# Patient Record
Sex: FEMALE | Race: WHITE | NOT HISPANIC OR LATINO | URBAN - METROPOLITAN AREA
[De-identification: names, ages, dates, MRNs, and addresses within clinical notes are randomized per-mention and may not be internally consistent; named-entity substitution may affect disease eponyms.]

---

## 2022-09-01 ENCOUNTER — APPOINTMENT (EMERGENCY)
Dept: CT IMAGING | Facility: HOSPITAL | Age: 57
End: 2022-09-01
Payer: COMMERCIAL

## 2022-09-01 ENCOUNTER — APPOINTMENT (OUTPATIENT)
Dept: MRI IMAGING | Facility: HOSPITAL | Age: 57
End: 2022-09-01
Payer: COMMERCIAL

## 2022-09-01 ENCOUNTER — HOSPITAL ENCOUNTER (OUTPATIENT)
Facility: HOSPITAL | Age: 57
Setting detail: OBSERVATION
Discharge: HOME HEALTH | End: 2022-09-02
Attending: EMERGENCY MEDICINE | Admitting: INTERNAL MEDICINE
Payer: COMMERCIAL

## 2022-09-01 DIAGNOSIS — R29.90 STROKE-LIKE SYMPTOM: Primary | ICD-10-CM

## 2022-09-01 DIAGNOSIS — I63.9 ACUTE CVA (CEREBROVASCULAR ACCIDENT) (HCC): ICD-10-CM

## 2022-09-01 PROBLEM — I10 ESSENTIAL HYPERTENSION: Status: ACTIVE | Noted: 2022-09-01

## 2022-09-01 PROBLEM — Z72.0 TOBACCO ABUSE: Status: ACTIVE | Noted: 2022-09-01

## 2022-09-01 LAB
2HR DELTA HS TROPONIN: 0 NG/L
4HR DELTA HS TROPONIN: 1 NG/L
ANION GAP SERPL CALCULATED.3IONS-SCNC: 11 MMOL/L (ref 4–13)
APTT PPP: 33 SECONDS (ref 23–37)
ATRIAL RATE: 70 BPM
BUN SERPL-MCNC: 11 MG/DL (ref 5–25)
CALCIUM SERPL-MCNC: 8.9 MG/DL (ref 8.3–10.1)
CARDIAC TROPONIN I PNL SERPL HS: 4 NG/L
CARDIAC TROPONIN I PNL SERPL HS: 4 NG/L
CARDIAC TROPONIN I PNL SERPL HS: 5 NG/L
CHLORIDE SERPL-SCNC: 100 MMOL/L (ref 96–108)
CO2 SERPL-SCNC: 27 MMOL/L (ref 21–32)
CREAT SERPL-MCNC: 0.84 MG/DL (ref 0.6–1.3)
ERYTHROCYTE [DISTWIDTH] IN BLOOD BY AUTOMATED COUNT: 12.5 % (ref 11.6–15.1)
GFR SERPL CREATININE-BSD FRML MDRD: 77 ML/MIN/1.73SQ M
GLUCOSE SERPL-MCNC: 124 MG/DL (ref 65–140)
GLUCOSE SERPL-MCNC: 94 MG/DL (ref 65–140)
HCT VFR BLD AUTO: 42.8 % (ref 34.8–46.1)
HGB BLD-MCNC: 14.4 G/DL (ref 11.5–15.4)
INR PPP: 1.08 (ref 0.84–1.19)
MCH RBC QN AUTO: 32.9 PG (ref 26.8–34.3)
MCHC RBC AUTO-ENTMCNC: 33.6 G/DL (ref 31.4–37.4)
MCV RBC AUTO: 98 FL (ref 82–98)
P AXIS: 68 DEGREES
PLATELET # BLD AUTO: 218 THOUSANDS/UL (ref 149–390)
PMV BLD AUTO: 10.9 FL (ref 8.9–12.7)
POTASSIUM SERPL-SCNC: 4 MMOL/L (ref 3.5–5.3)
PR INTERVAL: 174 MS
PROTHROMBIN TIME: 14.8 SECONDS (ref 11.6–14.5)
QRS AXIS: 55 DEGREES
QRSD INTERVAL: 96 MS
QT INTERVAL: 416 MS
QTC INTERVAL: 449 MS
RBC # BLD AUTO: 4.38 MILLION/UL (ref 3.81–5.12)
SODIUM SERPL-SCNC: 138 MMOL/L (ref 135–147)
T WAVE AXIS: 33 DEGREES
VENTRICULAR RATE: 70 BPM
WBC # BLD AUTO: 6.22 THOUSAND/UL (ref 4.31–10.16)

## 2022-09-01 PROCEDURE — 84484 ASSAY OF TROPONIN QUANT: CPT | Performed by: PHYSICIAN ASSISTANT

## 2022-09-01 PROCEDURE — 82948 REAGENT STRIP/BLOOD GLUCOSE: CPT

## 2022-09-01 PROCEDURE — 85027 COMPLETE CBC AUTOMATED: CPT | Performed by: EMERGENCY MEDICINE

## 2022-09-01 PROCEDURE — G1004 CDSM NDSC: HCPCS

## 2022-09-01 PROCEDURE — 70498 CT ANGIOGRAPHY NECK: CPT

## 2022-09-01 PROCEDURE — 85730 THROMBOPLASTIN TIME PARTIAL: CPT | Performed by: EMERGENCY MEDICINE

## 2022-09-01 PROCEDURE — 93005 ELECTROCARDIOGRAM TRACING: CPT

## 2022-09-01 PROCEDURE — 93010 ELECTROCARDIOGRAM REPORT: CPT | Performed by: INTERNAL MEDICINE

## 2022-09-01 PROCEDURE — 99285 EMERGENCY DEPT VISIT HI MDM: CPT | Performed by: EMERGENCY MEDICINE

## 2022-09-01 PROCEDURE — 84484 ASSAY OF TROPONIN QUANT: CPT | Performed by: EMERGENCY MEDICINE

## 2022-09-01 PROCEDURE — 99291 CRITICAL CARE FIRST HOUR: CPT | Performed by: PHYSICIAN ASSISTANT

## 2022-09-01 PROCEDURE — 99220 PR INITIAL OBSERVATION CARE/DAY 70 MINUTES: CPT | Performed by: INTERNAL MEDICINE

## 2022-09-01 PROCEDURE — 70551 MRI BRAIN STEM W/O DYE: CPT

## 2022-09-01 PROCEDURE — 80048 BASIC METABOLIC PNL TOTAL CA: CPT | Performed by: EMERGENCY MEDICINE

## 2022-09-01 PROCEDURE — 85610 PROTHROMBIN TIME: CPT | Performed by: EMERGENCY MEDICINE

## 2022-09-01 PROCEDURE — 99285 EMERGENCY DEPT VISIT HI MDM: CPT

## 2022-09-01 PROCEDURE — 70496 CT ANGIOGRAPHY HEAD: CPT

## 2022-09-01 PROCEDURE — 36415 COLL VENOUS BLD VENIPUNCTURE: CPT | Performed by: EMERGENCY MEDICINE

## 2022-09-01 RX ORDER — ATORVASTATIN CALCIUM 40 MG/1
40 TABLET, FILM COATED ORAL DAILY
COMMUNITY

## 2022-09-01 RX ORDER — CLOPIDOGREL BISULFATE 75 MG/1
75 TABLET ORAL DAILY
Status: DISCONTINUED | OUTPATIENT
Start: 2022-09-02 | End: 2022-09-02 | Stop reason: HOSPADM

## 2022-09-01 RX ORDER — ATORVASTATIN CALCIUM 40 MG/1
40 TABLET, FILM COATED ORAL
Status: DISCONTINUED | OUTPATIENT
Start: 2022-09-01 | End: 2022-09-02 | Stop reason: HOSPADM

## 2022-09-01 RX ORDER — ASPIRIN 81 MG/1
81 TABLET ORAL DAILY
Status: DISCONTINUED | OUTPATIENT
Start: 2022-09-02 | End: 2022-09-02 | Stop reason: HOSPADM

## 2022-09-01 RX ORDER — ACETAMINOPHEN 325 MG/1
650 TABLET ORAL EVERY 4 HOURS PRN
Status: DISCONTINUED | OUTPATIENT
Start: 2022-09-01 | End: 2022-09-02 | Stop reason: HOSPADM

## 2022-09-01 RX ORDER — NICOTINE 21 MG/24HR
1 PATCH, TRANSDERMAL 24 HOURS TRANSDERMAL DAILY
Status: DISCONTINUED | OUTPATIENT
Start: 2022-09-01 | End: 2022-09-02 | Stop reason: HOSPADM

## 2022-09-01 RX ORDER — ENOXAPARIN SODIUM 100 MG/ML
40 INJECTION SUBCUTANEOUS
Status: DISCONTINUED | OUTPATIENT
Start: 2022-09-01 | End: 2022-09-02 | Stop reason: HOSPADM

## 2022-09-01 RX ORDER — LORAZEPAM 0.5 MG/1
0.5 TABLET ORAL EVERY 8 HOURS PRN
Status: DISCONTINUED | OUTPATIENT
Start: 2022-09-01 | End: 2022-09-02 | Stop reason: HOSPADM

## 2022-09-01 RX ORDER — NIFEDIPINE 30 MG/1
30 TABLET, FILM COATED, EXTENDED RELEASE ORAL DAILY
COMMUNITY
End: 2022-10-06 | Stop reason: SDUPTHER

## 2022-09-01 RX ORDER — TRAMADOL HYDROCHLORIDE 50 MG/1
50 TABLET ORAL EVERY 8 HOURS PRN
COMMUNITY
End: 2022-09-08 | Stop reason: ALTCHOICE

## 2022-09-01 RX ORDER — ASPIRIN 81 MG/1
81 TABLET ORAL DAILY
COMMUNITY
End: 2022-10-06 | Stop reason: SDUPTHER

## 2022-09-01 RX ORDER — CLOPIDOGREL BISULFATE 75 MG/1
75 TABLET ORAL DAILY
COMMUNITY
End: 2022-09-16 | Stop reason: SDUPTHER

## 2022-09-01 RX ORDER — ASPIRIN 325 MG
325 TABLET ORAL ONCE
Status: COMPLETED | OUTPATIENT
Start: 2022-09-01 | End: 2022-09-01

## 2022-09-01 RX ADMIN — ATORVASTATIN CALCIUM 40 MG: 40 TABLET, FILM COATED ORAL at 16:36

## 2022-09-01 RX ADMIN — IOHEXOL 65 ML: 350 INJECTION, SOLUTION INTRAVENOUS at 11:39

## 2022-09-01 RX ADMIN — LORAZEPAM 0.5 MG: 0.5 TABLET ORAL at 16:55

## 2022-09-01 RX ADMIN — ASPIRIN 325 MG ORAL TABLET 325 MG: 325 PILL ORAL at 14:18

## 2022-09-01 RX ADMIN — ENOXAPARIN SODIUM 40 MG: 100 INJECTION SUBCUTANEOUS at 14:18

## 2022-09-01 NOTE — CASE MANAGEMENT
Case Management Assessment & Discharge Planning Note    Patient name Josefina Andersen  Location [facility] POOL/[facility] MRN [id number]  : 1965 Date 2022       Current Admission Date: 2022  Current Admission Diagnosis:Stroke-like symptoms   Patient Active Problem List    Diagnosis Date Noted    Stroke-like symptoms 2022      LOS (days): 0  Geometric Mean LOS (GMLOS) (days):   Days to GMLOS:     OBJECTIVE:              Current admission status: Observation       Preferred Pharmacy: No Pharmacies Listed  Primary Care Provider: No primary care provider on file  Primary Insurance:   Secondary Insurance:     ASSESSMENT:  [address] [place] [address] Representative - Son   Primary Phone: [phone] (Mobile)               Advance Directives  Does patient have a [address]?: No  Was patient offered paperwork?: Yes (declined)  Does patient currently have a Health Care decision maker?: Yes, please see Health Care Proxy section  Does patient have Advance Directives?: No  Was patient offered paperwork?: Yes (declined)  Primary Contact: Leonides Alonzo         Readmission Root Cause  30 Day Readmission: No    Patient Information  Admitted from[de-identified] Home  Mental Status: Alert  During Assessment patient was accompanied by: Not accompanied during assessment  Assessment information provided by[de-identified] Patient  Primary Caregiver: Self  Support Systems: Self, Family members  [place] of Residence: [address] [place] [address] do you live in?: [address] [place] [address] entry access options   Select all that apply : Stairs  Number of steps to enter home : 3  Do the steps have railings?: Yes  Type of Current Residence: [facility]  In the last 12 months, was there a time when you were not able to pay the mortgage or rent on time?: No  In the last 12 months, how many places have you lived?: 1  In the last 12 months, was there a time when you did not have a steady place to sleep or slept in a shelter (including now)?: No  Homeless/housing insecurity resource given?: N/A  Living Arrangements: Lives w/ Son  Is patient a ?: No    Activities of Daily Living Prior to Admission  Functional Status: Independent  Completes ADLs independently?: Yes  Ambulates independently?: Yes  Does patient use assisted devices?: Yes  Assisted Devices (DME) used: [employer]  Does patient currently own DME?: Yes  What DME does the patient currently own?: [employer]  Does patient have a history of Outpatient Therapy (PT/OT)?: Yes  Does the patient have a history of Short-Term Rehab?: No  Does patient have a history of HHC?: No  Does patient currently have [address]?: No         Patient Information Continued  Income Source: Unknown  Does patient have prescription coverage?: Yes  Within the past 12 months, you worried that your food would run out before you got the money to buy more : Never true  Within the past 12 months, the food you bought just didn't last and you didn't have money to get more : Never true  Food insecurity resource given?: N/A  Does patient receive dialysis treatments?: No  Does patient have a history of substance abuse?: No  Does patient have a history of Mental Health Diagnosis?: No         Means of Transportation  Means of Transport to Appts[de-identified] Family transport  In the past 12 months, has lack of transportation kept you from medical appointments or from getting medications?: No  In the past 12 months, has lack of transportation kept you from meetings, work, or from getting things needed for daily living?: No  Was application for public transport provided?: N/A        DISCHARGE DETAILS:    Discharge planning discussed with[de-identified] Patient  Freedom of Choice: Yes  Comments - Freedom of Choice: discussed  CM contacted family/caregiver?: No- see comments (pt indpt in room)  Were Treatment Team discharge recommendations reviewed with patient/caregiver?: Yes  Did patient/caregiver verbalize understanding of patient care needs?: Yes Contacts  Reason/Outcome: Discharge [address]         Is the patient interested in [facility] at discharge?: No    DME Referral Provided  Referral made for DME?: No    Other Referral/Resources/Interventions Provided:  Interventions: None Indicated  Referral Comments: Pt needs TBD  At this time she states she is indpt  Pt is participating in Op PT/OT with [facility] from previous stroke 8/1  No other noted needs            Discharge Destination Plan[de-identified] Home  Transport at Discharge : Family                                      Additional Comments: Cm met with pt at bedside in the ED  Pt reports that she lives in a mobile home with 3 louisa  Pt states that her 2 sons reside with her as well  No hx of HHC/STR/MH/DA  Pt states that she had a stroke earlier this month and went to [facility]  She was sent home with a walker and OP PT/OT orders  Pt started OP PT/OT yesterday with [facility]  Pt states she has not used her rw yet  Pt also reports that her speech is "off" as of today due to ther current hospital problem resulting in stroke pathway orders  Pt states that she is no longer driving for about a year now do to losing her license  Pt did not expand on this further  Pt states her sons are her transportation when needed  Pt does nto have a PCP  INFO link explained to pt and added to her follow up  Pt does have insurance  She states her cards have not come yet so she was not able to provide to registration  Pt states she uses [employer] and that her insurance is on record with them  Cm called pts pharmacy and got her ID number for her [employer] plan ID number [id number]  This information was provided to Financial Counsel[employer] for ins to be added to pts chart  Neurology consulting   Cm following

## 2022-09-01 NOTE — H&P
[place]     H&P- Ghislaine Zenge 1965, 61 y o  female MRN: [id number]  Unit/Bed#: -[facility] Encounter: [id number]  Primary Care Provider: No primary care provider on file  Date and time admitted to hospital: 9/1/2022 11:11 AM    * Stroke-like symptoms  Assessment & Plan  · Patient presented to the ED on 09/01/2022 with stroke like symptoms of right sided UE and LE weakness, left sided facial droop, and dysphagia  · Patient was admitted to [facility] last week for acute CVA  · CT head and CTA of head and neck: Concern for right sided subacute to chronic infarct  Chronic left lacunar infarcts  Atherosclerotic changes on bilateral carotids   · Obtain MRI brain  · Will attempt to obtain records from recent [facility] admission - order echo if this was not recently completed previously  · Continue ASA/plavix, statin  · Attempting to obtain medical records  · Consult PT/OT and speech therapy  · Monitor on telemetry  · Follow up hemoglobin A1c, lipid panel  · Appreciate ongoing neurology recommendations    Essential hypertension  Assessment & Plan  · Blood pressure stable  · Home medication regimen includes nifedipine  · Hold antihypertensives for now to allow for permissive hypertension  · Monitor BP per unit protocol    Tobacco abuse  Assessment & Plan  · Smokes 4 cigarettes per day on average  · Nicotine patch ordered    VTE Pharmacologic Prophylaxis: VTE Score: 6 High Risk (Score >/= 5) - Pharmacological DVT Prophylaxis Ordered: enoxaparin (Lovenox)  Sequential Compression Devices Ordered  Code Status: Level 1 - Full Code   Discussion with family: Patient declined call to   Offered to call  Anticipated Length of Stay: Patient will be admitted on an observation basis with an anticipated length of stay of less than 2 midnights secondary to CVA work up      Total Time for Visit, including Counseling / Coordination of Care: 70 minutes Greater than 50% of this total time spent on direct patient counseling and coordination of care  Chief Complaint: Stroke like symptoms    History of Present Illness:  Josefina Andersen is a 61 y o  female with a PMH of HTN, tobacco abuse, and peripheral neuropathy of the lower extremities who presents with stroke-like symptoms of right upper and lower extremity weakness, left sided facial droop, and dysphagia  Patient has recently had a right hemisphere stroke and was admitted to [facility] last week  Patient stated that the new weakness began yesterday (08/31) afternoon but did not think much of it  Patient has noted sensation of dysphagia and had an episode while drinking coffee where she felt she could not completely swallow therefore her intake has been reduced due to fear of difficulty swallowing  Also notes that her speech was slightly "off" after her recent hospitalization but that seems to have worsened since yesterday when her new symptoms originally began  This was also associated with report of blurry vision  She states that she is a little SOB, which she feels is related to anxiety  Denies nausea, vomiting, and abdominal pain  Denies any chest pain  She believes she had been discharged on aspirin and plavix after leaving [facility] but is unable to recall what her other medications had been  She had been discharged home with VNA services and was to use a rolling walker with ambulation  Review of Systems:  Review of Systems   Constitutional: Negative for chills, fatigue, fever and unexpected weight change  HENT: Negative for congestion, sore throat and trouble swallowing  Eyes: Positive for visual disturbance  Negative for photophobia and pain  Respiratory: Negative for cough, shortness of breath and wheezing  Cardiovascular: Negative for chest pain, palpitations and leg swelling     Gastrointestinal: Negative for abdominal pain, constipation, diarrhea, nausea and vomiting  Endocrine: Negative for polyuria  Genitourinary: Negative for difficulty urinating, dysuria, flank pain, hematuria and urgency  Musculoskeletal: Negative for back pain, myalgias, neck pain and neck stiffness  Skin: Negative for pallor and rash  Neurological: Positive for facial asymmetry, speech difficulty, weakness and numbness  Negative for dizziness, tremors, syncope, light-headedness and headaches  Hematological: Does not bruise/bleed easily  Psychiatric/Behavioral: Negative for agitation and confusion  Past Medical and Surgical History:   Past Medical History:   Diagnosis Date    CVA (cerebral vascular accident) ([facility] [address] )     Diverticulitis        History reviewed  No pertinent surgical history  Meds/Allergies:  Prior to Admission medications    Not on File     I have reviewed home medications with a medical source (PCP, Pharmacy, other)  Allergies: No Known Allergies    Social History:  Marital Status: Unknown   Occupation:   Patient Pre-hospital Living Situation: Home  Patient Pre-hospital Level of Mobility: unable to be assessed at time of evaluation  Patient Pre-hospital Diet Restrictions:   Substance Use History:   Social History     Substance and Sexual Activity   Alcohol Use Yes     Social History     Tobacco Use   Smoking Status Current Every Day Smoker    Types: Cigarettes   Smokeless Tobacco Never Used   Tobacco Comment    smokes 4 cigarettes daily     Social History     Substance and Sexual Activity   Drug Use Yes    Types: Marijuana       Family History:  History reviewed  No pertinent family history      Physical Exam:     Vitals:   Blood Pressure: 170/92 (09/01/22 1439)  Pulse: 58 (09/01/22 1439)  Temperature: 97 5 °F (36 4 °C) (09/01/22 1439)  Temp Source: Axillary (09/01/22 1338)  Respirations: 17 (09/01/22 1338)  Height: 5' 6" (167 6 cm) (09/01/22 1344)  Weight - Scale: 61 3 kg (135 lb 2 3 oz) (09/01/22 1344)  SpO2: 96 % (09/01/22 1439)    Physical Exam  Vitals and nursing note reviewed  Constitutional:       Appearance: Normal appearance  Comments: No acute distress   HENT:      Head: Normocephalic  Eyes:      General: No scleral icterus  Extraocular Movements: Extraocular movements intact  Conjunctiva/sclera: Conjunctivae normal    Cardiovascular:      Rate and Rhythm: Normal rate and regular rhythm  Heart sounds: Normal heart sounds  Pulmonary:      Effort: Pulmonary effort is normal       Breath sounds: Normal breath sounds  No wheezing, rhonchi or rales  Abdominal:      General: Bowel sounds are normal       Palpations: Abdomen is soft  Tenderness: There is no abdominal tenderness  There is no guarding or rebound  Musculoskeletal:         General: No swelling, tenderness or deformity  Cervical back: Normal range of motion  Comments: Right upper and lower extremity strength 4/5  Left upper and lower extremity strength 5/5  No erythema and edema     Skin:     General: Skin is warm and dry  Neurological:      Mental Status: She is alert and oriented to person, place, and time  Cranial Nerves: Dysarthria and facial asymmetry present  Comments: - left sided facial droop   Psychiatric:         Attention and Perception: Attention and perception normal          Mood and Affect: Mood is anxious           Additional Data:     Lab Results:  Results from last 7 days   Lab Units 09/01/22  1126   WBC Thousand/uL 6 22   HEMOGLOBIN g/dL 14 4   HEMATOCRIT % 42 8   PLATELETS Thousands/uL 218     Results from last 7 days   Lab Units 09/01/22  1126   SODIUM mmol/L 138   POTASSIUM mmol/L 4 0   CHLORIDE mmol/L 100   CO2 mmol/L 27   BUN mg/dL 11   CREATININE mg/dL 0 84   ANION GAP mmol/L 11   CALCIUM mg/dL 8 9   GLUCOSE RANDOM mg/dL 124     Results from last 7 days   Lab Units 09/01/22  1149   INR  1 08                   Imaging: Reviewed radiology reports from this admission including: CT head  CTA stroke alert (head/neck) Final Result by Emma Lott MD (09/01 1210)      1  No intracranial large vessel occlusion or critical stenosis  No aneurysm  2   No hemodynamically significant stenosis of cervical carotid and vertebral arteries  Atherosclerotic change of bilateral carotid arteries  I personally discussed this study with Dr Laurence Quiroz on 9/1/2022 at 11:49 AM                 Workstation performed: [id number]         CT stroke alert brain   Final Result by Emma Lott MD (09/01 1212)      1  Hypodensities involving right frontal periventricular white matter/centrum semiovale and right thalamocapsular region representing ischemic change, most likely subacute to chronic in nature  2   Chronic lacunar infarcts involving left thalamus and basal ganglia  I personally discussed this study with Dr Laurence Quiroz on 9/1/2022 at 11:49 AM              Workstation performed: [id number]         MRI inpatient order    (Results Pending)       EKG and Other Studies Reviewed on Admission:   · EKG: NSR  HR 70     ** Please Note: This note has been constructed using a voice recognition system   **

## 2022-09-01 NOTE — ASSESSMENT & PLAN NOTE
· Patient presented to the ED on 09/01/2022 with stroke like symptoms of right sided UE and LE weakness, left sided facial droop, and dysphagia  · Patient was admitted to [facility] last week for acute CVA  · CT head and CTA of head and neck: Concern for right sided subacute to chronic infarct  Chronic left lacunar infarcts    Atherosclerotic changes on bilateral carotids   · Obtain MRI brain  · Will attempt to obtain records from recent [facility] admission - order echo if this was not recently completed previously  · Continue ASA/plavix, statin  · Attempting to obtain medical records  · Consult PT/OT and speech therapy  · Monitor on telemetry  · Follow up hemoglobin A1c, lipid panel  · Appreciate ongoing neurology recommendations

## 2022-09-01 NOTE — ASSESSMENT & PLAN NOTE
· Blood pressure stable  · Home medication regimen includes nifedipine  · Hold antihypertensives for now to allow for permissive hypertension  · Monitor BP per unit protocol

## 2022-09-01 NOTE — CONSULTS
Consultation - Stroke   Trinidad Atwood 61 y o  female MRN: [id number]  Unit/Bed#: ED 09 Encounter: [id number]      Assessment/Plan   76-year-old female with history of recent right hemisphere stroke at [facility] (L facial weakness and hemiparesis per patient), HTN, tobacco use, anxiety and LE neuropathy  Patient presents today (09/01) as stroke following new onset stroke-like symptoms beginning last night: dysarthria, blurry vision and R UE and LE heaviness and fine motor difficulty in the R UE  No acute findings on initial neuroimaging (recent subacute R hemisphere infarcts noted and chronic L subcortical infarcts seen)  Not a candidate for TNK/endovasucular intervention  Will rule out new/acute cerebrovascular event given her symptoms  Thrombolytic Decision: Patient not a candidate  Unclear time of onset outside appropriate time window  Plan:  -initiate stroke pathway:  -CT head during alert with R thalamic/corona radiata infarcts, but no new/acute intracranial pathology  -CTA head/neck negative for LVO, nor any significant flow restrictive disease/stenosis  -obtain MRI brain  -if able, obtain records from [address] [place] [address] admission last week; if 2D echo performed and results reviewed, no need to repeat from neurology standpoint  -per patient on plavix prior to admission; will load with aspirin 325 mg once and continue DAPT starting tomorrow  -initiate lipitor 40 mg daily  -check hemoglobin A1C and lipid panel  -ok with permissive HTN until MRI reviewed  -neuro checks  -telemetry monitoring  -provide stroke education  -therapy evaluations (noted with unsteady gait during initial eval in the ED this morning)    Discussed plan of care with attending neurologist during stroke alert  Recommendations for outpatient neurological follow up have yet to be determined      History of Present Illness     Reason for Consult / Principal Problem: Stroke alert, new onset dysarthria, blurry vision and R sided heaviness/fine motor difficulty  Hx and PE limited by: patient relatively poor historian  Patient last known well: last night (08/31) at 7 PM  Stroke alert called: 11:22 AM  Neurology time of arrival: Via phone immediately, in person 11:24 AM    HPI: Ji Ibarra is a 61 y o  female with history as mentioned above in assessment who neurology is asked to evaluate as stroke alert this morning for the above symptoms  Upon discussing with patient, she was just recently admitted to [facility] last week for several days after suffering right hemisphere stroke; symptoms that time included left facial droop, left-sided hemiparesis  She was discharged home on Plavix and had been functioning okay/ambulating without significant difficulty  Beginning last night at approximately 7:00 PM; patient felt new onset symptoms which included slurred speech, his sensation blurry vision as well as the sensation of bilateral hand cramping  Additionally though last night and as well as this morning, she experience new right-sided symptoms which she was concerned about; this included heaviness of the right upper lower extremity, including difficulty holding a coffee cup which resulted in her spelling  She noted her gait was more unsteady than previously  Given these new onset symptoms patient became concerned and a friend drove her to the ED this morning for evaluation  Stroke alert was activated upon arrival, initial /95, see NIHSS as mentioned below and neuro imaging results as mentioned above  Patient states she could do a better job with medication compliance at home, currently smoker, 4 cigarettes per day on average  No prior outside records for review at time of stroke alert and note documentation, will update if/as it becomes available  Consult to Neurology  Consult performed by:  Laura Maya PA-C  Consult ordered by: Shira Hill, DO          Review of Systems   Unable to perform ROS: Acuity of condition       Historical Information   Past Medical History:   Diagnosis Date    CVA [facility]cerebral vascular accident) ([facility] [address] )     Diverticulitis      History reviewed  No pertinent surgical history  Social History   Social History     Substance and Sexual Activity   Alcohol Use Yes     Social History     Substance and Sexual Activity   Drug Use Not on file     E-Cigarette/Vaping    E-Cigarette Use Never User      E-Cigarette/Vaping Substances     Social History     Tobacco Use   Smoking Status Current Every Day Smoker    Types: Cigarettes   Smokeless Tobacco Never Used     Family History: History reviewed  No pertinent family history  Review of previous medical records was completed  Meds/Allergies   current meds:   No current facility-administered medications for this encounter  and PTA meds:   None       No Known Allergies    Objective   Vitals:Blood pressure 163/92, pulse 66, resp  rate 16, height 5' 6" (1 676 m), weight 60 3 kg (133 lb), SpO2 99 %  ,Body mass index is 21 47 kg/m²  No intake or output data in the 24 hours ending 09/01/22 1208    Invasive Devices: Invasive Devices  Report    Peripheral Intravenous Line  Duration           Peripheral IV 09/01/22 Left Forearm <1 day                Physical Exam  Constitutional:       Comments: Overall frail/thin body frame   HENT:      Head: Normocephalic and atraumatic  Eyes:      Extraocular Movements: EOM normal       Pupils: Pupils are equal, round, and reactive to light  Cardiovascular:      Rate and Rhythm: Normal rate  Pulmonary:      Effort: Pulmonary effort is normal    Abdominal:      General: There is no distension  Musculoskeletal:      Cervical back: Normal range of motion and neck supple  Skin:     General: Skin is warm and dry  Neurological:      Mental Status: She is alert  Neurologic Exam     Mental Status     Awake, alert, oriented to place, date, names simple objects without aphasia    Speech is moderately dysarthric, for the most part intelligible  Following commands  Cranial Nerves     CN II   Visual fields full to confrontation  CN III, IV, VI   Pupils are equal, round, and reactive to light  Extraocular motions are normal    Nystagmus: none     CN V   Facial sensation intact  CN VII   Left facial weakness: central    CN VIII   CN VIII normal      CN IX, X   CN IX normal    CN X normal      CN XI   CN XI normal      CN XII   CN XII normal      EOMs appear full, no visual field deficit to confrontation, has resting and with smile a left facial weakness/droop  To pinprick, symmetric and sharp sensation throughout the face  Motor Exam No obvious drift/pronation in the upper extremities with testing, with maximal effort she has just trace weakness at most in the right upper extremity  Has proximal greater than distal mild weakness throughout the left upper extremity  Minimal with giveaway weakness with left hip flexor and knee flexion  Full strength with right hip flexion and knee range of motion  Symmetric and essentially full sensation in the distal lower extremities bilaterally  Sensory Exam     Diminished sensation to pin and temperature bilaterally lower extremities (from the knee distally; history of neuropathy per patient)    Fully intact sensation in upper extremities to temperature, vibration, light touch  No obvious kristy sensory loss nor neglect/extension  Gait, Coordination, and Reflexes   Minimal clumsiness bilaterally with heel-to-shin, but not significant  Has slightly clumsy year left finger-to-nose performance compared to the right, which appears smooth  3+ patellar DTRs bilaterally, 2+ throughout uppers, no ankle clonus  Gait deferred due to acuity of condition/safety  NIHSS:  1a Level of Consciousness: 0 = Alert   1b  LOC Questions: 0 = Answers both correctly   1c  LOC Commands: 0 = Obeys both correctly   2  Best Gaze: 0 = Normal   3   Visual: 0 = No visual field loss   4  Facial Palsy: 2=Partial paralysis (total or near total paralysis of the lower face)   5a  Motor Right Arm: 0=No drift, limb holds 90 (or 45) degrees for full 10 seconds   5b  Motor Left Arm: 0=No drift, limb holds 90 (or 45) degrees for full 10 seconds   6a  Motor Right Le=No drift, limb holds 90 (or 45) degrees for full 10 seconds   6b  Motor Left Le=No drift, limb holds 90 (or 45) degrees for full 10 seconds   7  Limb Ataxia:  0=Absent   8  Sensory: 0=Normal; no sensory loss   9  Best Language:  0=No aphasia, normal   10  Dysarthria: 1=Mild to moderate, patient slurs at least some words and at worst, can be understood with some difficulty   11  Extinction and Inattention (formerly Neglect): 0=No abnormality   Total Score: 3     Time NIHSS was completed: 11:30 AM    Modified Mansoor Score:  2 (Unable to carry out all previous activities, but able to look after own affairs without assistance)    Lab Results:   CBC:   Results from last 7 days   Lab Units 22  1126   WBC Thousand/uL 6 22   RBC Million/uL 4 38   HEMOGLOBIN g/dL 14 4   HEMATOCRIT % 42 8   MCV fL 98   PLATELETS Thousands/uL 218   , BMP/CMP:       Invalid input(s): ALBUMIN, Vitamin B12:   , HgBA1C:   , TSH:   , Coagulation:   , Lipid Profile:     Imaging Studies: I have personally reviewed pertinent films in PACS   CTA stroke alert (head/neck)   Final Result by Libertad Arriola MD ([id number])      1  No intracranial large vessel occlusion or critical stenosis  No aneurysm  2   No hemodynamically significant stenosis of cervical carotid and vertebral arteries  Atherosclerotic change of bilateral carotid arteries  I personally discussed this study with Dr Izabella Sandhu on 2022 at 11:49 AM                 Workstation performed: [id number]         CT stroke alert brain   Final Result by Libertad Arriola MD ([id number])      1    Hypodensities involving right frontal periventricular white matter/centrum semiovale and right thalamocapsular region representing ischemic change, most likely subacute to chronic in nature  2   Chronic lacunar infarcts involving left thalamus and basal ganglia  I personally discussed this study with Dr Patrick Alston on 9/1/2022 at 11:49 AM              Workstation performed: [id number]             EKG, Pathology, and Other Studies: I have personally reviewed pertinent reports  VTE Prophylaxis: None, in ED    Code Status: No Order    Total Critical Care time spent 45 minutes  Discussed plan of care with patient and ED team: initial neuroimaging without significant findings, admit to stroke pathway to exclude new infarct causing her acute symptoms, DAPT, neuro checks, therapies for gait assessment

## 2022-09-01 NOTE — PLAN OF CARE
Problem: MOBILITY - ADULT  Goal: Maintain or return to baseline ADL function  Description: INTERVENTIONS:  -  Assess patient's ability to carry out ADLs; assess patient's baseline for ADL function and identify physical deficits which impact ability to perform ADLs (bathing, care of mouth/teeth, toileting, grooming, dressing, etc )  - Assess/evaluate cause of self-care deficits   - Assess range of motion  - Assess patient's mobility; develop plan if impaired  - Assess patient's need for assistive devices and provide as appropriate  - Encourage maximum independence but intervene and supervise when necessary  - Involve family in performance of ADLs  - Assess for home care needs following discharge   - Consider OT consult to assist with ADL evaluation and planning for discharge  - Provide patient education as appropriate  Outcome: Progressing  Goal: Maintains/Returns to pre admission functional level  Description: INTERVENTIONS:  - Perform BMAT or MOVE assessment daily    - Set and communicate daily mobility goal to care team and patient/family/caregiver     - Collaborate with rehabilitation services on mobility goals if consulted  - Perform Range of Motion  - Reposition patient  - Dangle patient   - Stand patient  - Ambulate patient   - Out of bed to chair   - Out of bed for meals   Problem: NEUROSENSORY - ADULT  Goal: Achieves stable or improved neurological status  Description: INTERVENTIONS  - Monitor and report changes in neurological status  - Monitor vital signs such as temperature, blood pressure, glucose, and any other labs ordered   - Initiate measures to prevent increased intracranial pressure  - Monitor for seizure activity and implement precautions if appropriate      Outcome: Progressing  Goal: Achieves maximal functionality and self care  Description: INTERVENTIONS  - Monitor swallowing and airway patency with patient fatigue and changes in neurological status  - Encourage and assist patient to increase activity and self care     - Encourage visually impaired, hearing impaired and aphasic patients to use assistive/communication devices  Outcome: Progressing     - Out of bed for toileting  - Record patient progress and toleration of activity level   Outcome: Progressing

## 2022-09-01 NOTE — ED PROVIDER NOTES
History  Chief Complaint   Patient presents with    CVA/TIA-like Symptoms     HX CVA last week, slurred speech left facial droop, unsteady gait, vision changes  States last normal time was yesterday afternoon     48-year-old female notes dysarthria and heaviness of right upper extremity, left upper extremity and the right side her face  She says it feels somewhat like muscle spasm and somewhat like she is dehydrated  Started last evening  It has not improved  Patient recently was seen at [facility] and diagnosed with stroke  Plavix was begun  Patient denies recent fever, cough, vomiting, diarrhea, headache vision changes  Prior to Admission Medications   Prescriptions Last Dose Informant Patient Reported? Taking? NIFEdipine ER (ADALAT CC) 30 MG 24 hr tablet   Yes Yes   Sig: Take 30 mg by mouth daily   aspirin (ECOTRIN LOW STRENGTH) 81 mg EC tablet   Yes Yes   Sig: Take 81 mg by mouth daily   atorvastatin (LIPITOR) 40 mg tablet   Yes Yes   Sig: Take 40 mg by mouth daily   clopidogrel (PLAVIX) 75 mg tablet   Yes Yes   Sig: Take 75 mg by mouth daily   traMADol (ULTRAM) 50 mg tablet   Yes Yes   Sig: Take 50 mg by mouth every 8 (eight) hours as needed for moderate pain      Facility-Administered Medications: None       Past Medical History:   Diagnosis Date    CVA (cerebral vascular accident) ([facility] [address] )     Diverticulitis        History reviewed  No pertinent surgical history  History reviewed  No pertinent family history  I have reviewed and agree with the history as documented  E-Cigarette/Vaping    E-Cigarette Use Never User      E-Cigarette/Vaping Substances     Social History     Tobacco Use    Smoking status: Current Every Day Smoker     Types: Cigarettes    Smokeless tobacco: Never Used    Tobacco comment: smokes 4 cigarettes daily   Vaping Use    Vaping Use: Never used   Substance Use Topics    Alcohol use:  Yes    Drug use: Yes     Types: Marijuana       Review of Systems Constitutional: Negative for fever  Eyes: Positive for visual disturbance (transient blurred vision)  Respiratory: Negative for cough and shortness of breath  Cardiovascular: Negative for chest pain and palpitations  Gastrointestinal: Negative for abdominal pain and blood in stool  Genitourinary: Negative for dysuria and flank pain  Neurological: Positive for speech difficulty  Negative for dizziness, tremors, syncope and headaches  Psychiatric/Behavioral: Positive for confusion  All other systems reviewed and are negative  Physical Exam  Physical Exam  Vitals and nursing note reviewed  Constitutional:       General: She is not in acute distress  Appearance: She is well-developed and normal weight  She is not ill-appearing or diaphoretic  Comments: dysarthric   HENT:      Head: Normocephalic and atraumatic  Right Ear: External ear normal       Left Ear: External ear normal       Nose: Nose normal       Mouth/Throat:      Mouth: Mucous membranes are moist       Pharynx: Oropharynx is clear  Eyes:      General: No scleral icterus  Conjunctiva/sclera: Conjunctivae normal       Pupils: Pupils are equal, round, and reactive to light  Neck:      Vascular: No carotid bruit  Cardiovascular:      Rate and Rhythm: Normal rate and regular rhythm  Pulses: Normal pulses  Heart sounds: Normal heart sounds  No murmur heard  Pulmonary:      Effort: Pulmonary effort is normal       Breath sounds: Normal breath sounds  Abdominal:      General: Bowel sounds are normal       Palpations: Abdomen is soft  Tenderness: There is no abdominal tenderness  There is no guarding or rebound  Musculoskeletal:         General: No tenderness  Normal range of motion  Cervical back: Normal range of motion and neck supple  No tenderness  Right lower leg: No edema  Left lower leg: No edema  Skin:     General: Skin is warm and dry        Capillary Refill: Capillary refill takes less than 2 seconds  Findings: No rash  Neurological:      Mental Status: She is alert and oriented to person, place, and time  Cranial Nerves: No cranial nerve deficit  Sensory: No sensory deficit  Motor: Weakness present  Coordination: Coordination normal       Deep Tendon Reflexes: Reflexes are normal and symmetric  Reflexes normal       Comments: Dysarthria     Psychiatric:         Mood and Affect: Mood normal          Behavior: Behavior normal          Vital Signs  ED Triage Vitals   Temperature Pulse Respirations Blood Pressure SpO2   09/01/22 1145 09/01/22 1109 09/01/22 1109 09/01/22 1109 09/01/22 1109   98 3 °F (36 8 °C) 77 16 (!) 172/95 99 %      Temp Source Heart Rate Source Patient Position - Orthostatic VS BP Location FiO2 (%)   09/01/22 1145 09/01/22 1109 09/01/22 1109 09/01/22 1109 --   Oral Monitor Sitting Left arm       Pain Score       09/01/22 1109       No Pain           Vitals:    09/02/22 0756 09/02/22 1140 09/02/22 1523 09/02/22 1528   BP: 160/93 160/93 143/89 143/89   Pulse: (!) 50   63   Patient Position - Orthostatic VS:             Visual Acuity  Visual Acuity    Flowsheet Row Most Recent Value   L Pupil Size (mm) 3   R Pupil Size (mm) 3   L Pupil Shape Round   R Pupil Shape Round          ED Medications  Medications   iohexol (OMNIPAQUE) 350 MG/ML injection (MULTI-DOSE) 100 mL (65 mL Intravenous Given 9/1/22 1139)   aspirin tablet 325 mg (325 mg Oral Given 9/1/22 1418)   potassium chloride (K-DUR,KLOR-CON) CR tablet 40 mEq (40 mEq Oral Given 9/2/22 0957)   barium sulfate 60 % cream 2 5 g (2 5 g Oral Given by Other 9/2/22 1142)       Diagnostic Studies  Results Reviewed     Procedure Component Value Units Date/Time    Fingerstick Glucose (POCT) [[id number]]  (Normal) Collected: 09/01/22 1145    Lab Status: Final result Updated: 09/02/22 1432     POC Glucose 84 mg/dl     HS Troponin I 4hr [[id number]]  (Normal) Collected: 09/01/22 1836    Lab Status: Final result Specimen: Blood from Arm, Right Updated: 09/01/22 1915     hs TnI 4hr 5 ng/L      Delta 4hr hsTnI 1 ng/L     HS Troponin I 2hr [[id number]]  (Normal) Collected: 09/01/22 1416    Lab Status: Final result Specimen: Blood from Arm, Left Updated: 09/01/22 1458     hs TnI 2hr 4 ng/L      Delta 2hr hsTnI 0 ng/L     Protime-INR [[id number]]  (Abnormal) Collected: 09/01/22 1149    Lab Status: Final result Specimen: Blood from Arm, Left Updated: 09/01/22 1443     Protime 14 8 seconds      INR 1 08    APTT [[id number]]  (Normal) Collected: 09/01/22 1149    Lab Status: Final result Specimen: Blood from Arm, Left Updated: 09/01/22 1443     PTT 33 seconds     Basic metabolic panel [[id number]] Collected: 09/01/22 1126    Lab Status: Final result Specimen: Blood from Arm, Left Updated: 09/01/22 1242     Sodium 138 mmol/L      Potassium 4 0 mmol/L      Chloride 100 mmol/L      CO2 27 mmol/L      ANION GAP 11 mmol/L      BUN 11 mg/dL      Creatinine 0 84 mg/dL      Glucose 124 mg/dL      Calcium 8 9 mg/dL      eGFR 77 ml/min/1 73sq m     Narrative:      [place] guidelines for Chronic Kidney Disease (CKD):     Stage 1 with normal or high GFR (GFR > 90 mL/min/1 73 square meters)    Stage 2 Mild CKD (GFR = 60-89 mL/min/1 73 square meters)    Stage 3A Moderate CKD (GFR = 45-59 mL/min/1 73 square meters)    Stage 3B Moderate CKD (GFR = 30-44 mL/min/1 73 square meters)    Stage 4 Severe CKD (GFR = 15-29 mL/min/1 73 square meters)    Stage 5 End Stage CKD (GFR <15 mL/min/1 73 square meters)  Note: GFR calculation is accurate only with a steady state creatinine    HS Troponin 0hr (reflex protocol) [[id number]]  (Normal) Collected: 09/01/22 1126    Lab Status: Final result Specimen: Blood from Arm, Left Updated: 09/01/22 1223     hs TnI 0hr 4 ng/L     CBC and Platelet [[id number]]  (Normal) Collected: 09/01/22 1126    Lab Status: Final result Specimen: Blood from Arm, Left Updated: 09/01/22 1130 WBC 6 22 Thousand/uL      RBC 4 38 Million/uL      Hemoglobin 14 4 g/dL      Hematocrit 42 8 %      MCV 98 fL      MCH 32 9 pg      MCHC 33 6 g/dL      RDW 12 5 %      Platelets 025 Thousands/uL      MPV 10 9 fL                  FL barium swallow video w speech   Final Result by SYSTEMGENERATED, DOCUMENTATION (09/02 1110)      MRI brain wo contrast   Final Result by Rosaline Costa MD (09/01 1601)      Acute infarcts in right frontal centrum semiovale and right posterior putamen  Small subacute infarct in left paramedian andreea  Chronic lacunar infarcts in right corona radiata, bilateral basal ganglia, and bilateral thalami  Findings consistent with hypertensive arteriopathy  Mild chronic microangiopathy  The study was marked in [facility] for immediate notification  Workstation performed: [id number]         CTA stroke alert (head/neck)   Final Result by Rob Sanders MD (09/01 1210)      1  No intracranial large vessel occlusion or critical stenosis  No aneurysm  2   No hemodynamically significant stenosis of cervical carotid and vertebral arteries  Atherosclerotic change of bilateral carotid arteries  I personally discussed this study with Dr Tahmina Wang on 9/1/2022 at 11:49 AM                 Workstation performed: [id number]         CT stroke alert brain   Final Result by Rob Sanders MD (09/01 1212)      1  Hypodensities involving right frontal periventricular white matter/centrum semiovale and right thalamocapsular region representing ischemic change, most likely subacute to chronic in nature  2   Chronic lacunar infarcts involving left thalamus and basal ganglia                        I personally discussed this study with Dr Tahmina Wang on 9/1/2022 at 11:49 AM              Workstation performed: [id number]                    Procedures  ECG 12 Lead Documentation Only    Date/Time: 9/1/2022 11:51 AM  Performed by: Davis Kenyon   Authorized by: Miller Jacobo DO     ECG reviewed by me, the ED Provider: yes    Patient location:  ED  Previous ECG:     Previous ECG:  Unavailable    Comparison to cardiac monitor: Yes    Interpretation:     Interpretation: normal    Quality:     Tracing quality:  Limited by artifact  Other findings:     Other findings: LAE               ED Course  ED Course as of 09/02/22 2012   Thu Sep 01, 2022   1126 Discussed case with Dr Laurence Dowd Name 09/01/22 1155             NIH Stroke Scale    Interval Baseline      Level of Consciousness (1a ) 0      LOC Questions (1b ) 0      LOC Commands (1c ) 0      Best Gaze (2 ) 0      Visual (3 ) 0      Facial Palsy (4 ) 2      Motor Arm, Left (5a ) 0      Motor Arm, Right (5b ) 0      Motor Leg, Left (6a ) 0      Motor Leg, Right (6b ) 0      Limb Ataxia (7 ) 0      Sensory (8 ) 0      Best Language (9 ) 0      Dysarthria (10 ) 1      Extinction and Inattention (11 ) (Formerly Neglect) 0      Total 3              Flowsheet Row Most Recent Value   Thrombolytic Decision Options    Thrombolytic Decision Patient not a candidate  Patient is not a candidate options Unclear time of onset outside appropriate time window , Recent significant trauma/stroke  MDM  Number of Diagnoses or Management Options  Stroke-like symptom: new and requires workup  Diagnosis management comments: 60 yo female with history of acute ischemic stroke evaluated at [facility] within the past week was improved on Plavix  Since about 7 PM last night developed dysarthria and feeling of heaviness/weakness/muscle tightness of both upper extremities  Had mild residual LUE symptoms from prior event, but RUE symptoms are new according to patient  Will call Stroke Alert although outside window for thrombolytic  May be candidate for endovascular intervention      Neurology responded to stroke alert and has been with patient since before CT/CTA performed  Patient without hemorrhage or large vessel occlusion  [facility]  Amount and/or Complexity of Data Reviewed  Clinical lab tests: ordered and reviewed  Tests in the radiology section of CPT®: ordered and reviewed  Review and summarize past medical records: yes  Discuss the patient with other providers: yes  Independent visualization of images, tracings, or specimens: yes        Disposition  Final diagnoses:   Stroke-like symptom     Time reflects when diagnosis was documented in both MDM as applicable and the Disposition within this note     Time User Action Codes Description Comment    9/1/2022 11:20 AM Sonny WALTER Add [R29 90] Stroke-like symptom     9/2/2022  3:29 PM Ford Austin Add [I63 9] Acute CVA (cerebrovascular accident) [facility])       ED Disposition     ED Disposition   Admit    Condition   Stable    Date/Time   u Sep 1, 2022 12:19 PM    Comment   Case was discussed with Dr Loly Edwards and the patient's admission status was agreed to be Admission Status: observation status to the service of Dr Loly Edwards   Follow-up Information     Follow up With Specialties Details Why Contact Info Additional Information    [facility]  Follow up [address] PCP [address] [facility] [facility] Neurology Follow up Office will call patient to arrange follow-up  If you do not hear from office within 7-10 days following discharge, please call office at [phone] to inquire   [address] [facility] [address] [address] [facility] [address], [address], [address], [place], [place], [address] [facility], [address] [place] [address] Medicine, Nurse Practitioner Follow up Appt Time: 9/08/22 at 9:05 am  You are a new patient Elaine  [address]  [address] [address] [facility] [address] [address]  [facility]  Follow up Start of care 9/9 Anand Archer [address]  [phone]           Discharge Medication List as of 9/2/2022  4:44 PM      CONTINUE these medications which have NOT CHANGED    Details   aspirin (ECOTRIN LOW STRENGTH) 81 mg EC tablet Take 81 mg by mouth daily, Historical Med      atorvastatin (LIPITOR) 40 mg tablet Take 40 mg by mouth daily, Historical Med      clopidogrel (PLAVIX) 75 mg tablet Take 75 mg by mouth daily, Historical Med      NIFEdipine ER (ADALAT CC) 30 MG 24 hr tablet Take 30 mg by mouth daily, Historical Med      traMADol (ULTRAM) 50 mg tablet Take 50 mg by mouth every 8 (eight) hours as needed for moderate pain, Historical Med             Outpatient Discharge Orders   EXTERNAL: Ambulatory Referral to Home Health   Standing Status: Future Standing Exp  Date: 09/02/23      Ambulatory referral to Cardiology   Standing Status: Future Standing Exp  Date: 09/02/23      EXTERNAL: Ambulatory Referral to Home Health   Standing Status: Future Standing Exp   Date: 09/02/23      Discharge Diet       PDMP Review       Value Time User    PDMP Reviewed  Yes 9/1/2022  1:45 PM Glenn Carson PA-C          ED Provider  Electronically Signed by           Davis Kenyon DO  09/02/22 2012

## 2022-09-02 ENCOUNTER — APPOINTMENT (OUTPATIENT)
Dept: RADIOLOGY | Facility: HOSPITAL | Age: 57
End: 2022-09-02
Payer: COMMERCIAL

## 2022-09-02 ENCOUNTER — APPOINTMENT (OUTPATIENT)
Dept: NON INVASIVE DIAGNOSTICS | Facility: HOSPITAL | Age: 57
End: 2022-09-02
Payer: COMMERCIAL

## 2022-09-02 VITALS
BODY MASS INDEX: 21.69 KG/M2 | DIASTOLIC BLOOD PRESSURE: 89 MMHG | RESPIRATION RATE: 16 BRPM | OXYGEN SATURATION: 97 % | HEART RATE: 63 BPM | SYSTOLIC BLOOD PRESSURE: 143 MMHG | HEIGHT: 66 IN | TEMPERATURE: 98.1 F | WEIGHT: 135 LBS

## 2022-09-02 PROBLEM — I63.9 ACUTE CVA (CEREBROVASCULAR ACCIDENT) (HCC): Status: ACTIVE | Noted: 2022-09-01

## 2022-09-02 LAB
ANION GAP SERPL CALCULATED.3IONS-SCNC: 8 MMOL/L (ref 4–13)
AORTIC ROOT: 2.8 CM
APICAL FOUR CHAMBER EJECTION FRACTION: 65 %
BASOPHILS # BLD AUTO: 0.03 THOUSANDS/ΜL (ref 0–0.1)
BASOPHILS NFR BLD AUTO: 1 % (ref 0–1)
BUN SERPL-MCNC: 15 MG/DL (ref 5–25)
CALCIUM SERPL-MCNC: 8.9 MG/DL (ref 8.3–10.1)
CHLORIDE SERPL-SCNC: 103 MMOL/L (ref 96–108)
CHOLEST SERPL-MCNC: 114 MG/DL
CO2 SERPL-SCNC: 28 MMOL/L (ref 21–32)
CREAT SERPL-MCNC: 0.75 MG/DL (ref 0.6–1.3)
E WAVE DECELERATION TIME: 359 MS
EOSINOPHIL # BLD AUTO: 0.14 THOUSAND/ΜL (ref 0–0.61)
EOSINOPHIL NFR BLD AUTO: 2 % (ref 0–6)
ERYTHROCYTE [DISTWIDTH] IN BLOOD BY AUTOMATED COUNT: 12.5 % (ref 11.6–15.1)
EST. AVERAGE GLUCOSE BLD GHB EST-MCNC: 103 MG/DL
FRACTIONAL SHORTENING: 38 (ref 28–44)
GFR SERPL CREATININE-BSD FRML MDRD: 88 ML/MIN/1.73SQ M
GLUCOSE P FAST SERPL-MCNC: 113 MG/DL (ref 65–99)
GLUCOSE SERPL-MCNC: 113 MG/DL (ref 65–140)
GLUCOSE SERPL-MCNC: 84 MG/DL (ref 65–140)
HBA1C MFR BLD: 5.2 %
HCT VFR BLD AUTO: 40.2 % (ref 34.8–46.1)
HDLC SERPL-MCNC: 39 MG/DL
HGB BLD-MCNC: 13.4 G/DL (ref 11.5–15.4)
IMM GRANULOCYTES # BLD AUTO: 0 THOUSAND/UL (ref 0–0.2)
IMM GRANULOCYTES NFR BLD AUTO: 0 % (ref 0–2)
INTERVENTRICULAR SEPTUM IN DIASTOLE (PARASTERNAL SHORT AXIS VIEW): 1.2 CM
INTERVENTRICULAR SEPTUM: 1.2 CM (ref 0.6–1.1)
LAAS-AP2: 16.3 CM2
LAAS-AP4: 23.9 CM2
LDLC SERPL CALC-MCNC: 56 MG/DL (ref 0–100)
LEFT ATRIUM AREA SYSTOLE SINGLE PLANE A4C: 22.6 CM2
LEFT ATRIUM SIZE: 3.4 CM
LEFT INTERNAL DIMENSION IN SYSTOLE: 3.1 CM (ref 2.1–4)
LEFT VENTRICLE DIASTOLIC VOLUME (MOD BIPLANE): 165 ML
LEFT VENTRICLE SYSTOLIC VOLUME (MOD BIPLANE): 56 ML
LEFT VENTRICULAR INTERNAL DIMENSION IN DIASTOLE: 5 CM (ref 3.5–6)
LEFT VENTRICULAR POSTERIOR WALL IN END DIASTOLE: 1.3 CM
LEFT VENTRICULAR STROKE VOLUME: 79 ML
LV EF: 66 %
LVSV (TEICH): 79 ML
LYMPHOCYTES # BLD AUTO: 2.55 THOUSANDS/ΜL (ref 0.6–4.47)
LYMPHOCYTES NFR BLD AUTO: 43 % (ref 14–44)
MCH RBC QN AUTO: 32.7 PG (ref 26.8–34.3)
MCHC RBC AUTO-ENTMCNC: 33.3 G/DL (ref 31.4–37.4)
MCV RBC AUTO: 98 FL (ref 82–98)
MONOCYTES # BLD AUTO: 0.73 THOUSAND/ΜL (ref 0.17–1.22)
MONOCYTES NFR BLD AUTO: 12 % (ref 4–12)
MV E'TISSUE VEL-SEP: 9 CM/S
MV PEAK A VEL: 0.78 M/S
MV PEAK E VEL: 57 CM/S
MV STENOSIS PRESSURE HALF TIME: 104 MS
MV VALVE AREA P 1/2 METHOD: 2.12
NEUTROPHILS # BLD AUTO: 2.48 THOUSANDS/ΜL (ref 1.85–7.62)
NEUTS SEG NFR BLD AUTO: 42 % (ref 43–75)
NRBC BLD AUTO-RTO: 0 /100 WBCS
PLATELET # BLD AUTO: 190 THOUSANDS/UL (ref 149–390)
PMV BLD AUTO: 11.2 FL (ref 8.9–12.7)
POTASSIUM SERPL-SCNC: 3.4 MMOL/L (ref 3.5–5.3)
RBC # BLD AUTO: 4.1 MILLION/UL (ref 3.81–5.12)
RIGHT ATRIUM AREA SYSTOLE A4C: 14.6 CM2
RIGHT VENTRICLE ID DIMENSION: 2.8 CM
SL CV LEFT ATRIUM LENGTH A2C: 5.4 CM
SL CV LV EF: 55
SL CV PED ECHO LEFT VENTRICLE DIASTOLIC VOLUME (MOD BIPLANE) 2D: 116 ML
SL CV PED ECHO LEFT VENTRICLE SYSTOLIC VOLUME (MOD BIPLANE) 2D: 38 ML
SODIUM SERPL-SCNC: 139 MMOL/L (ref 135–147)
TRIGL SERPL-MCNC: 93 MG/DL
WBC # BLD AUTO: 5.93 THOUSAND/UL (ref 4.31–10.16)

## 2022-09-02 PROCEDURE — RECHECK: Performed by: PHYSICIAN ASSISTANT

## 2022-09-02 PROCEDURE — 97167 OT EVAL HIGH COMPLEX 60 MIN: CPT

## 2022-09-02 PROCEDURE — 83036 HEMOGLOBIN GLYCOSYLATED A1C: CPT | Performed by: INTERNAL MEDICINE

## 2022-09-02 PROCEDURE — 74230 X-RAY XM SWLNG FUNCJ C+: CPT

## 2022-09-02 PROCEDURE — 85025 COMPLETE CBC W/AUTO DIFF WBC: CPT | Performed by: INTERNAL MEDICINE

## 2022-09-02 PROCEDURE — 99214 OFFICE O/P EST MOD 30 MIN: CPT | Performed by: PSYCHIATRY & NEUROLOGY

## 2022-09-02 PROCEDURE — 92610 EVALUATE SWALLOWING FUNCTION: CPT

## 2022-09-02 PROCEDURE — 80048 BASIC METABOLIC PNL TOTAL CA: CPT | Performed by: INTERNAL MEDICINE

## 2022-09-02 PROCEDURE — 93306 TTE W/DOPPLER COMPLETE: CPT

## 2022-09-02 PROCEDURE — 80061 LIPID PANEL: CPT | Performed by: INTERNAL MEDICINE

## 2022-09-02 PROCEDURE — 93306 TTE W/DOPPLER COMPLETE: CPT | Performed by: INTERNAL MEDICINE

## 2022-09-02 PROCEDURE — 97163 PT EVAL HIGH COMPLEX 45 MIN: CPT

## 2022-09-02 PROCEDURE — 92526 ORAL FUNCTION THERAPY: CPT

## 2022-09-02 PROCEDURE — 92522 EVALUATE SPEECH PRODUCTION: CPT

## 2022-09-02 PROCEDURE — 99217 PR OBSERVATION CARE DISCHARGE MANAGEMENT: CPT | Performed by: PHYSICIAN ASSISTANT

## 2022-09-02 PROCEDURE — 92611 MOTION FLUOROSCOPY/SWALLOW: CPT

## 2022-09-02 RX ORDER — POTASSIUM CHLORIDE 20 MEQ/1
40 TABLET, EXTENDED RELEASE ORAL ONCE
Status: COMPLETED | OUTPATIENT
Start: 2022-09-02 | End: 2022-09-02

## 2022-09-02 RX ADMIN — POTASSIUM CHLORIDE 40 MEQ: 1500 TABLET, EXTENDED RELEASE ORAL at 09:57

## 2022-09-02 RX ADMIN — ENOXAPARIN SODIUM 40 MG: 100 INJECTION SUBCUTANEOUS at 09:43

## 2022-09-02 RX ADMIN — ASPIRIN 81 MG: 81 TABLET, COATED ORAL at 09:43

## 2022-09-02 RX ADMIN — LORAZEPAM 0.5 MG: 0.5 TABLET ORAL at 12:46

## 2022-09-02 RX ADMIN — CLOPIDOGREL BISULFATE 75 MG: 75 TABLET ORAL at 09:43

## 2022-09-02 RX ADMIN — LORAZEPAM 0.5 MG: 0.5 TABLET ORAL at 04:28

## 2022-09-02 NOTE — ASSESSMENT & PLAN NOTE
· Blood pressure stable  · Home medication regimen includes nifedipine  · Held antihypertensives to allow for permissive hypertension  · Can resume on discharge  · Monitor BP per unit protocol

## 2022-09-02 NOTE — CASE MANAGEMENT
Case Management Progress Note    Patient name Sarkis Chaudhry  Location [address] 325/-01 MRN [id number]  : 1965 Date 2022       LOS (days): 0  Geometric Mean LOS (GMLOS) (days):   Days to GMLOS:        OBJECTIVE:        Current admission status: Observation  Preferred Pharmacy:   [address] [place] [address] #[id number] [address]  Phone: [phone] Fax: [phone]    Primary Care Provider: No primary care provider on file  Primary Insurance:   Secondary Insurance:     PROGRESS NOTE: Pt in need of a PCP for [facility] to be established   Cm was able to get pt an appointment with Joe Nuñez with [facility] on 22 at 9:05am  [facility] now able to accept pt for PT/OT/ST

## 2022-09-02 NOTE — PROGRESS NOTES
Progress Note - Neurology   Roel Stokes 61 y o  female MRN: [id number]  Unit/Bed#: -01 Encounter: [id number]      Assessment/Plan   58-year-old female with history of recent right hemisphere stroke at [facility] (L facial weakness and hemiparesis per patient), HTN, tobacco use, anxiety and LE neuropathy       Patient presented on 09/01 as stroke alert following new onset stroke-like symptoms beginning the night prior: dysarthria, blurry vision and R UE and LE heaviness and fine motor difficulty in the R UE      Was not a candidate for TNK/endovasuclar intervention  Her MRI yesterday afternoon again demonstrated right hemisphere infarcts as well as subacute to chronic left pontine infarct (unclear if these were the same infarcts noted on her MRI during recent admission to [place]; those images/report are not available for review)  Additionally noted numerous subcortical and deep white matter hemosiderin deposits suggesting micro-hemorrhages (likely hypertensive)      Etiology of strokes is most likely small vessel in nature, however given bihemispheric strokes and lack of medical information regarding her [place] admission recently, need to rule out embolic origin such as cardio-embolic      Plan:  -stroke pathway initiated:  -CT head during alert with R thalamic/corona radiata infarcts, but no new/acute intracranial pathology  -CTA head/neck negative for LVO, nor any significant flow restrictive disease/stenosis  -MRI brain performed yesterday, results as mentioned above  -if able, obtain records from [facility] admission last week; if 2D echo performed and results reviewed, no need to repeat from neurology standpoint  -would recommend outpatient cardiology follow up for Alhaji Grover placement for arrhythmia monitoring to look for underlying a-fib (versus loop monitor placement if indicated)  -continue DAPT for 3 weeks, followed by monotherapy with Plavix  -lipitor 40 mg daily  -hemoglobin A1C pending, lipid panel with LDL of 56  -neuro checks  -telemetry monitoring  -provide stroke education  -therapy evaluations (noted with unsteady gait during initial eval in the ED)  -will arrange for stroke clinic/vascular neurology team follow up     Discussed plan of care with attending neurologist during stroke alert  Brunetta Burkitt will need follow up in in 6 weeks with neurovascular attending or advance practitioner  She will not require outpatient neurological testing  Subjective:   Patient resting in bed, noting she feels confused this morning  Vitals: Blood pressure 132/74, pulse (!) 53, temperature (!) 97 4 °F (36 3 °C), resp  rate 18, height 5' 6" (1 676 m), weight 61 3 kg (135 lb 2 3 oz), SpO2 94 %  ,Body mass index is 21 81 kg/m²  Physical Exam:   Physical Exam  Constitutional:       Appearance: Normal appearance  HENT:      Head: Normocephalic and atraumatic  Eyes:      Extraocular Movements: Extraocular movements intact  Conjunctiva/sclera: Conjunctivae normal       Pupils: Pupils are equal, round, and reactive to light  Cardiovascular:      Rate and Rhythm: Normal rate  Pulmonary:      Effort: Pulmonary effort is normal    Abdominal:      General: There is no distension  Musculoskeletal:      Cervical back: Normal range of motion and neck supple  Skin:     General: Skin is warm and dry  Neurological:      Mental Status: She is alert and oriented to person, place, and time  Coordination: Heel to Shin Test normal         Neurologic Exam     Mental Status   Oriented to person, place, and time  Attention: normal  Concentration: normal    Normal comprehension  Just mild dysarthria, speech intelligible, no aphasia, following commands  Cranial Nerves     CN III, IV, VI   Pupils are equal, round, and reactive to light  Motor Exam Largely full appearing strength throughout UE/LE with exception of just mild L deltoid giveaway weakness  Sensory Exam   Right leg vibration: decreased from ankle  Left leg vibration: decreased from ankle  Right leg pinprick: decreased from knee  Left leg pinprick: decreased from knee    Gait, Coordination, and Reflexes     Coordination   Heel to shin coordination: normal    Tremor   Resting tremor: absent  Intention tremor: absent    Reflexes   Right plantar reflex: mute  Left plantar: normal  Right ankle clonus: absent  Left ankle clonus: absent      Lab, Imaging and other studies:   MRI brain wo contrast   Final Result by Rao Lewis MD (09/01 1601)      Acute infarcts in right frontal centrum semiovale and right posterior putamen  Small subacute infarct in left paramedian andreea  Chronic lacunar infarcts in right corona radiata, bilateral basal ganglia, and bilateral thalami  Findings consistent with hypertensive arteriopathy  Mild chronic microangiopathy  The study was marked in [facility] for immediate notification  Workstation performed: [id number]         CTA stroke alert (head/neck)   Final Result by Luis Eduardo Arteaga MD (09/01 1210)      1  No intracranial large vessel occlusion or critical stenosis  No aneurysm  2   No hemodynamically significant stenosis of cervical carotid and vertebral arteries  Atherosclerotic change of bilateral carotid arteries  I personally discussed this study with Dr Isreal Sánchez on 9/1/2022 at 11:49 AM                 Workstation performed: [id number]         CT stroke alert brain   Final Result by Luis Eduardo Arteaga MD (09/01 1212)      1  Hypodensities involving right frontal periventricular white matter/centrum semiovale and right thalamocapsular region representing ischemic change, most likely subacute to chronic in nature  2   Chronic lacunar infarcts involving left thalamus and basal ganglia                        I personally discussed this study with Dr Isreal Sánchez on 9/1/2022 at 11:49 AM  Workstation performed: [id number]             CBC:   Results from last 7 days   Lab Units 09/02/22  0305 09/01/22  1126   WBC Thousand/uL 5 93 6 22   RBC Million/uL 4 10 4 38   HEMOGLOBIN g/dL 13 4 14 4   HEMATOCRIT % 40 2 42 8   MCV fL 98 98   PLATELETS Thousands/uL 190 218   , BMP/CMP:   Results from last 7 days   Lab Units 09/02/22  0305 09/01/22  1126   SODIUM mmol/L 139 138   POTASSIUM mmol/L 3 4* 4 0   CHLORIDE mmol/L 103 100   CO2 mmol/L 28 27   BUN mg/dL 15 11   CREATININE mg/dL 0 75 0 84   CALCIUM mg/dL 8 9 8 9   EGFR ml/min/1 73sq m 88 77   , Vitamin B12:   , HgBA1C:   , TSH:   , Coagulation:   Results from last 7 days   Lab Units 09/01/22  1149   INR  1 08   , Lipid Profile:   Results from last 7 days   Lab Units 09/02/22  0305   HDL mg/dL 39*   LDL CALC mg/dL 56   TRIGLYCERIDES mg/dL 93     VTE Prophylaxis: Sequential compression device (Venodyne)  and Enoxaparin (Lovenox)    Total time spent today 20 minutes

## 2022-09-02 NOTE — CASE MANAGEMENT
Case Management Progress Note    Patient name Yaakov Ledesma  Location [address] 325/-01 MRN [id number]  : 1965 Date 2022       LOS (days): 0  Geometric Mean LOS (GMLOS) (days):   Days to GMLOS:        OBJECTIVE:        Current admission status: Observation  Preferred Pharmacy:   [address] [place] [address] #[id number] [address]  Phone: [phone] Fax: [phone]    Primary Care Provider: No primary care provider on file  Primary Insurance:   Secondary Insurance:     PROGRESS NOTE: Cm discussed HHC need with pt  Pt in agreement for [facility] for PT/Ot  No agency preference  Referrals sent via aidin

## 2022-09-02 NOTE — NURSING NOTE
Pt's son will be picking pt up  Pt's IV was removed  Pt's discharge instructions were discussed and reviewed  All patient questions were answered  All patient belongings were taken with her at discharge

## 2022-09-02 NOTE — PROGRESS NOTES
[place]     Progress Note - Rosanna Mathur 1965, 61 y o  female MRN: [id number]  Unit/Bed#: -01 Encounter: [id number]  Primary Care Provider: No primary care provider on file  Date and time admitted to hospital: 9/1/2022 11:11 AM    * Acute CVA (cerebrovascular accident) [facility])  Assessment & Plan  · Patient presented to the ED on 09/01/2022 with stroke like symptoms of right sided UE and LE weakness, left sided facial droop, and dysphagia  · Patient was admitted to [facility] last week for acute CVA  · CT head and CTA of head and neck: Concern for right sided subacute to chronic infarct  Chronic left lacunar infarcts  Atherosclerotic changes on bilateral carotids   · MRI brain: Findings consistent with hypertensive arteriopathy  Mild chronic microangiopathy  Acute and chronic   infarcts demonstrated in the right hemisphere  Subacute infarcts demonstrated in the left hemisphere  · Will attempt to obtain records from recent [facility] admission  · Echo ordered  · Continue ASA/plavix, statin  · Attempting to obtain medical records  · Consult PT/OT and speech therapy - FL barium swallow study ordered  · Monitor on telemetry - no evidence of Afib  · Appreciate ongoing neurology recommendations    Essential hypertension  Assessment & Plan  · Blood pressure stable  · Home medication regimen includes nifedipine  · Hold antihypertensives for now to allow for permissive hypertension  · Monitor BP per unit protocol    Tobacco abuse  Assessment & Plan  · Smokes 4 cigarettes per day on average  · Nicotine patch ordered      VTE Pharmacologic Prophylaxis: VTE Score: 6 Moderate Risk (Score 3-4) - Pharmacological DVT Prophylaxis Ordered: enoxaparin (Lovenox)  Patient Centered Rounds: I performed bedside rounds with nursing staff today  Discussions with Specialists or Other Care Team Provider:  Appreciate initial neurology consultation    Discussed with case management, PT/OT    Education and Discussions with Family / Patient: Patient declined call to   Time Spent for Care: 30 minutes  More than 50% of total time spent on counseling and coordination of care as described above  Current Length of Stay: 0 day(s)  Current Patient Status: Observation   Certification Statement: The patient will continue to require additional inpatient hospital stay due to pending PT/OT, final Neurology recommendations  Discharge Plan: Anticipate discharge later today or tomorrow to home with home services  Code Status: Level 1 - Full Code    Subjective:   Patient was admitted to the hospital on  after presenting to the ED with stroke like symptoms of right upper and lower extremity weakness, dysphagia, and left sided facial droop that began  evening  She was admitted to [facility] last week for a right hemisphere stroke and was discharged on plavix and ASA  Denies nausea, vomiting, and abdominal pain  States she has peripheral neuropathy with diminished feeling in bilateral lower extremities  Denies any chest pain  She states that she is SOB, which she feels is due to anxiety  Patient is having trouble speaking and swallowing  Objective:     Vitals:   Temp (24hrs), Av 9 °F (36 6 °C), Min:97 1 °F (36 2 °C), Max:98 4 °F (36 9 °C)    Temp:  [97 1 °F (36 2 °C)-98 4 °F (36 9 °C)] 97 9 °F (36 6 °C)  HR:  [43-62] 50  Resp:  [16-19] 16  BP: (132-170)/(74-99) 160/93  SpO2:  [91 %-98 %] 95 %  Body mass index is 21 81 kg/m²  Input and Output Summary (last 24 hours): Intake/Output Summary (Last 24 hours) at 2022 1214  Last data filed at 2022 0936  Gross per 24 hour   Intake 240 ml   Output --   Net 240 ml       Physical Exam:   Physical Exam  Vitals and nursing note reviewed  Constitutional:       General: She is not in acute distress  Appearance: She is well-developed  HENT:      Head: Normocephalic and atraumatic     Eyes: Conjunctiva/sclera: Conjunctivae normal    Cardiovascular:      Rate and Rhythm: Normal rate and regular rhythm  Heart sounds: No murmur heard  Pulmonary:      Effort: Pulmonary effort is normal  No respiratory distress  Breath sounds: Normal breath sounds  Abdominal:      General: Bowel sounds are normal       Palpations: Abdomen is soft  Tenderness: There is no abdominal tenderness  Musculoskeletal:      Cervical back: Neck supple  Comments: R upper and lower extremity weakness: 4/5  L upper and lower extremity weakness: 5/5   Skin:     General: Skin is warm and dry  Neurological:      Mental Status: She is alert  Cranial Nerves: Dysarthria and facial asymmetry present  Motor: Weakness present  Psychiatric:         Mood and Affect: Mood is anxious           Additional Data:     Labs:  Results from last 7 days   Lab Units 09/02/22  0305   WBC Thousand/uL 5 93   HEMOGLOBIN g/dL 13 4   HEMATOCRIT % 40 2   PLATELETS Thousands/uL 190   NEUTROS PCT % 42*   LYMPHS PCT % 43   MONOS PCT % 12   EOS PCT % 2     Results from last 7 days   Lab Units 09/02/22  0305   SODIUM mmol/L 139   POTASSIUM mmol/L 3 4*   CHLORIDE mmol/L 103   CO2 mmol/L 28   BUN mg/dL 15   CREATININE mg/dL 0 75   ANION GAP mmol/L 8   CALCIUM mg/dL 8 9   GLUCOSE RANDOM mg/dL 113     Results from last 7 days   Lab Units 09/01/22  1149   INR  1 08     Results from last 7 days   Lab Units 09/01/22  1613   POC GLUCOSE mg/dl 94     Results from last 7 days   Lab Units 09/02/22  0305   HEMOGLOBIN A1C % 5 2           Lines/Drains:  Invasive Devices  Report    Peripheral Intravenous Line  Duration           Peripheral IV 09/01/22 Left Forearm 1 day                  Telemetry:  Telemetry Orders (From admission, onward)             48 Hour Telemetry Monitoring  Continuous x 48 hours        References:    Telemetry Guidelines   Question:  Reason for 48 Hour Telemetry  Answer:  Acute CVA (<24 hrs old, hemispheric strokes, selected brainstem strokes, cardiac arrhythmias)                 Telemetry Reviewed: Normal Sinus Rhythm and Sinus Bradycardia  Indication for Continued Telemetry Use: Acute CVA             Imaging: Reviewed radiology reports from this admission including: CT head and MRI brain    Recent Cultures (last 7 days):         Last 24 Hours Medication List:   Current Facility-Administered Medications   Medication Dose Route Frequency Provider Last Rate    acetaminophen  650 mg Oral Q4H PRN Randee Correia PA-C      aspirin  81 mg Oral Daily Raejina Lawrence, [place]      atorvastatin  40 mg Oral Daily With Dinner Randee Correia PA-C      barium sulfate  135 mL Oral Once in [facility], [place]      clopidogrel  75 mg Oral Daily Rae Lawrence PA-C      enoxaparin  40 mg Subcutaneous Q24H [address] Rae Lawrence PA-C      LORazepam  0 5 mg Oral Q8H PRN Randee Correia PA-C      nicotine  1 patch Transdermal Daily Randee Correia PA-C          Today, Patient Was Seen By: Dave Doe PA-C    **Please Note: This note may have been constructed using a voice recognition system  **

## 2022-09-02 NOTE — PLAN OF CARE
Problem: MOBILITY - ADULT  Goal: Maintain or return to baseline ADL function  Description: INTERVENTIONS:  -  Assess patient's ability to carry out ADLs; assess patient's baseline for ADL function and identify physical deficits which impact ability to perform ADLs (bathing, care of mouth/teeth, toileting, grooming, dressing, etc )  - Assess/evaluate cause of self-care deficits   - Assess range of motion  - Assess patient's mobility; develop plan if impaired  - Assess patient's need for assistive devices and provide as appropriate  - Encourage maximum independence but intervene and supervise when necessary  - Involve family in performance of ADLs  - Assess for home care needs following discharge   - Consider OT consult to assist with ADL evaluation and planning for discharge  - Provide patient education as appropriate  Outcome: Progressing  Goal: Maintains/Returns to pre admission functional level  Description: INTERVENTIONS:  - Perform BMAT or MOVE assessment daily    - Set and communicate daily mobility goal to care team and patient/family/caregiver  - Collaborate with rehabilitation services on mobility goals if consulted  - Perform Range of Motion 3 times a day  - Reposition patient every 2 hours    - Dangle patient 3 times a day  - Stand patient 3 times a day  - Ambulate patient 3 times a day  - Out of bed to chair 3 times a day   - Out of bed for meals 3 times a day  - Out of bed for toileting  - Record patient progress and toleration of activity level   Outcome: Progressing     Problem: Potential for Falls  Goal: Patient will remain free of falls  Description: INTERVENTIONS:  - Educate patient/family on patient safety including physical limitations  - Instruct patient to call for assistance with activity   - Consult OT/PT to assist with strengthening/mobility   - Keep Call bell within reach  - Keep bed low and locked with side rails adjusted as appropriate  - Keep care items and personal belongings within reach  - Initiate and maintain comfort rounds  - Make Fall Risk Sign visible to staff  - Offer Toileting every 2 Hours, in advance of need  - Initiate/Maintain bed alarm  - Obtain necessary fall risk management equipment:   - Apply yellow socks and bracelet for high fall risk patients  - Consider moving patient to room near nurses station  Outcome: Progressing     Problem: SAFETY ADULT  Goal: Maintain or return to baseline ADL function  Description: INTERVENTIONS:  -  Assess patient's ability to carry out ADLs; assess patient's baseline for ADL function and identify physical deficits which impact ability to perform ADLs (bathing, care of mouth/teeth, toileting, grooming, dressing, etc )  - Assess/evaluate cause of self-care deficits   - Assess range of motion  - Assess patient's mobility; develop plan if impaired  - Assess patient's need for assistive devices and provide as appropriate  - Encourage maximum independence but intervene and supervise when necessary  - Involve family in performance of ADLs  - Assess for home care needs following discharge   - Consider OT consult to assist with ADL evaluation and planning for discharge  - Provide patient education as appropriate  Outcome: Progressing  Goal: Patient will remain free of falls  Description: INTERVENTIONS:  - Educate patient/family on patient safety including physical limitations  - Instruct patient to call for assistance with activity   - Consult OT/PT to assist with strengthening/mobility   - Keep Call bell within reach  - Keep bed low and locked with side rails adjusted as appropriate  - Keep care items and personal belongings within reach  - Initiate and maintain comfort rounds  - Make Fall Risk Sign visible to staff  - Offer Toileting every 2 Hours, in advance of need  - Initiate/Maintain bed alarm  - Obtain necessary fall risk management equipment:   - Apply yellow socks and bracelet for high fall risk patients  - Consider moving patient to room near nurses station  Outcome: Progressing     Problem: Knowledge Deficit  Goal: Patient/family/caregiver demonstrates understanding of disease process, treatment plan, medications, and discharge instructions  Description: Complete learning assessment and assess knowledge base  Interventions:  - Provide teaching at level of understanding  - Provide teaching via preferred learning methods  Outcome: Progressing     Problem: NEUROSENSORY - ADULT  Goal: Achieves stable or improved neurological status  Description: INTERVENTIONS  - Monitor and report changes in neurological status  - Monitor vital signs such as temperature, blood pressure, glucose, and any other labs ordered   - Initiate measures to prevent increased intracranial pressure  - Monitor for seizure activity and implement precautions if appropriate      Outcome: Progressing  Goal: Achieves maximal functionality and self care  Description: INTERVENTIONS  - Monitor swallowing and airway patency with patient fatigue and changes in neurological status  - Encourage and assist patient to increase activity and self care     - Encourage visually impaired, hearing impaired and aphasic patients to use assistive/communication devices  Outcome: Progressing     Problem: Prexisting or High Potential for Compromised Skin Integrity  Goal: Skin integrity is maintained or improved  Description: INTERVENTIONS:  - Identify patients at risk for skin breakdown  - Assess and monitor skin integrity  - Assess and monitor nutrition and hydration status  - Monitor labs   - Assess for incontinence   - Turn and reposition patient  - Assist with mobility/ambulation  - Relieve pressure over bony prominences  - Avoid friction and shearing  - Provide appropriate hygiene as needed including keeping skin clean and dry  - Evaluate need for skin moisturizer/barrier cream  - Collaborate with interdisciplinary team   - Patient/family teaching  - Consider wound care consult   Outcome: Progressing Problem: Nutrition/Hydration-ADULT  Goal: Nutrient/Hydration intake appropriate for improving, restoring or maintaining nutritional needs  Description: Monitor and assess patient's nutrition/hydration status for malnutrition  Collaborate with interdisciplinary team and initiate plan and interventions as ordered  Monitor patient's weight and dietary intake as ordered or per policy  Utilize nutrition screening tool and intervene as necessary  Determine patient's food preferences and provide high-protein, high-caloric foods as appropriate       INTERVENTIONS:  - Monitor oral intake, urinary output, labs, and treatment plans  - Assess nutrition and hydration status and recommend course of action  - Evaluate amount of meals eaten  - Assist patient with eating if necessary   - Allow adequate time for meals  - Recommend/ encourage appropriate diets, oral nutritional supplements, and vitamin/mineral supplements  - Order, calculate, and assess calorie counts as needed  - Recommend, monitor, and adjust tube feedings and TPN/PPN based on assessed needs  - Assess need for intravenous fluids  - Provide specific nutrition/hydration education as appropriate  - Include patient/family/caregiver in decisions related to nutrition  Outcome: Progressing

## 2022-09-02 NOTE — SPEECH THERAPY NOTE
Speech Language/Pathology    Speech-Language Pathology Bedside Swallow Evaluation      Patient Name: Nallely Adames    Today's Date: 9/2/2022     Problem List  Principal Problem:    Stroke-like symptoms  Active Problems:    Tobacco abuse    Essential hypertension      Past Medical History  Past Medical History:   Diagnosis Date    CVA (cerebral vascular accident) ([address] )     Diverticulitis        Past Surgical History  History reviewed  No pertinent surgical history  Summary   Pt presented with s/s suggestive of minimal oral and suspected mild pharyngeal dysphagia  Mastication and oral manipulation min-mild prolonged 2/2 oral weakness  Pt ultimately able to break down and transfer all  Swallows suspected fairly prompt  Cough and wet vocal quality w/ all trials thin liquids today (via cup and straw)  No overt s/s aspiration w/ nectar thick, pt reports increased comfort w/ this consistency  S/w pt regarding VBS to further assess swallow function/safety and further guide tx plan, pt agreeable at this time  Risk/s for Aspiration: Mild      Recommended Diet: regular diet and nectar thick liquids   Recommended Form of Meds: whole with puree   Aspiration precautions and swallowing strategies: upright posture, only feed when fully alert, slow rate of feeding and small bites/sips  Other Recommendations: Continue frequent oral care, plan for VBS today as able         Current Medical Status  Pt is a 61 y o  female who presented to [address] [place] [address]  with a PMH of HTN, tobacco abuse, and peripheral neuropathy of the lower extremities who presents with stroke-like symptoms of right upper and lower extremity weakness, left sided facial droop, and dysphagia  Patient has recently had a right hemisphere stroke and was admitted to [address] [place] [address] last week  Patient stated that the new weakness began yesterday (08/31) afternoon but did not think much of it    Patient has noted sensation of dysphagia and had an episode while drinking coffee where she felt she could not completely swallow therefore her intake has been reduced due to fear of difficulty swallowing  Also notes that her speech was slightly "off" after her recent hospitalization but that seems to have worsened since yesterday when her new symptoms originally began  This was also associated with report of blurry vision  She states that she is a little SOB, which she feels is related to anxiety  Denies nausea, vomiting, and abdominal pain  Denies any chest pain  She believes she had been discharged on aspirin and plavix after leaving [facility] but is unable to recall what her other medications had been  She had been discharged home with VNA services and was to use a rolling walker with ambulation  Current Precautions: Allergies:  No known food allergies    Past medical history:  Please see H&P for details    Special Studies:  MRI brain 9/1: Acute infarcts in right frontal centrum semiovale and right posterior putamen      Small subacute infarct in left paramedian andreea      Chronic lacunar infarcts in right corona radiata, bilateral basal ganglia, and bilateral thalami      Findings consistent with hypertensive arteriopathy      Mild chronic microangiopathy  CTA stroke alert 9/1: 1  No intracranial large vessel occlusion or critical stenosis  No aneurysm        2  No hemodynamically significant stenosis of cervical carotid and vertebral arteries  Atherosclerotic change of bilateral carotid arteries      CT stroke alert brain 9/1: 1  Hypodensities involving right frontal periventricular white matter/centrum semiovale and right thalamocapsular region representing ischemic change, most likely subacute to chronic in nature      2    Chronic lacunar infarcts involving left thalamus and basal ganglia          Social/Education/Vocational Hx:  Pt lives with family    Swallow Information   Current Risks for Dysphagia & Aspiration: CVA  Current Symptoms/Concerns: coughing with po and choking incident  Current Diet: regular diet and thin liquids   Baseline Diet: regular diet and thin liquids      Baseline Assessment   Behavior/Cognition: alert  Speech/Language Status: able to participate in conversation, able to follow commands and speech is dysarthric and pt reports word-finding difficulty, further assessment indicated  Patient Positioning: upright in bed  Pain Status/Interventions/Response to Interventions:   No report of or nonverbal indications of pain  Swallow Mechanism Exam  Facial: left facial droop  Labial: decreased ROM left side  Lingual: WFL  Velum: symmetrical  Mandible: adequate ROM  Dentition: adequate  Vocal quality:clear/adequate   Volitional Cough: strong/productive   Respiratory Status: on RA      Consistencies Assessed and Performance   Materials administered included toasted english muffin, scrambled eggs, sausage taz, thin liquids, nectar thick liquids     Oral Stage: minimal  Mastication was adequate with the materials administered today, min prolonged 2/2 oral weakness  Bolus formation and transfer were functional with no significant oral residue noted  No overt s/s reduced oral control  Pharyngeal Stage: mild  Swallow Mechanics:  Swallowing initiation appeared prompt  Laryngeal rise was palpated and judged to be within functional limits  Coughing and wet vocal quality w/ thin liquids today  Esophageal Concerns: none reported    Strategies and Efficacy: cup vs straw without difference     Summary and Recommendations (see above)    Results Reviewed with: patient, RN and PA     Treatment Recommended: Yes     Frequency of treatment: As able and appropriate     Patient Stated Goal: "I just want to get better"     Dysphagia LTG  -Patient will demonstrate safe and effective oral intake (without overt s/s significant oral/pharyngeal dysphagia including s/s penetration or aspiration) for the highest appropriate diet level  Short Term Goals:  -Pt will tolerate regular textures and nectar thick liquid with no significant s/s oral or pharyngeal dysphagia across 1-3 diagnostic session/s    -Patient will tolerate trials of upgraded food and/or liquid texture with no significant s/s of oral or pharyngeal dysphagia including aspiration across 1-3 diagnostic sessions     -Patient will comply with a Video/Modified Barium Swallow study for more complete assessment of swallowing anatomy/physiology/aspiration risk and to assess efficacy of treatment techniques so as to best guide treatment plan    Speech Therapy Prognosis   Prognosis: good    Prognosis Considerations: age, medical status, prior medical history and cognitive status

## 2022-09-02 NOTE — DISCHARGE SUMMARY
Atif Zabalamaton  Discharge- Laurie Cano 1965, 61 y o  female MRN: [id number]  Unit/Bed#: -01 Encounter: [id number]  Primary Care Provider: No primary care provider on file  Date and time admitted to hospital: 9/1/2022 11:11 AM    * Acute CVA (cerebrovascular accident) [facility])  Assessment & Plan  · Patient presented to the ED on 09/01/2022 with stroke like symptoms of right sided UE and LE weakness, left sided facial droop, and dysphagia  · Patient was admitted to [facility] last week for acute CVA  · CT head and CTA of head and neck: Concern for right sided subacute to chronic infarct  Chronic left lacunar infarcts  Atherosclerotic changes on bilateral carotids   · MRI brain: Findings consistent with hypertensive arteriopathy  Mild chronic microangiopathy  Acute and chronic   infarcts demonstrated in the right hemisphere  Subacute infarcts demonstrated in the left hemisphere     · Will attempt to obtain records from recent [facility] admission  · Echo reveals mild left atrial dilation and mitral regurgitation  · Continue ASA/plavix, statin  · Attempting to obtain medical records  · Consult PT/OT and speech therapy - FL barium swallow study ordered  · Monitor on telemetry - no evidence of Afib  · Will need outpatient ZIO patch/loop recorder, to follow up outpatient with Cardiology referral  · Appreciate ongoing neurology recommendations    Essential hypertension  Assessment & Plan  · Blood pressure stable  · Home medication regimen includes nifedipine  · Held antihypertensives to allow for permissive hypertension  · Can resume on discharge  · Monitor BP per unit protocol    Tobacco abuse  Assessment & Plan  · Smokes 4 cigarettes per day on average  · Nicotine patch ordered    Medical Problems             Resolved Problems  Date Reviewed: 9/2/2022   None               Discharging Physician / Practitioner: Anselmo Eagle PA-C  PCP: No primary care provider on file  Admission Date:   Admission Orders (From admission, onward)     Ordered        09/01/22 1219  Place in Observation  Once                      Discharge Date: 09/02/22    Consultations During Hospital Stay:  · Neurology  · Speech, PT/OT    Procedures Performed:   · Echo 9/2:    Left Ventricle: Left ventricular cavity size is normal  Wall thickness is normal  The left ventricular ejection fraction is 55%  Systolic function is normal  Wall motion is normal  Diastolic function is mildly abnormal, consistent with grade I (abnormal) relaxation    Left Atrium: The atrium is mildly dilated    Mitral Valve: There is mild regurgitation  Significant Findings / Test Results:   · CT stroke alert 9/1:  · Hypodensities involving right frontal periventricular white matter/centrum semiovale and right thalamocapsular region representing ischemic change, most likely subacute to chronic in nature  · Chronic lacunar infarcts involving left thalamus and basal ganglia  · CTA stroke alert 9/1:  · No intracranial large vessel occlusion or critical stenosis  No aneurysm  · No hemodynamically significant stenosis of cervical carotid and vertebral arteries  Atherosclerotic change of bilateral carotid arteries  Incidental Findings:   · None    Test Results Pending at Discharge (will require follow up): · None     Outpatient Tests Requested:  · ZIO/Loop    Complications:  None    Reason for Admission: stroke-like symptoms    Hospital Course:   Tristen Turner is a 61 y o  female patient with past medical history of hypertension, tobacco abuse, peripheral neuropathy who originally presented to the hospital on 9/1/2022 due to left-sided facial droop with right-sided weakness, slurred speech and blurred vision  Neurology consulted  Patient admitted under stroke pathway, antihypertensive medications held on admission, can be resumed on discharge  Imaging does confirm stroke    Given possible multi embolic source, patient should have Cardiology evaluation and loop recorder or ZIO patch placed outpatient  She should continue on dap therapy for 3 weeks and continue on aspirin monotherapy  Outpatient follow-up in stroke clinic recommended  Hemodynamically stable time of discharge and appropriate for outpatient follow-up  PT/OT cleared patient for safe return home  Speech therapy recommending modified diet on discharge with nectar thick liquids  Please see above list of diagnoses and related plan for additional information  Condition at Discharge: stable    Discharge Day Visit / Exam:   * Please refer to separate progress note for these details *    Discussion with Family: Patient declined call to   Discharge instructions/Information to patient and family:   See after visit summary for information provided to patient and family  Provisions for Follow-Up Care:  See after visit summary for information related to follow-up care and any pertinent home health orders  Disposition:   Home with VNA Services (Reminder: Complete face to face encounter)    Planned Readmission: None     Discharge Statement:  I spent 65 minutes discharging the patient  This time was spent on the day of discharge  I had direct contact with the patient on the day of discharge  Greater than 50% of the total time was spent examining patient, answering all patient questions, arranging and discussing plan of care with patient as well as directly providing post-discharge instructions  Additional time then spent on discharge activities  Discharge Medications:  See after visit summary for reconciled discharge medications provided to patient and/or family        **Please Note: This note may have been constructed using a voice recognition system**

## 2022-09-02 NOTE — PROGRESS NOTES
Pastoral Care Progress Note    2022  Patient: Anisa Drummond : 1965  Admission Date & Time: 2022 1111  MRN: [id number] [facility]: [id number]       entered the room to respond to a call bell  Pt requested her gown be tied in the back, which  did   introduced herself also, providing chaplaincy education  Pt because teary; however, when asked if she would like to talk about it, she declined   told her that she could tell a nurse or Aid later if she was interested and they would reach out to me to come back       [id number]   Clinical Encounter Type   Visited With Patient   Routine Visit Introduction

## 2022-09-02 NOTE — SPEECH THERAPY NOTE
Speech Language/Pathology  Speech/Language Pathology  Assessment    Patient Name: Shahana Blair  Today's Date: 9/2/2022     Problem List  Principal Problem:    Acute CVA (cerebrovascular accident) [facility])  Active Problems:    Tobacco abuse    Essential hypertension    Past Medical History  Past Medical History:   Diagnosis Date    CVA (cerebral vascular accident) ([address] )     Diverticulitis      Past Surgical History  History reviewed  No pertinent surgical history  MOTOR SPEECH EVALUATION    Impressions:  Pt presents w/ mild dysarthria huber by decreased breath support for speech, slow rate, and imprecise articulation  Motor speech impairments w/ min impact on pt's ability to communicate regarding wants/needs/medical care though expresses frustration and anxiety regarding her impairments  See below for performance on specific tasks       Therapy Prognosis: Good   Prognosis considerations: Age, current medical, strong motivation for tx   Discharge Recommendations: ST to continue at next level of care     Goals:  LONG TERM GOALS:  -The patient will demonstrate intelligible speech in all activities of daily living including dynamic conversation  -The patient will independently utilize compensatory strategies to maximize communication in all ADLs      SHORT TERM GOALS:    Respiration and Phonation  -Patient will utilize the strategy of increased respiratory support (ie inhale more deeply)  before beginning an utterance at the word, phrase and sentence level tasks  for 90% of trials with minimal to no cues needed in both formal speech tasks and in informal ADL activities    Articulation    -Patient will use trained strategies (eg slowed rate, over articulation, increased oral opening, writing key word, increased loudness, phrasing)  to improve speech intelligibility in word,  phrases, sentences and  conversation with 80% accuracy      -Patient will discriminate between intelligible and unintelligible speech and use trained strategies to repair communication (eg slowed rate, exaggerated articulation, repetition, rephrasal)               Patient's goal: "to get better"         H&P/Admit info, pertinent provider notes:(pmh noted above)  61 y o  female presented to the ED with stroke-like symptoms of right upper and lower extremity weakness, left sided facial droop, and dysphagia   Patient has recently had a right hemisphere stroke and was admitted to [facility] last week  Patient stated that the new weakness began yesterday (08/31) afternoon but did not think much of it  Shahla Jett has noted sensation of dysphagia and had an episode while drinking coffee where she felt she could not completely swallow therefore her intake has been reduced due to fear of difficulty swallowing   Also notes that her speech was slightly "off" after her recent hospitalization but that seems to have worsened since yesterday when her new symptoms originally began   This was also associated with report of blurry vision  Peter iFore states that she is a little SOB, which she feels is related to anxiety  PMH: CVA, HTN, diverticulitis  PE: RU and RL extremity strength 4/5, TAMARA and LL extremity strength 5/5, L sided facial droop and dysarthria  Plan: Observation for stroke like symptoms: MRI brain, continue ASA, statin, Plavix, telemetry, PT/OT/speech, Neurology consult, hold antihypertensives       Neurology consult: stroke pathway, MRI brain,  mg load, initiate lipitor 40 mg daily, neuro checks, allow permissive HTN            Special Studies:  MRI brain 9/1: Acute infarcts in right frontal centrum semiovale and right posterior putamen      Small subacute infarct in left paramedian andreea      Chronic lacunar infarcts in right corona radiata, bilateral basal ganglia, and bilateral thalami      Findings consistent with hypertensive arteriopathy      Mild chronic microangiopathy      CTA stroke alert 9/1: 1   No intracranial large vessel occlusion or critical stenosis  No aneurysm        2   No hemodynamically significant stenosis of cervical carotid and vertebral arteries   Atherosclerotic change of bilateral carotid arteries      CT stroke alert brain 9/1: 1   Hypodensities involving right frontal periventricular white matter/centrum semiovale and right thalamocapsular region representing ischemic change, most likely subacute to chronic in nature      2   Chronic lacunar infarcts involving left thalamus and basal ganglia  Did the pt report pain? No   If yes, was nursing notified/was it addressed? N/a    Precautions: -     Evaluation(abnormal findings in red): Vowel prolongation: /a/ (Normal 15-20 seconds): 12 seconds     Diadochokinesis:    puh puh puh (normal 3-5 5 repetitions/second): 3 reps/sec    tuh tuh tuh (normal should exceed 25 reps in 10 seconds): 26 reps/10s    kuh kuh kuh (normal should exceed 25 reps in 10 seconds): 25 reps/10s    puh tuh kuh (or buttercup)- (normal should exceed 8 reps in 10 seconds): 12 reps     Articulation:  Single words: precise articulation, 100% intelligibility     Multisyllabic words: mild imprecise articulations (overall retains intelligibility)     Repetition of multisyllabic words: decreased precision by 4th repetition      Words of progressive length: precise articulation, 100% intelligibility     Count 1-20, then backwards from 20-1: precise articulation though decreased breath support     Oral Motor Skills:  Facial symmetry: L facial droop   Labial: L weakness   Lingual: symmetrical   Palatal function: symmetrical  Voice: wfl      Oral apraxia:  No s/s oral apraxia     Articulation in connected speech:  Mildly imprecise articulation, judged approx 90% intelligibility       Conversation:  Imprecise articulation  Decreased breath support for speech  Slow rate

## 2022-09-02 NOTE — PROCEDURES
Video Swallow Study      Patient Name: Brittany Billy  PKNRE'Y Date: 9/2/2022        Past Medical History  Past Medical History:   Diagnosis Date    CVA (cerebral vascular accident) ([address] )     Diverticulitis         Past Surgical History  History reviewed  No pertinent surgical history  Video Barium Swallow Study    Summary:  Images are on PACS for review  Pt presents w/ min-mild oral, mild pharyngeal dysphagia huber by reduced oral control w/ premature spill over base of tongue, delayed swallow initiation w/ delayed epiglottic inversion and delayed/incomplete laryngeal vestibule closure resulting in reduced airway protection during the swallow  All material spills to the at least the valleculae, thin liquids to the pyriforms  Kirt aspiration w/ thin liquids via tsp, and straw, trace/transient w/ cup sip thin  Cough response appreciated  No significant pharyngeal retention, no penetration or aspiration of other administered consistencies  Recommendations:  Diet: Regular textures   Liquids: Nectar thick (plan for f/u regarding benefits/risks of modifications and acceptance of risk)   Meds: Whole in puree  Strategies: Chin tuck or prep set w/ effortful swallow for thin liquids   Frequent oral care  Upright position  F/u ST tx: Yes  Therapy Prognosis: Fair-good   Prognosis considerations: Age, current medical, past medical   Aspiration Precautions  Reflux Precautions  Consider consult with: -   Results reviewed with: pt, nursing, PA   Aspiration precautions posted  Repeat VBS as necessary  If a dedicated assessment of the esophagus is desired, consider esophagram/barium swallow or EGD  Goals:  Pt will tolerate least restrictive diet w/out s/s aspiration or oral/pharyngeal difficulties  Patient's goal: "getting better"     Previous VBS:  No         Does the pt have pain? No   If yes, was nursing made aware/was it addressed?  N/A       Precautions:  -     Food Allergies:  None    Current Diet:  Regular w/ nectar     Premorbid diet:  Regular w/ thin     Dentition:  Natural/adequate    O2 requirement:  RA    Oral mech:  Strength and ROM: L facial droop and weakness    Vocal Quality/Speech:  Dysarthric    Cognitive status:  Awake, alert    Pt was viewed sitting upright in the lateral position  Trials administered were consistent with [facility] Protocol: 5-mL thin liquid x2, 20-mL cup sip thin, 40-mL sequential swallow thin, 5-mL nectar thick, 20-mL cup sip nectar thick, 40-mL sequential swallow nectar thick, 5-mL Honey thick, 5-mL pudding, ½ cookie coated with 3-mL pudding  Pt was also given thin liquids by straw, as well as a barium tablet with pudding  Oral stage:  Lip closure: wfl   Mastication: wfl   Bolus formation: wfl   Bolus control: reduced, premature spill over base of tongue   Transfer: adequate  Residue: no     Pharyngeal stage:  Swallow promptness: mild delay   Spill to valleculae: w/ all   Spill to pyriforms: w/ thin   Epiglottic inversion: delayed, sluggish   Laryngeal excursion: fair   Pharyngeal constriction: fair, reduced pharyngeal stripping wave   Laryngeal Vestibule Closure: delayed, incomplete   Vallecular retention: no   Pyriform retention: no  PPW coating: no   Osteophytes: no   CP prominence: no   Retropulsion from prominence: no   Transient penetration: no   Epiglottic undercoat: no   Penetration: no   Aspiration: trace w/ thin liquids via cup, katiana aspiration w/ thin liquid via tsp and straw  Strategies: chin tuck and oral prep both effective in eliminating aspiration   Response to aspiration: cough     Screening of Esophageal stage:   Tortuous, slow motility, retention

## 2022-09-02 NOTE — ASSESSMENT & PLAN NOTE
· Patient presented to the ED on 09/01/2022 with stroke like symptoms of right sided UE and LE weakness, left sided facial droop, and dysphagia  · Patient was admitted to [facility] last week for acute CVA  · CT head and CTA of head and neck: Concern for right sided subacute to chronic infarct  Chronic left lacunar infarcts  Atherosclerotic changes on bilateral carotids   · MRI brain: Findings consistent with hypertensive arteriopathy  Mild chronic microangiopathy  Acute and chronic   infarcts demonstrated in the right hemisphere  Subacute infarcts demonstrated in the left hemisphere     · Will attempt to obtain records from recent [facility] admission  · Echo ordered  · Continue ASA/plavix, statin  · Attempting to obtain medical records  · Consult PT/OT and speech therapy - FL barium swallow study ordered  · Monitor on telemetry - no evidence of Afib  · Appreciate ongoing neurology recommendations

## 2022-09-02 NOTE — UTILIZATION REVIEW
Initial Clinical Review    Admission: Date/Time/Statement:   Admission Orders (From admission, onward)     Ordered        09/01/22 1219  Place in Observation  Once                      Orders Placed This Encounter   Procedures    Place in Observation     Standing Status:   Standing     Number of Occurrences:   1     Order Specific Question:   Level of Care     Answer:   Med Surg [16]     ED Arrival Information     Expected   -    Arrival   9/1/2022 10:58    Acuity   Emergent            Means of arrival   Walk-In    Escorted by   [facility]    Admission type   Emergency            Arrival complaint   numbness on right side , slurring words           Chief Complaint   Patient presents with    CVA/TIA-like Symptoms     HX CVA last week, slurred speech left facial droop, unsteady gait, vision changes  States last normal time was yesterday afternoon       Initial Presentation: 61 y o  female presented to the ED with stroke-like symptoms of right upper and lower extremity weakness, left sided facial droop, and dysphagia  Patient has recently had a right hemisphere stroke and was admitted to [address] [place] [address] last week  Patient stated that the new weakness began yesterday (08/31) afternoon but did not think much of it  Patient has noted sensation of dysphagia and had an episode while drinking coffee where she felt she could not completely swallow therefore her intake has been reduced due to fear of difficulty swallowing  Also notes that her speech was slightly "off" after her recent hospitalization but that seems to have worsened since yesterday when her new symptoms originally began  This was also associated with report of blurry vision  She states that she is a little SOB, which she feels is related to anxiety  PMH: CVA, HTN, diverticulitis  PE: RU and RL extremity strength 4/5, TAMARA and LL extremity strength 5/5, L sided facial droop and dysarthria   Plan: Observation for stroke like symptoms: MRI brain, continue ASA, statin, Plavix, telemetry, PT/OT/speech, Neurology consult, hold antihypertensives  Neurology consult: stroke pathway, MRI brain,  mg load, initiate lipitor 40 mg daily, neuro checks, allow permissive HTN       ED Triage Vitals   Temperature Pulse Respirations Blood Pressure SpO2   09/01/22 1145 09/01/22 1109 09/01/22 1109 09/01/22 1109 09/01/22 1109   98 3 °F (36 8 °C) 77 16 (!) 172/95 99 %      Temp Source Heart Rate Source Patient Position - Orthostatic VS BP Location FiO2 (%)   09/01/22 1145 09/01/22 1109 09/01/22 1109 09/01/22 1109 --   Oral Monitor Sitting Left arm       Pain Score       09/01/22 1109       No Pain          Wt Readings from Last 1 Encounters:   09/01/22 61 3 kg (135 lb 2 3 oz)     Additional Vital Signs:     Date/Time Temp Pulse Resp BP MAP (mmHg) SpO2 O2 Device   09/02/22 07:56:37 97 9 °F (36 6 °C) 50 Abnormal  -- 160/93 115 95 % --   09/02/22 07:54:56 97 9 °F (36 6 °C) 46 Abnormal  16 162/79 107 98 % --   09/02/22 07:03:23 97 4 °F (36 3 °C) Abnormal  53 Abnormal  -- 132/74 93 94 % --   09/02/22 0430 98 °F (36 7 °C) 48 Abnormal  18 138/80 -- 95 % None (Room air)   09/02/22 02:29:22 97 7 °F (36 5 °C) 43 Abnormal  -- 145/83 104 95 % --   09/01/22 23:35:13 98 °F (36 7 °C) 44 Abnormal  18 141/85 104 94 % None (Room air)   09/01/22 23:34:49 98 °F (36 7 °C) -- -- 141/85 104 -- --   09/01/22 20:50:23 98 2 °F (36 8 °C) 52 Abnormal  -- 135/79 98 94 % --   09/01/22 20:49:19 98 2 °F (36 8 °C) 55 -- 135/79 98 94 % --   09/01/22 19:00:22 98 2 °F (36 8 °C) 53 Abnormal  18 138/79 99 94 % --   09/01/22 18:26:14 97 5 °F (36 4 °C) 59 -- 139/78 98 93 % --   09/01/22 17:15:19 98 4 °F (36 9 °C) 51 Abnormal  -- 165/94 118 97 % --   09/01/22 17:14:09 98 4 °F (36 9 °C) 57 -- 166/99 121 97 % --   09/01/22 16:35:56 98 1 °F (36 7 °C) 55 16 166/89 115 96 % --   09/01/22 16:19:23 97 8 °F (36 6 °C) 54 Abnormal  16 168/90 116 96 % --   09/01/22 14:39:09 97 5 °F (36 4 °C) 58 16 170/92 118 96 % --   09/01/22 14:03:49 97 6 °F (36 4 °C) 60 16 155/94 114 95 % --   09/01/22 1338 97 1 °F (36 2 °C) Abnormal  62 17 163/95 118 93 % None (Room air)   09/01/22 1250 -- 50 Abnormal  18 151/94 117 93 % --   09/01/22 1230 -- 51 Abnormal  19 153/92 117 91 % --   09/01/22 1220 -- 53 Abnormal  19 158/95 121 92 % --   09/01/22 1215 -- 55 18 158/95 -- 92 % --   09/01/22 1200 -- 59 18 170/99 129 94 % --   09/01/22 1145 98 3 °F (36 8 °C) 50 Abnormal  16 150/80 -- 99 % None (Room air)   09/01/22 11:37:30 -- -- -- -- -- -- None (Room air)   09/01/22 1130 -- 66 16 163/92 121 99 % None (Room air)   09/01/22 1125 -- 76 -- 163/92 121 99 % None (Room air)       Pertinent Labs/Diagnostic Test Results:   MRI brain wo contrast   Final Result by Dev Salazar MD (09/01 1601)      Acute infarcts in right frontal centrum semiovale and right posterior putamen  Small subacute infarct in left paramedian andreea  Chronic lacunar infarcts in right corona radiata, bilateral basal ganglia, and bilateral thalami  Findings consistent with hypertensive arteriopathy  Mild chronic microangiopathy  The study was marked in [facility] for immediate notification  Workstation performed: [id number]         CTA stroke alert (head/neck)   Final Result by Dinh Del Angel MD (09/01 1210)      1  No intracranial large vessel occlusion or critical stenosis  No aneurysm  2   No hemodynamically significant stenosis of cervical carotid and vertebral arteries  Atherosclerotic change of bilateral carotid arteries  I personally discussed this study with Dr Jeet Mendes on 9/1/2022 at 11:49 AM                 Workstation performed: [id number]         CT stroke alert brain   Final Result by Dinh Del Angel MD (09/01 1212)      1    Hypodensities involving right frontal periventricular white matter/centrum semiovale and right thalamocapsular region representing ischemic change, most likely subacute to chronic in nature  2   Chronic lacunar infarcts involving left thalamus and basal ganglia                        I personally discussed this study with Dr Alisia Mckinley on 9/1/2022 at 11:49 AM              Workstation performed: [id number]         FL barium swallow video w speech    (Results Pending)     9/1 EKG:  Normal sinus rhythm  Possible Left atrial enlargement  Nonspecific T wave abnormality  Abnormal ECG  No previous ECGs available      Results from last 7 days   Lab Units 09/02/22  0305 09/01/22  1126   WBC Thousand/uL 5 93 6 22   HEMOGLOBIN g/dL 13 4 14 4   HEMATOCRIT % 40 2 42 8   PLATELETS Thousands/uL 190 218   NEUTROS ABS Thousands/µL 2 48  --          Results from last 7 days   Lab Units 09/02/22  0305 09/01/22  1126   SODIUM mmol/L 139 138   POTASSIUM mmol/L 3 4* 4 0   CHLORIDE mmol/L 103 100   CO2 mmol/L 28 27   ANION GAP mmol/L 8 11   BUN mg/dL 15 11   CREATININE mg/dL 0 75 0 84   EGFR ml/min/1 73sq m 88 77   CALCIUM mg/dL 8 9 8 9         Results from last 7 days   Lab Units 09/01/22  1613   POC GLUCOSE mg/dl 94     Results from last 7 days   Lab Units 09/02/22  0305 09/01/22  1126   GLUCOSE RANDOM mg/dL 113 124         Results from last 7 days   Lab Units 09/01/22  1836 09/01/22  1416 09/01/22  1126   HS TNI 0HR ng/L  --   --  4   HS TNI 2HR ng/L  --  4  --    HSTNI D2 ng/L  --  0  --    HS TNI 4HR ng/L 5  --   --    HSTNI D4 ng/L 1  --   --          Results from last 7 days   Lab Units 09/01/22  1149   PROTIME seconds 14 8*   INR  1 08   PTT seconds 33       ED Treatment:   Medication Administration from 09/01/2022 1057 to 09/01/2022 1321       Date/Time Order Dose Route Action     09/01/2022 1139 iohexol (OMNIPAQUE) 350 MG/ML injection (MULTI-DOSE) 100 mL 65 mL Intravenous Given        Past Medical History:   Diagnosis Date    CVA [facility]cerebral vascular accident) ([facility] [address] )     Diverticulitis      Present on Admission:   Stroke-like symptoms   Essential hypertension      Admitting Diagnosis: Stroke-like symptom [R29 90]  Age/Sex: 61 y o  female  Admission Orders:  Scheduled Medications:  aspirin, 81 mg, Oral, Daily  atorvastatin, 40 mg, Oral, Daily With Dinner  clopidogrel, 75 mg, Oral, Daily  enoxaparin, 40 mg, Subcutaneous, Q24H NAOMI  nicotine, 1 patch, Transdermal, Daily      Continuous IV Infusions:     PRN Meds:  acetaminophen, 650 mg, Oral, Q4H PRN  LORazepam, 0 5 mg, Oral, Q8H PRN        IP CONSULT TO NEUROLOGY  IP CONSULT TO CASE MANAGEMENT  IP CONSULT TO NUTRITION SERVICES    Network Utilization Review Department  ATTENTION: Please call with any questions or concerns to [phone] and carefully listen to the prompts so that you are directed to the right person  All voicemails are confidential   Mitali Raya all requests for admission clinical reviews, approved or denied determinations and any other requests to dedicated fax number below belonging to the campus where the patient is receiving treatment   List of dedicated fax numbers for the Facilities:  [address] DENIALS (Administrative/Medical Necessity) [phone]   [address] (Maternity/NICU/Pediatrics) [phone]   [address]  [address] Medical [place] [address] [id number] [place] [address] [address] [facility] [place] [address] [phone]

## 2022-09-02 NOTE — PHYSICAL THERAPY NOTE
PHYSICAL THERAPY Evaluation    Performed at least 2 patient identifiers during session:  Patient Active Problem List   Diagnosis    Stroke-like symptoms    Tobacco abuse    Essential hypertension       Past Medical History:   Diagnosis Date    CVA (cerebral vascular accident) ([address] )     Diverticulitis        History reviewed  No pertinent surgical history  09/02/22 1040   PT Last Visit   PT Visit Date 09/02/22   Note Type   Note type Evaluation   Pain Assessment   Pain Assessment Tool 0-10   Pain Score No Pain   Home Living   Type of Home Mobile home   Home Layout One level  (3 [address])   Prior Function   Level of [place] Independent with ADLs and functional mobility   Lives With Son   Receives Help From Family   ADL Assistance Independent   IADLs Independent   General   Additional Pertinent History pt with previous CVA and L sided weakness as a result  Family/Caregiver Present No   Cognition   Overall Cognitive Status WFL   Arousal/Participation Alert   Orientation Level Oriented X4   Memory Within functional limits   Following Commands Follows all commands and directions without difficulty   Subjective   Subjective pt with expressive aphasia however able to communicate and get point across with increased time   RUE Assessment   RUE Assessment WFL   LUE Assessment   LUE Assessment   (4-/5 at triceps;  WFL at shoulder wrist/hand)   RLE Assessment   RLE Assessment WFL   LLE Assessment   LLE Assessment WFL   Vision-Basic Assessment   Current Vision Wears glasses only for reading   Patient Visual Report Blurring of print when reading;Blurring of vision when changing focal distance   Coordination   Movements are Fluid and Coordinated 0   Coordination and Movement Description impaired L UE coordination (+) ataxia with finger to target;  pt becomes tearful     Sensation   Critical access hospital however pt reports chronic neuropathy in BLE from knees to toes)   Finger to Nose & Finger to Finger  Impaired  (intact on R;  slowed movement on L;  L pronator drift)   Heel to Shin Intact   Rapid Alternating Movements Intact  (intact for BLE toe taps)   Bed Mobility   Supine to Sit 6  Modified independent   Additional Comments Pt remains seated in WC at end of session   Transfers   Sit to Stand 6  Modified independent   Stand to Sit 6  Modified independent   Ambulation/Elevation   Gait pattern Decreased foot clearance;Shuffling; Short stride   Gait Assistance 5  Supervision   Additional items Assist x 1   Assistive Device None   Distance 100ft without AD, short shuffling steps, no LOB or unsteadiness, slow jxason;  able to scan enviornment appropriately;  pt continues with word finding difficulty/expressive aphasia however able to hold conversation while ambulating   Balance   Static Sitting Good   Dynamic Sitting Good   Static Standing Fair +   Dynamic Standing Fair   Ambulatory Fair   Activity Tolerance   Activity Tolerance   (no adverse effects to PT noted)   Nurse Made Aware Claudia   Assessment   Prognosis Good   Problem List Decreased strength; Impaired balance;Decreased mobility; Decreased coordination  (impaired speech)   Assessment Pt is a 61 y o  female who presented to ED 9/1/22 with c/o dysarthria, blurry vision, RUE and LE heaviness  Dx: CVA; Acute infarcts in R Frontal centrum semiovale and R posterior putamen  Comorbidities affecting pt's physical performance at time of assessment include: CVA, non-compliance with meds, smoker  Personal factors affecting pt at time of IE include: expressive aphasia, coordination deficits in LUE  PLOF and home set up listed above  Upon evaluation: Pt mod I for bed mobility, mod I for sit to stand, and S for ambulation without AD  Full objective findings from PT assessment regarding body systems outlined above  Current limitations include impaired coordination, expressive aphasia   Pt's clinical presentation is currently unstable/unpredictable seen in pt's presentation of continuous monitoring in hospital, aphasia, coordination deficits  Pt would benefit from continued PT while in hospital and follow up with HHCPT at D/C to increase strength, balance, endurance, independence with funcitonal mobility to return to PLOF, maximize independence, decrease caregiver burden and improve quality of life  The patient's AM-PAC Basic Mobility Inpatient Short Form Raw Score is 22  A Raw score of greater than 17 suggests the patient may benefit from discharge to home  Please also refer to the recommendation of the Physical Therapist for safe discharge planning  Goals   Patient Goals to get better   STG Expiration Date 09/16/22   Short Term Goal #1 Pt will be able to demo:  mod I ambulation x 200ft without AD, mod I to ascend/descend 4 steps with handrail;  to return home at mod I level  Plan   Treatment/Interventions ADL retraining;Functional transfer training;LE strengthening/ROM; Elevations; Therapeutic exercise; Endurance training;Patient/family training;Equipment eval/education;Gait training;Bed mobility; Compensatory technique education;Continued evaluation;Spoke to nursing;OT   PT Frequency 2-3x/wk   Recommendation   PT Discharge Recommendation Home with home health rehabilitation   Additional Comments educated pt on use of LUE as much as possible throughout the day and during ADL activities     AM-PAC Basic Mobility Inpatient   Turning in Bed Without Bedrails 4   Lying on Back to Sitting on Edge of Flat Bed 4   Moving Bed to Chair 4   Standing Up From Chair 4   Walk in Room 3   Climb 3-5 Stairs 3   Basic Mobility Inpatient Raw Score 22   Basic Mobility Standardized Score 47 4   Highest Level Of Mobility   JH-HLM Goal 7: Walk 25 feet or more   JH-HLM Achieved 7: Walk 25 feet or more     Duran Huddleston PT    Patient Name: Brunetta Burkitt [id number] Date: 9/2/2022

## 2022-09-02 NOTE — PLAN OF CARE
Problem: PHYSICAL THERAPY ADULT  Goal: Performs mobility at highest level of function for planned discharge setting  See evaluation for individualized goals  Description: Treatment/Interventions: ADL retraining, Functional transfer training, LE strengthening/ROM, Elevations, Therapeutic exercise, Endurance training, Patient/family training, Equipment eval/education, Gait training, Bed mobility, Compensatory technique education, Continued evaluation, Spoke to nursing, OT          See flowsheet documentation for full assessment, interventions and recommendations  Note: Prognosis: Good  Problem List: Decreased strength, Impaired balance, Decreased mobility, Decreased coordination (impaired speech)  Assessment: Pt is a 61 y o  female who presented to ED 9/1/22 with c/o dysarthria, blurry vision, RUE and LE heaviness  Dx: CVA; Acute infarcts in R Frontal centrum semiovale and R posterior putamen  Comorbidities affecting pt's physical performance at time of assessment include: CVA, non-compliance with meds, smoker  Personal factors affecting pt at time of IE include: expressive aphasia, coordination deficits in LUE  PLOF and home set up listed above  Upon evaluation: Pt mod I for bed mobility, mod I for sit to stand, and S for ambulation without AD  Full objective findings from PT assessment regarding body systems outlined above  Current limitations include impaired coordination, expressive aphasia  Pt's clinical presentation is currently unstable/unpredictable seen in pt's presentation of continuous monitoring in hospital, aphasia, coordination deficits  Pt would benefit from continued PT while in hospital and follow up with HHCPT at D/C to increase strength, balance, endurance, independence with funcitonal mobility to return to PLOF, maximize independence, decrease caregiver burden and improve quality of life  The patient's AM-PAC Basic Mobility Inpatient Short Form Raw Score is 22   A Raw score of greater than 17 suggests the patient may benefit from discharge to home  Please also refer to the recommendation of the Physical Therapist for safe discharge planning  PT Discharge Recommendation: Home with home health rehabilitation    See flowsheet documentation for full assessment

## 2022-09-02 NOTE — PLAN OF CARE
Problem: OCCUPATIONAL THERAPY ADULT  Goal: Performs self-care activities at highest level of function for planned discharge setting  See evaluation for individualized goals  Description: Treatment Interventions: ADL retraining, Functional transfer training, Patient/family training, Equipment evaluation/education, Energy conservation, Compensatory technique education, Visual perceptual retraining          See flowsheet documentation for full assessment, interventions and recommendations  Note: Limitation: Decreased high-level ADLs, Decreased ADL status, Decreased self-care trans  Prognosis: Good  Assessment: Pt is a 61 y o  female seen for OT evaluation at [facility], admitted 9/1/2022 w/ Acute CVA (cerebrovascular accident) ([facility] [address] )  MRI noted demonstrated consistent right hemisphere infarcts as well as subacute to chronic left pontine infarct  OT completed extensive review of pt's medical and social history  Comorbidities affecting pt's functional performance at time of assessment include: history of recent right hemisphere stroke at [facility] (L facial weakness and hemiparesis per patient), tobacco use, HTN  Personal factors affecting pt at time of IE include: steps to enter environment, behavioral pattern, difficulty performing ADLS, difficulty performing IADLS , flat affect, decreased initiation and engagement  and environment  Prior to admission, pt was living in a [facility] with 3STE with her 2 sons  Pt was I w/  ADLS and w/ IADLS, (-) drove, & required use of no DME/AD PTA  Upon evaluation: Pt requires Mod I for bed mobility, S for functional mobility/transfers, S for UB ADLs and S for LB ADLS 2* the following deficits impacting occupational performance: decreased balance, impaired GMC, decreased safety awareness and decreased coping skills  Full objective findings from OT assessment regarding body systems outlined above   Pt to benefit from continued skilled OT tx while in the hospital to address deficits as defined above and maximize level of functional independence w/ ADL's and functional mobility  Occupational Performance areas to address include: bathing/shower, toilet hygiene, dressing, medication management, health maintenance, functional mobility, community mobility and clothing management  Based on findings, pt is of high complexity  The patient's raw score on the AM-PAC Daily Activity inpatient short form is 24, standardized score is 57 54, greater than 39 4  Patients at this level are likely to benefit from DC to home, which does coincide with current above OT recommendations  However, please refer to therapist recommendation for discharge planning given other factors that may influence destination  At this time, OT recommendations at time of discharge are home OT       OT Discharge Recommendation: Home with home health rehabilitation

## 2022-09-02 NOTE — SPEECH THERAPY NOTE
Speech Language/Pathology    Speech/Language Pathology Progress Note    Patient Name: Betty Danielson  Today's Date: 9/2/2022     Problem List  Principal Problem:    Acute CVA (cerebrovascular accident) [facility])  Active Problems:    Tobacco abuse    Essential hypertension       Past Medical History  Past Medical History:   Diagnosis Date    CVA (cerebral vascular accident) ([address] )     Diverticulitis         Past Surgical History  History reviewed  No pertinent surgical history  Subjective:  Pt eager to hear VBS findings  Objective:  Pt seen for dx dysphagia tx following VBS  VBS findings thoroughly reviewed w/ pt w/ use of images to aid in understanding  Education provided on risks of aspiration w/ each administered consistency and options for diet modifications (thickened liquids) to potentially reduce aspiration  Further education provided on risks vs benefits of liquid texture modifications (reduced aspiration risk and subsequent medical implications, increased retention, potential dehydration, etc )  Education provided on strategies to optimize swallow safety without dysphagia diet modifications, including the importance of oral care and s/s medical complications associated w/ aspiration to monitor for and notify medical team of should they arise  Pt verbalized understanding of options and at this time  Pt states she wishes to thicken liquids to reduce aspiration risk w/ ongoing ST f/u to work toward goal of thin liquids w/ strategy use  Pt states she does not feel she would be able to independently use strategies to tolerate thin liquids safely  Education provided on how to thicken liquids appropriately and hand-out provided on how to obtain resources and properly thicken  Pt seen w/ 4 oz cup sips nectar thick liquids, no overt s/s aspiration       Assessment:  Pt w/ good understanding of VBS findings and recommendations and at this time agreeable to modifications to nectar thick liquids given aspiration on VBS      Plan/Recommendations:  Regular w/ nectar thick  Frequent and thorough oral care  ST f/u as able and appropriate

## 2022-09-02 NOTE — OCCUPATIONAL THERAPY NOTE
Occupational Therapy Evaluation     Patient Name: Kassandra Reasons  Today's Date: 9/2/2022  Problem List  Principal Problem:    Acute CVA (cerebrovascular accident) [facility])  Active Problems:    Tobacco abuse    Essential hypertension    Past Medical History  Past Medical History:   Diagnosis Date    CVA (cerebral vascular accident) ([address] )     Diverticulitis      Past Surgical History  History reviewed  No pertinent surgical history  09/02/22 1037   OT Last Visit   OT Visit Date 09/02/22   Note Type   Note type Evaluation   Restrictions/Precautions   Weight Bearing Precautions Per Order No   Pain Assessment   Pain Assessment Tool 0-10   Pain Score No Pain   Home Living   Type of Home Mobile home   Home Layout One level  (3STE)   Bathroom Shower/Tub Tub/shower unit   Bathroom Equipment Shower chair  (Has not been using but reports she plans to use now)   [address] [facility]  (RW)   Additional Comments Pt issued RW at recent DC from [facility] s/p CVA  However, pt reports she has not been using and has been furniture walking   Prior Function   Level of [place] Independent with ADLs and functional mobility   Lives With Son  (2 sons)   Receives Help From Family   ADL Assistance Independent   IADLs Independent   Falls in the last 6 months 0   Comments (-) Drives - sons drive  Per documentation, pt has been nonompliant with her meds & smokes 4 cigarettes a day   Lifestyle   Autonomy Pt reports being independent in ADL/IADLs except for driving   Reciprocal Relationships Lives with 2 sons   Home alone 3 days of the week during the day   Psychosocial   Psychosocial (WDL) X   Patient Behaviors/Mood Tearful   Subjective   Subjective "It's so scary"   ADL   Eating Assistance 7  Independent   Grooming Assistance 7  [facility] 5  Supervision/Setup   LB Bathing Assistance 5  Supervision/Setup   UB Dressing Assistance 5  Supervision/Setup   LB Dressing Assistance 5  Supervision/Setup Toileting Assistance  5  Supervision/Setup   Bed Mobility   Supine to Sit 6  Modified independent   Additional Comments Pt remains seated in WC with call bell in reach in preparation for transport to S   Transfers   Sit to Stand 6  Modified independent   Stand to Sit 6  Modified independent   Functional Mobility   Functional Mobility 5  Supervision   Additional Comments No DME   Balance   Static Sitting Good   Dynamic Sitting Good   Static Standing Fair +   Dynamic Standing Fair   Ambulatory Fair   Activity Tolerance   Activity Tolerance Patient tolerated treatment well   Medical Staff Made Aware PT Elma   Nurse Made Aware LUIS FERNANDO GODOY Assessment   RUE Assessment WFL  (Grossly 5/5 MMT)   LUE Assessment   LUE Assessment WFL  ((4/5 MMT at triceps, shoulder 5/5,  4/5 MMT))   Hand Function   Gross Motor Coordination Impaired  (WFL to RUE  Mild past point reaching for LUE)   Fine Motor Coordination Functional  (Pt able to management small lids/caps & manipulate spoon in hand)   Sensation   Light Touch No apparent deficits   Vision-Basic Assessment   Current Vision Wears glasses only for reading   Patient Visual Report Blurring of vision when changing focal distance;Blurring of print when reading   Vision - Complex Assessment   Ocular Range of Motion WFL   Head Position WDL   Tracking Decreased smoothness of eye movement to L inferior field   Saccades Decreased speed of pursuit between targets  (Reports dizziness)   Acuity Able to read clock/calendar on wall without difficulty; Able to read employee name badge without difficulty  (Only able to read name badge with readers on   Read clock without readers)   Perception   Inattention/Neglect Appears intact   Cognition   Overall Cognitive Status WFL   Arousal/Participation Alert   Attention Within functional limits   Orientation Level Oriented X4   Memory Within functional limits   Following Commands Follows all commands and directions without difficulty   Assessment Limitation Decreased high-level ADLs; Decreased ADL status; Decreased self-care trans   Prognosis Good   Assessment Pt is a 61 y o  female seen for OT evaluation at [facility], admitted 9/1/2022 w/ Acute CVA (cerebrovascular accident) ([address] )  MRI noted demonstrated consistent right hemisphere infarcts as well as subacute to chronic left pontine infarct  OT completed extensive review of pt's medical and social history  Comorbidities affecting pt's functional performance at time of assessment include: history of recent right hemisphere stroke at [facility] (L facial weakness and hemiparesis per patient), tobacco use, HTN  Personal factors affecting pt at time of IE include: steps to enter environment, behavioral pattern, difficulty performing ADLS, difficulty performing IADLS , flat affect, decreased initiation and engagement  and environment  Prior to admission, pt was living in a [facility] with 3STE with her 2 sons  Pt was I w/  ADLS and w/ IADLS, (-) drove, & required use of no DME/AD PTA  Upon evaluation: Pt requires Mod I for bed mobility, S for functional mobility/transfers, S for UB ADLs and S for LB ADLS 2* the following deficits impacting occupational performance: decreased balance, impaired GMC, decreased safety awareness and decreased coping skills  Full objective findings from OT assessment regarding body systems outlined above  Pt to benefit from continued skilled OT tx while in the hospital to address deficits as defined above and maximize level of functional independence w/ ADL's and functional mobility  Occupational Performance areas to address include: bathing/shower, toilet hygiene, dressing, medication management, health maintenance, functional mobility, community mobility and clothing management  Based on findings, pt is of high complexity  The patient's raw score on the AM-PAC Daily Activity inpatient short form is 24, standardized score is 57 54, greater than 39 4   Patients at this level are likely to benefit from DC to home, which does coincide with current above OT recommendations  However, please refer to therapist recommendation for discharge planning given other factors that may influence destination  At this time, OT recommendations at time of discharge are home OT  Goals   Patient Goals Pt wants to get better   Plan   Treatment Interventions ADL retraining;Functional transfer training;Patient/family training;Equipment evaluation/education; Energy conservation; Compensatory technique education;Visual perceptual retraining   Goal Expiration Date 09/12/22   OT Treatment Day 0   OT Frequency 2-3x/wk   Recommendation   OT Discharge Recommendation Home with home health rehabilitation   AM-PAC Daily Activity Inpatient   Lower Body Dressing 4   Bathing 4   Toileting 4   Upper Body Dressing 4   Grooming 4   Eating 4   Daily Activity Raw Score 24   Daily Activity Standardized Score (Calc for Raw Score >=11) 57 54   AM-PAC Applied Cognition Inpatient   Following a Speech/Presentation 4   Understanding Ordinary Conversation 4   Taking Medications 4   Remembering Where Things Are Placed or Put Away 4   Remembering List of 4-5 Errands 4   Taking Care of Complicated Tasks 4   Applied Cognition Raw Score 24   Applied Cognition Standardized Score 62 21     Pt will achieve the following goals within 10 days  *Pt will complete UB bathing and dressing with Mod I     *Pt will complete LB bathing and dressing independently  *Pt will complete toileting w/ Mod I w/ G hygiene/thoroughness using DME PRN    *Pt will complete bed mobility independently, with bed flat and no side rail to prep for purposeful tasks    *Pt will perform functional transfers with on/off all surfaces independently w/ G balance/safety  *Pt will improve functional mobility during ADL/IADL/leisure tasks to Mod I using DME as needed w/ G balance/safety       *Pt will improve standing balance to G for 8-10 minutes during purposeful activity w/ S & G endurance       *Assess DME needs     Michaela Sport, OTR/L

## 2022-09-02 NOTE — ASSESSMENT & PLAN NOTE
· Patient presented to the ED on 09/01/2022 with stroke like symptoms of right sided UE and LE weakness, left sided facial droop, and dysphagia  · Patient was admitted to [facility] last week for acute CVA  · CT head and CTA of head and neck: Concern for right sided subacute to chronic infarct  Chronic left lacunar infarcts  Atherosclerotic changes on bilateral carotids   · MRI brain: Findings consistent with hypertensive arteriopathy  Mild chronic microangiopathy  Acute and chronic   infarcts demonstrated in the right hemisphere  Subacute infarcts demonstrated in the left hemisphere     · Will attempt to obtain records from recent [facility] admission  · Echo reveals mild left atrial dilation and mitral regurgitation  · Continue ASA/plavix, statin  · Attempting to obtain medical records  · Consult PT/OT and speech therapy - FL barium swallow study ordered  · Monitor on telemetry - no evidence of Afib  · Will need outpatient ZIO patch/loop recorder, to follow up outpatient with Cardiology referral  · Appreciate ongoing neurology recommendations

## 2022-09-02 NOTE — NURSING NOTE
Pt slept during most hrly rounds between assessments overnight; awoken for bloodwork, assisted to BR to void, and then req Ativan for anxiety  Sinus Darrick on the monitor overnight; neuro checks unchanged, VSS; pt reports feeling better than when first to ER

## 2022-09-08 ENCOUNTER — OFFICE VISIT (OUTPATIENT)
Dept: FAMILY MEDICINE CLINIC | Facility: HOSPITAL | Age: 57
End: 2022-09-08
Payer: COMMERCIAL

## 2022-09-08 ENCOUNTER — TELEPHONE (OUTPATIENT)
Dept: NEUROLOGY | Facility: CLINIC | Age: 57
End: 2022-09-08

## 2022-09-08 VITALS
OXYGEN SATURATION: 97 % | BODY MASS INDEX: 21.57 KG/M2 | HEIGHT: 66 IN | WEIGHT: 134.2 LBS | HEART RATE: 80 BPM | SYSTOLIC BLOOD PRESSURE: 152 MMHG | DIASTOLIC BLOOD PRESSURE: 90 MMHG | TEMPERATURE: 97.3 F

## 2022-09-08 DIAGNOSIS — I63.9 ACUTE CVA (CEREBROVASCULAR ACCIDENT) (HCC): Primary | ICD-10-CM

## 2022-09-08 DIAGNOSIS — I10 ESSENTIAL HYPERTENSION: ICD-10-CM

## 2022-09-08 DIAGNOSIS — Z12.11 SCREEN FOR COLON CANCER: ICD-10-CM

## 2022-09-08 DIAGNOSIS — Z09 HOSPITAL DISCHARGE FOLLOW-UP: ICD-10-CM

## 2022-09-08 DIAGNOSIS — Z72.0 TOBACCO ABUSE: ICD-10-CM

## 2022-09-08 DIAGNOSIS — Z12.31 ENCOUNTER FOR SCREENING MAMMOGRAM FOR BREAST CANCER: ICD-10-CM

## 2022-09-08 PROCEDURE — 99204 OFFICE O/P NEW MOD 45 MIN: CPT | Performed by: NURSE PRACTITIONER

## 2022-09-08 PROCEDURE — 3725F SCREEN DEPRESSION PERFORMED: CPT | Performed by: NURSE PRACTITIONER

## 2022-09-08 PROCEDURE — 1111F DSCHRG MED/CURRENT MED MERGE: CPT | Performed by: NURSE PRACTITIONER

## 2022-09-08 RX ORDER — LISINOPRIL 10 MG/1
10 TABLET ORAL DAILY
Qty: 30 TABLET | Refills: 1 | Status: SHIPPED | OUTPATIENT
Start: 2022-09-08

## 2022-09-08 NOTE — PROGRESS NOTES
Assessment/Plan:    Acute CVA (cerebrovascular accident) [facility])  She is doing relatively well  We were able to get her scheduled with neuro for November  PT/OT/speech with [facility]  BP uncontrolled  Add lisinopril  Continue on Plavix, statin and ASA  F/U in 4 weeks  Essential hypertension  BP is not controlled  Add lisinopril  F/U in 4 weeks  Tobacco abuse  Smoking 4 cigarettes/day and plans to continue to wean to full cessation  Declines any assistance  Diagnoses and all orders for this visit:    Acute CVA (cerebrovascular accident) [facility])  -     Ambulatory Referral to Cardiology; Future  -     Ambulatory Referral to Neurology; Future    Essential hypertension  -     lisinopril (ZESTRIL) 10 mg tablet; Take 1 tablet (10 mg total) by mouth daily    Hospital discharge follow-up    Screen for colon cancer  -     Ambulatory referral for colonoscopy; Future    Encounter for screening mammogram for breast cancer  -     Mammo screening bilateral w 3d & cad; Future    Tobacco abuse          Subjective:      Patient ID: Nupur Heaton is a 61 y o  female  New pt  Presented to ER on 9/1 with left sided facial droop, weakness, slurred speech and blurry vision  Imaging confirmed CVA  Right sided subacute or chronic infarct  Left lacunar infarct  Atherosclerotic changes B/L carotid arteries  She was previously in [address] [place] [address] the week prior with acute CVA  Smoker  H/o HTN  Neuro consulted  Possible embolic source  Echo showed mild atrial dilation and mitral regurg  Will need cardiology eval and Zio patch  Dap therapy for 3 weeks then ASA monotherapy  Evaluated by speech and OT and cleared for d/c  Speech eval and nectar thick liquids  Home care consulted for outpt  Needs f/u with cardiology for Zio patch, neurology stroke clinic  Continues with blurry vision  Feels like a film over her eyes  Feels better after using OTC rewetting drops  Will be getting Pt/OT and speech therapy through [facility]   This has not started yet  She has not been contacted by cardiology or neurology  She has h/o chronic neuropathy in both hands and feet  Dropping objects  No further facial droop  Has not gotten any thickening agent for liquids yet  Avoiding thin liquids  Is being very careful with eating  Cutting food into very small pieces  The following portions of the patient's history were reviewed and updated as appropriate: allergies, current medications, past family history, past medical history, past social history, past surgical history and problem list     Review of Systems   Constitutional: Positive for fatigue  HENT: Positive for trouble swallowing  Eyes: Positive for visual disturbance  Respiratory: Positive for shortness of breath  Cardiovascular: Negative for chest pain, palpitations and leg swelling  Neurological: Positive for numbness (chronic)  Negative for dizziness, syncope, speech difficulty, weakness, light-headedness and headaches  Hematological: Bruises/bleeds easily  Psychiatric/Behavioral: Positive for dysphoric mood  Objective:  Vitals:    09/08/22 0921   BP: 152/90   Pulse: 80   Temp: (!) 97 3 °F (36 3 °C)   SpO2: 97%      Physical Exam  Vitals reviewed  Constitutional:       Appearance: Normal appearance  She is normal weight  Eyes:      Extraocular Movements: Extraocular movements intact  Conjunctiva/sclera: Conjunctivae normal       Pupils: Pupils are equal, round, and reactive to light  Neck:      Vascular: No carotid bruit  Cardiovascular:      Rate and Rhythm: Normal rate and regular rhythm  Heart sounds: Normal heart sounds  No murmur heard  Pulmonary:      Effort: Pulmonary effort is normal       Breath sounds: Normal breath sounds  Skin:     General: Skin is warm and dry  Findings: Bruising present  Neurological:      Mental Status: She is alert and oriented to person, place, and time  Cranial Nerves: Cranial nerves are intact  Motor: No weakness  Gait: Gait is intact  Psychiatric:         Mood and Affect: Affect is tearful  Behavior: Behavior normal          Thought Content:  Thought content normal          Judgment: Judgment normal

## 2022-09-08 NOTE — ASSESSMENT & PLAN NOTE
She is doing relatively well  We were able to get her scheduled with neuro for November  PT/OT/speech with [facility]  BP uncontrolled  Add lisinopril  Continue on Plavix, statin and ASA  F/U in 4 weeks

## 2022-09-08 NOTE — TELEPHONE ENCOUNTER
Per Hospital Notes:  Maliha Higgins will need follow up in in 6 weeks with neurovascular attending or advance practitioner  She will not require outpatient neurological testing  Nanci Degroot from the patient PCP office called schedule her hospital follow up appt  I offered 10-11-22 at 315 pm with Teri Betts in [place] and she accepted

## 2022-09-09 DIAGNOSIS — I63.40 CEREBROVASCULAR ACCIDENT (CVA) DUE TO EMBOLISM OF CEREBRAL ARTERY (HCC): Primary | ICD-10-CM

## 2022-09-09 NOTE — PROGRESS NOTES
Chart reviewed  I have not seen or examined the patient  Recently admitted for an acute CVA, with suspicion for a cardioembolic etiology  NO arrhythmias documented from her 1 day hospital stay  I have ordered a 48 hour Holter monitor to assess for atrial fibrillation on an ambulatory basis  Ideally this can be done prior to her OV with me, so that we can review the results and assess her for a Ziopatch monitor at that time  Additionally, if there is no atrial fibrillation on Holter monitoring we will need to evaluate for PFO with a limited transthoracic saline bubble study with or without additional transcranial dopplers           Lisa Ortiz MD

## 2022-09-10 ENCOUNTER — TELEPHONE (OUTPATIENT)
Dept: OTHER | Facility: OTHER | Age: 57
End: 2022-09-10

## 2022-09-10 NOTE — TELEPHONE ENCOUNTER
Patient was admitted to services today her /100 has not picked up lisinopril medication yet, does not have the nectar make the liquids thick complaining of knee pain 7 out of 10 no tylenol or pain med  Requested for Pt to be called directly  She will follow up after

## 2022-09-12 ENCOUNTER — TELEPHONE (OUTPATIENT)
Dept: FAMILY MEDICINE CLINIC | Facility: HOSPITAL | Age: 57
End: 2022-09-12

## 2022-09-12 ENCOUNTER — TELEPHONE (OUTPATIENT)
Dept: CARDIOLOGY CLINIC | Facility: CLINIC | Age: 57
End: 2022-09-12

## 2022-09-12 NOTE — TELEPHONE ENCOUNTER
Stephanie Campbell from [address] [place] [address] called to report pts orthostatic b/p's:    Standing:  HR: 77  B/P: 136/79  Sitting:      HR: 66  B/P: 153/96  Lying:       HR: 58   B/P: 147/77

## 2022-09-12 NOTE — TELEPHONE ENCOUNTER
Sean Zaman called asking if pt is supposed to be home bound  States that coworkers saw her driving around in a car  Is not sure if this is something that was ordered by PCP   PCB

## 2022-09-12 NOTE — TELEPHONE ENCOUNTER
[address] FROM [facility] CALLED SHE SAID PATIENT WAS D/C FROM THE HOSPITAL FOR A STROKE  AND KNOW HER L KNEE IS GIVING HER PAIN - ASKING WHAT SHE CAN TAKE FOR THAT    Azael Augustin  [phone]

## 2022-09-13 ENCOUNTER — HOSPITAL ENCOUNTER (OUTPATIENT)
Dept: NON INVASIVE DIAGNOSTICS | Age: 57
Discharge: HOME | End: 2022-09-13
Payer: COMMERCIAL

## 2022-09-13 DIAGNOSIS — I63.40 CEREBROVASCULAR ACCIDENT (CVA) DUE TO EMBOLISM OF CEREBRAL ARTERY (HCC): ICD-10-CM

## 2022-09-13 PROCEDURE — 93225 XTRNL ECG REC<48 HRS REC: CPT

## 2022-09-13 PROCEDURE — 93226 XTRNL ECG REC<48 HR SCAN A/R: CPT
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
## 2022-09-16 ENCOUNTER — TELEPHONE (OUTPATIENT)
Dept: FAMILY MEDICINE CLINIC | Facility: HOSPITAL | Age: 57
End: 2022-09-16

## 2022-09-16 DIAGNOSIS — I63.9 ACUTE CVA (CEREBROVASCULAR ACCIDENT) (HCC): Primary | ICD-10-CM

## 2022-09-16 RX ORDER — CLOPIDOGREL BISULFATE 75 MG/1
75 TABLET ORAL DAILY
Qty: 30 TABLET | Refills: 5 | Status: SHIPPED | OUTPATIENT
Start: 2022-09-16

## 2022-09-16 NOTE — TELEPHONE ENCOUNTER
Spoke to verenice from [facility], she is going to send a message to the traveling OT who going to see pt today, to have pt call our office to schedule an appointment

## 2022-09-20 ENCOUNTER — OFFICE VISIT (OUTPATIENT)
Dept: FAMILY MEDICINE CLINIC | Facility: HOSPITAL | Age: 57
End: 2022-09-20
Payer: COMMERCIAL

## 2022-09-20 VITALS
OXYGEN SATURATION: 97 % | BODY MASS INDEX: 20.76 KG/M2 | SYSTOLIC BLOOD PRESSURE: 136 MMHG | WEIGHT: 129.2 LBS | HEIGHT: 66 IN | TEMPERATURE: 97 F | DIASTOLIC BLOOD PRESSURE: 82 MMHG | HEART RATE: 62 BPM

## 2022-09-20 DIAGNOSIS — I10 ESSENTIAL HYPERTENSION: Primary | ICD-10-CM

## 2022-09-20 DIAGNOSIS — M25.562 ACUTE PAIN OF LEFT KNEE: ICD-10-CM

## 2022-09-20 DIAGNOSIS — G62.9 NEUROPATHY: ICD-10-CM

## 2022-09-20 DIAGNOSIS — F41.1 GAD (GENERALIZED ANXIETY DISORDER): ICD-10-CM

## 2022-09-20 PROCEDURE — 3079F DIAST BP 80-89 MM HG: CPT | Performed by: NURSE PRACTITIONER

## 2022-09-20 PROCEDURE — 3075F SYST BP GE 130 - 139MM HG: CPT | Performed by: NURSE PRACTITIONER

## 2022-09-20 PROCEDURE — 99214 OFFICE O/P EST MOD 30 MIN: CPT | Performed by: NURSE PRACTITIONER

## 2022-09-20 RX ORDER — ESCITALOPRAM OXALATE 10 MG/1
10 TABLET ORAL DAILY
Qty: 30 TABLET | Refills: 1 | Status: SHIPPED | OUTPATIENT
Start: 2022-09-20 | End: 2022-10-18 | Stop reason: SDUPTHER

## 2022-09-20 RX ORDER — GABAPENTIN 300 MG/1
300 CAPSULE ORAL
Qty: 30 CAPSULE | Refills: 1 | Status: SHIPPED | OUTPATIENT
Start: 2022-09-20

## 2022-09-20 NOTE — PROGRESS NOTES
Assessment/Plan:    Essential hypertension  BP is controlled  No change to regimen  [facility] reports some issues with orthostatic hypotension, however will not make any changes at this time unless this worsens  Neuropathy  Longstanding neuropathy  Will start gabapentin at bedtime  F/U in 4 weeks  ROSANA (generalized anxiety disorder)  Start lexapro  Discussed AE  May need Buspar added in  F/U in 4 weeks  Diagnoses and all orders for this visit:    Essential hypertension    ROSANA (generalized anxiety disorder)  -     escitalopram (Lexapro) 10 mg tablet; Take 1 tablet (10 mg total) by mouth daily    Neuropathy    Acute pain of left knee  Comments:  Refer to ortho  Orders:  -     gabapentin (Neurontin) 300 mg capsule; Take 1 capsule (300 mg total) by mouth daily at bedtime  -     Ambulatory Referral to Orthopedic Surgery; Future          Subjective:      Patient ID: Sarkis Chaudhry is a 61 y o  female  Very anxious  With left knee pain that started around time of stroke  Unsure if any injury  Pain in the front  Has swelling  Some days pain is 10/10  Has been doing PT post CVA  Tends to hurt more during this time  Will have pain at rest  Never had issues with this knee before  Has neuropathy in hands and feet and this has really been bothering her  Feels anxiety is her biggest issues  Feels depressed and anxious  Has never been on anxiety med before  Drinks 3 shots of rum/week  Reports alcohol abuse many years ago  The following portions of the patient's history were reviewed and updated as appropriate: allergies, current medications, past family history, past medical history, past social history, past surgical history and problem list     Review of Systems   Eyes: Negative for visual disturbance  Respiratory: Negative for shortness of breath  Musculoskeletal: Positive for arthralgias (left knee) and joint swelling (left knee)  Neurological: Positive for numbness (hands and feet)  Negative for dizziness, syncope, light-headedness and headaches  Psychiatric/Behavioral: Positive for dysphoric mood  Negative for sleep disturbance and suicidal ideas  The patient is nervous/anxious  Objective:  Vitals:    09/20/22 1116   BP: 136/82   Pulse: 62   Temp: (!) 97 °F (36 1 °C)   SpO2: 97%      Physical Exam  Vitals reviewed  Constitutional:       Appearance: Normal appearance  Cardiovascular:      Rate and Rhythm: Normal rate and regular rhythm  Heart sounds: Normal heart sounds  No murmur heard  Pulmonary:      Effort: Pulmonary effort is normal       Breath sounds: Normal breath sounds  Musculoskeletal:      Left knee: Swelling (mild) and crepitus present  No effusion, erythema or ecchymosis  Normal range of motion  Tenderness (global to light touch) present  Skin:     General: Skin is warm and dry  Neurological:      Mental Status: She is alert and oriented to person, place, and time  Psychiatric:         Mood and Affect: Affect is tearful  Behavior: Behavior normal          Thought Content:  Thought content normal          Cognition and Memory: Cognition normal          Judgment: Judgment normal

## 2022-09-20 NOTE — ASSESSMENT & PLAN NOTE
BP is controlled  No change to regimen  VNA reports some issues with orthostatic hypotension, however will not make any changes at this time unless this worsens

## 2022-09-21 PROCEDURE — 93227 XTRNL ECG REC<48 HR R&I: CPT | Performed by: INTERNAL MEDICINE

## 2022-09-22 ENCOUNTER — TELEPHONE (OUTPATIENT)
Dept: CARDIOLOGY CLINIC | Facility: CLINIC | Age: 57
End: 2022-09-22

## 2022-09-22 NOTE — TELEPHONE ENCOUNTER
Called, spoke to pt  Message relayed as given  Pt verbalized understanding and will discuss w/ physician at next [address]

## 2022-09-22 NOTE — TELEPHONE ENCOUNTER
----- Message from Anitha Hutchins MD sent at 9/22/2022 10:33 AM EDT -----  Please call Michaela Pham to notify her of her Holter monitor results as noted:    1  Holter monitor demonstrates a normal heart rhythm, with no arrhythmias to suggest that the heart was the source of her stroke  I recommend long-term monitoring with an implantable loop recorder  She will need to be seen in our office to have this arranged  She did not show to her appointment today, however I am happy to see her again in the future at any time that is convenient for her to discuss long-term cardiac rhythm monitoring

## 2022-10-05 ENCOUNTER — CONSULT (OUTPATIENT)
Dept: CARDIOLOGY CLINIC | Facility: CLINIC | Age: 57
End: 2022-10-05
Payer: COMMERCIAL

## 2022-10-05 ENCOUNTER — TELEPHONE (OUTPATIENT)
Dept: NEUROLOGY | Facility: CLINIC | Age: 57
End: 2022-10-05

## 2022-10-05 VITALS
WEIGHT: 129 LBS | BODY MASS INDEX: 20.73 KG/M2 | HEART RATE: 88 BPM | SYSTOLIC BLOOD PRESSURE: 124 MMHG | DIASTOLIC BLOOD PRESSURE: 74 MMHG | HEIGHT: 66 IN

## 2022-10-05 DIAGNOSIS — Z72.0 TOBACCO ABUSE: ICD-10-CM

## 2022-10-05 DIAGNOSIS — I10 ESSENTIAL HYPERTENSION: Primary | ICD-10-CM

## 2022-10-05 DIAGNOSIS — I63.9 ACUTE CVA (CEREBROVASCULAR ACCIDENT) (HCC): ICD-10-CM

## 2022-10-05 PROCEDURE — 99244 OFF/OP CNSLTJ NEW/EST MOD 40: CPT | Performed by: INTERNAL MEDICINE

## 2022-10-05 NOTE — PROGRESS NOTES
[facility]    Manav Kline   DOS: 10/5/2022     Chief Complaint:   Chief Complaint   Patient presents with    CVA/TIA-like Symptoms     Consult - PCP for CVA x2  D/C [facility] 9/2,  in Aug         HISTORY OF PRESENT ILLNESS:    HPI:  Alisa Quintana is a 61 y o  female  She  has a past medical history of CVA (cerebral vascular accident) ([facility] [address] ), Diverticulitis, Diverticulitis of colon, Diverticulosis, Hypertension, and Stroke ([facility] [address] )  She presents to establish care with a cardiologist in the outpatient setting after a recent diagnosis of stroke  Per review of the chart discussion with the patient, she presented to [facility] with slurred speech and left-sided facial droop, associated with visual field changes and an unsteady gait  She was diagnosed there with an acute CVA  She subsequently was seen in the emergency room at [facility] for evaluation of similar symptoms recurring on 09/01/2022  She was evaluated by Neurology the inpatient setting, and referred for outpatient cardiac rhythm monitoring due to concer for sinus bradycardia noted while she was inpatient  There was also concern for a cardioembolic source by her neurologist     She was initially evaluated in the outpatient setting with a 48 hour Holter monitor  This demonstrated predominantly normal sinus rhythm, with low supraventricular and ventricular ectopic burden  There is no evidence on her Holter monitor of atrial fibrillation or atrial flutter  Today, she reports no active complaints but does state that she has been quite anxious about her stroke diagnosis  Specifically, she denies chest pain, shortness of breath, diaphoresis, dizziness, palpitations, orthopnea, edema, syncope, recurrence of her stroke-like symptoms    She reports that there is a home health aide that comes to her house to check her blood pressure regularly, and there have been adjustments to her blood pressure medication regimen based upon those readings recently  She is a prior 30 pack year cigarette smoker, and is currently actively making an effort to completely quit  She is down to 4 cigarettes per day on average, previously was at 1 pack per day  She drinks 1-2 alcoholic beverages per week, and reports medical marijuana use for anxiety  She denies other recreational drug use  She has a family history of early-onset coronary artery disease in her mother, who she states required coronary bypass in her 45s  She has no known family history of stroke, arrhythmias, heart failure, valvular heart disease  ROS    ROS: Pertinent positives and negatives as described in History of Present Illness  Remainder of a 14 point review of systems was negative  No Known Allergies     Current Outpatient Medications on File Prior to Visit   Medication Sig Dispense Refill    aspirin (ECOTRIN LOW STRENGTH) 81 mg EC tablet Take 81 mg by mouth daily      atorvastatin (LIPITOR) 40 mg tablet Take 40 mg by mouth daily      clopidogrel (PLAVIX) 75 mg tablet Take 1 tablet (75 mg total) by mouth daily 30 tablet 5    escitalopram (Lexapro) 10 mg tablet Take 1 tablet (10 mg total) by mouth daily 30 tablet 1    gabapentin (Neurontin) 300 mg capsule Take 1 capsule (300 mg total) by mouth daily at bedtime 30 capsule 1    lisinopril (ZESTRIL) 10 mg tablet Take 1 tablet (10 mg total) by mouth daily 30 tablet 1    NIFEdipine ER (ADALAT CC) 30 MG 24 hr tablet Take 30 mg by mouth daily       No current facility-administered medications on file prior to visit         Past Medical History:   Diagnosis Date    CVA [facility]cerebral vascular accident) ([facility] [address] )     Diverticulitis     Diverticulitis of colon     Diverticulosis     Hypertension     Stroke [facility])        Past Surgical History:   Procedure Laterality Date    COLON SIGMOID RESECTION         Family History   Problem Relation Age of Onset    Coronary artery disease Mother     Heart disease Mother     Diabetes Father     Breast cancer Maternal Grandmother     Breast cancer Paternal Aunt        Social History     Socioeconomic History    Marital status: Unknown     Spouse name: Not on file    Number of children: Not on file    Years of education: Not on file    Highest education level: Not on file   Occupational History    Not on file   Tobacco Use    Smoking status: Light Tobacco Smoker     Types: Cigarettes    Smokeless tobacco: Never Used    Tobacco comment: smokes 4 cigarettes daily   Vaping Use    Vaping Use: Never used   Substance and Sexual Activity    Alcohol use: Yes    Drug use: Yes     Types: Marijuana     Comment: medical card    Sexual activity: Not on file   Other Topics Concern    Not on file   Social History Narrative    Not on file     Social Determinants of Health     Financial Resource Strain: Not on file   Food Insecurity: No Food Insecurity    Worried About Running Out of Food in the Last Year: Never true    Bailey of Food in the Last Year: Never true   Transportation Needs: No Transportation Needs    Lack of Transportation (Medical): No    Lack of Transportation (Non-Medical): No   Physical Activity: Not on file   Stress: Not on file   Social Connections: Not on file   Intimate Partner Violence: Not on file   Housing Stability: Low Risk     Unable to Pay for Housing in the Last Year: No    Number of Places Lived in the Last Year: 1    Unstable Housing in the Last Year: No       OBJECTIVE:    /74 (BP Location: Left arm, Patient Position: Sitting, Cuff Size: Standard)   Pulse 88   Ht 5' 6" (1 676 m)   Wt 58 5 kg (129 lb)   BMI 20 82 kg/m²      BP Readings from Last 3 Encounters:   10/05/22 124/74   09/20/22 136/82   09/08/22 152/90       Wt Readings from Last 3 Encounters:   10/05/22 58 5 kg (129 lb)   09/20/22 58 6 kg (129 lb 3 2 oz)   09/08/22 60 9 kg (134 lb 3 2 oz)         Physical Exam  Vitals reviewed     Constitutional:       General: She is not in acute distress  Appearance: Normal appearance  She is not diaphoretic  HENT:      Head: Normocephalic and atraumatic  Eyes:      Conjunctiva/sclera: Conjunctivae normal    Neck:      Vascular: No carotid bruit or JVD  Cardiovascular:      Rate and Rhythm: Normal rate and regular rhythm  Pulses: Normal pulses  Heart sounds: Normal heart sounds  No murmur heard  No friction rub  No gallop  Pulmonary:      Effort: Pulmonary effort is normal       Breath sounds: Normal breath sounds  No wheezing, rhonchi or rales  Abdominal:      General: Abdomen is flat  Bowel sounds are normal  There is no distension  Palpations: Abdomen is soft  Musculoskeletal:      Right lower leg: No edema  Left lower leg: No edema  Skin:     General: Skin is warm and dry  Neurological:      Mental Status: She is alert and oriented to person, place, and time  Psychiatric:         Mood and Affect: Mood normal          Behavior: Behavior normal                                                        Cardiac testing:   EKG tracing reviewed personally from 9/1/22:  Normal sinus rhythm  Possible left atrial enlargement  Nonspecific T-wave abnormality  Baseline artifact limits assessment  TTE reported from 9/2/22 personally reviewed -   Left Ventricle Left ventricular cavity size is normal  Wall thickness is normal  The left ventricular ejection fraction is 55%  Systolic function is normal   Wall motion is normal  Diastolic function is mildly abnormal, consistent with grade I (abnormal) relaxation  Right Ventricle Right ventricular cavity size is normal  Systolic function is normal  Wall thickness is normal    Left Atrium The atrium is mildly dilated  Right Atrium The atrium is normal in size  Aortic Valve The aortic valve is trileaflet  The leaflets are not thickened  The leaflets are not calcified  The leaflets exhibit normal mobility  There is no evidence of regurgitation   The aortic valve has no significant stenosis  Mitral Valve There is mild regurgitation  There is no evidence of stenosis  The mitral valve has normal structure and normal function  Tricuspid Valve Tricuspid valve structure is normal  There is trace regurgitation  There is no evidence of stenosis  Pulmonic Valve Pulmonic valve structure is normal  There is trace regurgitation  There is no evidence of stenosis  Ascending Aorta The aortic root is normal in size  IVC/SVC The inferior vena cava is normal in size  Pericardium There is no pericardial effusion  The pericardium is normal in appearance  48 hour Holter monitor - 9/13/22  IMPRESSION:  1  Predominantly sinus rhythm, with an average heart rate of 79 beats per minute  2  9 premature atrial contractions  3  298 premature ventricular contractions  4  No significant pauses or advanced degree heart block         LABS:  Lab Results   Component Value Date    BUN 15 09/02/2022    CREATININE 0 75 09/02/2022    CALCIUM 8 9 09/02/2022    K 3 4 (L) 09/02/2022    CO2 28 09/02/2022     09/02/2022        Lab Results   Component Value Date    WBC 5 93 09/02/2022    HGB 13 4 09/02/2022    HCT 40 2 09/02/2022    MCV 98 09/02/2022     09/02/2022       Lab Results   Component Value Date    HDL 39 (L) 09/02/2022    LDLCALC 56 09/02/2022    TRIG 93 09/02/2022       Lab Results   Component Value Date    HGBA1C 5 2 09/02/2022       No results found for: TSH      ASSESSMENT/PLAN:    Diagnoses and all orders for this visit:    Essential hypertension  BP Readings from Last 3 Encounters:   10/05/22 124/74   09/20/22 136/82   09/08/22 152/90   Now well controlled on lisinopril 10 mg once daily  Anticipate further improvement in her blood pressure with continued efforts toward smoking cessation  Have recommended that she continue lisinopril  -     Echo follow up/limited w/ contrast if indicated;  Future    Acute CVA (cerebrovascular accident) [facility])  - Her MRI brain demonstrates multiple infarcts including right frontal, right posterior putamen, left paramedian andreea, chronic lacunar infarcts  These defects appear to be of varying chronicity, and may represent true cardio embolism  - In this setting, I have ordered a repeat limited transthoracic echocardiogram to be performed with saline contrast   The purpose of the study will be solely to exclude intracardiac shunting   - Additionally, have ordered extended outpatient cardiac rhythm monitoring with a Zio patch XT to be performed for a period of 28 days  I have advised the patient that this will require prior authorization, and she will receive the patch(s) in the mail  - if further monitoring is unrevealing and there is no evidence of an intracardiac shunt by saline contrast evaluation, I do think that there would be utility to long-term cardiac rhythm monitoring with an implantable loop recorder  There also may be utility in obtaining a transcranial Doppler study, however this will be determined based upon the results of the tests ordered today  - she does have outpatient follow-up scheduled with Neurology  -     Ambulatory Referral to Cardiology  -     Echo follow up/limited w/ contrast if indicated; Future  -     AMB extended holter monitor; Future    Tobacco abuse  Discussed tobacco cessation  Discussed the effects of smoking on cardiovascular system, skin and lungs  Patient verbalized understanding and is ready to quit, actively making an effort  Discussed tips for smoking cessation:  Set a quit date, Change environment (avoid tobacco exposure, avoid situations where you previously smoked), dispose of all cigarettes and ashtrays  Involve family, friends, and co-workers for support  Patient verbalized understanding                    Enedina Gottlieb MD

## 2022-10-05 NOTE — PATIENT INSTRUCTIONS
You were seen today in the Cardiology office for evaluation of stroke     Please continue your current cardiac medications as prescribed  We discussed the benefits of smoking cessation, healthy low-fat diet, salt reduction  Please schedule your echocardiogram  You will receive your [employer] rhythm monitor in the mail  Thank you for choosing [address]  Please call our office or use [employer] with any questions

## 2022-10-06 DIAGNOSIS — I10 ESSENTIAL HYPERTENSION: Primary | ICD-10-CM

## 2022-10-07 RX ORDER — NIFEDIPINE 30 MG/1
30 TABLET, FILM COATED, EXTENDED RELEASE ORAL DAILY
Qty: 30 TABLET | Refills: 5 | Status: SHIPPED | OUTPATIENT
Start: 2022-10-07

## 2022-10-07 RX ORDER — ASPIRIN 81 MG/1
81 TABLET ORAL DAILY
Qty: 30 TABLET | Refills: 5 | Status: SHIPPED | OUTPATIENT
Start: 2022-10-07

## 2022-10-18 ENCOUNTER — OFFICE VISIT (OUTPATIENT)
Dept: FAMILY MEDICINE CLINIC | Facility: HOSPITAL | Age: 57
End: 2022-10-18
Payer: COMMERCIAL

## 2022-10-18 VITALS
HEART RATE: 66 BPM | DIASTOLIC BLOOD PRESSURE: 90 MMHG | WEIGHT: 130.4 LBS | BODY MASS INDEX: 20.96 KG/M2 | SYSTOLIC BLOOD PRESSURE: 148 MMHG | OXYGEN SATURATION: 98 % | HEIGHT: 66 IN | TEMPERATURE: 97.2 F

## 2022-10-18 DIAGNOSIS — G62.9 NEUROPATHY: ICD-10-CM

## 2022-10-18 DIAGNOSIS — Z23 ENCOUNTER FOR IMMUNIZATION: ICD-10-CM

## 2022-10-18 DIAGNOSIS — F41.1 GAD (GENERALIZED ANXIETY DISORDER): Primary | ICD-10-CM

## 2022-10-18 DIAGNOSIS — I10 ESSENTIAL HYPERTENSION: ICD-10-CM

## 2022-10-18 PROCEDURE — 90682 RIV4 VACC RECOMBINANT DNA IM: CPT | Performed by: NURSE PRACTITIONER

## 2022-10-18 PROCEDURE — 90471 IMMUNIZATION ADMIN: CPT | Performed by: NURSE PRACTITIONER

## 2022-10-18 PROCEDURE — 99214 OFFICE O/P EST MOD 30 MIN: CPT | Performed by: NURSE PRACTITIONER

## 2022-10-18 RX ORDER — ESCITALOPRAM OXALATE 10 MG/1
20 TABLET ORAL DAILY
Qty: 30 TABLET | Refills: 1 | Status: SHIPPED | OUTPATIENT
Start: 2022-10-18 | End: 2022-10-19 | Stop reason: SDUPTHER

## 2022-10-18 NOTE — ASSESSMENT & PLAN NOTE
BP is slightly elevated today  Had been controlled previously  Will not make any change to lisinopril  F/U in 4 weeks

## 2022-10-18 NOTE — PROGRESS NOTES
Assessment/Plan:    ROSANA (generalized anxiety disorder)  Some improvement in anxiety  Will increase to Lexapro to 20 mg    F/U in 4 weeks  Neuropathy  Gabapentin has helped and would like to continue with just nightly dosing  Essential hypertension  BP is slightly elevated today  Had been controlled previously  Will not make any change to lisinopril  F/U in 4 weeks  Diagnoses and all orders for this visit:    ROSANA (generalized anxiety disorder)  -     escitalopram (Lexapro) 10 mg tablet; Take 2 tablets (20 mg total) by mouth daily    Neuropathy    Essential hypertension          Subjective:      Patient ID: Amee Estrada is a 61 y o  female  Feels anxiety is better  Less emotional  Still gets very anxious  Gabapentin has been helpful for the neuropathy  The following portions of the patient's history were reviewed and updated as appropriate: allergies, current medications, past family history, past medical history, past social history, past surgical history and problem list     Review of Systems   Constitutional: Negative for fatigue  Cardiovascular: Negative for chest pain, palpitations and leg swelling  Neurological: Negative for dizziness, syncope, light-headedness and headaches  Psychiatric/Behavioral: Negative for dysphoric mood, sleep disturbance and suicidal ideas  The patient is nervous/anxious  Objective:  Vitals:    10/18/22 1112   BP: 148/90   Pulse: 66   Temp: (!) 97 2 °F (36 2 °C)   SpO2: 98%      Physical Exam  Vitals reviewed  Constitutional:       Appearance: Normal appearance  She is well-developed  Cardiovascular:      Rate and Rhythm: Normal rate and regular rhythm  Heart sounds: Normal heart sounds  No murmur heard  Pulmonary:      Effort: Pulmonary effort is normal       Breath sounds: Normal breath sounds  Skin:     General: Skin is warm and dry  Neurological:      Mental Status: She is alert and oriented to person, place, and time  Psychiatric:         Mood and Affect: Mood normal          Behavior: Behavior normal          Thought Content:  Thought content normal          Judgment: Judgment normal

## 2022-10-19 DIAGNOSIS — F41.1 GAD (GENERALIZED ANXIETY DISORDER): ICD-10-CM

## 2022-10-19 RX ORDER — ESCITALOPRAM OXALATE 20 MG/1
20 TABLET ORAL DAILY
Qty: 30 TABLET | Refills: 1 | Status: SHIPPED | OUTPATIENT
Start: 2022-10-19

## 2022-11-04 ENCOUNTER — HOSPITAL ENCOUNTER (OUTPATIENT)
Dept: NON INVASIVE DIAGNOSTICS | Age: 57
Discharge: HOME | End: 2022-11-04

## 2022-11-04 VITALS
DIASTOLIC BLOOD PRESSURE: 90 MMHG | HEIGHT: 66 IN | SYSTOLIC BLOOD PRESSURE: 148 MMHG | HEART RATE: 71 BPM | WEIGHT: 130 LBS | BODY MASS INDEX: 20.89 KG/M2

## 2022-11-04 DIAGNOSIS — I63.9 ACUTE CVA (CEREBROVASCULAR ACCIDENT) (HCC): ICD-10-CM

## 2022-11-04 DIAGNOSIS — I10 ESSENTIAL HYPERTENSION: ICD-10-CM

## 2022-11-04 LAB
AORTIC ROOT: 2.5 CM
APICAL FOUR CHAMBER EJECTION FRACTION: 70 %
ASCENDING AORTA: 3.2 CM
E WAVE DECELERATION TIME: 300 MS
FRACTIONAL SHORTENING: 30 (ref 28–44)
INTERVENTRICULAR SEPTUM IN DIASTOLE (PARASTERNAL SHORT AXIS VIEW): 0.8 CM
INTERVENTRICULAR SEPTUM: 0.8 CM (ref 0.6–1.1)
LAAS-AP2: 19.6 CM2
LAAS-AP4: 23.4 CM2
LEFT ATRIUM SIZE: 3.7 CM
LEFT INTERNAL DIMENSION IN SYSTOLE: 3.3 CM (ref 2.1–4)
LEFT VENTRICLE DIASTOLIC VOLUME (MOD BIPLANE): 118 ML
LEFT VENTRICLE SYSTOLIC VOLUME (MOD BIPLANE): 39 ML
LEFT VENTRICULAR INTERNAL DIMENSION IN DIASTOLE: 4.7 CM (ref 3.5–6)
LEFT VENTRICULAR POSTERIOR WALL IN END DIASTOLE: 1.2 CM
LEFT VENTRICULAR STROKE VOLUME: 56 ML
LV EF: 67 %
LVSV (TEICH): 56 ML
MV E'TISSUE VEL-SEP: 7 CM/S
MV PEAK A VEL: 0.68 M/S
MV PEAK E VEL: 43 CM/S
MV STENOSIS PRESSURE HALF TIME: 87 MS
MV VALVE AREA P 1/2 METHOD: 2.53
RIGHT ATRIUM AREA SYSTOLE A4C: 12.2 CM2
RIGHT VENTRICLE ID DIMENSION: 2.6 CM
SL CV LEFT ATRIUM LENGTH A2C: 6.2 CM
SL CV LV EF: 60
SL CV PED ECHO LEFT VENTRICLE DIASTOLIC VOLUME (MOD BIPLANE) 2D: 101 ML
SL CV PED ECHO LEFT VENTRICLE SYSTOLIC VOLUME (MOD BIPLANE) 2D: 45 ML

## 2022-11-11 ENCOUNTER — CLINICAL SUPPORT (OUTPATIENT)
Dept: CARDIOLOGY CLINIC | Facility: CLINIC | Age: 57
End: 2022-11-11

## 2022-11-11 DIAGNOSIS — I63.9 ACUTE CVA (CEREBROVASCULAR ACCIDENT) (HCC): ICD-10-CM

## 2022-11-14 ENCOUNTER — HOSPITAL ENCOUNTER (OUTPATIENT)
Dept: NON INVASIVE DIAGNOSTICS | Facility: HOSPITAL | Age: 57
Discharge: HOME | End: 2022-11-14
Attending: INTERNAL MEDICINE

## 2022-11-14 VITALS
BODY MASS INDEX: 20.89 KG/M2 | SYSTOLIC BLOOD PRESSURE: 148 MMHG | HEIGHT: 66 IN | WEIGHT: 130 LBS | DIASTOLIC BLOOD PRESSURE: 90 MMHG | HEART RATE: 75 BPM

## 2022-11-14 DIAGNOSIS — I69.398 CVA, OLD, ALTERATIONS OF SENSATIONS: ICD-10-CM

## 2022-11-14 DIAGNOSIS — R20.9 CVA, OLD, ALTERATIONS OF SENSATIONS: ICD-10-CM

## 2022-11-15 LAB — SL CV LV EF: 60

## 2022-12-27 ENCOUNTER — HOSPITAL ENCOUNTER (OUTPATIENT)
Dept: MAMMOGRAPHY | Facility: IMAGING CENTER | Age: 57
Discharge: HOME | End: 2022-12-27

## 2022-12-27 VITALS — WEIGHT: 118 LBS | HEIGHT: 67 IN | BODY MASS INDEX: 18.52 KG/M2

## 2022-12-27 DIAGNOSIS — Z12.31 ENCOUNTER FOR SCREENING MAMMOGRAM FOR BREAST CANCER: ICD-10-CM

## 2023-01-03 ENCOUNTER — OFFICE VISIT (OUTPATIENT)
Dept: CARDIOLOGY CLINIC | Facility: CLINIC | Age: 58
End: 2023-01-03

## 2023-01-03 VITALS
HEART RATE: 67 BPM | HEIGHT: 66 IN | SYSTOLIC BLOOD PRESSURE: 134 MMHG | WEIGHT: 127.2 LBS | BODY MASS INDEX: 20.44 KG/M2 | DIASTOLIC BLOOD PRESSURE: 72 MMHG

## 2023-01-03 DIAGNOSIS — I63.40 CEREBROVASCULAR ACCIDENT (CVA) DUE TO EMBOLISM OF CEREBRAL ARTERY (HCC): Primary | ICD-10-CM

## 2023-01-03 DIAGNOSIS — I10 ESSENTIAL HYPERTENSION: ICD-10-CM

## 2023-01-03 NOTE — PROGRESS NOTES
[facility]    Kiran Wilcox   DOS: 1/3/2023     Chief Complaint:   Chief Complaint   Patient presents with   • Follow-up     No complaints  HISTORY OF PRESENT ILLNESS:      HPI:  Yaakov Ledesma is a 61 y o  female  She  has a past medical history of CVA (cerebral vascular accident) ([facility] [address] ), Diverticulitis, Diverticulitis of colon, Diverticulosis, Hypertension, and Stroke ([facility] [address] )  She presents for follow-up evaluation  Last saw me in the office on 10/5/2022 to establish care after a recently diagnosed stroke  Per my last office visit note, she presented to [facility] with slurred speech and left-sided facial droop, associated with visual field changes and an unsteady gait  She was diagnosed there with an acute CVA  She subsequently was seen in the emergency room at [facility] for evaluation of similar symptoms recurring on 09/01/2022  She was evaluated by Neurology the inpatient setting, and referred for outpatient cardiac rhythm monitoring due to concer for sinus bradycardia noted while she was inpatient  There was also concern for a cardioembolic source by her neurologist     Rhythm monitoring thus far has included a 48-hour Holter in addition to 14-day Zio patch  Neither demonstrated atrial fibrillation or atrial flutter  I had also referred her for a transthoracic echocardiogram with saline contrast to exclude intracardiac shunting  The study demonstrated no evidence of intracardiac shunt  Today, she reports no active complaints  Does remain quite anxious that another stroke may occur     She denies chest pain, shortness of breath, diaphoresis, dizziness, palpitations, orthopnea, edema, syncope, recurrence of her stroke-like symptoms    Does note easy bruising on dual antiplatelet therapy, recalling a recent incident in which she bumped her wrist into a metal pole while walking her dog which resulted in bruising of her right hand and wrist       ROS    ROS: Pertinent positives and negatives as described in History of Present Illness  Remainder of a 14 point review of systems was negative  No Known Allergies     Current Outpatient Medications on File Prior to Visit   Medication Sig Dispense Refill   • aspirin (ECOTRIN LOW STRENGTH) 81 mg EC tablet Take 1 tablet (81 mg total) by mouth daily 30 tablet 5   • atorvastatin (LIPITOR) 40 mg tablet Take 40 mg by mouth daily     • clopidogrel (PLAVIX) 75 mg tablet Take 1 tablet (75 mg total) by mouth daily 30 tablet 5   • escitalopram (Lexapro) 20 mg tablet Take 1 tablet (20 mg total) by mouth daily 30 tablet 1   • gabapentin (Neurontin) 300 mg capsule Take 1 capsule (300 mg total) by mouth daily at bedtime 30 capsule 1   • lisinopril (ZESTRIL) 10 mg tablet Take 1 tablet (10 mg total) by mouth daily 30 tablet 1   • NIFEdipine ER (ADALAT CC) 30 MG 24 hr tablet Take 1 tablet (30 mg total) by mouth daily 30 tablet 5     No current facility-administered medications on file prior to visit  Past Medical History:   Diagnosis Date   • CVA (cerebral vascular accident) ([facility] [address] )    • Diverticulitis    • Diverticulitis of colon    • Diverticulosis    • Hypertension    • Stroke [facility])        Past Surgical History:   Procedure Laterality Date   • COLON SIGMOID RESECTION         Family History   Problem Relation Age of Onset   • Coronary artery disease Mother    • Heart disease Mother    • Diabetes Father    • No Known Problems Sister    • Breast cancer Maternal Grandmother         age unknown   • No Known Problems Maternal Grandfather    • No Known Problems Paternal Grandmother    • No Known Problems Paternal Grandfather    • No Known Problems Paternal Aunt    • No Known Problems Paternal Aunt    • No Known Problems Maternal Aunt        Social History     Socioeconomic History   • Marital status:       Spouse name: Not on file   • Number of children: Not on file   • Years of education: Not on file   • Highest education level: Not on file   Occupational History   • Not on file   Tobacco Use   • Smoking status: Light Smoker     Types: Cigarettes   • Smokeless tobacco: Never   • Tobacco comments:     smokes 4 cigarettes daily   Vaping Use   • Vaping Use: Never used   Substance and Sexual Activity   • Alcohol use: Yes   • Drug use: Yes     Types: Marijuana     Comment: medical card   • Sexual activity: Not on file   Other Topics Concern   • Not on file   Social History Narrative   • Not on file     Social Determinants of Health     Financial Resource Strain: Not on file   Food Insecurity: No Food Insecurity   • Worried About Running Out of Food in the Last Year: Never true   • Ran Out of Food in the Last Year: Never true   Transportation Needs: No Transportation Needs   • Lack of Transportation (Medical): No   • Lack of Transportation (Non-Medical): No   Physical Activity: Not on file   Stress: Not on file   Social Connections: Not on file   Intimate Partner Violence: Not on file   Housing Stability: Low Risk    • Unable to Pay for Housing in the Last Year: No   • Number of Places Lived in the Last Year: 1   • Unstable Housing in the Last Year: No       OBJECTIVE:    /72 (BP Location: Right arm, Patient Position: Sitting, Cuff Size: Standard)   Pulse 67   Ht 5' 6" (1 676 m)   Wt 57 7 kg (127 lb 3 2 oz)   BMI 20 53 kg/m²      BP Readings from Last 3 Encounters:   01/03/23 134/72   11/14/22 148/90   11/04/22 148/90       Wt Readings from Last 3 Encounters:   01/03/23 57 7 kg (127 lb 3 2 oz)   12/27/22 53 5 kg (118 lb)   11/14/22 59 kg (130 lb)         Physical Exam  Vitals reviewed  Constitutional:       General: She is not in acute distress  Appearance: Normal appearance  She is not diaphoretic  HENT:      Head: Normocephalic and atraumatic  Eyes:      Conjunctiva/sclera: Conjunctivae normal    Neck:      Vascular: No carotid bruit or JVD  Cardiovascular:      Rate and Rhythm: Normal rate and regular rhythm        Pulses: Normal pulses  Heart sounds: Normal heart sounds  No murmur heard  No friction rub  No gallop  Pulmonary:      Effort: Pulmonary effort is normal       Breath sounds: Normal breath sounds  No wheezing, rhonchi or rales  Abdominal:      General: Abdomen is flat  Bowel sounds are normal  There is no distension  Palpations: Abdomen is soft  Musculoskeletal:      Right lower leg: No edema  Left lower leg: No edema  Skin:     General: Skin is warm and dry  Findings: Bruising (Right hand and wrist) present  Neurological:      Mental Status: She is alert and oriented to person, place, and time  Psychiatric:         Mood and Affect: Mood normal          Behavior: Behavior normal                                                        Cardiac testing:   Ziopatch XT x14 days 11/11/22  IMPRESSION:  Predominantly sinus rhythm, with an average heart rate of 80 beats per minute  Rare premature atrial contractions  Two episodes of self limiting supraventricular tachycardia  This appears to be an atrial tachycardia  No evidence of atrial fibrillation or atrial flutter was noted     Rare premature ventricular contractions  No significant pauses or advanced degree heart block        LABS:  Lab Results   Component Value Date    BUN 15 09/02/2022    CREATININE 0 75 09/02/2022    CALCIUM 8 9 09/02/2022    K 3 4 (L) 09/02/2022    CO2 28 09/02/2022     09/02/2022        Lab Results   Component Value Date    WBC 5 93 09/02/2022    HGB 13 4 09/02/2022    HCT 40 2 09/02/2022    MCV 98 09/02/2022     09/02/2022       Lab Results   Component Value Date    HDL 39 (L) 09/02/2022    LDLCALC 56 09/02/2022    TRIG 93 09/02/2022       Lab Results   Component Value Date    HGBA1C 5 2 09/02/2022       No results found for: TSH        ASSESSMENT/PLAN:  Diagnoses and all orders for this visit:    Cerebrovascular accident (CVA) due to embolism of cerebral artery ([address] )  Cardiac rhythm monitoring thus far has been unrevealing for atrial fibrillation or atrial flutter  Have referred the patient for an implantable loop recorder due to neurology concerns for cardioembolic etiology of her stroke  -     Case Request Cath Lab: Cardiac loop recorder implant; Standing  -     Case Request Cath Lab: Cardiac loop recorder implant    Essential hypertension  Well-controlled  Continue lisinopril 10 mg once daily as ordered  Other orders  -     Height and weight; Standing  -     Nursing communication 1) Nurse to verify the following labs are available PRE-PROCEDURE: CBC, INR (if on coumadin), BMP   2) Call Cardiologist with abnormal results ; Standing  -     Insert and maintain IV line; Standing  -     Void on Call to Cath Lab; Alberto Leal MD

## 2023-01-03 NOTE — PATIENT INSTRUCTIONS
You were seen today in the Cardiology office for follow up evaluation  Please continue your current cardiac medications as prescribed  Please schedule your loop recorder implant  Thank you for choosing [address]  Please call our office or use [employer] with any questions

## 2023-01-19 ENCOUNTER — TELEPHONE (OUTPATIENT)
Dept: CARDIOLOGY CLINIC | Facility: CLINIC | Age: 58
End: 2023-01-19

## 2023-01-19 DIAGNOSIS — I63.40 CEREBROVASCULAR ACCIDENT (CVA) DUE TO EMBOLISM OF CEREBRAL ARTERY (HCC): Primary | ICD-10-CM

## 2023-01-19 NOTE — TELEPHONE ENCOUNTER
Left voicemail on machine informing patient to call and schedule a LOOP Recorder procedure  Sent MyChart message      Alexandre Mason > 17 inches

## 2023-01-19 NOTE — TELEPHONE ENCOUNTER
Progress Notes by Sussy Hayward MD1/3/2023  Signed    Cardiac rhythm monitoring thus far has been unrevealing for atrial fibrillation or atrial flutter  Have referred the patient for an implantable loop recorder due to neurology concerns for cardioembolic etiology of her stroke    - Case Request Cath Lab: Cardiac loop recorder implant; Standing  - Case Request Cath Lab: Cardiac loop recorder implant

## 2023-01-23 NOTE — TELEPHONE ENCOUNTER
Patient is scheduled for LOOP Recorder Implant on 2/8/23 at [facility] with Arthurine Erin  Patient aware of all general instructions  Instructions sent to patient through [address]  Medication holds:   N/A    Blood work to be done by 1/26/23:  CMP (patient already did CBC on 9/2/22)    Insurance: [employer]     Please obtain auth           Thank you,  Ana Tyler

## 2023-01-23 NOTE — TELEPHONE ENCOUNTER
Left voicemail on machine informing patient to call and schedule a LOOP Recorder procedure       Alexandre Mason

## 2023-01-30 ENCOUNTER — APPOINTMENT (OUTPATIENT)
Dept: LAB | Facility: HOSPITAL | Age: 58
End: 2023-01-30

## 2023-01-30 DIAGNOSIS — Z01.818 OTHER SPECIFIED PRE-OPERATIVE EXAMINATION: ICD-10-CM

## 2023-01-30 LAB
ALBUMIN SERPL BCP-MCNC: 3.7 G/DL (ref 3.5–5)
ALP SERPL-CCNC: 88 U/L (ref 46–116)
ALT SERPL W P-5'-P-CCNC: 101 U/L (ref 12–78)
ANION GAP SERPL CALCULATED.3IONS-SCNC: 4 MMOL/L (ref 4–13)
AST SERPL W P-5'-P-CCNC: 88 U/L (ref 5–45)
BILIRUB SERPL-MCNC: 0.58 MG/DL (ref 0.2–1)
BUN SERPL-MCNC: 14 MG/DL (ref 5–25)
CALCIUM SERPL-MCNC: 9.5 MG/DL (ref 8.3–10.1)
CHLORIDE SERPL-SCNC: 103 MMOL/L (ref 96–108)
CO2 SERPL-SCNC: 29 MMOL/L (ref 21–32)
CREAT SERPL-MCNC: 0.88 MG/DL (ref 0.6–1.3)
GFR SERPL CREATININE-BSD FRML MDRD: 72 ML/MIN/1.73SQ M
GLUCOSE SERPL-MCNC: 96 MG/DL (ref 65–140)
POTASSIUM SERPL-SCNC: 3.8 MMOL/L (ref 3.5–5.3)
PROT SERPL-MCNC: 8.3 G/DL (ref 6.4–8.4)
SODIUM SERPL-SCNC: 136 MMOL/L (ref 135–147)

## 2023-02-08 ENCOUNTER — HOSPITAL ENCOUNTER (OUTPATIENT)
Facility: HOSPITAL | Age: 58
Setting detail: OUTPATIENT SURGERY
Discharge: HOME | End: 2023-02-08
Attending: INTERNAL MEDICINE | Admitting: INTERNAL MEDICINE

## 2023-02-08 DIAGNOSIS — I63.40 CEREBROVASCULAR ACCIDENT (CVA) DUE TO EMBOLISM OF CEREBRAL ARTERY (HCC): ICD-10-CM

## 2023-02-08 DEVICE — LOOP RECORDER REVEAL LINQ II SYS DEVICE ONLY: Type: IMPLANTABLE DEVICE | Status: FUNCTIONAL

## 2023-02-08 RX ORDER — LIDOCAINE HYDROCHLORIDE AND EPINEPHRINE 10; 10 MG/ML; UG/ML
INJECTION, SOLUTION INFILTRATION; PERINEURAL CODE/TRAUMA/SEDATION MEDICATION
Status: DISCONTINUED | OUTPATIENT
Start: 2023-02-08 | End: 2023-02-08 | Stop reason: HOSPADM

## 2023-02-08 RX ORDER — LIDOCAINE HYDROCHLORIDE AND EPINEPHRINE 10; 10 MG/ML; UG/ML
INJECTION, SOLUTION INFILTRATION; PERINEURAL
Status: DISCONTINUED
Start: 2023-02-08 | End: 2023-02-08 | Stop reason: HOSPADM

## 2023-02-08 NOTE — DISCHARGE INSTR - AVS FIRST PAGE
OK to shower with with the glue, glue will fall off in 1 week on its own, do not scrub the area or swim during the next 14 days  not use lotions/powders/creams on incision  Remove outer bandage on for 24 hours after procedure  Please call the office [phone] if you notice redness, swelling, bleeding, or drainage from incision or if you develop fevers  Cardiac Loop Recorder Insertion      WHAT YOU SHOULD KNOW:    A cardiac loop recorder is a device used to diagnose heart rhythm problems, such as a fast or irregular heartbeat  It is implanted in your left chest, just under the skin  The device records a pattern of your heart's rhythm, called an EKG  Your device records automatic EKGs, depending on how your caregiver programs it  You may also receive a handheld controller  You press a button on the controller when you have symptoms, such as dizziness, lightheadedness, or palpitations  The device will record an EKG at that moment  The recording can help your caregiver see if your symptoms may be caused by heart rhythm problems  Your caregiver will remove the device after it has collected enough data  You may need the device for up to 5 years  The procedure to remove the device is similar to the procedure used to implant it  AFTER YOU LEAVE:    Follow up with our loop recorder clinic: You will need to return in 1 to 2 weeks to meet the staff in our loop recorder clinic  At this appointment they will check your incision and remove your stitches  We will discuss how we retrieve data from your loop recorder at this appointment  You will be able to transmit data from your device from home as well, this will also be explained by our loop recorder clinic staff  Ask for information about this process  Write down your questions so you remember to ask them during your visits        Wound care: the glue over your loop recorder is water proof, you can shower as your normally wound, please do not pick the glue off the wound  Do not use lotions/powders/creams on incision  Remove outer bandage 24 hours after procedure, you will notice a few stitches which will be removed at your two week follow up appointment  Please call the office if you notice redness, swelling, bleeding, or drainage from incision or if you develop fevers  Keep the loop recorder area clean until it heals  Return to activity: If you received anesthesia, you will not be able to drive for 24 hours  Otherwise, most people can return to normal activities soon after the procedure  Your cardiologist may want to know if your work involves electrical current or high-voltage equipment  Ask about other electrical items that could interfere with your cardiac loop recorder  Contact your cardiologist if:   You have a fever or chills  Your wound is red, swollen, or draining pus  You have questions or concerns about your condition or care  Seek care immediately or call 911 if: You feel weak, dizzy, or faint  You lose consciousness  © 2014 [address] is for End User's use only and may not be sold, redistributed or otherwise used for commercial purposes  All illustrations and images included in CareNotes® are the copyrighted property of [employer]  or Troy Gordon  The above information is an  only  It is not intended as medical advice for individual conditions or treatments  Talk to your doctor, nurse or pharmacist before following any medical regimen to see if it is safe and effective for you

## 2023-02-11 ENCOUNTER — APPOINTMENT (EMERGENCY)
Dept: RADIOLOGY | Facility: HOSPITAL | Age: 58
End: 2023-02-11

## 2023-02-11 ENCOUNTER — APPOINTMENT (EMERGENCY)
Dept: CT IMAGING | Facility: HOSPITAL | Age: 58
End: 2023-02-11

## 2023-02-11 ENCOUNTER — HOSPITAL ENCOUNTER (EMERGENCY)
Facility: HOSPITAL | Age: 58
Discharge: HOME | End: 2023-02-11
Attending: EMERGENCY MEDICINE

## 2023-02-11 VITALS
SYSTOLIC BLOOD PRESSURE: 175 MMHG | DIASTOLIC BLOOD PRESSURE: 85 MMHG | WEIGHT: 127.21 LBS | BODY MASS INDEX: 20.53 KG/M2 | HEART RATE: 89 BPM | OXYGEN SATURATION: 96 % | TEMPERATURE: 98 F | RESPIRATION RATE: 18 BRPM

## 2023-02-11 DIAGNOSIS — S30.0XXA TRAUMATIC HEMATOMA OF BUTTOCK, INITIAL ENCOUNTER: ICD-10-CM
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
## 2023-02-11 LAB
ANION GAP SERPL CALCULATED.3IONS-SCNC: 5 MMOL/L (ref 4–13)
APTT PPP: 29 SECONDS (ref 23–37)
ATRIAL RATE: 69 BPM
BASOPHILS # BLD AUTO: 0.04 THOUSANDS/ÂΜL (ref 0–0.1)
BASOPHILS NFR BLD AUTO: 1 % (ref 0–1)
BUN SERPL-MCNC: 17 MG/DL (ref 5–25)
CALCIUM SERPL-MCNC: 8.1 MG/DL (ref 8.3–10.1)
CHLORIDE SERPL-SCNC: 99 MMOL/L (ref 96–108)
CO2 SERPL-SCNC: 31 MMOL/L (ref 21–32)
CREAT SERPL-MCNC: 0.8 MG/DL (ref 0.6–1.3)
EOSINOPHIL # BLD AUTO: 0.1 THOUSAND/ÂΜL (ref 0–0.61)
EOSINOPHIL NFR BLD AUTO: 1 % (ref 0–6)
ERYTHROCYTE [DISTWIDTH] IN BLOOD BY AUTOMATED COUNT: 13.9 % (ref 11.6–15.1)
GFR SERPL CREATININE-BSD FRML MDRD: 81 ML/MIN/1.73SQ M
GLUCOSE SERPL-MCNC: 103 MG/DL (ref 65–140)
HCT VFR BLD AUTO: 35 % (ref 34.8–46.1)
HGB BLD-MCNC: 11.2 G/DL (ref 11.5–15.4)
IMM GRANULOCYTES # BLD AUTO: 0.03 THOUSAND/UL (ref 0–0.2)
IMM GRANULOCYTES NFR BLD AUTO: 0 % (ref 0–2)
INR PPP: 1.05 (ref 0.84–1.19)
LYMPHOCYTES # BLD AUTO: 1.62 THOUSANDS/ÂΜL (ref 0.6–4.47)
LYMPHOCYTES NFR BLD AUTO: 22 % (ref 14–44)
MCH RBC QN AUTO: 32.7 PG (ref 26.8–34.3)
MCHC RBC AUTO-ENTMCNC: 32 G/DL (ref 31.4–37.4)
MCV RBC AUTO: 102 FL (ref 82–98)
MONOCYTES # BLD AUTO: 0.82 THOUSAND/ÂΜL (ref 0.17–1.22)
MONOCYTES NFR BLD AUTO: 11 % (ref 4–12)
NEUTROPHILS # BLD AUTO: 4.85 THOUSANDS/ÂΜL (ref 1.85–7.62)
NEUTS SEG NFR BLD AUTO: 65 % (ref 43–75)
NRBC BLD AUTO-RTO: 0 /100 WBCS
P AXIS: 71 DEGREES
PLATELET # BLD AUTO: 176 THOUSANDS/UL (ref 149–390)
PMV BLD AUTO: 10 FL (ref 8.9–12.7)
POTASSIUM SERPL-SCNC: 3.9 MMOL/L (ref 3.5–5.3)
PR INTERVAL: 152 MS
PROTHROMBIN TIME: 14.4 SECONDS (ref 11.6–14.5)
QRS AXIS: 69 DEGREES
QRSD INTERVAL: 84 MS
QT INTERVAL: 450 MS
QTC INTERVAL: 482 MS
RBC # BLD AUTO: 3.43 MILLION/UL (ref 3.81–5.12)
SODIUM SERPL-SCNC: 135 MMOL/L (ref 135–147)
T WAVE AXIS: 82 DEGREES
VENTRICULAR RATE: 69 BPM
WBC # BLD AUTO: 7.46 THOUSAND/UL (ref 4.31–10.16)

## 2023-02-11 RX ORDER — ACETAMINOPHEN 325 MG/1
650 TABLET ORAL ONCE
Status: COMPLETED | OUTPATIENT
Start: 2023-02-11 | End: 2023-02-11

## 2023-02-11 RX ORDER — OXYCODONE HYDROCHLORIDE 5 MG/1
5 TABLET ORAL ONCE
Status: COMPLETED | OUTPATIENT
Start: 2023-02-11 | End: 2023-02-11

## 2023-02-11 RX ORDER — OXYCODONE HYDROCHLORIDE 5 MG/1
5 TABLET ORAL EVERY 4 HOURS PRN
Qty: 15 TABLET | Refills: 0 | Status: SHIPPED | OUTPATIENT
Start: 2023-02-11

## 2023-02-11 RX ADMIN — OXYCODONE HYDROCHLORIDE 5 MG: 5 TABLET ORAL at 11:20

## 2023-02-11 RX ADMIN — ACETAMINOPHEN 650 MG: 325 TABLET, FILM COATED ORAL at 10:20

## 2023-02-11 NOTE — ED PROVIDER NOTES
History  Chief Complaint   Patient presents with   • Fall     To ED after falling this morning several hours ago for the second time this week  C/o left hip pain and inability to walk after second fall  Was not evaluated one week ago  Denies any LOC of head strike  50-year-old female presents for evaluation of left leg and buttock pain that have been ongoing for a week after a fall  The patient states that she took a hard fall onto her left hip and buttock on a deck 1 week ago and then fell again yesterday  The patient has not sought medical care for either of these falls  She is on Plavix and aspirin      Fall      Prior to Admission Medications   Prescriptions Last Dose Informant Patient Reported? Taking? NIFEdipine ER (ADALAT CC) 30 MG 24 hr tablet   No No   Sig: Take 1 tablet (30 mg total) by mouth daily   aspirin (ECOTRIN LOW STRENGTH) 81 mg EC tablet   No No   Sig: Take 1 tablet (81 mg total) by mouth daily   atorvastatin (LIPITOR) 40 mg tablet   Yes No   Sig: Take 40 mg by mouth daily   clopidogrel (PLAVIX) 75 mg tablet   No No   Sig: Take 1 tablet (75 mg total) by mouth daily   escitalopram (Lexapro) 20 mg tablet   No No   Sig: Take 1 tablet (20 mg total) by mouth daily   gabapentin (Neurontin) 300 mg capsule   No No   Sig: Take 1 capsule (300 mg total) by mouth daily at bedtime   lisinopril (ZESTRIL) 10 mg tablet   No No   Sig: Take 1 tablet (10 mg total) by mouth daily      Facility-Administered Medications: None       Past Medical History:   Diagnosis Date   • CVA (cerebral vascular accident) ([facility] [address] )    • Diverticulitis    • Diverticulitis of colon    • Diverticulosis    • Hypertension    • Stroke [facility])        Past Surgical History:   Procedure Laterality Date   • CARDIAC ELECTROPHYSIOLOGY PROCEDURE N/A 2/8/2023    Procedure: Cardiac loop recorder implant;  Surgeon: Jacinto Leon MD;  Location: [facility];   Service: Cardiology   • COLON SIGMOID RESECTION         Family History   Problem Relation Age of Onset   • Coronary artery disease Mother    • Heart disease Mother    • Diabetes Father    • No Known Problems Sister    • Breast cancer Maternal Grandmother         age unknown   • No Known Problems Maternal Grandfather    • No Known Problems Paternal Grandmother    • No Known Problems Paternal Grandfather    • No Known Problems Paternal Aunt    • No Known Problems Paternal Aunt    • No Known Problems Maternal Aunt      I have reviewed and agree with the history as documented  E-Cigarette/Vaping   • E-Cigarette Use Never User      E-Cigarette/Vaping Substances   • Nicotine No    • Flavoring No      Social History     Tobacco Use   • Smoking status: Light Smoker     Types: Cigarettes   • Smokeless tobacco: Never   • Tobacco comments:     smokes 4 cigarettes daily   Vaping Use   • Vaping Use: Never used   Substance Use Topics   • Alcohol use: Yes     Comment: several drinks a week   • Drug use: Yes     Types: Marijuana     Comment: medical card       Review of Systems    Physical Exam  Physical Exam  Musculoskeletal:      Left hip: Tenderness present  No bony tenderness  Decreased range of motion  Left upper leg: Swelling and tenderness present  Skin:     Findings: Bruising and ecchymosis present                  Vital Signs  ED Triage Vitals   Temperature Pulse Respirations Blood Pressure SpO2   02/11/23 0918 02/11/23 0915 02/11/23 0915 02/11/23 0915 02/11/23 0915   98 °F (36 7 °C) 69 18 (!) 183/105 98 %      Temp Source Heart Rate Source Patient Position - Orthostatic VS BP Location FiO2 (%)   02/11/23 0918 02/11/23 0915 02/11/23 0915 02/11/23 0918 --   Oral Monitor Lying Left arm       Pain Score       02/11/23 0915       10 - Worst Possible Pain           Vitals:    02/11/23 0915 02/11/23 0918 02/11/23 1015 02/11/23 1115   BP: (!) 183/105 (!) 183/105 (!) 183/89 (!) 175/85   Pulse: 69 69 83 89   Patient Position - Orthostatic VS: Lying Lying Lying Lying         Visual Acuity  Visual Acuity Flowsheet Row Most Recent Value   L Pupil Size (mm) 3   R Pupil Size (mm) 3          ED Medications  Medications   acetaminophen (TYLENOL) tablet 650 mg (650 mg Oral Given 2/11/23 1020)   oxyCODONE (ROXICODONE) IR tablet 5 mg (5 mg Oral Given 2/11/23 1120)       Diagnostic Studies  Results Reviewed     Procedure Component Value Units Date/Time    Protime-INR [[id number]]  (Normal) Collected: 02/11/23 0924    Lab Status: Final result Specimen: Blood from Arm, Left Updated: 02/11/23 0951     Protime 14 4 seconds      INR 1 05    APTT [[id number]]  (Normal) Collected: 02/11/23 0924    Lab Status: Final result Specimen: Blood from Arm, Left Updated: 02/11/23 0951     PTT 29 seconds     Basic metabolic panel [[id number]]  (Abnormal) Collected: 02/11/23 0924    Lab Status: Final result Specimen: Blood from Arm, Left Updated: 02/11/23 0944     Sodium 135 mmol/L      Potassium 3 9 mmol/L      Chloride 99 mmol/L      CO2 31 mmol/L      ANION GAP 5 mmol/L      BUN 17 mg/dL      Creatinine 0 80 mg/dL      Glucose 103 mg/dL      Calcium 8 1 mg/dL      eGFR 81 ml/min/1 73sq m     Narrative:      [place] guidelines for Chronic Kidney Disease (CKD):   •  Stage 1 with normal or high GFR (GFR > 90 mL/min/1 73 square meters)  •  Stage 2 Mild CKD (GFR = 60-89 mL/min/1 73 square meters)  •  Stage 3A Moderate CKD (GFR = 45-59 mL/min/1 73 square meters)  •  Stage 3B Moderate CKD (GFR = 30-44 mL/min/1 73 square meters)  •  Stage 4 Severe CKD (GFR = 15-29 mL/min/1 73 square meters)  •  Stage 5 End Stage CKD (GFR <15 mL/min/1 73 square meters)  Note: GFR calculation is accurate only with a steady state creatinine    CBC and differential [[id number]]  (Abnormal) Collected: 02/11/23 0924    Lab Status: Final result Specimen: Blood from Arm, Left Updated: 02/11/23 0931     WBC 7 46 Thousand/uL      RBC 3 43 Million/uL      Hemoglobin 11 2 g/dL      Hematocrit 35 0 %       fL      MCH 32 7 pg      MCHC 32 0 g/dL RDW 13 9 %      MPV 10 0 fL      Platelets 067 Thousands/uL      nRBC 0 /100 WBCs      Neutrophils Relative 65 %      Immat GRANS % 0 %      Lymphocytes Relative 22 %      Monocytes Relative 11 %      Eosinophils Relative 1 %      Basophils Relative 1 %      Neutrophils Absolute 4 85 Thousands/µL      Immature Grans Absolute 0 03 Thousand/uL      Lymphocytes Absolute 1 62 Thousands/µL      Monocytes Absolute 0 82 Thousand/µL      Eosinophils Absolute 0 10 Thousand/µL      Basophils Absolute 0 04 Thousands/µL                  CT lower extremity wo contrast left   Final Result by Vianca Warren MD (02/11 1104)      Large left gluteus intramuscular hematoma  Nonspecific inguinal and left pelvic sidewall lymphadenopathy  No fracture  Workstation performed: [id number]         XR hip/pelv 2-3 vws left   ED Interpretation by Lg Ledezma DO (02/11 3354)   No acute osseous abnormality on my interpretation      Final Result by Ciro Short MD (02/11 7487)      No acute osseous abnormality  Workstation performed: [id number]                    Procedures  ECG 12 Lead Documentation Only    Date/Time: 2/11/2023 10:48 AM  Performed by: Lg Ledezma DO  Authorized by:  Lg Ledezma DO     Indications / Diagnosis:  FALL  ECG reviewed by me, the ED Provider: yes    Patient location:  ED  Previous ECG:     Previous ECG:  Compared to current    Comparison ECG info:  9/1/22    Similarity:  No change  Interpretation:     Interpretation: abnormal    Quality:     Tracing quality:  Limited by artifact  Rate:     ECG rate:  69    ECG rate assessment: normal    Rhythm:     Rhythm: sinus rhythm    Ectopy:     Ectopy: none    QRS:     QRS axis:  Normal    QRS intervals:  Normal  Conduction:     Conduction: normal    ST segments:     ST segments:  Normal  T waves:     T waves: non-specific    Other findings:     Other findings: prolonged qTc interval               ED Course  ED Course as of 02/11/23 1350   Sat Feb 11, 2023   0921 Breath sounds equal  No crepitus or chest wall tenderness with compression over the chest  No pain or instability with compression over the pelvis  No bedside imaging required  Patient is stable to proceed to CT scan  9402 Patient still continues with moderate pain  I discussed the plan to obtain a CT to rule out occult fracture not seen on x-ray                                             Medical Decision Making  Trauma: Evaluation/Alert      Mechanism of Injury: Fall    LOC:  alert  Airway:  patent  Breathing:  CTA B/L  Circulation: intact  Disability: none  Exposure: complete        Numbers; 3-15 (gcs): 15      none      NEXUS:  Neuro Deficit  Spinal Tenderness (Midline)  Altered Mental Status/Level of Consciousness  Intoxication  Distracting Injury    If No C Spine cleared:      Pt Direct To CT:     Plan is to obtain imaging of the left lower extremity to rule out fracture/dislocation versus discomfort from a large hematoma secondary to the patient's antiplatelet medications  Pain control will also be provided    Imaging results discussed with the patient  Plan for outpatient ortho follow up    The patient (and any family present) verbalized understanding of the discharge instructions and warnings that would necessitate return to the Emergency Department  All questions were answered prior to discharge  Fall, initial encounter: acute illness or injury  Traumatic hematoma of buttock, initial encounter: acute illness or injury  Amount and/or Complexity of Data Reviewed  Labs: ordered  Radiology: ordered and independent interpretation performed  Risk  OTC drugs  Prescription drug management            Disposition  Final diagnoses:   Fall, initial encounter   Traumatic hematoma of buttock, initial encounter     Time reflects when diagnosis was documented in both MDM as applicable and the Disposition within this note     Time User Action Codes Description Comment    2/11/2023 11:21 AM Army Harsh Add [P52  KCXI] Fall, initial encounter     2/11/2023 11:22 AM Army Harsh Add [S30  0XXA] Traumatic hematoma of buttock, initial encounter       ED Disposition     ED Disposition   Discharge    Condition   Stable    Date/Time   Sat Feb 11, 2023 11:21 AM    Comment   Pattie Abad discharge to home/self care                 Follow-up Information     Follow up With Specialties Details Why Contact Info Additional [address] [facility] Specialists [facility] Orthopedic Surgery Schedule an appointment as soon as possible for a visit  For further evaluation [address] [id number] [facility] [address]  [address] Specialists [facility], [address] [place] [address], [facility], [place],  [address]     [address] Emergency Department Emergency Medicine Go to  If symptoms worsen [address] [address]  [phone] [address] Emergency Department, [address] [facility], [facility], [address]          Discharge Medication List as of 2/11/2023 11:25 AM      START taking these medications    Details   oxyCODONE (Roxicodone) 5 immediate release tablet Take 1 tablet (5 mg total) by mouth every 4 (four) hours as needed for moderate pain for up to 15 doses Max Daily Amount: 30 mg, Starting Sat 2/11/2023, Normal         CONTINUE these medications which have NOT CHANGED    Details   aspirin (ECOTRIN LOW STRENGTH) 81 mg EC tablet Take 1 tablet (81 mg total) by mouth daily, Starting Fri 10/7/2022, Normal      atorvastatin (LIPITOR) 40 mg tablet Take 40 mg by mouth daily, Historical Med      clopidogrel (PLAVIX) 75 mg tablet Take 1 tablet (75 mg total) by mouth daily, Starting Fri 9/16/2022, Normal      escitalopram (Lexapro) 20 mg tablet Take 1 tablet (20 mg total) by mouth daily, Starting Wed 10/19/2022, Normal gabapentin (Neurontin) 300 mg capsule Take 1 capsule (300 mg total) by mouth daily at bedtime, Starting Tue 9/20/2022, Normal      lisinopril (ZESTRIL) 10 mg tablet Take 1 tablet (10 mg total) by mouth daily, Starting Thu 9/8/2022, Normal      NIFEdipine ER (ADALAT CC) 30 MG 24 hr tablet Take 1 tablet (30 mg total) by mouth daily, Starting Fri 10/7/2022, Normal                 PDMP Review       Value Time User    PDMP Reviewed  Yes 2/11/2023 10:24 AM Oswald Day DO          ED Provider  Electronically Signed by           Oswald Day DO  02/11/23 2751

## 2023-02-22 ENCOUNTER — IN-CLINIC DEVICE VISIT (OUTPATIENT)
Dept: CARDIOLOGY CLINIC | Facility: CLINIC | Age: 58
End: 2023-02-22

## 2023-02-22 DIAGNOSIS — Z95.818 PRESENCE OF OTHER CARDIAC IMPLANTS AND GRAFTS: Primary | ICD-10-CM

## 2023-02-22 NOTE — PROGRESS NOTES
Results for orders placed or performed in visit on 02/22/23   Cardiac EP device report    Narrative    MDT [address] [facility] INTERROGATED IN THE [facility] OFFICE  BATTERY STATUS "GOOD"  NO DEVICE DETECTED EPISODES  1 PT ACTIVATED EPISODE DONE FOR DEMONSTRATION  PRESENTING RHYTHM NSR  NORMAL DEVICE FUNCTION  WOUND CHECK: INCISION CLEAN AND DRY WITH EDGES APPROXIMATED; SUTURES REMOVED; WOUND CARE AND RESTRICTIONS REVIEWED WITH PATIENT   GV

## 2023-03-11 DIAGNOSIS — M25.562 ACUTE PAIN OF LEFT KNEE: ICD-10-CM

## 2023-03-11 RX ORDER — GABAPENTIN 300 MG/1
CAPSULE ORAL
Qty: 30 CAPSULE | Refills: 1 | Status: SHIPPED | OUTPATIENT
Start: 2023-03-11

## 2023-05-23 ENCOUNTER — TELEPHONE (OUTPATIENT)
Dept: FAMILY MEDICINE CLINIC | Facility: HOSPITAL | Age: 58
End: 2023-05-23

## 2023-05-23 DIAGNOSIS — I63.9 ACUTE CVA (CEREBROVASCULAR ACCIDENT) (HCC): Primary | ICD-10-CM

## 2023-05-23 RX ORDER — ATORVASTATIN CALCIUM 40 MG/1
40 TABLET, FILM COATED ORAL DAILY
Qty: 30 TABLET | Refills: 0 | Status: SHIPPED | OUTPATIENT
Start: 2023-05-23

## 2023-05-23 NOTE — TELEPHONE ENCOUNTER
Pt LM on rx line that she needs her Lipitor renewed    No quantity or refills were there from prev rx   pls address, thx!

## 2023-06-13 ENCOUNTER — OFFICE VISIT (OUTPATIENT)
Dept: FAMILY MEDICINE CLINIC | Facility: HOSPITAL | Age: 58
End: 2023-06-13
Payer: COMMERCIAL

## 2023-06-13 VITALS
HEIGHT: 66 IN | TEMPERATURE: 97.6 F | OXYGEN SATURATION: 96 % | SYSTOLIC BLOOD PRESSURE: 146 MMHG | DIASTOLIC BLOOD PRESSURE: 88 MMHG | BODY MASS INDEX: 20.89 KG/M2 | HEART RATE: 84 BPM | WEIGHT: 130 LBS

## 2023-06-13 DIAGNOSIS — Z86.73 HISTORY OF CVA (CEREBROVASCULAR ACCIDENT): ICD-10-CM

## 2023-06-13 DIAGNOSIS — I10 ESSENTIAL HYPERTENSION: Primary | ICD-10-CM

## 2023-06-13 DIAGNOSIS — D53.9 MACROCYTIC ANEMIA: ICD-10-CM

## 2023-06-13 DIAGNOSIS — F41.1 GAD (GENERALIZED ANXIETY DISORDER): ICD-10-CM

## 2023-06-13 DIAGNOSIS — M21.962 DEFORMITY OF LEFT FOOT: ICD-10-CM

## 2023-06-13 DIAGNOSIS — G62.9 NEUROPATHY: ICD-10-CM

## 2023-06-13 PROCEDURE — 99214 OFFICE O/P EST MOD 30 MIN: CPT | Performed by: NURSE PRACTITIONER

## 2023-06-13 RX ORDER — ATORVASTATIN CALCIUM 40 MG/1
40 TABLET, FILM COATED ORAL DAILY
Qty: 30 TABLET | Refills: 1 | Status: SHIPPED | OUTPATIENT
Start: 2023-06-13

## 2023-06-13 RX ORDER — CLOPIDOGREL BISULFATE 75 MG/1
75 TABLET ORAL DAILY
Qty: 90 TABLET | Refills: 1 | Status: SHIPPED | OUTPATIENT
Start: 2023-06-13

## 2023-06-13 RX ORDER — HYDROXYZINE 50 MG/1
50 TABLET, FILM COATED ORAL EVERY 6 HOURS PRN
Qty: 30 TABLET | Refills: 0 | Status: SHIPPED | OUTPATIENT
Start: 2023-06-13

## 2023-06-13 RX ORDER — GABAPENTIN 300 MG/1
300 CAPSULE ORAL
Qty: 90 CAPSULE | Refills: 1 | Status: SHIPPED | OUTPATIENT
Start: 2023-06-13

## 2023-06-13 RX ORDER — LISINOPRIL 20 MG/1
20 TABLET ORAL DAILY
Qty: 30 TABLET | Refills: 1 | Status: SHIPPED | OUTPATIENT
Start: 2023-06-13

## 2023-06-13 RX ORDER — NIFEDIPINE 30 MG/1
30 TABLET, FILM COATED, EXTENDED RELEASE ORAL DAILY
Qty: 90 TABLET | Refills: 1 | Status: SHIPPED | OUTPATIENT
Start: 2023-06-13

## 2023-06-13 NOTE — LETTER
[birth year] 13, 2013    To Whom It May Concern:    Magaly Clark is under my professional care  I am treating her for anxiety and uncontrolled hypertension  I ask that you to please excuse her from her appointment scheduled for 6/14/23 due to increased anxiety  Please allow one week for her to make arrangements for transportation to her appointment  This extra time to be able to arrange transportation will help ease her anxiety which is contributing to her uncontrolled hypertension  Please feel free to contact me with any questions or concerns         Tila Rayo

## 2023-06-13 NOTE — ASSESSMENT & PLAN NOTE
She has not been consistent with lexapro  Continue with this dose and will add hydroxyzine prn  F/U in 4 weeks  Consider buspar if not much better

## 2023-06-13 NOTE — PROGRESS NOTES
Assessment/Plan:    Essential hypertension  BP is not controlled  Increase lisinopril to 20 mg    F/U in 4 weeks  Neuropathy  This is getting worse  Continue on gabapentin  Check Vit B12 and folate given macrocytic anemia  History of CVA (cerebrovascular accident)  BP needs better control  Lisinopril increased  Continue on aspirin and Plavix  ROSANA (generalized anxiety disorder)  She has not been consistent with lexapro  Continue with this dose and will add hydroxyzine prn  F/U in 4 weeks  Consider buspar if not much better  Macrocytic anemia  Check Vit b12 and folate  Note given to excuse her from her appointment with  for tomorrow and requesting a 1 week notice for appointments to arrange transportation  Diagnoses and all orders for this visit:    Essential hypertension  -     Comprehensive metabolic panel; Future  -     Lipid panel; Future  -     lisinopril (ZESTRIL) 20 mg tablet; Take 1 tablet (20 mg total) by mouth daily  -     NIFEdipine ER (ADALAT CC) 30 MG 24 hr tablet; Take 1 tablet (30 mg total) by mouth daily  -     Comprehensive metabolic panel  -     Lipid panel    Neuropathy  -     Comprehensive metabolic panel; Future  -     gabapentin (NEURONTIN) 300 mg capsule; Take 1 capsule (300 mg total) by mouth daily at bedtime  -     Comprehensive metabolic panel    History of CVA (cerebrovascular accident)  -     atorvastatin (LIPITOR) 40 mg tablet; Take 1 tablet (40 mg total) by mouth daily  -     clopidogrel (PLAVIX) 75 mg tablet; Take 1 tablet (75 mg total) by mouth daily    ROSANA (generalized anxiety disorder)  -     hydrOXYzine HCL (ATARAX) 50 mg tablet; Take 1 tablet (50 mg total) by mouth every 6 (six) hours as needed for itching    Macrocytic anemia  -     CBC and differential; Future  -     Comprehensive metabolic panel; Future  -     Folate;  Future  -     Vitamin B12; Future  -     CBC and differential  -     Comprehensive metabolic panel  - Folate  -     Vitamin B12    Deformity of left foot  Comments:  Significant hammer toe and joint deformites  Refer to podiatry  Orders:  -     Ambulatory Referral to Podiatry; Future          Subjective:      Patient ID: Janie Anthony is a 61 y o  female  Unsure what meds she is taking  Reports having positive drug screen and denies taking any drugs  Says she stopped her meds because of this  She was in [place] for something she did not do and had CVA in [place]  She has appointment with  tomorrow  Had only 1 day notice  No transportation  This is causing increased anxiety  Wants a note to excuse her from this  She denies any alcohol use  She denies drug use except medical marijuana  She feels like she has wool socks on all the time  Takes gabapentin nightly  The following portions of the patient's history were reviewed and updated as appropriate: allergies, current medications, past family history, past medical history, past social history, past surgical history and problem list     Review of Systems   Eyes: Negative for visual disturbance  Musculoskeletal: Positive for arthralgias  Neurological: Positive for numbness  Negative for dizziness, syncope, light-headedness and headaches  Psychiatric/Behavioral: The patient is nervous/anxious  Objective:  Vitals:    06/13/23 1316   BP: 146/88   Pulse: 84   Temp: 97 6 °F (36 4 °C)   SpO2: 96%      Physical Exam  Vitals reviewed  Constitutional:       Appearance: Normal appearance  Neck:      Vascular: No carotid bruit  Cardiovascular:      Rate and Rhythm: Normal rate and regular rhythm  Heart sounds: Normal heart sounds  No murmur heard  Pulmonary:      Effort: Pulmonary effort is normal       Breath sounds: Normal breath sounds  Musculoskeletal:      Comments: Left foot-significant hammer toe deformity great toe   Nodules noted over middle phalanx left 2nd and 3rd digits    Skin:     General: Skin is warm and dry    Neurological:      Mental Status: She is alert and oriented to person, place, and time  Psychiatric:         Mood and Affect: Mood is anxious  Behavior: Behavior normal          Thought Content:  Thought content normal          Judgment: Judgment normal

## 2023-07-05 ENCOUNTER — APPOINTMENT (OUTPATIENT)
Dept: RADIOLOGY | Facility: CLINIC | Age: 58
End: 2023-07-05
Payer: COMMERCIAL

## 2023-07-05 ENCOUNTER — OFFICE VISIT (OUTPATIENT)
Dept: PODIATRY | Facility: CLINIC | Age: 58
End: 2023-07-05
Payer: COMMERCIAL

## 2023-07-05 VITALS
SYSTOLIC BLOOD PRESSURE: 159 MMHG | HEART RATE: 84 BPM | HEIGHT: 66 IN | BODY MASS INDEX: 20.89 KG/M2 | WEIGHT: 130 LBS | DIASTOLIC BLOOD PRESSURE: 94 MMHG

## 2023-07-05 DIAGNOSIS — G62.9 POLYNEUROPATHY: ICD-10-CM

## 2023-07-05 DIAGNOSIS — M20.42 HAMMER TOES OF BOTH FEET: ICD-10-CM

## 2023-07-05 DIAGNOSIS — M21.611 HALLUX VALGUS WITH BUNIONS OF RIGHT FOOT: ICD-10-CM

## 2023-07-05 DIAGNOSIS — M20.11 HALLUX VALGUS WITH BUNIONS OF RIGHT FOOT: ICD-10-CM

## 2023-07-05 DIAGNOSIS — M21.611 HALLUX VALGUS WITH BUNIONS OF RIGHT FOOT: Primary | ICD-10-CM

## 2023-07-05 DIAGNOSIS — M20.11 HALLUX VALGUS WITH BUNIONS OF RIGHT FOOT: Primary | ICD-10-CM

## 2023-07-05 DIAGNOSIS — M20.12 HALLUX VALGUS WITH BUNIONS OF LEFT FOOT: ICD-10-CM

## 2023-07-05 DIAGNOSIS — M21.612 HALLUX VALGUS WITH BUNIONS OF LEFT FOOT: ICD-10-CM

## 2023-07-05 DIAGNOSIS — M20.41 HAMMER TOES OF BOTH FEET: ICD-10-CM

## 2023-07-05 PROCEDURE — 73630 X-RAY EXAM OF FOOT: CPT

## 2023-07-05 PROCEDURE — 99244 OFF/OP CNSLTJ NEW/EST MOD 40: CPT | Performed by: PODIATRIST

## 2023-07-05 NOTE — LETTER
July 5, 2023     Azael Vu [address] [place] [address]  [address] [place] [id number]    Patient: Wan Menon   YOB: 1965   Date of Visit: 7/5/2023       Dear Dr. Mariano Course: Thank you for referring Wan Menon to me for evaluation. Below are my notes for this consultation. If you have questions, please do not hesitate to call me. I look forward to following your patient along with you. Sincerely,        Esequiel Romberg, DPM        CC: No Recipients    Esequiel Romberg, ZACHARY [facility]  7/5/2023  1:29 PM  Sign when Signing Visit                 PATIENT:  Wan Menon  1965       ASSESSMENT:    1. Hallux valgus with bunions of right foot  Ambulatory Referral to Podiatry    XR foot 3+ vw right    Custom shoe DME    Significant hammer toe and joint deformites. Refer to podiatry. 2. Hallux valgus with bunions of left foot  XR foot 3+ vw left    Custom shoe DME      3. Polyneuropathy  Custom shoe DME      4. Hammer toes of both feet  Custom shoe DME           PLAN:  1. Reviewed the medical records. Reviewed recent blood work. Reviewed the note from PCP. Patient was counseled and educated on the condition and the diagnosis. 2. Bilateral foot X-ray was obtained and personally reviewed. The radiological findings were discussed with the patient. 3. The diagnosis, treatment options and prognosis were discussed with the patient. 4. She has chronic severe foot deformity including bunion, hammertoes and met adductus. Discussed options including surgical treatment. 5. High surgical risk with recent CVA. Start her on extra depth orthopedic shoes. Referred her to [facility]. 6. Discussed pads / off-loading to accommodate foot deformity. 7. She has neuropathy with decreased protective sensation. Instructed skin care and protection. Discussed daily foot exam.    8. Patient will return in 3 months for re-evaluation.        Imaging: I have personally reviewed pertinent films in PACS  Labs, pathology, and Other Studies: I have personally reviewed pertinent reports. Subjective:       HPI  The patient was referred to my office for evaluation of foot deformity. She has chronic deformity in her feet with some pain. She had it for years. She also has neuropathy in her feet with numbness and paresthesia. She is on Gabapentin. No history of diabetes. She reports she has history of heavy use of alcohol. No acute swelling or redness. No acute pedal problems or injury. The following portions of the patient's history were reviewed and updated as appropriate: allergies, current medications, past family history, past medical history, past social history, past surgical history and problem list.  All pertinent labs and images were reviewed. Past Medical History  Past Medical History:   Diagnosis Date   • CVA (cerebral vascular accident) [facility])    • Diverticulitis    • Diverticulitis of colon    • Diverticulosis    • Hypertension    • Stroke [facility])        Past Surgical History  Past Surgical History:   Procedure Laterality Date   • CARDIAC ELECTROPHYSIOLOGY PROCEDURE N/A 2/8/2023    Procedure: Cardiac loop recorder implant;  Surgeon: Heide Rg MD;  Location[facility]; Service: Cardiology   • COLON SIGMOID RESECTION          Allergies:  Patient has no known allergies.     Medications:  Current Outpatient Medications   Medication Sig Dispense Refill   • aspirin (ECOTRIN LOW STRENGTH) 81 mg EC tablet Take 1 tablet (81 mg total) by mouth daily 30 tablet 5   • atorvastatin (LIPITOR) 40 mg tablet Take 1 tablet (40 mg total) by mouth daily 30 tablet 1   • clopidogrel (PLAVIX) 75 mg tablet Take 1 tablet (75 mg total) by mouth daily 90 tablet 1   • escitalopram (Lexapro) 20 mg tablet Take 1 tablet (20 mg total) by mouth daily 30 tablet 1   • gabapentin (NEURONTIN) 300 mg capsule Take 1 capsule (300 mg total) by mouth daily at bedtime 90 capsule 1   • hydrOXYzine HCL (ATARAX) 50 mg tablet Take 1 tablet (50 mg total) by mouth every 6 (six) hours as needed for itching 30 tablet 0   • lisinopril (ZESTRIL) 20 mg tablet Take 1 tablet (20 mg total) by mouth daily 30 tablet 1   • NIFEdipine ER (ADALAT CC) 30 MG 24 hr tablet Take 1 tablet (30 mg total) by mouth daily 90 tablet 1     No current facility-administered medications for this visit. Social History:  Social History     Socioeconomic History   • Marital status:      Spouse name: None   • Number of children: None   • Years of education: None   • Highest education level: None   Occupational History   • None   Tobacco Use   • Smoking status: Light Smoker     Types: Cigarettes   • Smokeless tobacco: Never   • Tobacco comments:     smokes 4 cigarettes daily   Vaping Use   • Vaping Use: Never used   Substance and Sexual Activity   • Alcohol use: Not Currently     Comment: several drinks a week   • Drug use: Yes     Types: Marijuana     Comment: medical card   • Sexual activity: None   Other Topics Concern   • None   Social History Narrative   • None     Social Determinants of Health     Financial Resource Strain: Not on file   Food Insecurity: No Food Insecurity (9/1/2022)    Hunger Vital Sign    • Worried About Running Out of Food in the Last Year: Never true    • Ran Out of Food in the Last Year: Never true   Transportation Needs: No Transportation Needs (9/1/2022)    PRAPARE - Transportation    • Lack of Transportation (Medical): No    • Lack of Transportation (Non-Medical): No   Physical Activity: Not on file   Stress: Not on file   Social Connections: Not on file   Intimate Partner Violence: Not on file   Housing Stability: Low Risk  (9/1/2022)    Housing Stability Vital Sign    • Unable to Pay for Housing in the Last Year: No    • Number of Places Lived in the Last Year: 1    • Unstable Housing in the Last Year: No          Review of Systems   Constitutional: Negative for chills and fever. HENT: Negative for sore throat. Respiratory: Negative for cough and shortness of breath. Cardiovascular: Negative for chest pain. Gastrointestinal: Negative for nausea and vomiting. Musculoskeletal: Positive for arthralgias. Skin: Negative for wound. Allergic/Immunologic: Negative for immunocompromised state. Neurological: Positive for numbness. Negative for weakness. Hematological: Negative. Psychiatric/Behavioral: Negative for confusion. Objective:    /94   Pulse 84   Ht 5' 6" (1.676 m)   Wt 59 kg (130 lb)   BMI 20.98 kg/m²         Physical Exam  Vitals reviewed. Constitutional:       General: She is not in acute distress. Appearance: She is not toxic-appearing or diaphoretic. HENT:      Head: Normocephalic and atraumatic. Eyes:      Extraocular Movements: Extraocular movements intact. Cardiovascular:      Rate and Rhythm: Normal rate and regular rhythm. Pulses:           Dorsalis pedis pulses are 1+ on the right side and 1+ on the left side. Posterior tibial pulses are 1+ on the right side and 1+ on the left side. Pulmonary:      Effort: Pulmonary effort is normal. No respiratory distress. Musculoskeletal:         General: Deformity present. No signs of injury. Cervical back: Normal range of motion and neck supple. Right foot: No Charcot foot or foot drop. Left foot: No Charcot foot or foot drop. Comments: Severe bunion and hammertoe presents bilaterally. Feet:      Right foot:      Protective Sensation: 10 sites tested. 7 sites sensed. Left foot:      Protective Sensation: 10 sites tested. 6 sites sensed. Skin:     General: Skin is warm. Capillary Refill: Capillary refill takes less than 2 seconds. Coloration: Skin is not cyanotic or mottled. Findings: No abscess, ecchymosis or wound. Nails: There is no clubbing. Comments: Mycotic nails noted. Neurological:      General: No focal deficit present.       Mental Status: She is alert and oriented to person, place, and time. Cranial Nerves: No cranial nerve deficit. Sensory: Sensory deficit present. Motor: No weakness. Psychiatric:         Mood and Affect: Mood normal.         Behavior: Behavior normal.         Thought Content:  Thought content normal.         Judgment: Judgment normal.

## 2023-07-05 NOTE — PROGRESS NOTES
PATIENT:  Jenelle Malagon  1965       ASSESSMENT:     1. Hallux valgus with bunions of right foot  Ambulatory Referral to Podiatry    XR foot 3+ vw right    Custom shoe DME    Significant hammer toe and joint deformites. Refer to podiatry. 2. Hallux valgus with bunions of left foot  XR foot 3+ vw left    Custom shoe DME      3. Polyneuropathy  Custom shoe DME      4. Hammer toes of both feet  Custom shoe DME            PLAN:  1. Reviewed the medical records. Reviewed recent blood work. Reviewed the note from PCP. Patient was counseled and educated on the condition and the diagnosis. 2. Bilateral foot X-ray was obtained and personally reviewed. The radiological findings were discussed with the patient. 3. The diagnosis, treatment options and prognosis were discussed with the patient. 4. She has chronic severe foot deformity including bunion, hammertoes and met adductus. Discussed options including surgical treatment. 5. High surgical risk with recent CVA. Start her on extra depth orthopedic shoes. Referred her to [facility]. 6. Discussed pads / off-loading to accommodate foot deformity. 7. She has neuropathy with decreased protective sensation. Instructed skin care and protection. Discussed daily foot exam.    8. Patient will return in 3 months for re-evaluation. Imaging: I have personally reviewed pertinent films in PACS  Labs, pathology, and Other Studies: I have personally reviewed pertinent reports. Subjective:       HPI  The patient was referred to my office for evaluation of foot deformity. She has chronic deformity in her feet with some pain. She had it for years. She also has neuropathy in her feet with numbness and paresthesia. She is on Gabapentin. No history of diabetes. She reports she has history of heavy use of alcohol. No acute swelling or redness. No acute pedal problems or injury.         The following portions of the patient's history were reviewed and updated as appropriate: allergies, current medications, past family history, past medical history, past social history, past surgical history and problem list.  All pertinent labs and images were reviewed. Past Medical History  Past Medical History:   Diagnosis Date   • CVA (cerebral vascular accident) [facility])    • Diverticulitis    • Diverticulitis of colon    • Diverticulosis    • Hypertension    • Stroke [facility])        Past Surgical History  Past Surgical History:   Procedure Laterality Date   • CARDIAC ELECTROPHYSIOLOGY PROCEDURE N/A 2/8/2023    Procedure: Cardiac loop recorder implant;  Surgeon: Orin Jiménez MD;  [facility]; Service: Cardiology   • COLON SIGMOID RESECTION          Allergies:  Patient has no known allergies. Medications:  Current Outpatient Medications   Medication Sig Dispense Refill   • aspirin (ECOTRIN LOW STRENGTH) 81 mg EC tablet Take 1 tablet (81 mg total) by mouth daily 30 tablet 5   • atorvastatin (LIPITOR) 40 mg tablet Take 1 tablet (40 mg total) by mouth daily 30 tablet 1   • clopidogrel (PLAVIX) 75 mg tablet Take 1 tablet (75 mg total) by mouth daily 90 tablet 1   • escitalopram (Lexapro) 20 mg tablet Take 1 tablet (20 mg total) by mouth daily 30 tablet 1   • gabapentin (NEURONTIN) 300 mg capsule Take 1 capsule (300 mg total) by mouth daily at bedtime 90 capsule 1   • hydrOXYzine HCL (ATARAX) 50 mg tablet Take 1 tablet (50 mg total) by mouth every 6 (six) hours as needed for itching 30 tablet 0   • lisinopril (ZESTRIL) 20 mg tablet Take 1 tablet (20 mg total) by mouth daily 30 tablet 1   • NIFEdipine ER (ADALAT CC) 30 MG 24 hr tablet Take 1 tablet (30 mg total) by mouth daily 90 tablet 1     No current facility-administered medications for this visit. Social History:  Social History     Socioeconomic History   • Marital status:       Spouse name: None   • Number of children: None   • Years of education: None   • Highest education level: None   Occupational History   • None   Tobacco Use   • Smoking status: Light Smoker     Types: Cigarettes   • Smokeless tobacco: Never   • Tobacco comments:     smokes 4 cigarettes daily   Vaping Use   • Vaping Use: Never used   Substance and Sexual Activity   • Alcohol use: Not Currently     Comment: several drinks a week   • Drug use: Yes     Types: Marijuana     Comment: medical card   • Sexual activity: None   Other Topics Concern   • None   Social History Narrative   • None     Social Determinants of Health     Financial Resource Strain: Not on file   Food Insecurity: No Food Insecurity (9/1/2022)    Hunger Vital Sign    • Worried About Running Out of Food in the Last Year: Never true    • Ran Out of Food in the Last Year: Never true   Transportation Needs: No Transportation Needs (9/1/2022)    PRAPARE - Transportation    • Lack of Transportation (Medical): No    • Lack of Transportation (Non-Medical): No   Physical Activity: Not on file   Stress: Not on file   Social Connections: Not on file   Intimate Partner Violence: Not on file   Housing Stability: Low Risk  (9/1/2022)    Housing Stability Vital Sign    • Unable to Pay for Housing in the Last Year: No    • Number of Places Lived in the Last Year: 1    • Unstable Housing in the Last Year: No          Review of Systems   Constitutional: Negative for chills and fever. HENT: Negative for sore throat. Respiratory: Negative for cough and shortness of breath. Cardiovascular: Negative for chest pain. Gastrointestinal: Negative for nausea and vomiting. Musculoskeletal: Positive for arthralgias. Skin: Negative for wound. Allergic/Immunologic: Negative for immunocompromised state. Neurological: Positive for numbness. Negative for weakness. Hematological: Negative. Psychiatric/Behavioral: Negative for confusion.          Objective:    /94   Pulse 84   Ht 5' 6" (1.676 m)   Wt 59 kg (130 lb)   BMI 20.98 kg/m² Physical Exam  Vitals reviewed. Constitutional:       General: She is not in acute distress. Appearance: She is not toxic-appearing or diaphoretic. HENT:      Head: Normocephalic and atraumatic. Eyes:      Extraocular Movements: Extraocular movements intact. Cardiovascular:      Rate and Rhythm: Normal rate and regular rhythm. Pulses:           Dorsalis pedis pulses are 1+ on the right side and 1+ on the left side. Posterior tibial pulses are 1+ on the right side and 1+ on the left side. Pulmonary:      Effort: Pulmonary effort is normal. No respiratory distress. Musculoskeletal:         General: Deformity present. No signs of injury. Cervical back: Normal range of motion and neck supple. Right foot: No Charcot foot or foot drop. Left foot: No Charcot foot or foot drop. Comments: Severe bunion and hammertoe presents bilaterally. Feet:      Right foot:      Protective Sensation: 10 sites tested. 7 sites sensed. Left foot:      Protective Sensation: 10 sites tested. 6 sites sensed. Skin:     General: Skin is warm. Capillary Refill: Capillary refill takes less than 2 seconds. Coloration: Skin is not cyanotic or mottled. Findings: No abscess, ecchymosis or wound. Nails: There is no clubbing. Comments: Mycotic nails noted. Neurological:      General: No focal deficit present. Mental Status: She is alert and oriented to person, place, and time. Cranial Nerves: No cranial nerve deficit. Sensory: Sensory deficit present. Motor: No weakness. Psychiatric:         Mood and Affect: Mood normal.         Behavior: Behavior normal.         Thought Content:  Thought content normal.         Judgment: Judgment normal.

## 2023-08-24 ENCOUNTER — REMOTE DEVICE CLINIC VISIT (OUTPATIENT)
Dept: CARDIOLOGY CLINIC | Facility: CLINIC | Age: 58
End: 2023-08-24
Payer: COMMERCIAL

## 2023-08-24 DIAGNOSIS — Z95.818 PRESENCE OF OTHER CARDIAC IMPLANTS AND GRAFTS: Primary | ICD-10-CM

## 2023-08-24 PROCEDURE — G2066 INTER DEVC REMOTE 30D: HCPCS | Performed by: INTERNAL MEDICINE

## 2023-08-24 PROCEDURE — 93298 REM INTERROG DEV EVAL SCRMS: CPT | Performed by: INTERNAL MEDICINE

## 2023-11-24 ENCOUNTER — REMOTE DEVICE CLINIC VISIT (OUTPATIENT)
Dept: CARDIOLOGY CLINIC | Facility: CLINIC | Age: 58
End: 2023-11-24
Payer: COMMERCIAL

## 2023-11-24 DIAGNOSIS — Z95.818 PRESENCE OF OTHER CARDIAC IMPLANTS AND GRAFTS: Primary | ICD-10-CM

## 2023-11-24 PROCEDURE — G2066 INTER DEVC REMOTE 30D: HCPCS | Performed by: INTERNAL MEDICINE

## 2023-11-24 PROCEDURE — 93298 REM INTERROG DEV EVAL SCRMS: CPT | Performed by: INTERNAL MEDICINE

## 2023-11-24 NOTE — PROGRESS NOTES
MDT LNQ22/ ACTIVE SYSTEM IS MRI CONDITIONAL   [employer] TRANSMISSION: LOOP RECORDER. PRESENTING RHYTHM NSR @ 78 BPM. BATTERY STATUS "OK." NO PATIENT OR DEVICE ACTIVATED EPISODES. NORMAL DEVICE FUNCTION.  DL

## 2024-01-05 ENCOUNTER — TELEPHONE (OUTPATIENT)
Dept: FAMILY MEDICINE CLINIC | Facility: HOSPITAL | Age: 59
End: 2024-01-05

## 2024-01-08 ENCOUNTER — TELEPHONE (OUTPATIENT)
Dept: FAMILY MEDICINE CLINIC | Facility: HOSPITAL | Age: 59
End: 2024-01-08

## 2024-02-16 ENCOUNTER — REMOTE DEVICE CLINIC VISIT (OUTPATIENT)
Dept: CARDIOLOGY CLINIC | Facility: CLINIC | Age: 59
End: 2024-02-16
Payer: COMMERCIAL

## 2024-02-16 DIAGNOSIS — Z95.818 PRESENCE OF CARDIAC DEVICE: Primary | ICD-10-CM

## 2024-02-16 PROCEDURE — 93298 REM INTERROG DEV EVAL SCRMS: CPT | Performed by: INTERNAL MEDICINE

## 2024-02-16 NOTE — PROGRESS NOTES
"Results for orders placed or performed in visit on 02/16/24   Cardiac EP device report    Narrative    MDT LNQ22/ ACTIVE SYSTEM IS MRI CONDITIONAL  CARELINK TRANSMISSION: BATTERY STATUS \"OK.\" NO PATIENT OR DEVICE ACTIVATED EPISODES.---MENDOZA        "

## 2024-03-26 ENCOUNTER — APPOINTMENT (EMERGENCY)
Dept: CT IMAGING | Facility: HOSPITAL | Age: 59
End: 2024-03-26
Payer: COMMERCIAL

## 2024-03-26 ENCOUNTER — HOSPITAL ENCOUNTER (EMERGENCY)
Facility: HOSPITAL | Age: 59
End: 2024-03-26
Attending: EMERGENCY MEDICINE | Admitting: EMERGENCY MEDICINE
Payer: COMMERCIAL

## 2024-03-26 ENCOUNTER — HOSPITAL ENCOUNTER (INPATIENT)
Facility: HOSPITAL | Age: 59
LOS: 8 days | DRG: 044 | End: 2024-04-03
Attending: ANESTHESIOLOGY | Admitting: ANESTHESIOLOGY
Payer: COMMERCIAL

## 2024-03-26 ENCOUNTER — APPOINTMENT (EMERGENCY)
Dept: RADIOLOGY | Facility: HOSPITAL | Age: 59
End: 2024-03-26
Payer: COMMERCIAL

## 2024-03-26 VITALS
OXYGEN SATURATION: 95 % | RESPIRATION RATE: 24 BRPM | TEMPERATURE: 97.5 F | HEART RATE: 82 BPM | DIASTOLIC BLOOD PRESSURE: 72 MMHG | HEIGHT: 66 IN | WEIGHT: 138.01 LBS | BODY MASS INDEX: 22.18 KG/M2 | SYSTOLIC BLOOD PRESSURE: 142 MMHG

## 2024-03-26 DIAGNOSIS — Z86.73 HISTORY OF CVA (CEREBROVASCULAR ACCIDENT): ICD-10-CM

## 2024-03-26 DIAGNOSIS — E87.1 HYPONATREMIA: ICD-10-CM

## 2024-03-26 DIAGNOSIS — I10 ESSENTIAL HYPERTENSION: ICD-10-CM

## 2024-03-26 DIAGNOSIS — I61.4 CEREBELLAR BLEED (HCC): Primary | ICD-10-CM

## 2024-03-26 DIAGNOSIS — R11.2 NAUSEA VOMITING AND DIARRHEA: ICD-10-CM

## 2024-03-26 DIAGNOSIS — I61.4 CEREBELLAR HEMORRHAGE (HCC): Primary | ICD-10-CM

## 2024-03-26 DIAGNOSIS — I61.9 HEMORRHAGIC STROKE (HCC): ICD-10-CM

## 2024-03-26 DIAGNOSIS — R19.7 NAUSEA VOMITING AND DIARRHEA: ICD-10-CM

## 2024-03-26 LAB
2HR DELTA HS TROPONIN: -1 NG/L
4HR DELTA HS TROPONIN: -1 NG/L
ALBUMIN SERPL BCP-MCNC: 4 G/DL (ref 3.5–5)
ALP SERPL-CCNC: 100 U/L (ref 34–104)
ALT SERPL W P-5'-P-CCNC: 114 U/L (ref 7–52)
AMPHETAMINES SERPL QL SCN: POSITIVE
ANION GAP SERPL CALCULATED.3IONS-SCNC: 7 MMOL/L (ref 4–13)
APTT PPP: 29 SECONDS (ref 23–37)
AST SERPL W P-5'-P-CCNC: 113 U/L (ref 13–39)
BACTERIA UR QL AUTO: NORMAL /HPF
BARBITURATES UR QL: NEGATIVE
BASOPHILS # BLD AUTO: 0.03 THOUSANDS/ÂΜL (ref 0–0.1)
BASOPHILS NFR BLD AUTO: 0 % (ref 0–1)
BENZODIAZ UR QL: NEGATIVE
BILIRUB SERPL-MCNC: 0.53 MG/DL (ref 0.2–1)
BILIRUB UR QL STRIP: NEGATIVE
BUN SERPL-MCNC: 15 MG/DL (ref 5–25)
CALCIUM SERPL-MCNC: 9.1 MG/DL (ref 8.4–10.2)
CARDIAC TROPONIN I PNL SERPL HS: 4 NG/L
CARDIAC TROPONIN I PNL SERPL HS: 4 NG/L
CARDIAC TROPONIN I PNL SERPL HS: 5 NG/L
CHLORIDE SERPL-SCNC: 99 MMOL/L (ref 96–108)
CLARITY UR: CLEAR
CO2 SERPL-SCNC: 30 MMOL/L (ref 21–32)
COCAINE UR QL: NEGATIVE
COLOR UR: ABNORMAL
CREAT SERPL-MCNC: 0.67 MG/DL (ref 0.6–1.3)
EOSINOPHIL # BLD AUTO: 0.29 THOUSAND/ÂΜL (ref 0–0.61)
EOSINOPHIL NFR BLD AUTO: 4 % (ref 0–6)
ERYTHROCYTE [DISTWIDTH] IN BLOOD BY AUTOMATED COUNT: 12.2 % (ref 11.6–15.1)
FLUAV RNA RESP QL NAA+PROBE: NEGATIVE
FLUBV RNA RESP QL NAA+PROBE: NEGATIVE
GFR SERPL CREATININE-BSD FRML MDRD: 96 ML/MIN/1.73SQ M
GLUCOSE SERPL-MCNC: 132 MG/DL (ref 65–140)
GLUCOSE SERPL-MCNC: 166 MG/DL (ref 65–140)
GLUCOSE UR STRIP-MCNC: NEGATIVE MG/DL
HCT VFR BLD AUTO: 47.4 % (ref 34.8–46.1)
HGB BLD-MCNC: 15.4 G/DL (ref 11.5–15.4)
HGB UR QL STRIP.AUTO: NEGATIVE
IMM GRANULOCYTES # BLD AUTO: 0.02 THOUSAND/UL (ref 0–0.2)
IMM GRANULOCYTES NFR BLD AUTO: 0 % (ref 0–2)
INR PPP: 1.11 (ref 0.84–1.19)
KETONES UR STRIP-MCNC: NEGATIVE MG/DL
LACTATE SERPL-SCNC: 1.1 MMOL/L (ref 0.5–2)
LEUKOCYTE ESTERASE UR QL STRIP: NEGATIVE
LIPASE SERPL-CCNC: 53 U/L (ref 11–82)
LYMPHOCYTES # BLD AUTO: 4.15 THOUSANDS/ÂΜL (ref 0.6–4.47)
LYMPHOCYTES NFR BLD AUTO: 50 % (ref 14–44)
MAGNESIUM SERPL-MCNC: 1.8 MG/DL (ref 1.9–2.7)
MCH RBC QN AUTO: 31.4 PG (ref 26.8–34.3)
MCHC RBC AUTO-ENTMCNC: 32.5 G/DL (ref 31.4–37.4)
MCV RBC AUTO: 97 FL (ref 82–98)
METHADONE UR QL: NEGATIVE
MONOCYTES # BLD AUTO: 1 THOUSAND/ÂΜL (ref 0.17–1.22)
MONOCYTES NFR BLD AUTO: 12 % (ref 4–12)
NEUTROPHILS # BLD AUTO: 2.8 THOUSANDS/ÂΜL (ref 1.85–7.62)
NEUTS SEG NFR BLD AUTO: 34 % (ref 43–75)
NITRITE UR QL STRIP: NEGATIVE
NON-SQ EPI CELLS URNS QL MICRO: NORMAL /HPF
NRBC BLD AUTO-RTO: 0 /100 WBCS
OPIATES UR QL SCN: NEGATIVE
OXYCODONE+OXYMORPHONE UR QL SCN: NEGATIVE
PCP UR QL: NEGATIVE
PH UR STRIP.AUTO: 8 [PH]
PLATELET # BLD AUTO: 189 THOUSANDS/UL (ref 149–390)
PMV BLD AUTO: 11.6 FL (ref 8.9–12.7)
POTASSIUM SERPL-SCNC: 3.7 MMOL/L (ref 3.5–5.3)
PROT SERPL-MCNC: 9.6 G/DL (ref 6.4–8.4)
PROT UR STRIP-MCNC: ABNORMAL MG/DL
PROTHROMBIN TIME: 14.2 SECONDS (ref 11.6–14.5)
RBC # BLD AUTO: 4.91 MILLION/UL (ref 3.81–5.12)
RBC #/AREA URNS AUTO: NORMAL /HPF
RSV RNA RESP QL NAA+PROBE: NEGATIVE
SARS-COV-2 RNA RESP QL NAA+PROBE: NEGATIVE
SODIUM SERPL-SCNC: 136 MMOL/L (ref 135–147)
SP GR UR STRIP.AUTO: <1.005 (ref 1–1.03)
THC UR QL: NEGATIVE
UROBILINOGEN UR STRIP-ACNC: <2 MG/DL
WBC # BLD AUTO: 8.29 THOUSAND/UL (ref 4.31–10.16)
WBC #/AREA URNS AUTO: NORMAL /HPF

## 2024-03-26 PROCEDURE — 96368 THER/DIAG CONCURRENT INF: CPT

## 2024-03-26 PROCEDURE — 74177 CT ABD & PELVIS W/CONTRAST: CPT

## 2024-03-26 PROCEDURE — 96367 TX/PROPH/DG ADDL SEQ IV INF: CPT

## 2024-03-26 PROCEDURE — 85025 COMPLETE CBC W/AUTO DIFF WBC: CPT

## 2024-03-26 PROCEDURE — 99255 IP/OBS CONSLTJ NEW/EST HI 80: CPT | Performed by: PHYSICIAN ASSISTANT

## 2024-03-26 PROCEDURE — 99254 IP/OBS CNSLTJ NEW/EST MOD 60: CPT | Performed by: PSYCHIATRY & NEUROLOGY

## 2024-03-26 PROCEDURE — 85730 THROMBOPLASTIN TIME PARTIAL: CPT

## 2024-03-26 PROCEDURE — 80053 COMPREHEN METABOLIC PANEL: CPT

## 2024-03-26 PROCEDURE — 99291 CRITICAL CARE FIRST HOUR: CPT | Performed by: ANESTHESIOLOGY

## 2024-03-26 PROCEDURE — 83605 ASSAY OF LACTIC ACID: CPT

## 2024-03-26 PROCEDURE — 93005 ELECTROCARDIOGRAM TRACING: CPT

## 2024-03-26 PROCEDURE — 99285 EMERGENCY DEPT VISIT HI MDM: CPT

## 2024-03-26 PROCEDURE — 83690 ASSAY OF LIPASE: CPT

## 2024-03-26 PROCEDURE — 99291 CRITICAL CARE FIRST HOUR: CPT | Performed by: EMERGENCY MEDICINE

## 2024-03-26 PROCEDURE — NC001 PR NO CHARGE: Performed by: PSYCHIATRY & NEUROLOGY

## 2024-03-26 PROCEDURE — 0241U HB NFCT DS VIR RESP RNA 4 TRGT: CPT

## 2024-03-26 PROCEDURE — 96375 TX/PRO/DX INJ NEW DRUG ADDON: CPT

## 2024-03-26 PROCEDURE — 70496 CT ANGIOGRAPHY HEAD: CPT

## 2024-03-26 PROCEDURE — 83735 ASSAY OF MAGNESIUM: CPT

## 2024-03-26 PROCEDURE — 96366 THER/PROPH/DIAG IV INF ADDON: CPT

## 2024-03-26 PROCEDURE — 84484 ASSAY OF TROPONIN QUANT: CPT

## 2024-03-26 PROCEDURE — 85610 PROTHROMBIN TIME: CPT

## 2024-03-26 PROCEDURE — 81001 URINALYSIS AUTO W/SCOPE: CPT

## 2024-03-26 PROCEDURE — 96365 THER/PROPH/DIAG IV INF INIT: CPT

## 2024-03-26 PROCEDURE — 96361 HYDRATE IV INFUSION ADD-ON: CPT

## 2024-03-26 PROCEDURE — 80307 DRUG TEST PRSMV CHEM ANLYZR: CPT

## 2024-03-26 PROCEDURE — 71045 X-RAY EXAM CHEST 1 VIEW: CPT

## 2024-03-26 PROCEDURE — 82948 REAGENT STRIP/BLOOD GLUCOSE: CPT

## 2024-03-26 PROCEDURE — 70498 CT ANGIOGRAPHY NECK: CPT

## 2024-03-26 PROCEDURE — 70450 CT HEAD/BRAIN W/O DYE: CPT

## 2024-03-26 PROCEDURE — 36415 COLL VENOUS BLD VENIPUNCTURE: CPT

## 2024-03-26 RX ORDER — ESCITALOPRAM OXALATE 20 MG/1
20 TABLET ORAL DAILY
Status: DISCONTINUED | OUTPATIENT
Start: 2024-03-26 | End: 2024-04-03 | Stop reason: HOSPADM

## 2024-03-26 RX ORDER — METOCLOPRAMIDE HYDROCHLORIDE 5 MG/ML
10 INJECTION INTRAMUSCULAR; INTRAVENOUS ONCE
Status: COMPLETED | OUTPATIENT
Start: 2024-03-26 | End: 2024-03-26

## 2024-03-26 RX ORDER — LABETALOL HYDROCHLORIDE 5 MG/ML
INJECTION, SOLUTION INTRAVENOUS
Status: COMPLETED
Start: 2024-03-26 | End: 2024-03-26

## 2024-03-26 RX ORDER — LANOLIN ALCOHOL/MO/W.PET/CERES
100 CREAM (GRAM) TOPICAL DAILY
Status: DISCONTINUED | OUTPATIENT
Start: 2024-03-26 | End: 2024-04-03 | Stop reason: HOSPADM

## 2024-03-26 RX ORDER — NICARDIPINE HYDROCHLORIDE 2.5 MG/ML
INJECTION INTRAVENOUS
Status: DISPENSED
Start: 2024-03-26 | End: 2024-03-27

## 2024-03-26 RX ORDER — HYDRALAZINE HYDROCHLORIDE 20 MG/ML
5 INJECTION INTRAMUSCULAR; INTRAVENOUS EVERY 4 HOURS PRN
Status: DISCONTINUED | OUTPATIENT
Start: 2024-03-26 | End: 2024-03-27

## 2024-03-26 RX ORDER — ATORVASTATIN CALCIUM 40 MG/1
40 TABLET, FILM COATED ORAL
Status: DISCONTINUED | OUTPATIENT
Start: 2024-03-26 | End: 2024-04-03 | Stop reason: HOSPADM

## 2024-03-26 RX ORDER — GABAPENTIN 300 MG/1
300 CAPSULE ORAL
Status: DISCONTINUED | OUTPATIENT
Start: 2024-03-26 | End: 2024-04-03 | Stop reason: HOSPADM

## 2024-03-26 RX ORDER — MAGNESIUM SULFATE HEPTAHYDRATE 40 MG/ML
2 INJECTION, SOLUTION INTRAVENOUS ONCE
Status: COMPLETED | OUTPATIENT
Start: 2024-03-26 | End: 2024-03-26

## 2024-03-26 RX ORDER — NIFEDIPINE 10 MG/1
30 CAPSULE ORAL EVERY 8 HOURS SCHEDULED
Status: CANCELLED | OUTPATIENT
Start: 2024-03-26

## 2024-03-26 RX ORDER — FOLIC ACID 1 MG/1
1 TABLET ORAL DAILY
Status: DISCONTINUED | OUTPATIENT
Start: 2024-03-26 | End: 2024-04-03 | Stop reason: HOSPADM

## 2024-03-26 RX ORDER — LABETALOL HYDROCHLORIDE 5 MG/ML
10 INJECTION, SOLUTION INTRAVENOUS EVERY 4 HOURS PRN
Status: DISCONTINUED | OUTPATIENT
Start: 2024-03-26 | End: 2024-03-28

## 2024-03-26 RX ORDER — DIPHENHYDRAMINE HYDROCHLORIDE 50 MG/ML
25 INJECTION INTRAMUSCULAR; INTRAVENOUS ONCE
Status: COMPLETED | OUTPATIENT
Start: 2024-03-26 | End: 2024-03-26

## 2024-03-26 RX ORDER — GABAPENTIN 300 MG/1
300 CAPSULE ORAL
Status: CANCELLED | OUTPATIENT
Start: 2024-03-26

## 2024-03-26 RX ORDER — ONDANSETRON 2 MG/ML
1 INJECTION INTRAMUSCULAR; INTRAVENOUS ONCE
Status: COMPLETED | OUTPATIENT
Start: 2024-03-26 | End: 2024-03-26

## 2024-03-26 RX ORDER — SODIUM CHLORIDE, SODIUM GLUCONATE, SODIUM ACETATE, POTASSIUM CHLORIDE, MAGNESIUM CHLORIDE, SODIUM PHOSPHATE, DIBASIC, AND POTASSIUM PHOSPHATE .53; .5; .37; .037; .03; .012; .00082 G/100ML; G/100ML; G/100ML; G/100ML; G/100ML; G/100ML; G/100ML
1000 INJECTION, SOLUTION INTRAVENOUS ONCE
Status: COMPLETED | OUTPATIENT
Start: 2024-03-26 | End: 2024-03-26

## 2024-03-26 RX ORDER — HYDROXYZINE HYDROCHLORIDE 25 MG/1
50 TABLET, FILM COATED ORAL EVERY 6 HOURS PRN
Status: CANCELLED | OUTPATIENT
Start: 2024-03-26

## 2024-03-26 RX ORDER — HYDRALAZINE HYDROCHLORIDE 20 MG/ML
5 INJECTION INTRAMUSCULAR; INTRAVENOUS ONCE
Status: COMPLETED | OUTPATIENT
Start: 2024-03-26 | End: 2024-03-26

## 2024-03-26 RX ORDER — SODIUM CHLORIDE, SODIUM LACTATE, POTASSIUM CHLORIDE, CALCIUM CHLORIDE 600; 310; 30; 20 MG/100ML; MG/100ML; MG/100ML; MG/100ML
75 INJECTION, SOLUTION INTRAVENOUS CONTINUOUS
Status: DISCONTINUED | OUTPATIENT
Start: 2024-03-26 | End: 2024-03-27

## 2024-03-26 RX ORDER — ATORVASTATIN CALCIUM 40 MG/1
40 TABLET, FILM COATED ORAL DAILY
Status: CANCELLED | OUTPATIENT
Start: 2024-03-26

## 2024-03-26 RX ORDER — CHLORHEXIDINE GLUCONATE ORAL RINSE 1.2 MG/ML
15 SOLUTION DENTAL EVERY 12 HOURS SCHEDULED
Status: DISCONTINUED | OUTPATIENT
Start: 2024-03-26 | End: 2024-04-03 | Stop reason: HOSPADM

## 2024-03-26 RX ADMIN — FOLIC ACID 1 MG: 1 TABLET ORAL at 16:51

## 2024-03-26 RX ADMIN — GABAPENTIN 300 MG: 300 CAPSULE ORAL at 21:49

## 2024-03-26 RX ADMIN — SODIUM CHLORIDE 1000 ML: 0.9 INJECTION, SOLUTION INTRAVENOUS at 08:15

## 2024-03-26 RX ADMIN — METOCLOPRAMIDE 10 MG: 5 INJECTION, SOLUTION INTRAMUSCULAR; INTRAVENOUS at 08:15

## 2024-03-26 RX ADMIN — SODIUM CHLORIDE 2.5 MG/HR: 0.9 INJECTION, SOLUTION INTRAVENOUS at 18:28

## 2024-03-26 RX ADMIN — MAGNESIUM SULFATE HEPTAHYDRATE 2 G: 2 INJECTION, SOLUTION INTRAVENOUS at 08:40

## 2024-03-26 RX ADMIN — ASPIRIN 81 MG: 81 TABLET, COATED ORAL at 16:51

## 2024-03-26 RX ADMIN — Medication 1 TABLET: at 16:58

## 2024-03-26 RX ADMIN — IOHEXOL 140 ML: 350 INJECTION, SOLUTION INTRAVENOUS at 11:02

## 2024-03-26 RX ADMIN — HYDRALAZINE HYDROCHLORIDE 5 MG: 20 INJECTION INTRAMUSCULAR; INTRAVENOUS at 17:53

## 2024-03-26 RX ADMIN — SODIUM CHLORIDE, SODIUM GLUCONATE, SODIUM ACETATE, POTASSIUM CHLORIDE, MAGNESIUM CHLORIDE, SODIUM PHOSPHATE, DIBASIC, AND POTASSIUM PHOSPHATE 1000 ML: .53; .5; .37; .037; .03; .012; .00082 INJECTION, SOLUTION INTRAVENOUS at 15:03

## 2024-03-26 RX ADMIN — DESMOPRESSIN ACETATE 18.8 MCG: 40 INJECTION, SOLUTION INTRAVENOUS; SUBCUTANEOUS at 10:19

## 2024-03-26 RX ADMIN — SODIUM CHLORIDE 500 ML: 0.9 INJECTION, SOLUTION INTRAVENOUS at 12:01

## 2024-03-26 RX ADMIN — THIAMINE HCL TAB 100 MG 100 MG: 100 TAB at 16:51

## 2024-03-26 RX ADMIN — CHLORHEXIDINE GLUCONATE 15 ML: 1.2 SOLUTION ORAL at 20:52

## 2024-03-26 RX ADMIN — LABETALOL HYDROCHLORIDE 10 MG: 5 INJECTION, SOLUTION INTRAVENOUS at 17:12

## 2024-03-26 RX ADMIN — SODIUM CHLORIDE 10 MG/HR: 0.9 INJECTION, SOLUTION INTRAVENOUS at 21:49

## 2024-03-26 RX ADMIN — TRIMETHOBENZAMIDE HYDROCHLORIDE 200 MG: 100 INJECTION INTRAMUSCULAR at 16:40

## 2024-03-26 RX ADMIN — ATORVASTATIN CALCIUM 40 MG: 40 TABLET, FILM COATED ORAL at 16:51

## 2024-03-26 RX ADMIN — SODIUM CHLORIDE 5 MG/HR: 0.9 INJECTION, SOLUTION INTRAVENOUS at 09:48

## 2024-03-26 RX ADMIN — HYDRALAZINE HYDROCHLORIDE 5 MG: 20 INJECTION INTRAMUSCULAR; INTRAVENOUS at 09:08

## 2024-03-26 RX ADMIN — DESMOPRESSIN ACETATE 18.4 MCG: 4 SOLUTION INTRAVENOUS at 18:14

## 2024-03-26 RX ADMIN — SODIUM CHLORIDE, SODIUM LACTATE, POTASSIUM CHLORIDE, AND CALCIUM CHLORIDE 75 ML/HR: .6; .31; .03; .02 INJECTION, SOLUTION INTRAVENOUS at 16:35

## 2024-03-26 RX ADMIN — DIPHENHYDRAMINE HYDROCHLORIDE 25 MG: 50 INJECTION, SOLUTION INTRAMUSCULAR; INTRAVENOUS at 08:15

## 2024-03-26 RX ADMIN — ESCITALOPRAM OXALATE 20 MG: 20 TABLET ORAL at 16:52

## 2024-03-26 NOTE — ED PROVIDER NOTES
History  Chief Complaint   Patient presents with    Vomiting     Pt to er via ems from home with reports that she woke up this am experiencing nausea, vomiting, and diarrhea. States that she feels dizzy when she opens her eyes, but when she closes her eyes and lays back the dizziness goes away. Reports drinking twisted tea daily.      The patient is a 58-year-old female with PMH of HTN, CVA, and diverticulitis with PSH of colon sigmoid resection and cardiac loop recorder placement presenting for evaluation of N/V/D.  The patient reports making approximately 30 to 45 minutes PTA with sudden onset of nausea, vomiting, diarrhea.  She was vomiting approximately 3 times and is unable to describe what it looks like as she has not looked.  She also reports sudden onset of loose watery stools.  She notes yesterday she felt well and has not been around anyone sick.  She denies associated fevers or chills.  She does currently endorse a moderate headache that is all over and pressure-like.  She denies chest pain or shortness of breath.  She endorses epigastric abdominal discomfort.  She denies flank pain and urinary complaints.       History provided by:  Patient   used: No        Prior to Admission Medications   Prescriptions Last Dose Informant Patient Reported? Taking?   NIFEdipine ER (ADALAT CC) 30 MG 24 hr tablet   No No   Sig: Take 1 tablet (30 mg total) by mouth daily   aspirin (ECOTRIN LOW STRENGTH) 81 mg EC tablet  Self No No   Sig: Take 1 tablet (81 mg total) by mouth daily   atorvastatin (LIPITOR) 40 mg tablet   No No   Sig: Take 1 tablet (40 mg total) by mouth daily   clopidogrel (PLAVIX) 75 mg tablet   No No   Sig: Take 1 tablet (75 mg total) by mouth daily   escitalopram (Lexapro) 20 mg tablet  Self No No   Sig: Take 1 tablet (20 mg total) by mouth daily   gabapentin (NEURONTIN) 300 mg capsule   No No   Sig: Take 1 capsule (300 mg total) by mouth daily at bedtime   hydrOXYzine HCL (ATARAX) 50  "mg tablet   No No   Sig: Take 1 tablet (50 mg total) by mouth every 6 (six) hours as needed for itching   lisinopril (ZESTRIL) 20 mg tablet   No No   Sig: Take 1 tablet (20 mg total) by mouth daily      Facility-Administered Medications: None       Past Medical History:   Diagnosis Date    CVA (cerebral vascular accident) (HCC)     Diverticulitis     Diverticulitis of colon     Diverticulosis     Hypertension     Stroke (HCC)        Past Surgical History:   Procedure Laterality Date    CARDIAC ELECTROPHYSIOLOGY PROCEDURE N/A 2/8/2023    Procedure: Cardiac loop recorder implant;  Surgeon: Duke Abraham MD;  Location: [facility];  Service: Cardiology    COLON SIGMOID RESECTION         Family History   Problem Relation Age of Onset    Coronary artery disease Mother     Heart disease Mother     Diabetes Father     No Known Problems Sister     Breast cancer Maternal Grandmother         age unknown    No Known Problems Maternal Grandfather     No Known Problems Paternal Grandmother     No Known Problems Paternal Grandfather     No Known Problems Paternal Aunt     No Known Problems Paternal Aunt     No Known Problems Maternal Aunt      I have reviewed and agree with the history as documented.    E-Cigarette/Vaping    E-Cigarette Use Never User      E-Cigarette/Vaping Substances    Nicotine No     Flavoring No      Social History     Tobacco Use    Smoking status: Light Smoker     Types: Cigarettes    Smokeless tobacco: Never    Tobacco comments:     smokes 4 cigarettes daily   Vaping Use    Vaping status: Never Used   Substance Use Topics    Alcohol use: Yes     Comment: \"twisted tea daily\"\"    Drug use: Yes     Types: Marijuana     Comment: medical card       Review of Systems   Constitutional:  Negative for chills and fever.   HENT:  Positive for congestion.    Respiratory:  Positive for cough. Negative for shortness of breath.    Cardiovascular:  Negative for chest pain, palpitations and leg swelling. "   Gastrointestinal:  Positive for abdominal pain (Epigastric abdominal pain), diarrhea, nausea and vomiting. Negative for abdominal distention.   Genitourinary: Negative.    Musculoskeletal:  Negative for back pain and gait problem.   Neurological:  Positive for headaches. Negative for dizziness, syncope, weakness and light-headedness.   All other systems reviewed and are negative.      Physical Exam  Physical Exam  Vitals and nursing note reviewed.   Constitutional:       General: She is awake. She is in acute distress (Evidencing moderate discomfort and mild stress).      Appearance: Normal appearance. She is well-developed. She is ill-appearing (Appears fatigued and mildly ill). She is not toxic-appearing or diaphoretic.   HENT:      Head: Normocephalic and atraumatic.      Jaw: There is normal jaw occlusion.      Nose: Nose normal.      Mouth/Throat:      Lips: Pink. No lesions.      Mouth: Mucous membranes are moist.   Eyes:      General: Lids are normal. Vision grossly intact. Gaze aligned appropriately.      Extraocular Movements: Extraocular movements intact.      Conjunctiva/sclera: Conjunctivae normal.      Pupils: Pupils are equal, round, and reactive to light.   Neck:      Trachea: Phonation normal. No abnormal tracheal secretions.   Cardiovascular:      Rate and Rhythm: Normal rate and regular rhythm.      Pulses:           Radial pulses are 2+ on the right side and 2+ on the left side.        Dorsalis pedis pulses are 2+ on the right side and 2+ on the left side.        Posterior tibial pulses are 2+ on the right side and 2+ on the left side.      Heart sounds: Normal heart sounds, S1 normal and S2 normal. No murmur heard.  Pulmonary:      Effort: Pulmonary effort is normal. No tachypnea or respiratory distress.      Breath sounds: Normal breath sounds and air entry. No stridor, decreased air movement or transmitted upper airway sounds. No decreased breath sounds.   Abdominal:      General: There is no  distension.      Palpations: Abdomen is soft.      Tenderness: There is abdominal tenderness in the epigastric area and periumbilical area. There is no guarding or rebound. Negative signs include Workman's sign.   Musculoskeletal:         General: Normal range of motion.      Cervical back: Neck supple.      Right lower leg: No edema.      Left lower leg: No edema.      Comments: PAREKH, 5/5 strength throughout, sensation intact, no focal joint swelling.   Skin:     General: Skin is warm and dry.      Capillary Refill: Capillary refill takes less than 2 seconds.      Findings: No rash or wound.   Neurological:      General: No focal deficit present.      Mental Status: She is alert and oriented to person, place, and time. Mental status is at baseline.   Psychiatric:         Behavior: Behavior is cooperative.         Vital Signs  ED Triage Vitals [03/26/24 0756]   Temperature Pulse Respirations Blood Pressure SpO2   97.5 °F (36.4 °C) 70 18 (!) 222/126 99 %      Temp Source Heart Rate Source Patient Position - Orthostatic VS BP Location FiO2 (%)   Temporal Monitor Lying Right arm --      Pain Score       --           Vitals:    03/26/24 1240 03/26/24 1250 03/26/24 1300 03/26/24 1310   BP: 126/73 138/80 135/71 142/72   Pulse: 80 83 80 82   Patient Position - Orthostatic VS:             Visual Acuity  Visual Acuity      Flowsheet Row Most Recent Value   L Pupil Size (mm) 3   R Pupil Size (mm) 3            ED Medications  Medications   ondansetron (FOR EMS ONLY) (ZOFRAN) 4 mg/2 mL injection 4 mg (0 mg Does not apply Given to EMS 3/26/24 0758)   sodium chloride 0.9 % bolus 1,000 mL (0 mL Intravenous Stopped 3/26/24 0915)   diphenhydrAMINE (BENADRYL) injection 25 mg (25 mg Intravenous Given 3/26/24 0815)   metoclopramide (REGLAN) injection 10 mg (10 mg Intravenous Given 3/26/24 0815)   magnesium sulfate 2 g/50 mL IVPB (premix) 2 g (0 g Intravenous Stopped 3/26/24 0910)   hydrALAZINE (APRESOLINE) injection 5 mg (5 mg  Intravenous Given 3/26/24 0908)   desmopressin (DDAVP) 18.8 mcg in sodium chloride 0.9 % 50 mL IVPB (0 mcg Intravenous Stopped 3/26/24 1049)   iohexol (OMNIPAQUE) 350 MG/ML injection (MULTI-DOSE) 100 mL (140 mL Intravenous Given 3/26/24 1102)   sodium chloride 0.9 % bolus 500 mL (0 mL Intravenous Stopped 3/26/24 1313)       Diagnostic Studies  Results Reviewed       Procedure Component Value Units Date/Time    Urine Microscopic [[id number]] Collected: 03/26/24 1400    Lab Status: In process Specimen: Urine, Other Updated: 03/26/24 1520    UA w Reflex to Microscopic w Reflex to Culture [[id number]] Collected: 03/26/24 1400    Lab Status: In process Specimen: Urine, Other Updated: 03/26/24 1514    HS Troponin I 4hr [[id number]]  (Normal) Collected: 03/26/24 1315    Lab Status: Final result Specimen: Blood from Arm, Left Updated: 03/26/24 1343     hs TnI 4hr 4 ng/L      Delta 4hr hsTnI -1 ng/L     HS Troponin I 2hr [[id number]]  (Normal) Collected: 03/26/24 1129    Lab Status: Final result Specimen: Blood from Arm, Left Updated: 03/26/24 1154     hs TnI 2hr 4 ng/L      Delta 2hr hsTnI -1 ng/L     FLU/RSV/COVID - if FLU/RSV clinically relevant [[id number]]  (Normal) Collected: 03/26/24 0840    Lab Status: Final result Specimen: Nares from Nose Updated: 03/26/24 0924     SARS-CoV-2 Negative     INFLUENZA A PCR Negative     INFLUENZA B PCR Negative     RSV PCR Negative    Narrative:      FOR PEDIATRIC PATIENTS - copy/paste COVID Guidelines URL to browser: [email or web address]    SARS-CoV-2 assay is a Nucleic Acid Amplification assay intended for the  qualitative detection of nucleic acid from SARS-CoV-2 in nasopharyngeal  swabs. Results are for the presumptive identification of SARS-CoV-2 RNA.    Positive results are indicative of infection with SARS-CoV-2, the virus  causing COVID-19, but do not rule out bacterial infection or co-infection  with other viruses. Laboratories  within the [place] and its  territories are required to report all positive results to the appropriate  public health authorities. Negative results do not preclude SARS-CoV-2  infection and should not be used as the sole basis for treatment or other  patient management decisions. Negative results must be combined with  clinical observations, patient history, and epidemiological information.  This test has not been FDA cleared or approved.    This test has been authorized by FDA under an Emergency Use Authorization  (EUA). This test is only authorized for the duration of time the  declaration that circumstances exist justifying the authorization of the  emergency use of an in vitro diagnostic tests for detection of SARS-CoV-2  virus and/or diagnosis of COVID-19 infection under section 564(b)(1) of  the Act, 21 U.S.C. 360bbb-3(b)(1), unless the authorization is terminated  or revoked sooner. The test has been validated but independent review by FDA  and CLIA is pending.    Test performed using Access Point GeneXpert: This RT-PCR assay targets N2,  a region unique to SARS-CoV-2. A conserved region in the E-gene was chosen  for pan-Sarbecovirus detection which includes SARS-CoV-2.    According to CMS-2020-01-R, this platform meets the definition of high-throughput technology.    Magnesium [[id number]]  (Abnormal) Collected: 03/26/24 0820    Lab Status: Final result Specimen: Blood from Arm, Left Updated: 03/26/24 0917     Magnesium 1.8 mg/dL     Comprehensive metabolic panel [[id number]]  (Abnormal) Collected: 03/26/24 0820    Lab Status: Final result Specimen: Blood from Arm, Left Updated: 03/26/24 0849     Sodium 136 mmol/L      Potassium 3.7 mmol/L      Chloride 99 mmol/L      CO2 30 mmol/L      ANION GAP 7 mmol/L      BUN 15 mg/dL      Creatinine 0.67 mg/dL      Glucose 132 mg/dL      Calcium 9.1 mg/dL       U/L       U/L      Alkaline Phosphatase 100 U/L      Total Protein 9.6 g/dL      Albumin 4.0  g/dL      Total Bilirubin 0.53 mg/dL      eGFR 96 ml/min/1.73sq m     Narrative:      National Kidney Disease Foundation guidelines for Chronic Kidney Disease (CKD):     Stage 1 with normal or high GFR (GFR > 90 mL/min/1.73 square meters)    Stage 2 Mild CKD (GFR = 60-89 mL/min/1.73 square meters)    Stage 3A Moderate CKD (GFR = 45-59 mL/min/1.73 square meters)    Stage 3B Moderate CKD (GFR = 30-44 mL/min/1.73 square meters)    Stage 4 Severe CKD (GFR = 15-29 mL/min/1.73 square meters)    Stage 5 End Stage CKD (GFR <15 mL/min/1.73 square meters)  Note: GFR calculation is accurate only with a steady state creatinine    Lipase [[id number]]  (Normal) Collected: 03/26/24 0820    Lab Status: Final result Specimen: Blood from Arm, Left Updated: 03/26/24 0849     Lipase 53 u/L     HS Troponin 0hr (reflex protocol) [[id number]]  (Normal) Collected: 03/26/24 0820    Lab Status: Final result Specimen: Blood from Arm, Left Updated: 03/26/24 0849     hs TnI 0hr 5 ng/L     Lactic acid, plasma (w/reflex if result > 2.0) [[id number]]  (Normal) Collected: 03/26/24 0820    Lab Status: Final result Specimen: Blood from Arm, Left Updated: 03/26/24 0846     LACTIC ACID 1.1 mmol/L     Narrative:      Result may be elevated if tourniquet was used during collection.    Fingerstick Glucose (POCT) [[id number]]  (Abnormal) Collected: 03/26/24 0844    Lab Status: Final result Specimen: Blood Updated: 03/26/24 0845     POC Glucose 166 mg/dl     CBC and differential [[id number]]  (Abnormal) Collected: 03/26/24 0820    Lab Status: Final result Specimen: Blood from Arm, Left Updated: 03/26/24 0829     WBC 8.29 Thousand/uL      RBC 4.91 Million/uL      Hemoglobin 15.4 g/dL      Hematocrit 47.4 %      MCV 97 fL      MCH 31.4 pg      MCHC 32.5 g/dL      RDW 12.2 %      MPV 11.6 fL      Platelets 189 Thousands/uL      nRBC 0 /100 WBCs      Neutrophils Relative 34 %      Immature Grans % 0 %      Lymphocytes Relative 50 %      Monocytes Relative 12 %       Eosinophils Relative 4 %      Basophils Relative 0 %      Neutrophils Absolute 2.80 Thousands/µL      Absolute Immature Grans 0.02 Thousand/uL      Absolute Lymphocytes 4.15 Thousands/µL      Absolute Monocytes 1.00 Thousand/µL      Eosinophils Absolute 0.29 Thousand/µL      Basophils Absolute 0.03 Thousands/µL                    CT abdomen pelvis with contrast   Final Result by Michael Altman MD (03/26 1131)      1. Cholelithiasis, including a 3 mm gallstone within the gallbladder neck versus cystic duct. No findings of acute cholecystitis. If there is concern for acute cholecystitis, right upper quadrant ultrasound can be obtained.      2. Findings suggesting cirrhosis with mild mesenteric edema, but no ascites. Enlarged ana hepatic and peripancreatic lymph nodes measuring up to 2 cm in short axis, indeterminate, although could be reactive, given suspicion for cirrhosis.      3. Simple appearing right adnexal cyst measuring 3.7 cm.  According to current guidelines (J Am Kayla Radiol 2020; 17:248-254) in this postmenopausal woman, this should be followed in 6 to 12 months by pelvic ultrasound.      The study was marked in EPIC for immediate notification.            Workstation performed: [id number]         CTA head and neck with and without contrast   Final Result by Pallav N Shah, MD (03/26 2312)      No left cerebellar vascular malformation to account for the patient's acute parenchymal hematoma.      No cervical or intracranial large vessel occlusion, dissection or aneurysm.      Mild bilateral cervical carotid bifurcation atherosclerotic disease.                  Workstation performed: [id number]         CT head without contrast   Final Result by Jet Sims DO (03/26 3117)      Acute parenchymal hematoma left paramedian cerebellum measures 2.6 x 1.6 cm with mass effect on the fourth ventricle. Intraventricular extension of hemorrhage noted with blood in the fourth ventricle and left foramen of  Mi.      Chronic lacunar infarcts are identified as described.         I personally discussed this study with ABRAHAN UP on 3/26/2024 9:26 AM.                        Workstation performed: [id number]         XR chest 1 view portable   ED Interpretation by MILAN Waters (03/26 0827)   Loop recorder to left lower chest wall.  No acute cardiopulmonary disease identified by me.      Final Result by Luis Wu MD (03/26 0815)      No acute cardiopulmonary disease.            Workstation performed: [id number]                    Procedures  ECG 12 Lead Documentation Only    Date/Time: 3/26/2024 8:27 AM    Performed by: MILAN Waters  Authorized by: MILAN Waters    Indications / Diagnosis:  N/V, epigastric abdominal pain  ECG reviewed by me, the ED Provider: yes    Patient location:  ED  Previous ECG:     Previous ECG:  Compared to current    Comparison ECG info:  February 11, 2023    Similarity:  Changes noted (Prolonged QTc, new T wave inversion in lead V3)    Comparison to cardiac monitor: Yes    Interpretation:     Interpretation: non-specific    Rate:     ECG rate:  67    ECG rate assessment: normal    Rhythm:     Rhythm: sinus rhythm    Ectopy:     Ectopy: none    QRS:     QRS axis:  Indeterminate    QRS intervals:  Normal  Conduction:     Conduction: normal    ST segments:     ST segments:  Normal  T waves:     T waves: flattening and inverted      Flattening:  AVL    Inverted:  AVR, V1, V2 and V3  Comments:      Sinus rhythm, indeterminate axis, prolonged QTc, nonspecific T wave inversion in leads V2 and V3 with previous T wave inversion in lead V2, no acute ischemic changes read by me           ED Course  ED Course as of 03/26/24 1520   Tue Mar 26, 2024   0845 On reevaluation, patient notably more calm.  She has not vomited since receiving the Benadryl/Reglan.  She currently denies all complaints including pain.  She does remain notably hypertensive for which I will place an order for  hydralazine.   0848 LACTIC ACID: 1.1  WNL   0848 POC Glucose(!): 166  stable   0849 hs TnI 0hr: 5  Will delta, however given no chest pain or shortness of breath, ACS less likely   0849 Blood Pressure(!): 235/115  Giving hydralazine will trend   0849 Comprehensive metabolic panel(!)  No electrolyte derangement, no ROMULO, normal random glucose, elevated AST and ALT consistent with last measure 1 year ago, no elevated total bilirubin, no right upper quadrant abdominal tenderness on exam to suggest acute hepatobiliary pathology.   0850 LIPASE: 53  WNL, patient is an almost daily drinker, obtaining CT images to further evaluate for acute intra-abdominal pathology   0913 The patient is slightly lethargic but oriented x 3.  And CT found to have intracerebral hemorrhage.  Images crossing over, reading room called for notification.  Transfer for [facility] placed   0925 FLU/RSV/COVID - if FLU/RSV clinically relevant  Noted negative   0927 CT head without contrast  Received call from reading room, acute intracerebral hemorrhage with leaking into subarachnoid space   0927 Awaiting callback from neurocritical care   0929 CT head without contrast  IMPRESSION:     Acute parenchymal hematoma left paramedian cerebellum measures 2.6 x 1.6 cm with mass effect on the fourth ventricle. Intraventricular extension of hemorrhage noted with blood in the fourth ventricle and left foramen of Luschka.     Chronic lacunar infarcts are identified as described.     1000 Dr. Tenorio called from [facility], recommends desmopressin and cardene   1001 Goal -150   1030 On reevaluation, patient remains slightly lethargic but is arousable to speech.  She remains oriented x 3.  She denies pain currently.  When asked about her advanced directives, she does wish for CPR and intubation.  She has a son which she requested I reach out to.   1133 On reevaluation, patient resting comfortably.  Wakes to light speech and  touch.  Slight mumbling of words but oriented x 3.  Able to follow commands.   1134 CT abdomen pelvis with contrast  IMPRESSION:     1. Cholelithiasis, including a 3 mm gallstone within the gallbladder neck versus cystic duct. No findings of acute cholecystitis. If there is concern for acute cholecystitis, right upper quadrant ultrasound can be obtained.     2. Findings suggesting cirrhosis with mild mesenteric edema, but no ascites. Enlarged ana hepatic and peripancreatic lymph nodes measuring up to 2 cm in short axis, indeterminate, although could be reactive, given suspicion for cirrhosis.     3. Simple appearing right adnexal cyst measuring 3.7 cm.  According to current guidelines (J Am Kayla Radiol 2020; 17:248-254) in this postmenopausal woman, this should be followed in 6 to 12 months by pelvic ultrasound.     1136 CTA head and neck with and without contrast  IMPRESSION:     No left cerebellar vascular malformation to account for the patient's acute parenchymal hematoma.     No cervical or intracranial large vessel occlusion, dissection or aneurysm.     Mild bilateral cervical carotid bifurcation atherosclerotic disease.        1158 HS Troponin I 2hr  Negative delta trop   1221 Per PACs, updated ETA of transport at 1300   1235 Patient more alert now.  Following commands still.  Oriented x 3.  Aware that updated transfer time is 1500.   1311 Transport at bedside.                               SBIRT 22yo+      Flowsheet Row Most Recent Value   Initial Alcohol Screen: US AUDIT-C     1. How often do you have a drink containing alcohol? 0 Filed at: 03/26/2024 0756   2. How many drinks containing alcohol do you have on a typical day you are drinking?  0 Filed at: 03/26/2024 0756   3a. Male UNDER 65: How often do you have five or more drinks on one occasion? 0 Filed at: 03/26/2024 0756   3b. FEMALE Any Age, or MALE 65+: How often do you have 4 or more drinks on one occassion? 0 Filed at: 03/26/2024 0756   Audit-C  Score 0 Filed at: 03/26/2024 0756   HUE: How many times in the past year have you...    Used an illegal drug or used a prescription medication for non-medical reasons? Never Filed at: 03/26/2024 0756                      Medical Decision Making  DDx including but not limited to: Viral illness, food poisoning, metabolic abnormality, dehydration, intracranial process, colitis, enteritis, partial SBO, pancreatitis, GERD, gastritis, UTI; considered but less likely cardiac etiology      The patient is notably hypertensive on arrival to the ED, however she is hunched forward, with arms flexed inwards, actively retching and vomiting.  She is on dual antiplatelet therapy for which we will obtain CT head given forceful vomiting with headache.  Her most other prominent symptom is nausea and vomiting.  Plan to treat with Benadryl, Reglan, and fluids.  Given epigastric abdominal pain with cardiac history, will obtain troponin and EKG.  Will also obtain abdominal labs to further evaluate for electrolyte derangement, organ dysfunction, and anemia.  Will obtain CT imaging to further evaluate for intra-abdominal process given her history of partial colon resection and diverticulitis.  The patient does not report eating any new foods or fluids, undercooked meats, raw fish, or drinking unfiltered water.  Will send viral swab to screen for viral illness.    See ED course for further MDM and disposition discussion.      Problems Addressed:  Cerebellar bleed (HCC): acute illness or injury  Nausea vomiting and diarrhea: acute illness or injury    Amount and/or Complexity of Data Reviewed  Labs: ordered. Decision-making details documented in ED Course.  Radiology: ordered and independent interpretation performed. Decision-making details documented in ED Course.  ECG/medicine tests: ordered and independent interpretation performed.    Risk  OTC drugs.  Prescription drug management.             Disposition  Final diagnoses:   Cerebellar  bleed (HCC)   Nausea vomiting and diarrhea     Time reflects when diagnosis was documented in both MDM as applicable and the Disposition within this note       Time User Action Codes Description Comment    3/26/2024 10:17 AM Cindi Gaona [I61.4] Cerebellar bleed (HCC)     3/26/2024 10:17 AM Cindi Gaona [R11.2,  R19.7] Nausea vomiting and diarrhea           ED Disposition       ED Disposition   Transfer to Another Facility-In Network    Condition   --    Date/Time   Tue Mar 26, 2024 1016    Comment   Tanika Lira should be transferred out to [facility].               MD Documentation      Flowsheet Row Most Recent Value   Patient Condition The patient has been stabilized such that within reasonable medical probability, no material deterioration of the patient condition or the condition of the unborn child(brennan) is likely to result from the transfer   Reason for Transfer Level of Care needed not available at this facility   Benefits of Transfer Specialized equipment and/or services available at the receiving facility (Include comment)________________________, Continuity of care   Risks of Transfer Potential for delay in receiving treatment, Potential deterioration of medical condition, Possible worsening of condition or death during transfer   Accepting Physician Dr. Tenorio   Accepting Facility Name, City & State  [facility]   Sending MILAN Rice          RN Documentation      Flowsheet Row Most Recent Value   Accepting Facility Name, [facility] State  [facility]          Follow-up Information    None         Discharge Medication List as of 3/26/2024  1:22 PM        CONTINUE these medications which have NOT CHANGED    Details   aspirin (ECOTRIN LOW STRENGTH) 81 mg EC tablet Take 1 tablet (81 mg total) by mouth daily, Starting Fri 10/7/2022, Normal      atorvastatin (LIPITOR) 40 mg tablet Take 1 tablet (40 mg total) by mouth daily, Starting Tue 6/13/2023, Normal      clopidogrel (PLAVIX) 75 mg  tablet Take 1 tablet (75 mg total) by mouth daily, Starting Tue 6/13/2023, Normal      escitalopram (Lexapro) 20 mg tablet Take 1 tablet (20 mg total) by mouth daily, Starting Wed 10/19/2022, Normal      gabapentin (NEURONTIN) 300 mg capsule Take 1 capsule (300 mg total) by mouth daily at bedtime, Starting Tue 6/13/2023, Normal      hydrOXYzine HCL (ATARAX) 50 mg tablet Take 1 tablet (50 mg total) by mouth every 6 (six) hours as needed for itching, Starting Tue 6/13/2023, Normal      lisinopril (ZESTRIL) 20 mg tablet Take 1 tablet (20 mg total) by mouth daily, Starting Tue 6/13/2023, Normal      NIFEdipine ER (ADALAT CC) 30 MG 24 hr tablet Take 1 tablet (30 mg total) by mouth daily, Starting Tue 6/13/2023, Normal             No discharge procedures on file.    PDMP Review         Value Time User    PDMP Reviewed  Yes 2/11/2023 10:24 AM Robert Cristina DO            ED Provider  Electronically Signed by             MILAN Waters  03/26/24 9366

## 2024-03-26 NOTE — H&P
[facility]  H&P: Critical Care  Name: Tanika Lira 58 y.o. female I MRN: [id number]  Unit/Bed#: ICU 04 I Date of Admission: 3/26/2024   Date of Service: 3/26/2024 I Hospital Day: 0      Assessment/Plan   Neuro:   Diagnosis: Cerebellar hemorrhage  Patient woke up the morning of 3/26 with headache, nausea, and dizziness. She then head several episodes of emesis and diarrhea.   3/26 am CTH wo contrast: Acute parenchymal hematoma left paramedian cerebellum measures 2.6 x 1.6 cm with mass effect on the fourth ventricle. Intraventricular extension of hemorrhage noted with blood in the fourth ventricle and left foramen of Luschka.  Chronic lacunar infarcts are identified as described.  Plan:   Frequent neurochecks Q1 hour   If no neuro change, repeat CT head in the morning.   Future MRI at neuro's discretion  If neuro change occurs, low threshold for repeat CT head and subsequent EVD placement if CSF occlusion detected    CV:   No active issues  Prior holter monitor was negative for A-fib or other arrythmia    Pulm:  No active issues    GI:   Diagnosis: cholelithiasis  Cholelithiasis, including a 3 mm gallstone within the gallbladder neck versus cystic duct. No findings of acute cholecystitis. If there is concern for acute cholecystitis, right upper quadrant ultrasound can be obtained.   RUQ tenderness on exam   Plan:   If patient becomes symptomatic, obtain RUQ US, consider cholecystectomy  Follow up with GI outpatient  Diagnosis: Possible alcohol induced cirrhosis  3/26 CT: Findings suggesting cirrhosis with mild mesenteric edema, but no ascites. Enlarged ana hepatic and peripancreatic lymph nodes measuring up to 2 cm in short axis, indeterminate, although could be reactive, given suspicion for cirrhosis.   AST: 113  ALT: 114  Plan:   Monitor liver enzymes  Follow up with GI outpatient      :   Diagnosis: R adnexal cyst noted on CT  Simple appearing right adnexal cyst  measuring 3.7 cm. According to current guidelines (J Am Kayla Radiol 2020; 17:248-254) in this postmenopausal woman, this should be followed in 6 to 12 months by pelvic ultrasound.   Plan:   Discussed with patient f/u outpatient 6-12 months by pelvic US    F/E/N:    F: 1L bolus Isolyte and 75/hr LR  E: replete prn  N: regular house after bedside swallow    Heme/Onc:   No active issues    Endo:   No active issues    ID:   No active issues    MSK/Skin:   Diagnosis: Hammertoe  Plan: Frequent skin care     Disposition: Critical care       History of Present Illness     HPI: Tanika Lira is a 58 y.o. who presents with cerebellar hemorrhage. PMHx is significant for daily alcohol use, 30 pack-years smoking hx, marijuana use, HTN, neuropathy, diverticulitis s/p sigmoid colon resection, and previous CVA in 09/2022. The patient woke up this morning feeling dizzy, nauseous, and headache. She fell down on the floor and began vomiting. She also had several episodes of diarrhea. She called EMS and was found to be ataxic on the way to the ED.     History obtained from chart review and the patient.  Review of Systems   Constitutional:  Negative for chills and fever.   HENT:  Negative for congestion, rhinorrhea and sore throat.    Eyes:  Positive for photophobia. Negative for visual disturbance.   Respiratory:  Negative for apnea, chest tightness and shortness of breath.    Cardiovascular:  Negative for chest pain.   Gastrointestinal:  Positive for diarrhea, nausea and vomiting. Negative for abdominal distention and abdominal pain.   Genitourinary:  Negative for difficulty urinating and dysuria.   Neurological:  Positive for dizziness, numbness and headaches. Negative for facial asymmetry and weakness.      Historical Information   Past Medical History:  No date: CVA (cerebral vascular accident) (HCC)  No date: Diverticulitis  No date: Diverticulitis of colon  No date: Diverticulosis  No date: Hypertension  No date: Stroke (HCC)  "Past Surgical History:  2/8/2023: CARDIAC ELECTROPHYSIOLOGY PROCEDURE; N/A      Comment:  Procedure: Cardiac loop recorder implant;  Surgeon:                Duke Abraham MD;  Location: [facility];                 Service: Cardiology  No date: COLON SIGMOID RESECTION   Current Outpatient Medications   Medication Instructions    aspirin (ECOTRIN LOW STRENGTH) 81 mg, Oral, Daily    atorvastatin (LIPITOR) 40 mg, Oral, Daily    clopidogrel (PLAVIX) 75 mg, Oral, Daily    escitalopram (LEXAPRO) 20 mg, Oral, Daily    gabapentin (NEURONTIN) 300 mg, Oral, Daily at bedtime    hydrOXYzine HCL (ATARAX) 50 mg, Oral, Every 6 hours PRN    lisinopril (ZESTRIL) 20 mg, Oral, Daily    NIFEdipine ER (ADALAT CC) 30 mg, Oral, Daily    No Known Allergies   Social History     Tobacco Use    Smoking status: Light Smoker     Types: Cigarettes    Smokeless tobacco: Never    Tobacco comments:     smokes 4 cigarettes daily   Vaping Use    Vaping status: Never Used   Substance Use Topics    Alcohol use: Yes     Comment: \"twisted tea daily\"\"    Drug use: Yes     Types: Marijuana     Comment: medical card    Family History   Problem Relation Age of Onset    Coronary artery disease Mother     Heart disease Mother     Diabetes Father     No Known Problems Sister     Breast cancer Maternal Grandmother         age unknown    No Known Problems Maternal Grandfather     No Known Problems Paternal Grandmother     No Known Problems Paternal Grandfather     No Known Problems Paternal Aunt     No Known Problems Paternal Aunt     No Known Problems Maternal Aunt           Objective                            Vitals I/O      Most Recent Min/Max in 24hrs   Temp 97.6 °F (36.4 °C) Temp  Min: 97.5 °F (36.4 °C)  Max: 97.6 °F (36.4 °C)   Pulse 78 Pulse  Min: 64  Max: 92   Resp 19 Resp  Min: 16  Max: 34   /86 BP  Min: 113/61  Max: 235/115   O2 Sat 97 % SpO2  Min: 90 %  Max: 99 %    No intake or output data in the 24 hours ending 03/26/24 1507    Diet " Regular; Regular House    Invasive Monitoring           Physical Exam   Physical Exam  Vitals and nursing note reviewed.   Eyes:      General: Vision grossly intact.      Pupils: Pupils are equal, round, and reactive to light.   Skin:     General: Skin is warm and dry.   HENT:      Head: Normocephalic and atraumatic.      Right Ear: No drainage.      Left Ear: No drainage.      Nose: No congestion or rhinorrhea.      Mouth/Throat:      Mouth: Mucous membranes are moist.   Cardiovascular:      Rate and Rhythm: Normal rate and regular rhythm.      Pulses: Normal pulses.   Abdominal:      Palpations: Abdomen is soft.      Tenderness: There is abdominal tenderness.      Comments: Mild RUQ tenderness. Liver edge palpable 3cm below the costal margin   Constitutional:       General: She is not in acute distress.     Appearance: She is ill-appearing.   Pulmonary:      Effort: Pulmonary effort is normal.      Breath sounds: Normal breath sounds.   Neurological:      Mental Status: She is alert and oriented to person, place and time.      Motor: Strength full and intact in all extremities.      Comments: Mild ataxia on finger to nose testing. Difficulty keeping eyes focused on a single point. Vision, and motor grossly intact            Diagnostic Studies      EKG: NSR  Imaging:  I have personally reviewed pertinent reports.       Medications:  Scheduled PRN   aspirin, 81 mg, Daily  atorvastatin, 40 mg, Daily With Dinner  chlorhexidine, 15 mL, Q12H NAOMI  escitalopram, 20 mg, Daily  folic acid, 1 mg, Daily  gabapentin, 300 mg, HS  multi-electrolyte, 1,000 mL, Once  multivitamin-minerals, 1 tablet, Daily  thiamine, 100 mg, Daily      trimethobenzamide, 200 mg, Q6H PRN       Continuous    lactated ringers, 75 mL/hr         Labs:    CBC    Recent Labs     03/26/24  0820   WBC 8.29   HGB 15.4   HCT 47.4*        BMP    Recent Labs     03/26/24  0820   SODIUM 136   K 3.7   CL 99   CO2 30   AGAP 7   BUN 15   CREATININE 0.67    CALCIUM 9.1       Coags    No recent results     Additional Electrolytes  Recent Labs     03/26/24  0820   MG 1.8*          Blood Gas    No recent results  No recent results LFTs  Recent Labs     03/26/24  0820   *   *   ALKPHOS 100   ALB 4.0   TBILI 0.53       Infectious  No recent results  Glucose  Recent Labs     03/26/24  0820   GLUC 132               César Benavidez MD

## 2024-03-26 NOTE — INCIDENTAL FINDINGS
The following findings require follow up:  Radiographic finding   Findin. Cholelithiasis, including a 3 mm gallstone within the gallbladder neck versus cystic duct. No findings of acute cholecystitis. If there is concern for acute cholecystitis, right upper quadrant ultrasound can be obtained.     2. Findings suggesting cirrhosis with mild mesenteric edema, but no ascites. Enlarged ana hepatic and peripancreatic lymph nodes measuring up to 2 cm in short axis, indeterminate, although could be reactive, given suspicion for cirrhosis.     3. Simple appearing right adnexal cyst measuring 3.7 cm.  According to current guidelines (J Am Kayla Radiol 2020; 17:248-254) in this postmenopausal woman, this should be followed in 6 to 12 months by pelvic ultrasound.       Follow up required:   1. Monitoring for cholycystitis or other complications   2. Follow up with GI outpatient   3. Pelvic ultrasound in 6-12 months   Follow up should be done within 6 month(s) or sooner if symptoms change or progress. Discussed with patient. Patient expressed understanding    Please notify the following clinician to assist with the follow up:   Dr. Benavidez

## 2024-03-26 NOTE — ASSESSMENT & PLAN NOTE
H/o CVA and stroke like symptoms 9/2022 seen at [facility]  Previously on DAPT   Now on plavix monotherapy  Neurology consultation

## 2024-03-26 NOTE — QUICK NOTE
Patient was incorrectly given 81 mg Aspirin. Discussed with Dr. Tenorio and the patient. Ordered DDAVP 0.3 mcg/kg IV given now. Will hold future AC.

## 2024-03-26 NOTE — ASSESSMENT & PLAN NOTE
Patient is a 58 year old woman with hx of right hemisphere stroke (possibly right pontine infarct) on aspirin , HTN, HLD presenting for left cerebellar hemorrhage with IVH. Patient's symptoms are of nausea, vomiting, diarrhea, vertigo sensation but no focal numbness, weakness, visual deficits, nor headaches. Also, horizontal diplopia. Patient has history of right sided stroke in 2022 which she was placed on DAPT and to continue plavix after 21 days. Patient states she is on aspirin daily on this visit. Initial blood pressure recorded at [facility] is 222/136. On exam, continued but improving L>R dysmetria.  Thinners: aspirin reversed with DDAVP  Initial BP: Blood Pressure: 144/78  Presenting exam: Bilateral dysmetric L>R  Vascular risk factors: HTN, HLD, previous stroke    Workup:  CT head wo contrast 3/26/2024: Acute parenchymal hematoma left paramedian cerebellum measures 2.6 x 1.6 cm with mass effect on the fourth ventricle. Intraventricular extension of hemorrhage noted with blood in the fourth ventricle and left foramen of Luschka.   CT head wo contrast 3/27/2024: Redemonstration of hyperdense hemorrhage in the left paramedian cerebellum, as described with mass effect and extension into the fourth ventricle and left foramen of Luschka, similar in appearance to the prior.   CT head wo contrast 3/28: no significant changes from above  Lab Results   Component Value Date    INR 1.11 03/26/2024    HGBA1C 5.2 09/02/2022    SODIUM 136 03/26/2024       Pertinent scores:  - ICH: 2 due to IVH and Infratentorial origin    Impression: Left cerebellar hemorrhage most likely in setting of hypertensive emergency with blood pressures in the 220s sbp 2/2 to poor medication adherence of antihypertensives and methamphetamine use but now stable.    Plan:  S/P DDAVP X2  Repeat CT head on 4/11/2024 and if improving hemorrhage, patient can resume plavix 75mg daily for stroke prevention; no need for aspirin in terms of stroke  prevention  Repeat CTH if > 2 pt drop in GCS in one hour  Goal SBP between 120 and 140 as much as possible; improved  PT/OT/Speech/PMR consults when able  BG < 180, SSI for coverage

## 2024-03-26 NOTE — ASSESSMENT & PLAN NOTE
Patient presented s/p acute onset nausea, diarrhea and dizziness   H/o ischemic stroke in 2022 maintained on Plavix     Intracerebral Hemorrhage (ICH) Score:    [place] Coma Sore 13-15 equals +0   Age greater than or equal to 80 No   ICH Volume greater than or equal to 30 ml No   Intraventricular Hemorrhage Yes (1 Point)   Infratentorial Origin of Hemorrhage Yes (1 Point)   Total: 2       Imaging:   CT head 3/26/2024: Acute parenchymal hematoma in the left paramedian cerebellum measuring 2.6 x 1.6 with mass effect on the fourth ventricle.  Intraventricular extension of hemorrhage noted with blood in the fourth ventricle and left foramen of Luschka.     Plan:   Close neurological monitoring given 4th ventricular involvement  EVD watch   Recommend STAT CT head for decline in GCS >2 points in 1 hour  No need for keppra at this time given infratentorial location   Neurology for stroke management   MRI brain   Repeat CT head for stability  BP goal per neurology   DVT ppx: SCD's only. Recommend stable CT head prior to initiation of pharm DVT ppx  Hold all AC/AP medication at this time  Reversal of plavix given intracranial hemorrhage   PT/OT evaluation- suspect ataxia with visual concerns given cerebellar involvement  Ongoing medical management and pain control per primary team  CM following for dispo planning  Neurosurgery will continue to follow. Call with questions or concerns.

## 2024-03-26 NOTE — EMTALA/ACUTE CARE TRANSFER
[facility] EMERGENCY DEPARTMENT  [address] [facility] [place] [address]  Dept: [phone]      EMTALA TRANSFER CONSENT    NAME Tanika Lira                                         1965                              MRN [id number]    I have been informed of my rights regarding examination, treatment, and transfer   by Dr. Nikita Jamison DO    Benefits: Specialized equipment and/or services available at the receiving facility (Include comment)________________________, Continuity of care    Risks: Potential for delay in receiving treatment, Potential deterioration of medical condition, Possible worsening of condition or death during transfer      Consent for Transfer:  I acknowledge that my medical condition has been evaluated and explained to me by the emergency department physician or other qualified medical person and/or my attending physician, who has recommended that I be transferred to the service of  Accepting Physician: Dr. Tenorio at Accepting Facility Name, City & State : hospitals Neuro ICU. The above potential benefits of such transfer, the potential risks associated with such transfer, and the probable risks of not being transferred have been explained to me, and I fully understand them.  The doctor has explained that, in my case, the benefits of transfer outweigh the risks.  I agree to be transferred.    I authorize the performance of emergency medical procedures and treatments upon me in both transit and upon arrival at the receiving facility.  Additionally, I authorize the release of any and all medical records to the receiving facility and request they be transported with me, if possible.  I understand that the safest mode of transportation during a medical emergency is an ambulance and that the Hospital advocates the use of this mode of transport. Risks of traveling to the receiving facility by car, including absence of medical control, life sustaining equipment,  such as oxygen, and medical personnel has been explained to me and I fully understand them.    (TARA CORRECT BOX BELOW)  [  ]  I consent to the stated transfer and to be transported by ambulance/helicopter.  [  ]  I consent to the stated transfer, but refuse transportation by ambulance and accept full responsibility for my transportation by car.  I understand the risks of non-ambulance transfers and I exonerate the Hospital and its staff from any deterioration in my condition that results from this refusal.    X___________________________________________    DATE  24  TIME________  Signature of patient or legally responsible individual signing on patient behalf           RELATIONSHIP TO PATIENT_________________________          Provider Certification    NAME Tanika Lira                                         1965                              MRN [id number]    A medical screening exam was performed on the above named patient.  Based on the examination:    Condition Necessitating Transfer The primary encounter diagnosis was Cerebellar bleed (HCC). A diagnosis of Nausea vomiting and diarrhea was also pertinent to this visit.    Patient Condition: The patient has been stabilized such that within reasonable medical probability, no material deterioration of the patient condition or the condition of the unborn child(brennan) is likely to result from the transfer    Reason for Transfer: Level of Care needed not available at this facility    Transfer Requirements: Facility [facility]   Space available and qualified personnel available for treatment as acknowledged by    Agreed to accept transfer and to provide appropriate medical treatment as acknowledged by       Dr. Tenorio  Appropriate medical records of the examination and treatment of the patient are provided at the time of transfer   STAFF INITIAL WHEN COMPLETED _______  Transfer will be performed by qualified personnel from    and appropriate transfer  equipment as required, including the use of necessary and appropriate life support measures.    Provider Certification: I have examined the patient and explained the following risks and benefits of being transferred/refusing transfer to the patient/family:         Based on these reasonable risks and benefits to the patient and/or the unborn child(brennan), and based upon the information available at the time of the patient’s examination, I certify that the medical benefits reasonably to be expected from the provision of appropriate medical treatments at another medical facility outweigh the increasing risks, if any, to the individual’s medical condition, and in the case of labor to the unborn child, from effecting the transfer.    X____________________________________________ DATE 03/26/24        TIME_______      ORIGINAL - SEND TO MEDICAL RECORDS   COPY - SEND WITH PATIENT DURING TRANSFER

## 2024-03-26 NOTE — PLAN OF CARE
Problem: Prexisting or High Potential for Compromised Skin Integrity  Goal: Skin integrity is maintained or improved  Description: INTERVENTIONS:  - Identify patients at risk for skin breakdown  - Assess and monitor skin integrity  - Assess and monitor nutrition and hydration status  - Monitor labs   - Assess for incontinence   - Turn and reposition patient  - Assist with mobility/ambulation  - Relieve pressure over bony prominences  - Avoid friction and shearing  - Provide appropriate hygiene as needed including keeping skin clean and dry  - Evaluate need for skin moisturizer/barrier cream  - Collaborate with interdisciplinary team   - Patient/family teaching  - Consider wound care consult   Outcome: Progressing     Problem: Neurological Deficit  Goal: Neurological status is stable or improving  Description: Interventions:  - Monitor and assess patient's level of consciousness, motor function, sensory function, and level of assistance needed for ADLs.   - Monitor and report changes from baseline. Collaborate with interdisciplinary team to initiate plan and implement interventions as ordered.   - Provide and maintain a safe environment.  - Consider seizure precautions.  - Consider fall precautions.  - Consider aspiration precautions.  - Consider bleeding precautions.  Outcome: Progressing     Problem: Activity Intolerance/Impaired Mobility  Goal: Mobility/activity is maintained at optimum level for patient  Description: Interventions:  - Assess and monitor patient  barriers to mobility and need for assistive/adaptive devices.  - Assess patient's emotional response to limitations.  - Collaborate with interdisciplinary team and initiate plans and interventions as ordered.  - Encourage independent activity per ability.  - Maintain proper body alignment.  - Perform active/passive rom as tolerated/ordered.  - Plan activities to conserve energy.  - Turn patient as appropriate  Outcome: Progressing     Problem:  Communication Impairment  Goal: Ability to express needs and understand communication  Description: Assess patient's communication skills and ability to understand information.  Patient will demonstrate use of effective communication techniques, alternative methods of communication and understanding even if not able to speak.     - Encourage communication and provide alternate methods of communication as needed.  - Collaborate with case management/ for discharge needs.  - Include patient/family/caregiver in decisions related to communication.  Outcome: Progressing     Problem: Potential for Aspiration  Goal: Non-ventilated patient's risk of aspiration is minimized  Description: Assess and monitor vital signs, respiratory status, and labs (WBC).  Monitor for signs of aspiration (tachypnea, cough, rales, wheezing, cyanosis, fever).    - Assess and monitor patient's ability to swallow.  - Place patient up in chair to eat if possible.  - HOB up at 90 degrees to eat if unable to get patient up into chair.  - Supervise patient during oral intake.   - Instruct patient/ family to take small bites.  - Instruct patient/ family to take small single sips when taking liquids.  - Follow patient-specific strategies generated by speech pathologist.  Outcome: Progressing     Problem: Nutrition  Goal: Nutrition/Hydration status is improving  Description: Monitor and assess patient's nutrition/hydration status for malnutrition (ex- brittle hair, bruises, dry skin, pale skin and conjunctiva, muscle wasting, smooth red tongue, and disorientation). Collaborate with interdisciplinary team and initiate plan and interventions as ordered.  Monitor patient's weight and dietary intake as ordered or per policy. Utilize nutrition screening tool and intervene per policy. Determine patient's food preferences and provide high-protein, high-caloric foods as appropriate.     - Assist patient with eating.  - Allow adequate time for  meals.  - Encourage patient to take dietary supplement as ordered.  - Collaborate with clinical nutritionist.  - Include patient/family/caregiver in decisions related to nutrition.  Outcome: Progressing     Problem: Nutrition/Hydration-ADULT  Goal: Nutrient/Hydration intake appropriate for improving, restoring or maintaining nutritional needs  Description: Monitor and assess patient's nutrition/hydration status for malnutrition. Collaborate with interdisciplinary team and initiate plan and interventions as ordered.  Monitor patient's weight and dietary intake as ordered or per policy. Utilize nutrition screening tool and intervene as necessary. Determine patient's food preferences and provide high-protein, high-caloric foods as appropriate.     INTERVENTIONS:  - Monitor oral intake, urinary output, labs, and treatment plans  - Assess nutrition and hydration status and recommend course of action  - Evaluate amount of meals eaten  - Assist patient with eating if necessary   - Allow adequate time for meals  - Recommend/ encourage appropriate diets, oral nutritional supplements, and vitamin/mineral supplements  - Order, calculate, and assess calorie counts as needed  - Recommend, monitor, and adjust tube feedings and TPN/PPN based on assessed needs  - Assess need for intravenous fluids  - Provide specific nutrition/hydration education as appropriate  - Include patient/family/caregiver in decisions related to nutrition  Outcome: Progressing     Problem: COPING  Goal: Pt/Family able to verbalize concerns and demonstrate effective coping strategies  Description: INTERVENTIONS:  - Assist patient/family to identify coping skills, available support systems and cultural and spiritual values  - Provide emotional support, including active listening and acknowledgement of concerns of patient and caregivers  - Reduce environmental stimuli, as able  - Provide patient education  - Assess for spiritual pain/suffering and initiate  spiritual care, including notification of Pastoral Care or carl based community as needed  - Assess effectiveness of coping strategies  Outcome: Progressing  Goal: Will report anxiety at manageable levels  Description: INTERVENTIONS:  - Administer medication as ordered  - Teach and encourage coping skills  - Provide emotional support  - Assess patient/family for anxiety and ability to cope  Outcome: Progressing     Problem: Potential for Falls  Goal: Patient will remain free of falls  Description: INTERVENTIONS:  - Educate patient/family on patient safety including physical limitations  - Instruct patient to call for assistance with activity   - Consult OT/PT to assist with strengthening/mobility   - Keep Call bell within reach  - Keep bed low and locked with side rails adjusted as appropriate  - Keep care items and personal belongings within reach  - Initiate and maintain comfort rounds  - Make Fall Risk Sign visible to staff  - Offer Toileting every 2 Hours, in advance of need  - Initiate/Maintain Bed/Chair alarm  - Obtain necessary fall risk management equipment  - Apply yellow socks and bracelet for high fall risk patients  - Consider moving patient to room near nurses station  Outcome: Progressing     Problem: PAIN - ADULT  Goal: Verbalizes/displays adequate comfort level or baseline comfort level  Description: Interventions:  - Encourage patient to monitor pain and request assistance  - Assess pain using appropriate pain scale  - Administer analgesics based on type and severity of pain and evaluate response  - Implement non-pharmacological measures as appropriate and evaluate response  - Consider cultural and social influences on pain and pain management  - Notify physician/advanced practitioner if interventions unsuccessful or patient reports new pain  Outcome: Progressing     Problem: INFECTION - ADULT  Goal: Absence or prevention of progression during hospitalization  Description: INTERVENTIONS:  - Assess  and monitor for signs and symptoms of infection  - Monitor lab/diagnostic results  - Monitor all insertion sites, i.e. indwelling lines, tubes, and drains  - Monitor endotracheal if appropriate and nasal secretions for changes in amount and color  - [place] appropriate cooling/warming therapies per order  - Administer medications as ordered  - Instruct and encourage patient and family to use good hand hygiene technique  - Identify and instruct in appropriate isolation precautions for identified infection/condition  Outcome: Progressing     Problem: SAFETY ADULT  Goal: Patient will remain free of falls  Description: INTERVENTIONS:  - Educate patient/family on patient safety including physical limitations  - Instruct patient to call for assistance with activity   - Consult OT/PT to assist with strengthening/mobility   - Keep Call bell within reach  - Keep bed low and locked with side rails adjusted as appropriate  - Keep care items and personal belongings within reach  - Initiate and maintain comfort rounds  - Make Fall Risk Sign visible to staff  - Offer Toileting every 2 Hours, in advance of need  - Initiate/Maintain Bed/Chair alarm  - Obtain necessary fall risk management equipment  - Apply yellow socks and bracelet for high fall risk patients  - Consider moving patient to room near nurses station  Outcome: Progressing  Goal: Maintain or return to baseline ADL function  Description: INTERVENTIONS:  -  Assess patient's ability to carry out ADLs; assess patient's baseline for ADL function and identify physical deficits which impact ability to perform ADLs (bathing, care of mouth/teeth, toileting, grooming, dressing, etc.)  - Assess/evaluate cause of self-care deficits   - Assess range of motion  - Assess patient's mobility; develop plan if impaired  - Assess patient's need for assistive devices and provide as appropriate  - Encourage maximum independence but intervene and supervise when necessary  - Involve family  in performance of ADLs  - Assess for home care needs following discharge   - Consider OT consult to assist with ADL evaluation and planning for discharge  - Provide patient education as appropriate  Outcome: Progressing  Goal: Maintains/Returns to pre admission functional level  Description: INTERVENTIONS:  - Perform AM-PAC 6 Click Basic Mobility/ Daily Activity assessment daily.  - Set and communicate daily mobility goal to care team and patient/family/caregiver.   - Collaborate with rehabilitation services on mobility goals if consulted  - Perform Range of Motion 8 times a day.  - Reposition patient every 2 hours.  - Dangle patient 2 times a day  - Stand patient 2 times a day  - Ambulate patient 2 times a day  - Out of bed to chair 2 times a day   - Out of bed for meals 2 times a day  - Out of bed for toileting  - Record patient progress and toleration of activity level   Outcome: Progressing     Problem: DISCHARGE PLANNING  Goal: Discharge to home or other facility with appropriate resources  Description: INTERVENTIONS:  - Identify barriers to discharge w/patient and caregiver  - Arrange for needed discharge resources and transportation as appropriate  - Identify discharge learning needs (meds, wound care, etc.)  - Arrange for interpretive services to assist at discharge as needed  - Refer to Case Management Department for coordinating discharge planning if the patient needs post-hospital services based on physician/advanced practitioner order or complex needs related to functional status, cognitive ability, or social support system  Outcome: Progressing     Problem: Knowledge Deficit  Goal: Patient/family/caregiver demonstrates understanding of disease process, treatment plan, medications, and discharge instructions  Description: Complete learning assessment and assess knowledge base.  Interventions:  - Provide teaching at level of understanding  - Provide teaching via preferred learning methods  Outcome:  Progressing     Problem: NEUROSENSORY - ADULT  Goal: Achieves stable or improved neurological status  Description: INTERVENTIONS  - Monitor and report changes in neurological status  - Monitor vital signs such as temperature, blood pressure, glucose, and any other labs ordered   - Initiate measures to prevent increased intracranial pressure  - Monitor for seizure activity and implement precautions if appropriate      Outcome: Progressing  Goal: Remains free of injury related to seizures activity  Description: INTERVENTIONS  - Maintain airway, patient safety  and administer oxygen as ordered  - Monitor patient for seizure activity, document and report duration and description of seizure to physician/advanced practitioner  - If seizure occurs,  ensure patient safety during seizure  - Reorient patient post seizure  - Seizure pads on all 4 side rails  - Instruct patient/family to notify RN of any seizure activity including if an aura is experienced  - Instruct patient/family to call for assistance with activity based on nursing assessment  - Administer anti-seizure medications if ordered    Outcome: Progressing  Goal: Achieves maximal functionality and self care  Description: INTERVENTIONS  - Monitor swallowing and airway patency with patient fatigue and changes in neurological status  - Encourage and assist patient to increase activity and self care.   - Encourage visually impaired, hearing impaired and aphasic patients to use assistive/communication devices  Outcome: Progressing     Problem: CARDIOVASCULAR - ADULT  Goal: Maintains optimal cardiac output and hemodynamic stability  Description: INTERVENTIONS:  - Monitor I/O, vital signs and rhythm  - Monitor for S/S and trends of decreased cardiac output  - Administer and titrate ordered vasoactive medications to optimize hemodynamic stability  - Assess quality of pulses, skin color and temperature  - Assess for signs of decreased coronary artery perfusion  - Instruct  patient to report change in severity of symptoms  Outcome: Progressing  Goal: Absence of cardiac dysrhythmias or at baseline rhythm  Description: INTERVENTIONS:  - Continuous cardiac monitoring, vital signs, obtain 12 lead EKG if ordered  - Administer antiarrhythmic and heart rate control medications as ordered  - Monitor electrolytes and administer replacement therapy as ordered  Outcome: Progressing     Problem: RESPIRATORY - ADULT  Goal: Achieves optimal ventilation and oxygenation  Description: INTERVENTIONS:  - Assess for changes in respiratory status  - Assess for changes in mentation and behavior  - Position to facilitate oxygenation and minimize respiratory effort  - Oxygen administered by appropriate delivery if ordered  - Initiate smoking cessation education as indicated  - Encourage broncho-pulmonary hygiene including cough, deep breathe, Incentive Spirometry  - Assess the need for suctioning and aspirate as needed  - Assess and instruct to report SOB or any respiratory difficulty  - Respiratory Therapy support as indicated  Outcome: Progressing     Problem: GASTROINTESTINAL - ADULT  Goal: Minimal or absence of nausea and/or vomiting  Description: INTERVENTIONS:  - Administer IV fluids if ordered to ensure adequate hydration  - Maintain NPO status until nausea and vomiting are resolved  - Nasogastric tube if ordered  - Administer ordered antiemetic medications as needed  - Provide nonpharmacologic comfort measures as appropriate  - Advance diet as tolerated, if ordered  - Consider nutrition services referral to assist patient with adequate nutrition and appropriate food choices  Outcome: Progressing  Goal: Maintains or returns to baseline bowel function  Description: INTERVENTIONS:  - Assess bowel function  - Encourage oral fluids to ensure adequate hydration  - Administer IV fluids if ordered to ensure adequate hydration  - Administer ordered medications as needed  - Encourage mobilization and  activity  - Consider nutritional services referral to assist patient with adequate nutrition and appropriate food choices  Outcome: Progressing  Goal: Maintains adequate nutritional intake  Description: INTERVENTIONS:  - Monitor percentage of each meal consumed  - Identify factors contributing to decreased intake, treat as appropriate  - Assist with meals as needed  - Monitor I&O, weight, and lab values if indicated  - Obtain nutrition services referral as needed  Outcome: Progressing     Problem: GENITOURINARY - ADULT  Goal: Maintains or returns to baseline urinary function  Description: INTERVENTIONS:  - Assess urinary function  - Encourage oral fluids to ensure adequate hydration if ordered  - Administer IV fluids as ordered to ensure adequate hydration  - Administer ordered medications as needed  - Offer frequent toileting  - Follow urinary retention protocol if ordered  Outcome: Progressing  Goal: Absence of urinary retention  Description: INTERVENTIONS:  - Assess patient’s ability to void and empty bladder  - Monitor I/O  - Bladder scan as needed  - Discuss with physician/AP medications to alleviate retention as needed  - Discuss catheterization for long term situations as appropriate  Outcome: Progressing     Problem: METABOLIC, FLUID AND ELECTROLYTES - ADULT  Goal: Electrolytes maintained within normal limits  Description: INTERVENTIONS:  - Monitor labs and assess patient for signs and symptoms of electrolyte imbalances  - Administer electrolyte replacement as ordered  - Monitor response to electrolyte replacements, including repeat lab results as appropriate  - Instruct patient on fluid and nutrition as appropriate  Outcome: Progressing  Goal: Fluid balance maintained  Description: INTERVENTIONS:  - Monitor labs   - Monitor I/O and WT  - Instruct patient on fluid and nutrition as appropriate  - Assess for signs & symptoms of volume excess or deficit  Outcome: Progressing     Problem: SKIN/TISSUE INTEGRITY -  ADULT  Goal: Skin Integrity remains intact(Skin Breakdown Prevention)  Description: Assess:  -Perform Werner assessment every shift  -Clean and moisturize skin every shift  -Inspect skin when repositioning, toileting, and assisting with ADLS  -Assess under medical devices every shift  -Assess extremities for adequate circulation and sensation     Bed Management:  -Have minimal linens on bed & keep smooth, unwrinkled  -Change linens as needed when moist or perspiring  -Avoid sitting or lying in one position for more than 2 hours while in bed  -Keep HOB at 30 degrees     Toileting:  -Offer bedside commode  -Assess for incontinence every 2 hours  -Use incontinent care products after each incontinent episode     Activity:  -Mobilize patient 2 times a day  -Encourage activity and walks on unit  -Encourage or provide ROM exercises   -Turn and reposition patient every 2 Hours  -Use appropriate equipment to lift or move patient in bed  -Instruct/ Assist with weight shifting every 30 minutes when out of bed in chair  -Consider limitation of chair time 3 hour intervals    Skin Care:  -Avoid use of baby powder, tape, friction and shearing, hot water or constrictive clothing  -Relieve pressure over bony prominences  -Do not massage red bony areas    Next Steps:  -Teach patient strategies to minimize risks    -Consider consults to  interdisciplinary teams   Outcome: Progressing     Problem: HEMATOLOGIC - ADULT  Goal: Maintains hematologic stability  Description: INTERVENTIONS  - Assess for signs and symptoms of bleeding or hemorrhage  - Monitor labs  - Administer supportive blood products/factors as ordered and appropriate  Outcome: Progressing     Problem: MUSCULOSKELETAL - ADULT  Goal: Maintain or return mobility to safest level of function  Description: INTERVENTIONS:  - Assess patient's ability to carry out ADLs; assess patient's baseline for ADL function and identify physical deficits which impact ability to perform ADLs  (bathing, care of mouth/teeth, toileting, grooming, dressing, etc.)  - Assess/evaluate cause of self-care deficits   - Assess range of motion  - Assess patient's mobility  - Assess patient's need for assistive devices and provide as appropriate  - Encourage maximum independence but intervene and supervise when necessary  - Involve family in performance of ADLs  - Assess for home care needs following discharge   - Consider OT consult to assist with ADL evaluation and planning for discharge  - Provide patient education as appropriate  Outcome: Progressing

## 2024-03-26 NOTE — CONSULTS
NEUROLOGY RESIDENCY CONSULT NOTE     Name: Tanika Lira   Age & Sex: 58 y.o. female   MRN: [id number]  Unit/Bed#: ICU 04   Encounter: [id number]  Length of Stay: 1    Tanika Lira will need follow up in in 4 weeks with neurovascular AP or attending .  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 Patient's symptoms are of nausea, vomiting, diarrhea, vertigo sensation but no focal numbness, weakness, visual deficits, nor headaches. Also, horizontal diplopia. Patient has history of right sided stroke in 2022 which she was placed on DAPT and to continue plavix after 21 days. Patient states she is on aspirin daily on this visit. Initial blood pressure recorded at [facility] is 222/136. On exam, bilateral L>R dysmetria, HINTS exam with vertical skew and bidirectional slow nystagmus.   Thinners: aspirin reversed with DDAVP  Initial BP: Blood Pressure: 144/78  Presenting exam: Bilateral dysmetric L>R  Vascular risk factors: HTN, HLD, previous stroke    Workup:  CT head wo contrast 3/26/2024:   Lab Results   Component Value Date    INR 1.11 03/26/2024    HGBA1C 5.2 09/02/2022    SODIUM 136 03/26/2024       Pertinent scores:  - ICH: 2 due to IVH and Infratentorial origin    Impression: Left cerebellar hemorrhage most likely in setting of hypertensive emergency with blood pressures in the 200s systolic.     Plan:  S/P DDAVP X2  Neurosurgery input appreciated  Interval CTH until bleed stabilized  Q1 hour neuro checks  Repeat CTH if > 2 pt drop in GCS in one hour  Goal SBP between 120 and 140, cardene gtt or pressers/fluids as needed to keep within range  PT/OT/Speech/PMR consults when able  Replete lytes as needed as per CC  No chemical ppx due to  "hemorrhage, SCDs for ppx  Hold AP agents  BG < 180, SSI for coverage  Rest of care per primary        SUBJECTIVE     Reason for Consult / Principal Problem: Left cerebellar hemorrhage with IVH  Hx and PE limited by: None    HPI: Tanika Lira is a 58 y.o. female with hx of right hemisphere stroke (possibly left pontine infarct per note 9/2/2022 (Left facial weakness and hemiparesis)) on at least aspirin who presents with nausea, vomiting diarrhea, room spinning vertigo, ambulatory dysfunction. CT head was done which left cerebellar acute hemorrhage with ventricular extension. Blood pressure noted to be 222/126 at ED in [facility]. Patient was given DDAVP and was transferred to [facility] for neurosurgical evaluation.     When patient seen, patient stated she had dizziness when she opens her eyes. Otherwise denies headaches, numbness, weakness, visual deficits (she stated having horizontal diplopia). Denies headaches. Patient states she has been taking antiplatelet agents for strokes since a few years ago. Aspirin but possibly plavix although patient was not completely clear.     Inpatient consult to Neurology  Consult performed by: Clemente Mcwilliams MD  Consult ordered by: César Benavidez MD          Historical Information   Past Medical History:   Diagnosis Date    CVA (cerebral vascular accident) (HCC)     Diverticulitis     Diverticulitis of colon     Diverticulosis     Hypertension     Stroke (HCC)      Past Surgical History:   Procedure Laterality Date    CARDIAC ELECTROPHYSIOLOGY PROCEDURE N/A 2/8/2023    Procedure: Cardiac loop recorder implant;  Surgeon: Duke Abraham MD;  Location: [facility];  Service: Cardiology    COLON SIGMOID RESECTION       Social History   Social History     Substance and Sexual Activity   Alcohol Use Yes    Comment: \"twisted tea daily\"\"     Social History     Substance and Sexual Activity   Drug Use Yes    Types: Marijuana    Comment: medical card     E-Cigarette/Vaping    " E-Cigarette Use Never User      E-Cigarette/Vaping Substances    Nicotine No     Flavoring No      Social History     Tobacco Use   Smoking Status Light Smoker    Types: Cigarettes   Smokeless Tobacco Never   Tobacco Comments    smokes 4 cigarettes daily     Family History:   Family History   Problem Relation Age of Onset    Coronary artery disease Mother     Heart disease Mother     Diabetes Father     No Known Problems Sister     Breast cancer Maternal Grandmother         age unknown    No Known Problems Maternal Grandfather     No Known Problems Paternal Grandmother     No Known Problems Paternal Grandfather     No Known Problems Paternal Aunt     No Known Problems Paternal Aunt     No Known Problems Maternal Aunt      Meds/Allergies   current meds:   Current Facility-Administered Medications   Medication Dose Route Frequency    atorvastatin (LIPITOR) tablet 40 mg  40 mg Oral Daily With Dinner    chlorhexidine (PERIDEX) 0.12 % oral rinse 15 mL  15 mL Mouth/Throat Q12H NAOMI    escitalopram (LEXAPRO) tablet 20 mg  20 mg Oral Daily    folic acid (FOLVITE) tablet 1 mg  1 mg Oral Daily    gabapentin (NEURONTIN) capsule 300 mg  300 mg Oral HS    hydrALAZINE (APRESOLINE) injection 5 mg  5 mg Intravenous Q4H PRN    labetalol (NORMODYNE) injection 10 mg  10 mg Intravenous Q4H PRN    lactated ringers infusion  75 mL/hr Intravenous Continuous    magnesium sulfate 2 g/50 mL IVPB (premix) 2 g  2 g Intravenous Once    multivitamin-minerals (CENTRUM) tablet 1 tablet  1 tablet Oral Daily    niCARdipine (CARDENE) 2.5 mg/mL injection **ADS Override Pull**        niCARdipine (CARDENE) 2.5 mg/mL injection **ADS Override Pull**        niCARdipine (CARDENE) 25 mg (STANDARD CONCENTRATION) in sodium chloride 0.9% 250 mL  1-15 mg/hr Intravenous Titrated    potassium chloride (Klor-Con M20) CR tablet 20 mEq  20 mEq Oral Q2H    thiamine tablet 100 mg  100 mg Oral Daily    trimethobenzamide (TIGAN) IM injection 200 mg  200 mg Intramuscular  Q6H PRN    and PTA meds:   Prior to Admission Medications   Prescriptions Last Dose Informant Patient Reported? Taking?   NIFEdipine ER (ADALAT CC) 30 MG 24 hr tablet   No No   Sig: Take 1 tablet (30 mg total) by mouth daily   aspirin (ECOTRIN LOW STRENGTH) 81 mg EC tablet  Self No No   Sig: Take 1 tablet (81 mg total) by mouth daily   atorvastatin (LIPITOR) 40 mg tablet   No No   Sig: Take 1 tablet (40 mg total) by mouth daily   clopidogrel (PLAVIX) 75 mg tablet   No No   Sig: Take 1 tablet (75 mg total) by mouth daily   escitalopram (Lexapro) 20 mg tablet  Self No No   Sig: Take 1 tablet (20 mg total) by mouth daily   gabapentin (NEURONTIN) 300 mg capsule   No No   Sig: Take 1 capsule (300 mg total) by mouth daily at bedtime   hydrOXYzine HCL (ATARAX) 50 mg tablet   No No   Sig: Take 1 tablet (50 mg total) by mouth every 6 (six) hours as needed for itching   lisinopril (ZESTRIL) 20 mg tablet   No No   Sig: Take 1 tablet (20 mg total) by mouth daily      Facility-Administered Medications: None     No Known Allergies    Review of previous medical records was  completed.       ROS is negative unless stated above    OBJECTIVE     Patient ID: Tanika Lira is a 58 y.o. female.    Vitals:   Vitals:    24 0500 24 0530 24 0600 24 0700   BP: 138/74 142/77 150/77 146/79   BP Location: Left arm Left arm Left arm Left arm   Pulse: 76 80 80 72   Resp: 21 (!) 23 (!) 29 21   Temp:       TempSrc:       SpO2: 93% 93% 93% 94%   Weight:       Height:          Body mass index is 21.6 kg/m².     Intake/Output Summary (Last 24 hours) at 3/27/2024 0735  Last data filed at 3/27/2024 0605  Gross per 24 hour   Intake 2910.41 ml   Output 2450 ml   Net 460.41 ml       Temperature:   Temp (24hrs), Av.8 °F (36.6 °C), Min:97.5 °F (36.4 °C), Max:98.2 °F (36.8 °C)    Temperature: 98.2 °F (36.8 °C)    Invasive Devices:   Invasive Devices       Peripheral Intravenous Line  Duration             Peripheral IV 24  Left Antecubital <1 day    Peripheral IV 03/26/24 Ventral (anterior);Right Forearm <1 day                    Physical Exam  Eyes:      Pupils: Pupils are equal, round, and reactive to light.   Neurological:      Motor: Motor strength is normal.     Coordination: Finger-Nose-Finger Test abnormal (L>R dysmetria). Heel to Marquez Test normal.      Deep Tendon Reflexes:      Reflex Scores:       Bicep reflexes are 2+ on the right side and 2+ on the left side.       Brachioradialis reflexes are 2+ on the right side and 2+ on the left side.       Patellar reflexes are 2+ on the right side and 2+ on the left side.       Achilles reflexes are 1+ on the right side and 1+ on the left side.         Neurologic Exam     Mental Status   Oriented to person.   Oriented to place.   Disoriented to date. Oriented to year and month.   Attention: decreased. Concentration: decreased.   Level of consciousness: drowsy    Cranial Nerves     CN II   Visual fields full to confrontation.     CN III, IV, VI   Pupils are equal, round, and reactive to light.  Right pupil: Reactivity: brisk.   Left pupil: Reactivity: brisk.   HINTS exam showed vertical skew and slow bidirectional nystagmus (possibly with intermittent vertical nystagmus). No saccades noted.     Motor Exam   Muscle bulk: normal  Overall muscle tone: normal    Strength   Strength 5/5 throughout.     Sensory Exam   Light touch normal.     Gait, Coordination, and Reflexes     Coordination   Finger to nose coordination: abnormal (L>R dysmetria)  Heel to shin coordination: normal    Reflexes   Right brachioradialis: 2+  Left brachioradialis: 2+  Right biceps: 2+  Left biceps: 2+  Right patellar: 2+  Left patellar: 2+  Right achilles: 1+  Left achilles: 1+           LABORATORY DATA     Labs: I have personally reviewed pertinent reports.    Results from last 7 days   Lab Units 03/27/24  0427 03/26/24  0820   WBC Thousand/uL 9.44 8.29   HEMOGLOBIN g/dL 12.9 15.4   HEMATOCRIT % 40.0 47.4*    PLATELETS Thousands/uL 179 189   NEUTROS PCT %  --  34*   MONOS PCT %  --  12   EOS PCT %  --  4      Results from last 7 days   Lab Units 03/27/24  0427 03/26/24  0820   POTASSIUM mmol/L 3.3* 3.7   CHLORIDE mmol/L 99 99   CO2 mmol/L 26 30   BUN mg/dL 11 15   CREATININE mg/dL 0.55* 0.67   CALCIUM mg/dL 8.2* 9.1   ALK PHOS U/L  --  100   ALT U/L  --  114*   AST U/L  --  113*     Results from last 7 days   Lab Units 03/27/24  0427 03/26/24  0820   MAGNESIUM mg/dL 1.6* 1.8*     Results from last 7 days   Lab Units 03/27/24  0427   PHOSPHORUS mg/dL 3.6      Results from last 7 days   Lab Units 03/26/24  1804 03/26/24  1647   INR   --  1.11   PTT seconds 29  --      Results from last 7 days   Lab Units 03/26/24  0820   LACTIC ACID mmol/L 1.1           IMAGING & DIAGNOSTIC TESTING     Radiology Results: I have personally reviewed pertinent films in PACS  CT head wo contrast   Final Result by Gurwinder Cisneros DO (03/27 0515)      Redemonstration of hyperdense hemorrhage in the left paramedian cerebellum, as described with mass effect and extension into the fourth ventricle and left foramen of Luschka, similar in appearance to the prior.      No hydrocephalus.      Other findings as above. Overall there has been no significant interval change.      Follow-up as clinically warranted.                  Workstation performed: [id number]             Other Diagnostic Testing: I have personally reviewed pertinent reports.      ACTIVE MEDICATIONS     Current Facility-Administered Medications   Medication Dose Route Frequency    atorvastatin (LIPITOR) tablet 40 mg  40 mg Oral Daily With Dinner    chlorhexidine (PERIDEX) 0.12 % oral rinse 15 mL  15 mL Mouth/Throat Q12H NAOMI    escitalopram (LEXAPRO) tablet 20 mg  20 mg Oral Daily    folic acid (FOLVITE) tablet 1 mg  1 mg Oral Daily    gabapentin (NEURONTIN) capsule 300 mg  300 mg Oral HS    hydrALAZINE (APRESOLINE) injection 5 mg  5 mg Intravenous Q4H PRN    labetalol  (NORMODYNE) injection 10 mg  10 mg Intravenous Q4H PRN    lactated ringers infusion  75 mL/hr Intravenous Continuous    magnesium sulfate 2 g/50 mL IVPB (premix) 2 g  2 g Intravenous Once    multivitamin-minerals (CENTRUM) tablet 1 tablet  1 tablet Oral Daily    niCARdipine (CARDENE) 2.5 mg/mL injection **ADS Override Pull**        niCARdipine (CARDENE) 2.5 mg/mL injection **ADS Override Pull**        niCARdipine (CARDENE) 25 mg (STANDARD CONCENTRATION) in sodium chloride 0.9% 250 mL  1-15 mg/hr Intravenous Titrated    potassium chloride (Klor-Con M20) CR tablet 20 mEq  20 mEq Oral Q2H    thiamine tablet 100 mg  100 mg Oral Daily    trimethobenzamide (TIGAN) IM injection 200 mg  200 mg Intramuscular Q6H PRN       Prior to Admission medications    Medication Sig Start Date End Date Taking? Authorizing Provider   aspirin (ECOTRIN LOW STRENGTH) 81 mg EC tablet Take 1 tablet (81 mg total) by mouth daily 10/7/22   Alec Kamara MD   atorvastatin (LIPITOR) 40 mg tablet Take 1 tablet (40 mg total) by mouth daily 6/13/23   Tanika WinterMILAN   clopidogrel (PLAVIX) 75 mg tablet Take 1 tablet (75 mg total) by mouth daily 6/13/23   Tanika WinterMILAN   escitalopram (Lexapro) 20 mg tablet Take 1 tablet (20 mg total) by mouth daily 10/19/22   Tanika WinterMILAN   gabapentin (NEURONTIN) 300 mg capsule Take 1 capsule (300 mg total) by mouth daily at bedtime 6/13/23   Tanika Nieto, MILAN   hydrOXYzine HCL (ATARAX) 50 mg tablet Take 1 tablet (50 mg total) by mouth every 6 (six) hours as needed for itching 6/13/23   Tanika Emilia, MILAN   lisinopril (ZESTRIL) 20 mg tablet Take 1 tablet (20 mg total) by mouth daily 6/13/23   Tanika Winter, MILAN   NIFEdipine ER (ADALAT CC) 30 MG 24 hr tablet Take 1 tablet (30 mg total) by mouth daily 6/13/23   MILAN Miller       CODE STATUS & ADVANCED DIRECTIVES     Code Status: Level 1 - Full Code  Advance Directive and Living Will:      Power of :    POLST:        VTE Pharmacologic  Prophylaxis:  contraindicated at this time   VTE Mechanical Prophylaxis: sequential compression device    ======    I have discussed the patient's history, physical exam findings, assessment, and plan in detail with attending, Dr. Martinez    Thank you for allowing me to participate in the care of your patient, Tanika Soraya.    Clemente Mcwilliams MD  [facility] Neurology Residency, PGY-4

## 2024-03-26 NOTE — CONSULTS
[facility]  Consult  Name: Tanika Lira 58 y.o. female I MRN: [id number]  Unit/Bed#: ICU 04 I Date of Admission: 3/26/2024   Date of Service: 3/26/2024 I Hospital Day: 0    Inpatient consult to Neurosurgery  Consult performed by: Cory Meehan PA-C  Consult ordered by: César Benavidez MD          Assessment/Plan   Hemorrhagic stroke (HCC)  Assessment & Plan  Patient presented s/p acute onset nausea, diarrhea and dizziness   H/o ischemic stroke in 2022 maintained on Plavix     Intracerebral Hemorrhage (ICH) Score:    Severino Coma Sore 13-15 equals +0   Age greater than or equal to 80 No   ICH Volume greater than or equal to 30 ml No   Intraventricular Hemorrhage Yes (1 Point)   Infratentorial Origin of Hemorrhage Yes (1 Point)   Total: 2       Imaging:   CT head 3/26/2024: Acute parenchymal hematoma in the left paramedian cerebellum measuring 2.6 x 1.6 with mass effect on the fourth ventricle.  Intraventricular extension of hemorrhage noted with blood in the fourth ventricle and left foramen of Luschka.     Plan:   Close neurological monitoring given 4th ventricular involvement  EVD watch   Recommend STAT CT head for decline in GCS >2 points in 1 hour  No need for keppra at this time given infratentorial location   Neurology for stroke management   MRI brain   Repeat CT head for stability  BP goal per neurology   DVT ppx: SCD's only. Recommend stable CT head prior to initiation of pharm DVT ppx  Hold all AC/AP medication at this time  Reversal of plavix given intracranial hemorrhage   PT/OT evaluation- suspect ataxia with visual concerns given cerebellar involvement  Ongoing medical management and pain control per primary team  CM following for dispo planning  Neurosurgery will continue to follow. Call with questions or concerns.     History of CVA (cerebrovascular accident)  Assessment & Plan  H/o CVA and stroke like symptoms 9/2022 seen at [facility]  Previously on DAPT    Now on plavix monotherapy  Neurology consultation            History of Present Illness   HPI: Tanika Lira is a 58 y.o. female with PMH including CVA in 2022, diverticulitis, hypertension who presents with complaint of sudden onset nausea, vomiting, diarrhea this morning.  She reports no symptoms the night prior.  When attempting to stand she felt significant dizziness particularly with eye opening.  She does also complain of some headache on admission.  Her BP per ED documentation was 222/126. CT imaging was completed showing evidence of medial cerebellar hemorrhagic stroke with extension of the hemorrhage into the fourth ventricle.  She was transferred to [facility] for further neurosurgical and critical care management.  Patient was seen in the ICU.  She opened her eyes and was interacting to voice.  After initial stimulation she maintained alertness spontaneously.  She follows commands in all extremities.  No evidence of cranial nerve deficits.  She does have nystagmus with difficulty tracking.  Eyes are primarily closed secondary to dizziness and nausea associated with eye opening.  Neurologically oriented x 3.    Review of Systems   Constitutional:  Negative for activity change and diaphoresis.   HENT: Negative.     Eyes:  Positive for visual disturbance.   Respiratory:  Negative for chest tightness and shortness of breath.    Cardiovascular:  Negative for leg swelling.   Gastrointestinal:  Negative for abdominal distention, abdominal pain, diarrhea, nausea and vomiting.   Neurological:  Positive for dizziness and headaches. Negative for speech difficulty, weakness and numbness.   Psychiatric/Behavioral:  Negative for agitation, behavioral problems and confusion.        Historical Information   Past Medical History:   Diagnosis Date    CVA (cerebral vascular accident) (HCC)     Diverticulitis     Diverticulitis of colon     Diverticulosis     Hypertension     Stroke (HCC)      Past Surgical  "History:   Procedure Laterality Date    CARDIAC ELECTROPHYSIOLOGY PROCEDURE N/A 2/8/2023    Procedure: Cardiac loop recorder implant;  Surgeon: Duke Abraham MD;  Location: [facility];  Service: Cardiology    COLON SIGMOID RESECTION       Social History     Substance and Sexual Activity   Alcohol Use Yes    Comment: \"twisted tea daily\"\"     Social History     Substance and Sexual Activity   Drug Use Yes    Types: Marijuana    Comment: medical card     Social History     Tobacco Use   Smoking Status Light Smoker    Types: Cigarettes   Smokeless Tobacco Never   Tobacco Comments    smokes 4 cigarettes daily     Family History   Problem Relation Age of Onset    Coronary artery disease Mother     Heart disease Mother     Diabetes Father     No Known Problems Sister     Breast cancer Maternal Grandmother         age unknown    No Known Problems Maternal Grandfather     No Known Problems Paternal Grandmother     No Known Problems Paternal Grandfather     No Known Problems Paternal Aunt     No Known Problems Paternal Aunt     No Known Problems Maternal Aunt        Meds/Allergies   all current active meds have been reviewed, current meds:   Current Facility-Administered Medications   Medication Dose Route Frequency    aspirin (ECOTRIN LOW STRENGTH) EC tablet 81 mg  81 mg Oral Daily    atorvastatin (LIPITOR) tablet 40 mg  40 mg Oral Daily With Dinner    chlorhexidine (PERIDEX) 0.12 % oral rinse 15 mL  15 mL Mouth/Throat Q12H NAOMI    escitalopram (LEXAPRO) tablet 20 mg  20 mg Oral Daily    folic acid (FOLVITE) tablet 1 mg  1 mg Oral Daily    gabapentin (NEURONTIN) capsule 300 mg  300 mg Oral HS    lactated ringers infusion  75 mL/hr Intravenous Continuous    multi-electrolyte (ISOLYTE-S PH 7.4) bolus 1,000 mL  1,000 mL Intravenous Once    multivitamin-minerals (CENTRUM) tablet 1 tablet  1 tablet Oral Daily    thiamine tablet 100 mg  100 mg Oral Daily    trimethobenzamide (TIGAN) IM injection 200 mg  200 mg " Intramuscular Q6H PRN   , and PTA meds:   Prior to Admission Medications   Prescriptions Last Dose Informant Patient Reported? Taking?   NIFEdipine ER (ADALAT CC) 30 MG 24 hr tablet   No No   Sig: Take 1 tablet (30 mg total) by mouth daily   aspirin (ECOTRIN LOW STRENGTH) 81 mg EC tablet  Self No No   Sig: Take 1 tablet (81 mg total) by mouth daily   atorvastatin (LIPITOR) 40 mg tablet   No No   Sig: Take 1 tablet (40 mg total) by mouth daily   clopidogrel (PLAVIX) 75 mg tablet   No No   Sig: Take 1 tablet (75 mg total) by mouth daily   escitalopram (Lexapro) 20 mg tablet  Self No No   Sig: Take 1 tablet (20 mg total) by mouth daily   gabapentin (NEURONTIN) 300 mg capsule   No No   Sig: Take 1 capsule (300 mg total) by mouth daily at bedtime   hydrOXYzine HCL (ATARAX) 50 mg tablet   No No   Sig: Take 1 tablet (50 mg total) by mouth every 6 (six) hours as needed for itching   lisinopril (ZESTRIL) 20 mg tablet   No No   Sig: Take 1 tablet (20 mg total) by mouth daily      Facility-Administered Medications: None     No Known Allergies    Objective   I/O       None            Physical Exam  Constitutional:       Appearance: Normal appearance. She is well-developed.   HENT:      Head: Normocephalic and atraumatic.   Eyes:      Extraocular Movements: EOM normal.      Pupils: Pupils are equal, round, and reactive to light.   Neck:      Vascular: No JVD.      Trachea: No tracheal deviation.   Cardiovascular:      Rate and Rhythm: Normal rate.   Pulmonary:      Effort: Pulmonary effort is normal. No respiratory distress.   Musculoskeletal:         General: No deformity. Normal range of motion.      Cervical back: Normal range of motion and neck supple.   Skin:     General: Skin is warm and dry.   Neurological:      Mental Status: She is alert and oriented to person, place, and time. Mental status is at baseline.      Cranial Nerves: No cranial nerve deficit.      Sensory: No sensory deficit.      Motor: Motor strength is  "normal.No weakness.      Coordination: Coordination abnormal. Finger-Nose-Finger Test abnormal (bilateral dysmetria).      Deep Tendon Reflexes: Reflexes are normal and symmetric.      Reflex Scores:       Patellar reflexes are 2+ on the right side and 2+ on the left side.  Psychiatric:         Speech: Speech normal.         Behavior: Behavior normal.         Thought Content: Thought content normal.       Neurologic Exam     Mental Status   Oriented to person, place, and time.   Attention: normal.   Speech: speech is normal   Level of consciousness: alert  Knowledge: good.   Normal comprehension.     Cranial Nerves     CN III, IV, VI   Pupils are equal, round, and reactive to light.  Extraocular motions are normal.   Nystagmus: bilateral   Upgaze: normal  Downgaze: normal    CN VII   Facial expression full, symmetric.     CN VIII   CN VIII normal.   Hearing: intact  Dizziness associated with eye opening.  Difficulty tracking and maintaining eye opening.     Motor Exam   Muscle bulk: normal  Right arm tone: normal  Left arm tone: normal  Right arm pronator drift: absent  Left arm pronator drift: absent  Right leg tone: normal  Left leg tone: normal    Strength   Strength 5/5 throughout.     Sensory Exam   Light touch normal.     Gait, Coordination, and Reflexes     Coordination   Finger to nose coordination: abnormal (bilateral dysmetria)    Tremor   Resting tremor: absent  Action tremor: absent    Reflexes   Right patellar: 2+  Left patellar: 2+    Vitals:Blood pressure 149/86, pulse 78, temperature 97.6 °F (36.4 °C), temperature source Oral, resp. rate 19, height 5' 6\" (1.676 m), weight 60.7 kg (133 lb 13.1 oz), SpO2 97%.,Body mass index is 21.6 kg/m².     Lab Results:   Results from last 7 days   Lab Units 03/26/24  0820   WBC Thousand/uL 8.29   HEMOGLOBIN g/dL 15.4   HEMATOCRIT % 47.4*   PLATELETS Thousands/uL 189   NEUTROS PCT % 34*   MONOS PCT % 12   EOS PCT % 4     Results from last 7 days   Lab Units " "03/26/24  0820   SODIUM mmol/L 136   POTASSIUM mmol/L 3.7   CHLORIDE mmol/L 99   CO2 mmol/L 30   BUN mg/dL 15   CREATININE mg/dL 0.67   CALCIUM mg/dL 9.1   ALK PHOS U/L 100   ALT U/L 114*   AST U/L 113*     Results from last 7 days   Lab Units 03/26/24  0820   MAGNESIUM mg/dL 1.8*             No results found for: \"TROPONINT\"  ABG:No results found for: \"PHART\", \"UCN8RUJ\", \"PO2ART\", \"QQS5BJX\", \"E7MEJVYR\", \"BEART\", \"SOURCE\"    Imaging Studies: I have personally reviewed pertinent reports.   and I have personally reviewed pertinent films in PACS    CTA head and neck with and without contrast    Result Date: 3/26/2024  Impression: No left cerebellar vascular malformation to account for the patient's acute parenchymal hematoma. No cervical or intracranial large vessel occlusion, dissection or aneurysm. Mild bilateral cervical carotid bifurcation atherosclerotic disease. Workstation performed: [id number]     CT abdomen pelvis with contrast    Result Date: 3/26/2024  Impression: 1. Cholelithiasis, including a 3 mm gallstone within the gallbladder neck versus cystic duct. No findings of acute cholecystitis. If there is concern for acute cholecystitis, right upper quadrant ultrasound can be obtained. 2. Findings suggesting cirrhosis with mild mesenteric edema, but no ascites. Enlarged ana hepatic and peripancreatic lymph nodes measuring up to 2 cm in short axis, indeterminate, although could be reactive, given suspicion for cirrhosis. 3. Simple appearing right adnexal cyst measuring 3.7 cm.  According to current guidelines (J Am Kayla Radiol 2020; 17:248-254) in this postmenopausal woman, this should be followed in 6 to 12 months by pelvic ultrasound. The study was marked in EPIC for immediate notification. Workstation performed: [id number]     CT head without contrast    Result Date: 3/26/2024  Impression: Acute parenchymal hematoma left paramedian cerebellum measures 2.6 x 1.6 cm with mass effect on the fourth ventricle. " Intraventricular extension of hemorrhage noted with blood in the fourth ventricle and left foramen of Luschka. Chronic lacunar infarcts are identified as described. I personally discussed this study with ABRAHAN UP on 3/26/2024 9:26 AM. Workstation performed: [id number]       EKG, Pathology, and Other Studies: I have personally reviewed pertinent reports.      VTE Prophylaxis: Sequential compression device (Venodyne)  and Reason for no pharmacologic prophylaxis ICH    Code Status: Level 1 - Full Code  Advance Directive and Living Will:      Power of :    POLST:      Counseling / Coordination of Care  I spent 30 minutes with the patient.

## 2024-03-27 ENCOUNTER — APPOINTMENT (INPATIENT)
Dept: RADIOLOGY | Facility: HOSPITAL | Age: 59
DRG: 044 | End: 2024-03-27
Payer: COMMERCIAL

## 2024-03-27 LAB
ANION GAP SERPL CALCULATED.3IONS-SCNC: 4 MMOL/L (ref 4–13)
ANION GAP SERPL CALCULATED.3IONS-SCNC: 8 MMOL/L (ref 4–13)
ATRIAL RATE: 67 BPM
BUN SERPL-MCNC: 11 MG/DL (ref 5–25)
BUN SERPL-MCNC: 15 MG/DL (ref 5–25)
CA-I BLD-SCNC: 1.08 MMOL/L (ref 1.12–1.32)
CALCIUM SERPL-MCNC: 8.2 MG/DL (ref 8.4–10.2)
CALCIUM SERPL-MCNC: 8.7 MG/DL (ref 8.4–10.2)
CHLORIDE SERPL-SCNC: 102 MMOL/L (ref 96–108)
CHLORIDE SERPL-SCNC: 99 MMOL/L (ref 96–108)
CO2 SERPL-SCNC: 26 MMOL/L (ref 21–32)
CO2 SERPL-SCNC: 27 MMOL/L (ref 21–32)
CREAT SERPL-MCNC: 0.55 MG/DL (ref 0.6–1.3)
CREAT SERPL-MCNC: 0.69 MG/DL (ref 0.6–1.3)
ERYTHROCYTE [DISTWIDTH] IN BLOOD BY AUTOMATED COUNT: 12.5 % (ref 11.6–15.1)
GFR SERPL CREATININE-BSD FRML MDRD: 102 ML/MIN/1.73SQ M
GFR SERPL CREATININE-BSD FRML MDRD: 95 ML/MIN/1.73SQ M
GLUCOSE SERPL-MCNC: 119 MG/DL (ref 65–140)
GLUCOSE SERPL-MCNC: 122 MG/DL (ref 65–140)
HCT VFR BLD AUTO: 40 % (ref 34.8–46.1)
HGB BLD-MCNC: 12.9 G/DL (ref 11.5–15.4)
MAGNESIUM SERPL-MCNC: 1.6 MG/DL (ref 1.9–2.7)
MCH RBC QN AUTO: 31.8 PG (ref 26.8–34.3)
MCHC RBC AUTO-ENTMCNC: 32.3 G/DL (ref 31.4–37.4)
MCV RBC AUTO: 99 FL (ref 82–98)
P AXIS: 59 DEGREES
PHOSPHATE SERPL-MCNC: 3.6 MG/DL (ref 2.7–4.5)
PLATELET # BLD AUTO: 179 THOUSANDS/UL (ref 149–390)
PMV BLD AUTO: 10.9 FL (ref 8.9–12.7)
POTASSIUM SERPL-SCNC: 3.3 MMOL/L (ref 3.5–5.3)
POTASSIUM SERPL-SCNC: 4.2 MMOL/L (ref 3.5–5.3)
PR INTERVAL: 170 MS
QRS AXIS: 93 DEGREES
QRSD INTERVAL: 96 MS
QT INTERVAL: 506 MS
QTC INTERVAL: 534 MS
RBC # BLD AUTO: 4.06 MILLION/UL (ref 3.81–5.12)
SODIUM SERPL-SCNC: 133 MMOL/L (ref 135–147)
SODIUM SERPL-SCNC: 133 MMOL/L (ref 135–147)
T WAVE AXIS: 45 DEGREES
VENTRICULAR RATE: 67 BPM
WBC # BLD AUTO: 9.44 THOUSAND/UL (ref 4.31–10.16)

## 2024-03-27 PROCEDURE — 80048 BASIC METABOLIC PNL TOTAL CA: CPT

## 2024-03-27 PROCEDURE — 83735 ASSAY OF MAGNESIUM: CPT

## 2024-03-27 PROCEDURE — 93010 ELECTROCARDIOGRAM REPORT: CPT | Performed by: INTERNAL MEDICINE

## 2024-03-27 PROCEDURE — 97163 PT EVAL HIGH COMPLEX 45 MIN: CPT

## 2024-03-27 PROCEDURE — 85027 COMPLETE CBC AUTOMATED: CPT

## 2024-03-27 PROCEDURE — 97167 OT EVAL HIGH COMPLEX 60 MIN: CPT

## 2024-03-27 PROCEDURE — 99232 SBSQ HOSP IP/OBS MODERATE 35: CPT | Performed by: PHYSICIAN ASSISTANT

## 2024-03-27 PROCEDURE — 82330 ASSAY OF CALCIUM: CPT

## 2024-03-27 PROCEDURE — 70450 CT HEAD/BRAIN W/O DYE: CPT

## 2024-03-27 PROCEDURE — 84100 ASSAY OF PHOSPHORUS: CPT

## 2024-03-27 PROCEDURE — 99233 SBSQ HOSP IP/OBS HIGH 50: CPT | Performed by: PSYCHIATRY & NEUROLOGY

## 2024-03-27 PROCEDURE — 99255 IP/OBS CONSLTJ NEW/EST HI 80: CPT | Performed by: NURSE PRACTITIONER

## 2024-03-27 PROCEDURE — 99291 CRITICAL CARE FIRST HOUR: CPT | Performed by: ANESTHESIOLOGY

## 2024-03-27 RX ORDER — MAGNESIUM SULFATE HEPTAHYDRATE 40 MG/ML
2 INJECTION, SOLUTION INTRAVENOUS ONCE
Status: COMPLETED | OUTPATIENT
Start: 2024-03-27 | End: 2024-03-27

## 2024-03-27 RX ORDER — HYDRALAZINE HYDROCHLORIDE 20 MG/ML
10 INJECTION INTRAMUSCULAR; INTRAVENOUS EVERY 4 HOURS PRN
Status: DISCONTINUED | OUTPATIENT
Start: 2024-03-27 | End: 2024-04-01

## 2024-03-27 RX ORDER — NICARDIPINE HYDROCHLORIDE 2.5 MG/ML
INJECTION INTRAVENOUS
Status: DISPENSED
Start: 2024-03-27 | End: 2024-03-27

## 2024-03-27 RX ORDER — AMLODIPINE BESYLATE 5 MG/1
5 TABLET ORAL DAILY
Status: DISCONTINUED | OUTPATIENT
Start: 2024-03-27 | End: 2024-03-27

## 2024-03-27 RX ORDER — AMLODIPINE BESYLATE 10 MG/1
10 TABLET ORAL DAILY
Status: DISCONTINUED | OUTPATIENT
Start: 2024-03-28 | End: 2024-04-03 | Stop reason: HOSPADM

## 2024-03-27 RX ORDER — LISINOPRIL 20 MG/1
20 TABLET ORAL DAILY
Status: DISCONTINUED | OUTPATIENT
Start: 2024-03-27 | End: 2024-03-28

## 2024-03-27 RX ORDER — POTASSIUM CHLORIDE 20 MEQ/1
20 TABLET, EXTENDED RELEASE ORAL
Status: COMPLETED | OUTPATIENT
Start: 2024-03-27 | End: 2024-03-27

## 2024-03-27 RX ORDER — AMLODIPINE BESYLATE 5 MG/1
5 TABLET ORAL ONCE
Status: COMPLETED | OUTPATIENT
Start: 2024-03-27 | End: 2024-03-27

## 2024-03-27 RX ADMIN — TRIMETHOBENZAMIDE HYDROCHLORIDE 200 MG: 100 INJECTION INTRAMUSCULAR at 13:55

## 2024-03-27 RX ADMIN — POTASSIUM CHLORIDE 20 MEQ: 1500 TABLET, EXTENDED RELEASE ORAL at 08:26

## 2024-03-27 RX ADMIN — LABETALOL HYDROCHLORIDE 10 MG: 5 INJECTION, SOLUTION INTRAVENOUS at 11:02

## 2024-03-27 RX ADMIN — Medication 1 TABLET: at 08:21

## 2024-03-27 RX ADMIN — CHLORHEXIDINE GLUCONATE 15 ML: 1.2 SOLUTION ORAL at 08:21

## 2024-03-27 RX ADMIN — LABETALOL HYDROCHLORIDE 10 MG: 5 INJECTION, SOLUTION INTRAVENOUS at 22:20

## 2024-03-27 RX ADMIN — SODIUM CHLORIDE 10 MG/HR: 0.9 INJECTION, SOLUTION INTRAVENOUS at 00:53

## 2024-03-27 RX ADMIN — LISINOPRIL 20 MG: 20 TABLET ORAL at 23:29

## 2024-03-27 RX ADMIN — ATORVASTATIN CALCIUM 40 MG: 40 TABLET, FILM COATED ORAL at 16:56

## 2024-03-27 RX ADMIN — POTASSIUM CHLORIDE 20 MEQ: 1500 TABLET, EXTENDED RELEASE ORAL at 08:20

## 2024-03-27 RX ADMIN — LABETALOL HYDROCHLORIDE 10 MG: 5 INJECTION, SOLUTION INTRAVENOUS at 16:56

## 2024-03-27 RX ADMIN — THIAMINE HCL TAB 100 MG 100 MG: 100 TAB at 08:20

## 2024-03-27 RX ADMIN — CHLORHEXIDINE GLUCONATE 15 ML: 1.2 SOLUTION ORAL at 21:17

## 2024-03-27 RX ADMIN — POTASSIUM CHLORIDE 20 MEQ: 1500 TABLET, EXTENDED RELEASE ORAL at 10:32

## 2024-03-27 RX ADMIN — LABETALOL HYDROCHLORIDE 10 MG: 5 INJECTION, SOLUTION INTRAVENOUS at 06:08

## 2024-03-27 RX ADMIN — FOLIC ACID 1 MG: 1 TABLET ORAL at 08:21

## 2024-03-27 RX ADMIN — GABAPENTIN 300 MG: 300 CAPSULE ORAL at 21:17

## 2024-03-27 RX ADMIN — AMLODIPINE BESYLATE 5 MG: 5 TABLET ORAL at 09:29

## 2024-03-27 RX ADMIN — HYDRALAZINE HYDROCHLORIDE 5 MG: 20 INJECTION INTRAMUSCULAR; INTRAVENOUS at 19:10

## 2024-03-27 RX ADMIN — MAGNESIUM SULFATE HEPTAHYDRATE 2 G: 40 INJECTION, SOLUTION INTRAVENOUS at 08:20

## 2024-03-27 RX ADMIN — HYDRALAZINE HYDROCHLORIDE 10 MG: 20 INJECTION, SOLUTION INTRAMUSCULAR; INTRAVENOUS at 23:26

## 2024-03-27 RX ADMIN — SODIUM CHLORIDE, SODIUM LACTATE, POTASSIUM CHLORIDE, AND CALCIUM CHLORIDE 75 ML/HR: .6; .31; .03; .02 INJECTION, SOLUTION INTRAVENOUS at 06:08

## 2024-03-27 RX ADMIN — ESCITALOPRAM OXALATE 20 MG: 20 TABLET ORAL at 08:21

## 2024-03-27 RX ADMIN — AMLODIPINE BESYLATE 5 MG: 5 TABLET ORAL at 21:13

## 2024-03-27 NOTE — PLAN OF CARE
Problem: PHYSICAL THERAPY ADULT  Goal: Performs mobility at highest level of function for planned discharge setting.  See evaluation for individualized goals.  Description: Treatment/Interventions: Functional transfer training, LE strengthening/ROM, Elevations, Therapeutic exercise, Endurance training, Patient/family training, Equipment eval/education, Bed mobility, Compensatory technique education, Gait training, Spoke to nursing, OT, Spoke to case management, Spoke to MD          See flowsheet documentation for full assessment, interventions and recommendations.  Note: Prognosis: Good  Problem List: Decreased endurance, Impaired balance, Decreased mobility, Decreased coordination, Impaired vision, Impaired sensation  Assessment: Pt is 58 y.o. female seen for a PT evaluation s/p admit to [facility] on 3/26/2024. Pt presenting w/ nausea, vomiting, dizziness; imaging (+) for cerebellar hemorrhage. Please see above for other active problem list / PMH. PT now consulted to assess functional mobility and needs for safe d/c planning. Prior to admission, pt was I w/ ambulation w/o AD, lives w/ 2 sons in a mobile home 2-[address]. Currently pt requires S for bed skills; MinAx1 for functional transfers; ModAx1 for ambulation w/ RW. Pt presents functioning below baseline and w/ overall mobility deficits 2* to: generalized weakness/deconditioning; decreased endurance; impaired balance; impaired coordination; ataxia; diplopia; dizziness; gait deviations; fatigue; bed/chair alarms; multiple lines. These impairments place pt at risk for falls. Pt will continue to benefit from skilled PT interventions to address stated impairments; to maximize functional potential; for ongoing pt/ family training; and DME needs. PT is currently recommending acute medical rehab.  Barriers to Discharge: Inaccessible home environment, Decreased caregiver support     Rehab Resource Intensity Level, PT: I (Maximum Resource Intensity)    See  flowsheet documentation for full assessment.

## 2024-03-27 NOTE — OCCUPATIONAL THERAPY NOTE
Occupational Therapy Evaluation     Patient Name: Tanika Lira  Today's Date: 3/27/2024  Problem List  Active Problems:    History of CVA (cerebrovascular accident)    Cerebellar hemorrhage (HCC)    Past Medical History  Past Medical History:   Diagnosis Date    CVA (cerebral vascular accident) (HCC)     Diverticulitis     Diverticulitis of colon     Diverticulosis     Hypertension     Stroke (HCC)      Past Surgical History  Past Surgical History:   Procedure Laterality Date    CARDIAC ELECTROPHYSIOLOGY PROCEDURE N/A 2/8/2023    Procedure: Cardiac loop recorder implant;  Surgeon: Duke Abraham MD;  Location: [facility];  Service: Cardiology    COLON SIGMOID RESECTION           03/27/24 0915   OT Last Visit   OT Visit Date 03/27/24   Note Type   Note type Evaluation   Pain Assessment   Pain Assessment Tool 0-10   Pain Score No Pain   Restrictions/Precautions   Weight Bearing Precautions Per Order No   Other Precautions Cognitive;Chair Alarm;Bed Alarm;Multiple lines;Telemetry;Fall Risk;Pain;Visual impairment   Home Living   Type of Home Mobile home   Home Layout One level;Performs ADLs on one level;Able to live on main level with bedroom/bathroom  (2 ELIZABETH)   Bathroom Shower/Tub Tub/shower unit   Bathroom Toilet Standard   Bathroom Equipment Shower chair;Grab bars in shower   Home Equipment Walker  (unused PTA)   Prior Function   Level of [place] Independent with ADLs;Independent with functional mobility;Independent with IADLS   Lives With Son  (2 sons; both out of the house during the day)   Receives Help From Family   IADLs Independent with meal prep;Independent with medication management;Family/Friend/Other provides transportation   Falls in the last 6 months 1 to 4  (1)   Vocational Unemployed   Comments (-)    Lifestyle   Autonomy I w/ ADLS/IADLS, transfers and functional mobility PTA; (-)    Reciprocal Relationships Pt lives w/ her 2 sons; pt is alone during the day   Service to  Others Unemployed   Intrinsic Gratification Cleaning   ADL   Eating Assistance 5  Supervision/Setup   Grooming Assistance 4  Minimal Assistance   UB Bathing Assistance 4  Minimal Assistance   LB Bathing Assistance 3  Moderate Assistance   UB Dressing Assistance 4  Minimal Assistance   LB Dressing Assistance 3  Moderate Assistance   LB Dressing Deficit Don/doff L sock  (donned R sock w/ Max VC for encouragement)   Toileting Assistance  3  Moderate Assistance   Functional Assistance 3  Moderate Assistance   Functional Deficit Steadying;Verbal cueing;Supervision/safety;Increased time to complete   Bed Mobility   Supine to Sit 5  Supervision   Additional items HOB elevated;Increased time required;Verbal cues;LE management   Sit to Supine Unable to assess   Additional Comments pt sat EOB w/ Fair sitting balance/trunk control; complains of dizziness/nausea; BP /77.   Transfers   Sit to Stand 4  Minimal assistance   Additional items Assist x 1;Increased time required;Verbal cues   Stand to Sit 4  Minimal assistance   Additional items Assist x 1;Increased time required;Verbal cues   Additional Comments w/ RW; VC for hand placement   Functional Mobility   Functional Mobility 3  Moderate assistance   Additional Comments pt took few small steps from EOB to recliner w/ Mod A x1; use of RW; SBA 2nd for safety. Ataxia noted   Additional items Rolling walker   Balance   Static Sitting Fair -   Dynamic Sitting Poor +   Static Standing Poor +   Dynamic Standing Poor   Ambulatory Poor   Activity Tolerance   Activity Tolerance Patient limited by fatigue;Patient limited by pain;Other (Comment)  (nausea/dizziness)   Medical Staff Made Aware Claudia LUO   Nurse Made Aware yes, Salima   RUEVELIO Assessment   RUE Assessment WFL   LUE Assessment   LUE Assessment WFL   Hand Function   Gross Motor Coordination Functional   Fine Motor Coordination Functional   Sensation   Light Touch   (neuropathy in B/L feet at baseline)   Proprioception    Proprioception No apparent deficits   Vision-Basic Assessment   Patient Visual Report Diplopia   Psychosocial   Psychosocial (WDL) X   Patient Behaviors/Mood Flat affect;Not interactive   Cognition   Overall Cognitive Status WFL   Arousal/Participation Responsive;Cooperative   Attention Attends with cues to redirect   Orientation Level Oriented X4   Memory Decreased recall of precautions   Following Commands Follows one step commands with increased time or repetition   Comments Pt is cooperative; limited by nausea/fatigue/dizziness. Flat affect, needs VC for encouragement to participate.   Assessment   Limitation Decreased ADL status;Decreased UE strength;Decreased Safe judgement during ADL;Decreased endurance;Visual deficit;Decreased self-care trans;Decreased high-level ADLs   Prognosis Fair   Assessment Pt is a 59 y/o female seen for OT eval s/p adm to [facility] w/ dizziness, nausea and headache. Fell on the floor and began vomiting w/ several episodes of diarrhea. Pt is dx'd w/ cerebellar hemorrhage. Pt  has a past medical history of CVA (cerebral vascular accident) ([facility]), Diverticulitis, Diverticulitis of colon, Diverticulosis, Hypertension, and Stroke ([facility]). Pt with active OT orders and activity as tolerated orders. Pt lives with her 2 sons (both work during the day) in mobile home w/ 2 ELIZABETH. Pt was I w/  ADLS and IADLS, does not drive, & required no use of DME PTA but reports owning a RW. Pt is currently demonstrating the following occupational deficits: Min A UB ADLS, Mod A LB ADLS, S bed mobility, Min A transfers and Mod A functional mobility w/ RW; SBA 2nd for safety. These deficits that are impacting pt's baseline areas of occupation are a result of the following impairments: endurance, activity tolerance, functional mobility, forward functional reach, balance, trunk control, functional standing tolerance, decreased I w/ ADLS/IADLS, visual deficits, sensation deficits, cognitive impairments, decreased safety  awareness, decreased insight into deficits, ataxia, and coordination deficits.The following Occupational Performance Areas to address include: eating, grooming, bathing/shower, toilet hygiene, dressing, medication management, socialization, health maintenance, functional mobility, clothing management, cleaning, and meal prep. Recommend inpatient rehab  upon D/C. Pt to continue to benefit from acute immediate OT services to address the following goals 3-5x/week to  w/in 10-14 days:   Goals   Patient Goals to feel better   LTG Time Frame 10-   Long Term Goal #1 see below listed goals   Plan   Treatment Interventions ADL retraining;Visual perceptual retraining;Functional transfer training;UE strengthening/ROM;Endurance training;Patient/family training;Cognitive reorientation;Equipment evaluation/education;Compensatory technique education;Continued evaluation;Energy conservation;Activityengagement   Goal Expiration Date 04/10/24   OT Frequency 3-5x/wk   Discharge Recommendation   Rehab Resource Intensity Level, OT I (Maximum Resource Intensity)   Additional Comments  The patient's raw score on the AM-PAC Daily Activity Inpatient Short Form is 16. A raw score of less than 19 suggests the patient may benefit from discharge to post-acute rehabilitation services. Please refer to the recommendation of the Occupational Therapist for safe discharge planning.   Additional Comments 2 Pt seen as a co-session due to the patient's co-morbidities, clinically unstable presentation, and present impairments which are a regression from the patient's baseline.   AM-[facility] Daily Activity Inpatient   Lower Body Dressing 2   Bathing 2   Toileting 2   Upper Body Dressing 3   Grooming 3   Eating 4   Daily Activity Raw Score 16   Daily Activity Standardized Score (Calc for Raw Score >=11) 35.96   -PAC Applied Cognition Inpatient   Following a Speech/Presentation 3   Understanding Ordinary Conversation 4   Taking Medications 3    Remembering Where Things Are Placed or Put Away 3   Remembering List of 4-5 Errands 3   Taking Care of Complicated Tasks 3   Applied Cognition Raw Score 19   Applied Cognition Standardized Score 39.77   Barthel Index   Feeding 5   Bathing 0   Grooming Score 0   Dressing Score 5   Bladder Score 5   Bowels Score 5   Toilet Use Score 5   Transfers (Bed/Chair) Score 5   Mobility (Level Surface) Score 0   Stairs Score 0   Barthel Index Score 30   End of Consult   Education Provided Yes   Patient Position at End of Consult Bedside chair;Bed/Chair alarm activated;All needs within reach   Nurse Communication Nurse aware of consult     1) Pt will improve activity tolerance to G for  30 min txment sessions  2) Pt will complete ADLs/self care w/ mod I w/ use of AD/DME as needed to increase independence in functional tasks  3) Pt will complete toileting w/ mod I w/ G hygiene/thoroughness using DME PRN  4) Pt will improve fx'l tfers on/off all surfaces using DME PRN w/ G balance/safety including toileting w/ mod I  5) Pt will improve fx'l mobility during I/ADl/leisure tasks using DME PRN w/ g balance/safety w/ mod I  6) Pt will be attentive 100% of the time during ongoing cognitive assessment w/ G participation to A w/ safe d/c planning/recommendations  7) Pt will demonstrate G carryover of pt/caregiver education and training as appropriate w/o cues w/ G tolerance to increase safety during functional tasks  8) Pt will engage in ongoing  assessments, screens, and activities t/o fx'l I/ADL/leisure tasks w/ G participation to A w/ adaptation and accomodations or rule out visual perceptual impairments  9) Pt will follow 100% simple one step verbal commands and be A/Ox4 consistently t/o use of external environmental cues to increase awareness during functional tasks    Berta Mcwilliams MS, OTR/L

## 2024-03-27 NOTE — ASSESSMENT & PLAN NOTE
Patient presented s/p acute onset nausea, diarrhea and dizziness   H/o ischemic stroke in 2022.   Per records pt had ASA and Plavix listed on med list. Pt reported taking ASA prior to admssion.     Intracerebral Hemorrhage (ICH) Score:    Severino Coma Sore 13-15 equals +0   Age greater than or equal to 80 No   ICH Volume greater than or equal to 30 ml No   Intraventricular Hemorrhage Yes (1 Point)   Infratentorial Origin of Hemorrhage Yes (1 Point)   Total: 2       Imaging:   CT head wo 3/27/24: Redemonstration of hyperdense hemorrhage in the left paramedian cerebellum, as described with mass effect and extension into the fourth ventricle and left foramen of Luschka, similar in appearance to the prior. No hydrocephalus.  CT head 3/26/2024: Acute parenchymal hematoma in the left paramedian cerebellum measuring 2.6 x 1.6 with mass effect on the fourth ventricle.  Intraventricular extension of hemorrhage noted with blood in the fourth ventricle and left foramen of Luschka.     Plan:   Close neurological monitoring given 4th ventricular involvement  Recommend STAT CT head for decline in GCS >2 points in 1 hour  No need for keppra at this time given infratentorial location   Neurology for stroke management   BP goal per neurology   Hold all AC/AP medication at this time.   DVT ppx: SCD's only  PT/OT evaluation and tx.   Ongoing medical management and pain control per primary team  Continue EVD watch. CTH today stable, will continue to monitor.     Neurosurgery will continue to follow. Call with questions or concerns.

## 2024-03-27 NOTE — CASE MANAGEMENT
Case Management Assessment & Discharge Planning Note    Patient name Tanika Lira  Location ICU 04/ICU 04 MRN [id number]  : 1965 Date 3/27/2024       Current Admission Date: 3/26/2024  Current Admission Diagnosis:History of CVA (cerebrovascular accident)   Patient Active Problem List    Diagnosis Date Noted    Cerebellar hemorrhage (HCC) 2024    Polyneuropathy 2023    Hallux valgus with bunions of right foot 2023    Hallux valgus with bunions of left foot 2023    Hammer toes of both feet 2023    Macrocytic anemia 2023    ROSANA (generalized anxiety disorder) 2022    Neuropathy 2022    History of CVA (cerebrovascular accident) 2022    Tobacco abuse 2022    Essential hypertension 2022      LOS (days): 1  Geometric Mean LOS (GMLOS) (days):   Days to GMLOS:     OBJECTIVE:    Risk of Unplanned Readmission Score: 12.55         Current admission status: Inpatient  Referral Reason: Other    Preferred Pharmacy:   [id number] - [place] - [address]  [address]  [facility] [address]  Phone: [phone] Fax: [phone]    Primary Care Provider: MILAN Miller    Primary Insurance: [employer]  Secondary Insurance:     ASSESSMENT:  Active Health Care Proxies       Abner Lira Health Care Representative - Son   Primary Phone: [phone] (Mobile)                 Advance Directives  Does patient have a Health Care POA?: No  Was patient offered paperwork?: Yes (interested in more info)  Does patient have Advance Directives?: No  Was patient offered paperwork?: Yes (interested in more info)  Primary Contact: Son Abner Lira         Readmission Root Cause  30 Day Readmission: No    Patient Information  Admitted from:: Home  Mental Status: Other (Comment), Alert (lethargic, kept eyes closed during interview for dizzyness)  During Assessment patient was accompanied by: Not accompanied during  assessment  Assessment information provided by:: Patient  Primary Caregiver: Self  Support Systems: Self, Son  County of Residence: [place]  What city do you live in?: [place]  Home entry access options. Select all that apply.: Stairs  Number of steps to enter home.: 3  Do the steps have railings?: Yes  Type of Current Residence: Trailer Home  Living Arrangements: Lives w/ Son  Is patient a ?: No    Activities of Daily Living Prior to Admission  Functional Status: Independent  Completes ADLs independently?: Yes  Ambulates independently?: Yes  Does patient use assisted devices?: No  Does patient currently own DME?: Yes  What DME does the patient currently own?: Walker  Does patient have a history of Outpatient Therapy (PT/OT)?: No  Does the patient have a history of Short-Term Rehab?: Yes (Pt cannot recall but thinks a SL facility)  Does patient have a history of HHC?: Yes ( [facility]NA)  Does patient currently have HHC?: No         Patient Information Continued  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 N   In the last 12 months, how many places have you lived? 1   In the last 12 months, was there a time when you did not have a steady place to sleep or slept in a shelter (including now)? N   Food Insecurity    Within the past 12 months, you worried that your food would run out before you got the money to buy more. Never true   Within the past 12 months, the food you bought just  didn't last and you didn't have money to get more. Never true   Utilities    In the past 12 months has the electric, gas, oil, or water company threatened to shut off services in your home? No            DISCHARGE DETAILS:    Discharge planning discussed with:: Patient  Freedom of Choice: Yes     CM contacted family/caregiver?: No- see comments  Were Treatment Team discharge recommendations reviewed with patient/caregiver?: No (Pending recs fro PT/OT/ST/PMR)  Did patient/caregiver verbalize understanding of patient care needs?: Yes  Were patient/caregiver advised of the risks associated with not following Treatment Team discharge recommendations?: Yes    Contacts  Patient Contacts: Son Abner Lira  Relationship to Patient:: Family  Contact Method: In Person  Reason/Outcome: Continuity of Care, Emergency Contact              Other Referral/Resources/Interventions Provided:  Interventions: Other (Specify)  Referral Comments: Pending Recommendations                                                      Additional Comments: CM met with patient at bedside, introduced self and CM Role. Pt kept her eyes closed due to dizzyness but was agreeable to interview. Pt lives with two sons Abner and Frandy. Abner provides transport to MD appts. Pt report non compliance with following up with her doctors and taking her medications. Pt states she forgets to take her medications on a regular basis and has purchased a pillbox with the goal of being more compliant. Pt has history for prior CVA in 2022. pt reports drinking 1 twisted tea not always daily but a few times a week , is a smoker and uses marijuana. 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 Pt reports sons are supportive. Pt states she is independent with ADL's has a walker at home but does not need it. Pt has been to [facility] before but cannot recall the name but belives it to be  facility. Pt denies food insecuirty or difficulty paying her rent/MTG. Pt denies mental  health issues /concerns.SOns will be avilable for transport to home at dc per patient. Patient is agreeable to info for POA/LW/AD.

## 2024-03-27 NOTE — PLAN OF CARE
Problem: OCCUPATIONAL THERAPY ADULT  Goal: Performs self-care activities at highest level of function for planned discharge setting.  See evaluation for individualized goals.  Description: Treatment Interventions: ADL retraining, Visual perceptual retraining, Functional transfer training, UE strengthening/ROM, Endurance training, Patient/family training, Cognitive reorientation, Equipment evaluation/education, Compensatory technique education, Continued evaluation, Energy conservation, Activityengagement          See flowsheet documentation for full assessment, interventions and recommendations.   Note: Limitation: Decreased ADL status, Decreased UE strength, Decreased Safe judgement during ADL, Decreased endurance, Visual deficit, Decreased self-care trans, Decreased high-level ADLs  Prognosis: Fair  Assessment: Pt is a 57 y/o female seen for OT eval s/p adm to [facility] w/ dizziness, nausea and headache. Fell on the floor and began vomiting w/ several episodes of diarrhea. Pt is dx'd w/ cerebellar hemorrhage. Pt  has a past medical history of CVA (cerebral vascular accident) (HCC), Diverticulitis, Diverticulitis of colon, Diverticulosis, Hypertension, and Stroke (HCC). Pt with active OT orders and activity as tolerated orders. Pt lives with her 2 sons (both work during the day) in mobile home w/ 2 ELIZABETH. Pt was I w/  ADLS and IADLS, does not drive, & required no use of DME PTA but reports owning a RW. Pt is currently demonstrating the following occupational deficits: Min A UB ADLS, Mod A LB ADLS, S bed mobility, Min A transfers and Mod A functional mobility w/ RW; SBA 2nd for safety. These deficits that are impacting pt's baseline areas of occupation are a result of the following impairments: endurance, activity tolerance, functional mobility, forward functional reach, balance, trunk control, functional standing tolerance, decreased I w/ ADLS/IADLS, visual deficits, sensation deficits, cognitive impairments, decreased  safety awareness, decreased insight into deficits, ataxia, and coordination deficits.The following Occupational Performance Areas to address include: eating, grooming, bathing/shower, toilet hygiene, dressing, medication management, socialization, health maintenance, functional mobility, clothing management, cleaning, and meal prep. Recommend inpatient rehab  upon D/C. Pt to continue to benefit from acute immediate OT services to address the following goals 3-5x/week to  w/in 10-14 days:     Rehab Resource Intensity Level, OT: I (Maximum Resource Intensity)   Berta Mcwilliams MS, OTR/L

## 2024-03-27 NOTE — PLAN OF CARE
Problem: Prexisting or High Potential for Compromised Skin Integrity  Goal: Skin integrity is maintained or improved  Description: INTERVENTIONS:  - Identify patients at risk for skin breakdown  - Assess and monitor skin integrity  - Assess and monitor nutrition and hydration status  - Monitor labs   - Assess for incontinence   - Turn and reposition patient  - Assist with mobility/ambulation  - Relieve pressure over bony prominences  - Avoid friction and shearing  - Provide appropriate hygiene as needed including keeping skin clean and dry  - Evaluate need for skin moisturizer/barrier cream  - Collaborate with interdisciplinary team   - Patient/family teaching  - Consider wound care consult   Outcome: Progressing     Problem: Neurological Deficit  Goal: Neurological status is stable or improving  Description: Interventions:  - Monitor and assess patient's level of consciousness, motor function, sensory function, and level of assistance needed for ADLs.   - Monitor and report changes from baseline. Collaborate with interdisciplinary team to initiate plan and implement interventions as ordered.   - Provide and maintain a safe environment.  - Consider seizure precautions.  - Consider fall precautions.  - Consider aspiration precautions.  - Consider bleeding precautions.  Outcome: Progressing     Problem: Activity Intolerance/Impaired Mobility  Goal: Mobility/activity is maintained at optimum level for patient  Description: Interventions:  - Assess and monitor patient  barriers to mobility and need for assistive/adaptive devices.  - Assess patient's emotional response to limitations.  - Collaborate with interdisciplinary team and initiate plans and interventions as ordered.  - Encourage independent activity per ability.  - Maintain proper body alignment.  - Perform active/passive rom as tolerated/ordered.  - Plan activities to conserve energy.  - Turn patient as appropriate  Outcome: Progressing     Problem:  Communication Impairment  Goal: Ability to express needs and understand communication  Description: Assess patient's communication skills and ability to understand information.  Patient will demonstrate use of effective communication techniques, alternative methods of communication and understanding even if not able to speak.     - Encourage communication and provide alternate methods of communication as needed.  - Collaborate with case management/ for discharge needs.  - Include patient/family/caregiver in decisions related to communication.  Outcome: Progressing     Problem: Potential for Aspiration  Goal: Non-ventilated patient's risk of aspiration is minimized  Description: Assess and monitor vital signs, respiratory status, and labs (WBC).  Monitor for signs of aspiration (tachypnea, cough, rales, wheezing, cyanosis, fever).    - Assess and monitor patient's ability to swallow.  - Place patient up in chair to eat if possible.  - HOB up at 90 degrees to eat if unable to get patient up into chair.  - Supervise patient during oral intake.   - Instruct patient/ family to take small bites.  - Instruct patient/ family to take small single sips when taking liquids.  - Follow patient-specific strategies generated by speech pathologist.  Outcome: Progressing     Problem: Nutrition  Goal: Nutrition/Hydration status is improving  Description: Monitor and assess patient's nutrition/hydration status for malnutrition (ex- brittle hair, bruises, dry skin, pale skin and conjunctiva, muscle wasting, smooth red tongue, and disorientation). Collaborate with interdisciplinary team and initiate plan and interventions as ordered.  Monitor patient's weight and dietary intake as ordered or per policy. Utilize nutrition screening tool and intervene per policy. Determine patient's food preferences and provide high-protein, high-caloric foods as appropriate.     - Assist patient with eating.  - Allow adequate time for  meals.  - Encourage patient to take dietary supplement as ordered.  - Collaborate with clinical nutritionist.  - Include patient/family/caregiver in decisions related to nutrition.  Outcome: Progressing     Problem: Nutrition/Hydration-ADULT  Goal: Nutrient/Hydration intake appropriate for improving, restoring or maintaining nutritional needs  Description: Monitor and assess patient's nutrition/hydration status for malnutrition. Collaborate with interdisciplinary team and initiate plan and interventions as ordered.  Monitor patient's weight and dietary intake as ordered or per policy. Utilize nutrition screening tool and intervene as necessary. Determine patient's food preferences and provide high-protein, high-caloric foods as appropriate.     INTERVENTIONS:  - Monitor oral intake, urinary output, labs, and treatment plans  - Assess nutrition and hydration status and recommend course of action  - Evaluate amount of meals eaten  - Assist patient with eating if necessary   - Allow adequate time for meals  - Recommend/ encourage appropriate diets, oral nutritional supplements, and vitamin/mineral supplements  - Order, calculate, and assess calorie counts as needed  - Recommend, monitor, and adjust tube feedings and TPN/PPN based on assessed needs  - Assess need for intravenous fluids  - Provide specific nutrition/hydration education as appropriate  - Include patient/family/caregiver in decisions related to nutrition  Outcome: Progressing     Problem: COPING  Goal: Pt/Family able to verbalize concerns and demonstrate effective coping strategies  Description: INTERVENTIONS:  - Assist patient/family to identify coping skills, available support systems and cultural and spiritual values  - Provide emotional support, including active listening and acknowledgement of concerns of patient and caregivers  - Reduce environmental stimuli, as able  - Provide patient education  - Assess for spiritual pain/suffering and initiate  spiritual care, including notification of Pastoral Care or carl based community as needed  - Assess effectiveness of coping strategies  Outcome: Progressing  Goal: Will report anxiety at manageable levels  Description: INTERVENTIONS:  - Administer medication as ordered  - Teach and encourage coping skills  - Provide emotional support  - Assess patient/family for anxiety and ability to cope  Outcome: Progressing     Problem: Potential for Falls  Goal: Patient will remain free of falls  Description: INTERVENTIONS:  - Educate patient/family on patient safety including physical limitations  - Instruct patient to call for assistance with activity   - Consult OT/PT to assist with strengthening/mobility   - Keep Call bell within reach  - Keep bed low and locked with side rails adjusted as appropriate  - Keep care items and personal belongings within reach  - Initiate and maintain comfort rounds  - Make Fall Risk Sign visible to staff  - Offer Toileting every  Hours, in advance of need  - Initiate/Maintain alarm  - Obtain necessary fall risk management equipment:   - Apply yellow socks and bracelet for high fall risk patients  - Consider moving patient to room near nurses station  Outcome: Progressing     Problem: PAIN - ADULT  Goal: Verbalizes/displays adequate comfort level or baseline comfort level  Description: Interventions:  - Encourage patient to monitor pain and request assistance  - Assess pain using appropriate pain scale  - Administer analgesics based on type and severity of pain and evaluate response  - Implement non-pharmacological measures as appropriate and evaluate response  - Consider cultural and social influences on pain and pain management  - Notify physician/advanced practitioner if interventions unsuccessful or patient reports new pain  Outcome: Progressing     Problem: INFECTION - ADULT  Goal: Absence or prevention of progression during hospitalization  Description: INTERVENTIONS:  - Assess and  monitor for signs and symptoms of infection  - Monitor lab/diagnostic results  - Monitor all insertion sites, i.e. indwelling lines, tubes, and drains  - Monitor endotracheal if appropriate and nasal secretions for changes in amount and color  - Spicer appropriate cooling/warming therapies per order  - Administer medications as ordered  - Instruct and encourage patient and family to use good hand hygiene technique  - Identify and instruct in appropriate isolation precautions for identified infection/condition  Outcome: Progressing     Problem: SAFETY ADULT  Goal: Patient will remain free of falls  Description: INTERVENTIONS:  - Educate patient/family on patient safety including physical limitations  - Instruct patient to call for assistance with activity   - Consult OT/PT to assist with strengthening/mobility   - Keep Call bell within reach  - Keep bed low and locked with side rails adjusted as appropriate  - Keep care items and personal belongings within reach  - Initiate and maintain comfort rounds  - Make Fall Risk Sign visible to staff  - Offer Toileting every  Hours, in advance of need  - Initiate/Maintain alarm  - Obtain necessary fall risk management equipment:   - Apply yellow socks and bracelet for high fall risk patients  - Consider moving patient to room near nurses station  Outcome: Progressing  Goal: Maintain or return to baseline ADL function  Description: INTERVENTIONS:  -  Assess patient's ability to carry out ADLs; assess patient's baseline for ADL function and identify physical deficits which impact ability to perform ADLs (bathing, care of mouth/teeth, toileting, grooming, dressing, etc.)  - Assess/evaluate cause of self-care deficits   - Assess range of motion  - Assess patient's mobility; develop plan if impaired  - Assess patient's need for assistive devices and provide as appropriate  - Encourage maximum independence but intervene and supervise when necessary  - Involve family in  performance of ADLs  - Assess for home care needs following discharge   - Consider OT consult to assist with ADL evaluation and planning for discharge  - Provide patient education as appropriate  Outcome: Progressing  Goal: Maintains/Returns to pre admission functional level  Description: INTERVENTIONS:  - Perform AM-PAC 6 Click Basic Mobility/ Daily Activity assessment daily.  - Set and communicate daily mobility goal to care team and patient/family/caregiver.   - Collaborate with rehabilitation services on mobility goals if consulted  - Perform Range of Motion  times a day.  - Reposition patient every  hours.  - Dangle patient  times a day  - Stand patient  times a day  - Ambulate patient  times a day  - Out of bed to chair  times a day   - Out of bed for meals  times a day  - Out of bed for toileting  - Record patient progress and toleration of activity level   Outcome: Progressing     Problem: DISCHARGE PLANNING  Goal: Discharge to home or other facility with appropriate resources  Description: INTERVENTIONS:  - Identify barriers to discharge w/patient and caregiver  - Arrange for needed discharge resources and transportation as appropriate  - Identify discharge learning needs (meds, wound care, etc.)  - Arrange for interpretive services to assist at discharge as needed  - Refer to Case Management Department for coordinating discharge planning if the patient needs post-hospital services based on physician/advanced practitioner order or complex needs related to functional status, cognitive ability, or social support system  Outcome: Progressing     Problem: Knowledge Deficit  Goal: Patient/family/caregiver demonstrates understanding of disease process, treatment plan, medications, and discharge instructions  Description: Complete learning assessment and assess knowledge base.  Interventions:  - Provide teaching at level of understanding  - Provide teaching via preferred learning methods  Outcome: Progressing      Problem: NEUROSENSORY - ADULT  Goal: Achieves stable or improved neurological status  Description: INTERVENTIONS  - Monitor and report changes in neurological status  - Monitor vital signs such as temperature, blood pressure, glucose, and any other labs ordered   - Initiate measures to prevent increased intracranial pressure  - Monitor for seizure activity and implement precautions if appropriate      Outcome: Progressing  Goal: Remains free of injury related to seizures activity  Description: INTERVENTIONS  - Maintain airway, patient safety  and administer oxygen as ordered  - Monitor patient for seizure activity, document and report duration and description of seizure to physician/advanced practitioner  - If seizure occurs,  ensure patient safety during seizure  - Reorient patient post seizure  - Seizure pads on all 4 side rails  - Instruct patient/family to notify RN of any seizure activity including if an aura is experienced  - Instruct patient/family to call for assistance with activity based on nursing assessment  - Administer anti-seizure medications if ordered    Outcome: Progressing  Goal: Achieves maximal functionality and self care  Description: INTERVENTIONS  - Monitor swallowing and airway patency with patient fatigue and changes in neurological status  - Encourage and assist patient to increase activity and self care.   - Encourage visually impaired, hearing impaired and aphasic patients to use assistive/communication devices  Outcome: Progressing     Problem: CARDIOVASCULAR - ADULT  Goal: Maintains optimal cardiac output and hemodynamic stability  Description: INTERVENTIONS:  - Monitor I/O, vital signs and rhythm  - Monitor for S/S and trends of decreased cardiac output  - Administer and titrate ordered vasoactive medications to optimize hemodynamic stability  - Assess quality of pulses, skin color and temperature  - Assess for signs of decreased coronary artery perfusion  - Instruct patient to  report change in severity of symptoms  Outcome: Progressing  Goal: Absence of cardiac dysrhythmias or at baseline rhythm  Description: INTERVENTIONS:  - Continuous cardiac monitoring, vital signs, obtain 12 lead EKG if ordered  - Administer antiarrhythmic and heart rate control medications as ordered  - Monitor electrolytes and administer replacement therapy as ordered  Outcome: Progressing     Problem: RESPIRATORY - ADULT  Goal: Achieves optimal ventilation and oxygenation  Description: INTERVENTIONS:  - Assess for changes in respiratory status  - Assess for changes in mentation and behavior  - Position to facilitate oxygenation and minimize respiratory effort  - Oxygen administered by appropriate delivery if ordered  - Initiate smoking cessation education as indicated  - Encourage broncho-pulmonary hygiene including cough, deep breathe, Incentive Spirometry  - Assess the need for suctioning and aspirate as needed  - Assess and instruct to report SOB or any respiratory difficulty  - Respiratory Therapy support as indicated  Outcome: Progressing     Problem: GASTROINTESTINAL - ADULT  Goal: Minimal or absence of nausea and/or vomiting  Description: INTERVENTIONS:  - Administer IV fluids if ordered to ensure adequate hydration  - Maintain NPO status until nausea and vomiting are resolved  - Nasogastric tube if ordered  - Administer ordered antiemetic medications as needed  - Provide nonpharmacologic comfort measures as appropriate  - Advance diet as tolerated, if ordered  - Consider nutrition services referral to assist patient with adequate nutrition and appropriate food choices  Outcome: Progressing  Goal: Maintains or returns to baseline bowel function  Description: INTERVENTIONS:  - Assess bowel function  - Encourage oral fluids to ensure adequate hydration  - Administer IV fluids if ordered to ensure adequate hydration  - Administer ordered medications as needed  - Encourage mobilization and activity  - Consider  nutritional services referral to assist patient with adequate nutrition and appropriate food choices  Outcome: Progressing  Goal: Maintains adequate nutritional intake  Description: INTERVENTIONS:  - Monitor percentage of each meal consumed  - Identify factors contributing to decreased intake, treat as appropriate  - Assist with meals as needed  - Monitor I&O, weight, and lab values if indicated  - Obtain nutrition services referral as needed  Outcome: Progressing     Problem: GENITOURINARY - ADULT  Goal: Maintains or returns to baseline urinary function  Description: INTERVENTIONS:  - Assess urinary function  - Encourage oral fluids to ensure adequate hydration if ordered  - Administer IV fluids as ordered to ensure adequate hydration  - Administer ordered medications as needed  - Offer frequent toileting  - Follow urinary retention protocol if ordered  Outcome: Progressing  Goal: Absence of urinary retention  Description: INTERVENTIONS:  - Assess patient’s ability to void and empty bladder  - Monitor I/O  - Bladder scan as needed  - Discuss with physician/AP medications to alleviate retention as needed  - Discuss catheterization for long term situations as appropriate  Outcome: Progressing     Problem: METABOLIC, FLUID AND ELECTROLYTES - ADULT  Goal: Electrolytes maintained within normal limits  Description: INTERVENTIONS:  - Monitor labs and assess patient for signs and symptoms of electrolyte imbalances  - Administer electrolyte replacement as ordered  - Monitor response to electrolyte replacements, including repeat lab results as appropriate  - Instruct patient on fluid and nutrition as appropriate  Outcome: Progressing  Goal: Fluid balance maintained  Description: INTERVENTIONS:  - Monitor labs   - Monitor I/O and WT  - Instruct patient on fluid and nutrition as appropriate  - Assess for signs & symptoms of volume excess or deficit  Outcome: Progressing     Problem: SKIN/TISSUE INTEGRITY - ADULT  Goal: Skin  Integrity remains intact(Skin Breakdown Prevention)  Description: Assess:  -Perform Werner assessment every   -Clean and moisturize skin every  -Inspect skin when repositioning, toileting, and assisting with ADLS  -Assess under medical devices such as  every   -Assess extremities for adequate circulation and senstion     Bed Management:  -Have minimal linens on bed & keep smooth, unwrinkled  -Change linens as needed when moist or perspiring  -Avoid sitting or lying in one position for more than  hours while in bed  -Keep HOB at degrees     Toileting:  -Offer bedside commode  -Assess for incontinence every   -Use incontinent care products after each incontinent episode such as     Activity:  -Mobilize patient  times a day  -Encourage activity and walks on unit  -Encourage or provide ROM exercises   -Turn and reposition patient every  Hours  -Use appropriate equipment to lift or move patient in bed  -Instruct/ Assist with weight shifting every  when out of bed in chair  -Consider limitation of chair time  hour intervals    Skin Care:  -Avoid use of baby powder, tape, friction and shearing, hot water or constrictive clothing  -Relieve pressure over bony prominences using   -Do not massage red bony area    Next Steps:  -Teach patient strategies to minimize risks such as    -Consider consults to  interdisciplinary teams such as   Outcome: Progressing     Problem: HEMATOLOGIC - ADULT  Goal: Maintains hematologic stability  Description: INTERVENTIONS  - Assess for signs and symptoms of bleeding or hemorrhage  - Monitor labs  - Administer supportive blood products/factors as ordered and appropriate  Outcome: Progressing     Problem: MUSCULOSKELETAL - ADULT  Goal: Maintain or return mobility to safest level of function  Description: INTERVENTIONS:  - Assess patient's ability to carry out ADLs; assess patient's baseline for ADL function and identify physical deficits which impact ability to perform ADLs (bathing, care of  mouth/teeth, toileting, grooming, dressing, etc.)  - Assess/evaluate cause of self-care deficits   - Assess range of motion  - Assess patient's mobility  - Assess patient's need for assistive devices and provide as appropriate  - Encourage maximum independence but intervene and supervise when necessary  - Involve family in performance of ADLs  - Assess for home care needs following discharge   - Consider OT consult to assist with ADL evaluation and planning for discharge  - Provide patient education as appropriate  Outcome: Progressing

## 2024-03-27 NOTE — PROGRESS NOTES
NEUROLOGY RESIDENCY PROGRESS NOTE     Name: Tanika Lira   Age & Sex: 58 y.o. female   MRN: [id number]  Unit/Bed#: ICU 04   Encounter: [id number]    Neurology outpatient followup in 4 weeks recommended    Pending for discharge: Clinical improvement    ASSESSMENT & PLAN     Cerebellar hemorrhage (HCC)  Assessment & Plan  Patient is a 58 year old woman with hx of right hemisphere stroke (possibly right pontine infarct) on aspirin , HTN, HLD presenting for left cerebellar hemorrhage with IVH. Patient's symptoms are of nausea, vomiting, diarrhea, vertigo sensation but no focal numbness, weakness, visual deficits, nor headaches. Also, horizontal diplopia. Patient has history of right sided stroke in 2022 which she was placed on DAPT and to continue plavix after 21 days. Patient states she is on aspirin daily on this visit. Initial blood pressure recorded at [facility] is 222/136. On exam, bilateral L>R dysmetria, HINTS exam with vertical skew and bidirectional slow nystagmus.   Thinners: aspirin reversed with DDAVP  Initial BP: Blood Pressure: 144/78  Presenting exam: Bilateral dysmetric L>R  Vascular risk factors: HTN, HLD, previous stroke    Workup:  CT head wo contrast 3/26/2024: Acute parenchymal hematoma left paramedian cerebellum measures 2.6 x 1.6 cm with mass effect on the fourth ventricle. Intraventricular extension of hemorrhage noted with blood in the fourth ventricle and left foramen of Luschka.   CT head wo contrast 3/27/2024: Redemonstration of hyperdense hemorrhage in the left paramedian cerebellum, as described with mass effect and extension into the fourth ventricle and left foramen of Luschka, similar in appearance to the prior.   Lab Results   Component Value Date    INR 1.11 03/26/2024    HGBA1C 5.2 09/02/2022    SODIUM 136 03/26/2024       Pertinent scores:  - ICH: 2 due to IVH and Infratentorial origin    Impression: Left cerebellar hemorrhage most likely in setting of hypertensive emergency  with blood pressures in the 200s systolic 2/2 to poor medication adherence and methamphetamine use now stable.    Plan:  S/P DDAVP X2  Neurosurgery input appreciated  Interval CTH until bleed stabilized  Q1 hour neuro checks  Repeat CTH if > 2 pt drop in GCS in one hour  Goal SBP between 120 and 140, cardene gtt or pressers/fluids as needed to keep within range  PT/OT/Speech/PMR consults when able  Replete lytes as needed as per CC  No chemical ppx due to hemorrhage, SCDs for ppx  Hold AP agents (at discharge, patient should be on plavix daily)  BG < 180, SSI for coverage  Rest of care per primary              SUBJECTIVE     Patient was seen and examined. No acute events overnight. This morning patient was complaining of photophobia, double vision and nausea but has not vomited since yesterday. Pt also admitted to methamphetamine use last week but reports that this is not something she uses normally.    Patient states she takes aspirin and plavix inconsistently at home and possibly last taken earlier on Sunday.     Pertinent Negatives include: paralysis/weakness, numbness or tingling     Review of Systems   Constitutional:  Negative for chills and fever.   HENT:  Negative for ear pain and sore throat.    Eyes:  Negative for pain.   Respiratory:  Negative for cough and shortness of breath.    Cardiovascular:  Negative for chest pain and palpitations.   Gastrointestinal:  Negative for abdominal pain and vomiting.   Genitourinary:  Negative for dysuria and hematuria.   Musculoskeletal:  Negative for arthralgias and back pain.   Skin:  Negative for color change and rash.   Neurological:  Negative for seizures and syncope.   All other systems reviewed and are negative.      OBJECTIVE     Patient ID: Tanika Lira is a 58 y.o. female.    Vitals:    03/27/24 0900 03/27/24 1000 03/27/24 1030 03/27/24 1100   BP: 141/89 124/73 140/81 148/80   BP Location:       Pulse: 74 70 68 68   Resp: 22 22 18 21   Temp:       TempSrc:        SpO2: 96% 96% 96% 97%   Weight:       Height:          Temperature:   Temp (24hrs), Av °F (36.7 °C), Min:97.6 °F (36.4 °C), Max:98.3 °F (36.8 °C)    Temperature: 98.3 °F (36.8 °C)      Physical Exam  Vitals and nursing note reviewed.   Constitutional:       General: She is not in acute distress.     Appearance: She is well-developed.   HENT:      Head: Normocephalic and atraumatic.   Eyes:      Extraocular Movements: EOM normal.      Conjunctiva/sclera: Conjunctivae normal.      Pupils: Pupils are equal, round, and reactive to light.   Cardiovascular:      Rate and Rhythm: Normal rate and regular rhythm.      Heart sounds: No murmur heard.  Pulmonary:      Effort: Pulmonary effort is normal. No respiratory distress.      Breath sounds: Normal breath sounds.   Abdominal:      Palpations: Abdomen is soft.      Tenderness: There is no abdominal tenderness.   Musculoskeletal:         General: No swelling.      Cervical back: Neck supple.   Skin:     General: Skin is warm and dry.      Capillary Refill: Capillary refill takes less than 2 seconds.   Neurological:      Mental Status: She is alert and oriented to person, place, and time.      Motor: Motor strength is normal.     Coordination: Finger-Nose-Finger Test abnormal (L>R dysmetria but improved). Heel to Marquez Test normal.      Deep Tendon Reflexes:      Reflex Scores:       Bicep reflexes are 2+ on the right side and 2+ on the left side.       Brachioradialis reflexes are 2+ on the right side and 2+ on the left side.       Patellar reflexes are 2+ on the right side and 2+ on the left side.       Achilles reflexes are 1+ on the right side and 1+ on the left side.  Psychiatric:         Mood and Affect: Mood normal.          Neurologic Exam     Mental Status   Oriented to person, place, and time.   Level of consciousness: drowsy    Cranial Nerves     CN II   Right visual field deficit: upper temporal and upper nasal quadrant(s)  Left visual field deficit: upper  nasal and upper temporal quadrant(s)    CN III, IV, VI   Pupils are equal, round, and reactive to light.  Extraocular motions are normal.   Nystagmus: none   Diplopia: bilateral    CN V   Facial sensation intact.     CN VII   Facial expression full, symmetric.     CN VIII   CN VIII normal.     CN XI   CN XI normal.     Motor Exam     Strength   Strength 5/5 throughout.     Sensory Exam   Light touch normal.     Gait, Coordination, and Reflexes     Coordination   Finger to nose coordination: abnormal (L>R dysmetria but improved)  Heel to shin coordination: normal    Tremor   Intention tremor: present    Reflexes   Right brachioradialis: 2+  Left brachioradialis: 2+  Right biceps: 2+  Left biceps: 2+  Right patellar: 2+  Left patellar: 2+  Right achilles: 1+  Left achilles: 1+         LABORATORY DATA     Labs: I have personally reviewed pertinent reports.    Results from last 7 days   Lab Units 03/27/24 0427 03/26/24  0820   WBC Thousand/uL 9.44 8.29   HEMOGLOBIN g/dL 12.9 15.4   HEMATOCRIT % 40.0 47.4*   PLATELETS Thousands/uL 179 189   NEUTROS PCT %  --  34*   MONOS PCT %  --  12   EOS PCT %  --  4      Results from last 7 days   Lab Units 03/27/24 0427 03/26/24  0820   SODIUM mmol/L 133* 136   POTASSIUM mmol/L 3.3* 3.7   CHLORIDE mmol/L 99 99   CO2 mmol/L 26 30   BUN mg/dL 11 15   CREATININE mg/dL 0.55* 0.67   CALCIUM mg/dL 8.2* 9.1   ALK PHOS U/L  --  100   ALT U/L  --  114*   AST U/L  --  113*     Results from last 7 days   Lab Units 03/27/24  0427 03/26/24  0820   MAGNESIUM mg/dL 1.6* 1.8*     Results from last 7 days   Lab Units 03/27/24  0427   PHOSPHORUS mg/dL 3.6      Results from last 7 days   Lab Units 03/26/24  1804 03/26/24  1647   INR   --  1.11   PTT seconds 29  --      Results from last 7 days   Lab Units 03/26/24  0820   LACTIC ACID mmol/L 1.1           Rapid drug screen, urine        Component  Ref Range & Units 3/26/24 1626   Amph/Meth UR  Negative Positive Abnormal    Barbiturate Ur  Negative  Negative   Benzodiazepine Urine  Negative Negative   Cocaine Urine  Negative Negative   Methadone Urine  Negative Negative   Opiate Urine  Negative Negative   PCP Ur  Negative Negative   THC Urine  Negative Negative   Oxycodone Urine  Negative Negative                       IMAGING & DIAGNOSTIC TESTING     Radiology Results: I have personally reviewed pertinent reports.      CT head wo contrast   Final Result by Gurwinder Cisneros DO (03/27 0515)      Redemonstration of hyperdense hemorrhage in the left paramedian cerebellum, as described with mass effect and extension into the fourth ventricle and left foramen of Luschka, similar in appearance to the prior.      No hydrocephalus.      Other findings as above. Overall there has been no significant interval change.      Follow-up as clinically warranted.                  Workstation performed: [id number]             Other Diagnostic Testing: I have personally reviewed pertinent reports.      ACTIVE MEDICATIONS     Current Facility-Administered Medications   Medication Dose Route Frequency    amLODIPine (NORVASC) tablet 5 mg  5 mg Oral Daily    atorvastatin (LIPITOR) tablet 40 mg  40 mg Oral Daily With Dinner    chlorhexidine (PERIDEX) 0.12 % oral rinse 15 mL  15 mL Mouth/Throat Q12H NAOMI    escitalopram (LEXAPRO) tablet 20 mg  20 mg Oral Daily    folic acid (FOLVITE) tablet 1 mg  1 mg Oral Daily    gabapentin (NEURONTIN) capsule 300 mg  300 mg Oral HS    hydrALAZINE (APRESOLINE) injection 5 mg  5 mg Intravenous Q4H PRN    labetalol (NORMODYNE) injection 10 mg  10 mg Intravenous Q4H PRN    lactated ringers infusion  75 mL/hr Intravenous Continuous    multivitamin-minerals (CENTRUM) tablet 1 tablet  1 tablet Oral Daily    niCARdipine (CARDENE) 2.5 mg/mL injection **ADS Override Pull**        thiamine tablet 100 mg  100 mg Oral Daily    trimethobenzamide (TIGAN) IM injection 200 mg  200 mg Intramuscular Q6H PRN       Prior to Admission medications    Medication  Sig Start Date End Date Taking? Authorizing Provider   aspirin (ECOTRIN LOW STRENGTH) 81 mg EC tablet Take 1 tablet (81 mg total) by mouth daily 10/7/22   Alec Kamara MD   atorvastatin (LIPITOR) 40 mg tablet Take 1 tablet (40 mg total) by mouth daily 6/13/23   Tanika Winter, MILAN   clopidogrel (PLAVIX) 75 mg tablet Take 1 tablet (75 mg total) by mouth daily 6/13/23   Tanika Winter, MICHAELNP   escitalopram (Lexapro) 20 mg tablet Take 1 tablet (20 mg total) by mouth daily 10/19/22   Tanika Winter, MICHAELNP   gabapentin (NEURONTIN) 300 mg capsule Take 1 capsule (300 mg total) by mouth daily at bedtime 6/13/23   Tanika Winter, MICHAELNP   hydrOXYzine HCL (ATARAX) 50 mg tablet Take 1 tablet (50 mg total) by mouth every 6 (six) hours as needed for itching 6/13/23   Tanika Winter, CRNP   lisinopril (ZESTRIL) 20 mg tablet Take 1 tablet (20 mg total) by mouth daily 6/13/23   Tanika Winter, MICHAELNP   NIFEdipine ER (ADALAT CC) 30 MG 24 hr tablet Take 1 tablet (30 mg total) by mouth daily 6/13/23   Tanika Winter, MILAN         VTE Pharmacologic Prophylaxis: per primary team  VTE Mechanical Prophylaxis: per primary team    ======    I have discussed the patient's history, physical exam findings, assessment, and plan in detail with attending, Dr. Marlena Martinez.    Thank you for allowing me to participate in the care of your patient, Tanika Lira.    Clemente Mcwilliams MD  [facility], PGY-4

## 2024-03-27 NOTE — OB LABOR/OXYTOCIN SAFETY PROGRESS
NEUROLOGY RESIDENCY PROGRESS NOTE     Name: Tanika Lira   Age & Sex: 58 y.o. female   MRN: [id number]  Unit/Bed#: ICU 04   Encounter: [id number]    {Neurology Follow Up:82407} *** This should be completed prior to removing patient from list or discharge     Pending for discharge: ***    ASSESSMENT & PLAN     Cerebellar hemorrhage (HCC)  Assessment & Plan  Patient is a 58 year old woman with hx of right hemisphere stroke (possibly right pontine infarct) on aspirin , HTN, HLD presenting for left cerebellar hemorrhage with IVH. Patient's symptoms are of nausea, vomiting, diarrhea, vertigo sensation but no focal numbness, weakness, visual deficits, nor headaches. Also, horizontal diplopia. Patient has history of right sided stroke in 2022 which she was placed on DAPT and to continue plavix after 21 days. Patient states she is on aspirin daily on this visit. Initial blood pressure recorded at [facility] is 222/136. On exam, bilateral L>R dysmetria, HINTS exam with vertical skew and bidirectional slow nystagmus.   Thinners: aspirin reversed with DDAVP  Initial BP: Blood Pressure: 144/78  Presenting exam: Bilateral dysmetric L>R  Vascular risk factors: HTN, HLD, previous stroke    Workup:  CT head wo contrast 3/26/2024:   Lab Results   Component Value Date    INR 1.11 03/26/2024    HGBA1C 5.2 09/02/2022    SODIUM 136 03/26/2024       Pertinent scores:  - ICH: 2 due to IVH and Infratentorial origin    Impression: Left cerebellar hemorrhage most likely in setting of hypertensive emergency with blood pressures in the 200s systolic.     Plan:  S/P DDAVP X2  Neurosurgery input appreciated  Interval CTH until bleed stabilized  Q1 hour neuro checks  Repeat CTH if > 2 pt drop in GCS in one hour  Goal SBP between 120 and 140, cardene gtt or pressers/fluids as needed to keep within range  PT/OT/Speech/PMR consults when able  Replete lytes as needed as per CC  No chemical ppx due to hemorrhage, SCDs for ppx  Hold AP  agents  BG < 180, SSI for coverage  Rest of care per primary              SUBJECTIVE     Patient was seen and examined. No acute events overnight. ***    Pertinent Negatives include: {Ros-neuro negatives:5140}     Review of Systems    OBJECTIVE     Patient ID: Tanika Lira is a 58 y.o. female.    Vitals:    24 0500 24 0530 24 0600 24 0700   BP: 138/74 142/77 150/77 146/79   BP Location: Left arm Left arm Left arm Left arm   Pulse: 76 80 80 72   Resp: 21 (!) 23 (!) 29 21   Temp:       TempSrc:       SpO2: 93% 93% 93% 94%   Weight:       Height:          Temperature:   Temp (24hrs), Av.8 °F (36.6 °C), Min:97.5 °F (36.4 °C), Max:98.2 °F (36.8 °C)    Temperature: 98.2 °F (36.8 °C)      Physical Exam     Neurologic Exam       *** Delete this line once neuro exam completed.    LABORATORY DATA     Labs: {Results Review Statement:94016}  Results from last 7 days   Lab Units 24  0427 24  0820   WBC Thousand/uL 9.44 8.29   HEMOGLOBIN g/dL 12.9 15.4   HEMATOCRIT % 40.0 47.4*   PLATELETS Thousands/uL 179 189   NEUTROS PCT %  --  34*   MONOS PCT %  --  12   EOS PCT %  --  4      Results from last 7 days   Lab Units 24  0427 24  0820   SODIUM mmol/L 133* 136   POTASSIUM mmol/L 3.3* 3.7   CHLORIDE mmol/L 99 99   CO2 mmol/L 26 30   BUN mg/dL 11 15   CREATININE mg/dL 0.55* 0.67   CALCIUM mg/dL 8.2* 9.1   ALK PHOS U/L  --  100   ALT U/L  --  114*   AST U/L  --  113*     Results from last 7 days   Lab Units 24  0427 24  0820   MAGNESIUM mg/dL 1.6* 1.8*     Results from last 7 days   Lab Units 24  0427   PHOSPHORUS mg/dL 3.6      Results from last 7 days   Lab Units 24  1804 03/26/24  1647   INR   --  1.11   PTT seconds 29  --      Results from last 7 days   Lab Units 24  0820   LACTIC ACID mmol/L 1.1           IMAGING & DIAGNOSTIC TESTING     Radiology Results: {Results Review Statement:21127}    CT head wo contrast   Final Result by Gurwinder Gomez  DO Blayne (03/27 0515)      Redemonstration of hyperdense hemorrhage in the left paramedian cerebellum, as described with mass effect and extension into the fourth ventricle and left foramen of Luschka, similar in appearance to the prior.      No hydrocephalus.      Other findings as above. Overall there has been no significant interval change.      Follow-up as clinically warranted.                  Workstation performed: [id number]             Other Diagnostic Testing: {Results Review Statement:80837}    ACTIVE MEDICATIONS     Current Facility-Administered Medications   Medication Dose Route Frequency    atorvastatin (LIPITOR) tablet 40 mg  40 mg Oral Daily With Dinner    chlorhexidine (PERIDEX) 0.12 % oral rinse 15 mL  15 mL Mouth/Throat Q12H NAOMI    escitalopram (LEXAPRO) tablet 20 mg  20 mg Oral Daily    folic acid (FOLVITE) tablet 1 mg  1 mg Oral Daily    gabapentin (NEURONTIN) capsule 300 mg  300 mg Oral HS    hydrALAZINE (APRESOLINE) injection 5 mg  5 mg Intravenous Q4H PRN    labetalol (NORMODYNE) injection 10 mg  10 mg Intravenous Q4H PRN    lactated ringers infusion  75 mL/hr Intravenous Continuous    magnesium sulfate 2 g/50 mL IVPB (premix) 2 g  2 g Intravenous Once    multivitamin-minerals (CENTRUM) tablet 1 tablet  1 tablet Oral Daily    niCARdipine (CARDENE) 2.5 mg/mL injection **ADS Override Pull**        niCARdipine (CARDENE) 2.5 mg/mL injection **ADS Override Pull**        niCARdipine (CARDENE) 25 mg (STANDARD CONCENTRATION) in sodium chloride 0.9% 250 mL  1-15 mg/hr Intravenous Titrated    potassium chloride (Klor-Con M20) CR tablet 20 mEq  20 mEq Oral Q2H    thiamine tablet 100 mg  100 mg Oral Daily    trimethobenzamide (TIGAN) IM injection 200 mg  200 mg Intramuscular Q6H PRN       Prior to Admission medications    Medication Sig Start Date End Date Taking? Authorizing Provider   aspirin (ECOTRIN LOW STRENGTH) 81 mg EC tablet Take 1 tablet (81 mg total) by mouth daily 10/7/22   Alec  MD Anh   atorvastatin (LIPITOR) 40 mg tablet Take 1 tablet (40 mg total) by mouth daily 6/13/23   Tanika Winter, MILAN   clopidogrel (PLAVIX) 75 mg tablet Take 1 tablet (75 mg total) by mouth daily 6/13/23   Tanika Winter, MILAN   escitalopram (Lexapro) 20 mg tablet Take 1 tablet (20 mg total) by mouth daily 10/19/22   Tanika Winter, MILAN   gabapentin (NEURONTIN) 300 mg capsule Take 1 capsule (300 mg total) by mouth daily at bedtime 6/13/23   Tanika Winter, MILAN   hydrOXYzine HCL (ATARAX) 50 mg tablet Take 1 tablet (50 mg total) by mouth every 6 (six) hours as needed for itching 6/13/23   Tanika Winter, MILAN   lisinopril (ZESTRIL) 20 mg tablet Take 1 tablet (20 mg total) by mouth daily 6/13/23   Tanika Winter, MILAN   NIFEdipine ER (ADALAT CC) 30 MG 24 hr tablet Take 1 tablet (30 mg total) by mouth daily 6/13/23   Tanika Winter, MILAN         VTE Pharmacologic Prophylaxis: {Pharmacologic VTE Prophylaxis:85628}  VTE Mechanical Prophylaxis: {Mechanical VTE Prophylaxis:14258}    ======    I have discussed the patient's history, physical exam findings, assessment, and plan in detail with attending,  ***    Thank you for allowing me to participate in the care of your patient, Tanika Lira.    Clemente Mcwilliams MD  [facility] Neurology Residency, PGY-***

## 2024-03-27 NOTE — UTILIZATION REVIEW
Initial Clinical Review    Admission: Date/Time/Statement:   Admission Orders (From admission, onward)       Ordered        03/26/24 1417  Inpatient Admission  Once                          Orders Placed This Encounter   Procedures    Inpatient Admission     Standing Status:   Standing     Number of Occurrences:   1     Order Specific Question:   Level of Care     Answer:   Critical Care [15]     Order Specific Question:   Estimated length of stay     Answer:   More than 2 Midnights     Order Specific Question:   Certification     Answer:   I certify that inpatient services are medically necessary for this patient for a duration of greater than two midnights. See H&P and MD Progress Notes for additional information about the patient's course of treatment.       Initial Presentation: 58 y.o. female to ED via EMS from home  Present to ED with cerebellar hemorrhage. patient woke up this morning feeling dizzy, nauseous, and headache. She fell down on the floor and began vomiting. She also had several episodes of diarrhea. She called EMS and was found to be ataxic on the way to the ED.    PMHX: daily alcohol use, 30 pack-years smoking hx, marijuana use, HTN, neuropathy, diverticulitis s/p sigmoid colon resection, and previous CVA in 09/2022.   Admitted to ICU with DX: cerebellar hemorrhage.   on exam: ICH Score 2; GCS 13-15; lethargic, inattentive, hypertensive, Mild RUQ tenderness. Liver edge palpable 3cm below the costal margin.    Mild ataxia on finger to nose testing. Difficulty keeping eyes focused on a single point. Vision, and motor grossly intact; Mg 1.8  CTH wo contrast: Acute parenchymal hematoma left paramedian cerebellum measures 2.6 x 1.6 cm with mass effect on the fourth ventricle. Intraventricular extension of hemorrhage noted with blood in the fourth ventricle and left foramen of Luschka.   PLAN: neuro checks Q1H; NPO; serial abdominal exams; rec'd DDAVP x1; rec'd ivf bolus 1L x1; cont ivf; cont cardene  gtt; rec'd hydralazine iv x1; rec'd labetalol iv x1; monitor labs; Cardiopulmonary monitoring; f/u MRI; consult neurology; consult neurosurgery    NEUROLOGY CONSULT  Patient's symptoms are of nausea, vomiting, diarrhea, vertigo sensation but no focal numbness, weakness, visual deficits, nor headaches. Also, horizontal diplopia. Patient has history of right sided stroke in 2022 which she was placed on DAPT and to continue plavix after 21 days. Patient states she is on aspirin daily on this visit. Initial blood pressure recorded at [facility] is 222/136. On exam, bilateral L>R dysmetria, HINTS exam with vertical skew and bidirectional slow nystagmus.  Thinners: aspirin reversed with DDAVP.  Initial BP: Blood Pressure: 144/78.  Presenting exam: Bilateral dysmetric L>R.  Vascular risk factors: HTN, HLD, previous stroke.    Plan:       NEUROSURGERY CONSULT  Patient presented s/p acute onset nausea, diarrhea and dizziness.  H/o ischemic stroke in 2022 maintained on Plavix. ICH score 2.  Close neurological monitoring given 4th ventricular involvement.  EVD watch.  No need for keppra at this time given infratentorial location . Hold all AC/AP medication at this time - Reversal of plavix given intracranial hemorrhage .  PT/OT evaluation- suspect ataxia with visual concerns given cerebellar involvement. CM following for dispo planning. Neurosurgery will continue to follow. ICH: 2 due to IVH and Infratentorial origin.    Impression: Left cerebellar hemorrhage most likely in setting of hypertensive emergency with blood pressures in the 200s systolic. S/P DDAVP X2   Plan: Q1 hour neuro checks; continue cardene gtt; PT/OT/Speech eval - tx; monitor labs; hold AP agents          Date: 3/27/24       Day 2  Stable exam. Stable size of bleed present without evidence of hydrocephalus. UDS positive for meth. Received additional dose of ASA incorrect, given additional dose of DDAVP .  3/27 am CT Head: Redemonstration of hyperdense  hemorrhage in the left paramedian cerebellum, as described with mass effect and extension into the fourth ventricle and left foramen of Luschka, similar in appearance to the prior.   Plan: neuro checks Q2H; NPO; serial abdominal exams; cont ivf; cont cardene gtt; rec'd labetalol iv x2; recd Mg sulf iv x1; monitor labs; Cardiopulmonary monitoring; f/u MRI; start norvasc 5 mg today; recheck EKG; advance diet      ED Triage Vitals   Temperature Pulse Respirations Blood Pressure SpO2   03/26/24 1355 03/26/24 1355 03/26/24 1355 03/26/24 1355 03/26/24 1355   97.6 °F (36.4 °C) 82 16 144/78 97 %      Temp Source Heart Rate Source Patient Position - Orthostatic VS BP Location FiO2 (%)   03/26/24 1355 03/26/24 1355 03/26/24 2100 03/26/24 1355 --   Oral Monitor Lying Left arm       Pain Score       03/26/24 1400       No Pain          Wt Readings from Last 1 Encounters:   03/26/24 60.7 kg (133 lb 13.1 oz)     Additional Vital Signs:   Date/Time Temp Pulse Resp BP MAP (mmHg) SpO2 Calculated FIO2 (%) - Nasal Cannula Nasal Cannula O2 Flow Rate (L/min) O2 Device Patient Position - Orthostatic VS   03/27/24 1100 -- 68 21 148/80 115 97 % -- -- -- --   03/27/24 1030 -- 68 18 140/81 102 96 % -- -- -- --   03/27/24 1000 -- 70 22 124/73 100 96 % -- -- -- --   03/27/24 0900 -- 74 22 141/89 111 96 % -- -- -- --   03/27/24 0800 98.3 °F (36.8 °C) 74 21 151/84 105 94 % -- -- None (Room air) --   03/27/24 0700 -- 72 21 146/79 110 94 % -- -- None (Room air) Lying   03/27/24 0600 -- 80 29 Abnormal  150/77 104 93 % -- -- None (Room air) Lying   03/27/24 0530 -- 80 23 Abnormal  142/77 108 93 % -- -- None (Room air) Lying   03/27/24 0500 -- 76 21 138/74 103 93 % -- -- None (Room air) Lying   03/27/24 0430 -- 80 21 144/76 112 92 % -- -- None (Room air) Lying   03/27/24 0400 -- -- -- -- -- 92 % -- -- None (Room air) --   03/27/24 0345 -- 82 21 -- -- 92 % -- -- -- --   03/27/24 0330 -- 82 21 140/75 107 92 % -- -- None (Room air) Lying   03/27/24 0328  -- 82 22 138/76 99 92 % -- -- None (Room air) Lying   03/27/24 0300 -- 84 24 Abnormal  133/72 93 91 % -- -- None (Room air) Lying   03/27/24 0230 -- 80 19 117/58 80 95 % -- -- None (Room air) Lying   03/27/24 0227 -- 80 21 114/61 77 95 % 28 2 L/min Nasal cannula Lying   03/27/24 0200 -- 82 18 112/57 80 96 % -- -- Nasal cannula Lying   03/27/24 0130 -- 84 21 127/79 98 96 % -- -- Nasal cannula Lying   03/27/24 0100 -- 84 23 Abnormal  124/64 87 96 % 28 2 L/min Nasal cannula Lying   03/27/24 0056 -- 84 22 131/71 90 96 % -- -- None (Room air) Lying   03/27/24 0030 -- 88 23 Abnormal  123/66 85 92 % -- -- None (Room air) Lying   03/27/24 0000 -- 82 21 118/60 80 92 % -- -- None (Room air) Lying   03/26/24 2330 -- 82 21 113/62 78 92 % -- -- None (Room air) Lying   03/26/24 2300 98.2 °F (36.8 °C) 84 21 119/64 95 92 % -- -- None (Room air) Lying   03/26/24 2200 -- 84 22 121/63 94 93 % -- -- None (Room air) Lying   03/26/24 2130 -- 84 22 123/66 88 93 % -- -- -- Lying   03/26/24 2100 -- 84 22 125/67 92 94 % -- -- None (Room air) Lying   03/26/24 2030 -- 84 19 113/69 83 94 % -- -- None (Room air) --   03/26/24 2000 -- 84 20 118/64 94 95 % -- -- None (Room air) --   03/26/24 1930 -- 86 21 125/67 85 95 % -- -- None (Room air) --   03/26/24 1915 -- 86 22 133/71 96 95 % -- -- None (Room air) --   03/26/24 1900 -- 84 21 140/75 96 95 % -- -- None (Room air) --   03/26/24 1850 -- 84 20 149/83 110 96 % -- -- None (Room air) --   03/26/24 1845 -- 82 22 152/82 113 96 % -- -- None (Room air) --   03/26/24 1840 -- 80 21 165/91 122 96 % -- -- None (Room air) --   03/26/24 1829 -- 76 21 171/91 Abnormal  115 96 % -- -- None (Room air) --   03/26/24 1825 -- 76 21 183/96 Abnormal  130 96 % -- -- None (Room air) --   03/26/24 1800 -- 72 20 174/94 Abnormal  134 97 % -- -- None (Room air) --   03/26/24 1755 -- 70 20 184/98 Abnormal  133 97 % -- -- None (Room air) --   03/26/24 1720 -- 68 21 168/92 121 96 % -- -- None (Room air) --   03/26/24 1700 --  78 20 177/91 Abnormal  151 96 % -- -- None (Room air) --   03/26/24 1656 98 °F (36.7 °C) 78 20 180/95 Abnormal  129 96 % -- -- None (Room air) --   03/26/24 1630 -- 82 21 164/91 136 96 % -- -- None (Room air) --   03/26/24 1600 -- 84 19 172/92 Abnormal  124 96 % -- -- None (Room air) --   03/26/24 1500 -- 78 19 149/86 117 97 % -- -- None (Room air) --   03/26/24 1444 -- 80 22 151/89 121 -- -- -- -- --   03/26/24 1439 -- -- -- -- -- 98 % -- -- None (Room air) --   03/26/24 1430 -- 78 21 153/83 121 96 % -- -- None (Room air) --   03/26/24 1400 -- 80 18 145/85 111 96 % -- -- None (Room air) --   03/26/24 1355 97.6 °F (36.4 °C) 82 16 144/78 103 97 % -- -- None (Room air)        EKG: NSR       Pertinent Labs/Diagnostic Test Results:   CT head wo contrast   Final Result by Gurwinder Cisneros DO (03/27 0515)      Redemonstration of hyperdense hemorrhage in the left paramedian cerebellum, as described with mass effect and extension into the fourth ventricle and left foramen of Luschka, similar in appearance to the prior.      No hydrocephalus.      Other findings as above. Overall there has been no significant interval change.      Follow-up as clinically warranted.                  Workstation performed: [id number]           Results from last 7 days   Lab Units 03/26/24  0840   SARS-COV-2  Negative     Results from last 7 days   Lab Units 03/27/24  0427 03/26/24  0820   WBC Thousand/uL 9.44 8.29   HEMOGLOBIN g/dL 12.9 15.4   HEMATOCRIT % 40.0 47.4*   PLATELETS Thousands/uL 179 189   NEUTROS ABS Thousands/µL  --  2.80        Results from last 7 days   Lab Units 03/27/24  0549 03/27/24  0427 03/26/24  0820   SODIUM mmol/L  --  133* 136   POTASSIUM mmol/L  --  3.3* 3.7   CHLORIDE mmol/L  --  99 99   CO2 mmol/L  --  26 30   ANION GAP mmol/L  --  8 7   BUN mg/dL  --  11 15   CREATININE mg/dL  --  0.55* 0.67   EGFR ml/min/1.73sq m  --  102 96   CALCIUM mg/dL  --  8.2* 9.1   CALCIUM, IONIZED mmol/L 1.08*  --   --     MAGNESIUM mg/dL  --  1.6* 1.8*   PHOSPHORUS mg/dL  --  3.6  --      Results from last 7 days   Lab Units 03/26/24  0820   AST U/L 113*   ALT U/L 114*   ALK PHOS U/L 100   TOTAL PROTEIN g/dL 9.6*   ALBUMIN g/dL 4.0   TOTAL BILIRUBIN mg/dL 0.53     Results from last 7 days   Lab Units 03/26/24  0844   POC GLUCOSE mg/dl 166*     Results from last 7 days   Lab Units 03/27/24  0427 03/26/24  0820   GLUCOSE RANDOM mg/dL 119 132        Results from last 7 days   Lab Units 03/26/24  1315 03/26/24  1129 03/26/24  0820   HS TNI 0HR ng/L  --   --  5   HS TNI 2HR ng/L  --  4  --    HSTNI D2 ng/L  --  -1  --    HS TNI 4HR ng/L 4  --   --    HSTNI D4 ng/L -1  --   --         Results from last 7 days   Lab Units 03/26/24  1804 03/26/24  1647   PROTIME seconds  --  14.2   INR   --  1.11   PTT seconds 29  --         Results from last 7 days   Lab Units 03/26/24  0820   LACTIC ACID mmol/L 1.1        Results from last 7 days   Lab Units 03/26/24  0820   LIPASE u/L 53        Results from last 7 days   Lab Units 03/26/24  1400   CLARITY UA  Clear   COLOR UA  Light Yellow   SPEC GRAV UA  <1.005*   PH UA  8.0   GLUCOSE UA mg/dl Negative   KETONES UA mg/dl Negative   BLOOD UA  Negative   PROTEIN UA mg/dl Trace*   NITRITE UA  Negative   BILIRUBIN UA  Negative   UROBILINOGEN UA (BE) mg/dl <2.0   LEUKOCYTES UA  Negative   WBC UA /hpf None Seen   RBC UA /hpf None Seen   BACTERIA UA /hpf None Seen   EPITHELIAL CELLS WET PREP /hpf None Seen     Results from last 7 days   Lab Units 03/26/24  0840   INFLUENZA A PCR  Negative   INFLUENZA B PCR  Negative   RSV PCR  Negative        Results from last 7 days   Lab Units 03/26/24  1626   AMPH/METH  Positive*   BARBITURATE UR  Negative   BENZODIAZEPINE UR  Negative   COCAINE UR  Negative   METHADONE URINE  Negative   OPIATE UR  Negative   PCP UR  Negative   THC UR  Negative            Admitting Diagnosis: Acute parenchymal hematoma    Age/Sex: 58 y.o. female    Admission Orders: SCDs; neuro checks;  I/O; Daily wts; Cardiopulmonary monitoring; seizure precautions; aspiration precautions; regular diet    Scheduled Medications:  amLODIPine, 5 mg, Oral, Daily  atorvastatin, 40 mg, Oral, Daily With Dinner  chlorhexidine, 15 mL, Mouth/Throat, Q12H NAOMI  escitalopram, 20 mg, Oral, Daily  folic acid, 1 mg, Oral, Daily  gabapentin, 300 mg, Oral, HS  multivitamin-minerals, 1 tablet, Oral, Daily  thiamine, 100 mg, Oral, Daily    multi-electrolyte (ISOLYTE-S PH 7.4) bolus 1,000 mL  Dose: 1,000 mL  Freq: Once Route: IV  Last Dose: Stopped (03/26/24 1603)  Start: 03/26/24 1500 End: 03/26/24 1603     desmopressin (DDAVP) 18.4 mcg in sodium chloride 0.9 % 50 mL IVPB  Dose: 0.3 mcg/kg  Weight Dosing Info: 60.7 kg  Freq: Once Route: IV  Last Dose: Stopped (03/26/24 1848)  Start: 03/26/24 1800 End: 03/26/24 1848     magnesium sulfate 2 g/50 mL IVPB (premix) 2 g  Dose: 2 g  Freq: Once Route: IV  Last Dose: 2 g (03/27/24 0820)  Start: 03/27/24 0700 End: 03/27/24 1020       Continuous IV Infusions:  lactated ringers, 75 mL/hr, Intravenous, Continuous  niCARdipine (CARDENE) 25 mg (STANDARD CONCENTRATION) in sodium chloride 0.9% 250 mL      PRN Meds:   hydrALAZINE, 5 mg, Intravenous, Q4H PRN  (3/26 rec'd x1)   labetalol, 10 mg, Intravenous, Q4H PRN     (3/26 rec'd x1)  (3/27 rec'd x2 so far today)   trimethobenzamide, 200 mg, Intramuscular, Q6H PRN   (3/26 rec'd x1)         IP CONSULT TO CASE MANAGEMENT  IP CONSULT TO PHYSICAL MEDICINE REHAB  IP CONSULT TO NEUROLOGY  IP CONSULT TO NUTRITION SERVICES  IP CONSULT TO NEUROSURGERY    Network Utilization Review Department  ATTENTION: Please call with any questions or concerns to [phone] and carefully listen to the prompts so that you are directed to the right person. All voicemails are confidential.   For Discharge needs, contact Care Management DC Support Team at [phone] opt. 2  Send all requests for admission clinical reviews, approved or denied determinations and any other  requests to dedicated fax number below belonging to the campus where the patient is receiving treatment. List of dedicated fax numbers for the Facilities:  FACILITY NAME UR FAX NUMBER   ADMISSION DENIALS (Administrative/Medical Necessity) [phone]   DISCHARGE SUPPORT TEAM (NETWORK) [phone]   PARENT CHILD HEALTH (Maternity/NICU/Pediatrics) [phone]   [facility] [phone]   [facility] [phone]   [facility] [phone]   [facility] [phone]   [facility] [phone]   [facility] [phone]   [facility] [phone]   [facility] [phone]   [facility] [phone]   Critical access hospital [phone]   [facility] [phone]   [facility] [phone]

## 2024-03-27 NOTE — CASE MANAGEMENT
Case Management Discharge Planning Note    Patient name Tanika Lira  Location ICU 04/ICU 04 MRN [id number]  : 1965 Date 3/27/2024       Current Admission Date: 3/26/2024  Current Admission Diagnosis:History of CVA (cerebrovascular accident)   Patient Active Problem List    Diagnosis Date Noted    Cerebellar hemorrhage (HCC) 2024    Polyneuropathy 2023    Hallux valgus with bunions of right foot 2023    Hallux valgus with bunions of left foot 2023    Hammer toes of both feet 2023    Macrocytic anemia 2023    ROSANA (generalized anxiety disorder) 2022    Neuropathy 2022    History of CVA (cerebrovascular accident) 2022    Tobacco abuse 2022    Essential hypertension 2022      LOS (days): 1  Geometric Mean LOS (GMLOS) (days):   Days to GMLOS:     OBJECTIVE:  Risk of Unplanned Readmission Score: 11.65         Current admission status: Inpatient   Preferred Pharmacy:   [id number] - [place] - [address]  [address]  [place] [address]  Phone: [phone] Fax: [phone]    Primary Care Provider: MILAN Miller    Primary Insurance: [employer]  Secondary Insurance:     DISCHARGE DETAILS:                   Other Referral/Resources/Interventions Provided:  Interventions: Acute Rehab  Referral Comments: Met with patient and discussed PT/OT eval recs for acute rehab. Pt agreeable and would like referral sent to [facility]. ref sent on [facility]. Also made aware that pt will likely need drug/alcohol rehab following STR.         Treatment Team Recommendation: Acute Rehab  Discharge Destination Plan:: Acute Rehab

## 2024-03-27 NOTE — PROGRESS NOTES
[facility]  Progress Note: Critical Care  Name: Tanika Lira 58 y.o. female I MRN: [id number]  Unit/Bed#: ICU 04 I Date of Admission: 3/26/2024   Date of Service: 3/27/2024 I Hospital Day: 1    Assessment/Plan   Neuro:   Diagnosis: Cerebellar hemorrhage  Patient woke up the morning of 3/26 with headache, nausea, and dizziness. She then head several episodes of emesis and diarrhea.   3/26 am CTH wo contrast: Acute parenchymal hematoma left paramedian cerebellum measures 2.6 x 1.6 cm with mass effect on the fourth ventricle. Intraventricular extension of hemorrhage noted with blood in the fourth ventricle and left foramen of Luschka.  Chronic lacunar infarcts are identified as described.  3/27 am CT Head: Redemonstration of hyperdense hemorrhage in the left paramedian cerebellum, as described with mass effect and extension into the fourth ventricle and left foramen of Luschka, similar in appearance to the prior.     Plan:   Frequent neurochecks Q1 hour   Future MRI at neuro's discretion  If neuro change occurs, low threshold for repeat CT head and subsequent EVD placement if CSF occlusion detected     CV:   No active issues  Prior holter monitor was negative for A-fib or other arrythmia     Pulm:  No active issues     GI:   Diagnosis: cholelithiasis  Cholelithiasis, including a 3 mm gallstone within the gallbladder neck versus cystic duct. No findings of acute cholecystitis. If there is concern for acute cholecystitis, right upper quadrant ultrasound can be obtained.   RUQ tenderness on exam   Plan:   If patient becomes symptomatic, obtain RUQ US, consider cholecystectomy  Follow up with GI outpatient  Diagnosis: Possible alcohol induced cirrhosis  3/26 CT: Findings suggesting cirrhosis with mild mesenteric edema, but no ascites. Enlarged ana hepatic and peripancreatic lymph nodes measuring up to 2 cm in short axis, indeterminate, although could be reactive, given suspicion  for cirrhosis.   AST: 113  ALT: 114  Plan:   Monitor liver enzymes  Follow up with GI outpatient        :   Diagnosis: R adnexal cyst noted on CT  Simple appearing right adnexal cyst measuring 3.7 cm. According to current guidelines (J Am Kayla Radiol 2020; 17:248-254) in this postmenopausal woman, this should be followed in 6 to 12 months by pelvic ultrasound.   Plan:   Discussed with patient f/u outpatient 6-12 months by pelvic US  Diagnosis: Positive UDS for amphetamine  Plan: Continue to monitor for withdrawal sx  F/E/N:    F: 1L bolus Isolyte and 75/hr LR  E: replete prn  N: regular house after bedside swallow     Heme/Onc:   No active issues     Endo:   No active issues     ID:   No active issues     MSK/Skin:   Diagnosis: Shannon  Plan: Frequent skin care     Disposition: Critical care    ICU Core Measures     A: Assess, Prevent, and Manage Pain Has pain been assessed? Yes  Need for changes to pain regimen? No   B: Both SAT/SAT  N/A   C: Choice of Sedation RASS Goal: 0 Alert and Calm  Need for changes to sedation or analgesia regimen? No   D: Delirium CAM-ICU: Negative   E: Early Mobility  Plan for early mobility? Yes   F: Family Engagement Plan for family engagement today? Yes         Prophylaxis:  VTE Contraindicated secondary to: High bleeding risk   Stress Ulcer  not ordered        Significant 24hr Events     24hr events:  Patient's initially presented yesterday morning with headache, vomiting, diarrhea, and ataxia. Confirmed R paramedian cerebellar hemorrhage and old infarcts. Overnight, the patient had no new complaints. Frequent neuro checks showed consistent exam. This morning, patient had a repeat CT Head which showed stable hemorrhage consistent with prior exam. Her am labs showed low Ca, low K, low mag. Electrolytes were repleted per protocol.      Subjective     Review of Systems   Constitutional:  Negative for chills and fever.   HENT:  Negative for congestion, rhinorrhea and sore throat.     Eyes:  Positive for photophobia. Negative for visual disturbance.   Respiratory:  Negative for apnea, chest tightness and shortness of breath.    Cardiovascular:  Negative for chest pain.   Gastrointestinal:  Positive for nausea. Negative for abdominal distention, abdominal pain, diarrhea and vomiting.   Genitourinary:  Negative for difficulty urinating and dysuria.   Neurological:  Positive for dizziness, numbness and headaches. Negative for facial asymmetry and weakness.        Objective                            Vitals I/O      Most Recent Min/Max in 24hrs   Temp 98.2 °F (36.8 °C) Temp  Min: 97.6 °F (36.4 °C)  Max: 98.2 °F (36.8 °C)   Pulse 72 Pulse  Min: 64  Max: 92   Resp 21 Resp  Min: 16  Max: 34   /79 BP  Min: 112/57  Max: 235/115   O2 Sat 94 % SpO2  Min: 90 %  Max: 98 %      Intake/Output Summary (Last 24 hours) at 3/27/2024 0801  Last data filed at 3/27/2024 0605  Gross per 24 hour   Intake 2910.41 ml   Output 2450 ml   Net 460.41 ml       Diet Regular; Regular House    Invasive Monitoring           Physical Exam   Physical Exam  Vitals and nursing note reviewed.   Eyes:      General: Vision grossly intact.      Pupils: Pupils are equal, round, and reactive to light.   Skin:     General: Skin is warm and dry.   HENT:      Head: Normocephalic and atraumatic.      Right Ear: No drainage.      Left Ear: No drainage.      Nose: No congestion or rhinorrhea.      Mouth/Throat:      Mouth: Mucous membranes are moist.   Cardiovascular:      Rate and Rhythm: Normal rate and regular rhythm.      Pulses: Normal pulses.   Abdominal:      Palpations: Abdomen is soft.      Tenderness: There is abdominal tenderness.      Comments: Mild RUQ tenderness. Liver edge palpable 3cm below the costal margin   Constitutional:       General: She is not in acute distress.     Appearance: She is ill-appearing.   Pulmonary:      Effort: Pulmonary effort is normal.      Breath sounds: Normal breath sounds.   Neurological:       Mental Status: She is alert and oriented to person, place and time.      Motor: Strength full and intact in all extremities.      Comments: Mild ataxia on finger to nose testing. Difficulty keeping eyes focused on a single point. Vision, and motor grossly intact          Diagnostic Studies      Imaging:  I have personally reviewed pertinent reports.       Medications:  Scheduled PRN   atorvastatin, 40 mg, Daily With Dinner  chlorhexidine, 15 mL, Q12H NAMOI  escitalopram, 20 mg, Daily  folic acid, 1 mg, Daily  gabapentin, 300 mg, HS  magnesium sulfate, 2 g, Once  multivitamin-minerals, 1 tablet, Daily  niCARdipine, ,   niCARdipine, ,   potassium chloride, 20 mEq, Q2H  thiamine, 100 mg, Daily      hydrALAZINE, 5 mg, Q4H PRN  labetalol, 10 mg, Q4H PRN  niCARdipine, ,   niCARdipine, ,   trimethobenzamide, 200 mg, Q6H PRN       Continuous    lactated ringers, 75 mL/hr, Last Rate: 75 mL/hr (03/27/24 0608)  niCARdipine, 1-15 mg/hr, Last Rate: Stopped (03/27/24 0228)         Labs:    CBC    Recent Labs     03/26/24  0820 03/27/24 0427   WBC 8.29 9.44   HGB 15.4 12.9   HCT 47.4* 40.0    179     BMP    Recent Labs     03/26/24 0820 03/27/24 0427   SODIUM 136 133*   K 3.7 3.3*   CL 99 99   CO2 30 26   AGAP 7 8   BUN 15 11   CREATININE 0.67 0.55*   CALCIUM 9.1 8.2*       Coags    Recent Labs     03/26/24  1647 03/26/24  1804   INR 1.11  --    PTT  --  29        Additional Electrolytes  Recent Labs     03/26/24  0820 03/27/24  0427 03/27/24  0549   MG 1.8* 1.6*  --    PHOS  --  3.6  --    CAIONIZED  --   --  1.08*          Blood Gas    No recent results  No recent results LFTs  Recent Labs     03/26/24 0820   *   *   ALKPHOS 100   ALB 4.0   TBILI 0.53       Infectious  No recent results  Glucose  Recent Labs     03/26/24 0820 03/27/24 0427   GLUC 132 119               César Benavidez MD

## 2024-03-27 NOTE — PHYSICAL THERAPY NOTE
Physical Therapy Evaluation    Patient's Name: Tanika Lira    Admitting Diagnosis  Acute parenchymal hematoma    Problem List  Patient Active Problem List   Diagnosis    History of CVA (cerebrovascular accident)    Tobacco abuse    Essential hypertension    ROSANA (generalized anxiety disorder)    Neuropathy    Macrocytic anemia    Polyneuropathy    Hallux valgus with bunions of right foot    Hallux valgus with bunions of left foot    Hammer toes of both feet    Cerebellar hemorrhage (HCC)       Past Medical History  Past Medical History:   Diagnosis Date    CVA (cerebral vascular accident) (HCC)     Diverticulitis     Diverticulitis of colon     Diverticulosis     Hypertension     Stroke (HCC)        Past Surgical History  Past Surgical History:   Procedure Laterality Date    CARDIAC ELECTROPHYSIOLOGY PROCEDURE N/A 2/8/2023    Procedure: Cardiac loop recorder implant;  Surgeon: Duke Abraham MD;  [facility];  Service: Cardiology    COLON SIGMOID RESECTION          03/27/24 0916   PT Last Visit   PT Visit Date 03/27/24   Note Type   Note type Evaluation   Pain Assessment   Pain Assessment Tool 0-10   Pain Score No Pain   Restrictions/Precautions   Weight Bearing Precautions Per Order No   Other Precautions Cognitive;Chair Alarm;Bed Alarm;Multiple lines;Telemetry;Fall Risk   Home Living   Type of Home Mobile home   Home Layout One level;Stairs to enter with rails  (2-3 ELIZABETH)   Bathroom Shower/Tub Tub/shower unit   Bathroom Toilet Standard   Bathroom Equipment Shower chair;Grab bars in shower   Home Equipment Walker   Prior Function   Level of [place] Independent with ADLs;Independent with functional mobility  (I ambulation w/o AD)   Lives With   (2 sons - pt alone during the day)   Receives Help From Family   IADLs Independent with meal prep;Independent with medication management;Family/Friend/Other provides transportation   Falls in the last 6 months 1 to 4   Vocational Unemployed    General   Family/Caregiver Present No   Cognition   Arousal/Participation Alert   Orientation Level Oriented X4   Comments cooperative w/ encouragement   Subjective   Subjective Pt reports feeling naseous and dizzy limiting her participation in PT session.   RLE Assessment   RLE Assessment   (functionally at least 4-/5, pt refused formal MMT as Neurosurgery just in)   LLE Assessment   LLE Assessment   (functionally at least 4-/5, pt refused formal MMT as Neurosurgery just in)   Vision-Basic Assessment   Patient Visual Report Diplopia  (maintains eyes closed for majority of session due to diplopia/dizziness/nausea)   Coordination   Movements are Fluid and Coordinated 0   Coordination and Movement Description ataxia w/ ambulationn   Sensation   (hx peripheral neuropathy but otherwise grossly in tact to light touch BLE)   Heel to Shin Intact  (BLE)   Bed Mobility   Supine to Sit 5  Supervision   Additional items HOB elevated;Increased time required;Verbal cues   Sit to Supine Unable to assess   Additional Comments Pt greeted in supine.   Transfers   Sit to Stand 4  Minimal assistance   Additional items Assist x 1;Increased time required;Verbal cues   Stand to Sit 4  Minimal assistance   Additional items Assist x 1;Increased time required;Verbal cues   Additional Comments RW   Ambulation/Elevation   Gait pattern Ataxia;Short stride;Decreased foot clearance  (unsteady)   Gait Assistance 3  Moderate assist   Additional items Assist x 1;Verbal cues;Tactile cues  (+ 2nd SBA for safety / line management)   Assistive Device Rolling walker   Distance 10'   Stair Management Assistance Not tested   Balance   Static Sitting Fair -   Dynamic Sitting Poor +   Static Standing Poor +   Dynamic Standing Poor   Ambulatory Poor  (RW)   Endurance Deficit   Endurance Deficit Yes   Endurance Deficit Description weakness, fatigue   Activity Tolerance   Activity Tolerance Patient limited by fatigue  (nausea/dizziness)   Medical Staff Made  Aware OT [facility] during rounds, ICU resident   Nurse Made Aware yes - cleared for therapy   Assessment   Prognosis Good   Problem List Decreased endurance;Impaired balance;Decreased mobility;Decreased coordination;Impaired vision;Impaired sensation   Assessment Pt is 58 y.o. female seen for a PT evaluation s/p admit to [facility] on 3/26/2024. Pt presenting w/ nausea, vomiting, dizziness; imaging (+) for cerebellar hemorrhage. Please see above for other active problem list / PMH.     PT now consulted to assess functional mobility and needs for safe d/c planning. Prior to admission, pt was I w/ ambulation w/o AD, lives w/ 2 sons in a mobile home 2-3 [place].     Currently pt requires S for bed skills; MinAx1 for functional transfers; ModAx1 for ambulation w/ RW. Pt presents functioning below baseline and w/ overall mobility deficits 2* to: generalized weakness/deconditioning; decreased endurance; impaired balance; impaired coordination; ataxia; diplopia; dizziness; gait deviations; fatigue; bed/chair alarms; multiple lines. These impairments place pt at risk for falls.     Pt will continue to benefit from skilled PT interventions to address stated impairments; to maximize functional potential; for ongoing pt/ family training; and DME needs. PT is currently recommending acute medical rehab.   Barriers to Discharge Inaccessible home environment;Decreased caregiver support   Goals   Patient Goals feel better   [facility] Expiration Date 04/10/24   Short Term Goal #1 In 14 days pt will complete: 1) Bed mobility skills with Karime to facilitate safe return to previous living environment. 2) Functional transfers with Karime to facilitate safe return to previous living environment. 3) Ambulation with least restrictive ' w/ Karime without LOB for safe ambulation in home/community environment. 4) Improve balance scores by 1 grade to decrease fall risk. 5) Improve LE strength grades by 1 to increase independence w/ all  functional mobility, transfers and gait. 6) PT for ongoing pt and family education; DME needs and D/C planning to promote highest level of function in least restrictive environment. 7) Stair training up/ down 3 steps with most appropriate technique and Karime for safe access to previous living environment and to increase community access.   PT Treatment Day 0   Plan   Treatment/Interventions Functional transfer training;LE strengthening/ROM;Elevations;Therapeutic exercise;Endurance training;Patient/family training;Equipment eval/education;Bed mobility;Compensatory technique education;Gait training;Spoke to nursing;OT;Spoke to case management;Spoke to MD   PT Frequency 3-5x/wk   Discharge Recommendation   Rehab Resource Intensity Level, PT I (Maximum Resource Intensity)   AM-PAC Basic Mobility Inpatient   Turning in Flat Bed Without Bedrails 3   Lying on Back to Sitting on Edge of Flat Bed Without Bedrails 3   Moving Bed to Chair 3   Standing Up From Chair Using Arms 3   Walk in Room 2   Climb 3-5 Stairs With Railing 1   Basic Mobility Inpatient Raw Score 15   Basic Mobility Standardized Score 36.97   [facility] Highest Level Of Mobility   -HLM Goal 4: Move to chair/commode   -HLM Achieved 6: Walk 10 steps or more   End of Consult   Patient Position at End of Consult Bedside chair;Bed/Chair alarm activated;All needs within reach  (on waffle cushion, all lines in tact, BLE elevated, SCDs activated)     Claudia Navarrete, PT, DPT

## 2024-03-27 NOTE — UTILIZATION REVIEW
Immediate Brief Procedure Note    Patient Name:  Latonya Menon  YOB: 1968  DATE OF PROCEDURE : 5/30/2017  PROCEDURALIST: Vega Gamez MD  ASSISTANT(S):  None  ANESTHESIA TYPE:  Local  ANESTHESIOLOGIST:  None    PROCEDURE PERFORMED: Ultrasound Guided Left Paracentesis    Pre-procedure Dx:   Patient Active Problem List   Diagnosis   • Cirrhosis (CMS/HCC)   • Chronic hepatitis C without hepatic coma (CMS/HCC)   • Traumatic open wound of right lower leg   • Altered mental status   • Pneumonia due to infectious organism       Post-procedure Dx: Same    Findings: Technically successful ultrasound guided left paracentesis.    Estimated Blood Loss: Less than 5 ml.    Complications: None noted    Specimens Removed: Yes   "NOTIFICATION OF INPATIENT ADMISSION   AUTHORIZATION REQUEST   SERVICING FACILITY:   [facility]  Address: [address] [place] [address]  Tax ID: [id number]  NPI: [id number] ATTENDING PROVIDER:  Attending Name and NPI#: Sintia Tenorio Md [[id number]]  Address: [address] [place] [address]  Phone: [phone]   ADMISSION INFORMATION:  Place of Service: Inpatient [facility]  Place of Service Code: 21  Inpatient Admission Date/Time: 3/26/24  1:52 PM  Discharge Date/Time: No discharge date for patient encounter.  Admitting Diagnosis Code/Description:  Acute parenchymal hematoma     UTILIZATION REVIEW CONTACT:  Raina \"Lexy\"Winston Utilization   Network Utilization Review Department  Phone: [phone]  Fax: [phone]  Email: [email or web address]  Contact for approvals/pending authorizations, clinical reviews, and discharge.     PHYSICIAN ADVISORY SERVICES:  Medical Necessity Denial & [place] Review  Phone: [phone]  Fax: [phone]  Email: [email or web address]     DISCHARGE SUPPORT TEAM:  For Patients Discharge Needs & Updates  Phone: [phone] opt. 2 Fax: [phone]  Email: [email or web address]     "

## 2024-03-27 NOTE — PROGRESS NOTES
"[facility]  Progress Note: Critical Care  Name: Tanika Lira 58 y.o. female I MRN: [id number]  Unit/Bed#: ICU 04 I Date of Admission: 3/26/2024   Date of Service: 3/27/2024 I Hospital Day: 1    Assessment/Plan   Neuro:   {Plan:30462}    CV:   {Plan:14912}    Pulm:  {Plan:60760}    GI:   {Plan:18280}    :   {Plan:28874}    F/E/N:   {Plan:91621}    Heme/Onc:   {Plan:40112}    Endo:   {Plan:83302}    ID:   {Plan:34294}    MSK/Skin:   {Plan:32167}    Disposition: {CC Transfer Levels of Care:20915}    ICU Core Measures     A: Assess, Prevent, and Manage Pain Has pain been assessed? {Yes/No/NA:20919}  Need for changes to pain regimen? {Yes/No/NA:27413}   B: Both SAT/SAT  N/A   C: Choice of Sedation {RASS Goal:82921::\"0 Alert and Calm\"}  Need for changes to sedation or analgesia regimen? {Yes/No/NA:77148}   D: Delirium CAM-ICU: {CAM ICU Results:62273}   E: Early Mobility  Plan for early mobility? {Yes/No/NA:28933}   F: Family Engagement Plan for family engagement today? {Yes/No/NA:44434}         Prophylaxis:  VTE {Contraindicated secondary to:94250}   Stress Ulcer  not ordered     { I DISAPPEARING TIP I Update Problem List daily:65658}   Significant 24hr Events     24hr events: Patient's initial presentation was yesterday morning with headache, vomiting, diarrhea, and ataxia. Confirmed R paramedian cerebellar hemorrhage and old infarcts. Overnight, the patient had no issues. Frequent neuro checks showed consistent exam. This morning, patient had a repeat CT Head which showed stable hemorrhage consistent with prior exam. Her am labs showed low Ca, low K, low mag. Electrolytes were repleted per protocol.     Subjective     Review of Systems     Objective                            Vitals I/O      Most Recent Min/Max in 24hrs   Temp 98.2 °F (36.8 °C) Temp  Min: 97.5 °F (36.4 °C)  Max: 98.2 °F (36.8 °C)   Pulse 80 Pulse  Min: 64  Max: 92   Resp (!) 29 Resp  Min: 16  Max: 34   BP " 150/77 BP  Min: 112/57  Max: 235/115   O2 Sat 93 % SpO2  Min: 90 %  Max: 99 %      Intake/Output Summary (Last 24 hours) at 3/27/2024 0635  Last data filed at 3/27/2024 0605  Gross per 24 hour   Intake 2910.41 ml   Output 2450 ml   Net 460.41 ml       Diet Regular; Regular House    Invasive Monitoring   {Invasive Device (Optional):32938}        Physical Exam   Critical Care Physical Exam *** (fill out SmartBlock)       Diagnostic Studies    {IDisappearing Data Review I RadiologyI Cardiology:13656}  EKG: ***  Imaging: *** {Results Review Statement:77414}     Medications:  Scheduled PRN   atorvastatin, 40 mg, Daily With Dinner  chlorhexidine, 15 mL, Q12H NAOMI  escitalopram, 20 mg, Daily  folic acid, 1 mg, Daily  gabapentin, 300 mg, HS  multivitamin-minerals, 1 tablet, Daily  niCARdipine, ,   niCARdipine, ,   thiamine, 100 mg, Daily      hydrALAZINE, 5 mg, Q4H PRN  labetalol, 10 mg, Q4H PRN  niCARdipine, ,   niCARdipine, ,   trimethobenzamide, 200 mg, Q6H PRN       Continuous    lactated ringers, 75 mL/hr, Last Rate: 75 mL/hr (03/27/24 0608)  niCARdipine, 1-15 mg/hr, Last Rate: Stopped (03/27/24 0228)         Labs:  { I Disappearing Data Review I Results Review I Micro :43800}  CBC    Recent Labs     03/26/24  0820 03/27/24  0427   WBC 8.29 9.44   HGB 15.4 12.9   HCT 47.4* 40.0    179     BMP    Recent Labs     03/26/24  0820 03/27/24  0427   SODIUM 136 133*   K 3.7 3.3*   CL 99 99   CO2 30 26   AGAP 7 8   BUN 15 11   CREATININE 0.67 0.55*   CALCIUM 9.1 8.2*       Coags    Recent Labs     03/26/24  1647 03/26/24  1804   INR 1.11  --    PTT  --  29        Additional Electrolytes  Recent Labs     03/26/24  0820 03/27/24  0427 03/27/24  0549   MG 1.8* 1.6*  --    PHOS  --  3.6  --    CAIONIZED  --   --  1.08*          Blood Gas    No recent results  No recent results LFTs  Recent Labs     03/26/24  0820   *   *   ALKPHOS 100   ALB 4.0   TBILI 0.53       Infectious  No recent results  Glucose  Recent  Labs     03/26/24  0820 03/27/24  0427   GLUC 132 119               César Benavidez MD

## 2024-03-27 NOTE — SPEECH THERAPY NOTE
Speech Language/Pathology  Speech-Language Pathology Bedside Swallow Evaluation      Patient Name: Tanika Lira    Today's Date: 3/27/2024     Problem List  Active Problems:    History of CVA (cerebrovascular accident)    Cerebellar hemorrhage (HCC)      Past Medical History  Past Medical History:   Diagnosis Date    CVA (cerebral vascular accident) (HCC)     Diverticulitis     Diverticulitis of colon     Diverticulosis     Hypertension     Stroke (HCC)        Past Surgical History  Past Surgical History:   Procedure Laterality Date    CARDIAC ELECTROPHYSIOLOGY PROCEDURE N/A 2/8/2023    Procedure: Cardiac loop recorder implant;  Surgeon: Duke Abraham MD;  Location: [facility];  Service: Cardiology    COLON SIGMOID RESECTION         Summary   Order received, chart reviewed.  Pt mildly slow to respond but able to make needs known.  Morning meal is completed,she passed the [facility] dysphagia screen.  No formal assessment at this time.  Re consult as needed during stay.       Current Medical Status  Pt is a 58 y.o. female who presented to  [facility]  with nausea, vomiting, and diarrhea. States that she feels dizzy when she opens her eyes, but when she closes her eyes and lays back the dizziness goes away. Reports drinking twisted tea daily.   PSH of colon sigmoid resection and cardiac loop recorder placement     Current Precautions:  Fall  Aspiration  Contact  AIrborn  C diff   Seizure  Delirium    Allergies:  No known food allergies    Past medical history:  Please see H&P for details    Special Studies:  3/26 am CTH wo contrast: Acute parenchymal hematoma left paramedian cerebellum measures 2.6 x 1.6 cm with mass effect on the fourth ventricle. Intraventricular extension of hemorrhage noted with blood in the fourth ventricle and left foramen of Luschka.  Chronic lacunar infarcts are identified as described.  3/27 am CT Head: Redemonstration of hyperdense hemorrhage in the left paramedian cerebellum, as described  with mass effect and extension into the fourth ventricle and left foramen of Luschka, similar in appearance to the prior.     Social/Education/Vocational Hx:  Pt lives with family

## 2024-03-27 NOTE — ASSESSMENT & PLAN NOTE
H/o CVA and stroke like symptoms 9/2022 seen at [facility]  Per medical records pt had ASA and Plavix listed on medication list. Pt reported taking ASA prior to admission.   Neurology following

## 2024-03-27 NOTE — PLAN OF CARE
Problem: Neurological Deficit  Goal: Neurological status is stable or improving  Description: Interventions:  - Monitor and assess patient's level of consciousness, motor function, sensory function, and level of assistance needed for ADLs.   - Monitor and report changes from baseline. Collaborate with interdisciplinary team to initiate plan and implement interventions as ordered.   - Provide and maintain a safe environment.  - Consider seizure precautions.  - Consider fall precautions.  - Consider aspiration precautions.  - Consider bleeding precautions.  Outcome: Progressing     Problem: Activity Intolerance/Impaired Mobility  Goal: Mobility/activity is maintained at optimum level for patient  Description: Interventions:  - Assess and monitor patient  barriers to mobility and need for assistive/adaptive devices.  - Assess patient's emotional response to limitations.  - Collaborate with interdisciplinary team and initiate plans and interventions as ordered.  - Encourage independent activity per ability.  - Maintain proper body alignment.  - Perform active/passive rom as tolerated/ordered.  - Plan activities to conserve energy.  - Turn patient as appropriate  Outcome: Progressing     Problem: Communication Impairment  Goal: Ability to express needs and understand communication  Description: Assess patient's communication skills and ability to understand information.  Patient will demonstrate use of effective communication techniques, alternative methods of communication and understanding even if not able to speak.     - Encourage communication and provide alternate methods of communication as needed.  - Collaborate with case management/ for discharge needs.  - Include patient/family/caregiver in decisions related to communication.  Outcome: Progressing     Problem: Potential for Aspiration  Goal: Non-ventilated patient's risk of aspiration is minimized  Description: Assess and monitor vital signs,  respiratory status, and labs (WBC).  Monitor for signs of aspiration (tachypnea, cough, rales, wheezing, cyanosis, fever).    - Assess and monitor patient's ability to swallow.  - Place patient up in chair to eat if possible.  - HOB up at 90 degrees to eat if unable to get patient up into chair.  - Supervise patient during oral intake.   - Instruct patient/ family to take small bites.  - Instruct patient/ family to take small single sips when taking liquids.  - Follow patient-specific strategies generated by speech pathologist.  Outcome: Progressing     Problem: Nutrition  Goal: Nutrition/Hydration status is improving  Description: Monitor and assess patient's nutrition/hydration status for malnutrition (ex- brittle hair, bruises, dry skin, pale skin and conjunctiva, muscle wasting, smooth red tongue, and disorientation). Collaborate with interdisciplinary team and initiate plan and interventions as ordered.  Monitor patient's weight and dietary intake as ordered or per policy. Utilize nutrition screening tool and intervene per policy. Determine patient's food preferences and provide high-protein, high-caloric foods as appropriate.     - Assist patient with eating.  - Allow adequate time for meals.  - Encourage patient to take dietary supplement as ordered.  - Collaborate with clinical nutritionist.  - Include patient/family/caregiver in decisions related to nutrition.  Outcome: Progressing     Problem: Nutrition/Hydration-ADULT  Goal: Nutrient/Hydration intake appropriate for improving, restoring or maintaining nutritional needs  Description: Monitor and assess patient's nutrition/hydration status for malnutrition. Collaborate with interdisciplinary team and initiate plan and interventions as ordered.  Monitor patient's weight and dietary intake as ordered or per policy. Utilize nutrition screening tool and intervene as necessary. Determine patient's food preferences and provide high-protein, high-caloric foods as  appropriate.     INTERVENTIONS:  - Monitor oral intake, urinary output, labs, and treatment plans  - Assess nutrition and hydration status and recommend course of action  - Evaluate amount of meals eaten  - Assist patient with eating if necessary   - Allow adequate time for meals  - Recommend/ encourage appropriate diets, oral nutritional supplements, and vitamin/mineral supplements  - Order, calculate, and assess calorie counts as needed  - Recommend, monitor, and adjust tube feedings and TPN/PPN based on assessed needs  - Assess need for intravenous fluids  - Provide specific nutrition/hydration education as appropriate  - Include patient/family/caregiver in decisions related to nutrition  Outcome: Progressing     Problem: COPING  Goal: Pt/Family able to verbalize concerns and demonstrate effective coping strategies  Description: INTERVENTIONS:  - Assist patient/family to identify coping skills, available support systems and cultural and spiritual values  - Provide emotional support, including active listening and acknowledgement of concerns of patient and caregivers  - Reduce environmental stimuli, as able  - Provide patient education  - Assess for spiritual pain/suffering and initiate spiritual care, including notification of Pastoral Care or carl based community as needed  - Assess effectiveness of coping strategies  Outcome: Progressing  Goal: Will report anxiety at manageable levels  Description: INTERVENTIONS:  - Administer medication as ordered  - Teach and encourage coping skills  - Provide emotional support  - Assess patient/family for anxiety and ability to cope  Outcome: Progressing

## 2024-03-27 NOTE — PROGRESS NOTES
[facility]  Progress Note  Name: Tanika Lira I  MRN: [id number]  Unit/Bed#: ICU 04 I Date of Admission: 3/26/2024   Date of Service: 3/27/2024 I Hospital Day: 1    Assessment/Plan   Cerebellar hemorrhage (HCC)  Assessment & Plan  Patient presented s/p acute onset nausea, diarrhea and dizziness   H/o ischemic stroke in 2022.   Per records pt had ASA and Plavix listed on med list. Pt reported taking ASA prior to admssion.     Intracerebral Hemorrhage (ICH) Score:    [place] Coma Sore 13-15 equals +0   Age greater than or equal to 80 No   ICH Volume greater than or equal to 30 ml No   Intraventricular Hemorrhage Yes (1 Point)   Infratentorial Origin of Hemorrhage Yes (1 Point)   Total: 2       Imaging:   CT head wo 3/27/24: Redemonstration of hyperdense hemorrhage in the left paramedian cerebellum, as described with mass effect and extension into the fourth ventricle and left foramen of Luschka, similar in appearance to the prior. No hydrocephalus.  CT head 3/26/2024: Acute parenchymal hematoma in the left paramedian cerebellum measuring 2.6 x 1.6 with mass effect on the fourth ventricle.  Intraventricular extension of hemorrhage noted with blood in the fourth ventricle and left foramen of Luschka.     Plan:   Close neurological monitoring given 4th ventricular involvement  Recommend STAT CT head for decline in GCS >2 points in 1 hour  No need for keppra at this time given infratentorial location   Neurology for stroke management   BP goal per neurology   Hold all AC/AP medication at this time.   DVT ppx: SCD's only  PT/OT evaluation and tx.   Ongoing medical management and pain control per primary team  Continue EVD watch. CTH today stable, will continue to monitor.     Neurosurgery will continue to follow. Call with questions or concerns.     History of CVA (cerebrovascular accident)  Assessment & Plan  H/o CVA and stroke like symptoms 9/2022 seen at [facility]  Per medical  "records pt had ASA and Plavix listed on medication list. Pt reported taking ASA prior to admission.   Neurology following             Subjective/Objective     Chief Complaint: \"I am dizzy and nauseas\"    Subjective: Patient reports she is dizzy and nauseous.  She also reports visual disturbance with double vision and blurry vision.  Patient denies significant headache at this time.  Patient reports chronic numbness/neuropathy in bilateral hands and feet.  She denies any new numbness.  Patient denies any new weakness.    Objective: Drowsy, laying in bed, no acute distress    I/O         03/25 0701 03/26 0700 03/26 0701 03/27 0700 03/27 0701 03/28 0700    P.O.  120     I.V. (mL/kg)  2740.4 (45.1) 143.8 (2.4)    IV Piggyback  50 50    Total Intake(mL/kg)  2910.4 (47.9) 193.8 (3.2)    Urine (mL/kg/hr)  2450     Total Output  2450     Net  +460.4 +193.8                   Invasive Devices       Peripheral Intravenous Line  Duration             Peripheral IV 03/26/24 Left Antecubital 1 day    Peripheral IV 03/26/24 Ventral (anterior);Right Forearm 1 day                    Physical Exam:  Vitals: Blood pressure 141/89, pulse 74, temperature 98.3 °F (36.8 °C), temperature source Oral, resp. rate 22, height 5' 6\" (1.676 m), weight 60.7 kg (133 lb 13.1 oz), SpO2 96%.,Body mass index is 21.6 kg/m².    General appearance:  Appears stated age  Head: Normocephalic, without obvious abnormality  Eyes: PERRL, able to count fingers accurately bilaterally  Neck: supple, symmetrical, trachea midline   Lungs: non labored breathing  Heart: regular heart rate  Neurologic:   Mental status: Drowsy, oriented to all but the date thought it was that 26 th instead of the 27th.  Cranial nerves: grossly intact (Cranial nerves II-XII)  Sensory: normal to LT x 4 except decreased in bilateral hands and feet.  Motor: moving all extremities, strength 4+/5 throughout.  Coordination: finger to nose test normal on the right, slight dysmetria with the " left hand, no drift in bilateral upper extremities      Lab Results:  Results from last 7 days   Lab Units 03/27/24  0427 03/26/24  0820   WBC Thousand/uL 9.44 8.29   HEMOGLOBIN g/dL 12.9 15.4   HEMATOCRIT % 40.0 47.4*   PLATELETS Thousands/uL 179 189   NEUTROS PCT %  --  34*   MONOS PCT %  --  12   EOS PCT %  --  4     Results from last 7 days   Lab Units 03/27/24  0427 03/26/24  0820   POTASSIUM mmol/L 3.3* 3.7   CHLORIDE mmol/L 99 99   CO2 mmol/L 26 30   BUN mg/dL 11 15   CREATININE mg/dL 0.55* 0.67   CALCIUM mg/dL 8.2* 9.1   ALK PHOS U/L  --  100   ALT U/L  --  114*   AST U/L  --  113*     Results from last 7 days   Lab Units 03/27/24  0427 03/26/24  0820   MAGNESIUM mg/dL 1.6* 1.8*     Results from last 7 days   Lab Units 03/27/24  0427   PHOSPHORUS mg/dL 3.6     Results from last 7 days   Lab Units 03/26/24  1804 03/26/24  1647   INR   --  1.11   PTT seconds 29  --              Imaging Studies: I have personally reviewed pertinent reports and I have personally reviewed pertinent films in PACS    CT head wo contrast    Result Date: 3/27/2024  Impression: Redemonstration of hyperdense hemorrhage in the left paramedian cerebellum, as described with mass effect and extension into the fourth ventricle and left foramen of Luschka, similar in appearance to the prior. No hydrocephalus. Other findings as above. Overall there has been no significant interval change. Follow-up as clinically warranted. Workstation performed: [id number]     XR chest 1 view portable    Result Date: 3/26/2024  Impression: No acute cardiopulmonary disease. Workstation performed: [id number]     CTA head and neck with and without contrast    Result Date: 3/26/2024  Impression: No left cerebellar vascular malformation to account for the patient's acute parenchymal hematoma. No cervical or intracranial large vessel occlusion, dissection or aneurysm. Mild bilateral cervical carotid bifurcation atherosclerotic disease. Workstation performed:  [id number]     CT abdomen pelvis with contrast    Result Date: 3/26/2024  Impression: 1. Cholelithiasis, including a 3 mm gallstone within the gallbladder neck versus cystic duct. No findings of acute cholecystitis. If there is concern for acute cholecystitis, right upper quadrant ultrasound can be obtained. 2. Findings suggesting cirrhosis with mild mesenteric edema, but no ascites. Enlarged ana hepatic and peripancreatic lymph nodes measuring up to 2 cm in short axis, indeterminate, although could be reactive, given suspicion for cirrhosis. 3. Simple appearing right adnexal cyst measuring 3.7 cm.  According to current guidelines (J Am Kayla Radiol 2020; 17:248-254) in this postmenopausal woman, this should be followed in 6 to 12 months by pelvic ultrasound. The study was marked in EPIC for immediate notification. Workstation performed: [id number]     CT head without contrast    Result Date: 3/26/2024  Impression: Acute parenchymal hematoma left paramedian cerebellum measures 2.6 x 1.6 cm with mass effect on the fourth ventricle. Intraventricular extension of hemorrhage noted with blood in the fourth ventricle and left foramen of Luschka. Chronic lacunar infarcts are identified as described. I personally discussed this study with ABRAHAN UP on 3/26/2024 9:26 AM. Workstation performed: [id number]      EKG, Pathology, and Other Studies: I have personally reviewed pertinent reports.          PLEASE NOTE:  This encounter may have been completed utilizing the [employer] Direct Speech Voice Recognition Software. Grammatical errors, random word insertions, pronoun errors and incomplete sentences are occasional consequences of the system due to software limitations, ambient noise and hardware issues.These may be missed by proof reading prior to affixing electronic signature. Any questions or concerns about the content, text or information contained within the body of this dictation should be directly addressed to  the advanced practitioner or physician for clarification.

## 2024-03-27 NOTE — CONSULTS
PHYSICAL MEDICINE AND REHABILITATION CONSULT NOTE  Tanika Lira 58 y.o. female MRN: [id number]  Unit/Bed#: ICU 04 Encounter: [id number]    Requested by (Physician/Service): Sintia Tenorio MD  Reason for Consultation:  Assessment of rehabilitation needs    Assessment:  Rehabilitation Diagnosis:   Cerebellar hemorrhage   Impaired mobility and self care    Recommendations:  Rehabilitation Plan:  Therapy is pending however she may be a candidate for acute inpatient rehabilitation once medically stable pending tolerance/progression.     Medical Co-morbidities Plan:  Nausea and vomiting  Dizziness   Hypertension   Hx of right pontine CVA on ASA  Hyperlipidemia   Tobacco use  ETOH use  Marijuana use   Bowel plan: LBM 3/26/2024  Bladder plan: continent   DVT ppx: SCD    Thank you for this consultation.  Do not hesitate to contact service with further questions.      MILAN Danielson  PM&R    I have spent a total time of 30 minutes on 03/27/24 in caring for this patient including Patient and family education, Counseling / Coordination of care, Documenting in the medical record, Reviewing / ordering tests, medicine, procedures  , Obtaining or reviewing history  , and Communicating with other healthcare professionals .    History of Present Illness:  Tanika Lira is a 58 y.o. female with a PMH of alcohol use, tobacco use, marijuana use, HTN, neuropathy, diverticulitis s/p sigmoid colon resection and previous CVA 9/2022 who presented to the [facility] on 3/26/2024 with dizziness, nausea and headache. She fell down on the floor and began vomiting. She also had diarrhea. EMS found he to be ataxic on exam. CT head showed acute parenchymal hematoma left paramedian cerebellum with mass effect on the 4th ventricle. Intraventricular extension of hemorrhage noted with blood in the 4th ventricle and left foramen of Luschka. Chronic lacunar infarcts are identified as described. She is currently on EVD  "watch. PM&R are consulted for rehabilitation recommendations.      The patient was seen in her room. She was OOB to the chair. She reports dizziness and nausea. She keeps her eyes closed as this helps with her dizziness. She denies any weakness. She does have a slight headache.     Review of Systems: 10 point ROS negative except for what is noted in HPI    Function:  Prior level of function and living situation: The patient lives with her two sons in a mobile home that is one level. There are 3 ELIZABETH. 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     Current level of function:  Physical Therapy: Pending   Occupational Therapy: Pending     Physical Exam:  /81   Pulse 68   Temp 98.3 °F (36.8 °C) (Oral)   Resp 18   Ht 5' 6\" (1.676 m)   Wt 60.7 kg (133 lb 13.1 oz)   SpO2 96%   BMI 21.60 kg/m²        Intake/Output Summary (Last 24 hours) at 3/27/2024 1102  Last data filed at 3/27/2024 1001  Gross per 24 hour   Intake 3255.41 ml   Output 2450 ml   Net 805.41 ml       Body mass index is 21.6 kg/m².      Physical Exam  Constitutional:       General: She is not in acute distress.     Appearance: She is not toxic-appearing.      Comments: Keeps eye closed due to dizziness    HENT:      Head: Normocephalic and atraumatic.      Right Ear: External ear normal.      Left Ear: External ear normal.      Nose: Nose normal.      Mouth/Throat:      Mouth: Mucous membranes are moist.      Pharynx: Oropharynx is clear.   Pulmonary:      Effort: Pulmonary effort is normal. No respiratory distress.   Abdominal:      General: There is no distension.   Musculoskeletal:         General: Normal range of motion.   Skin:     General: Skin is warm and dry.   Neurological:      Mental Status: She is alert and oriented to person, place, and time.      Comments: Left > right dysmetria    Psychiatric:         Mood and Affect: Mood normal.          Social History:    Social History     Socioeconomic History    Marital status: " "     Spouse name: Not on file    Number of children: Not on file    Years of education: Not on file    Highest education level: Not on file   Occupational History    Not on file   Tobacco Use    Smoking status: Light Smoker     Types: Cigarettes    Smokeless tobacco: Never    Tobacco comments:     smokes 4 cigarettes daily   Vaping Use    Vaping status: Never Used   Substance and Sexual Activity    Alcohol use: Yes     Comment: \"twisted tea daily\"\"    Drug use: Yes     Types: Marijuana     Comment: medical card    Sexual activity: Not on file   Other Topics Concern    Not on file   Social History Narrative    Not on file     Social Determinants of Health     Financial Resource Strain: Not on file   Food Insecurity: No Food Insecurity (9/1/2022)    Hunger Vital Sign     Worried About Running Out of Food in the Last Year: Never true     Ran Out of Food in the Last Year: Never true   Transportation Needs: No Transportation Needs (9/1/2022)    PRAPARE - Transportation     Lack of Transportation (Medical): No     Lack of Transportation (Non-Medical): No   Physical Activity: Not on file   Stress: Not on file   Social Connections: Not on file   Intimate Partner Violence: Not on file   Housing Stability: Low Risk  (9/1/2022)    Housing Stability Vital Sign     Unable to Pay for Housing in the Last Year: No     Number of Places Lived in the Last Year: 1     Unstable Housing in the Last Year: No        Family History:    Family History   Problem Relation Age of Onset    Coronary artery disease Mother     Heart disease Mother     Diabetes Father     No Known Problems Sister     Breast cancer Maternal Grandmother         age unknown    No Known Problems Maternal Grandfather     No Known Problems Paternal Grandmother     No Known Problems Paternal Grandfather     No Known Problems Paternal Aunt     No Known Problems Paternal Aunt     No Known Problems Maternal Aunt          Medications:     Current Facility-Administered " Medications:     amLODIPine (NORVASC) tablet 5 mg, 5 mg, Oral, Daily, Pollo Talbert DO, 5 mg at 03/27/24 0929    atorvastatin (LIPITOR) tablet 40 mg, 40 mg, Oral, Daily With Dinner, César Benavidez MD, 40 mg at 03/26/24 1651    chlorhexidine (PERIDEX) 0.12 % oral rinse 15 mL, 15 mL, Mouth/Throat, Q12H NAOMI, César Benavidez MD, 15 mL at 03/27/24 0821    escitalopram (LEXAPRO) tablet 20 mg, 20 mg, Oral, Daily, César Benavidez MD, 20 mg at 03/27/24 0821    folic acid (FOLVITE) tablet 1 mg, 1 mg, Oral, Daily, César Benavidez MD, 1 mg at 03/27/24 0821    gabapentin (NEURONTIN) capsule 300 mg, 300 mg, Oral, HS, César Benavidez MD, 300 mg at 03/26/24 2149    hydrALAZINE (APRESOLINE) injection 5 mg, 5 mg, Intravenous, Q4H PRN, César Benavidez MD, 5 mg at 03/26/24 1753    labetalol (NORMODYNE) injection 10 mg, 10 mg, Intravenous, Q4H PRN, César Benavidez MD, 10 mg at 03/27/24 0608    lactated ringers infusion, 75 mL/hr, Intravenous, Continuous, César Benavidez MD, Last Rate: 75 mL/hr at 03/27/24 0608, 75 mL/hr at 03/27/24 0608    multivitamin-minerals (CENTRUM) tablet 1 tablet, 1 tablet, Oral, Daily, César Benavidez MD, 1 tablet at 03/27/24 0821    niCARdipine (CARDENE) 2.5 mg/mL injection **ADS Override Pull**, , , ,     thiamine tablet 100 mg, 100 mg, Oral, Daily, César Benavidez MD, 100 mg at 03/27/24 0820    trimethobenzamide (TIGAN) IM injection 200 mg, 200 mg, Intramuscular, Q6H PRN, César Benavidez MD, 200 mg at 03/26/24 1640    Past Medical History:     Past Medical History:   Diagnosis Date    CVA (cerebral vascular accident) (HCC)     Diverticulitis     Diverticulitis of colon     Diverticulosis     Hypertension     Stroke (HCC)         Past Surgical History:     Past Surgical History:   Procedure Laterality Date    CARDIAC ELECTROPHYSIOLOGY PROCEDURE N/A 2/8/2023    Procedure: Cardiac loop recorder implant;  Surgeon: Duke Abraham MD;  Location: [facility];  Service: Cardiology    COLON  SIGMOID RESECTION           Allergies:     No Known Allergies        LABORATORY RESULTS:      Lab Results   Component Value Date    HGB 12.9 03/27/2024    HCT 40.0 03/27/2024    WBC 9.44 03/27/2024     Lab Results   Component Value Date    BUN 11 03/27/2024    K 3.3 (L) 03/27/2024    CL 99 03/27/2024    CREATININE 0.55 (L) 03/27/2024     Lab Results   Component Value Date    PROTIME 14.2 03/26/2024    INR 1.11 03/26/2024        DIAGNOSTIC STUDIES: Reviewed  CT head wo contrast    Result Date: 3/27/2024  Impression: Redemonstration of hyperdense hemorrhage in the left paramedian cerebellum, as described with mass effect and extension into the fourth ventricle and left foramen of Luschka, similar in appearance to the prior. No hydrocephalus. Other findings as above. Overall there has been no significant interval change. Follow-up as clinically warranted. Workstation performed: [id number]     XR chest 1 view portable    Result Date: 3/26/2024  Impression: No acute cardiopulmonary disease. Workstation performed: [id number]     CTA head and neck with and without contrast    Result Date: 3/26/2024  Impression: No left cerebellar vascular malformation to account for the patient's acute parenchymal hematoma. No cervical or intracranial large vessel occlusion, dissection or aneurysm. Mild bilateral cervical carotid bifurcation atherosclerotic disease. Workstation performed: [id number]     CT abdomen pelvis with contrast    Result Date: 3/26/2024  Impression: 1. Cholelithiasis, including a 3 mm gallstone within the gallbladder neck versus cystic duct. No findings of acute cholecystitis. If there is concern for acute cholecystitis, right upper quadrant ultrasound can be obtained. 2. Findings suggesting cirrhosis with mild mesenteric edema, but no ascites. Enlarged ana hepatic and peripancreatic lymph nodes measuring up to 2 cm in short axis, indeterminate, although could be reactive, given suspicion for cirrhosis. 3.  Simple appearing right adnexal cyst measuring 3.7 cm.  According to current guidelines (J Am Kayla Radiol 2020; 17:248-254) in this postmenopausal woman, this should be followed in 6 to 12 months by pelvic ultrasound. The study was marked in EPIC for immediate notification. Workstation performed: [id number]     CT head without contrast    Result Date: 3/26/2024  Impression: Acute parenchymal hematoma left paramedian cerebellum measures 2.6 x 1.6 cm with mass effect on the fourth ventricle. Intraventricular extension of hemorrhage noted with blood in the fourth ventricle and left foramen of Luschka. Chronic lacunar infarcts are identified as described. I personally discussed this study with ABRAHAN UP on 3/26/2024 9:26 AM. Workstation performed: [id number]

## 2024-03-28 ENCOUNTER — APPOINTMENT (INPATIENT)
Dept: RADIOLOGY | Facility: HOSPITAL | Age: 59
DRG: 044 | End: 2024-03-28
Payer: COMMERCIAL

## 2024-03-28 ENCOUNTER — TELEPHONE (OUTPATIENT)
Dept: NEUROSURGERY | Facility: CLINIC | Age: 59
End: 2024-03-28

## 2024-03-28 LAB
ALBUMIN SERPL BCP-MCNC: 3.2 G/DL (ref 3.5–5)
ALP SERPL-CCNC: 79 U/L (ref 34–104)
ALT SERPL W P-5'-P-CCNC: 75 U/L (ref 7–52)
ANION GAP SERPL CALCULATED.3IONS-SCNC: 8 MMOL/L (ref 4–13)
AST SERPL W P-5'-P-CCNC: 62 U/L (ref 13–39)
BASOPHILS # BLD AUTO: 0.01 THOUSANDS/ÂΜL (ref 0–0.1)
BASOPHILS NFR BLD AUTO: 0 % (ref 0–1)
BILIRUB SERPL-MCNC: 0.69 MG/DL (ref 0.2–1)
BUN SERPL-MCNC: 16 MG/DL (ref 5–25)
CA-I BLD-SCNC: 1.16 MMOL/L (ref 1.12–1.32)
CALCIUM ALBUM COR SERPL-MCNC: 9.4 MG/DL (ref 8.3–10.1)
CALCIUM SERPL-MCNC: 8.8 MG/DL (ref 8.4–10.2)
CHLORIDE SERPL-SCNC: 99 MMOL/L (ref 96–108)
CO2 SERPL-SCNC: 26 MMOL/L (ref 21–32)
CREAT SERPL-MCNC: 0.61 MG/DL (ref 0.6–1.3)
EOSINOPHIL # BLD AUTO: 0.03 THOUSAND/ÂΜL (ref 0–0.61)
EOSINOPHIL NFR BLD AUTO: 0 % (ref 0–6)
ERYTHROCYTE [DISTWIDTH] IN BLOOD BY AUTOMATED COUNT: 12.5 % (ref 11.6–15.1)
GFR SERPL CREATININE-BSD FRML MDRD: 99 ML/MIN/1.73SQ M
GLUCOSE SERPL-MCNC: 110 MG/DL (ref 65–140)
HCT VFR BLD AUTO: 43.7 % (ref 34.8–46.1)
HGB BLD-MCNC: 14.4 G/DL (ref 11.5–15.4)
IMM GRANULOCYTES # BLD AUTO: 0.02 THOUSAND/UL (ref 0–0.2)
IMM GRANULOCYTES NFR BLD AUTO: 0 % (ref 0–2)
LYMPHOCYTES # BLD AUTO: 2.2 THOUSANDS/ÂΜL (ref 0.6–4.47)
LYMPHOCYTES NFR BLD AUTO: 32 % (ref 14–44)
MAGNESIUM SERPL-MCNC: 1.8 MG/DL (ref 1.9–2.7)
MCH RBC QN AUTO: 32.4 PG (ref 26.8–34.3)
MCHC RBC AUTO-ENTMCNC: 33 G/DL (ref 31.4–37.4)
MCV RBC AUTO: 98 FL (ref 82–98)
MONOCYTES # BLD AUTO: 0.85 THOUSAND/ÂΜL (ref 0.17–1.22)
MONOCYTES NFR BLD AUTO: 12 % (ref 4–12)
NEUTROPHILS # BLD AUTO: 3.74 THOUSANDS/ÂΜL (ref 1.85–7.62)
NEUTS SEG NFR BLD AUTO: 56 % (ref 43–75)
NRBC BLD AUTO-RTO: 0 /100 WBCS
PHOSPHATE SERPL-MCNC: 3.2 MG/DL (ref 2.7–4.5)
PLATELET # BLD AUTO: 183 THOUSANDS/UL (ref 149–390)
PMV BLD AUTO: 10.9 FL (ref 8.9–12.7)
POTASSIUM SERPL-SCNC: 3.8 MMOL/L (ref 3.5–5.3)
PROT SERPL-MCNC: 7.5 G/DL (ref 6.4–8.4)
RBC # BLD AUTO: 4.45 MILLION/UL (ref 3.81–5.12)
SODIUM SERPL-SCNC: 133 MMOL/L (ref 135–147)
WBC # BLD AUTO: 6.85 THOUSAND/UL (ref 4.31–10.16)

## 2024-03-28 PROCEDURE — 80053 COMPREHEN METABOLIC PANEL: CPT

## 2024-03-28 PROCEDURE — 82330 ASSAY OF CALCIUM: CPT

## 2024-03-28 PROCEDURE — 99233 SBSQ HOSP IP/OBS HIGH 50: CPT | Performed by: EMERGENCY MEDICINE

## 2024-03-28 PROCEDURE — 84100 ASSAY OF PHOSPHORUS: CPT

## 2024-03-28 PROCEDURE — 83735 ASSAY OF MAGNESIUM: CPT

## 2024-03-28 PROCEDURE — 99232 SBSQ HOSP IP/OBS MODERATE 35: CPT | Performed by: PHYSICIAN ASSISTANT

## 2024-03-28 PROCEDURE — 70450 CT HEAD/BRAIN W/O DYE: CPT

## 2024-03-28 PROCEDURE — NC001 PR NO CHARGE: Performed by: INTERNAL MEDICINE

## 2024-03-28 PROCEDURE — 85025 COMPLETE CBC W/AUTO DIFF WBC: CPT

## 2024-03-28 PROCEDURE — 99233 SBSQ HOSP IP/OBS HIGH 50: CPT | Performed by: PSYCHIATRY & NEUROLOGY

## 2024-03-28 RX ORDER — MAGNESIUM SULFATE HEPTAHYDRATE 40 MG/ML
2 INJECTION, SOLUTION INTRAVENOUS ONCE
Status: COMPLETED | OUTPATIENT
Start: 2024-03-28 | End: 2024-03-28

## 2024-03-28 RX ORDER — ACETAMINOPHEN 325 MG/1
650 TABLET ORAL EVERY 6 HOURS PRN
Status: DISCONTINUED | OUTPATIENT
Start: 2024-03-28 | End: 2024-04-03 | Stop reason: HOSPADM

## 2024-03-28 RX ORDER — POTASSIUM CHLORIDE 20 MEQ/1
20 TABLET, EXTENDED RELEASE ORAL ONCE
Status: COMPLETED | OUTPATIENT
Start: 2024-03-28 | End: 2024-03-28

## 2024-03-28 RX ORDER — LISINOPRIL 20 MG/1
20 TABLET ORAL 2 TIMES DAILY
Status: DISCONTINUED | OUTPATIENT
Start: 2024-03-28 | End: 2024-04-01

## 2024-03-28 RX ORDER — HYDRALAZINE HYDROCHLORIDE 20 MG/ML
10 INJECTION INTRAMUSCULAR; INTRAVENOUS ONCE
Status: COMPLETED | OUTPATIENT
Start: 2024-03-28 | End: 2024-03-28

## 2024-03-28 RX ORDER — LABETALOL HYDROCHLORIDE 5 MG/ML
20 INJECTION, SOLUTION INTRAVENOUS EVERY 4 HOURS PRN
Status: DISCONTINUED | OUTPATIENT
Start: 2024-03-28 | End: 2024-04-01

## 2024-03-28 RX ADMIN — LABETALOL HYDROCHLORIDE 20 MG: 5 INJECTION, SOLUTION INTRAVENOUS at 21:20

## 2024-03-28 RX ADMIN — SODIUM CHLORIDE 500 ML: 0.9 INJECTION, SOLUTION INTRAVENOUS at 12:00

## 2024-03-28 RX ADMIN — LISINOPRIL 20 MG: 20 TABLET ORAL at 19:09

## 2024-03-28 RX ADMIN — ACETAMINOPHEN 650 MG: 325 TABLET, FILM COATED ORAL at 22:30

## 2024-03-28 RX ADMIN — LABETALOL HYDROCHLORIDE 20 MG: 5 INJECTION, SOLUTION INTRAVENOUS at 16:27

## 2024-03-28 RX ADMIN — THIAMINE HCL TAB 100 MG 100 MG: 100 TAB at 08:47

## 2024-03-28 RX ADMIN — CHLORHEXIDINE GLUCONATE 15 ML: 1.2 SOLUTION ORAL at 08:47

## 2024-03-28 RX ADMIN — LISINOPRIL 20 MG: 20 TABLET ORAL at 08:47

## 2024-03-28 RX ADMIN — NICARDIPINE HYDROCHLORIDE 2.5 MG/HR: 2.5 INJECTION, SOLUTION INTRAVENOUS at 03:00

## 2024-03-28 RX ADMIN — HYDRALAZINE HYDROCHLORIDE 10 MG: 20 INJECTION INTRAMUSCULAR; INTRAVENOUS at 00:54

## 2024-03-28 RX ADMIN — POTASSIUM CHLORIDE 20 MEQ: 1500 TABLET, EXTENDED RELEASE ORAL at 08:47

## 2024-03-28 RX ADMIN — CHLORHEXIDINE GLUCONATE 15 ML: 1.2 SOLUTION ORAL at 21:17

## 2024-03-28 RX ADMIN — ACETAMINOPHEN 650 MG: 325 TABLET, FILM COATED ORAL at 16:29

## 2024-03-28 RX ADMIN — ATORVASTATIN CALCIUM 40 MG: 40 TABLET, FILM COATED ORAL at 16:57

## 2024-03-28 RX ADMIN — ACETAMINOPHEN 650 MG: 325 TABLET, FILM COATED ORAL at 03:31

## 2024-03-28 RX ADMIN — MAGNESIUM SULFATE HEPTAHYDRATE 2 G: 40 INJECTION, SOLUTION INTRAVENOUS at 08:48

## 2024-03-28 RX ADMIN — FOLIC ACID 1 MG: 1 TABLET ORAL at 08:47

## 2024-03-28 RX ADMIN — Medication 1 TABLET: at 08:47

## 2024-03-28 RX ADMIN — AMLODIPINE BESYLATE 10 MG: 10 TABLET ORAL at 08:47

## 2024-03-28 RX ADMIN — ESCITALOPRAM OXALATE 20 MG: 20 TABLET ORAL at 08:47

## 2024-03-28 RX ADMIN — GABAPENTIN 300 MG: 300 CAPSULE ORAL at 21:17

## 2024-03-28 RX ADMIN — LABETALOL HYDROCHLORIDE 10 MG: 5 INJECTION, SOLUTION INTRAVENOUS at 02:25

## 2024-03-28 NOTE — RESTORATIVE TECHNICIAN NOTE
Restorative Technician Note      Patient Name: Tanika Lira     Note Type: Mobility (Pt refused OOB/ambulation 2* c/o feeling too tired.)    Ramandeep SRINIVASAN, Restorative Technician,

## 2024-03-28 NOTE — ED PROCEDURE NOTE
PROCEDURE  CriticalCare Time    Date/Time: 3/26/2024 9:42 AM    Performed by: Nikita Jamison DO  Authorized by: Nikita Jamison DO    Critical care provider statement:     Critical care time (minutes):  60    Critical care start time:  3/26/2024 7:58 AM    Critical care end time:  3/26/2024 10:30 AM    Critical care time was exclusive of:  Separately billable procedures and treating other patients and teaching time    Critical care was necessary to treat or prevent imminent or life-threatening deterioration of the following conditions:  CNS failure or compromise    Critical care was time spent personally by me on the following activities:  Obtaining history from patient or surrogate, development of treatment plan with patient or surrogate, discussions with consultants, evaluation of patient's response to treatment, examination of patient, ordering and review of laboratory studies, ordering and review of radiographic studies, re-evaluation of patient's condition and review of old charts    I assumed direction of critical care for this patient from another provider in my specialty: no       Nikita Jamison DO  03/28/24 1915

## 2024-03-28 NOTE — PROGRESS NOTES
[facility]  Interval Progress Note: Critical Care  Name: Tanika Lira I  MRN: [id number]  Unit/Bed#: [facility] 719-01 I Date of Admission: 3/26/2024   Date of Service: 3/28/2024 I Hospital Day: 2    Interval Events:        Notified by RN throughout night of persistent HTN. This am pt with new onset H/A bifrontal 7/10 described as throbbing.      Pertinent New Data:   blood pressure, pulse, respirations, and pulse oximetry      CBC:   Lab Results   Component Value Date    WBC 9.44 03/27/2024    HGB 12.9 03/27/2024    HCT 40.0 03/27/2024    MCV 99 (H) 03/27/2024     03/27/2024    RBC 4.06 03/27/2024    MCH 31.8 03/27/2024    MCHC 32.3 03/27/2024    RDW 12.5 03/27/2024    MPV 10.9 03/27/2024   , CMP:   Lab Results   Component Value Date    SODIUM 133 (L) 03/27/2024    K 4.2 03/27/2024     03/27/2024    CO2 27 03/27/2024    BUN 15 03/27/2024    CREATININE 0.69 03/27/2024    CALCIUM 8.7 03/27/2024    EGFR 95 03/27/2024     CT headpending      Assessment and Plan  Diagnosis: HTN urgency  Plan: Norvasc increased to 10mg with one time 5mg dose, then lisinopril 20mg home dose added will increase to BID, hydralazine PRN increased from 5mg to 10mg, BP remains >160 will initiate cardene gtt  Diagnosis: H/A new onset  Plan: Neuro exam nonfocal will check stat CTH r/o worsening bleed in setting of HTN  Billing Level:  Critical Care Time Statement: Upon my evaluation, this patient had a high probability of imminent or life-threatening deterioration due to HTN/Headache with known ICH, which required my direct attention, intervention, and personal management.  I spent a total of 15 minutes directly providing critical care services, including management of organ system failure(s) , complex medical decision making (to support/prevent further life-threatening deterioration)., and titration of vasoactive medications. This time is exclusive of procedures, teaching, family meetings, and  any prior time recorded by providers other than myself.      SIGNATURE: MILAN Frank

## 2024-03-28 NOTE — PROGRESS NOTES
"[facility]  Progress Note: Critical Care  Name: Tanika Lira 58 y.o. female I MRN: [id number]  Unit/Bed#: [facility] 719-01 I Date of Admission: 3/26/2024   Date of Service: 3/28/2024 I Hospital Day: 2    Assessment/Plan   Neuro:   {Plan:19604}    CV:   {Plan:56610}    Pulm:  {Plan:50484}    GI:   {Plan:24353}    :   {Plan:27920}    F/E/N:   {Plan:52603}    Heme/Onc:   {Plan:25384}    Endo:   {Plan:24411}    ID:   {Plan:15073}    MSK/Skin:   {Plan:72635}    Disposition: {CC Transfer Levels of Care:89489}    ICU Core Measures     A: Assess, Prevent, and Manage Pain Has pain been assessed? {Yes/No/NA:33741}  Need for changes to pain regimen? {Yes/No/NA:03707}   B: Both SAT/SAT  N/A   C: Choice of Sedation {RASS Goal:76149::\"0 Alert and Calm\"}  Need for changes to sedation or analgesia regimen? {Yes/No/NA:41171}   D: Delirium CAM-ICU: {CAM ICU Results:38815}   E: Early Mobility  Plan for early mobility? {Yes/No/NA:26252}   F: Family Engagement Plan for family engagement today? {Yes/No/NA:03759}         Prophylaxis:  VTE {Contraindicated secondary to:92592}   Stress Ulcer  not ordered     { I DISAPPEARING TIP I Update Problem List daily:09973}   Significant 24hr Events     24hr events: Throughout the day yesterday, the patient had a stable neuro exam. She did not have any new complaints. However, overnight, the patient had increasing blood pressure problems with elevations as high as 180s over 100s and were difficult to control despite multiple doses of PRN Labetalol and Hydralazine. Home Lisinopril was restarted and Norvasc was added as well. Ultimately, the patient was restarted on a Cardene drip to obtain BP control and return them to goal.    Patient was also complaining of worsening headache and a repeat CT head ws obtained which showed no interval changes in her bleed compared to prior exam.    Most recent Bps were 120s over 70s     Subjective     Review of Systems     " Objective                            Vitals I/O      Most Recent Min/Max in 24hrs   Temp 98.3 °F (36.8 °C) Temp  Min: 98.1 °F (36.7 °C)  Max: 98.3 °F (36.8 °C)   Pulse 66 Pulse  Min: 62  Max: 80   Resp 18 Resp  Min: 18  Max: 35   /69 BP  Min: 118/69  Max: 189/100   O2 Sat 97 % SpO2  Min: 94 %  Max: 97 %      Intake/Output Summary (Last 24 hours) at 3/28/2024 0708  Last data filed at 3/27/2024 1400  Gross per 24 hour   Intake 643.75 ml   Output --   Net 643.75 ml       Diet Regular; Regular House    Invasive Monitoring   {Invasive Device (Optional):98965}        Physical Exam   Critical Care Physical Exam *** (fill out SmartBlock)       Diagnostic Studies    {IDisappearing Data Review I RadiologyI Cardiology:96353}  EKG: NSR, incomplete RBBB, prolonged QT  Imaging: Repeat CT head this am with stable exam.  I have personally reviewed pertinent reports.       Medications:  Scheduled PRN   amLODIPine, 10 mg, Daily  atorvastatin, 40 mg, Daily With Dinner  chlorhexidine, 15 mL, Q12H NAOMI  escitalopram, 20 mg, Daily  folic acid, 1 mg, Daily  gabapentin, 300 mg, HS  lisinopril, 20 mg, BID  multivitamin-minerals, 1 tablet, Daily  sodium chloride, 500 mL, Once  thiamine, 100 mg, Daily      acetaminophen, 650 mg, Q6H PRN  hydrALAZINE, 10 mg, Q4H PRN  labetalol, 20 mg, Q4H PRN  trimethobenzamide, 200 mg, Q6H PRN       Continuous    niCARdipine, 1-15 mg/hr, Last Rate: 2.5 mg/hr (03/28/24 0300)         Labs:  { I Disappearing Data Review I Results Review I Micro :03646}  CBC    Recent Labs     03/27/24  0427 03/28/24  0526   WBC 9.44 6.85   HGB 12.9 14.4   HCT 40.0 43.7    183     BMP    Recent Labs     03/27/24  1735 03/28/24  0526   SODIUM 133* 133*   K 4.2 3.8    99   CO2 27 26   AGAP 4 8   BUN 15 16   CREATININE 0.69 0.61   CALCIUM 8.7 8.8       Coags    Recent Labs     03/26/24  1647 03/26/24  1804   INR 1.11  --    PTT  --  29        Additional Electrolytes  Recent Labs     03/27/24  0427 03/27/24  0587  03/28/24  0526   MG 1.6*  --  1.8*   PHOS 3.6  --  3.2   CAIONIZED  --  1.08* 1.16          Blood Gas    No recent results  No recent results LFTs  Recent Labs     03/26/24  0820 03/28/24  0526   * 75*   * 62*   ALKPHOS 100 79   ALB 4.0 3.2*   TBILI 0.53 0.69       Infectious  No recent results  Glucose  Recent Labs     03/26/24  0820 03/27/24  0427 03/27/24  1735 03/28/24  0526   GLUC 132 119 122 110               César Benavidez MD

## 2024-03-28 NOTE — ARC ADMISSION
Patient is pre-approved for Acute Rehab pending medical stability, bed availability, functional tolerance/progress with therapies and confirmation of home support post rehab stay.     Thank you,  Alena Costello  [facility] Admissions

## 2024-03-28 NOTE — PROGRESS NOTES
[facility]  Progress Note  Name: Tanika Lira I  MRN: [id number]  Unit/Bed#: [facility] 719-01 I Date of Admission: 3/26/2024   Date of Service: 3/28/2024 I Hospital Day: 2    Assessment/Plan   * Cerebellar hemorrhage (HCC)  Assessment & Plan  Patient presented s/p acute onset nausea, diarrhea and dizziness   H/o ischemic stroke in 2022.   Per records pt had ASA and Plavix listed on med list. Pt reported taking ASA prior to admssion.     Intracerebral Hemorrhage (ICH) Score:    Severino Coma Sore 13-15 equals +0   Age greater than or equal to 80 No   ICH Volume greater than or equal to 30 ml No   Intraventricular Hemorrhage Yes (1 Point)   Infratentorial Origin of Hemorrhage Yes (1 Point)   Total: 2       Imaging:   CT head wo 3/28/2024: No significant interval change with previously noted cerebellar hemorrhage with 4th ventricular extension. Mild mass effect.     Plan:   Close neurological monitoring given 4th ventricular involvement  Recommend STAT CT head for decline in GCS >2 points in 1 hour  No need for keppra at this time given infratentorial location   Neurology for stroke management   BP goal per neurology   Hold all AC/AP medication at this time.   DVT ppx: SCD's. Cleared for dvt ppx at this time   PT/OT evaluation and tx.   Ongoing medical management and pain control per primary team    Neurosurgery will begin to follow from the periphery at this time. Will establish outpatient appointment in 1-2 weeks with repeat CT head for stability and assess for delayed hydrocephalus. If stable/improved will defer remaining management and follow up with neurology. Call with questions or concerns.     History of CVA (cerebrovascular accident)  Assessment & Plan  H/o CVA and stroke like symptoms 9/2022 seen at [facility]  Per medical records pt had ASA and Plavix listed on medication list. Pt reported taking ASA prior to admission  Neurology following             Subjective/Objective  "  Chief Complaint: tired    Subjective: Patient states she is doing very well today.  Just mostly tired.  Denies any headache, change in vision, trouble speech.    Objective: laying in bed     I/O         03/26 0701 03/27 0700 03/27 0701 03/28 0700 03/28 0701 03/29 0700    P.O. 120      I.V. (mL/kg) 2740.4 (45.1) 593.8 (9.9)     IV Piggyback 50 50     Total Intake(mL/kg) 2910.4 (47.9) 643.8 (10.7)     Urine (mL/kg/hr) 2450      Total Output 2450      Net +460.4 +643.8                    Invasive Devices       Peripheral Intravenous Line  Duration             Peripheral IV 03/26/24 Left Antecubital 2 days    Peripheral IV 03/26/24 Ventral (anterior);Right Forearm 2 days                    Physical Exam:  Vitals: Blood pressure 131/80, pulse 93, temperature 98.8 °F (37.1 °C), temperature source Oral, resp. rate 18, height 5' 6\" (1.676 m), weight 59.9 kg (132 lb 0.9 oz), SpO2 97%.,Body mass index is 21.31 kg/m².    General appearance: alert, appears stated age, cooperative and no distress  Head: Normocephalic, without obvious abnormality  Eyes: EOMI  Neck: supple, symmetrical, trachea midline   Back: no kyphosis present  Lungs: non labored breathing  Heart: regular heart rate  Neurologic:   Mental status: Alert, oriented x3, thought content appropriate  Cranial nerves: grossly intact (Cranial nerves II-XII)  Sensory: normal to light touch   Motor: moving all extremities without focal weakness  Coordination: finger to nose normal and much improved compared to presentation. No drift    Lab Results:  Results from last 7 days   Lab Units 03/28/24  0526 03/27/24  0427 03/26/24  0820   WBC Thousand/uL 6.85 9.44 8.29   HEMOGLOBIN g/dL 14.4 12.9 15.4   HEMATOCRIT % 43.7 40.0 47.4*   PLATELETS Thousands/uL 183 179 189   NEUTROS PCT % 56  --  34*   MONOS PCT % 12  --  12   EOS PCT % 0  --  4     Results from last 7 days   Lab Units 03/28/24  0526 03/27/24  1735 03/27/24  0427 03/26/24  0820   SODIUM mmol/L 133* 133* 133* 136 " "  POTASSIUM mmol/L 3.8 4.2 3.3* 3.7   CHLORIDE mmol/L 99 102 99 99   CO2 mmol/L 26 27 26 30   BUN mg/dL 16 15 11 15   CREATININE mg/dL 0.61 0.69 0.55* 0.67   CALCIUM mg/dL 8.8 8.7 8.2* 9.1   ALK PHOS U/L 79  --   --  100   ALT U/L 75*  --   --  114*   AST U/L 62*  --   --  113*     Results from last 7 days   Lab Units 03/28/24  0526 03/27/24  0427 03/26/24  0820   MAGNESIUM mg/dL 1.8* 1.6* 1.8*     Results from last 7 days   Lab Units 03/28/24  0526 03/27/24  0427   PHOSPHORUS mg/dL 3.2 3.6     Results from last 7 days   Lab Units 03/26/24  1804 03/26/24  1647   INR   --  1.11   PTT seconds 29  --      No results found for: \"TROPONINT\"  ABG:No results found for: \"PHART\", \"OAM0RYX\", \"PO2ART\", \"SNR6BMZ\", \"Z8SHXOOM\", \"BEART\", \"SOURCE\"    Imaging Studies: I have personally reviewed pertinent reports.   and I have personally reviewed pertinent films in PACS  CT head wo contrast    Result Date: 3/28/2024  Impression: No significant interval change with findings detailed above. Workstation performed: [id number]     CT head wo contrast    Result Date: 3/27/2024  Impression: Redemonstration of hyperdense hemorrhage in the left paramedian cerebellum, as described with mass effect and extension into the fourth ventricle and left foramen of Luschka, similar in appearance to the prior. No hydrocephalus. Other findings as above. Overall there has been no significant interval change. Follow-up as clinically warranted. Workstation performed: [id number]     XR chest 1 view portable    Result Date: 3/26/2024  Impression: No acute cardiopulmonary disease. Workstation performed: [id number]     CTA head and neck with and without contrast    Result Date: 3/26/2024  Impression: No left cerebellar vascular malformation to account for the patient's acute parenchymal hematoma. No cervical or intracranial large vessel occlusion, dissection or aneurysm. Mild bilateral cervical carotid bifurcation atherosclerotic disease. Workstation " performed: [id number]     CT abdomen pelvis with contrast    Result Date: 3/26/2024  Impression: 1. Cholelithiasis, including a 3 mm gallstone within the gallbladder neck versus cystic duct. No findings of acute cholecystitis. If there is concern for acute cholecystitis, right upper quadrant ultrasound can be obtained. 2. Findings suggesting cirrhosis with mild mesenteric edema, but no ascites. Enlarged ana hepatic and peripancreatic lymph nodes measuring up to 2 cm in short axis, indeterminate, although could be reactive, given suspicion for cirrhosis. 3. Simple appearing right adnexal cyst measuring 3.7 cm.  According to current guidelines (J Am Kayla Radiol 2020; 17:248-254) in this postmenopausal woman, this should be followed in 6 to 12 months by pelvic ultrasound. The study was marked in EPIC for immediate notification. Workstation performed: [id number]     CT head without contrast    Result Date: 3/26/2024  Impression: Acute parenchymal hematoma left paramedian cerebellum measures 2.6 x 1.6 cm with mass effect on the fourth ventricle. Intraventricular extension of hemorrhage noted with blood in the fourth ventricle and left foramen of Luschka. Chronic lacunar infarcts are identified as described. I personally discussed this study with ABRAHAN UP on 3/26/2024 9:26 AM. Workstation performed: [id number]       EKG, Pathology, and Other Studies: I have personally reviewed pertinent reports.      VTE Pharmacologic Prophylaxis: cleared for dvt ppx at this time    VTE Mechanical Prophylaxis: sequential compression device

## 2024-03-28 NOTE — ASSESSMENT & PLAN NOTE
H/o CVA and stroke like symptoms 9/2022 seen at [facility]  Per medical records pt had ASA and Plavix listed on medication list. Pt reported taking ASA prior to admission  Neurology following

## 2024-03-28 NOTE — PROGRESS NOTES
Patient:    MRN:  [id number]    [place] Request ID:  [id number]    Level of care reserved:  Inpatient Rehab Facility    Partner Reserved:  [facility] ([place]), IVAN Turcios [id number] [phone]    Clinical needs requested:    Geography searched:  30 miles around [id number]    Start of Service:    Request sent:  2:34pm EDT on 3/27/2024 by Stella Thompson    Partner reserved:  2:03pm EDT on 3/28/2024 by Jojo Drummond    Choice list shared:

## 2024-03-28 NOTE — ASSESSMENT & PLAN NOTE
Patient presented s/p acute onset nausea, diarrhea and dizziness   H/o ischemic stroke in 2022.   Per records pt had ASA and Plavix listed on med list. Pt reported taking ASA prior to admssion.     Intracerebral Hemorrhage (ICH) Score:    Severino Coma Sore 13-15 equals +0   Age greater than or equal to 80 No   ICH Volume greater than or equal to 30 ml No   Intraventricular Hemorrhage Yes (1 Point)   Infratentorial Origin of Hemorrhage Yes (1 Point)   Total: 2       Imaging:   CT head wo 3/28/2024: No significant interval change with previously noted cerebellar hemorrhage with 4th ventricular extension. Mild mass effect.     Plan:   Close neurological monitoring given 4th ventricular involvement  Recommend STAT CT head for decline in GCS >2 points in 1 hour  No need for keppra at this time given infratentorial location   Neurology for stroke management   BP goal per neurology   Hold all AC/AP medication at this time.   DVT ppx: SCD's. Cleared for dvt ppx at this time   PT/OT evaluation and tx.   Ongoing medical management and pain control per primary team    Neurosurgery will begin to follow from the periphery at this time. Will establish outpatient appointment in 1-2 weeks with repeat CT head for stability and assess for delayed hydrocephalus. If stable/improved will defer remaining management and follow up with neurology. Call with questions or concerns.

## 2024-03-28 NOTE — PLAN OF CARE
Problem: Activity Intolerance/Impaired Mobility  Goal: Mobility/activity is maintained at optimum level for patient  Description: Interventions:  - Assess and monitor patient  barriers to mobility and need for assistive/adaptive devices.  - Assess patient's emotional response to limitations.  - Collaborate with interdisciplinary team and initiate plans and interventions as ordered.  - Encourage independent activity per ability.  - Maintain proper body alignment.  - Perform active/passive rom as tolerated/ordered.  - Plan activities to conserve energy.  - Turn patient as appropriate  Outcome: Progressing     Problem: Nutrition/Hydration-ADULT  Goal: Nutrient/Hydration intake appropriate for improving, restoring or maintaining nutritional needs  Description: Monitor and assess patient's nutrition/hydration status for malnutrition. Collaborate with interdisciplinary team and initiate plan and interventions as ordered.  Monitor patient's weight and dietary intake as ordered or per policy. Utilize nutrition screening tool and intervene as necessary. Determine patient's food preferences and provide high-protein, high-caloric foods as appropriate.     INTERVENTIONS:  - Monitor oral intake, urinary output, labs, and treatment plans  - Assess nutrition and hydration status and recommend course of action  - Evaluate amount of meals eaten  - Assist patient with eating if necessary   - Allow adequate time for meals  - Recommend/ encourage appropriate diets, oral nutritional supplements, and vitamin/mineral supplements  - Order, calculate, and assess calorie counts as needed  - Recommend, monitor, and adjust tube feedings and TPN/PPN based on assessed needs  - Assess need for intravenous fluids  - Provide specific nutrition/hydration education as appropriate  - Include patient/family/caregiver in decisions related to nutrition  Outcome: Progressing     Problem: PAIN - ADULT  Goal: Verbalizes/displays adequate comfort level or  baseline comfort level  Description: Interventions:  - Encourage patient to monitor pain and request assistance  - Assess pain using appropriate pain scale  - Administer analgesics based on type and severity of pain and evaluate response  - Implement non-pharmacological measures as appropriate and evaluate response  - Consider cultural and social influences on pain and pain management  - Notify physician/advanced practitioner if interventions unsuccessful or patient reports new pain  Outcome: Progressing     Problem: INFECTION - ADULT  Goal: Absence or prevention of progression during hospitalization  Description: INTERVENTIONS:  - Assess and monitor for signs and symptoms of infection  - Monitor lab/diagnostic results  - Monitor all insertion sites, i.e. indwelling lines, tubes, and drains  - Monitor endotracheal if appropriate and nasal secretions for changes in amount and color  - [place] appropriate cooling/warming therapies per order  - Administer medications as ordered  - Instruct and encourage patient and family to use good hand hygiene technique  - Identify and instruct in appropriate isolation precautions for identified infection/condition  Outcome: Progressing

## 2024-03-28 NOTE — PROGRESS NOTES
[facility]  Progress Note: Critical Care  Name: Tanika Lira 58 y.o. female I MRN: [id number]  Unit/Bed#: [facility] 719-01 I Date of Admission: 3/26/2024   Date of Service: 3/28/2024 I Hospital Day: 2    Assessment/Plan   Neuro:   Diagnosis: Left cerebellar hemorrhage in the setting of hypertension and positive UDS for methamphetamine   Patient woke up the morning of 3/26 with headache, nausea, and dizziness. She then head several episodes of emesis and diarrhea.   3/26 am CTH wo contrast: Acute parenchymal hematoma left paramedian cerebellum measures 2.6 x 1.6 cm with mass effect on the fourth ventricle. Intraventricular extension of hemorrhage noted with blood in the fourth ventricle and left foramen of Luschka.  Chronic lacunar infarcts are identified as described.  3/27 am CT Head: Redemonstration of hyperdense hemorrhage in the left paramedian cerebellum, as described with mass effect and extension into the fourth ventricle and left foramen of Luschka, similar in appearance to the prior.     Plan:   Frequent neurochecks Q\ hour   Future MRI at neuro's discretion  If neuro change occurs, low threshold for repeat CT head and subsequent EVD placement if CSF occlusion detected     CV:   No active issues  Prior holter monitor was negative for A-fib or other arrythmia  Diagnosis: Hypertension  Home regimen lisinopril 20 mg, nifedipine 30 mg       Pulm:  No active issues     GI:   Diagnosis: cholelithiasis  Cholelithiasis, including a 3 mm gallstone within the gallbladder neck versus cystic duct. No findings of acute cholecystitis. If there is concern for acute cholecystitis, right upper quadrant ultrasound can be obtained.   RUQ tenderness on exam   Plan:   If patient becomes symptomatic, obtain RUQ US, consider cholecystectomy  Follow up with GI outpatient  Diagnosis: Possible alcohol induced cirrhosis  3/26 CT: Findings suggesting cirrhosis with mild mesenteric edema, but no  ascites. Enlarged ana hepatic and peripancreatic lymph nodes measuring up to 2 cm in short axis, indeterminate, although could be reactive, given suspicion for cirrhosis.   AST: 113  ALT: 114  Plan:   Monitor liver enzymes  Follow up with GI outpatient        :   Diagnosis: R adnexal cyst noted on CT  Simple appearing right adnexal cyst measuring 3.7 cm. According to current guidelines (J Am Kayla Radiol 2020; 17:248-254) in this postmenopausal woman, this should be followed in 6 to 12 months by pelvic ultrasound.   Plan:   Discussed with patient f/u outpatient 6-12 months by pelvic US  Diagnosis: Positive UDS for amphetamine  Plan: Continue to monitor for withdrawal sx  F/E/N:    F: None  E: replete prn  N: regular house after bedside swallow     Heme/Onc:   No active issues  Continue to trend hemoglobin    Endo:   No active issues     ID:   No active issues     MSK/Skin:   Diagnosis: Shannon  Plan: Frequent skin care     Disposition: Critical care    ICU Core Measures     A: Assess, Prevent, and Manage Pain Has pain been assessed? Yes  Need for changes to pain regimen? No   B: Both SAT/SAT  N/A   C: Choice of Sedation RASS Goal: 0 Alert and Calm  Need for changes to sedation or analgesia regimen? No   D: Delirium CAM-ICU: Negative   E: Early Mobility  Plan for early mobility? Yes   F: Family Engagement Plan for family engagement today? Yes         Prophylaxis:  VTE Contraindicated secondary to: High bleeding risk   Stress Ulcer  not ordered        Significant 24hr Events     24hr events:  Patient's initially presented yesterday morning with headache, vomiting, diarrhea, and ataxia. Confirmed R paramedian cerebellar hemorrhage and old infarcts. Overnight, the patient had no new complaints. Frequent neuro checks showed consistent exam. This morning, patient had a repeat CT Head which showed stable hemorrhage consistent with prior exam. Her am labs showed low Ca, low K, low mag. Electrolytes were repleted per  protocol.      Subjective     Review of Systems   Constitutional:  Negative for chills and fever.   HENT:  Negative for congestion, rhinorrhea and sore throat.    Eyes:  Positive for photophobia. Negative for visual disturbance.   Respiratory:  Negative for apnea, chest tightness and shortness of breath.    Cardiovascular:  Negative for chest pain.   Gastrointestinal:  Positive for nausea. Negative for abdominal distention, abdominal pain, diarrhea and vomiting.   Genitourinary:  Negative for difficulty urinating and dysuria.   Neurological:  Positive for dizziness and numbness. Negative for facial asymmetry and weakness.        Objective                            Vitals I/O      Most Recent Min/Max in 24hrs   Temp 98.3 °F (36.8 °C) Temp  Min: 98.1 °F (36.7 °C)  Max: 98.3 °F (36.8 °C)   Pulse 93 Pulse  Min: 62  Max: 93   Resp 19 Resp  Min: 18  Max: 35   /72 BP  Min: 118/69  Max: 189/100   O2 Sat 97 % SpO2  Min: 94 %  Max: 97 %      Intake/Output Summary (Last 24 hours) at 3/28/2024 0746  Last data filed at 3/27/2024 1400  Gross per 24 hour   Intake 643.75 ml   Output --   Net 643.75 ml       Diet Regular; Regular House    Invasive Monitoring           Physical Exam   Physical Exam  Vitals and nursing note reviewed.   Eyes:      General: Vision grossly intact.      Pupils: Pupils are equal, round, and reactive to light.   Skin:     General: Skin is warm and dry.   HENT:      Head: Normocephalic and atraumatic.      Right Ear: No drainage.      Left Ear: No drainage.      Nose: No congestion or rhinorrhea.      Mouth/Throat:      Mouth: Mucous membranes are moist.   Cardiovascular:      Rate and Rhythm: Normal rate and regular rhythm.      Pulses: Normal pulses.   Abdominal:      Palpations: Abdomen is soft.      Tenderness: There is abdominal tenderness.      Comments: Mild RUQ tenderness. Liver edge palpable 3cm below the costal margin   Constitutional:       General: She is not in acute distress.      Appearance: She is ill-appearing.   Pulmonary:      Effort: Pulmonary effort is normal.      Breath sounds: Normal breath sounds.   Neurological:      Mental Status: She is alert and oriented to person, place and time.      Motor: Strength full and intact in all extremities.      Comments: Mild ataxia on finger to nose testing. Difficulty keeping eyes focused on a single point. Vision, and motor grossly intact          Diagnostic Studies      Imaging:CT head wo contrast    Result Date: 3/28/2024  Impression: No significant interval change with findings detailed above. Workstation performed: [id number]     CT head wo contrast    Result Date: 3/27/2024  Impression: Redemonstration of hyperdense hemorrhage in the left paramedian cerebellum, as described with mass effect and extension into the fourth ventricle and left foramen of Luschka, similar in appearance to the prior. No hydrocephalus. Other findings as above. Overall there has been no significant interval change. Follow-up as clinically warranted. Workstation performed: [id number]     XR chest 1 view portable    Result Date: 3/26/2024  Impression: No acute cardiopulmonary disease. Workstation performed: [id number]     CTA head and neck with and without contrast    Result Date: 3/26/2024  Impression: No left cerebellar vascular malformation to account for the patient's acute parenchymal hematoma. No cervical or intracranial large vessel occlusion, dissection or aneurysm. Mild bilateral cervical carotid bifurcation atherosclerotic disease. Workstation performed: [id number]     CT abdomen pelvis with contrast    Result Date: 3/26/2024  Impression: 1. Cholelithiasis, including a 3 mm gallstone within the gallbladder neck versus cystic duct. No findings of acute cholecystitis. If there is concern for acute cholecystitis, right upper quadrant ultrasound can be obtained. 2. Findings suggesting cirrhosis with mild mesenteric edema, but no ascites. Enlarged ana hepatic  and peripancreatic lymph nodes measuring up to 2 cm in short axis, indeterminate, although could be reactive, given suspicion for cirrhosis. 3. Simple appearing right adnexal cyst measuring 3.7 cm.  According to current guidelines (J Am Kayla Radiol 2020; 17:248-254) in this postmenopausal woman, this should be followed in 6 to 12 months by pelvic ultrasound. The study was marked in EPIC for immediate notification. Workstation performed: [id number]     CT head without contrast    Result Date: 3/26/2024  Impression: Acute parenchymal hematoma left paramedian cerebellum measures 2.6 x 1.6 cm with mass effect on the fourth ventricle. Intraventricular extension of hemorrhage noted with blood in the fourth ventricle and left foramen of Luschka. Chronic lacunar infarcts are identified as described. I personally discussed this study with ABRAHAN UP on 3/26/2024 9:26 AM. Workstation performed: [id number]       XR chest 1 view portable    Result Date: 3/26/2024  Impression No acute cardiopulmonary disease. Workstation performed: [id number]           Medications:  Scheduled PRN   amLODIPine, 10 mg, Daily  atorvastatin, 40 mg, Daily With Dinner  chlorhexidine, 15 mL, Q12H NAOMI  escitalopram, 20 mg, Daily  folic acid, 1 mg, Daily  gabapentin, 300 mg, HS  lisinopril, 20 mg, BID  multivitamin-minerals, 1 tablet, Daily  thiamine, 100 mg, Daily      acetaminophen, 650 mg, Q6H PRN  hydrALAZINE, 10 mg, Q4H PRN  labetalol, 20 mg, Q4H PRN  trimethobenzamide, 200 mg, Q6H PRN       Continuous    niCARdipine, 1-15 mg/hr, Last Rate: 2.5 mg/hr (03/28/24 0300)         Labs:    CBC    Recent Labs     03/27/24  0427 03/28/24  0526   WBC 9.44 6.85   HGB 12.9 14.4   HCT 40.0 43.7    183     BMP    Recent Labs     03/27/24  1735 03/28/24  0526   SODIUM 133* 133*   K 4.2 3.8    99   CO2 27 26   AGAP 4 8   BUN 15 16   CREATININE 0.69 0.61   CALCIUM 8.7 8.8       Coags    Recent Labs     03/26/24  1647 03/26/24  1804   INR 1.11  --     PTT  --  29        Additional Electrolytes  Recent Labs     03/27/24  0427 03/27/24  0549 03/28/24  0526   MG 1.6*  --  1.8*   PHOS 3.6  --  3.2   CAIONIZED  --  1.08* 1.16          Blood Gas    No recent results  No recent results LFTs  Recent Labs     03/26/24  0820 03/28/24  0526   * 75*   * 62*   ALKPHOS 100 79   ALB 4.0 3.2*   TBILI 0.53 0.69       Infectious  No recent results  Glucose  Recent Labs     03/26/24  0820 03/27/24  0427 03/27/24  1735 03/28/24  0526   GLUC 132 119 122 110               Pollo Talbert, DO

## 2024-03-28 NOTE — TELEPHONE ENCOUNTER
3/28/24 - PT IN [facility]  4/15/24 2 WK HFU W/CTH     W[facility] [place] Clerical  2 week with AP. Ct head.

## 2024-03-28 NOTE — QUICK NOTE
Critical Care Interval Transfer Note:    Please refer to progress note from earlier today for full details.     Barriers to discharge:   PT/OT     Consults:   IP CONSULT TO CASE MANAGEMENT  IP CONSULT TO PHYSICAL MEDICINE REHAB  IP CONSULT TO NEUROLOGY  IP CONSULT TO NUTRITION SERVICES  IP CONSULT TO NEUROSURGERY    Recommended to review admission imaging for incidental findings and document in discharge navigator: Incidental findings have been documented in discharge navigator. Patient and/or family was informed and they expressed understanding.      Discharge Plan: Anticipate discharge in 24-48 hrs to pending facility         Patient seen and evaluated by Critical Care today and deemed to be appropriate for transfer to Med Surg. Spoke to Trenton Mcwilliams from Neurology to accept transfer. Critical care can be contacted via [employer] with any questions or concerns.

## 2024-03-28 NOTE — PLAN OF CARE
Problem: Prexisting or High Potential for Compromised Skin Integrity  Goal: Skin integrity is maintained or improved  Description: INTERVENTIONS:  - Identify patients at risk for skin breakdown  - Assess and monitor skin integrity  - Assess and monitor nutrition and hydration status  - Monitor labs   - Assess for incontinence   - Turn and reposition patient  - Assist with mobility/ambulation  - Relieve pressure over bony prominences  - Avoid friction and shearing  - Provide appropriate hygiene as needed including keeping skin clean and dry  - Evaluate need for skin moisturizer/barrier cream  - Collaborate with interdisciplinary team   - Patient/family teaching  - Consider wound care consult   Outcome: Progressing     Problem: Neurological Deficit  Goal: Neurological status is stable or improving  Description: Interventions:  - Monitor and assess patient's level of consciousness, motor function, sensory function, and level of assistance needed for ADLs.   - Monitor and report changes from baseline. Collaborate with interdisciplinary team to initiate plan and implement interventions as ordered.   - Provide and maintain a safe environment.  - Consider seizure precautions.  - Consider fall precautions.  - Consider aspiration precautions.  - Consider bleeding precautions.  Outcome: Progressing     Problem: Activity Intolerance/Impaired Mobility  Goal: Mobility/activity is maintained at optimum level for patient  Description: Interventions:  - Assess and monitor patient  barriers to mobility and need for assistive/adaptive devices.  - Assess patient's emotional response to limitations.  - Collaborate with interdisciplinary team and initiate plans and interventions as ordered.  - Encourage independent activity per ability.  - Maintain proper body alignment.  - Perform active/passive rom as tolerated/ordered.  - Plan activities to conserve energy.  - Turn patient as appropriate  Outcome: Progressing     Problem:  Communication Impairment  Goal: Ability to express needs and understand communication  Description: Assess patient's communication skills and ability to understand information.  Patient will demonstrate use of effective communication techniques, alternative methods of communication and understanding even if not able to speak.     - Encourage communication and provide alternate methods of communication as needed.  - Collaborate with case management/ for discharge needs.  - Include patient/family/caregiver in decisions related to communication.  Outcome: Progressing     Problem: Potential for Aspiration  Goal: Non-ventilated patient's risk of aspiration is minimized  Description: Assess and monitor vital signs, respiratory status, and labs (WBC).  Monitor for signs of aspiration (tachypnea, cough, rales, wheezing, cyanosis, fever).    - Assess and monitor patient's ability to swallow.  - Place patient up in chair to eat if possible.  - HOB up at 90 degrees to eat if unable to get patient up into chair.  - Supervise patient during oral intake.   - Instruct patient/ family to take small bites.  - Instruct patient/ family to take small single sips when taking liquids.  - Follow patient-specific strategies generated by speech pathologist.  Outcome: Progressing     Problem: Nutrition  Goal: Nutrition/Hydration status is improving  Description: Monitor and assess patient's nutrition/hydration status for malnutrition (ex- brittle hair, bruises, dry skin, pale skin and conjunctiva, muscle wasting, smooth red tongue, and disorientation). Collaborate with interdisciplinary team and initiate plan and interventions as ordered.  Monitor patient's weight and dietary intake as ordered or per policy. Utilize nutrition screening tool and intervene per policy. Determine patient's food preferences and provide high-protein, high-caloric foods as appropriate.     - Assist patient with eating.  - Allow adequate time for  meals.  - Encourage patient to take dietary supplement as ordered.  - Collaborate with clinical nutritionist.  - Include patient/family/caregiver in decisions related to nutrition.  Outcome: Progressing     Problem: Nutrition/Hydration-ADULT  Goal: Nutrient/Hydration intake appropriate for improving, restoring or maintaining nutritional needs  Description: Monitor and assess patient's nutrition/hydration status for malnutrition. Collaborate with interdisciplinary team and initiate plan and interventions as ordered.  Monitor patient's weight and dietary intake as ordered or per policy. Utilize nutrition screening tool and intervene as necessary. Determine patient's food preferences and provide high-protein, high-caloric foods as appropriate.     INTERVENTIONS:  - Monitor oral intake, urinary output, labs, and treatment plans  - Assess nutrition and hydration status and recommend course of action  - Evaluate amount of meals eaten  - Assist patient with eating if necessary   - Allow adequate time for meals  - Recommend/ encourage appropriate diets, oral nutritional supplements, and vitamin/mineral supplements  - Order, calculate, and assess calorie counts as needed  - Recommend, monitor, and adjust tube feedings and TPN/PPN based on assessed needs  - Assess need for intravenous fluids  - Provide specific nutrition/hydration education as appropriate  - Include patient/family/caregiver in decisions related to nutrition  Outcome: Progressing     Problem: COPING  Goal: Pt/Family able to verbalize concerns and demonstrate effective coping strategies  Description: INTERVENTIONS:  - Assist patient/family to identify coping skills, available support systems and cultural and spiritual values  - Provide emotional support, including active listening and acknowledgement of concerns of patient and caregivers  - Reduce environmental stimuli, as able  - Provide patient education  - Assess for spiritual pain/suffering and initiate  spiritual care, including notification of Pastoral Care or [facility] based community as needed  - Assess effectiveness of coping strategies  Outcome: Progressing  Goal: Will report anxiety at manageable levels  Description: INTERVENTIONS:  - Administer medication as ordered  - Teach and encourage coping skills  - Provide emotional support  - Assess patient/family for anxiety and ability to cope  Outcome: Progressing     Problem: Potential for Falls  Goal: Patient will remain free of falls  Description: INTERVENTIONS:  - Educate patient/family on patient safety including physical limitations  - Instruct patient to call for assistance with activity   - Consult OT/PT to assist with strengthening/mobility   - Keep Call bell within reach  - Keep bed low and locked with side rails adjusted as appropriate  - Keep care items and personal belongings within reach  - Initiate and maintain comfort rounds  - Make Fall Risk Sign visible to staff  - Offer Toileting every 2 Hours, in advance of need  - Apply yellow socks and bracelet for high fall risk patients  - Consider moving patient to room near nurses station  Outcome: Progressing     Problem: PAIN - ADULT  Goal: Verbalizes/displays adequate comfort level or baseline comfort level  Description: Interventions:  - Encourage patient to monitor pain and request assistance  - Assess pain using appropriate pain scale  - Administer analgesics based on type and severity of pain and evaluate response  - Implement non-pharmacological measures as appropriate and evaluate response  - Consider cultural and social influences on pain and pain management  - Notify physician/advanced practitioner if interventions unsuccessful or patient reports new pain  Outcome: Progressing     Problem: INFECTION - ADULT  Goal: Absence or prevention of progression during hospitalization  Description: INTERVENTIONS:  - Assess and monitor for signs and symptoms of infection  - Monitor lab/diagnostic results  -  Monitor all insertion sites, i.e. indwelling lines, tubes, and drains  - Monitor endotracheal if appropriate and nasal secretions for changes in amount and color  - [place] appropriate cooling/warming therapies per order  - Administer medications as ordered  - Instruct and encourage patient and family to use good hand hygiene technique  - Identify and instruct in appropriate isolation precautions for identified infection/condition  Outcome: Progressing     Problem: SAFETY ADULT  Goal: Patient will remain free of falls  Description: INTERVENTIONS:  - Educate patient/family on patient safety including physical limitations  - Instruct patient to call for assistance with activity   - Consult OT/PT to assist with strengthening/mobility   - Keep Call bell within reach  - Keep bed low and locked with side rails adjusted as appropriate  - Keep care items and personal belongings within reach  - Initiate and maintain comfort rounds  - Make Fall Risk Sign visible to staff  - Offer Toileting every 2 Hours, in advance of need  - Apply yellow socks and bracelet for high fall risk patients  - Consider moving patient to room near nurses station  Outcome: Progressing  Goal: Maintain or return to baseline ADL function  Description: INTERVENTIONS:  -  Assess patient's ability to carry out ADLs; assess patient's baseline for ADL function and identify physical deficits which impact ability to perform ADLs (bathing, care of mouth/teeth, toileting, grooming, dressing, etc.)  - Assess/evaluate cause of self-care deficits   - Assess range of motion  - Assess patient's mobility; develop plan if impaired  - Assess patient's need for assistive devices and provide as appropriate  - Encourage maximum independence but intervene and supervise when necessary  - Involve family in performance of ADLs  - Assess for home care needs following discharge   - Consider OT consult to assist with ADL evaluation and planning for discharge  - Provide patient  education as appropriate  Outcome: Progressing  Goal: Maintains/Returns to pre admission functional level  Description: INTERVENTIONS:  - Perform AM-PAC 6 Click Basic Mobility/ Daily Activity assessment daily.  - Set and communicate daily mobility goal to care team and patient/family/caregiver.   - Collaborate with rehabilitation services on mobility goals if consulted  - Perform Range of Motion 2 times a day.  - Reposition patient every 2 hours.  - Dangle patient 2 times a day  - Stand patient 2 times a day  - Ambulate patient 2 times a day  - Out of bed to chair 2 times a day   - Out of bed for meals 2 times a day  - Out of bed for toileting  - Record patient progress and toleration of activity level   Outcome: Progressing     Problem: DISCHARGE PLANNING  Goal: Discharge to home or other facility with appropriate resources  Description: INTERVENTIONS:  - Identify barriers to discharge w/patient and caregiver  - Arrange for needed discharge resources and transportation as appropriate  - Identify discharge learning needs (meds, wound care, etc.)  - Arrange for interpretive services to assist at discharge as needed  - Refer to Case Management Department for coordinating discharge planning if the patient needs post-hospital services based on physician/advanced practitioner order or complex needs related to functional status, cognitive ability, or social support system  Outcome: Progressing     Problem: Knowledge Deficit  Goal: Patient/family/caregiver demonstrates understanding of disease process, treatment plan, medications, and discharge instructions  Description: Complete learning assessment and assess knowledge base.  Interventions:  - Provide teaching at level of understanding  - Provide teaching via preferred learning methods  Outcome: Progressing     Problem: NEUROSENSORY - ADULT  Goal: Achieves stable or improved neurological status  Description: INTERVENTIONS  - Monitor and report changes in neurological  status  - Monitor vital signs such as temperature, blood pressure, glucose, and any other labs ordered   - Initiate measures to prevent increased intracranial pressure  - Monitor for seizure activity and implement precautions if appropriate      Outcome: Progressing  Goal: Remains free of injury related to seizures activity  Description: INTERVENTIONS  - Maintain airway, patient safety  and administer oxygen as ordered  - Monitor patient for seizure activity, document and report duration and description of seizure to physician/advanced practitioner  - If seizure occurs,  ensure patient safety during seizure  - Reorient patient post seizure  - Seizure pads on all 4 side rails  - Instruct patient/family to notify RN of any seizure activity including if an aura is experienced  - Instruct patient/family to call for assistance with activity based on nursing assessment  - Administer anti-seizure medications if ordered    Outcome: Progressing  Goal: Achieves maximal functionality and self care  Description: INTERVENTIONS  - Monitor swallowing and airway patency with patient fatigue and changes in neurological status  - Encourage and assist patient to increase activity and self care.   - Encourage visually impaired, hearing impaired and aphasic patients to use assistive/communication devices  Outcome: Progressing     Problem: CARDIOVASCULAR - ADULT  Goal: Maintains optimal cardiac output and hemodynamic stability  Description: INTERVENTIONS:  - Monitor I/O, vital signs and rhythm  - Monitor for S/S and trends of decreased cardiac output  - Administer and titrate ordered vasoactive medications to optimize hemodynamic stability  - Assess quality of pulses, skin color and temperature  - Assess for signs of decreased coronary artery perfusion  - Instruct patient to report change in severity of symptoms  Outcome: Progressing  Goal: Absence of cardiac dysrhythmias or at baseline rhythm  Description: INTERVENTIONS:  - Continuous  cardiac monitoring, vital signs, obtain 12 lead EKG if ordered  - Administer antiarrhythmic and heart rate control medications as ordered  - Monitor electrolytes and administer replacement therapy as ordered  Outcome: Progressing     Problem: RESPIRATORY - ADULT  Goal: Achieves optimal ventilation and oxygenation  Description: INTERVENTIONS:  - Assess for changes in respiratory status  - Assess for changes in mentation and behavior  - Position to facilitate oxygenation and minimize respiratory effort  - Oxygen administered by appropriate delivery if ordered  - Initiate smoking cessation education as indicated  - Encourage broncho-pulmonary hygiene including cough, deep breathe, Incentive Spirometry  - Assess the need for suctioning and aspirate as needed  - Assess and instruct to report SOB or any respiratory difficulty  - Respiratory Therapy support as indicated  Outcome: Progressing     Problem: GASTROINTESTINAL - ADULT  Goal: Minimal or absence of nausea and/or vomiting  Description: INTERVENTIONS:  - Administer IV fluids if ordered to ensure adequate hydration  - Maintain NPO status until nausea and vomiting are resolved  - Nasogastric tube if ordered  - Administer ordered antiemetic medications as needed  - Provide nonpharmacologic comfort measures as appropriate  - Advance diet as tolerated, if ordered  - Consider nutrition services referral to assist patient with adequate nutrition and appropriate food choices  Outcome: Progressing  Goal: Maintains or returns to baseline bowel function  Description: INTERVENTIONS:  - Assess bowel function  - Encourage oral fluids to ensure adequate hydration  - Administer IV fluids if ordered to ensure adequate hydration  - Administer ordered medications as needed  - Encourage mobilization and activity  - Consider nutritional services referral to assist patient with adequate nutrition and appropriate food choices  Outcome: Progressing  Goal: Maintains adequate nutritional  intake  Description: INTERVENTIONS:  - Monitor percentage of each meal consumed  - Identify factors contributing to decreased intake, treat as appropriate  - Assist with meals as needed  - Monitor I&O, weight, and lab values if indicated  - Obtain nutrition services referral as needed  Outcome: Progressing     Problem: GENITOURINARY - ADULT  Goal: Maintains or returns to baseline urinary function  Description: INTERVENTIONS:  - Assess urinary function  - Encourage oral fluids to ensure adequate hydration if ordered  - Administer IV fluids as ordered to ensure adequate hydration  - Administer ordered medications as needed  - Offer frequent toileting  - Follow urinary retention protocol if ordered  Outcome: Progressing  Goal: Absence of urinary retention  Description: INTERVENTIONS:  - Assess patient’s ability to void and empty bladder  - Monitor I/O  - Bladder scan as needed  - Discuss with physician/AP medications to alleviate retention as needed  - Discuss catheterization for long term situations as appropriate  Outcome: Progressing     Problem: METABOLIC, FLUID AND ELECTROLYTES - ADULT  Goal: Electrolytes maintained within normal limits  Description: INTERVENTIONS:  - Monitor labs and assess patient for signs and symptoms of electrolyte imbalances  - Administer electrolyte replacement as ordered  - Monitor response to electrolyte replacements, including repeat lab results as appropriate  - Instruct patient on fluid and nutrition as appropriate  Outcome: Progressing  Goal: Fluid balance maintained  Description: INTERVENTIONS:  - Monitor labs   - Monitor I/O and WT  - Instruct patient on fluid and nutrition as appropriate  - Assess for signs & symptoms of volume excess or deficit  Outcome: Progressing     Problem: HEMATOLOGIC - ADULT  Goal: Maintains hematologic stability  Description: INTERVENTIONS  - Assess for signs and symptoms of bleeding or hemorrhage  - Monitor labs  - Administer supportive blood  products/factors as ordered and appropriate  Outcome: Progressing     Problem: MUSCULOSKELETAL - ADULT  Goal: Maintain or return mobility to safest level of function  Description: INTERVENTIONS:  - Assess patient's ability to carry out ADLs; assess patient's baseline for ADL function and identify physical deficits which impact ability to perform ADLs (bathing, care of mouth/teeth, toileting, grooming, dressing, etc.)  - Assess/evaluate cause of self-care deficits   - Assess range of motion  - Assess patient's mobility  - Assess patient's need for assistive devices and provide as appropriate  - Encourage maximum independence but intervene and supervise when necessary  - Involve family in performance of ADLs  - Assess for home care needs following discharge   - Consider OT consult to assist with ADL evaluation and planning for discharge  - Provide patient education as appropriate  Outcome: Progressing

## 2024-03-28 NOTE — PROGRESS NOTES
NEUROLOGY RESIDENCY PROGRESS NOTE     Name: Tanika Lira   Age & Sex: 58 y.o. female   MRN: [id number]  Unit/Bed#: [facility] 719-01   Encounter: [id number]    Tanika Lira will need follow up in in 6 weeks with neurovascular Attending (Dr. Martinez) .  Recommend repeat CT head in 4 weeks before clearing for retaking plavix.    ASSESSMENT & PLAN     * Cerebellar hemorrhage (HCC)  Assessment & Plan  Patient is a 58 year old woman with hx of right hemisphere stroke (possibly right pontine infarct) on aspirin , HTN, HLD presenting for left cerebellar hemorrhage with IVH. Patient's symptoms are of nausea, vomiting, diarrhea, vertigo sensation but no focal numbness, weakness, visual deficits, nor headaches. Also, horizontal diplopia. Patient has history of right sided stroke in 2022 which she was placed on DAPT and to continue plavix after 21 days. Patient states she is on aspirin daily on this visit. Initial blood pressure recorded at [facility] is 222/136. On exam, continued but improving L>R dysmetria.  Thinners: aspirin reversed with DDAVP  Initial BP: Blood Pressure: 144/78  Presenting exam: Bilateral dysmetric L>R  Vascular risk factors: HTN, HLD, previous stroke    Workup:  CT head wo contrast 3/26/2024: Acute parenchymal hematoma left paramedian cerebellum measures 2.6 x 1.6 cm with mass effect on the fourth ventricle. Intraventricular extension of hemorrhage noted with blood in the fourth ventricle and left foramen of Luschka.   CT head wo contrast 3/27/2024: Redemonstration of hyperdense hemorrhage in the left paramedian cerebellum, as described with mass effect and extension into the fourth ventricle and left foramen of Luschka, similar in appearance to the prior.   CT head wo contrast 3/28: no significant changes from above  Lab Results   Component Value Date    INR 1.11 03/26/2024    HGBA1C 5.2 09/02/2022    SODIUM 136 03/26/2024       Pertinent scores:  - ICH: 2 due to IVH and Infratentorial  origin    Impression: Left cerebellar hemorrhage most likely in setting of hypertensive emergency with blood pressures in the 200s systolic 2/2 to poor medication adherence and methamphetamine use now stable.    Plan:  S/P DDAVP X2  Neurosurgery following  Neurochecks  Repeat CTH if > 2 pt drop in GCS in one hour  Goal SBP between 120 and 140, cardene gtt or pressers/fluids as needed to keep within range  PT/OT/Speech/PMR consults when able  Replete lytes as needed as per CC  No chemical ppx due to hemorrhage, SCDs for ppx  Hold AP agents for 4 weeks from 3/26 and then can retake plavix 75mg daily  BG < 180, SSI for coverage  Rest of care per primary              SUBJECTIVE     Patient was seen and examined. No acute events overnight. Patient states does not feel dizzy but it comes back when she bends over. Otherwise, no numbness, no weakness, no visual deficits. Patient states working with physical therapy.    ROS is negative unless stated above.    OBJECTIVE     Patient ID: Tanika Lira is a 58 y.o. female.    Vitals:    24 0900 24 1000 24 1015 24 1058   BP: 114/74 145/79 144/80 131/80   BP Location:    Right arm   Pulse: 74 69 69 93   Resp:    18   Temp:    98.8 °F (37.1 °C)   TempSrc:    Oral   SpO2:       Weight:       Height:          Temperature:   Temp (24hrs), Av.3 °F (36.8 °C), Min:98.1 °F (36.7 °C), Max:98.8 °F (37.1 °C)    Temperature: 98.8 °F (37.1 °C)      Physical Exam  Eyes:      Extraocular Movements: EOM normal.      Pupils: Pupils are equal, round, and reactive to light.   Neurological:      Mental Status: She is oriented to person, place, and time.      Motor: Motor strength is normal.     Coordination: Finger-Nose-Finger Test abnormal (L>R dysmetria improving). Heel to Marquez Test normal.      Deep Tendon Reflexes:      Reflex Scores:       Bicep reflexes are 2+ on the right side and 2+ on the left side.       Brachioradialis reflexes are 2+ on the right side and 2+  on the left side.       Patellar reflexes are 2+ on the right side and 2+ on the left side.       Achilles reflexes are 1+ on the right side and 1+ on the left side.         Neurologic Exam     Mental Status   Oriented to person, place, and time.   Level of consciousness: drowsy ,  alert    Cranial Nerves     CN III, IV, VI   Pupils are equal, round, and reactive to light.  Extraocular motions are normal.   Nystagmus: none     CN V   Facial sensation intact.     CN VII   Facial expression full, symmetric.     CN VIII   CN VIII normal.     CN XI   CN XI normal.     Motor Exam     Strength   Strength 5/5 throughout.     Sensory Exam   Light touch normal.     Gait, Coordination, and Reflexes     Coordination   Finger to nose coordination: abnormal (L>R dysmetria improving)  Heel to shin coordination: normal    Tremor   Intention tremor: present    Reflexes   Right brachioradialis: 2+  Left brachioradialis: 2+  Right biceps: 2+  Left biceps: 2+  Right patellar: 2+  Left patellar: 2+  Right achilles: 1+  Left achilles: 1+         LABORATORY DATA     Labs: I have personally reviewed pertinent reports.    Results from last 7 days   Lab Units 03/28/24 0526 03/27/24 0427 03/26/24  0820   WBC Thousand/uL 6.85 9.44 8.29   HEMOGLOBIN g/dL 14.4 12.9 15.4   HEMATOCRIT % 43.7 40.0 47.4*   PLATELETS Thousands/uL 183 179 189   NEUTROS PCT % 56  --  34*   MONOS PCT % 12  --  12   EOS PCT % 0  --  4      Results from last 7 days   Lab Units 03/28/24 0526 03/27/24  1735 03/27/24 0427 03/26/24  0820   SODIUM mmol/L 133* 133* 133* 136   POTASSIUM mmol/L 3.8 4.2 3.3* 3.7   CHLORIDE mmol/L 99 102 99 99   CO2 mmol/L 26 27 26 30   BUN mg/dL 16 15 11 15   CREATININE mg/dL 0.61 0.69 0.55* 0.67   CALCIUM mg/dL 8.8 8.7 8.2* 9.1   ALK PHOS U/L 79  --   --  100   ALT U/L 75*  --   --  114*   AST U/L 62*  --   --  113*     Results from last 7 days   Lab Units 03/28/24 0526 03/27/24 0427 03/26/24  0820   MAGNESIUM mg/dL 1.8* 1.6* 1.8*      Results from last 7 days   Lab Units 03/28/24  0526 03/27/24  0427   PHOSPHORUS mg/dL 3.2 3.6      Results from last 7 days   Lab Units 03/26/24  1804 03/26/24  1647   INR   --  1.11   PTT seconds 29  --      Results from last 7 days   Lab Units 03/26/24  0820   LACTIC ACID mmol/L 1.1           IMAGING & DIAGNOSTIC TESTING     Radiology Results: I have personally reviewed pertinent films in PACS    CT head wo contrast   Final Result by Riki Jacob MD (03/28 0354)      No significant interval change with findings detailed above.                  Workstation performed: [id number]         CT head wo contrast   Final Result by Gurwinder Cisneros DO (03/27 0515)      Redemonstration of hyperdense hemorrhage in the left paramedian cerebellum, as described with mass effect and extension into the fourth ventricle and left foramen of Luschka, similar in appearance to the prior.      No hydrocephalus.      Other findings as above. Overall there has been no significant interval change.      Follow-up as clinically warranted.                  Workstation performed: [id number]             Other Diagnostic Testing: I have personally reviewed pertinent reports.      ACTIVE MEDICATIONS     Current Facility-Administered Medications   Medication Dose Route Frequency    acetaminophen (TYLENOL) tablet 650 mg  650 mg Oral Q6H PRN    amLODIPine (NORVASC) tablet 10 mg  10 mg Oral Daily    atorvastatin (LIPITOR) tablet 40 mg  40 mg Oral Daily With Dinner    chlorhexidine (PERIDEX) 0.12 % oral rinse 15 mL  15 mL Mouth/Throat Q12H NAOMI    escitalopram (LEXAPRO) tablet 20 mg  20 mg Oral Daily    folic acid (FOLVITE) tablet 1 mg  1 mg Oral Daily    gabapentin (NEURONTIN) capsule 300 mg  300 mg Oral HS    hydrALAZINE (APRESOLINE) injection 10 mg  10 mg Intravenous Q4H PRN    labetalol (NORMODYNE) injection 20 mg  20 mg Intravenous Q4H PRN    lisinopril (ZESTRIL) tablet 20 mg  20 mg Oral BID    multivitamin-minerals (CENTRUM) tablet  1 tablet  1 tablet Oral Daily    niCARdipine (CARDENE) 25 mg (STANDARD CONCENTRATION) in sodium chloride 0.9% 250 mL  1-15 mg/hr Intravenous Titrated    sodium chloride 0.9 % bolus 500 mL  500 mL Intravenous Once    thiamine tablet 100 mg  100 mg Oral Daily    trimethobenzamide (TIGAN) IM injection 200 mg  200 mg Intramuscular Q6H PRN       Prior to Admission medications    Medication Sig Start Date End Date Taking? Authorizing Provider   aspirin (ECOTRIN LOW STRENGTH) 81 mg EC tablet Take 1 tablet (81 mg total) by mouth daily 10/7/22   Alec Kamara MD   atorvastatin (LIPITOR) 40 mg tablet Take 1 tablet (40 mg total) by mouth daily 6/13/23   Tanika Winter, MILAN   clopidogrel (PLAVIX) 75 mg tablet Take 1 tablet (75 mg total) by mouth daily 6/13/23   Tanika Winter, MILAN   escitalopram (Lexapro) 20 mg tablet Take 1 tablet (20 mg total) by mouth daily 10/19/22   Tanika Nieto, MILAN   gabapentin (NEURONTIN) 300 mg capsule Take 1 capsule (300 mg total) by mouth daily at bedtime 6/13/23   Tanika Winter, MILAN   hydrOXYzine HCL (ATARAX) 50 mg tablet Take 1 tablet (50 mg total) by mouth every 6 (six) hours as needed for itching 6/13/23   Tanika Winter, MILAN   lisinopril (ZESTRIL) 20 mg tablet Take 1 tablet (20 mg total) by mouth daily 6/13/23   Tanika Winter, MILAN   NIFEdipine ER (ADALAT CC) 30 MG 24 hr tablet Take 1 tablet (30 mg total) by mouth daily 6/13/23   Tanika Nieto, MILAN         VTE Pharmacologic Prophylaxis:  per primary team  VTE Mechanical Prophylaxis: sequential compression device    ======    I have discussed the patient's history, physical exam findings, assessment, and plan in detail with attending, Dr. Martinez    Thank you for allowing me to participate in the care of your patient, Tanika Lira.    Clemente Mcwilliams MD  [facility] Neurology Residency, PGY-4

## 2024-03-28 NOTE — [facility] ADMISSION
[facility]  met with patient at bedside. Reviewed [facility] program, acute rehab criteria and approval process, insurance authorization process, as well as [facility] locations and preferences. Patient's preferred [facility] location is [facility] and second choice is  [facility].  [facility] Rehab folder left with patient and all questions answered. Patient was made aware that [facility] Reviewer will keep their  updated regarding referral status.

## 2024-03-29 ENCOUNTER — DOCUMENTATION (OUTPATIENT)
Dept: NEUROLOGY | Facility: CLINIC | Age: 59
End: 2024-03-29

## 2024-03-29 PROBLEM — F10.90 ALCOHOL USE: Status: ACTIVE | Noted: 2024-03-29

## 2024-03-29 PROBLEM — Z78.9 ALCOHOL USE: Status: ACTIVE | Noted: 2024-03-29

## 2024-03-29 LAB
ALBUMIN SERPL BCP-MCNC: 3.2 G/DL (ref 3.5–5)
ALP SERPL-CCNC: 78 U/L (ref 34–104)
ALT SERPL W P-5'-P-CCNC: 65 U/L (ref 7–52)
ANION GAP SERPL CALCULATED.3IONS-SCNC: 6 MMOL/L (ref 4–13)
AST SERPL W P-5'-P-CCNC: 57 U/L (ref 13–39)
BASOPHILS # BLD AUTO: 0.02 THOUSANDS/ÂΜL (ref 0–0.1)
BASOPHILS NFR BLD AUTO: 0 % (ref 0–1)
BILIRUB SERPL-MCNC: 0.58 MG/DL (ref 0.2–1)
BUN SERPL-MCNC: 17 MG/DL (ref 5–25)
CA-I BLD-SCNC: 1.13 MMOL/L (ref 1.12–1.32)
CALCIUM ALBUM COR SERPL-MCNC: 9 MG/DL (ref 8.3–10.1)
CALCIUM SERPL-MCNC: 8.4 MG/DL (ref 8.4–10.2)
CHLORIDE SERPL-SCNC: 102 MMOL/L (ref 96–108)
CO2 SERPL-SCNC: 26 MMOL/L (ref 21–32)
CREAT SERPL-MCNC: 0.57 MG/DL (ref 0.6–1.3)
EOSINOPHIL # BLD AUTO: 0.1 THOUSAND/ÂΜL (ref 0–0.61)
EOSINOPHIL NFR BLD AUTO: 1 % (ref 0–6)
ERYTHROCYTE [DISTWIDTH] IN BLOOD BY AUTOMATED COUNT: 12.6 % (ref 11.6–15.1)
GFR SERPL CREATININE-BSD FRML MDRD: 101 ML/MIN/1.73SQ M
GLUCOSE SERPL-MCNC: 98 MG/DL (ref 65–140)
HCT VFR BLD AUTO: 45.5 % (ref 34.8–46.1)
HGB BLD-MCNC: 14.7 G/DL (ref 11.5–15.4)
IMM GRANULOCYTES # BLD AUTO: 0.02 THOUSAND/UL (ref 0–0.2)
IMM GRANULOCYTES NFR BLD AUTO: 0 % (ref 0–2)
LYMPHOCYTES # BLD AUTO: 2.85 THOUSANDS/ÂΜL (ref 0.6–4.47)
LYMPHOCYTES NFR BLD AUTO: 39 % (ref 14–44)
MAGNESIUM SERPL-MCNC: 1.6 MG/DL (ref 1.9–2.7)
MCH RBC QN AUTO: 32 PG (ref 26.8–34.3)
MCHC RBC AUTO-ENTMCNC: 32.3 G/DL (ref 31.4–37.4)
MCV RBC AUTO: 99 FL (ref 82–98)
MONOCYTES # BLD AUTO: 0.99 THOUSAND/ÂΜL (ref 0.17–1.22)
MONOCYTES NFR BLD AUTO: 13 % (ref 4–12)
NEUTROPHILS # BLD AUTO: 3.43 THOUSANDS/ÂΜL (ref 1.85–7.62)
NEUTS SEG NFR BLD AUTO: 47 % (ref 43–75)
NRBC BLD AUTO-RTO: 0 /100 WBCS
PHOSPHATE SERPL-MCNC: 3.6 MG/DL (ref 2.7–4.5)
PLATELET # BLD AUTO: 189 THOUSANDS/UL (ref 149–390)
PMV BLD AUTO: 11 FL (ref 8.9–12.7)
POTASSIUM SERPL-SCNC: 3.8 MMOL/L (ref 3.5–5.3)
PROT SERPL-MCNC: 7.5 G/DL (ref 6.4–8.4)
RBC # BLD AUTO: 4.6 MILLION/UL (ref 3.81–5.12)
SODIUM SERPL-SCNC: 134 MMOL/L (ref 135–147)
WBC # BLD AUTO: 7.41 THOUSAND/UL (ref 4.31–10.16)

## 2024-03-29 PROCEDURE — 83735 ASSAY OF MAGNESIUM: CPT

## 2024-03-29 PROCEDURE — 84100 ASSAY OF PHOSPHORUS: CPT

## 2024-03-29 PROCEDURE — 97530 THERAPEUTIC ACTIVITIES: CPT

## 2024-03-29 PROCEDURE — 82330 ASSAY OF CALCIUM: CPT

## 2024-03-29 PROCEDURE — 97112 NEUROMUSCULAR REEDUCATION: CPT

## 2024-03-29 PROCEDURE — 97535 SELF CARE MNGMENT TRAINING: CPT

## 2024-03-29 PROCEDURE — 99233 SBSQ HOSP IP/OBS HIGH 50: CPT | Performed by: PSYCHIATRY & NEUROLOGY

## 2024-03-29 PROCEDURE — 97116 GAIT TRAINING THERAPY: CPT

## 2024-03-29 PROCEDURE — 80053 COMPREHEN METABOLIC PANEL: CPT

## 2024-03-29 PROCEDURE — 85025 COMPLETE CBC W/AUTO DIFF WBC: CPT

## 2024-03-29 RX ADMIN — FOLIC ACID 1 MG: 1 TABLET ORAL at 08:08

## 2024-03-29 RX ADMIN — CHLORHEXIDINE GLUCONATE 15 ML: 1.2 SOLUTION ORAL at 20:55

## 2024-03-29 RX ADMIN — GABAPENTIN 300 MG: 300 CAPSULE ORAL at 20:56

## 2024-03-29 RX ADMIN — LISINOPRIL 20 MG: 20 TABLET ORAL at 08:08

## 2024-03-29 RX ADMIN — HYDRALAZINE HYDROCHLORIDE 10 MG: 20 INJECTION, SOLUTION INTRAMUSCULAR; INTRAVENOUS at 09:31

## 2024-03-29 RX ADMIN — HYDRALAZINE HYDROCHLORIDE 10 MG: 20 INJECTION, SOLUTION INTRAMUSCULAR; INTRAVENOUS at 23:47

## 2024-03-29 RX ADMIN — ACETAMINOPHEN 650 MG: 325 TABLET, FILM COATED ORAL at 08:09

## 2024-03-29 RX ADMIN — ESCITALOPRAM OXALATE 20 MG: 20 TABLET ORAL at 08:08

## 2024-03-29 RX ADMIN — ATORVASTATIN CALCIUM 40 MG: 40 TABLET, FILM COATED ORAL at 16:32

## 2024-03-29 RX ADMIN — LABETALOL HYDROCHLORIDE 20 MG: 5 INJECTION, SOLUTION INTRAVENOUS at 20:56

## 2024-03-29 RX ADMIN — HYDRALAZINE HYDROCHLORIDE 10 MG: 20 INJECTION, SOLUTION INTRAMUSCULAR; INTRAVENOUS at 00:22

## 2024-03-29 RX ADMIN — AMLODIPINE BESYLATE 10 MG: 10 TABLET ORAL at 08:06

## 2024-03-29 RX ADMIN — Medication 1 TABLET: at 08:08

## 2024-03-29 RX ADMIN — LABETALOL HYDROCHLORIDE 20 MG: 5 INJECTION, SOLUTION INTRAVENOUS at 01:59

## 2024-03-29 RX ADMIN — CHLORHEXIDINE GLUCONATE 15 ML: 1.2 SOLUTION ORAL at 08:09

## 2024-03-29 RX ADMIN — THIAMINE HCL TAB 100 MG 100 MG: 100 TAB at 08:08

## 2024-03-29 RX ADMIN — LISINOPRIL 20 MG: 20 TABLET ORAL at 16:32

## 2024-03-29 NOTE — PROGRESS NOTES
PHYSICAL MEDICINE AND REHABILITATION   PREADMISSION ASSESSMENT     Projected IGC and Rehabilitation Diagnoses:  Impairment of mobility, safety, Activities of Daily Living (ADLs), and cognitive/communication skills due to Stroke:  01.4  No paresis  Etiologic Dx: Left Cerebellar Hemorrhage  Date of Onset: 3/26/2024   Date of surgery: N/A    PATIENT INFORMATION  Name: Tanika Lira Phone #: [phone] (home)   Address: [address] [place] [address] [address]  [facility] [address]  YOB: 1965 Age: 58 y.o. SS#   Marital Status:   Ethnicity:   Employment Status:  unemployed  Extended Emergency Contact Information  Primary Emergency Contact: Abner Lira  Address: [address] [place] [address] [address]           [place] [id number] [place]  Mobile Phone: [phone]  Relation: Son   needed? No  Advance Directive: Level 1 Full Code - unknown advanced directive    INSURANCE/COVERAGE:     Primary Payor: [employer] / Plan: [employer] / Product Type: Medicaid HMO /   Secondary Payer: Private Pay   Payer Contact: Not Provided Payer Contact:   Contact Phone: [phone]  Contact Fax: [phone] Contact Phone:     Authorization #: [id number]  Coverage Dates: 4/3 - 4/10  LCD: 4/10 with review  Medical Record #: [id number]    **Confirmed patient's benefits for inpatient acute rehab as follows: $3 copay per day. Also confirmed ARC is in network with [employer].    REFERRAL SOURCE:   Referring provider: Emmanuel Khanna MD  Referring facility: [facility]  Room: [facility] 705/[facility] 705-  PCP: MILAN Miller PCP phone number: [phone]    MEDICAL INFORMATION  HPI: Patient is a 58 y.o. female with a PMH of alcohol use, tobacco use, marijuana use, HTN, neuropathy, diverticulitis s/p sigmoid colon resection and previous CVA 9/2022 who presented to the [facility] on 3/26/2024 with dizziness, nausea and headache. She fell down  on the floor and began vomiting. She also had diarrhea. EMS found her to be ataxic on exam. CT head showed acute parenchymal hematoma left paramedian cerebellum with mass effect on the 4th ventricle; intraventricular extension of hemorrhage noted with blood in the 4th ventricle and left foramen of Luschka; chronic lacunar infarcts are identified as described. Transferred to [facility] for Neurosurgery evaluation. Neurosurgery was consulted, and she is currently on EVD watch; hold all AC/AP, no role for Keppra given stroke location. UDS was positive for Methamphetamines, history of Marijuana use. She was administered DDAVP for ASA reversal. Neurology was consulted, suspect CVA likely hypertensive hemorrhage due to non-compliance with medication and Methamphetamine use. Repeat CTH on 3/27 was stable. Patient with noted BP elevation, and home Norvasc, Lisinopril doses were increased on 3/28. Repeat CTH remains stable. Patient continued with elevated BP, requiring prn doses of both IV Hydralazine and Labetalol. SLIM was consulted for management, with goal SBP <140, and patient was initiated on HCTZ on 4/2. Per Neurology, planned for repeat CTH on 4/11. If improving hemorrhage, patient can resume Plavix for stroke prevention, with no role for ASA at this time. Patient was also with mild hyponatremia in setting of hemorrhage, however patient remains asymptomatic and continues with BMP monitoring. Of note, patient may require drug and alcohol rehab following STR. Patient is overall hemodynamically stable and medically cleared for discharge to [facility]. PT/OT/ST therapies and PM&R were consulted, and they are recommending patient for inpatient Acute Rehab. She has demonstrated that she can tolerate and participate in 3 hours of therapy per day.    Unknown COVID vaccination status; COVID test resulted negative on 3/26/2024.    Past Medical History:   Past Surgical History:   Allergies:     Past Medical History:   Diagnosis Date    CVA  (cerebral vascular accident) (HCC)     Diverticulitis     Diverticulitis of colon     Diverticulosis     Hypertension     Stroke (HCC)     Past Surgical History:   Procedure Laterality Date    CARDIAC ELECTROPHYSIOLOGY PROCEDURE N/A 2/8/2023    Procedure: Cardiac loop recorder implant;  Surgeon: Duke Abraham MD;  Location: [facility];  Service: Cardiology    COLON SIGMOID RESECTION       No Known Allergies      Medical/functional conditions requiring inpatient rehabilitation: left cerebellar hemorrhage, s/p fall, dizziness/vertigo, headaches/acute pain, ataxia, alcohol/drug abuse, HTN, impaired mobility and self care, impaired cognition (delayed responses)    Risk for medical/clinical complications: risk for falls, risk for uncontrolled pain, risk for seizures, risk for skin breakdown, risk for DVT/PE, risk for aspiration, risk for hypo/hypertensive episodes    Comorbidities/Surgeries in the last 100 days:  alcohol/drug abuse, HTN, neuropathy, diverticulitis s/p sigmoid colon resection, CVA 9/2022    CURRENT VITAL SIGNS:   Temp:  [98.3 °F (36.8 °C)] 98.3 °F (36.8 °C)  HR:  [68-69] 69  BP: (147-149)/(84-85) 149/84   Intake/Output Summary (Last 24 hours) at 4/3/2024 1250  Last data filed at 4/3/2024 0800  Gross per 24 hour   Intake 240 ml   Output 1900 ml   Net -1660 ml        LABORATORY RESULTS:      Lab Results   Component Value Date    HGB 14.4 04/02/2024    HCT 43.9 04/02/2024    WBC 7.25 04/02/2024     Lab Results   Component Value Date    BUN 20 04/03/2024    K 4.1 04/03/2024    CL 99 04/03/2024    CREATININE 0.55 (L) 04/03/2024     Lab Results   Component Value Date    PROTIME 14.2 03/26/2024    INR 1.11 03/26/2024        DIAGNOSTIC STUDIES:  CT head wo contrast    Result Date: 3/28/2024  Impression: No significant interval change with findings detailed above. Workstation performed: [id number]     CT head wo contrast    Result Date: 3/27/2024  Impression: Redemonstration of hyperdense hemorrhage in  the left paramedian cerebellum, as described with mass effect and extension into the fourth ventricle and left foramen of Luschka, similar in appearance to the prior. No hydrocephalus. Other findings as above. Overall there has been no significant interval change. Follow-up as clinically warranted. Workstation performed: [id number]     XR chest 1 view portable    Result Date: 3/26/2024  Impression: No acute cardiopulmonary disease. Workstation performed: [id number]     CTA head and neck with and without contrast    Result Date: 3/26/2024  Impression: No left cerebellar vascular malformation to account for the patient's acute parenchymal hematoma. No cervical or intracranial large vessel occlusion, dissection or aneurysm. Mild bilateral cervical carotid bifurcation atherosclerotic disease. Workstation performed: [id number]     CT abdomen pelvis with contrast    Result Date: 3/26/2024  Impression: 1. Cholelithiasis, including a 3 mm gallstone within the gallbladder neck versus cystic duct. No findings of acute cholecystitis. If there is concern for acute cholecystitis, right upper quadrant ultrasound can be obtained. 2. Findings suggesting cirrhosis with mild mesenteric edema, but no ascites. Enlarged ana hepatic and peripancreatic lymph nodes measuring up to 2 cm in short axis, indeterminate, although could be reactive, given suspicion for cirrhosis. 3. Simple appearing right adnexal cyst measuring 3.7 cm.  According to current guidelines (J Am Kayla Radiol 2020; 17:248-254) in this postmenopausal woman, this should be followed in 6 to 12 months by pelvic ultrasound. The study was marked in EPIC for immediate notification. Workstation performed: [id number]     CT head without contrast    Result Date: 3/26/2024  Impression: Acute parenchymal hematoma left paramedian cerebellum measures 2.6 x 1.6 cm with mass effect on the fourth ventricle. Intraventricular extension of hemorrhage noted with blood in the fourth  "ventricle and left foramen of Luschka. Chronic lacunar infarcts are identified as described. I personally discussed this study with ABRAHAN UP on 3/26/2024 9:26 AM. Workstation performed: [id number]       PRECAUTIONS/SPECIAL NEEDS:  Tobacco:   Social History     Tobacco Use   Smoking Status Light Smoker    Types: Cigarettes   Smokeless Tobacco Never   Tobacco Comments    smokes 4 cigarettes daily   , Alcohol:    Social History     Substance and Sexual Activity   Alcohol Use Yes    Comment: \"twisted tea daily\"\"   , Anticoagulation: Heparin SQ Q8H, Edema Management, Safety Concerns, Pain Management, Aspiration Risk/Precautions, Language Preference: English, Seizure Precautions and Fall Precautions.    MEDICATIONS:     Current Facility-Administered Medications:     acetaminophen (TYLENOL) tablet 650 mg, 650 mg, Oral, Q6H PRN, MILAN Frank, 650 mg at 04/01/24 0216    amLODIPine (NORVASC) tablet 10 mg, 10 mg, Oral, Daily, MILAN Frank, 10 mg at 04/03/24 0949    atorvastatin (LIPITOR) tablet 40 mg, 40 mg, Oral, Daily With Dinner, César Benavidez MD, 40 mg at 04/02/24 1624    chlorhexidine (PERIDEX) 0.12 % oral rinse 15 mL, 15 mL, Mouth/Throat, Q12H NAOMI, César Benavidez MD, 15 mL at 04/03/24 0950    escitalopram (LEXAPRO) tablet 20 mg, 20 mg, Oral, Daily, César Benavidez MD, 20 mg at 04/03/24 0950    folic acid (FOLVITE) tablet 1 mg, 1 mg, Oral, Daily, César Benavidez MD, 1 mg at 04/03/24 0949    gabapentin (NEURONTIN) capsule 300 mg, 300 mg, Oral, HS, César Benavidez MD, 300 mg at 04/02/24 2107    heparin (porcine) subcutaneous injection 5,000 Units, 5,000 Units, Subcutaneous, Q8H NAOMIJus, 5,000 Units at 04/03/24 0515    hydrALAZINE (APRESOLINE) injection 10 mg, 10 mg, Intravenous, Q4H PRN, Clemente Mcwilliams MD    hydroCHLOROthiazide tablet 12.5 mg, 12.5 mg, Oral, Daily, Clemente Mcwilliams MD, 12.5 mg at 04/03/24 0949    labetalol (NORMODYNE) injection 10 mg, 10 mg, Intravenous, Q4H PRN, Clemente Mcwilliams MD    " lidocaine (LIDODERM) 5 % patch 1 patch, 1 patch, Topical, Daily PRN, Clemente Mcwilliams MD, 1 patch at 04/02/24 0558    lisinopril (ZESTRIL) tablet 40 mg, 40 mg, Oral, Daily, Ryland LEAH Pierre, DO, 40 mg at 04/03/24 0949    loperamide (IMODIUM) oral liquid 2 mg, 2 mg, Oral, TID PRN, Johnie Cee, DO, 2 mg at 03/31/24 1231    loratadine (CLARITIN) tablet 10 mg, 10 mg, Oral, Daily, Johniericardo Hdzmyer, DO, 10 mg at 04/03/24 0950    magnesium gluconate (MAGONATE) tablet 500 mg, 500 mg, Oral, BID AC, Johnie Hackmyer, DO, 500 mg at 04/03/24 0949    multivitamin-minerals (CENTRUM) tablet 1 tablet, 1 tablet, Oral, Daily, César Benavidez MD, 1 tablet at 04/03/24 0949    thiamine tablet 100 mg, 100 mg, Oral, Daily, César Benavidez MD, 100 mg at 04/03/24 0950    SKIN INTEGRITY:   Abrasion right hand/shin    PRIOR LEVEL OF FUNCTION:  She lives in a(n) mobile home  Tanika Lira is  and lives with their sons.  Self Care: Independent, Indoor Mobility: Independent, Stairs (in/outdoor): Independent, and Cognition: Independent  Prior to patient's admission, patient was fully Independent with ADLs and IADLs, including working part-time. She was Independent without use of AD for mobility.    FALLS IN THE LAST 6 MONTHS: one    HOME ENVIRONMENT:  The living area: can live on one level  There are 3 steps to enter the home.    The patient will not have 24 hour supervision/physical assistance available upon discharge.  Patient's sons both work. However, one is off on Tuesdays and Wednesdays, and the other is off on Saturdays and Sundays. She also has supportive friends and family that are able to assist when sons are working.    PREVIOUS DME:  Equipment in home (previous DME): Shower Chair and Grab Bars    FUNCTIONAL STATUS:  Physical Therapy Occupational Therapy Speech Therapy   4/3/2024, per PT    Cognition   Overall Cognitive Status WFL   Arousal/Participation Cooperative   Attention Attends with cues to redirect   Orientation Level Oriented X4    Following Commands Follows multistep commands with increased time or repetition   Comments in AM, irritable regarding staff coming in and out of room and not being able to rest.allowed pt to rest for 1 hour and returned. pt again irritable about staff however once neurologist entered she had an improved mood and was very pleasant to work with   Subjective   Subjective pt reports she is aware of her limitations and does not want to fall   Bed Mobility   Supine to Sit 5  Supervision   Additional items HOB elevated;Increased time required;Verbal cues   Sit to Supine 5  Supervision   Additional items Increased time required;Verbal cues   Transfers   Sit to Stand 4  Minimal assistance   Additional items Assist x 1;Increased time required;Verbal cues  (CGA)   Stand to Sit 4  Minimal assistance   Additional items Assist x 1;Increased time required;Verbal cues  (CGA)   Stand pivot 4  Minimal assistance   Additional items Assist x 1;Increased time required;Verbal cues   Toilet transfer 4  Minimal assistance   Additional items Assist x 1;Increased time required;Commode;Verbal cues  (x2 trials)   Additional Comments c RW vs HHA   Ambulation/Elevation   Gait pattern Improper Weight shift;Decreased foot clearance;Short stride;Ataxia;Shuffling;Excessively slow  (L lean/LOB)   Gait Assistance 3  Moderate assist   Additional items Assist x 1;Verbal cues  (HHA, minAx1 with RW)   Assistive Device Rolling walker  (vs HHA)   Distance 10'+75' c RW +75' +10' HHA   Stair Management Assistance 4  Minimal assist   Additional items Assist x 1;Verbal cues;Increased time required   Stair Management Technique Two rails;Alternating pattern;Foreward;Reciprocal   Number of Stairs 7   Ambulation/Elevation Additional Comments large L LOB requiring modAx1 for upright posture   Balance   Static Sitting Fair -   Dynamic Sitting Poor +   Static Standing Poor +   Dynamic Standing Poor   Ambulatory Poor   Endurance Deficit   Endurance Deficit Yes    Endurance Deficit Description fatigue, weakness   Activity Tolerance   Activity Tolerance Patient limited by fatigue   Medical Staff Made Aware co-tx with OT ANDREA due to decreased participation this AM session   Nurse Made Aware yes-cleared   Assessment   Prognosis Good   Problem List Decreased strength;Decreased endurance;Decreased mobility;Impaired balance;Decreased coordination;Decreased cognition;Impaired judgement;Decreased safety awareness;Impaired vision   Assessment Pt agreeable to participate in PT session. Pt performed functional mobility as outlined above. Continues to have short shuffling gait with decreased L foot clearance and able to increase step length with VC however LLE remains ataxic. Without RW, several large L LOB requiring modAx1 to remain upright. Pt reports she feels herself falling over to L but has difficulty correcting. Does report double vision at times throughout session but not consistent. Pt left seated in bed with bed alarm donned, call bell, phone, and all personal needs within reach. Pt will continue to benefit from skilled acute care PT to further address their functional mobility limitations.    4/3/2024, per OT    ADL   Where Assessed Sitting at sink   Grooming Assistance 5  Supervision/Setup   Grooming Deficit Setup;Increased time to complete   UB Bathing Assistance 5  Supervision/Setup   LB Bathing Assistance 4  Minimal Assistance    UB Dressing Assistance 5  Supervision/Setup   LB Dressing Assistance 4  Minimal Assistance   LB Dressing Deficit Don/doff R sock;Don/doff L sock-S, min a dynamic balance tasks.   Toileting Assistance  4  Minimal Assistance   Toileting Deficit Perineal hygiene, clothing management   Functional Standing Tolerance   Time approx 2 minutes   Activity washing hands   Bed Mobility   Supine to Sit 5  Supervision   Additional items Assist x 1   Sit to Supine 5  Supervision   Additional items Assist x 1   Transfers   Sit to Stand 4  Minimal assistance  "  Additional items Assist x 1   Stand to Sit 4  Minimal assistance   Additional items Assist x 1   Additional Comments +RW, HHA left lateral lean   Functional Mobility   Functional Mobility 3  Moderate assistance   Additional Comments mod a x 1 without HHA left lateral lean/LOB , min a with RW household distance functioanl mobilityn with safety/sequencing cues.   Toilet Transfers   Toilet Transfer From Rolling walker   Toilet Transfer Type To and from   Toilet Transfer to Standard toilet   Toilet Transfer Technique Stand pivot   Toilet Transfers Minimal assistance   Toilet Transfers Comments cues for safety   Cognition   Overall Cognitive Status Continue to assess   Arousal/Participation Alert;Cooperative   Attention Attends with cues to redirect   Orientation Level Oriented X4   Memory Decreased recall of precautions   Following Commands Follows multistep commands with increased time or repetition   Comments pt agreeable to pre-arranged time to participate in therapy session, anxious, initally perservating on 'resting' stating \"I cant sleep, Im so tired\" As session progressed pt with increased motivation. pt demonstrating higher level cognitive deficits  (due to anxiety?)-difficulty understanding complexity of discharge plans and rehab plans. pt would benefit from a scheduled therapy plan. Will continue to assess functional cognition.   Activity Tolerance   Activity Tolerance Patient tolerated treatment well   Medical Staff Made Aware RN cleared pt for therapy   Assessment   Assessment Patient participated in Skilled OT session this date with interventions consisting of self care tasks, standing tolerance sink side with grooming tasks, toileting, functional transfer/mobility tasks . Patient agreeable to OT treatment session, upon arrival patient was found supine in bed.  In comparison to previous session, patient with improvements in bed mobility. Patient requiring verbal cues for correct technique and verbal cues " for pacing thru activity steps. Patient continues to be functioning below baseline level, occupational performance remains limited secondary to factors listed above and increased risk for falls and injury.   From OT standpoint, recommendation at time of d/c would be max level I.    The patient's raw score on the AM-PAC Daily Activity Inpatient Short Form is 19. A raw score of greater than or equal to 19 suggests the patient may benefit from discharge to home. Please refer to the recommendation of the Occupational Therapist for safe discharge planning.  Patient to benefit from continued Occupational Therapy treatment while in the hospital to address deficits as defined above and maximize level of functional independence with ADLs and functional mobility.    3/27/2024, per SLP    Summary   Order received, chart reviewed.  Pt mildly slow to respond but able to make needs known.  Morning meal is completed,she passed the [facility] dysphagia screen.  No formal assessment at this time.  Re consult as needed during stay.         Current Medical Status  Pt is a 58 y.o. female who presented to  [facility]  with nausea, vomiting, and diarrhea. States that she feels dizzy when she opens her eyes, but when she closes her eyes and lays back the dizziness goes away. Reports drinking twisted tea daily.   PSH of colon sigmoid resection and cardiac loop recorder placement      Current Precautions:  Fall  Aspiration  Contact  AIrborn  C diff   Seizure  Delirium     Allergies:  No known food allergies     Past medical history:  Please see H&P for details     Special Studies:  3/26 am CTH wo contrast: Acute parenchymal hematoma left paramedian cerebellum measures 2.6 x 1.6 cm with mass effect on the fourth ventricle. Intraventricular extension of hemorrhage noted with blood in the fourth ventricle and left foramen of Luschka.  Chronic lacunar infarcts are identified as described.  3/27 am CT Head: Redemonstration of hyperdense hemorrhage in the  left paramedian cerebellum, as described with mass effect and extension into the fourth ventricle and left foramen of Luschka, similar in appearance to the prior.      Social/Education/Vocational Hx:  Pt lives with family     CARE SCORES:  Self Care:  Eatin: Not applicable  Oral hygiene: 05: Setup or clean-up assistance  Toilet hygiene: 04: Supervision or touching  assistance  Shower/bathing self: 04: Supervision or touching  assistance  Upper body dressin: Supervision or touching  assistance  Lower body dressin: Supervision or touching  assistance  Putting on/taking off footwear: 04: Supervision or touching  assistance  Transfers:  Roll left and right: 09: Not applicable  Sit to lyin: Supervision or touching  assistance  Lying to sitting on side of bed: 04: Supervision or touching  assistance  Sit to stand: 04: Supervision or touching  assistance  Chair/bed to chair transfer: 04: Supervision or touching  assistance  Toilet transfer: 04: Supervision or touching  assistance  Mobility:  Walk 10 ft: 03: Partial/moderate assistance  Walk 50 ft with two turns: 10: Not attempted due to environmental limitations  Walk 150ft: 88: Not attempted due to medical conditions or safety concerns    CURRENT GAP IN FUNCTION  Prior to Admission: Functional Status: Patient was independent with mobility/ambulation, transfers, ADL's, IADL's.    Expected functional outcomes: It is expected that with skilled acute rehabilitation services the patient will progress to Independent for self care and Independent for mobility     Estimated length of stay: 10 to 14 days    Anticipated Post-Discharge Disposition/Treatment  Disposition: Return to previous home/apartment.  Outpatient Services: Physical Therapy (PT), Occupational Therapy (OT), and Speech Therapy    BARRIERS TO DISCHARGE  Weakness, Pain, Diminished cognition/Mentation change, Balance Difficulty, Dizziness, Fatigue, Home Accessibility, Caregiver Accessibility,  Financial Resources, Equipment Needs, and Resource Availability    INTERVENTIONS FOR DISCHARGE  Adaptive equipment, Patient/Family/Caregiver Education, Community Resources, Support Group, Financial Assistance, Arrange DME needs, Medication Changes as per MD recommendations, Therapy exercises, Center of balance support , and Energy conservation education     REQUIRED THERAPY:  Patient will require PT, OT and ST 60 minutes each per day, five days per week to achieve rehab goals.     REQUIRED FUNCTIONAL AND MEDICAL MANAGEMENT FOR INPATIENT REHABILITATION:  Skin:  There are no pressure sores currently, abrasion right hand/shin, Pain Management: Overall pain is moderately controlled, Deep Vein Thrombosis (DVT) Prophylaxis:  SCD's while in bed, further IM management of additional medical conditions while on [facility], she needs PT/OT/ST intervention, patient/family education and training, possible Neuropsych and Neurosurgery consults with any other needed consults prn, nursing medication review and management of bowel/bladder function. Patient/family can participate in unit based stroke education while on [facility].    RECOMMENDED LEVEL OF CARE:   Patient is a 58 y.o. female with a PMH of alcohol use, tobacco use, marijuana use, HTN, neuropathy, diverticulitis s/p sigmoid colon resection and previous CVA 9/2022 who presented to the [facility] on 3/26/2024 with dizziness, nausea and headache. She fell down on the floor and began vomiting. She also had diarrhea. EMS found her to be ataxic on exam. CT head showed acute parenchymal hematoma left paramedian cerebellum with mass effect on the 4th ventricle; intraventricular extension of hemorrhage noted with blood in the 4th ventricle and left foramen of Luschka; chronic lacunar infarcts are identified as described. Transferred to [facility] for Neurosurgery evaluation. Neurosurgery was consulted, and she is currently on EVD watch; hold all AC/AP, no role for Keppra given  stroke location. UDS was positive for Methamphetamines, history of Marijuana use. She was administered DDAVP for ASA reversal. Neurology was consulted, suspect CVA likely hypertensive hemorrhage due to non-compliance with medication and Methamphetamine use. Repeat CTH on 3/27 was stable. Patient with noted BP elevation, and home Norvasc, Lisinopril doses were increased on 3/28. Repeat CTH remains stable. Patient continued with elevated BP, requiring prn doses of both IV Hydralazine and Labetalol. SLIM was consulted for management, with goal SBP <140, and patient was initiated on HCTZ on 4/2. Per Neurology, planned for repeat CTH on 4/11. If improving hemorrhage, patient can resume Plavix for stroke prevention, with no role for ASA at this time. Patient was also with mild hyponatremia in setting of hemorrhage, however patient remains asymptomatic and continues with BMP monitoring. Of note, patient may require drug and alcohol rehab following STR. Prior to patient's admission, patient was fully Independent with ADLs and IADLs, including working part-time. She was Independent without use of AD for mobility. Currently, patient is Min/Mod assist with use of RW vs HHA for gait and transfers, and Supervision for UB ADLs, Min assist for LB ADLs. Close medical management and PM&R management is recommended at this time while patient is on the ARC. Inpatient acute rehab is recommended for patient to maximize overall strength and mobility upon discharge to home with support of family.

## 2024-03-29 NOTE — OCCUPATIONAL THERAPY NOTE
Occupational Therapy Progress Note     Patient Name: Tanika Lira  Today's Date: 3/29/2024  Problem List  Principal Problem:    Cerebellar hemorrhage (HCC)  Active Problems:    History of CVA (cerebrovascular accident)    Tobacco abuse    Essential hypertension    ROSANA (generalized anxiety disorder)    Alcohol use            03/29/24 1450   OT Last Visit   OT Visit Date 03/29/24   Note Type   Note Type Treatment   Pain Assessment   Pain Assessment Tool 0-10   Pain Score No Pain   Restrictions/Precautions   Weight Bearing Precautions Per Order No   Other Precautions Chair Alarm;Telemetry;Fall Risk;Visual impairment  (chair alarm activated end of session)   Lifestyle   Autonomy I w/ ADLS/IADLS, transfers and functional mobility PTA; (-)    Reciprocal Relationships Pt lives w/ her 2 sons; pt is alone during the day   Service to Others Unemployed   Intrinsic Gratification Cleaning   ADL   LB Dressing Assistance 4  Minimal Assistance   LB Dressing Deficit Don/doff R sock;Don/doff L sock   Toileting Assistance  2  Maximal Assistance   Toileting Deficit Steadying;Bedside commode;Perineal hygiene   Toileting Comments Pt stood with B/L support of RW and needed max A for hygiene mngmt in standing.   Bed Mobility   Supine to Sit 5  Supervision   Additional items Increased time required   Sit to Supine 5  Supervision   Additional Comments Pt dizzy @ EOB. BP was 149/84   Transfers   Sit to Stand 4  Minimal assistance   Additional items Assist x 1   Stand to Sit 4  Minimal assistance   Additional items Assist x 1   Toilet transfer 4  Minimal assistance   Additional items Assist x 1  (2nd person SBA fort safety)   Functional Mobility   Additional Comments vc for RW safety   Additional items Rolling walker   Toilet Transfers   Toilet Transfer From Rolling walker   Toilet Transfer Type To and from   Toilet Transfer to Standard bedside commode   Toilet Transfer Technique Stand pivot   Toilet Transfers Minimal assistance    Subjective   Subjective I feel dizzy   Cognition   Arousal/Participation Alert;Responsive;Cooperative   Attention Within functional limits   Orientation Level Oriented X4   Memory Decreased recall of precautions   Following Commands Follows one step commands without difficulty   Comments Pt pleasant and agreeable to session. Increased vc for safety w/ RW during transfers.   Activity Tolerance   Activity Tolerance Patient tolerated treatment well   Medical Staff Made Aware PCA present   Assessment   Assessment Patient participated in Skilled OT session this date with interventions consisting of ADL re training with the use of correct body mechanics, safety awareness and fall prevention techniques, and increase dynamic sit/ stand balance during functional activity  . Patient agreeable to OT treatment session, upon arrival patient was found supine in bed.  In comparison to previous session, patient with improvements in activity tolerance and standing tolerance/balance. Patient requiring vc for safety w/ RW. She needs S for UB ADLs, mod A for LB ADLs, and min Ax1 w/ RW for mobility. Patient continues to be functioning below baseline level, occupational performance remains limited secondary to factors listed above and increased risk for falls and injury. From OT standpoint, recommendation at time of d/c would be Short Term Rehab.   Patient to benefit from continued Occupational Therapy treatment while in the hospital to address deficits as defined above and maximize level of functional independence with ADLs and functional mobility.   Plan   Treatment Interventions ADL retraining;Functional transfer training;Endurance training;Patient/family training;Equipment evaluation/education;Compensatory technique education;Energy conservation   Goal Expiration Date 04/10/24   OT Treatment Day 1   OT Frequency 3-5x/wk   Discharge Recommendation   Rehab Resource Intensity Level, OT I (Maximum Resource Intensity)   AM-PAC Daily  Activity Inpatient   Lower Body Dressing 3   Bathing 3   Toileting 2   Upper Body Dressing 3   Grooming 3   Eating 4   Daily Activity Raw Score 18   Daily Activity Standardized Score (Calc for Raw Score >=11) 38.66       Radha Pastor OTR/L

## 2024-03-29 NOTE — PROGRESS NOTES
"Met with patient at bedside in room 705. Introduced my role. Provided stroke education including the stroke education booklet and magnet. Reviewed stroke-like symptoms. Reviewed stroke risk factors.     Patient is a current smoker of tobacco products. Reviewed smoking cessation, she is determined to quit.     Per inpatient neurology notes from Dr. Mcwilliams on 3/28/24:  \"Tanika Lira will need follow up in in 6 weeks with neurovascular Attending (Dr. Martinez). Recommend repeat CT head in 4 weeks before clearing for retaking plavix.\"    Offered to schedule stroke HFU, patient is agreeable to this. Scheduled appointment and provided appointment details. Patient is agreeable to outreach phone calls after discharge. Denies any further questions or concerns. Call bell placed within reach. SCDs in place. Patient was appreciative.   "

## 2024-03-29 NOTE — PLAN OF CARE
Problem: OCCUPATIONAL THERAPY ADULT  Goal: Performs self-care activities at highest level of function for planned discharge setting.  See evaluation for individualized goals.  Description: Treatment Interventions: ADL retraining, Visual perceptual retraining, Functional transfer training, UE strengthening/ROM, Endurance training, Patient/family training, Cognitive reorientation, Equipment evaluation/education, Compensatory technique education, Continued evaluation, Energy conservation, Activityengagement          See flowsheet documentation for full assessment, interventions and recommendations.   Outcome: Progressing  Note: Limitation: Decreased ADL status, Decreased UE strength, Decreased Safe judgement during ADL, Decreased endurance, Visual deficit, Decreased self-care trans, Decreased high-level ADLs  Prognosis: Fair  Assessment: Patient participated in Skilled OT session this date with interventions consisting of ADL re training with the use of correct body mechanics, safety awareness and fall prevention techniques, and increase dynamic sit/ stand balance during functional activity  . Patient agreeable to OT treatment session, upon arrival patient was found supine in bed.  In comparison to previous session, patient with improvements in activity tolerance and standing tolerance/balance. Patient requiring vc for safety w/ RW. She needs S for UB ADLs, mod A for LB ADLs, and min Ax1 w/ RW for mobility. Patient continues to be functioning below baseline level, occupational performance remains limited secondary to factors listed above and increased risk for falls and injury. From OT standpoint, recommendation at time of d/c would be Short Term Rehab.   Patient to benefit from continued Occupational Therapy treatment while in the hospital to address deficits as defined above and maximize level of functional independence with ADLs and functional mobility.     Rehab Resource Intensity Level, OT: I (Maximum Resource  Intensity)

## 2024-03-29 NOTE — RESTORATIVE TECHNICIAN NOTE
Restorative Technician Note      Patient Name: Tanika Lira     Note Type: Mobility (Pt refused activity nursing aware.)    Ramandeep Ambrosio BS, Restorative Technician,

## 2024-03-29 NOTE — PROGRESS NOTES
NEUROLOGY RESIDENCY PROGRESS NOTE     Name: Tanika Lira   Age & Sex: 58 y.o. female   MRN: [id number]  Unit/Bed#: [facility] 705-01   Encounter: [id number]    Tanika Lira will need follow up in in 6 weeks with neurovascular Attending (Dr. Martinez) .  Recommend repeat CT head in 4 weeks before clearing for retaking plavix.     Pending for discharge: HTN control, Rehab pending    ASSESSMENT & PLAN     * Cerebellar hemorrhage (HCC)  Assessment & Plan  Patient is a 58 year old woman with hx of right hemisphere stroke (possibly right pontine infarct) on aspirin , HTN, HLD presenting for left cerebellar hemorrhage with IVH. Patient's symptoms are of nausea, vomiting, diarrhea, vertigo sensation but no focal numbness, weakness, visual deficits, nor headaches. Also, horizontal diplopia. Patient has history of right sided stroke in 2022 which she was placed on DAPT and to continue plavix after 21 days. Patient states she is on aspirin daily on this visit. Initial blood pressure recorded at [facility] is 222/136. On exam, continued but improving L>R dysmetria.  Thinners: aspirin reversed with DDAVP  Initial BP: Blood Pressure: 144/78  Presenting exam: Bilateral dysmetric L>R  Vascular risk factors: HTN, HLD, previous stroke    Workup:  CT head wo contrast 3/26/2024: Acute parenchymal hematoma left paramedian cerebellum measures 2.6 x 1.6 cm with mass effect on the fourth ventricle. Intraventricular extension of hemorrhage noted with blood in the fourth ventricle and left foramen of Luschka.   CT head wo contrast 3/27/2024: Redemonstration of hyperdense hemorrhage in the left paramedian cerebellum, as described with mass effect and extension into the fourth ventricle and left foramen of Luschka, similar in appearance to the prior.   CT head wo contrast 3/28: no significant changes from above  Lab Results   Component Value Date    INR 1.11 03/26/2024    HGBA1C 5.2 09/02/2022    SODIUM 136 03/26/2024       Pertinent  scores:  - ICH: 2 due to IVH and Infratentorial origin    Impression: Left cerebellar hemorrhage most likely in setting of hypertensive emergency with blood pressures in the 200s systolic 2/2 to poor medication adherence and methamphetamine use now stable.    Plan:  S/P DDAVP X2  Neurosurgery following  Neurochecks  Repeat CTH if > 2 pt drop in GCS in one hour  Goal SBP between 120 and 140, cardene gtt or pressers/fluids as needed to keep within range  PT/OT/Speech/PMR consults when able  Replete lytes as needed as per CC  No chemical ppx due to hemorrhage, SCDs for ppx  Hold AP agents for 4 weeks from 3/26 and then can retake plavix 75mg daily  BG < 180, SSI for coverage  Rest of care per primary    Alcohol use  Assessment & Plan  Discuss alcohol reduction.    Continue thiamine 100mg daily    ROSANA (generalized anxiety disorder)  Assessment & Plan  Continue home lexapro.    Essential hypertension  Assessment & Plan  Patient noncompliant on antihypertensives at home.  Patient on amlodipine 10mg daily and lisinopril 20mg daily which are increase from previous home medications.    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 Patient not compliant on her AP agents.    Holding AP agents until 4 weeks from ICH onset and then repeat CT head to see if improving and continue plavix afterward.              SUBJECTIVE     Patient was seen and examined. No acute events overnight. Patient's vertigo resolving. No new numbness, no new weakness, no new visual deficits. Diplopia has resolved. No chest pain, no palpitations.     ROS is negative unless stated above.    OBJECTIVE     Patient ID: Tanika Lira is a 58 y.o.  female.    Vitals:    24 0600 24 0804 24 0807 24 0922   BP:  (!) 182/96 (!) 182/96 152/84   Pulse:  58 58 60   Resp:  18     Temp:  97.9 °F (36.6 °C) 97.9 °F (36.6 °C)    TempSrc:       SpO2:  96% 93% 92%   Weight: 61.2 kg (135 lb)      Height:          Temperature:   Temp (24hrs), Av.6 °F (37 °C), Min:97.9 °F (36.6 °C), Max:99.9 °F (37.7 °C)    Temperature: 97.9 °F (36.6 °C)      Physical Exam  Vitals and nursing note reviewed.   Constitutional:       General: She is not in acute distress.     Appearance: She is well-developed.   HENT:      Head: Normocephalic and atraumatic.   Eyes:      Extraocular Movements: EOM normal.      Conjunctiva/sclera: Conjunctivae normal.      Pupils: Pupils are equal, round, and reactive to light.   Cardiovascular:      Rate and Rhythm: Normal rate and regular rhythm.      Pulses: Normal pulses.      Heart sounds: Normal heart sounds.   Pulmonary:      Effort: Pulmonary effort is normal.      Breath sounds: Normal breath sounds.   Abdominal:      Palpations: Abdomen is soft.      Tenderness: There is no abdominal tenderness.   Musculoskeletal:         General: No swelling.      Cervical back: Neck supple.   Skin:     General: Skin is warm and dry.      Capillary Refill: Capillary refill takes less than 2 seconds.   Neurological:      General: No focal deficit present.      Mental Status: She is alert.      Motor: Motor strength is normal.     Coordination: Finger-Nose-Finger Test abnormal (L>R dysmetria continues to improve). Heel to Marquez Test normal.      Deep Tendon Reflexes:      Reflex Scores:       Bicep reflexes are 2+ on the right side and 2+ on the left side.       Brachioradialis reflexes are 2+ on the right side and 2+ on the left side.       Patellar reflexes are 2+ on the right side and 2+ on the left side.       Achilles reflexes are 1+ on the right side and 1+ on the left side.  Psychiatric:         Mood and Affect: Mood normal.           Neurologic Exam     Mental Status   Oriented to person.   Oriented to place.   Oriented to year, month and date.   Level of consciousness: drowsy ,  alert    Cranial Nerves     CN III, IV, VI   Pupils are equal, round, and reactive to light.  Extraocular motions are normal.   Nystagmus: none     CN V   Facial sensation intact.     CN VII   Facial expression full, symmetric.     CN VIII   CN VIII normal.     CN XI   CN XI normal.     Motor Exam     Strength   Strength 5/5 throughout.     Sensory Exam   Light touch normal.     Gait, Coordination, and Reflexes     Coordination   Finger to nose coordination: abnormal (L>R dysmetria continues to improve)  Heel to shin coordination: normal    Reflexes   Right brachioradialis: 2+  Left brachioradialis: 2+  Right biceps: 2+  Left biceps: 2+  Right patellar: 2+  Left patellar: 2+  Right achilles: 1+  Left achilles: 1+       LABORATORY DATA     Labs: I have personally reviewed pertinent reports.    Results from last 7 days   Lab Units 03/29/24 0622 03/28/24  0526 03/27/24  0427 03/26/24  0820   WBC Thousand/uL 7.41 6.85 9.44 8.29   HEMOGLOBIN g/dL 14.7 14.4 12.9 15.4   HEMATOCRIT % 45.5 43.7 40.0 47.4*   PLATELETS Thousands/uL 189 183 179 189   NEUTROS PCT % 47 56  --  34*   MONOS PCT % 13* 12  --  12   EOS PCT % 1 0  --  4      Results from last 7 days   Lab Units 03/29/24  0622 03/28/24  0526 03/27/24  1735 03/27/24  0427 03/26/24  0820   SODIUM mmol/L 134* 133* 133*   < > 136   POTASSIUM mmol/L 3.8 3.8 4.2   < > 3.7   CHLORIDE mmol/L 102 99 102   < > 99   CO2 mmol/L 26 26 27   < > 30   BUN mg/dL 17 16 15   < > 15   CREATININE mg/dL 0.57* 0.61 0.69   < > 0.67   CALCIUM mg/dL 8.4 8.8 8.7   < > 9.1   ALK PHOS U/L 78 79  --   --  100   ALT U/L 65* 75*  --   --  114*   AST U/L 57* 62*  --   --  113*    < > = values in this interval not displayed.     Results from last 7 days   Lab Units 03/29/24 0622 03/28/24  0526 03/27/24  0427   MAGNESIUM mg/dL 1.6* 1.8* 1.6*      Results from last 7 days   Lab Units 03/29/24  0622 03/28/24  0526 03/27/24  0427   PHOSPHORUS mg/dL 3.6 3.2 3.6      Results from last 7 days   Lab Units 03/26/24  1804 03/26/24  1647   INR   --  1.11   PTT seconds 29  --      Results from last 7 days   Lab Units 03/26/24  0820   LACTIC ACID mmol/L 1.1           IMAGING & DIAGNOSTIC TESTING     Radiology Results: I have personally reviewed pertinent films in PACS    CT head wo contrast   Final Result by Riki Jacob MD (03/28 0354)      No significant interval change with findings detailed above.                  Workstation performed: [id number]         CT head wo contrast   Final Result by Gurwinder Cisneros DO (03/27 0515)      Redemonstration of hyperdense hemorrhage in the left paramedian cerebellum, as described with mass effect and extension into the fourth ventricle and left foramen of Luschka, similar in appearance to the prior.      No hydrocephalus.      Other findings as above. Overall there has been no significant interval change.      Follow-up as clinically warranted.                  Workstation performed: [id number]         CT head wo contrast    (Results Pending)       Other Diagnostic Testing: I have personally reviewed pertinent reports.      ACTIVE MEDICATIONS     Current Facility-Administered Medications   Medication Dose Route Frequency    acetaminophen (TYLENOL) tablet 650 mg  650 mg Oral Q6H PRN    amLODIPine (NORVASC) tablet 10 mg  10 mg Oral Daily    atorvastatin (LIPITOR) tablet 40 mg  40 mg Oral Daily With Dinner    chlorhexidine (PERIDEX) 0.12 % oral rinse 15 mL  15 mL Mouth/Throat Q12H [facility]    escitalopram (LEXAPRO) tablet 20 mg  20 mg Oral Daily    folic acid (FOLVITE) tablet 1 mg  1 mg Oral Daily    gabapentin (NEURONTIN) capsule 300 mg  300 mg Oral HS    hydrALAZINE (APRESOLINE) injection 10 mg  10 mg Intravenous Q4H PRN    labetalol (NORMODYNE) injection 20 mg  20 mg Intravenous Q4H PRN    lisinopril (ZESTRIL) tablet 20  mg  20 mg Oral BID    multivitamin-minerals (CENTRUM) tablet 1 tablet  1 tablet Oral Daily    thiamine tablet 100 mg  100 mg Oral Daily    trimethobenzamide (TIGAN) IM injection 200 mg  200 mg Intramuscular Q6H PRN       Prior to Admission medications    Medication Sig Start Date End Date Taking? Authorizing Provider   aspirin (ECOTRIN LOW STRENGTH) 81 mg EC tablet Take 1 tablet (81 mg total) by mouth daily 10/7/22   Alec Kamara MD   atorvastatin (LIPITOR) 40 mg tablet Take 1 tablet (40 mg total) by mouth daily 6/13/23   Tanika Winter, MILAN   clopidogrel (PLAVIX) 75 mg tablet Take 1 tablet (75 mg total) by mouth daily 6/13/23   Tanika Winter, MICHAELNP   escitalopram (Lexapro) 20 mg tablet Take 1 tablet (20 mg total) by mouth daily 10/19/22   Tanika Winter, MICHAELNP   gabapentin (NEURONTIN) 300 mg capsule Take 1 capsule (300 mg total) by mouth daily at bedtime 6/13/23   Tanika Winter, CRNP   hydrOXYzine HCL (ATARAX) 50 mg tablet Take 1 tablet (50 mg total) by mouth every 6 (six) hours as needed for itching 6/13/23   Tanika Winter, CRNP   lisinopril (ZESTRIL) 20 mg tablet Take 1 tablet (20 mg total) by mouth daily 6/13/23   Tanika Winter, MICHAELNP   NIFEdipine ER (ADALAT CC) 30 MG 24 hr tablet Take 1 tablet (30 mg total) by mouth daily 6/13/23   Tanika Winter, CRNP         VTE Pharmacologic Prophylaxis: chemical prophylaxis contraindicated due to cerebellar hemorrhage  VTE Mechanical Prophylaxis: sequential compression device    ======    I have discussed the patient's history, physical exam findings, assessment, and plan in detail with attending, Dr. Martinez    Thank you for allowing me to participate in the care of your patient, Tanika Lira.    Clemente Mcwilliams MD  [facility] Neurology Residency, PGY-4

## 2024-03-29 NOTE — PHYSICAL THERAPY NOTE
Physical Therapy Progress Note     03/29/24 1200   PT Last Visit   PT Visit Date 03/29/24   Note Type   Note Type Treatment   Pain Assessment   Pain Assessment Tool 0-10   Pain Score No Pain   Restrictions/Precautions   Other Precautions Chair Alarm;Bed Alarm;Fall Risk;Visual impairment  (Alarm active post session.)   Subjective   Subjective The patient reports that she is very fatigued, and that she did not sleep well.   Bed Mobility   Supine to Sit 5  Supervision   Additional items Increased time required;Verbal cues   Sit to Supine 5  Supervision   Additional items Verbal cues   Transfers   Sit to Stand 4  Minimal assistance   Additional items Assist x 1;Increased time required;Verbal cues   Stand to Sit 4  Minimal assistance   Additional items Assist x 1;Increased time required;Verbal cues   Ambulation/Elevation   Gait pattern Step to;Excessively slow;Short stride;Inconsistent jaxson;Decreased foot clearance;Improper Weight shift   Gait Assistance 3  Moderate assist   Additional items Assist x 1;Verbal cues;Tactile cues   Assistive Device Rolling walker   Distance 15 feet, 10 feet, 5 feet.   Balance   Static Sitting Fair -   Dynamic Sitting Poor +   Static Standing Poor +   Dynamic Standing Poor   Ambulatory Poor   Activity Tolerance   Activity Tolerance Patient tolerated treatment well;Patient limited by fatigue   Nurse Made Aware Yes.   Assessment   Prognosis Good   Problem List Decreased endurance;Impaired balance;Decreased mobility;Decreased coordination;Impaired vision;Impaired sensation   Assessment The patient continues to remain limited with deficits in balance, coordination, and activity tolerance. She fatigues easily with upright mobility as her gait notably worsens. The patient requires increased time for mobility as well as step-by-step instructions. She deferred further ambulatory trials due to fatigue, and she was returned to bed as she declined sitting up in the chair. Educated her about the  importance of continued mobility as well as remaining out of bed during the day.   Barriers to Discharge Inaccessible home environment;Decreased caregiver support   Goals   Patient Goals To rest.   STG Expiration Date 04/10/24   PT Treatment Day 1   Plan   Treatment/Interventions Functional transfer training;LE strengthening/ROM;Therapeutic exercise;Endurance training;Cognitive reorientation;Patient/family training;Bed mobility;Gait training;Elevations   Progress Progressing toward goals   PT Frequency 3-5x/wk   Discharge Recommendation   Rehab Resource Intensity Level, PT I (Maximum Resource Intensity)   Equipment Recommended Walker   Walker Package Recommended Wheeled walker   AM-PAC Basic Mobility Inpatient   Turning in Flat Bed Without Bedrails 3   Lying on Back to Sitting on Edge of Flat Bed Without Bedrails 3   Moving Bed to Chair 3   Standing Up From Chair Using Arms 3   Walk in Room 2   Climb 3-5 Stairs With Railing 1   Basic Mobility Inpatient Raw Score 15   Basic Mobility Standardized Score 36.97   [facility] Highest Level Of Mobility   -HL Goal 4: Move to chair/commode   -HLM Achieved 7: Walk 25 feet or more         An AM-PAC Basic Mobility raw score less than 16 suggests the patient may benefit from discharge to post-acute rehab services.    Bishnu Marie, PTA

## 2024-03-29 NOTE — PLAN OF CARE
Problem: Neurological Deficit  Goal: Neurological status is stable or improving  Description: Interventions:  - Monitor and assess patient's level of consciousness, motor function, sensory function, and level of assistance needed for ADLs.   - Monitor and report changes from baseline. Collaborate with interdisciplinary team to initiate plan and implement interventions as ordered.   - Provide and maintain a safe environment.  - Consider seizure precautions.  - Consider fall precautions.  - Consider aspiration precautions.  - Consider bleeding precautions.  Outcome: Progressing     Problem: Potential for Falls  Goal: Patient will remain free of falls  Description: INTERVENTIONS:  - Educate patient/family on patient safety including physical limitations  - Instruct patient to call for assistance with activity   - Consult OT/PT to assist with strengthening/mobility   - Keep Call bell within reach  - Keep bed low and locked with side rails adjusted as appropriate  - Keep care items and personal belongings within reach  - Initiate and maintain comfort rounds  - Make Fall Risk Sign visible to staff  Problem: NEUROSENSORY - ADULT  Goal: Achieves stable or improved neurological status  Description: INTERVENTIONS  - Monitor and report changes in neurological status  - Monitor vital signs such as temperature, blood pressure, glucose, and any other labs ordered   - Initiate measures to prevent increased intracranial pressure  - Monitor for seizure activity and implement precautions if appropriate      Outcome: Progressing     - Apply yellow socks and bracelet for high fall risk patients  - Consider moving patient to room near nurses station  Outcome: Progressing

## 2024-03-29 NOTE — PLAN OF CARE
Problem: Neurological Deficit  Goal: Neurological status is stable or improving  Description: Interventions:  - Monitor and assess patient's level of consciousness, motor function, sensory function, and level of assistance needed for ADLs.   - Monitor and report changes from baseline. Collaborate with interdisciplinary team to initiate plan and implement interventions as ordered.   - Provide and maintain a safe environment.  - Consider seizure precautions.  - Consider fall precautions.  - Consider aspiration precautions.  - Consider bleeding precautions.  Outcome: Progressing     Problem: Nutrition  Goal: Nutrition/Hydration status is improving  Description: Monitor and assess patient's nutrition/hydration status for malnutrition (ex- brittle hair, bruises, dry skin, pale skin and conjunctiva, muscle wasting, smooth red tongue, and disorientation). Collaborate with interdisciplinary team and initiate plan and interventions as ordered.  Monitor patient's weight and dietary intake as ordered or per policy. Utilize nutrition screening tool and intervene per policy. Determine patient's food preferences and provide high-protein, high-caloric foods as appropriate.     - Assist patient with eating.  - Allow adequate time for meals.  - Encourage patient to take dietary supplement as ordered.  - Collaborate with clinical nutritionist.  - Include patient/family/caregiver in decisions related to nutrition.  Outcome: Progressing     Problem: Nutrition/Hydration-ADULT  Goal: Nutrient/Hydration intake appropriate for improving, restoring or maintaining nutritional needs  Description: Monitor and assess patient's nutrition/hydration status for malnutrition. Collaborate with interdisciplinary team and initiate plan and interventions as ordered.  Monitor patient's weight and dietary intake as ordered or per policy. Utilize nutrition screening tool and intervene as necessary. Determine patient's food preferences and provide  high-protein, high-caloric foods as appropriate.     INTERVENTIONS:  - Monitor oral intake, urinary output, labs, and treatment plans  - Assess nutrition and hydration status and recommend course of action  - Evaluate amount of meals eaten  - Assist patient with eating if necessary   - Allow adequate time for meals  - Recommend/ encourage appropriate diets, oral nutritional supplements, and vitamin/mineral supplements  - Order, calculate, and assess calorie counts as needed  - Recommend, monitor, and adjust tube feedings and TPN/PPN based on assessed needs  - Assess need for intravenous fluids  - Provide specific nutrition/hydration education as appropriate  - Include patient/family/caregiver in decisions related to nutrition  Outcome: Progressing     Problem: PAIN - ADULT  Goal: Verbalizes/displays adequate comfort level or baseline comfort level  Description: Interventions:  - Encourage patient to monitor pain and request assistance  - Assess pain using appropriate pain scale  - Administer analgesics based on type and severity of pain and evaluate response  - Implement non-pharmacological measures as appropriate and evaluate response  - Consider cultural and social influences on pain and pain management  - Notify physician/advanced practitioner if interventions unsuccessful or patient reports new pain  Outcome: Progressing     Problem: INFECTION - ADULT  Goal: Absence or prevention of progression during hospitalization  Description: INTERVENTIONS:  - Assess and monitor for signs and symptoms of infection  - Monitor lab/diagnostic results  - Monitor all insertion sites, i.e. indwelling lines, tubes, and drains  - Monitor endotracheal if appropriate and nasal secretions for changes in amount and color  - [place] appropriate cooling/warming therapies per order  - Administer medications as ordered  - Instruct and encourage patient and family to use good hand hygiene technique  - Identify and instruct in appropriate  isolation precautions for identified infection/condition  Outcome: Progressing     Problem: SAFETY ADULT  Goal: Patient will remain free of falls  Description: INTERVENTIONS:  - Educate patient/family on patient safety including physical limitations  - Instruct patient to call for assistance with activity   - Consult OT/PT to assist with strengthening/mobility   - Keep Call bell within reach  - Keep bed low and locked with side rails adjusted as appropriate  - Keep care items and personal belongings within reach  - Initiate and maintain comfort rounds  - Make Fall Risk Sign visible to staff  - Consider moving patient to room near nurses station  Outcome: Progressing

## 2024-03-29 NOTE — ASSESSMENT & PLAN NOTE
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 Patient not compliant on her AP agents.    Holding AP agents until 4/11 and then repeat CT head to see if improving and continue plavix afterward.

## 2024-03-29 NOTE — PLAN OF CARE
Problem: PHYSICAL THERAPY ADULT  Goal: Performs mobility at highest level of function for planned discharge setting.  See evaluation for individualized goals.  Description: Treatment/Interventions: Functional transfer training, LE strengthening/ROM, Elevations, Therapeutic exercise, Endurance training, Patient/family training, Equipment eval/education, Bed mobility, Compensatory technique education, Gait training, Spoke to nursing, OT, Spoke to case management, Spoke to MD          See flowsheet documentation for full assessment, interventions and recommendations.  Outcome: Progressing  Note: Prognosis: Good  Problem List: Decreased endurance, Impaired balance, Decreased mobility, Decreased coordination, Impaired vision, Impaired sensation  Assessment: The patient continues to remain limited with deficits in balance, coordination, and activity tolerance. She fatigues easily with upright mobility as her gait notably worsens. The patient requires increased time for mobility as well as step-by-step instructions. She deferred further ambulatory trials due to fatigue, and she was returned to bed as she declined sitting up in the chair. Educated her about the importance of continued mobility as well as remaining out of bed during the day.  Barriers to Discharge: Inaccessible home environment, Decreased caregiver support     Rehab Resource Intensity Level, PT: I (Maximum Resource Intensity)    See flowsheet documentation for full assessment.

## 2024-03-29 NOTE — ASSESSMENT & PLAN NOTE
Patient noncompliant on antihypertensives at home.  Patient on amlodipine 10mg daily and lisinopril 40mg daily which are increase from previous home medications.    However, blood pressure had difficulty of being controlled. Medicine consulted- Blood pressure improved    Continue and control blood pressure acutely to sbp<140 with hydralazine 10mg PRN and labetalol 20mg PRN

## 2024-03-29 NOTE — CASE MANAGEMENT
Case Management Progress Note    Patient name Tanika Lira  Location [facility] 705/[facility] 705-01 MRN [id number]  : 1965 Date 3/29/2024       LOS (days): 3  Geometric Mean LOS (GMLOS) (days):   Days to GMLOS:        OBJECTIVE:        Current admission status: Inpatient  Preferred Pharmacy:   [id number] - [place] - [address]  [address]  [place] [address]  Phone: [phone] Fax: [phone]    Primary Care Provider: MILAN Miller    Primary Insurance: [employer]  Secondary Insurance:     PROGRESS NOTE:  Patient accepted to [facility], pending medical stability and insurance auth. CM will follow.

## 2024-03-30 PROCEDURE — 99232 SBSQ HOSP IP/OBS MODERATE 35: CPT | Performed by: STUDENT IN AN ORGANIZED HEALTH CARE EDUCATION/TRAINING PROGRAM

## 2024-03-30 RX ORDER — HEPARIN SODIUM 5000 [USP'U]/ML
5000 INJECTION, SOLUTION INTRAVENOUS; SUBCUTANEOUS EVERY 8 HOURS SCHEDULED
Status: DISCONTINUED | OUTPATIENT
Start: 2024-03-30 | End: 2024-04-03 | Stop reason: HOSPADM

## 2024-03-30 RX ORDER — LANOLIN ALCOHOL/MO/W.PET/CERES
3 CREAM (GRAM) TOPICAL ONCE
Status: COMPLETED | OUTPATIENT
Start: 2024-03-30 | End: 2024-03-30

## 2024-03-30 RX ORDER — MAGNESIUM SULFATE HEPTAHYDRATE 40 MG/ML
2 INJECTION, SOLUTION INTRAVENOUS ONCE
Status: COMPLETED | OUTPATIENT
Start: 2024-03-30 | End: 2024-03-30

## 2024-03-30 RX ORDER — MAGNESIUM SULFATE HEPTAHYDRATE 40 MG/ML
2 INJECTION, SOLUTION INTRAVENOUS CONTINUOUS
Status: DISCONTINUED | OUTPATIENT
Start: 2024-03-30 | End: 2024-03-30

## 2024-03-30 RX ADMIN — LABETALOL HYDROCHLORIDE 20 MG: 5 INJECTION, SOLUTION INTRAVENOUS at 09:35

## 2024-03-30 RX ADMIN — MELATONIN 3 MG: at 22:10

## 2024-03-30 RX ADMIN — ESCITALOPRAM OXALATE 20 MG: 20 TABLET ORAL at 08:14

## 2024-03-30 RX ADMIN — LABETALOL HYDROCHLORIDE 20 MG: 5 INJECTION, SOLUTION INTRAVENOUS at 21:32

## 2024-03-30 RX ADMIN — ACETAMINOPHEN 650 MG: 325 TABLET, FILM COATED ORAL at 00:25

## 2024-03-30 RX ADMIN — ACETAMINOPHEN 650 MG: 325 TABLET, FILM COATED ORAL at 22:10

## 2024-03-30 RX ADMIN — LISINOPRIL 20 MG: 20 TABLET ORAL at 08:14

## 2024-03-30 RX ADMIN — FOLIC ACID 1 MG: 1 TABLET ORAL at 08:14

## 2024-03-30 RX ADMIN — ACETAMINOPHEN 650 MG: 325 TABLET, FILM COATED ORAL at 15:39

## 2024-03-30 RX ADMIN — LABETALOL HYDROCHLORIDE 20 MG: 5 INJECTION, SOLUTION INTRAVENOUS at 01:31

## 2024-03-30 RX ADMIN — GABAPENTIN 300 MG: 300 CAPSULE ORAL at 20:50

## 2024-03-30 RX ADMIN — LISINOPRIL 20 MG: 20 TABLET ORAL at 16:58

## 2024-03-30 RX ADMIN — ATORVASTATIN CALCIUM 40 MG: 40 TABLET, FILM COATED ORAL at 16:58

## 2024-03-30 RX ADMIN — CHLORHEXIDINE GLUCONATE 15 ML: 1.2 SOLUTION ORAL at 20:50

## 2024-03-30 RX ADMIN — MAGNESIUM SULFATE HEPTAHYDRATE 2 G: 40 INJECTION, SOLUTION INTRAVENOUS at 11:28

## 2024-03-30 RX ADMIN — THIAMINE HCL TAB 100 MG 100 MG: 100 TAB at 08:13

## 2024-03-30 RX ADMIN — CHLORHEXIDINE GLUCONATE 15 ML: 1.2 SOLUTION ORAL at 08:13

## 2024-03-30 RX ADMIN — AMLODIPINE BESYLATE 10 MG: 10 TABLET ORAL at 08:14

## 2024-03-30 RX ADMIN — HEPARIN SODIUM 5000 UNITS: 5000 INJECTION INTRAVENOUS; SUBCUTANEOUS at 16:58

## 2024-03-30 RX ADMIN — Medication 1 TABLET: at 08:13

## 2024-03-30 NOTE — PROGRESS NOTES
NEUROLOGY RESIDENCY PROGRESS NOTE     Name: Tanika Lira   Age & Sex: 58 y.o. female   MRN: [id number]  Unit/Bed#: [facility] 705-01   Encounter: [id number]    Tanika Lira will need follow up in in 6 weeks with neurovascular Dr. Martinez .  She will not require outpatient neurological testing.     ASSESSMENT & PLAN     Alcohol use  Assessment & Plan  Discuss alcohol reduction.    Continue thiamine 100mg daily    ROSANA (generalized anxiety disorder)  Assessment & Plan  Continue home lexapro.    Essential hypertension  Assessment & Plan  Patient noncompliant on antihypertensives at home.  Patient on amlodipine 10mg daily and lisinopril 20mg daily which are increase from previous home medications.    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 Patient not compliant on her AP agents.    Holding AP agents until 4 weeks from ICH onset and then repeat CT head to see if improving and continue plavix afterward.    * Cerebellar hemorrhage (HCC)  Assessment & Plan  Patient is a 58 year old woman with hx of right hemisphere stroke (possibly right pontine infarct) on aspirin , HTN, HLD presenting for left cerebellar hemorrhage with IVH. Patient's symptoms are of nausea, vomiting, diarrhea, vertigo sensation but no focal numbness, weakness, visual deficits, nor headaches. Also, horizontal diplopia. Patient has history of right sided stroke in 2022 which she was placed on DAPT and to continue plavix after 21 days. Patient states she is on aspirin daily on this visit. Initial blood pressure recorded at [facility] is 222/136. On exam, continued but improving L>R dysmetria.  Thinners: aspirin reversed with DDAVP  Initial BP:  Blood Pressure: 144/78  Presenting exam: Bilateral dysmetric L>R  Vascular risk factors: HTN, HLD, previous stroke    Workup:  CT head wo contrast 3/26/2024: Acute parenchymal hematoma left paramedian cerebellum measures 2.6 x 1.6 cm with mass effect on the fourth ventricle. Intraventricular extension of hemorrhage noted with blood in the fourth ventricle and left foramen of Luschka.   CT head wo contrast 3/27/2024: Redemonstration of hyperdense hemorrhage in the left paramedian cerebellum, as described with mass effect and extension into the fourth ventricle and left foramen of Luschka, similar in appearance to the prior.   CT head wo contrast 3/28: no significant changes from above  Lab Results   Component Value Date    INR 1.11 03/26/2024    HGBA1C 5.2 09/02/2022    SODIUM 136 03/26/2024       Pertinent scores:  - ICH: 2 due to IVH and Infratentorial origin    Impression: Left cerebellar hemorrhage most likely in setting of hypertensive emergency with blood pressures in the 200s systolic 2/2 to poor medication adherence and methamphetamine use now stable.    Plan:  S/P DDAVP X2  Repeat CT head in 4 weeks and if stable, and hemorrhage is resolved, then consider resuming plavix 75mg PO qdaily at that time   Neurochecks  Repeat CTH if > 2 pt drop in GCS in one hour  Goal SBP between 120 and 140, cardene gtt or pressers/fluids as needed to keep within range  PT/OT/Speech/PMR consults when able  Replete lytes as needed as per CC  No chemical ppx due to hemorrhage, SCDs for ppx  Hold AP agents for 4 weeks from 3/26 and then can retake plavix 75mg daily  BG < 180, SSI for coverage        SUBJECTIVE     Patient was seen and examined. No acute events overnight. Patient's physical and neuro exam showed improvement. The patient was able to appreciate her improvement and less dysmetria as well. Patient complained of 3 wet bowel movements shortly after magnesium was initiated for her. Discussed with patient that this may be  the magnesium. Will follow closely.  All questions and concerns addressed.    OBJECTIVE     Patient ID: Tanika Lira is a 58 y.o. female.    Vitals:    24 0721 24 0929 24 0935 24 1004   BP: 159/83 152/85 151/85 126/76   Pulse: 70 61 65 55   Resp: (!) 11      Temp: (!) 97.1 °F (36.2 °C)      TempSrc:       SpO2: 95% 94% 97% 95%   Weight:       Height:          Temperature:   Temp (24hrs), Av.3 °F (36.3 °C), Min:97.1 °F (36.2 °C), Max:97.4 °F (36.3 °C)    Temperature: (!) 97.1 °F (36.2 °C)      Physical Exam  Vitals reviewed.   HENT:      Head: Normocephalic.      Mouth/Throat:      Mouth: Mucous membranes are moist.      Pharynx: Oropharynx is clear.   Eyes:      Extraocular Movements: Extraocular movements intact.      Conjunctiva/sclera: Conjunctivae normal.      Pupils: Pupils are equal, round, and reactive to light.   Cardiovascular:      Rate and Rhythm: Normal rate and regular rhythm.      Pulses: Normal pulses.      Heart sounds: Normal heart sounds.   Pulmonary:      Effort: Pulmonary effort is normal.      Breath sounds: Normal breath sounds.   Neurological:      Mental Status: She is oriented to person, place, and time.      Cranial Nerves: Cranial nerves 2-12 are intact.      Motor: Motor strength is normal.     Coordination: Finger-Nose-Finger Test abnormal ((L>R dysmetria continues to improve).). Heel to Marquez Test normal.      Deep Tendon Reflexes:      Reflex Scores:       Bicep reflexes are 2+ on the right side and 2+ on the left side.       Brachioradialis reflexes are 2+ on the right side and 2+ on the left side.       Patellar reflexes are 2+ on the right side and 2+ on the left side.       Achilles reflexes are 2+ on the right side and 2+ on the left side.         Neurologic Exam     Mental Status   Oriented to person, place, and time.     Cranial Nerves   Cranial nerves II through XII intact.     CN III, IV, VI   Pupils are equal, round, and reactive to  light.    Motor Exam   Muscle bulk: normal  Overall muscle tone: normal    Strength   Strength 5/5 throughout.     Sensory Exam   Light touch normal.     Gait, Coordination, and Reflexes     Coordination   Finger to nose coordination: abnormal ((L>R dysmetria continues to improve).)  Heel to shin coordination: normal    Reflexes   Right brachioradialis: 2+  Left brachioradialis: 2+  Right biceps: 2+  Left biceps: 2+  Right patellar: 2+  Left patellar: 2+  Right achilles: 2+  Left achilles: 2+       LABORATORY DATA     Labs: I have personally reviewed pertinent reports.    Results from last 7 days   Lab Units 03/29/24 0622 03/28/24 0526 03/27/24 0427 03/26/24  0820   WBC Thousand/uL 7.41 6.85 9.44 8.29   HEMOGLOBIN g/dL 14.7 14.4 12.9 15.4   HEMATOCRIT % 45.5 43.7 40.0 47.4*   PLATELETS Thousands/uL 189 183 179 189   NEUTROS PCT % 47 56  --  34*   MONOS PCT % 13* 12  --  12   EOS PCT % 1 0  --  4      Results from last 7 days   Lab Units 03/29/24 0622 03/28/24 0526 03/27/24  1735 03/27/24 0427 03/26/24  0820   SODIUM mmol/L 134* 133* 133*   < > 136   POTASSIUM mmol/L 3.8 3.8 4.2   < > 3.7   CHLORIDE mmol/L 102 99 102   < > 99   CO2 mmol/L 26 26 27   < > 30   BUN mg/dL 17 16 15   < > 15   CREATININE mg/dL 0.57* 0.61 0.69   < > 0.67   CALCIUM mg/dL 8.4 8.8 8.7   < > 9.1   ALK PHOS U/L 78 79  --   --  100   ALT U/L 65* 75*  --   --  114*   AST U/L 57* 62*  --   --  113*    < > = values in this interval not displayed.     Results from last 7 days   Lab Units 03/29/24 0622 03/28/24 0526 03/27/24  0427   MAGNESIUM mg/dL 1.6* 1.8* 1.6*     Results from last 7 days   Lab Units 03/29/24 0622 03/28/24 0526 03/27/24  0427   PHOSPHORUS mg/dL 3.6 3.2 3.6      Results from last 7 days   Lab Units 03/26/24  1804 03/26/24  1647   INR   --  1.11   PTT seconds 29  --      Results from last 7 days   Lab Units 03/26/24  0820   LACTIC ACID mmol/L 1.1           IMAGING & DIAGNOSTIC TESTING     Radiology Results: I have  personally reviewed pertinent reports.      CT head wo contrast   Final Result by Riki Jacob MD (03/28 9466)      No significant interval change with findings detailed above.                  Workstation performed: [id number]         CT head wo contrast   Final Result by Gurwinder Cisneros DO (03/27 0515)      Redemonstration of hyperdense hemorrhage in the left paramedian cerebellum, as described with mass effect and extension into the fourth ventricle and left foramen of Luschka, similar in appearance to the prior.      No hydrocephalus.      Other findings as above. Overall there has been no significant interval change.      Follow-up as clinically warranted.                  Workstation performed: [id number]         CT head wo contrast    (Results Pending)       Other Diagnostic Testing: I have personally reviewed pertinent reports.      ACTIVE MEDICATIONS     Current Facility-Administered Medications   Medication Dose Route Frequency    acetaminophen (TYLENOL) tablet 650 mg  650 mg Oral Q6H PRN    amLODIPine (NORVASC) tablet 10 mg  10 mg Oral Daily    atorvastatin (LIPITOR) tablet 40 mg  40 mg Oral Daily With Dinner    chlorhexidine (PERIDEX) 0.12 % oral rinse 15 mL  15 mL Mouth/Throat Q12H NAOMI    escitalopram (LEXAPRO) tablet 20 mg  20 mg Oral Daily    folic acid (FOLVITE) tablet 1 mg  1 mg Oral Daily    gabapentin (NEURONTIN) capsule 300 mg  300 mg Oral HS    hydrALAZINE (APRESOLINE) injection 10 mg  10 mg Intravenous Q4H PRN    labetalol (NORMODYNE) injection 20 mg  20 mg Intravenous Q4H PRN    lisinopril (ZESTRIL) tablet 20 mg  20 mg Oral BID    multivitamin-minerals (CENTRUM) tablet 1 tablet  1 tablet Oral Daily    thiamine tablet 100 mg  100 mg Oral Daily    trimethobenzamide (TIGAN) IM injection 200 mg  200 mg Intramuscular Q6H PRN       Prior to Admission medications    Medication Sig Start Date End Date Taking? Authorizing Provider   aspirin (ECOTRIN LOW STRENGTH) 81 mg EC tablet Take 1  tablet (81 mg total) by mouth daily 10/7/22   Alec Kamara MD   atorvastatin (LIPITOR) 40 mg tablet Take 1 tablet (40 mg total) by mouth daily 6/13/23   Tanika Winter, MILAN   clopidogrel (PLAVIX) 75 mg tablet Take 1 tablet (75 mg total) by mouth daily 6/13/23   Tanika Winter, MILAN   escitalopram (Lexapro) 20 mg tablet Take 1 tablet (20 mg total) by mouth daily 10/19/22   Tanika Winter, MILAN   gabapentin (NEURONTIN) 300 mg capsule Take 1 capsule (300 mg total) by mouth daily at bedtime 6/13/23   Tanika Winter, MILAN   hydrOXYzine HCL (ATARAX) 50 mg tablet Take 1 tablet (50 mg total) by mouth every 6 (six) hours as needed for itching 6/13/23   Tanika Winter, MILAN   lisinopril (ZESTRIL) 20 mg tablet Take 1 tablet (20 mg total) by mouth daily 6/13/23   Tanika Winter, MILAN   NIFEdipine ER (ADALAT CC) 30 MG 24 hr tablet Take 1 tablet (30 mg total) by mouth daily 6/13/23   Tanika Winter, MILAN         VTE Pharmacologic Prophylaxis: Pharmacologic VTE Prophylaxis contraindicated due to ICH  VTE Mechanical Prophylaxis: sequential compression device and foot pump applied    ======    I have discussed the patient's history, physical exam findings, assessment, and plan in detail with attending, Dr. Kebede    Thank you for allowing me to participate in the care of your patient, Tanika Lira.    Jus Blunt  [facility] Neurology Residency, PGY-2

## 2024-03-30 NOTE — PLAN OF CARE
Problem: Neurological Deficit  Goal: Neurological status is stable or improving  Description: Interventions:  - Monitor and assess patient's level of consciousness, motor function, sensory function, and level of assistance needed for ADLs.   - Monitor and report changes from baseline. Collaborate with interdisciplinary team to initiate plan and implement interventions as ordered.   - Provide and maintain a safe environment.  - Consider seizure precautions.  - Consider fall precautions.  - Consider aspiration precautions.  - Consider bleeding precautions.  Outcome: Progressing     Problem: Nutrition  Goal: Nutrition/Hydration status is improving  Description: Monitor and assess patient's nutrition/hydration status for malnutrition (ex- brittle hair, bruises, dry skin, pale skin and conjunctiva, muscle wasting, smooth red tongue, and disorientation). Collaborate with interdisciplinary team and initiate plan and interventions as ordered.  Monitor patient's weight and dietary intake as ordered or per policy. Utilize nutrition screening tool and intervene per policy. Determine patient's food preferences and provide high-protein, high-caloric foods as appropriate.     - Assist patient with eating.  - Allow adequate time for meals.  - Encourage patient to take dietary supplement as ordered.  - Collaborate with clinical nutritionist.  - Include patient/family/caregiver in decisions related to nutrition.  Outcome: Progressing     Problem: PAIN - ADULT  Goal: Verbalizes/displays adequate comfort level or baseline comfort level  Description: Interventions:  - Encourage patient to monitor pain and request assistance  - Assess pain using appropriate pain scale  - Administer analgesics based on type and severity of pain and evaluate response  - Implement non-pharmacological measures as appropriate and evaluate response  - Consider cultural and social influences on pain and pain management  - Notify physician/advanced  practitioner if interventions unsuccessful or patient reports new pain  Outcome: Progressing     Problem: SAFETY ADULT  Goal: Patient will remain free of falls  Description: INTERVENTIONS:  - Educate patient/family on patient safety including physical limitations  - Instruct patient to call for assistance with activity   - Consult OT/PT to assist with strengthening/mobility   - Keep Call bell within reach  - Keep bed low and locked with side rails adjusted as appropriate  - Keep care items and personal belongings within reach  - Initiate and maintain comfort rounds  - Apply yellow socks and bracelet for high fall risk patients  - Consider moving patient to room near nurses station  Outcome: Progressing     Problem: NEUROSENSORY - ADULT  Goal: Achieves stable or improved neurological status  Description: INTERVENTIONS  - Monitor and report changes in neurological status  - Monitor vital signs such as temperature, blood pressure, glucose, and any other labs ordered   - Initiate measures to prevent increased intracranial pressure  - Monitor for seizure activity and implement precautions if appropriate      Outcome: Progressing

## 2024-03-30 NOTE — PLAN OF CARE
Problem: Prexisting or High Potential for Compromised Skin Integrity  Goal: Skin integrity is maintained or improved  Description: INTERVENTIONS:  - Identify patients at risk for skin breakdown  - Assess and monitor skin integrity  - Assess and monitor nutrition and hydration status  - Monitor labs   - Assess for incontinence   - Turn and reposition patient  - Assist with mobility/ambulation  - Relieve pressure over bony prominences  - Avoid friction and shearing  - Provide appropriate hygiene as needed including keeping skin clean and dry  - Evaluate need for skin moisturizer/barrier cream  - Collaborate with interdisciplinary team   - Patient/family teaching  - Consider wound care consult   Outcome: Progressing     Problem: Neurological Deficit  Goal: Neurological status is stable or improving  Description: Interventions:  - Monitor and assess patient's level of consciousness, motor function, sensory function, and level of assistance needed for ADLs.   - Monitor and report changes from baseline. Collaborate with interdisciplinary team to initiate plan and implement interventions as ordered.   - Provide and maintain a safe environment.  - Consider seizure precautions.  - Consider fall precautions.  - Consider aspiration precautions.  - Consider bleeding precautions.  Outcome: Progressing     Problem: Activity Intolerance/Impaired Mobility  Goal: Mobility/activity is maintained at optimum level for patient  Description: Interventions:  - Assess and monitor patient  barriers to mobility and need for assistive/adaptive devices.  - Assess patient's emotional response to limitations.  - Collaborate with interdisciplinary team and initiate plans and interventions as ordered.  - Encourage independent activity per ability.  - Maintain proper body alignment.  - Perform active/passive rom as tolerated/ordered.  - Plan activities to conserve energy.  - Turn patient as appropriate  Outcome: Progressing     Problem:  Communication Impairment  Goal: Ability to express needs and understand communication  Description: Assess patient's communication skills and ability to understand information.  Patient will demonstrate use of effective communication techniques, alternative methods of communication and understanding even if not able to speak.     - Encourage communication and provide alternate methods of communication as needed.  - Collaborate with case management/ for discharge needs.  - Include patient/family/caregiver in decisions related to communication.  Outcome: Progressing     Problem: Potential for Aspiration  Goal: Non-ventilated patient's risk of aspiration is minimized  Description: Assess and monitor vital signs, respiratory status, and labs (WBC).  Monitor for signs of aspiration (tachypnea, cough, rales, wheezing, cyanosis, fever).    - Assess and monitor patient's ability to swallow.  - Place patient up in chair to eat if possible.  - HOB up at 90 degrees to eat if unable to get patient up into chair.  - Supervise patient during oral intake.   - Instruct patient/ family to take small bites.  - Instruct patient/ family to take small single sips when taking liquids.  - Follow patient-specific strategies generated by speech pathologist.  Outcome: Progressing     Problem: Nutrition  Goal: Nutrition/Hydration status is improving  Description: Monitor and assess patient's nutrition/hydration status for malnutrition (ex- brittle hair, bruises, dry skin, pale skin and conjunctiva, muscle wasting, smooth red tongue, and disorientation). Collaborate with interdisciplinary team and initiate plan and interventions as ordered.  Monitor patient's weight and dietary intake as ordered or per policy. Utilize nutrition screening tool and intervene per policy. Determine patient's food preferences and provide high-protein, high-caloric foods as appropriate.     - Assist patient with eating.  - Allow adequate time for  meals.  - Encourage patient to take dietary supplement as ordered.  - Collaborate with clinical nutritionist.  - Include patient/family/caregiver in decisions related to nutrition.  Outcome: Progressing     Problem: Nutrition/Hydration-ADULT  Goal: Nutrient/Hydration intake appropriate for improving, restoring or maintaining nutritional needs  Description: Monitor and assess patient's nutrition/hydration status for malnutrition. Collaborate with interdisciplinary team and initiate plan and interventions as ordered.  Monitor patient's weight and dietary intake as ordered or per policy. Utilize nutrition screening tool and intervene as necessary. Determine patient's food preferences and provide high-protein, high-caloric foods as appropriate.     INTERVENTIONS:  - Monitor oral intake, urinary output, labs, and treatment plans  - Assess nutrition and hydration status and recommend course of action  - Evaluate amount of meals eaten  - Assist patient with eating if necessary   - Allow adequate time for meals  - Recommend/ encourage appropriate diets, oral nutritional supplements, and vitamin/mineral supplements  - Order, calculate, and assess calorie counts as needed  - Recommend, monitor, and adjust tube feedings and TPN/PPN based on assessed needs  - Assess need for intravenous fluids  - Provide specific nutrition/hydration education as appropriate  - Include patient/family/caregiver in decisions related to nutrition  Outcome: Progressing     Problem: COPING  Goal: Pt/Family able to verbalize concerns and demonstrate effective coping strategies  Description: INTERVENTIONS:  - Assist patient/family to identify coping skills, available support systems and cultural and spiritual values  - Provide emotional support, including active listening and acknowledgement of concerns of patient and caregivers  - Reduce environmental stimuli, as able  - Provide patient education  - Assess for spiritual pain/suffering and initiate  spiritual care, including notification of Pastoral Care or car[facility] based community as needed  - Assess effectiveness of coping strategies  Outcome: Progressing  Goal: Will report anxiety at manageable levels  Description: INTERVENTIONS:  - Administer medication as ordered  - Teach and encourage coping skills  - Provide emotional support  - Assess patient/family for anxiety and ability to cope  Outcome: Progressing     Problem: Potential for Falls  Goal: Patient will remain free of falls  Description: INTERVENTIONS:  - Educate patient/family on patient safety including physical limitations  - Instruct patient to call for assistance with activity   - Consult OT/PT to assist with strengthening/mobility   - Keep Call bell within reach  - Keep bed low and locked with side rails adjusted as appropriate  - Keep care items and personal belongings within reach  - Initiate and maintain comfort rounds  - Make Fall Risk Sign visible to staff  - Apply yellow socks and bracelet for high fall risk patients  - Consider moving patient to room near nurses station  Outcome: Progressing     Problem: PAIN - ADULT  Goal: Verbalizes/displays adequate comfort level or baseline comfort level  Description: Interventions:  - Encourage patient to monitor pain and request assistance  - Assess pain using appropriate pain scale  - Administer analgesics based on type and severity of pain and evaluate response  - Implement non-pharmacological measures as appropriate and evaluate response  - Consider cultural and social influences on pain and pain management  - Notify physician/advanced practitioner if interventions unsuccessful or patient reports new pain  Outcome: Progressing     Problem: INFECTION - ADULT  Goal: Absence or prevention of progression during hospitalization  Description: INTERVENTIONS:  - Assess and monitor for signs and symptoms of infection  - Monitor lab/diagnostic results  - Monitor all insertion sites, i.e. indwelling lines,  tubes, and drains  - Monitor endotracheal if appropriate and nasal secretions for changes in amount and color  - [place] appropriate cooling/warming therapies per order  - Administer medications as ordered  - Instruct and encourage patient and family to use good hand hygiene technique  - Identify and instruct in appropriate isolation precautions for identified infection/condition  Outcome: Progressing     Problem: SAFETY ADULT  Goal: Patient will remain free of falls  Description: INTERVENTIONS:  - Educate patient/family on patient safety including physical limitations  - Instruct patient to call for assistance with activity   - Consult OT/PT to assist with strengthening/mobility   - Keep Call bell within reach  - Keep bed low and locked with side rails adjusted as appropriate  - Keep care items and personal belongings within reach  - Initiate and maintain comfort rounds  - Make Fall Risk Sign visible to staff  - Apply yellow socks and bracelet for high fall risk patients  - Consider moving patient to room near nurses station  Outcome: Progressing  Goal: Maintain or return to baseline ADL function  Description: INTERVENTIONS:  -  Assess patient's ability to carry out ADLs; assess patient's baseline for ADL function and identify physical deficits which impact ability to perform ADLs (bathing, care of mouth/teeth, toileting, grooming, dressing, etc.)  - Assess/evaluate cause of self-care deficits   - Assess range of motion  - Assess patient's mobility; develop plan if impaired  - Assess patient's need for assistive devices and provide as appropriate  - Encourage maximum independence but intervene and supervise when necessary  - Involve family in performance of ADLs  - Assess for home care needs following discharge   - Consider OT consult to assist with ADL evaluation and planning for discharge  - Provide patient education as appropriate  Outcome: Progressing  Goal: Maintains/Returns to pre admission functional  level  Description: INTERVENTIONS:  - Perform AM-PAC 6 Click Basic Mobility/ Daily Activity assessment daily.  - Set and communicate daily mobility goal to care team and patient/family/caregiver.   - Collaborate with rehabilitation services on mobility goals if consulted  - Out of bed for toileting  - Record patient progress and toleration of activity level   Outcome: Progressing     Problem: DISCHARGE PLANNING  Goal: Discharge to home or other facility with appropriate resources  Description: INTERVENTIONS:  - Identify barriers to discharge w/patient and caregiver  - Arrange for needed discharge resources and transportation as appropriate  - Identify discharge learning needs (meds, wound care, etc.)  - Arrange for interpretive services to assist at discharge as needed  - Refer to Case Management Department for coordinating discharge planning if the patient needs post-hospital services based on physician/advanced practitioner order or complex needs related to functional status, cognitive ability, or social support system  Outcome: Progressing     Problem: Knowledge Deficit  Goal: Patient/family/caregiver demonstrates understanding of disease process, treatment plan, medications, and discharge instructions  Description: Complete learning assessment and assess knowledge base.  Interventions:  - Provide teaching at level of understanding  - Provide teaching via preferred learning methods  Outcome: Progressing     Problem: NEUROSENSORY - ADULT  Goal: Achieves stable or improved neurological status  Description: INTERVENTIONS  - Monitor and report changes in neurological status  - Monitor vital signs such as temperature, blood pressure, glucose, and any other labs ordered   - Initiate measures to prevent increased intracranial pressure  - Monitor for seizure activity and implement precautions if appropriate      Outcome: Progressing  Goal: Remains free of injury related to seizures activity  Description: INTERVENTIONS  -  Maintain airway, patient safety  and administer oxygen as ordered  - Monitor patient for seizure activity, document and report duration and description of seizure to physician/advanced practitioner  - If seizure occurs,  ensure patient safety during seizure  - Reorient patient post seizure  - Seizure pads on all 4 side rails  - Instruct patient/family to notify RN of any seizure activity including if an aura is experienced  - Instruct patient/family to call for assistance with activity based on nursing assessment  - Administer anti-seizure medications if ordered    Outcome: Progressing  Goal: Achieves maximal functionality and self care  Description: INTERVENTIONS  - Monitor swallowing and airway patency with patient fatigue and changes in neurological status  - Encourage and assist patient to increase activity and self care.   - Encourage visually impaired, hearing impaired and aphasic patients to use assistive/communication devices  Outcome: Progressing     Problem: CARDIOVASCULAR - ADULT  Goal: Maintains optimal cardiac output and hemodynamic stability  Description: INTERVENTIONS:  - Monitor I/O, vital signs and rhythm  - Monitor for S/S and trends of decreased cardiac output  - Administer and titrate ordered vasoactive medications to optimize hemodynamic stability  - Assess quality of pulses, skin color and temperature  - Assess for signs of decreased coronary artery perfusion  - Instruct patient to report change in severity of symptoms  Outcome: Progressing  Goal: Absence of cardiac dysrhythmias or at baseline rhythm  Description: INTERVENTIONS:  - Continuous cardiac monitoring, vital signs, obtain 12 lead EKG if ordered  - Administer antiarrhythmic and heart rate control medications as ordered  - Monitor electrolytes and administer replacement therapy as ordered  Outcome: Progressing     Problem: RESPIRATORY - ADULT  Goal: Achieves optimal ventilation and oxygenation  Description: INTERVENTIONS:  - Assess for  changes in respiratory status  - Assess for changes in mentation and behavior  - Position to facilitate oxygenation and minimize respiratory effort  - Oxygen administered by appropriate delivery if ordered  - Initiate smoking cessation education as indicated  - Encourage broncho-pulmonary hygiene including cough, deep breathe, Incentive Spirometry  - Assess the need for suctioning and aspirate as needed  - Assess and instruct to report SOB or any respiratory difficulty  - Respiratory Therapy support as indicated  Outcome: Progressing     Problem: GASTROINTESTINAL - ADULT  Goal: Minimal or absence of nausea and/or vomiting  Description: INTERVENTIONS:  - Administer IV fluids if ordered to ensure adequate hydration  - Maintain NPO status until nausea and vomiting are resolved  - Nasogastric tube if ordered  - Administer ordered antiemetic medications as needed  - Provide nonpharmacologic comfort measures as appropriate  - Advance diet as tolerated, if ordered  - Consider nutrition services referral to assist patient with adequate nutrition and appropriate food choices  Outcome: Progressing  Goal: Maintains or returns to baseline bowel function  Description: INTERVENTIONS:  - Assess bowel function  - Encourage oral fluids to ensure adequate hydration  - Administer IV fluids if ordered to ensure adequate hydration  - Administer ordered medications as needed  - Encourage mobilization and activity  - Consider nutritional services referral to assist patient with adequate nutrition and appropriate food choices  Outcome: Progressing  Goal: Maintains adequate nutritional intake  Description: INTERVENTIONS:  - Monitor percentage of each meal consumed  - Identify factors contributing to decreased intake, treat as appropriate  - Assist with meals as needed  - Monitor I&O, weight, and lab values if indicated  - Obtain nutrition services referral as needed  Outcome: Progressing     Problem: GENITOURINARY - ADULT  Goal: Maintains  or returns to baseline urinary function  Description: INTERVENTIONS:  - Assess urinary function  - Encourage oral fluids to ensure adequate hydration if ordered  - Administer IV fluids as ordered to ensure adequate hydration  - Administer ordered medications as needed  - Offer frequent toileting  - Follow urinary retention protocol if ordered  Outcome: Progressing  Goal: Absence of urinary retention  Description: INTERVENTIONS:  - Assess patient’s ability to void and empty bladder  - Monitor I/O  - Bladder scan as needed  - Discuss with physician/AP medications to alleviate retention as needed  - Discuss catheterization for long term situations as appropriate  Outcome: Progressing     Problem: METABOLIC, FLUID AND ELECTROLYTES - ADULT  Goal: Electrolytes maintained within normal limits  Description: INTERVENTIONS:  - Monitor labs and assess patient for signs and symptoms of electrolyte imbalances  - Administer electrolyte replacement as ordered  - Monitor response to electrolyte replacements, including repeat lab results as appropriate  - Instruct patient on fluid and nutrition as appropriate  Outcome: Progressing  Goal: Fluid balance maintained  Description: INTERVENTIONS:  - Monitor labs   - Monitor I/O and WT  - Instruct patient on fluid and nutrition as appropriate  - Assess for signs & symptoms of volume excess or deficit  Outcome: Progressing     Problem: SKIN/TISSUE INTEGRITY - ADULT  Goal: Skin Integrity remains intact(Skin Breakdown Prevention)  Description: Assess:  -Inspect skin when repositioning, toileting, and assisting with ADLS  --Assess extremities for adequate circulation and sensation     Bed Management:  -Have minimal linens on bed & keep smooth, unwrinkled  -Change linens as needed when moist or perspiring  -Avoid sitting or lying in one position for more than 2 hours while in bed  -Keep HOB at 30 2degrees     Toileting:  -Offer bedside commode  3  Activity:  -Mobilize patient 3 times a  day  -Encourage activity and walks on unit  -Encourage or provide ROM exercises   -Turn and reposition patient every 2 Hours  -Use appropriate equipment to lift or move patient in bed  -    Skin Care:  -Avoid use of baby powder, tape, friction and shearing, hot water or constrictive clothing  -Relieve pressure over bony prominences using pillow  -Do not massage red bony areas    Outcome: Progressing     Problem: HEMATOLOGIC - ADULT  Goal: Maintains hematologic stability  Description: INTERVENTIONS  - Assess for signs and symptoms of bleeding or hemorrhage  - Monitor labs  - Administer supportive blood products/factors as ordered and appropriate  Outcome: Progressing     Problem: MUSCULOSKELETAL - ADULT  Goal: Maintain or return mobility to safest level of function  Description: INTERVENTIONS:  - Assess patient's ability to carry out ADLs; assess patient's baseline for ADL function and identify physical deficits which impact ability to perform ADLs (bathing, care of mouth/teeth, toileting, grooming, dressing, etc.)  - Assess/evaluate cause of self-care deficits   - Assess range of motion  - Assess patient's mobility  - Assess patient's need for assistive devices and provide as appropriate  - Encourage maximum independence but intervene and supervise when necessary  - Involve family in performance of ADLs  - Assess for home care needs following discharge   - Consider OT consult to assist with ADL evaluation and planning for discharge  - Provide patient education as appropriate  Outcome: Progressing

## 2024-03-31 PROBLEM — R19.7 DIARRHEA: Status: ACTIVE | Noted: 2024-03-31

## 2024-03-31 LAB
ANION GAP SERPL CALCULATED.3IONS-SCNC: 7 MMOL/L (ref 4–13)
BUN SERPL-MCNC: 18 MG/DL (ref 5–25)
CALCIUM SERPL-MCNC: 8.4 MG/DL (ref 8.4–10.2)
CHLORIDE SERPL-SCNC: 101 MMOL/L (ref 96–108)
CO2 SERPL-SCNC: 25 MMOL/L (ref 21–32)
CREAT SERPL-MCNC: 0.57 MG/DL (ref 0.6–1.3)
ERYTHROCYTE [DISTWIDTH] IN BLOOD BY AUTOMATED COUNT: 12.4 % (ref 11.6–15.1)
GFR SERPL CREATININE-BSD FRML MDRD: 101 ML/MIN/1.73SQ M
GLUCOSE SERPL-MCNC: 106 MG/DL (ref 65–140)
HCT VFR BLD AUTO: 44.3 % (ref 34.8–46.1)
HGB BLD-MCNC: 14.6 G/DL (ref 11.5–15.4)
MAGNESIUM SERPL-MCNC: 1.7 MG/DL (ref 1.9–2.7)
MCH RBC QN AUTO: 32.2 PG (ref 26.8–34.3)
MCHC RBC AUTO-ENTMCNC: 33 G/DL (ref 31.4–37.4)
MCV RBC AUTO: 98 FL (ref 82–98)
PHOSPHATE SERPL-MCNC: 3 MG/DL (ref 2.7–4.5)
PLATELET # BLD AUTO: 224 THOUSANDS/UL (ref 149–390)
PMV BLD AUTO: 11 FL (ref 8.9–12.7)
POTASSIUM SERPL-SCNC: 3.7 MMOL/L (ref 3.5–5.3)
RBC # BLD AUTO: 4.54 MILLION/UL (ref 3.81–5.12)
SODIUM SERPL-SCNC: 133 MMOL/L (ref 135–147)
WBC # BLD AUTO: 8.46 THOUSAND/UL (ref 4.31–10.16)

## 2024-03-31 PROCEDURE — 80048 BASIC METABOLIC PNL TOTAL CA: CPT | Performed by: PSYCHIATRY & NEUROLOGY

## 2024-03-31 PROCEDURE — 85027 COMPLETE CBC AUTOMATED: CPT | Performed by: PSYCHIATRY & NEUROLOGY

## 2024-03-31 PROCEDURE — 97535 SELF CARE MNGMENT TRAINING: CPT

## 2024-03-31 PROCEDURE — 97112 NEUROMUSCULAR REEDUCATION: CPT

## 2024-03-31 PROCEDURE — 83735 ASSAY OF MAGNESIUM: CPT | Performed by: PSYCHIATRY & NEUROLOGY

## 2024-03-31 PROCEDURE — 99232 SBSQ HOSP IP/OBS MODERATE 35: CPT | Performed by: STUDENT IN AN ORGANIZED HEALTH CARE EDUCATION/TRAINING PROGRAM

## 2024-03-31 PROCEDURE — 97116 GAIT TRAINING THERAPY: CPT

## 2024-03-31 PROCEDURE — 84100 ASSAY OF PHOSPHORUS: CPT | Performed by: PSYCHIATRY & NEUROLOGY

## 2024-03-31 RX ORDER — LORATADINE 10 MG/1
10 TABLET ORAL DAILY
Status: DISCONTINUED | OUTPATIENT
Start: 2024-03-31 | End: 2024-04-03 | Stop reason: HOSPADM

## 2024-03-31 RX ORDER — LOPERAMIDE HCL 1 MG/7.5ML
2 SUSPENSION ORAL 3 TIMES DAILY PRN
Status: DISCONTINUED | OUTPATIENT
Start: 2024-03-31 | End: 2024-04-03 | Stop reason: HOSPADM

## 2024-03-31 RX ORDER — UREA 10 %
500 LOTION (ML) TOPICAL
Status: DISCONTINUED | OUTPATIENT
Start: 2024-03-31 | End: 2024-04-03 | Stop reason: HOSPADM

## 2024-03-31 RX ADMIN — CHLORHEXIDINE GLUCONATE 15 ML: 1.2 SOLUTION ORAL at 09:36

## 2024-03-31 RX ADMIN — ATORVASTATIN CALCIUM 40 MG: 40 TABLET, FILM COATED ORAL at 17:01

## 2024-03-31 RX ADMIN — HYDRALAZINE HYDROCHLORIDE 10 MG: 20 INJECTION, SOLUTION INTRAMUSCULAR; INTRAVENOUS at 22:48

## 2024-03-31 RX ADMIN — LABETALOL HYDROCHLORIDE 20 MG: 5 INJECTION, SOLUTION INTRAVENOUS at 21:27

## 2024-03-31 RX ADMIN — HEPARIN SODIUM 5000 UNITS: 5000 INJECTION INTRAVENOUS; SUBCUTANEOUS at 21:22

## 2024-03-31 RX ADMIN — Medication 1 TABLET: at 09:37

## 2024-03-31 RX ADMIN — FOLIC ACID 1 MG: 1 TABLET ORAL at 09:36

## 2024-03-31 RX ADMIN — Medication 500 MG: at 17:00

## 2024-03-31 RX ADMIN — ESCITALOPRAM OXALATE 20 MG: 20 TABLET ORAL at 09:36

## 2024-03-31 RX ADMIN — LORATADINE 10 MG: 10 TABLET ORAL at 13:59

## 2024-03-31 RX ADMIN — LISINOPRIL 20 MG: 20 TABLET ORAL at 09:36

## 2024-03-31 RX ADMIN — LOPERAMIDE HCL 2 MG: 1 SOLUTION ORAL at 12:31

## 2024-03-31 RX ADMIN — HEPARIN SODIUM 5000 UNITS: 5000 INJECTION INTRAVENOUS; SUBCUTANEOUS at 13:59

## 2024-03-31 RX ADMIN — GABAPENTIN 300 MG: 300 CAPSULE ORAL at 21:22

## 2024-03-31 RX ADMIN — AMLODIPINE BESYLATE 10 MG: 10 TABLET ORAL at 09:37

## 2024-03-31 RX ADMIN — CHLORHEXIDINE GLUCONATE 15 ML: 1.2 SOLUTION ORAL at 21:22

## 2024-03-31 RX ADMIN — HEPARIN SODIUM 5000 UNITS: 5000 INJECTION INTRAVENOUS; SUBCUTANEOUS at 05:56

## 2024-03-31 RX ADMIN — LISINOPRIL 20 MG: 20 TABLET ORAL at 17:01

## 2024-03-31 RX ADMIN — THIAMINE HCL TAB 100 MG 100 MG: 100 TAB at 09:36

## 2024-03-31 NOTE — PLAN OF CARE
Problem: OCCUPATIONAL THERAPY ADULT  Goal: Performs self-care activities at highest level of function for planned discharge setting.  See evaluation for individualized goals.  Description: Treatment Interventions: ADL retraining, Visual perceptual retraining, Functional transfer training, UE strengthening/ROM, Endurance training, Patient/family training, Cognitive reorientation, Equipment evaluation/education, Compensatory technique education, Continued evaluation, Energy conservation, Activityengagement          See flowsheet documentation for full assessment, interventions and recommendations.   Outcome: Progressing  Note: Limitation: Decreased ADL status, Decreased UE strength, Decreased Safe judgement during ADL, Decreased endurance, Visual deficit, Decreased self-care trans, Decreased high-level ADLs  Prognosis: Fair  Assessment: .Pt seen for Occupational Therapy session with focus on activity tolerance, bed mob, functional transfers/mob, standing tolerance and balance for pt engagement in UB/LB self-care tasks and energy conservation techniques. Pt cleared by RN/ [place] for pt participated in OT session. Pt presented supine/HOB raised pt awake/alert and agreeable to participate in therapy following pt identifiers confirmed with min encouragement. She did not report a therapy goal this session however pt was pleasant and cooperative with all therapy requests. Pt required assist for functional mob with RW 2* unsteady balance and decreased pt coordination. She required chair set up at bathroom sink 2* pt unsteady standing balance but was able to tolerate engagement in UB/LB self-care tasks. Pt remains appropriate for I (Maximum Resource Intensity). Pt set up to bedside chair post session by her PCA and all needs addressed from an therapist perceptive.     Rehab Resource Intensity Level, OT: I (Maximum Resource Intensity)

## 2024-03-31 NOTE — PROGRESS NOTES
NEUROLOGY RESIDENCY PROGRESS NOTE     Name: Tanika Lira   Age & Sex: 58 y.o. female   MRN: [id number]  Unit/Bed#: [facility] 705-01   Encounter: [id number]    ASSESSMENT & PLAN     Diarrhea  Assessment & Plan  - Patient reports having longstanding diarrhea prior to presentation  - Patient does not meet conditions for C. difficile testing, additionally there is no clinical suspicion for it  - Imodium    Alcohol use  Assessment & Plan  Discuss alcohol reduction.    Continue thiamine 100mg daily    ORSANA (generalized anxiety disorder)  Assessment & Plan  Continue home lexapro.    Essential hypertension  Assessment & Plan  Patient noncompliant on antihypertensives at home.  Patient on amlodipine 10mg daily and lisinopril 20mg daily which are increase from previous home medications.    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 Patient not compliant on her AP agents.    Holding AP agents until 4 weeks from ICH onset and then repeat CT head to see if improving and continue plavix afterward.    * Cerebellar hemorrhage (HCC)  Assessment & Plan  Patient is a 58 year old woman with hx of right hemisphere stroke (possibly right pontine infarct) on aspirin , HTN, HLD presenting for left cerebellar hemorrhage with IVH. Patient's symptoms are of nausea, vomiting, diarrhea, vertigo sensation but no focal numbness, weakness, visual deficits, nor headaches. Also, horizontal diplopia. Patient has history of right sided stroke in 2022 which she was placed on DAPT and to continue plavix after 21 days. Patient states she is on aspirin daily on this visit. Initial blood pressure recorded at [facility] is 222/136. On exam, continued  but improving L>R dysmetria.  Thinners: aspirin reversed with DDAVP  Initial BP: Blood Pressure: 144/78  Presenting exam: Bilateral dysmetric L>R  Vascular risk factors: HTN, HLD, previous stroke    Workup:  CT head wo contrast 3/26/2024: Acute parenchymal hematoma left paramedian cerebellum measures 2.6 x 1.6 cm with mass effect on the fourth ventricle. Intraventricular extension of hemorrhage noted with blood in the fourth ventricle and left foramen of Luschka.   CT head wo contrast 3/27/2024: Redemonstration of hyperdense hemorrhage in the left paramedian cerebellum, as described with mass effect and extension into the fourth ventricle and left foramen of Luschka, similar in appearance to the prior.   CT head wo contrast 3/28: no significant changes from above  Lab Results   Component Value Date    INR 1.11 03/26/2024    HGBA1C 5.2 09/02/2022    SODIUM 136 03/26/2024       Pertinent scores:  - ICH: 2 due to IVH and Infratentorial origin    Impression: Left cerebellar hemorrhage most likely in setting of hypertensive emergency with blood pressures in the 200s systolic 2/2 to poor medication adherence and methamphetamine use now stable.    Plan:  S/P DDAVP X2  Repeat CT head in 4 weeks and if stable, and hemorrhage is resolved, then consider resuming plavix 75mg PO qdaily at that time   Neurochecks  Repeat CTH if > 2 pt drop in GCS in one hour  Goal SBP between 120 and 140, cardene gtt or pressers/fluids as needed to keep within range  PT/OT/Speech/PMR consults when able  Replete lytes as needed as per CC  No chemical ppx due to hemorrhage, SCDs for ppx  Hold AP agents for 4 weeks from 3/26 and then can retake plavix 75mg daily  BG < 180, SSI for coverage        Recommendations for outpatient neurological follow up have yet to be determined.      Disposition pending: Placement    SUBJECTIVE     Patient was seen and examined. No acute events overnight.  Patient reports having diarrhea, patient reports that diarrhea  has been chronic.  Denies lightheadedness, dizziness, syncope, headache, vision changes, diaphoresis, chest pain, palpitations, SOB, nausea, vomiting, abdominal pain or lower extremity edema.    Review of Systems  A 12 point ROS was completed. Other than the above mentioned complaints, all remaining systems were negative.    OBJECTIVE     Patient ID: Tanika Lira is a 58 y.o. female.    Vitals:    24 0600 24 0736 24 1234 24 1532   BP:  161/87 140/76 146/86   Pulse:   63    Resp:    14   Temp:  98.3 °F (36.8 °C)  100 °F (37.8 °C)   TempSrc:       SpO2:   95%    Weight: 61.5 kg (135 lb 10 oz)      Height:          Temperature:   Temp (24hrs), Av.1 °F (37.3 °C), Min:98.3 °F (36.8 °C), Max:100 °F (37.8 °C)    Temperature: 100 °F (37.8 °C)    Physical Exam:  Vitals and nursing note reviewed.   Constitutional: Alert.  Not in acute distress. Not ill-appearing, toxic-appearing or diaphoretic.    HENT: Normocephalic and atraumatic. Nose and Ears normal.    Eyes: No scleral icterus. No discharge.    Neck: Neck Supple. ROM normal.   Cardiovascular: Distal extremities warm without palpable edema or tenderness, no observed significant swelling.    Pulmonary:  Pulmonary effort is normal. Not in respiratory distress   Abdominal: Abdomen is flat and not distended   Musculoskeletal: No swelling or deformity.   Skin: Warm and dry   Psychiatric:  Normal behavior and appropriate affect      Neurological Exam  Mental Status  Awake, alert and oriented to person, place and time. Recent and remote memory are intact. Speech is normal. Language is fluent with no aphasia. Attention and concentration are normal.    Cranial Nerves  CN II: Visual fields full to confrontation.  CN III, IV, VI: Extraocular movements intact bilaterally. Normal lids and orbits bilaterally. Pupils equal round and reactive to light bilaterally.  CN V: Facial sensation is normal.  CN VII: Full and symmetric facial movement.  CN VIII:  Hearing is normal.  CN IX, X: Palate elevates symmetrically  CN XI: Shoulder shrug strength is normal.  CN XII: Tongue midline without atrophy or fasciculations.    Motor  Normal muscle bulk throughout. No fasciculations present. Normal muscle tone. No abnormal involuntary movements. Strength is 5/5 in all four extremities except as noted.    Sensory  Light touch is normal in upper and lower extremities.     Reflexes  Deep tendon reflexes are 2+ and symmetric except as noted.    Coordination    Dysmetria.    Gait    Unable to be assessed due to the patient's current medical status.    LABORATORY DATA     Labs: Additional Pertinent Lab Tests Reviewed: All Labs Within Last 24 Hours Reviewed  Results from last 7 days   Lab Units 03/31/24  1053 03/29/24  0622 03/28/24  0526 03/27/24  0427 03/26/24  0820   WBC Thousand/uL 8.46 7.41 6.85   < > 8.29   HEMOGLOBIN g/dL 14.6 14.7 14.4   < > 15.4   HEMATOCRIT % 44.3 45.5 43.7   < > 47.4*   PLATELETS Thousands/uL 224 189 183   < > 189   NEUTROS PCT %  --  47 56  --  34*   MONOS PCT %  --  13* 12  --  12   EOS PCT %  --  1 0  --  4    < > = values in this interval not displayed.      Results from last 7 days   Lab Units 03/31/24  1053 03/29/24  0622 03/28/24  0526 03/27/24  0427 03/26/24  0820   POTASSIUM mmol/L 3.7 3.8 3.8   < > 3.7   CHLORIDE mmol/L 101 102 99   < > 99   CO2 mmol/L 25 26 26   < > 30   BUN mg/dL 18 17 16   < > 15   CREATININE mg/dL 0.57* 0.57* 0.61   < > 0.67   CALCIUM mg/dL 8.4 8.4 8.8   < > 9.1   ALK PHOS U/L  --  78 79  --  100   ALT U/L  --  65* 75*  --  114*   AST U/L  --  57* 62*  --  113*    < > = values in this interval not displayed.     Results from last 7 days   Lab Units 03/31/24  1053 03/29/24  0622 03/28/24  0526   MAGNESIUM mg/dL 1.7* 1.6* 1.8*     Results from last 7 days   Lab Units 03/31/24  1053 03/29/24  0622 03/28/24  0526   PHOSPHORUS mg/dL 3.0 3.6 3.2      Results from last 7 days   Lab Units 03/26/24  1804 03/26/24  1647   INR   --  1.11    PTT seconds 29  --      Results from last 7 days   Lab Units 03/26/24  0820   LACTIC ACID mmol/L 1.1           IMAGING & DIAGNOSTIC TESTING     Radiology Results: I have personally reviewed pertinent films in PACS    CT head wo contrast   Final Result by Riki Jacob MD (03/28 0354)      No significant interval change with findings detailed above.                  Workstation performed: [id number]         CT head wo contrast   Final Result by Gurwinder Cisneros DO (03/27 0515)      Redemonstration of hyperdense hemorrhage in the left paramedian cerebellum, as described with mass effect and extension into the fourth ventricle and left foramen of Luschka, similar in appearance to the prior.      No hydrocephalus.      Other findings as above. Overall there has been no significant interval change.      Follow-up as clinically warranted.                  Workstation performed: [id number]         CT head wo contrast    (Results Pending)       Other Diagnostic Testing: I have personally reviewed pertinent reports.      ACTIVE MEDICATIONS     Current Facility-Administered Medications   Medication Dose Route Frequency    acetaminophen (TYLENOL) tablet 650 mg  650 mg Oral Q6H PRN    amLODIPine (NORVASC) tablet 10 mg  10 mg Oral Daily    atorvastatin (LIPITOR) tablet 40 mg  40 mg Oral Daily With Dinner    chlorhexidine (PERIDEX) 0.12 % oral rinse 15 mL  15 mL Mouth/Throat Q12H NAOMI    escitalopram (LEXAPRO) tablet 20 mg  20 mg Oral Daily    folic acid (FOLVITE) tablet 1 mg  1 mg Oral Daily    gabapentin (NEURONTIN) capsule 300 mg  300 mg Oral HS    heparin (porcine) subcutaneous injection 5,000 Units  5,000 Units Subcutaneous Q8H NAOMI    hydrALAZINE (APRESOLINE) injection 10 mg  10 mg Intravenous Q4H PRN    labetalol (NORMODYNE) injection 20 mg  20 mg Intravenous Q4H PRN    lisinopril (ZESTRIL) tablet 20 mg  20 mg Oral BID    loperamide (IMODIUM) oral liquid 2 mg  2 mg Oral TID PRN    loratadine (CLARITIN) tablet 10  mg  10 mg Oral Daily    magnesium gluconate (MAGONATE) tablet 500 mg  500 mg Oral BID AC    multivitamin-minerals (CENTRUM) tablet 1 tablet  1 tablet Oral Daily    thiamine tablet 100 mg  100 mg Oral Daily    trimethobenzamide (TIGAN) IM injection 200 mg  200 mg Intramuscular Q6H PRN       Prior to Admission medications    Medication Sig Start Date End Date Taking? Authorizing Provider   aspirin (ECOTRIN LOW STRENGTH) 81 mg EC tablet Take 1 tablet (81 mg total) by mouth daily 10/7/22   Alec Kamara MD   atorvastatin (LIPITOR) 40 mg tablet Take 1 tablet (40 mg total) by mouth daily 6/13/23   Tanika Winter, MILAN   clopidogrel (PLAVIX) 75 mg tablet Take 1 tablet (75 mg total) by mouth daily 6/13/23   Tanika Winter, MILAN   escitalopram (Lexapro) 20 mg tablet Take 1 tablet (20 mg total) by mouth daily 10/19/22   Tanika Winter, MILAN   gabapentin (NEURONTIN) 300 mg capsule Take 1 capsule (300 mg total) by mouth daily at bedtime 6/13/23   Tanika Winter, IMLAN   hydrOXYzine HCL (ATARAX) 50 mg tablet Take 1 tablet (50 mg total) by mouth every 6 (six) hours as needed for itching 6/13/23   Tanika Winter, MILAN   lisinopril (ZESTRIL) 20 mg tablet Take 1 tablet (20 mg total) by mouth daily 6/13/23   Tanika Winter, MILAN   NIFEdipine ER (ADALAT CC) 30 MG 24 hr tablet Take 1 tablet (30 mg total) by mouth daily 6/13/23   Tanika Winter, MILAN       VTE Pharmacologic Prophylaxis: Heparin   VTE Mechanical Prophylaxis: sequential compression device    ==  Johnie Cee DO   [facility] Neurology Residency, PGY-IV

## 2024-03-31 NOTE — PHYSICAL THERAPY NOTE
PHYSICAL THERAPY NOTE          Patient Name: Tanika Lira  Today's Date: 3/31/2024       03/31/24 1106   PT Last Visit   PT Visit Date 03/31/24   Note Type   Note Type Treatment   Pain Assessment   Pain Assessment Tool 0-10   Pain Score 4   Pain Location/Orientation Location: Head   Restrictions/Precautions   Weight Bearing Precautions Per Order No   Other Precautions Cognitive;Chair Alarm;Bed Alarm;Multiple lines;Fall Risk;Visual impairment   General   Chart Reviewed Yes   Additional Pertinent History impaired horizontal gaze stability-initiated VORx1 HEP   Family/Caregiver Present No   Cognition   Overall Cognitive Status WFL   Arousal/Participation Cooperative   Attention Attends with cues to redirect   Orientation Level Oriented X4   Following Commands Follows multistep commands with increased time or repetition   Comments very pleasant to work with   Subjective   Subjective pt reports headache at rest and dizziness with mobility-vitals stable   Bed Mobility   Supine to Sit 5  Supervision   Additional items HOB elevated;Increased time required;Verbal cues   Sit to Supine Unable to assess   Transfers   Sit to Stand 4  Minimal assistance   Additional items Assist x 1;Increased time required;Verbal cues   Stand to Sit 4  Minimal assistance   Additional items Assist x 1;Increased time required;Verbal cues   Stand pivot 4  Minimal assistance   Additional items Assist x 1;Increased time required;Verbal cues   Additional Comments c RW   Ambulation/Elevation   Gait pattern Improper Weight shift;Decreased foot clearance;Ataxia;Excessively slow;Shuffling  (L Lean, LLE ataxia)   Gait Assistance 3  Moderate assist   Additional items Assist x 1;Verbal cues   Assistive Device Rolling walker   Distance 30'x2   Balance   Static Sitting Fair -   Dynamic Sitting Poor +   Static Standing Poor +   Dynamic Standing Poor +   Ambulatory Poor    Endurance Deficit   Endurance Deficit Yes   Endurance Deficit Description fatigue, weakness, dizziness, pain   Activity Tolerance   Activity Tolerance Patient limited by fatigue;Patient limited by pain   Nurse Made Aware yes   Exercises   Neuro re-ed lateral walking at HR with b/l UE support 12'x2 L and R reach, at R HR with LUE support and modAx1-fwd marching/bwd walking 12'x2 each   Balance training  sitting EOB x5-8 min with S   Assessment   Prognosis Good   Problem List Decreased endurance;Impaired balance;Decreased coordination;Decreased mobility;Decreased cognition;Impaired judgement;Decreased safety awareness;Impaired vision   Assessment Pt agreeable to participate in PT session. Pt performed functional mobility and therex as outlined above. L lean and large LOB requiring modAx1 to correct. Initially shuffling gait with decreased L foot clearance however improved with VC. LLE remains ataxic. Impaired gaze stability with VOR testing-initiated HEP for same. Pt left seated in chair with chair alarm, call bell, phone, and all personal needs within reach. Pt will continue to benefit from skilled acute care PT to further address their functional mobility limitations.   Barriers to Discharge Inaccessible home environment;Decreased caregiver support   Goals   Patient Goals to improve   STG Expiration Date 04/10/24   PT Treatment Day 2   Plan   Treatment/Interventions LE strengthening/ROM;Functional transfer training;Elevations;Therapeutic exercise;Endurance training;Patient/family training;Cognitive reorientation;Equipment eval/education;Bed mobility;Gait training;Spoke to nursing;Spoke to case management;OT   Progress Progressing toward goals   PT Frequency 3-5x/wk   Discharge Recommendation   Rehab Resource Intensity Level, PT I (Maximum Resource Intensity)   Equipment Recommended Walker   Walker Package Recommended Wheeled walker   AM-PAC Basic Mobility Inpatient   Turning in Flat Bed Without Bedrails 3   Lying  on Back to Sitting on Edge of Flat Bed Without Bedrails 3   Moving Bed to Chair 3   Standing Up From Chair Using Arms 3   Walk in Room 2   Climb 3-5 Stairs With Railing 1   Basic Mobility Inpatient Raw Score 15   Basic Mobility Standardized Score 36.97   [facility] Highest Level Of Mobility   -[facility] [place] Goal 4: Move to chair/commode   -HLM Achieved 7: Walk 25 feet or more   Kit Munoz, PT, DPT

## 2024-03-31 NOTE — PLAN OF CARE
Problem: PHYSICAL THERAPY ADULT  Goal: Performs mobility at highest level of function for planned discharge setting.  See evaluation for individualized goals.  Description: Treatment/Interventions: Functional transfer training, LE strengthening/ROM, Elevations, Therapeutic exercise, Endurance training, Patient/family training, Equipment eval/education, Bed mobility, Compensatory technique education, Gait training, Spoke to nursing, OT, Spoke to case management, Spoke to MD          See flowsheet documentation for full assessment, interventions and recommendations.  Outcome: Progressing  Note: Prognosis: Good  Problem List: Decreased endurance, Impaired balance, Decreased coordination, Decreased mobility, Decreased cognition, Impaired judgement, Decreased safety awareness, Impaired vision  Assessment: Pt agreeable to participate in PT session. Pt performed functional mobility and therex as outlined above. L lean and large LOB requiring modAx1 to correct. Initially shuffling gait with decreased L foot clearance however improved with VC. LLE remains ataxic. Impaired gaze stability with VOR testing-initiated HEP for same. Pt left seated in chair with chair alarm, call bell, phone, and all personal needs within reach. Pt will continue to benefit from skilled acute care PT to further address their functional mobility limitations.  Barriers to Discharge: Inaccessible home environment, Decreased caregiver support     Rehab Resource Intensity Level, PT: I (Maximum Resource Intensity)    See flowsheet documentation for full assessment.

## 2024-03-31 NOTE — CASE MANAGEMENT
Case Management Progress Note    Patient name Tanika Lira  Location [facility] 705/[facility] 705-01 MRN [id number]  : 1965 Date 3/31/2024       LOS (days): 5  Geometric Mean LOS (GMLOS) (days):   Days to GMLOS:        OBJECTIVE:        Current admission status: Inpatient  Preferred Pharmacy:   [id number] - [place] - [address]  [address]  [place] [address]  Phone: [phone] Fax: [phone]    Primary Care Provider: MILAN Miller    Primary Insurance: [employer]  Secondary Insurance:     PROGRESS NOTE: Patient pending medical clearance. Updated OT note needed to start insurance auth. She is accepted to [facility].

## 2024-03-31 NOTE — OCCUPATIONAL THERAPY NOTE
Occupational Therapy Progress Note     Patient Name: Tanika Lira  Today's Date: 3/31/2024  Problem List  Principal Problem:    Cerebellar hemorrhage (HCC)  Active Problems:    History of CVA (cerebrovascular accident)    Tobacco abuse    Essential hypertension    ROSANA (generalized anxiety disorder)    Alcohol use           Occupational Therapy Treatment Note     03/31/24 1528   OT Last Visit   OT Visit Date 03/31/24   Note Type   Note Type Treatment for insurance authorization   Pain Assessment   Pain Assessment Tool 0-10   Pain Score No Pain   Restrictions/Precautions   Weight Bearing Precautions Per Order No   Other Precautions Cognitive;Chair Alarm;Bed Alarm;Fall Risk   Lifestyle   Autonomy I w/ ADLS/IADLS, transfers and functional mobility PTA; (-)    Reciprocal Relationships Pt lives w/ her 2 sons; pt is alone during the day   Service to Others Unemployed   Intrinsic Gratification Cleaning   ADL   Where Assessed Sitting at sink   Grooming Assistance 5  Supervision/Setup   Grooming Deficit Setup;Wash/dry hands;Wash/dry face   UB Bathing Assistance 4  Minimal Assistance   LB Bathing Assistance 4  Minimal Assistance   UB Dressing Assistance 5  Supervision/Setup   UB Dressing Deficit Setup   LB Dressing Assistance 4  Minimal Assistance   Toileting Assistance  3  Moderate Assistance   Toileting Deficit Perineal hygiene;Clothing management down;Clothing management up;Supervison/safety   Bed Mobility   Supine to Sit 5  Supervision   Additional items Assist x 1   Transfers   Sit to Stand 4  Minimal assistance   Additional items Assist x 1   Stand to Sit 4  Minimal assistance   Additional items Assist x 1   Toilet transfer 4  Minimal assistance   Additional items Assist x 1   Functional Mobility   Functional Mobility 5  Supervision   Additional items Rolling walker   Toilet Transfers   Toilet Transfer From Rolling walker   Toilet Transfer Type To and from   Toilet Transfer to Standard bedside commode   Toilet  "Transfer Technique Stand pivot   Toilet Transfers Minimal assistance   Subjective   Subjective pt stated \"can you come back later?\"   Cognition   Overall Cognitive Status WFL   Arousal/Participation Cooperative   Attention Attends with cues to redirect   Orientation Level Oriented X4   Memory Decreased recall of precautions   Following Commands Follows multistep commands with increased time or repetition   Activity Tolerance   Activity Tolerance Patient tolerated treatment well   Assessment   Assessment .Pt seen for Occupational Therapy session with focus on activity tolerance, bed mob, functional transfers/mob, standing tolerance and balance for pt engagement in UB/LB self-care tasks and energy conservation techniques. Pt cleared by RN/ [place] for pt participated in OT session. Pt presented supine/HOB raised pt awake/alert and agreeable to participate in therapy following pt identifiers confirmed with min encouragement. She did not report a therapy goal this session however pt was pleasant and cooperative with all therapy requests. Pt required assist for functional mob with RW 2* unsteady balance and decreased pt coordination. She required chair set up at bathroom sink 2* pt unsteady standing balance but was able to tolerate engagement in UB/LB self-care tasks. Pt remains appropriate for I (Maximum Resource Intensity). Pt set up to bedside chair post session by her PCA and all needs addressed from an therapist perceptive.   Plan   Treatment Interventions ADL retraining   Goal Expiration Date 04/10/24   OT Treatment Day 2   OT Frequency 3-5x/wk   Discharge Recommendation   Rehab Resource Intensity Level, OT I (Maximum Resource Intensity)   AM-PAC Daily Activity Inpatient   Lower Body Dressing 3   Bathing 3   Toileting 2   Upper Body Dressing 3   Grooming 3   Eating 4   Daily Activity Raw Score 18   Daily Activity Standardized Score (Calc for Raw Score >=11) 38.66   AM-PAC Applied Cognition Inpatient   Following a " Speech/Presentation 3   Understanding Ordinary Conversation 4   Taking Medications 3   Remembering Where Things Are Placed or Put Away 3   Remembering List of 4-5 Errands 3   Taking Care of Complicated Tasks 3   Applied Cognition Raw Score 19   Applied Cognition Standardized Score 39.77   Barthel Index   Feeding 10   Bathing 0   Grooming Score 0   Dressing Score 5   Bladder Score 5   Bowels Score 5   Toilet Use Score 5   Transfers (Bed/Chair) Score 5   Mobility (Level Surface) Score 0   Stairs Score 0   Barthel Index Score 35       Rossy NGUYEN/JIMMY

## 2024-03-31 NOTE — PLAN OF CARE
Problem: Prexisting or High Potential for Compromised Skin Integrity  Goal: Skin integrity is maintained or improved  Description: INTERVENTIONS:  - Identify patients at risk for skin breakdown  - Assess and monitor skin integrity  - Assess and monitor nutrition and hydration status  - Monitor labs   - Assess for incontinence   - Turn and reposition patient  - Assist with mobility/ambulation  - Relieve pressure over bony prominences  - Avoid friction and shearing  - Provide appropriate hygiene as needed including keeping skin clean and dry  - Evaluate need for skin moisturizer/barrier cream  - Collaborate with interdisciplinary team   - Patient/family teaching  - Consider wound care consult   Outcome: Progressing     Problem: Neurological Deficit  Goal: Neurological status is stable or improving  Description: Interventions:  - Monitor and assess patient's level of consciousness, motor function, sensory function, and level of assistance needed for ADLs.   - Monitor and report changes from baseline. Collaborate with interdisciplinary team to initiate plan and implement interventions as ordered.   - Provide and maintain a safe environment.  - Consider seizure precautions.  - Consider fall precautions.  - Consider aspiration precautions.  - Consider bleeding precautions.  Outcome: Progressing     Problem: Activity Intolerance/Impaired Mobility  Goal: Mobility/activity is maintained at optimum level for patient  Description: Interventions:  - Assess and monitor patient  barriers to mobility and need for assistive/adaptive devices.  - Assess patient's emotional response to limitations.  - Collaborate with interdisciplinary team and initiate plans and interventions as ordered.  - Encourage independent activity per ability.  - Maintain proper body alignment.  - Perform active/passive rom as tolerated/ordered.  - Plan activities to conserve energy.  - Turn patient as appropriate  Outcome: Progressing     Problem:  Communication Impairment  Goal: Ability to express needs and understand communication  Description: Assess patient's communication skills and ability to understand information.  Patient will demonstrate use of effective communication techniques, alternative methods of communication and understanding even if not able to speak.     - Encourage communication and provide alternate methods of communication as needed.  - Collaborate with case management/ for discharge needs.  - Include patient/family/caregiver in decisions related to communication.  Outcome: Progressing     Problem: Potential for Aspiration  Goal: Non-ventilated patient's risk of aspiration is minimized  Description: Assess and monitor vital signs, respiratory status, and labs (WBC).  Monitor for signs of aspiration (tachypnea, cough, rales, wheezing, cyanosis, fever).    - Assess and monitor patient's ability to swallow.  - Place patient up in chair to eat if possible.  - HOB up at 90 degrees to eat if unable to get patient up into chair.  - Supervise patient during oral intake.   - Instruct patient/ family to take small bites.  - Instruct patient/ family to take small single sips when taking liquids.  - Follow patient-specific strategies generated by speech pathologist.  Outcome: Progressing     Problem: Nutrition  Goal: Nutrition/Hydration status is improving  Description: Monitor and assess patient's nutrition/hydration status for malnutrition (ex- brittle hair, bruises, dry skin, pale skin and conjunctiva, muscle wasting, smooth red tongue, and disorientation). Collaborate with interdisciplinary team and initiate plan and interventions as ordered.  Monitor patient's weight and dietary intake as ordered or per policy. Utilize nutrition screening tool and intervene per policy. Determine patient's food preferences and provide high-protein, high-caloric foods as appropriate.     - Assist patient with eating.  - Allow adequate time for  meals.  - Encourage patient to take dietary supplement as ordered.  - Collaborate with clinical nutritionist.  - Include patient/family/caregiver in decisions related to nutrition.  Outcome: Progressing     Problem: Nutrition/Hydration-ADULT  Goal: Nutrient/Hydration intake appropriate for improving, restoring or maintaining nutritional needs  Description: Monitor and assess patient's nutrition/hydration status for malnutrition. Collaborate with interdisciplinary team and initiate plan and interventions as ordered.  Monitor patient's weight and dietary intake as ordered or per policy. Utilize nutrition screening tool and intervene as necessary. Determine patient's food preferences and provide high-protein, high-caloric foods as appropriate.     INTERVENTIONS:  - Monitor oral intake, urinary output, labs, and treatment plans  - Assess nutrition and hydration status and recommend course of action  - Evaluate amount of meals eaten  - Assist patient with eating if necessary   - Allow adequate time for meals  - Recommend/ encourage appropriate diets, oral nutritional supplements, and vitamin/mineral supplements  - Order, calculate, and assess calorie counts as needed  - Recommend, monitor, and adjust tube feedings and TPN/PPN based on assessed needs  - Assess need for intravenous fluids  - Provide specific nutrition/hydration education as appropriate  - Include patient/family/caregiver in decisions related to nutrition  Outcome: Progressing     Problem: COPING  Goal: Pt/Family able to verbalize concerns and demonstrate effective coping strategies  Description: INTERVENTIONS:  - Assist patient/family to identify coping skills, available support systems and cultural and spiritual values  - Provide emotional support, including active listening and acknowledgement of concerns of patient and caregivers  - Reduce environmental stimuli, as able  - Provide patient education  - Assess for spiritual pain/suffering and initiate  spiritual care, including notification of Pastoral Care or [facility] based community as needed  - Assess effectiveness of coping strategies  Outcome: Progressing  Goal: Will report anxiety at manageable levels  Description: INTERVENTIONS:  - Administer medication as ordered  - Teach and encourage coping skills  - Provide emotional support  - Assess patient/family for anxiety and ability to cope  Outcome: Progressing     Problem: Potential for Falls  Goal: Patient will remain free of falls  Description: INTERVENTIONS:  - Educate patient/family on patient safety including physical limitations  - Instruct patient to call for assistance with activity   - Consult OT/PT to assist with strengthening/mobility   - Keep Call bell within reach  - Keep bed low and locked with side rails adjusted as appropriate  - Keep care items and personal belongings within reach  - Initiate and maintain comfort rounds  - Make Fall Risk Sign visible to staff  - Apply yellow socks and bracelet for high fall risk patients  - Consider moving patient to room near nurses station  Outcome: Progressing     Problem: PAIN - ADULT  Goal: Verbalizes/displays adequate comfort level or baseline comfort level  Description: Interventions:  - Encourage patient to monitor pain and request assistance  - Assess pain using appropriate pain scale  - Administer analgesics based on type and severity of pain and evaluate response  - Implement non-pharmacological measures as appropriate and evaluate response  - Consider cultural and social influences on pain and pain management  - Notify physician/advanced practitioner if interventions unsuccessful or patient reports new pain  Outcome: Progressing     Problem: INFECTION - ADULT  Goal: Absence or prevention of progression during hospitalization  Description: INTERVENTIONS:  - Assess and monitor for signs and symptoms of infection  - Monitor lab/diagnostic results  - Monitor all insertion sites, i.e. indwelling lines,  tubes, and drains  - Monitor endotracheal if appropriate and nasal secretions for changes in amount and color  - [place] appropriate cooling/warming therapies per order  - Administer medications as ordered  - Instruct and encourage patient and family to use good hand hygiene technique  - Identify and instruct in appropriate isolation precautions for identified infection/condition  Outcome: Progressing     Problem: SAFETY ADULT  Goal: Patient will remain free of falls  Description: INTERVENTIONS:  - Educate patient/family on patient safety including physical limitations  - Instruct patient to call for assistance with activity   - Consult OT/PT to assist with strengthening/mobility   - Keep Call bell within reach  - Keep bed low and locked with side rails adjusted as appropriate  - Keep care items and personal belongings within reach  - Initiate and maintain comfort rounds  - Make Fall Risk Sign visible to staff  - Apply yellow socks and bracelet for high fall risk patients  - Consider moving patient to room near nurses station  Outcome: Progressing  Goal: Maintain or return to baseline ADL function  Description: INTERVENTIONS:  -  Assess patient's ability to carry out ADLs; assess patient's baseline for ADL function and identify physical deficits which impact ability to perform ADLs (bathing, care of mouth/teeth, toileting, grooming, dressing, etc.)  - Assess/evaluate cause of self-care deficits   - Assess range of motion  - Assess patient's mobility; develop plan if impaired  - Assess patient's need for assistive devices and provide as appropriate  - Encourage maximum independence but intervene and supervise when necessary  - Involve family in performance of ADLs  - Assess for home care needs following discharge   - Consider OT consult to assist with ADL evaluation and planning for discharge  - Provide patient education as appropriate  Outcome: Progressing  Goal: Maintains/Returns to pre admission functional  level  Description: INTERVENTIONS:  - Perform AM-PAC 6 Click Basic Mobility/ Daily Activity assessment daily.  - Set and communicate daily mobility goal to care team and patient/family/caregiver.   - Collaborate with rehabilitation services on mobility goals if consulted  - Out of bed for toileting  - Record patient progress and toleration of activity level   Outcome: Progressing     Problem: DISCHARGE PLANNING  Goal: Discharge to home or other facility with appropriate resources  Description: INTERVENTIONS:  - Identify barriers to discharge w/patient and caregiver  - Arrange for needed discharge resources and transportation as appropriate  - Identify discharge learning needs (meds, wound care, etc.)  - Arrange for interpretive services to assist at discharge as needed  - Refer to Case Management Department for coordinating discharge planning if the patient needs post-hospital services based on physician/advanced practitioner order or complex needs related to functional status, cognitive ability, or social support system  Outcome: Progressing     Problem: Knowledge Deficit  Goal: Patient/family/caregiver demonstrates understanding of disease process, treatment plan, medications, and discharge instructions  Description: Complete learning assessment and assess knowledge base.  Interventions:  - Provide teaching at level of understanding  - Provide teaching via preferred learning methods  Outcome: Progressing     Problem: NEUROSENSORY - ADULT  Goal: Achieves stable or improved neurological status  Description: INTERVENTIONS  - Monitor and report changes in neurological status  - Monitor vital signs such as temperature, blood pressure, glucose, and any other labs ordered   - Initiate measures to prevent increased intracranial pressure  - Monitor for seizure activity and implement precautions if appropriate      Outcome: Progressing  Goal: Remains free of injury related to seizures activity  Description: INTERVENTIONS  -  Maintain airway, patient safety  and administer oxygen as ordered  - Monitor patient for seizure activity, document and report duration and description of seizure to physician/advanced practitioner  - If seizure occurs,  ensure patient safety during seizure  - Reorient patient post seizure  - Seizure pads on all 4 side rails  - Instruct patient/family to notify RN of any seizure activity including if an aura is experienced  - Instruct patient/family to call for assistance with activity based on nursing assessment  - Administer anti-seizure medications if ordered    Outcome: Progressing  Goal: Achieves maximal functionality and self care  Description: INTERVENTIONS  - Monitor swallowing and airway patency with patient fatigue and changes in neurological status  - Encourage and assist patient to increase activity and self care.   - Encourage visually impaired, hearing impaired and aphasic patients to use assistive/communication devices  Outcome: Progressing     Problem: CARDIOVASCULAR - ADULT  Goal: Maintains optimal cardiac output and hemodynamic stability  Description: INTERVENTIONS:  - Monitor I/O, vital signs and rhythm  - Monitor for S/S and trends of decreased cardiac output  - Administer and titrate ordered vasoactive medications to optimize hemodynamic stability  - Assess quality of pulses, skin color and temperature  - Assess for signs of decreased coronary artery perfusion  - Instruct patient to report change in severity of symptoms  Outcome: Progressing  Goal: Absence of cardiac dysrhythmias or at baseline rhythm  Description: INTERVENTIONS:  - Continuous cardiac monitoring, vital signs, obtain 12 lead EKG if ordered  - Administer antiarrhythmic and heart rate control medications as ordered  - Monitor electrolytes and administer replacement therapy as ordered  Outcome: Progressing     Problem: RESPIRATORY - ADULT  Goal: Achieves optimal ventilation and oxygenation  Description: INTERVENTIONS:  - Assess for  changes in respiratory status  - Assess for changes in mentation and behavior  - Position to facilitate oxygenation and minimize respiratory effort  - Oxygen administered by appropriate delivery if ordered  - Initiate smoking cessation education as indicated  - Encourage broncho-pulmonary hygiene including cough, deep breathe, Incentive Spirometry  - Assess the need for suctioning and aspirate as needed  - Assess and instruct to report SOB or any respiratory difficulty  - Respiratory Therapy support as indicated  Outcome: Progressing     Problem: GASTROINTESTINAL - ADULT  Goal: Minimal or absence of nausea and/or vomiting  Description: INTERVENTIONS:  - Administer IV fluids if ordered to ensure adequate hydration  - Maintain NPO status until nausea and vomiting are resolved  - Nasogastric tube if ordered  - Administer ordered antiemetic medications as needed  - Provide nonpharmacologic comfort measures as appropriate  - Advance diet as tolerated, if ordered  - Consider nutrition services referral to assist patient with adequate nutrition and appropriate food choices  Outcome: Progressing  Goal: Maintains or returns to baseline bowel function  Description: INTERVENTIONS:  - Assess bowel function  - Encourage oral fluids to ensure adequate hydration  - Administer IV fluids if ordered to ensure adequate hydration  - Administer ordered medications as needed  - Encourage mobilization and activity  - Consider nutritional services referral to assist patient with adequate nutrition and appropriate food choices  Outcome: Progressing  Goal: Maintains adequate nutritional intake  Description: INTERVENTIONS:  - Monitor percentage of each meal consumed  - Identify factors contributing to decreased intake, treat as appropriate  - Assist with meals as needed  - Monitor I&O, weight, and lab values if indicated  - Obtain nutrition services referral as needed  Outcome: Progressing     Problem: GENITOURINARY - ADULT  Goal: Maintains  or returns to baseline urinary function  Description: INTERVENTIONS:  - Assess urinary function  - Encourage oral fluids to ensure adequate hydration if ordered  - Administer IV fluids as ordered to ensure adequate hydration  - Administer ordered medications as needed  - Offer frequent toileting  - Follow urinary retention protocol if ordered  Outcome: Progressing  Goal: Absence of urinary retention  Description: INTERVENTIONS:  - Assess patient’s ability to void and empty bladder  - Monitor I/O  - Bladder scan as needed  - Discuss with physician/AP medications to alleviate retention as needed  - Discuss catheterization for long term situations as appropriate  Outcome: Progressing     Problem: METABOLIC, FLUID AND ELECTROLYTES - ADULT  Goal: Electrolytes maintained within normal limits  Description: INTERVENTIONS:  - Monitor labs and assess patient for signs and symptoms of electrolyte imbalances  - Administer electrolyte replacement as ordered  - Monitor response to electrolyte replacements, including repeat lab results as appropriate  - Instruct patient on fluid and nutrition as appropriate  Outcome: Progressing  Goal: Fluid balance maintained  Description: INTERVENTIONS:  - Monitor labs   - Monitor I/O and WT  - Instruct patient on fluid and nutrition as appropriate  - Assess for signs & symptoms of volume excess or deficit  Outcome: Progressing     Problem: SKIN/TISSUE INTEGRITY - ADULT  Goal: Skin Integrity remains intact(Skin Breakdown Prevention)  Description: Assess:    Bed Management:  -Have minimal linens on bed & keep smooth, unwrinkled  -Change linens as needed when moist or perspiring    Toileting:  -Offer bedside commode    Activity:  -Skin Care:  -Avoid use of baby powder, tape, friction and shearing, hot water or constrictive clothing  -Relieve pressure over bony prominences using pillow  -Do not massage red bony areas    Next Steps:  -Teach patient strategies to minimize risks such as falls    -Consider consults to  interdisciplinary teams such as PT  Outcome: Progressing     Problem: HEMATOLOGIC - ADULT  Goal: Maintains hematologic stability  Description: INTERVENTIONS  - Assess for signs and symptoms of bleeding or hemorrhage  - Monitor labs  - Administer supportive blood products/factors as ordered and appropriate  Outcome: Progressing     Problem: MUSCULOSKELETAL - ADULT  Goal: Maintain or return mobility to safest level of function  Description: INTERVENTIONS:  - Assess patient's ability to carry out ADLs; assess patient's baseline for ADL function and identify physical deficits which impact ability to perform ADLs (bathing, care of mouth/teeth, toileting, grooming, dressing, etc.)  - Assess/evaluate cause of self-care deficits   - Assess range of motion  - Assess patient's mobility  - Assess patient's need for assistive devices and provide as appropriate  - Encourage maximum independence but intervene and supervise when necessary  - Involve family in performance of ADLs  - Assess for home care needs following discharge   - Consider OT consult to assist with ADL evaluation and planning for discharge  - Provide patient education as appropriate  Outcome: Progressing

## 2024-03-31 NOTE — PLAN OF CARE
Problem: Nutrition  Goal: Nutrition/Hydration status is improving  Description: Monitor and assess patient's nutrition/hydration status for malnutrition (ex- brittle hair, bruises, dry skin, pale skin and conjunctiva, muscle wasting, smooth red tongue, and disorientation). Collaborate with interdisciplinary team and initiate plan and interventions as ordered.  Monitor patient's weight and dietary intake as ordered or per policy. Utilize nutrition screening tool and intervene per policy. Determine patient's food preferences and provide high-protein, high-caloric foods as appropriate.     - Assist patient with eating.  - Allow adequate time for meals.  - Encourage patient to take dietary supplement as ordered.  - Collaborate with clinical nutritionist.  - Include patient/family/caregiver in decisions related to nutrition.  Outcome: Progressing     Problem: Nutrition/Hydration-ADULT  Goal: Nutrient/Hydration intake appropriate for improving, restoring or maintaining nutritional needs  Description: Monitor and assess patient's nutrition/hydration status for malnutrition. Collaborate with interdisciplinary team and initiate plan and interventions as ordered.  Monitor patient's weight and dietary intake as ordered or per policy. Utilize nutrition screening tool and intervene as necessary. Determine patient's food preferences and provide high-protein, high-caloric foods as appropriate.     INTERVENTIONS:  - Monitor oral intake, urinary output, labs, and treatment plans  - Assess nutrition and hydration status and recommend course of action  - Evaluate amount of meals eaten  - Assist patient with eating if necessary   - Allow adequate time for meals  - Recommend/ encourage appropriate diets, oral nutritional supplements, and vitamin/mineral supplements  - Order, calculate, and assess calorie counts as needed  - Recommend, monitor, and adjust tube feedings and TPN/PPN based on assessed needs  - Assess need for intravenous  fluids  - Provide specific nutrition/hydration education as appropriate  - Include patient/family/caregiver in decisions related to nutrition  Outcome: Progressing     Problem: Knowledge Deficit  Goal: Patient/family/caregiver demonstrates understanding of disease process, treatment plan, medications, and discharge instructions  Description: Complete learning assessment and assess knowledge base.  Interventions:  - Provide teaching at level of understanding  - Provide teaching via preferred learning methods  Outcome: Progressing

## 2024-03-31 NOTE — ASSESSMENT & PLAN NOTE
- Patient reports having longstanding diarrhea prior to presentation  - Patient does not meet conditions for C. difficile testing, additionally there is no clinical suspicion for it  - Imodium PRN continue for now  - Medicine consulted  - Improved

## 2024-04-01 PROCEDURE — 99232 SBSQ HOSP IP/OBS MODERATE 35: CPT | Performed by: PSYCHIATRY & NEUROLOGY

## 2024-04-01 PROCEDURE — 99252 IP/OBS CONSLTJ NEW/EST SF 35: CPT | Performed by: INTERNAL MEDICINE

## 2024-04-01 RX ORDER — CARVEDILOL 3.12 MG/1
3.12 TABLET ORAL ONCE
Status: COMPLETED | OUTPATIENT
Start: 2024-04-01 | End: 2024-04-01

## 2024-04-01 RX ORDER — LIDOCAINE 50 MG/G
1 PATCH TOPICAL DAILY PRN
Status: DISCONTINUED | OUTPATIENT
Start: 2024-04-01 | End: 2024-04-03 | Stop reason: HOSPADM

## 2024-04-01 RX ORDER — LISINOPRIL 20 MG/1
40 TABLET ORAL DAILY
Status: DISCONTINUED | OUTPATIENT
Start: 2024-04-01 | End: 2024-04-03 | Stop reason: HOSPADM

## 2024-04-01 RX ORDER — HYDRALAZINE HYDROCHLORIDE 20 MG/ML
10 INJECTION INTRAMUSCULAR; INTRAVENOUS EVERY 4 HOURS PRN
Status: DISCONTINUED | OUTPATIENT
Start: 2024-04-01 | End: 2024-04-03 | Stop reason: HOSPADM

## 2024-04-01 RX ORDER — LABETALOL HYDROCHLORIDE 5 MG/ML
10 INJECTION, SOLUTION INTRAVENOUS EVERY 4 HOURS PRN
Status: DISCONTINUED | OUTPATIENT
Start: 2024-04-01 | End: 2024-04-01

## 2024-04-01 RX ORDER — LABETALOL HYDROCHLORIDE 5 MG/ML
10 INJECTION, SOLUTION INTRAVENOUS EVERY 4 HOURS PRN
Status: DISCONTINUED | OUTPATIENT
Start: 2024-04-01 | End: 2024-04-03 | Stop reason: HOSPADM

## 2024-04-01 RX ADMIN — CARVEDILOL 3.12 MG: 3.12 TABLET, FILM COATED ORAL at 02:16

## 2024-04-01 RX ADMIN — FOLIC ACID 1 MG: 1 TABLET ORAL at 08:00

## 2024-04-01 RX ADMIN — AMLODIPINE BESYLATE 10 MG: 10 TABLET ORAL at 08:00

## 2024-04-01 RX ADMIN — Medication 500 MG: at 16:11

## 2024-04-01 RX ADMIN — LORATADINE 10 MG: 10 TABLET ORAL at 08:00

## 2024-04-01 RX ADMIN — LIDOCAINE 1 PATCH: 50 PATCH CUTANEOUS at 15:44

## 2024-04-01 RX ADMIN — RIMEGEPANT SULFATE 75 MG: 75 TABLET, ORALLY DISINTEGRATING ORAL at 15:10

## 2024-04-01 RX ADMIN — ATORVASTATIN CALCIUM 40 MG: 40 TABLET, FILM COATED ORAL at 15:44

## 2024-04-01 RX ADMIN — LISINOPRIL 40 MG: 20 TABLET ORAL at 08:00

## 2024-04-01 RX ADMIN — GABAPENTIN 300 MG: 300 CAPSULE ORAL at 21:52

## 2024-04-01 RX ADMIN — HEPARIN SODIUM 5000 UNITS: 5000 INJECTION INTRAVENOUS; SUBCUTANEOUS at 14:14

## 2024-04-01 RX ADMIN — HEPARIN SODIUM 5000 UNITS: 5000 INJECTION INTRAVENOUS; SUBCUTANEOUS at 21:52

## 2024-04-01 RX ADMIN — CHLORHEXIDINE GLUCONATE 15 ML: 1.2 SOLUTION ORAL at 08:00

## 2024-04-01 RX ADMIN — ESCITALOPRAM OXALATE 20 MG: 20 TABLET ORAL at 08:00

## 2024-04-01 RX ADMIN — HEPARIN SODIUM 5000 UNITS: 5000 INJECTION INTRAVENOUS; SUBCUTANEOUS at 06:11

## 2024-04-01 RX ADMIN — LABETALOL HYDROCHLORIDE 20 MG: 5 INJECTION, SOLUTION INTRAVENOUS at 01:25

## 2024-04-01 RX ADMIN — Medication 500 MG: at 06:11

## 2024-04-01 RX ADMIN — THIAMINE HCL TAB 100 MG 100 MG: 100 TAB at 08:00

## 2024-04-01 RX ADMIN — CHLORHEXIDINE GLUCONATE 15 ML: 1.2 SOLUTION ORAL at 21:52

## 2024-04-01 RX ADMIN — Medication 1 TABLET: at 08:00

## 2024-04-01 RX ADMIN — ACETAMINOPHEN 650 MG: 325 TABLET, FILM COATED ORAL at 02:16

## 2024-04-01 NOTE — RESTORATIVE TECHNICIAN NOTE
Restorative Technician Note      Patient Name: Tanika Lira     Note Type: Mobility  Patient Position Upon Consult: Bedside chair  Activity Performed: Ambulated; Dangled; Stood  Assistive Device: Roller walker  Education Provided: Yes  Patient Position at End of Consult: Bedside chair; All needs within reach; Bed/Chair alarm activated    Ramandeep SRINIVASAN, Restorative Technician,

## 2024-04-01 NOTE — [facility] ADMISSION
Reviewed updates with [facility] physician - patient remains acute rehab appropriate pending continued BP stability, insurance authorization and bed availability.    CM to request insurance authorization for inpatient acute rehab be initiated by [facility], and we await their determination at this time. Will continue to follow patient's case at this time.

## 2024-04-01 NOTE — PROGRESS NOTES
NEUROLOGY RESIDENCY PROGRESS NOTE     Name: Tanika Lira   Age & Sex: 58 y.o. female   MRN: [id number]  Unit/Bed#: [facility] 705-01   Encounter: [id number]    Tanika Lira will need follow up in in 6 weeks with neurovascular Attending (Dr. Martinez) . Recommend repeat CT head in 4/11 before clearing for retaking plavix.     Pending for discharge: HTN control    ASSESSMENT & PLAN     * Cerebellar hemorrhage (HCC)  Assessment & Plan  Patient is a 58 year old woman with hx of right hemisphere stroke (possibly right pontine infarct) on aspirin , HTN, HLD presenting for left cerebellar hemorrhage with IVH. Patient's symptoms are of nausea, vomiting, diarrhea, vertigo sensation but no focal numbness, weakness, visual deficits, nor headaches. Also, horizontal diplopia. Patient has history of right sided stroke in 2022 which she was placed on DAPT and to continue plavix after 21 days. Patient states she is on aspirin daily on this visit. Initial blood pressure recorded at [facility] is 222/136. On exam, continued but improving L>R dysmetria.  Thinners: aspirin reversed with DDAVP  Initial BP: Blood Pressure: 144/78  Presenting exam: Bilateral dysmetric L>R  Vascular risk factors: HTN, HLD, previous stroke    Workup:  CT head wo contrast 3/26/2024: Acute parenchymal hematoma left paramedian cerebellum measures 2.6 x 1.6 cm with mass effect on the fourth ventricle. Intraventricular extension of hemorrhage noted with blood in the fourth ventricle and left foramen of Luschka.   CT head wo contrast 3/27/2024: Redemonstration of hyperdense hemorrhage in the left paramedian cerebellum, as described with mass effect and extension into the fourth ventricle and left foramen of Luschka, similar in appearance to the prior.   CT head wo contrast 3/28: no significant changes from above  Lab Results   Component Value Date    INR 1.11 03/26/2024    HGBA1C 5.2 09/02/2022    SODIUM 136 03/26/2024       Pertinent scores:  - ICH: 2 due to  IVH and Infratentorial origin    Impression: Left cerebellar hemorrhage most likely in setting of hypertensive emergency with blood pressures in the 220s sbp 2/2 to poor medication adherence of antihypertensives and methamphetamine use but now stable.    Plan:  S/P DDAVP X2  Repeat CT head in 4 weeks and if stable, and hemorrhage is resolved, then consider resuming plavix 75mg PO qdaily at that time   Neurochecks  Repeat CTH if > 2 pt drop in GCS in one hour  Goal SBP between 120 and 140 as much as possible  PT/OT/Speech/PMR consults when able  Replete lytes as needed as per CC  No chemical ppx due to hemorrhage, SCDs for ppx  Hold AP agents until 4/11 and then can retake plavix 75mg daily  BG < 180, SSI for coverage    Diarrhea  Assessment & Plan  - Patient reports having longstanding diarrhea prior to presentation  - Patient does not meet conditions for C. difficile testing, additionally there is no clinical suspicion for it  - Imodium PRN continue for now  - Medicine consulted    Alcohol use  Assessment & Plan  Discuss alcohol reduction. Patient understands the risks of alcohol.    Continue thiamine 100mg daily    ROSANA (generalized anxiety disorder)  Assessment & Plan  Continue home lexapro.    Essential hypertension  Assessment & Plan  Patient noncompliant on antihypertensives at home.  Patient on amlodipine 10mg daily and lisinopril 40mg daily which are increase from previous home medications.    However, blood pressure had difficulty of being controlled. Medicine consulted    Continue and control blood pressure acutely to sbp<140 with hydralazine 10mg PRN and labetalol 20mg PRN    Tobacco abuse  Assessment & Plan  Continue nicotine patch  Smoking cessation discussed    History of CVA (cerebrovascular accident)  Assessment & Plan  History of stroke 2022 pontine which patient was to be on DAPT and then continue only plavix. Patient not compliant on her AP agents.    Holding AP agents until 4/11 and then repeat CT  head to see if improving and continue plavix afterward.              SUBJECTIVE     Patient was seen and examined. No acute events overnight. Patient has continued diarrhea, patient has taken imodium yesterday without much effect. Patient continues to deny double vision. She has improved vertigo sensation and is able to start walking but is unbalanced. Denies new numbness, new weakness, new visual deficits, chest pain, palpitations.    ROS is negative unless stated above.    OBJECTIVE     Patient ID: Tanika Lira is a 58 y.o. female.    Vitals:    24 0530 24 0600 24 0725 24 1030   BP: 145/91  149/85 129/76   BP Location:       Pulse: 55  (!) 52 (!) 54   Resp:       Temp:   (!) 97.4 °F (36.3 °C)    TempSrc:       SpO2: 94%  93% 98%   Weight:  61.6 kg (135 lb 12.9 oz)     Height:          Temperature:   Temp (24hrs), Av.9 °F (37.2 °C), Min:97.4 °F (36.3 °C), Max:100 °F (37.8 °C)    Temperature: (!) 97.4 °F (36.3 °C)      Physical Exam  Eyes:      Extraocular Movements: EOM normal.      Pupils: Pupils are equal, round, and reactive to light.   Neurological:      Motor: Motor strength is normal.     Coordination: Finger-Nose-Finger Test abnormal (L>R dysmetria/hypermetria continues to improve).      Deep Tendon Reflexes:      Reflex Scores:       Bicep reflexes are 2+ on the right side and 2+ on the left side.       Brachioradialis reflexes are 2+ on the right side and 2+ on the left side.       Patellar reflexes are 2+ on the right side and 2+ on the left side.       Achilles reflexes are 1+ on the right side and 1+ on the left side.         Neurologic Exam     Mental Status   Oriented to person.   Oriented to place.   Oriented to year, month and date.   Level of consciousness: alert    Cranial Nerves     CN III, IV, VI   Pupils are equal, round, and reactive to light.  Extraocular motions are normal.   Nystagmus: none     CN V   Facial sensation intact.     CN VII   Facial expression  full, symmetric.     CN VIII   CN VIII normal.     CN XI   CN XI normal.   Denies double vision     Motor Exam   Overall muscle tone: normal  Right arm tone: normal  Left arm tone: normal  Right leg tone: normal  Left leg tone: normal    Strength   Strength 5/5 throughout.     Sensory Exam   Light touch normal.     Gait, Coordination, and Reflexes     Coordination   Finger to nose coordination: abnormal (L>R dysmetria/hypermetria continues to improve)    Reflexes   Right brachioradialis: 2+  Left brachioradialis: 2+  Right biceps: 2+  Left biceps: 2+  Right patellar: 2+  Left patellar: 2+  Right achilles: 1+  Left achilles: 1+HTS testing showed slight discrepancy on left leg compared to right leg that could indicate mild ataxia on left that was previously obscured before on previous neurologic testing (beforehand was deemed inconsistent vs normal)        LABORATORY DATA     Labs: I have personally reviewed pertinent reports.    Results from last 7 days   Lab Units 03/31/24  1053 03/29/24  0622 03/28/24  0526 03/27/24 0427 03/26/24  0820   WBC Thousand/uL 8.46 7.41 6.85   < > 8.29   HEMOGLOBIN g/dL 14.6 14.7 14.4   < > 15.4   HEMATOCRIT % 44.3 45.5 43.7   < > 47.4*   PLATELETS Thousands/uL 224 189 183   < > 189   NEUTROS PCT %  --  47 56  --  34*   MONOS PCT %  --  13* 12  --  12   EOS PCT %  --  1 0  --  4    < > = values in this interval not displayed.      Results from last 7 days   Lab Units 03/31/24  1053 03/29/24  0622 03/28/24  0526 03/27/24 0427 03/26/24  0820   SODIUM mmol/L 133* 134* 133*   < > 136   POTASSIUM mmol/L 3.7 3.8 3.8   < > 3.7   CHLORIDE mmol/L 101 102 99   < > 99   CO2 mmol/L 25 26 26   < > 30   BUN mg/dL 18 17 16   < > 15   CREATININE mg/dL 0.57* 0.57* 0.61   < > 0.67   CALCIUM mg/dL 8.4 8.4 8.8   < > 9.1   ALK PHOS U/L  --  78 79  --  100   ALT U/L  --  65* 75*  --  114*   AST U/L  --  57* 62*  --  113*    < > = values in this interval not displayed.     Results from last 7 days   Lab Units  03/31/24  1053 03/29/24  0622 03/28/24  0526   MAGNESIUM mg/dL 1.7* 1.6* 1.8*     Results from last 7 days   Lab Units 03/31/24  1053 03/29/24  0622 03/28/24  0526   PHOSPHORUS mg/dL 3.0 3.6 3.2      Results from last 7 days   Lab Units 03/26/24  1804 03/26/24  1647   INR   --  1.11   PTT seconds 29  --      Results from last 7 days   Lab Units 03/26/24  0820   LACTIC ACID mmol/L 1.1           IMAGING & DIAGNOSTIC TESTING     Radiology Results: I have personally reviewed pertinent films in PACS    CT head wo contrast   Final Result by Riki Jacob MD (03/28 0354)      No significant interval change with findings detailed above.                  Workstation performed: [id number]         CT head wo contrast   Final Result by Gurwinder Cisneros DO (03/27 0515)      Redemonstration of hyperdense hemorrhage in the left paramedian cerebellum, as described with mass effect and extension into the fourth ventricle and left foramen of Luschka, similar in appearance to the prior.      No hydrocephalus.      Other findings as above. Overall there has been no significant interval change.      Follow-up as clinically warranted.                  Workstation performed: [id number]         CT head wo contrast    (Results Pending)       Other Diagnostic Testing: I have personally reviewed pertinent reports.      ACTIVE MEDICATIONS     Current Facility-Administered Medications   Medication Dose Route Frequency    acetaminophen (TYLENOL) tablet 650 mg  650 mg Oral Q6H PRN    amLODIPine (NORVASC) tablet 10 mg  10 mg Oral Daily    atorvastatin (LIPITOR) tablet 40 mg  40 mg Oral Daily With Dinner    chlorhexidine (PERIDEX) 0.12 % oral rinse 15 mL  15 mL Mouth/Throat Q12H NAOMI    escitalopram (LEXAPRO) tablet 20 mg  20 mg Oral Daily    folic acid (FOLVITE) tablet 1 mg  1 mg Oral Daily    gabapentin (NEURONTIN) capsule 300 mg  300 mg Oral HS    heparin (porcine) subcutaneous injection 5,000 Units  5,000 Units Subcutaneous Q8H NAOMI     hydrALAZINE (APRESOLINE) injection 10 mg  10 mg Intravenous Q4H PRN    labetalol (NORMODYNE) injection 10 mg  10 mg Intravenous Q4H PRN    lisinopril (ZESTRIL) tablet 40 mg  40 mg Oral Daily    loperamide (IMODIUM) oral liquid 2 mg  2 mg Oral TID PRN    loratadine (CLARITIN) tablet 10 mg  10 mg Oral Daily    magnesium gluconate (MAGONATE) tablet 500 mg  500 mg Oral BID AC    multivitamin-minerals (CENTRUM) tablet 1 tablet  1 tablet Oral Daily    thiamine tablet 100 mg  100 mg Oral Daily       Prior to Admission medications    Medication Sig Start Date End Date Taking? Authorizing Provider   aspirin (ECOTRIN LOW STRENGTH) 81 mg EC tablet Take 1 tablet (81 mg total) by mouth daily 10/7/22   Alec Kamara MD   atorvastatin (LIPITOR) 40 mg tablet Take 1 tablet (40 mg total) by mouth daily 6/13/23   Tanika Nieto, MILAN   clopidogrel (PLAVIX) 75 mg tablet Take 1 tablet (75 mg total) by mouth daily 6/13/23   Tanika Nieto, MILAN   escitalopram (Lexapro) 20 mg tablet Take 1 tablet (20 mg total) by mouth daily 10/19/22   Tanika Nieto, MICHAELNP   gabapentin (NEURONTIN) 300 mg capsule Take 1 capsule (300 mg total) by mouth daily at bedtime 6/13/23   Tanika Winter, MICHAELNP   hydrOXYzine HCL (ATARAX) 50 mg tablet Take 1 tablet (50 mg total) by mouth every 6 (six) hours as needed for itching 6/13/23   Tanika Nieto, MILAN   lisinopril (ZESTRIL) 20 mg tablet Take 1 tablet (20 mg total) by mouth daily 6/13/23   Tanika Nieto, MICHAELNP   NIFEdipine ER (ADALAT CC) 30 MG 24 hr tablet Take 1 tablet (30 mg total) by mouth daily 6/13/23   Tanika Nieto, MILAN         VTE Pharmacologic Prophylaxis: Heparin  VTE Mechanical Prophylaxis: sequential compression device    ======    I have discussed the patient's history, physical exam findings, assessment, and plan in detail with attending, Dr. Khanna    Thank you for allowing me to participate in the care of your patient, Tanika Lira.    Clemente Mcwilliams MD  [facility], PGY-4

## 2024-04-01 NOTE — PLAN OF CARE
Problem: Prexisting or High Potential for Compromised Skin Integrity  Goal: Skin integrity is maintained or improved  Description: INTERVENTIONS:  - Identify patients at risk for skin breakdown  - Assess and monitor skin integrity  - Assess and monitor nutrition and hydration status  - Monitor labs   - Assess for incontinence   - Turn and reposition patient  - Assist with mobility/ambulation  - Relieve pressure over bony prominences  - Avoid friction and shearing  - Provide appropriate hygiene as needed including keeping skin clean and dry  - Evaluate need for skin moisturizer/barrier cream  - Collaborate with interdisciplinary team   - Patient/family teaching  - Consider wound care consult   Outcome: Progressing     Problem: Neurological Deficit  Goal: Neurological status is stable or improving  Description: Interventions:  - Monitor and assess patient's level of consciousness, motor function, sensory function, and level of assistance needed for ADLs.   - Monitor and report changes from baseline. Collaborate with interdisciplinary team to initiate plan and implement interventions as ordered.   - Provide and maintain a safe environment.  - Consider seizure precautions.  - Consider fall precautions.  - Consider aspiration precautions.  - Consider bleeding precautions.  Outcome: Progressing     Problem: Activity Intolerance/Impaired Mobility  Goal: Mobility/activity is maintained at optimum level for patient  Description: Interventions:  - Assess and monitor patient  barriers to mobility and need for assistive/adaptive devices.  - Assess patient's emotional response to limitations.  - Collaborate with interdisciplinary team and initiate plans and interventions as ordered.  - Encourage independent activity per ability.  - Maintain proper body alignment.  - Perform active/passive rom as tolerated/ordered.  - Plan activities to conserve energy.  - Turn patient as appropriate  Outcome: Progressing     Problem:  Communication Impairment  Goal: Ability to express needs and understand communication  Description: Assess patient's communication skills and ability to understand information.  Patient will demonstrate use of effective communication techniques, alternative methods of communication and understanding even if not able to speak.     - Encourage communication and provide alternate methods of communication as needed.  - Collaborate with case management/ for discharge needs.  - Include patient/family/caregiver in decisions related to communication.  Outcome: Progressing     Problem: Potential for Aspiration  Goal: Non-ventilated patient's risk of aspiration is minimized  Description: Assess and monitor vital signs, respiratory status, and labs (WBC).  Monitor for signs of aspiration (tachypnea, cough, rales, wheezing, cyanosis, fever).    - Assess and monitor patient's ability to swallow.  - Place patient up in chair to eat if possible.  - HOB up at 90 degrees to eat if unable to get patient up into chair.  - Supervise patient during oral intake.   - Instruct patient/ family to take small bites.  - Instruct patient/ family to take small single sips when taking liquids.  - Follow patient-specific strategies generated by speech pathologist.  Outcome: Progressing     Problem: Nutrition  Goal: Nutrition/Hydration status is improving  Description: Monitor and assess patient's nutrition/hydration status for malnutrition (ex- brittle hair, bruises, dry skin, pale skin and conjunctiva, muscle wasting, smooth red tongue, and disorientation). Collaborate with interdisciplinary team and initiate plan and interventions as ordered.  Monitor patient's weight and dietary intake as ordered or per policy. Utilize nutrition screening tool and intervene per policy. Determine patient's food preferences and provide high-protein, high-caloric foods as appropriate.     - Assist patient with eating.  - Allow adequate time for  meals.  - Encourage patient to take dietary supplement as ordered.  - Collaborate with clinical nutritionist.  - Include patient/family/caregiver in decisions related to nutrition.  Outcome: Progressing     Problem: Nutrition/Hydration-ADULT  Goal: Nutrient/Hydration intake appropriate for improving, restoring or maintaining nutritional needs  Description: Monitor and assess patient's nutrition/hydration status for malnutrition. Collaborate with interdisciplinary team and initiate plan and interventions as ordered.  Monitor patient's weight and dietary intake as ordered or per policy. Utilize nutrition screening tool and intervene as necessary. Determine patient's food preferences and provide high-protein, high-caloric foods as appropriate.     INTERVENTIONS:  - Monitor oral intake, urinary output, labs, and treatment plans  - Assess nutrition and hydration status and recommend course of action  - Evaluate amount of meals eaten  - Assist patient with eating if necessary   - Allow adequate time for meals  - Recommend/ encourage appropriate diets, oral nutritional supplements, and vitamin/mineral supplements  - Order, calculate, and assess calorie counts as needed  - Recommend, monitor, and adjust tube feedings and TPN/PPN based on assessed needs  - Assess need for intravenous fluids  - Provide specific nutrition/hydration education as appropriate  - Include patient/family/caregiver in decisions related to nutrition  Outcome: Progressing     Problem: COPING  Goal: Pt/Family able to verbalize concerns and demonstrate effective coping strategies  Description: INTERVENTIONS:  - Assist patient/family to identify coping skills, available support systems and cultural and spiritual values  - Provide emotional support, including active listening and acknowledgement of concerns of patient and caregivers  - Reduce environmental stimuli, as able  - Provide patient education  - Assess for spiritual pain/suffering and initiate  spiritual care, including notification of Pastoral Care or carl based community as needed  - Assess effectiveness of coping strategies  Outcome: Progressing  Goal: Will report anxiety at manageable levels  Description: INTERVENTIONS:  - Administer medication as ordered  - Teach and encourage coping skills  - Provide emotional support  - Assess patient/family for anxiety and ability to cope  Outcome: Progressing     Problem: Potential for Falls  Goal: Patient will remain free of falls  Description: INTERVENTIONS:  - Educate patient/family on patient safety including physical limitations  - Instruct patient to call for assistance with activity   - Consult OT/PT to assist with strengthening/mobility   - Keep Call bell within reach  - Keep bed low and locked with side rails adjusted as appropriate  - Keep care items and personal belongings within reach  - Initiate and maintain comfort rounds  - Make Fall Risk Sign visible to staff  - Offer Toileting every 2 Hours, in advance of need  - Initiate/Maintain bed alarm  - Apply yellow socks and bracelet for high fall risk patients  - Consider moving patient to room near nurses station  Outcome: Progressing     Problem: PAIN - ADULT  Goal: Verbalizes/displays adequate comfort level or baseline comfort level  Description: Interventions:  - Encourage patient to monitor pain and request assistance  - Assess pain using appropriate pain scale  - Administer analgesics based on type and severity of pain and evaluate response  - Implement non-pharmacological measures as appropriate and evaluate response  - Consider cultural and social influences on pain and pain management  - Notify physician/advanced practitioner if interventions unsuccessful or patient reports new pain  Outcome: Progressing     Problem: INFECTION - ADULT  Goal: Absence or prevention of progression during hospitalization  Description: INTERVENTIONS:  - Assess and monitor for signs and symptoms of infection  -  Monitor lab/diagnostic results  - Monitor all insertion sites, i.e. indwelling lines, tubes, and drains  - Monitor endotracheal if appropriate and nasal secretions for changes in amount and color  - [place] appropriate cooling/warming therapies per order  - Administer medications as ordered  - Instruct and encourage patient and family to use good hand hygiene technique  - Identify and instruct in appropriate isolation precautions for identified infection/condition  Outcome: Progressing     Problem: SAFETY ADULT  Goal: Patient will remain free of falls  Description: INTERVENTIONS:  - Educate patient/family on patient safety including physical limitations  - Instruct patient to call for assistance with activity   - Consult OT/PT to assist with strengthening/mobility   - Keep Call bell within reach  - Keep bed low and locked with side rails adjusted as appropriate  - Keep care items and personal belongings within reach  - Initiate and maintain comfort rounds  - Make Fall Risk Sign visible to staff  - Offer Toileting every 2 Hours, in advance of need  - Initiate/Maintain bed alarm  - Apply yellow socks and bracelet for high fall risk patients  - Consider moving patient to room near nurses station  Outcome: Progressing  Goal: Maintain or return to baseline ADL function  Description: INTERVENTIONS:  -  Assess patient's ability to carry out ADLs; assess patient's baseline for ADL function and identify physical deficits which impact ability to perform ADLs (bathing, care of mouth/teeth, toileting, grooming, dressing, etc.)  - Assess/evaluate cause of self-care deficits   - Assess range of motion  - Assess patient's mobility; develop plan if impaired  - Assess patient's need for assistive devices and provide as appropriate  - Encourage maximum independence but intervene and supervise when necessary  - Involve family in performance of ADLs  - Assess for home care needs following discharge   - Consider OT consult to assist  with ADL evaluation and planning for discharge  - Provide patient education as appropriate  Outcome: Progressing  Goal: Maintains/Returns to pre admission functional level  Description: INTERVENTIONS:  - Perform AM-PAC 6 Click Basic Mobility/ Daily Activity assessment daily.  - Set and communicate daily mobility goal to care team and patient/family/caregiver.   - Collaborate with rehabilitation services on mobility goals if consulted  - Record patient progress and toleration of activity level   Outcome: Progressing     Problem: DISCHARGE PLANNING  Goal: Discharge to home or other facility with appropriate resources  Description: INTERVENTIONS:  - Identify barriers to discharge w/patient and caregiver  - Arrange for needed discharge resources and transportation as appropriate  - Identify discharge learning needs (meds, wound care, etc.)  - Arrange for interpretive services to assist at discharge as needed  - Refer to Case Management Department for coordinating discharge planning if the patient needs post-hospital services based on physician/advanced practitioner order or complex needs related to functional status, cognitive ability, or social support system  Outcome: Progressing     Problem: Knowledge Deficit  Goal: Patient/family/caregiver demonstrates understanding of disease process, treatment plan, medications, and discharge instructions  Description: Complete learning assessment and assess knowledge base.  Interventions:  - Provide teaching at level of understanding  - Provide teaching via preferred learning methods  Outcome: Progressing     Problem: NEUROSENSORY - ADULT  Goal: Achieves stable or improved neurological status  Description: INTERVENTIONS  - Monitor and report changes in neurological status  - Monitor vital signs such as temperature, blood pressure, glucose, and any other labs ordered   - Initiate measures to prevent increased intracranial pressure  - Monitor for seizure activity and implement  precautions if appropriate      Outcome: Progressing  Goal: Remains free of injury related to seizures activity  Description: INTERVENTIONS  - Maintain airway, patient safety  and administer oxygen as ordered  - Monitor patient for seizure activity, document and report duration and description of seizure to physician/advanced practitioner  - If seizure occurs,  ensure patient safety during seizure  - Reorient patient post seizure  - Seizure pads on all 4 side rails  - Instruct patient/family to notify RN of any seizure activity including if an aura is experienced  - Instruct patient/family to call for assistance with activity based on nursing assessment  - Administer anti-seizure medications if ordered    Outcome: Progressing  Goal: Achieves maximal functionality and self care  Description: INTERVENTIONS  - Monitor swallowing and airway patency with patient fatigue and changes in neurological status  - Encourage and assist patient to increase activity and self care.   - Encourage visually impaired, hearing impaired and aphasic patients to use assistive/communication devices  Outcome: Progressing     Problem: CARDIOVASCULAR - ADULT  Goal: Maintains optimal cardiac output and hemodynamic stability  Description: INTERVENTIONS:  - Monitor I/O, vital signs and rhythm  - Monitor for S/S and trends of decreased cardiac output  - Administer and titrate ordered vasoactive medications to optimize hemodynamic stability  - Assess quality of pulses, skin color and temperature  - Assess for signs of decreased coronary artery perfusion  - Instruct patient to report change in severity of symptoms  Outcome: Progressing  Goal: Absence of cardiac dysrhythmias or at baseline rhythm  Description: INTERVENTIONS:  - Continuous cardiac monitoring, vital signs, obtain 12 lead EKG if ordered  - Administer antiarrhythmic and heart rate control medications as ordered  - Monitor electrolytes and administer replacement therapy as  ordered  Outcome: Progressing     Problem: RESPIRATORY - ADULT  Goal: Achieves optimal ventilation and oxygenation  Description: INTERVENTIONS:  - Assess for changes in respiratory status  - Assess for changes in mentation and behavior  - Position to facilitate oxygenation and minimize respiratory effort  - Oxygen administered by appropriate delivery if ordered  - Initiate smoking cessation education as indicated  - Encourage broncho-pulmonary hygiene including cough, deep breathe, Incentive Spirometry  - Assess the need for suctioning and aspirate as needed  - Assess and instruct to report SOB or any respiratory difficulty  - Respiratory Therapy support as indicated  Outcome: Progressing     Problem: GASTROINTESTINAL - ADULT  Goal: Minimal or absence of nausea and/or vomiting  Description: INTERVENTIONS:  - Administer IV fluids if ordered to ensure adequate hydration  - Maintain NPO status until nausea and vomiting are resolved  - Nasogastric tube if ordered  - Administer ordered antiemetic medications as needed  - Provide nonpharmacologic comfort measures as appropriate  - Advance diet as tolerated, if ordered  - Consider nutrition services referral to assist patient with adequate nutrition and appropriate food choices  Outcome: Progressing  Goal: Maintains or returns to baseline bowel function  Description: INTERVENTIONS:  - Assess bowel function  - Encourage oral fluids to ensure adequate hydration  - Administer IV fluids if ordered to ensure adequate hydration  - Administer ordered medications as needed  - Encourage mobilization and activity  - Consider nutritional services referral to assist patient with adequate nutrition and appropriate food choices  Outcome: Progressing  Goal: Maintains adequate nutritional intake  Description: INTERVENTIONS:  - Monitor percentage of each meal consumed  - Identify factors contributing to decreased intake, treat as appropriate  - Assist with meals as needed  - Monitor I&O,  weight, and lab values if indicated  - Obtain nutrition services referral as needed  Outcome: Progressing     Problem: GENITOURINARY - ADULT  Goal: Maintains or returns to baseline urinary function  Description: INTERVENTIONS:  - Assess urinary function  - Encourage oral fluids to ensure adequate hydration if ordered  - Administer IV fluids as ordered to ensure adequate hydration  - Administer ordered medications as needed  - Offer frequent toileting  - Follow urinary retention protocol if ordered  Outcome: Progressing  Goal: Absence of urinary retention  Description: INTERVENTIONS:  - Assess patient’s ability to void and empty bladder  - Monitor I/O  - Bladder scan as needed  - Discuss with physician/AP medications to alleviate retention as needed  - Discuss catheterization for long term situations as appropriate  Outcome: Progressing     Problem: METABOLIC, FLUID AND ELECTROLYTES - ADULT  Goal: Electrolytes maintained within normal limits  Description: INTERVENTIONS:  - Monitor labs and assess patient for signs and symptoms of electrolyte imbalances  - Administer electrolyte replacement as ordered  - Monitor response to electrolyte replacements, including repeat lab results as appropriate  - Instruct patient on fluid and nutrition as appropriate  Outcome: Progressing  Goal: Fluid balance maintained  Description: INTERVENTIONS:  - Monitor labs   - Monitor I/O and WT  - Instruct patient on fluid and nutrition as appropriate  - Assess for signs & symptoms of volume excess or deficit  Outcome: Progressing     Problem: SKIN/TISSUE INTEGRITY - ADULT  Goal: Skin Integrity remains intact(Skin Breakdown Prevention)  Description: Assess:  -Inspect skin when repositioning, toileting, and assisting with ADLS  -Assess extremities for adequate circulation and sensation     Bed Management:  -Have minimal linens on bed & keep smooth, unwrinkled  -Change linens as needed when moist or perspiring  -Avoid sitting or lying in one  position for more than 2 hours while in bed      Toileting:  -Offer bedside commode  -Assess for incontinence    Activity:  -Mobilize patient 3 times a day  -Encourage activity and walks on unit  -Encourage or provide ROM exercises   -Turn and reposition patient every 2 Hours  -Use appropriate equipment to lift or move patient in bed  -Instruct/ Assist with weight shifting every 2 when out of bed in chair      Skin Care:  -Avoid use of baby powder, tape, friction and shearing, hot water or constrictive clothing  -Do not massage red bony areas    Outcome: Progressing     Problem: HEMATOLOGIC - ADULT  Goal: Maintains hematologic stability  Description: INTERVENTIONS  - Assess for signs and symptoms of bleeding or hemorrhage  - Monitor labs  - Administer supportive blood products/factors as ordered and appropriate  Outcome: Progressing     Problem: MUSCULOSKELETAL - ADULT  Goal: Maintain or return mobility to safest level of function  Description: INTERVENTIONS:  - Assess patient's ability to carry out ADLs; assess patient's baseline for ADL function and identify physical deficits which impact ability to perform ADLs (bathing, care of mouth/teeth, toileting, grooming, dressing, etc.)  - Assess/evaluate cause of self-care deficits   - Assess range of motion  - Assess patient's mobility  - Assess patient's need for assistive devices and provide as appropriate  - Encourage maximum independence but intervene and supervise when necessary  - Involve family in performance of ADLs  - Assess for home care needs following discharge   - Consider OT consult to assist with ADL evaluation and planning for discharge  - Provide patient education as appropriate  Outcome: Progressing

## 2024-04-01 NOTE — QUICK NOTE
RN, Janine Voss, TT-ed at 11:48 PM that the patient's BP has been running greater than 140 and has not improved much despite the administration of Labetalol and Hydralazine. Her last BP on file was 148/83.     I evaluated the patient at bedside immediately to make that there was no changes in her neurological examination. Patient's exam was consistent with the exam documented in the Progress Note from 3/31/24. Patient stated that she has a mild headache but that is from her sinus pressure. It is not comparable to how she presented to the ED with when the ICH was discovered. I advised the patient to let us know if she has any new neurological symptoms. She showed understanding of our conversation.    I advised the RN to cycle the BP every 30 mins for the next hour. If she remains greater than 140, then he can go head and administer other dose of Labetalol since it was last administered at 2127 on 3/31/24.     Update: 2:29 PM  At this time,  the patient has received Labetalol x2 and Hydralazine x1 without much improvement in BP. Last /82. Curbsided SOD to help with the management. They suggested Coreg one-time dose. Patient's neurological exam is still consistent with the prior exams.

## 2024-04-01 NOTE — ASSESSMENT & PLAN NOTE
Management as per neurology   We are consulted for assistance to keep BP less than 140  As of now blood pressures are well controlled at 138/75  Continue current meds  As per neuro - goal SBP>140

## 2024-04-01 NOTE — PHYSICAL THERAPY NOTE
PHYSICAL THERAPY NOTE          Patient Name: Tanika Lira  Today's Date: 4/1/2024 04/01/24 1255   PT Last Visit   PT Visit Date 04/01/24   Note Type   Note Type Cancelled Session   Cancel Reasons Refusal       PT attempt to see patient at stated time. Patient refusing PT services at this time due to fatigue and desire to rest. Patient educated on importance of mobility, but patient continues to decline. PT will continue to follow as able.    Uyen Medel, PT, DPT

## 2024-04-01 NOTE — PLAN OF CARE
Problem: Prexisting or High Potential for Compromised Skin Integrity  Goal: Skin integrity is maintained or improved  Description: INTERVENTIONS:  - Identify patients at risk for skin breakdown  - Assess and monitor skin integrity  - Assess and monitor nutrition and hydration status  - Monitor labs   - Assess for incontinence   - Turn and reposition patient  - Assist with mobility/ambulation  - Relieve pressure over bony prominences  - Avoid friction and shearing  - Provide appropriate hygiene as needed including keeping skin clean and dry  - Evaluate need for skin moisturizer/barrier cream  - Collaborate with interdisciplinary team   - Patient/family teaching  - Consider wound care consult   Outcome: Progressing     Problem: Neurological Deficit  Goal: Neurological status is stable or improving  Description: Interventions:  - Monitor and assess patient's level of consciousness, motor function, sensory function, and level of assistance needed for ADLs.   - Monitor and report changes from baseline. Collaborate with interdisciplinary team to initiate plan and implement interventions as ordered.   - Provide and maintain a safe environment.  - Consider seizure precautions.  - Consider fall precautions.  - Consider aspiration precautions.  - Consider bleeding precautions.  Outcome: Progressing     Problem: Activity Intolerance/Impaired Mobility  Goal: Mobility/activity is maintained at optimum level for patient  Description: Interventions:  - Assess and monitor patient  barriers to mobility and need for assistive/adaptive devices.  - Assess patient's emotional response to limitations.  - Collaborate with interdisciplinary team and initiate plans and interventions as ordered.  - Encourage independent activity per ability.  - Maintain proper body alignment.  - Perform active/passive rom as tolerated/ordered.  - Plan activities to conserve energy.  - Turn patient as appropriate  Outcome: Progressing     Problem:  Communication Impairment  Goal: Ability to express needs and understand communication  Description: Assess patient's communication skills and ability to understand information.  Patient will demonstrate use of effective communication techniques, alternative methods of communication and understanding even if not able to speak.     - Encourage communication and provide alternate methods of communication as needed.  - Collaborate with case management/ for discharge needs.  - Include patient/family/caregiver in decisions related to communication.  Outcome: Progressing     Problem: Potential for Aspiration  Goal: Non-ventilated patient's risk of aspiration is minimized  Description: Assess and monitor vital signs, respiratory status, and labs (WBC).  Monitor for signs of aspiration (tachypnea, cough, rales, wheezing, cyanosis, fever).    - Assess and monitor patient's ability to swallow.  - Place patient up in chair to eat if possible.  - HOB up at 90 degrees to eat if unable to get patient up into chair.  - Supervise patient during oral intake.   - Instruct patient/ family to take small bites.  - Instruct patient/ family to take small single sips when taking liquids.  - Follow patient-specific strategies generated by speech pathologist.  Outcome: Progressing

## 2024-04-01 NOTE — DISCHARGE SUMMARY
NEUROLOGY RESIDENCY - DISCHARGE SUMMARY     Name: Tanika Lira   Age & Sex: 58 y.o. female   MRN: [id number]  Unit/Bed#: [facility] 705-01   Encounter: [id number]    Discharging Resident Physician: Clemente Mcwilliams MD  Attending: Emmanuel Khanna MD  PCP: MILAN Miller  Admission Date: 3/26/2024  Discharge Date: 04/03/24    Tanika Lira will need follow up in in 6 weeks with neurovascular Attending (Dr. Martinez) .  Recommend repeat CT head in 4/11/2023 as neurosurgery recommends before clearing for retaking plavix.     ASSESSMENT & PLAN     * Cerebellar hemorrhage (HCC)  Assessment & Plan  Patient is a 58 year old woman with hx of right hemisphere stroke (possibly right pontine infarct) on aspirin , HTN, HLD presenting for left cerebellar hemorrhage with IVH. Patient's symptoms are of nausea, vomiting, diarrhea, vertigo sensation but no focal numbness, weakness, visual deficits, nor headaches. Also, horizontal diplopia. Patient has history of right sided stroke in 2022 which she was placed on DAPT and to continue plavix after 21 days. Patient states she is on aspirin daily on this visit. Initial blood pressure recorded at [facility] is 222/136. On exam, continued but improving L>R dysmetria.  Thinners: aspirin reversed with DDAVP  Initial BP: Blood Pressure: 144/78  Presenting exam: Bilateral dysmetric L>R  Vascular risk factors: HTN, HLD, previous stroke    Workup:  CT head wo contrast 3/26/2024: Acute parenchymal hematoma left paramedian cerebellum measures 2.6 x 1.6 cm with mass effect on the fourth ventricle. Intraventricular extension of hemorrhage noted with blood in the fourth ventricle and left foramen of Luschka.   CT head wo contrast 3/27/2024: Redemonstration of hyperdense hemorrhage in the left paramedian cerebellum, as described with mass effect and extension into the fourth ventricle and left foramen of Luschka, similar in appearance to the prior.   CT head wo contrast 3/28: no significant changes  from above  Lab Results   Component Value Date    INR 1.11 03/26/2024    HGBA1C 5.2 09/02/2022    SODIUM 136 03/26/2024       Pertinent scores:  - ICH: 2 due to IVH and Infratentorial origin    Impression: Left cerebellar hemorrhage most likely in setting of hypertensive emergency with blood pressures in the 220s sbp 2/2 to poor medication adherence of antihypertensives and methamphetamine use but now stable.    Plan:  S/P DDAVP X2  Repeat CT head on 4/11/2024 and if improving hemorrhage, patient can resume plavix 75mg daily for stroke prevention; no need for aspirin in terms of stroke prevention  Repeat CTH if > 2 pt drop in GCS in one hour  Goal SBP between 120 and 140 as much as possible; improved  PT/OT/Speech/PMR consults when able  BG < 180, SSI for coverage    Hyponatremia  Assessment & Plan  Mild hyponatremia in setting of hemorrhage. Remained stable without any symptoms. Monitor with outpatient BMP in 1 week per medicine.    Diarrhea  Assessment & Plan  - Patient reports having longstanding diarrhea prior to presentation  - Patient does not meet conditions for C. difficile testing, additionally there is no clinical suspicion for it  - Imodium PRN continue for now  - Medicine consulted  - Improved    Alcohol use  Assessment & Plan  Discuss alcohol reduction. Patient understands the risks of alcohol.    Continue thiamine 100mg daily    ROSANA (generalized anxiety disorder)  Assessment & Plan  Continue home lexapro.    Essential hypertension  Assessment & Plan  Patient noncompliant on antihypertensives at home.  Patient on amlodipine 10mg daily and lisinopril 40mg daily which are increase from previous home medications.    However, blood pressure had difficulty of being controlled. Medicine consulted- Blood pressure improved    Continue and control blood pressure acutely to sbp<140 with hydralazine 10mg PRN and labetalol 20mg PRN    Tobacco abuse  Assessment & Plan  Continue nicotine patch  Smoking cessation  discussed    History of CVA (cerebrovascular accident)  Assessment & Plan  History of stroke 2022 pontine which patient was to be on DAPT and then continue only plavix. Patient not compliant on her AP agents.    Holding AP agents until 4/11 and then repeat CT head to see if improving and continue plavix afterward.             Disposition:     Assisted Living Facility at Rehab    Reason for Admission: Left Cerebellar Hemorrhage    Consultations During Hospital Stay:  Neurosurgery  Neurology    Procedures Performed:     None    Significant Findings / Test Results:     CT head wo contrast 3/26/2024: Acute parenchymal hematoma left paramedian cerebellum with mass effect on the fourth ventricle. IVH extension of hemorrhage.  CTA head and neck w and wo contrast 3/26/2024: No left cerebellar vascular malformation to account for the patient's acute parenchymal hematoma. No cervical or intracranial large vessel occlusion, dissection or aneurysm.  CT head wo contrast 3/27 and 3/28/2024: No significant change from prior CT head wo contrast.  Na of 133-134 stable    Incidental Findings:   CT CAP- cholelithiasis (3mm gallstone), Possible cirrhosis, simple appearing right adnexal cyst- to be followed up by primary care doctor    Test Results Pending at Discharge (will require follow up):   None     Outpatient Tests Requested:  CT head wo contrast repeat 4/11/2024    Complications:  None    Hospital Course:     Tanika Lira is a 58 y.o. female patient who originally presented to the hospital on 3/26/2024 due to left cerebellar hemorrhage with IVH. Patient while in hospital found that she has been noncompliant on multiple medications including her antihypertensives and aspirin and plavix (patient was to be on plavix daily only after stroke 2022). Patient transferred from ICU to neurology primary service on 3/28 due to continued stability. Patient had greater stability of blood pressures while on neurology primary service and had  "diarrhea although it seems diarrhea is chronic for patient. Patient discharged to rehab and holding AP agents until CT head in 4/11 after acute hemorrhage and then continue plavix 75mg daily.    Patient placed on [id number] daily, lisinopril 40mg daily, and hydrochlorothiazide 12.5mg daily for blood pressure control. Blood pressure cuff recommended and ordered.    HPI 3/26/2024:  Tanika Lira is a 58 y.o. female with hx of right hemisphere stroke (possibly left pontine infarct per note 9/2/2022 (Left facial weakness and hemiparesis)) on at least aspirin who presents with nausea, vomiting diarrhea, room spinning vertigo, ambulatory dysfunction. CT head was done which left cerebellar acute hemorrhage with ventricular extension. Blood pressure noted to be 222/126 at ED in [facility]. Patient was given DDAVP and was transferred to hospitals for neurosurgical evaluation.      When patient seen, patient stated she had dizziness when she opens her eyes. Otherwise denies headaches, numbness, weakness, visual deficits (she stated having horizontal diplopia). Denies headaches. Patient states she has been taking antiplatelet agents for strokes since a few years ago. Aspirin but possibly plavix although patient was not completely clear.     Condition at Discharge: stable     Discharge Day Visit / Exam:     Subjective:  Patient has no acute issues. Denies new numbness, weakness, visual deficits. Denies headaches. Improving dizziness. No double vision. Patient very eager to be discharged from hospital.    Vitals: Blood Pressure: 149/84 (04/03/24 0800)  Pulse: 69 (04/03/24 0800)  Temperature: 98.3 °F (36.8 °C) (04/02/24 2113)  Temp Source: Oral (03/28/24 1058)  Respirations: 18 (04/02/24 0725)  Height: 5' 6\" (167.6 cm) (03/26/24 1355)  Weight - Scale: 62.1 kg (137 lb) (04/03/24 0535)  SpO2: 93 % (04/03/24 0800)    Exam:     Physical Exam  Eyes:      Extraocular Movements: EOM normal.      Pupils: Pupils are equal, round, " and reactive to light.   Neurological:      Motor: Motor strength is normal.     Coordination: Finger-Nose-Finger Test abnormal (L>R dysmetria/hypermetria improving).      Deep Tendon Reflexes:      Reflex Scores:       Bicep reflexes are 2+ on the right side and 2+ on the left side.       Brachioradialis reflexes are 2+ on the right side and 2+ on the left side.       Patellar reflexes are 2+ on the right side and 2+ on the left side.       Achilles reflexes are 1+ on the right side and 1+ on the left side.        Neurologic Exam     Mental Status   Oriented to person.   Oriented to place.   Oriented to year, month and date.   Level of consciousness: alert    Cranial Nerves     CN III, IV, VI   Pupils are equal, round, and reactive to light.  Extraocular motions are normal.   Nystagmus: none     CN V   Facial sensation intact.     CN VII   Facial expression full, symmetric.     CN VIII   CN VIII normal.     CN XI   CN XI normal.   Denies double vision     Motor Exam   Overall muscle tone: normal  Right arm tone: normal  Left arm tone: normal  Right leg tone: normal  Left leg tone: normal    Strength   Strength 5/5 throughout.     Sensory Exam   Light touch normal.     Gait, Coordination, and Reflexes     Coordination   Finger to nose coordination: abnormal (L>R dysmetria/hypermetria improving)    Reflexes   Right brachioradialis: 2+  Left brachioradialis: 2+  Right biceps: 2+  Left biceps: 2+  Right patellar: 2+  Left patellar: 2+  Right achilles: 1+  Left achilles: 1+Improving dysmetria/hypermetria     Discussion with Family: Patient declined update to family    Discharge instructions/Information to patient and family:   See after visit summary for information provided to patient and family.      Provisions for Follow-Up Care:  See after visit summary for information related to follow-up care and any pertinent home health orders.      Planned Readmission: None    Discharge Statement:  I spent 40 minutes  discharging the patient. This time was spent on the day of discharge. I had direct contact with the patient on the day of discharge. Greater than 50% of the total time was spent examining patient, answering all patient questions, arranging and discussing plan of care with patient as well as directly providing post-discharge instructions.  Additional time then spent on discharge activities.      Discharge Medications:  See after visit summary for reconciled discharge medications provided to patient and family.      ** Please Note: This note has been constructed using a voice recognition system **      ======    I have discussed the patient's history, physical exam findings, assessment, and plan in detail with attending, Dr. Khanna    Thank you for allowing me to participate in the care of your patient, Tanika Lira.    Clemente Mcwilliams MD  [facility] Neurology Residency, PGY-4

## 2024-04-01 NOTE — DISCHARGE INSTR - AVS FIRST PAGE
Dear Tanika Lira,     It was our pleasure to care for you here at [facility].  It is our hope that we were always able to exceed the expected standards for your care during your stay.  You were hospitalized due to brain bleed.  You were cared for on the 7th floor by Clemente Mcwilliams MD under the service of Marelna Martinez MD with the [facility] who covers for your primary care physician (PCP), MILAN Miller, while you were hospitalized.  If you have any questions or concerns related to this hospitalization, you may contact us at .  For follow up as well as any medication refills, we recommend that you follow up with your primary care physician.  A registered nurse will reach out to you by phone within a few days after your discharge to answer any additional questions that you may have after going home.  However, at this time we provide for you here, the most important instructions / recommendations at discharge:     Notable Medication Adjustments -   Hold plavix until getting your CT head done on 4/11/2024 and when cleared, resume plavix 75mg daily  Stop aspirin  Take lisinopril 40mg daily for your blood pressure  Take Hydrochlorothiazide 12.5mg daily for your blood pressure  Take Amlodipine 10mg daily for your blood pressure  Testing Required after Discharge -   CT head on 4/11/2024  BMP (bloodwork) in 1 week  ** Please contact your PCP to request testing orders for any of the testing recommended here **  Important follow up information -   Follow up with Dr. Martinez in 4-6 weeks  Follow up with your primary care doctor  Follow up with Neurosurgery  Other Instructions -   Follow with Rehab  Please check your blood pressure with a blood pressure cuff at least daily and talk to your primary care doctor about it  Please review this entire after visit summary as additional general instructions including medication list, appointments, activity, diet, any  pertinent wound care, and other additional recommendations from your care team that may be provided for you.      Sincerely,     Clemente Mcwilliams MD

## 2024-04-01 NOTE — UTILIZATION REVIEW
Continued Stay Review    Date: 04/01/24                          Current Patient Class: IP  Current Level of Care: Med/Surg    HPI:58 y.o. female initially admitted on 3/26.     Assessment/Plan: Pt w/ continued diarrhea; no relief w/ Imodium. Denies double vision. Reports improved vertigo, feels unbalanced when walking. HTS testing showed slight discrepancy on left leg compared to right leg that could indicate mild ataxia on left that was previously obscured before on previous neurologic testing. GCS 15. Plan: neuro checks q4h, -140, hold AP until 4/11 (then resume Plavix), continue PRN imodium, continue thiamine, lexapro, Trend labs, replete electrolytes as needed. DW, I&O. Start lisinopril today.    Vital Signs:   Date/Time Temp Pulse Resp BP MAP (mmHg) SpO2   04/01/24 15:07:04 -- 59 -- 138/75 96 94 %   04/01/24 14:56:30 99 °F (37.2 °C) 62 18 152/82 105 95 %   04/01/24 10:30:49 -- 54 Abnormal  -- 129/76 94 98 %   04/01/24 07:25:14 97.4 °F (36.3 °C) Abnormal  52 Abnormal  -- 149/85 106 93 %   04/01/24 0530 -- 55 -- 145/91 109 94 %   04/01/24 0500 -- 52 Abnormal  -- 139/76 97 93 %   04/01/24 0430 -- 54 Abnormal  -- 143/78 100 92 %   04/01/24 0400 -- 53 Abnormal  -- 145/79 101 93 %   04/01/24 0330 -- 56 -- 147/80 102 94 %   04/01/24 0300 -- 57 -- 150/84 106 94 %   04/01/24 0230 -- 55 -- 150/86 107 96 %   04/01/24 0203 -- -- -- 150/82 -- --   04/01/24 01:55:20 -- 61 -- 152/88 109 94 %   04/01/24 01:26:36 -- 59 -- 154/86 109 94 %   03/31/24 23:44:31 -- 64 -- 148/83 105 92 %   03/31/24 22:49:27 -- 59 -- 147/93 111 95 %   03/31/24 2248 -- -- -- 147/93 -- --   03/31/24 22:14:01 -- 59 -- 144/77 99 93 %   03/31/24 21:26:26 99.2 °F (37.3 °C) 62 16 163/92 116 97 %   03/31/24 15:32:03 100 °F (37.8 °C) -- 14 146/86 106 --   03/31/24 12:34:24 -- 63 -- 140/76 97 95 %       Pertinent Labs/Diagnostic Results:   Results from last 7 days   Lab Units 03/26/24  0840   SARS-COV-2  Negative     Results from last 7 days   Lab Units  03/31/24  1053 03/29/24  0622 03/28/24  0526 03/27/24  0427 03/26/24  0820   WBC Thousand/uL 8.46 7.41 6.85 9.44 8.29   HEMOGLOBIN g/dL 14.6 14.7 14.4 12.9 15.4   HEMATOCRIT % 44.3 45.5 43.7 40.0 47.4*   PLATELETS Thousands/uL 224 189 183 179 189   NEUTROS ABS Thousands/µL  --  3.43 3.74  --  2.80         Results from last 7 days   Lab Units 03/31/24  1053 03/29/24  0622 03/28/24  0526 03/27/24  1735 03/27/24  0549 03/27/24  0427 03/26/24  0820   SODIUM mmol/L 133* 134* 133* 133*  --  133* 136   POTASSIUM mmol/L 3.7 3.8 3.8 4.2  --  3.3* 3.7   CHLORIDE mmol/L 101 102 99 102  --  99 99   CO2 mmol/L 25 26 26 27  --  26 30   ANION GAP mmol/L 7 6 8 4  --  8 7   BUN mg/dL 18 17 16 15  --  11 15   CREATININE mg/dL 0.57* 0.57* 0.61 0.69  --  0.55* 0.67   EGFR ml/min/1.73sq m 101 101 99 95  --  102 96   CALCIUM mg/dL 8.4 8.4 8.8 8.7  --  8.2* 9.1   CALCIUM, IONIZED mmol/L  --  1.13 1.16  --  1.08*  --   --    MAGNESIUM mg/dL 1.7* 1.6* 1.8*  --   --  1.6* 1.8*   PHOSPHORUS mg/dL 3.0 3.6 3.2  --   --  3.6  --      Results from last 7 days   Lab Units 03/29/24  0622 03/28/24  0526 03/26/24  0820   AST U/L 57* 62* 113*   ALT U/L 65* 75* 114*   ALK PHOS U/L 78 79 100   TOTAL PROTEIN g/dL 7.5 7.5 9.6*   ALBUMIN g/dL 3.2* 3.2* 4.0   TOTAL BILIRUBIN mg/dL 0.58 0.69 0.53     Results from last 7 days   Lab Units 03/26/24  0844   POC GLUCOSE mg/dl 166*     Results from last 7 days   Lab Units 03/31/24  1053 03/29/24  0622 03/28/24  0526 03/27/24  1735 03/27/24  0427 03/26/24  0820   GLUCOSE RANDOM mg/dL 106 98 110 122 119 132     Results from last 7 days   Lab Units 03/26/24  1315 03/26/24  1129 03/26/24  0820   HS TNI 0HR ng/L  --   --  5   HS TNI 2HR ng/L  --  4  --    HSTNI D2 ng/L  --  -1  --    HS TNI 4HR ng/L 4  --   --    HSTNI D4 ng/L -1  --   --          Results from last 7 days   Lab Units 03/26/24  1804 03/26/24  1647   PROTIME seconds  --  14.2   INR   --  1.11   PTT seconds 29  --      Results from last 7 days   Lab Units  03/26/24  0820   LACTIC ACID mmol/L 1.1     Results from last 7 days   Lab Units 03/26/24  0820   LIPASE u/L 53     Results from last 7 days   Lab Units 03/26/24  1400   CLARITY UA  Clear   COLOR UA  Light Yellow   SPEC GRAV UA  <1.005*   PH UA  8.0   GLUCOSE UA mg/dl Negative   KETONES UA mg/dl Negative   BLOOD UA  Negative   PROTEIN UA mg/dl Trace*   NITRITE UA  Negative   BILIRUBIN UA  Negative   UROBILINOGEN UA (BE) mg/dl <2.0   LEUKOCYTES UA  Negative   WBC UA /hpf None Seen   RBC UA /hpf None Seen   BACTERIA UA /hpf None Seen   EPITHELIAL CELLS WET PREP /hpf None Seen     Results from last 7 days   Lab Units 03/26/24  0840   INFLUENZA A PCR  Negative   INFLUENZA B PCR  Negative   RSV PCR  Negative         Results from last 7 days   Lab Units 03/26/24  1626   AMPH/METH  Positive*   BARBITURATE UR  Negative   BENZODIAZEPINE UR  Negative   COCAINE UR  Negative   METHADONE URINE  Negative   OPIATE UR  Negative   PCP UR  Negative   THC UR  Negative         Medications:   Scheduled Medications:  amLODIPine, 10 mg, Oral, Daily  atorvastatin, 40 mg, Oral, Daily With Dinner  chlorhexidine, 15 mL, Mouth/Throat, Q12H NAOMI  escitalopram, 20 mg, Oral, Daily  folic acid, 1 mg, Oral, Daily  gabapentin, 300 mg, Oral, HS  heparin (porcine), 5,000 Units, Subcutaneous, Q8H NAOMI  lisinopril, 40 mg, Oral, Daily  loratadine, 10 mg, Oral, Daily  magnesium gluconate, 500 mg, Oral, BID AC  multivitamin-minerals, 1 tablet, Oral, Daily  thiamine, 100 mg, Oral, Daily    Continuous IV Infusions: none    PRN Meds:  acetaminophen, 650 mg, Oral, Q6H PRN; 4/1 x1  hydrALAZINE, 10 mg, Intravenous, Q4H PRN  labetalol, 10 mg, Intravenous, Q4H PRN  labetalol, 20 mg, Intravenous, 4/1 x1  lidocaine, 1 patch, Topical, Daily PRN  loperamide, 2 mg, Oral, TID PRN    carvedilol (COREG) tablet 3.125 mg  Dose: 3.125 mg  Freq: Once Route: PO  Start: 04/01/24 0215 End: 04/01/24 0216   rimegepant sulfate (NURTEC) disintegrating tablet 75 mg  Dose: 75 mg  Freq:  Once Route: PO  Start: 04/01/24 1430 End: 04/01/24 1510       Discharge Plan: TBD    Network Utilization Review Department  ATTENTION: Please call with any questions or concerns to [phone] and carefully listen to the prompts so that you are directed to the right person. All voicemails are confidential.   For Discharge needs, contact Care Management DC Support Team at [phone] opt. [phone]  Send all requests for admission clinical reviews, approved or denied determinations and any other requests to dedicated fax number below belonging to the campus where the patient is receiving treatment. List of dedicated fax numbers for the Facilities:  FACILITY NAME UR FAX NUMBER   ADMISSION DENIALS (Administrative/Medical Necessity) [phone]   DISCHARGE SUPPORT TEAM (NETWORK) [phone]   PARENT CHILD HEALTH (Maternity/NICU/Pediatrics) [phone]   [facility] [phone]   [facility] [phone]   Critical access hospital [phone]   [facility] [phone]   [facility] [phone]   [facility] [phone]   [facility] [phone]   [facility] [phone]   [facility] [phone]   [facility] [phone]   [facility] [phone]   [facility] [phone]

## 2024-04-01 NOTE — ASSESSMENT & PLAN NOTE
Continue norvasc lisinopril and nifedipine   Continue labetalol and hydralazine  So far did not need any hydralazine prn and used one dose of labetalol - will keep on current meds.

## 2024-04-01 NOTE — CASE MANAGEMENT
Case Management Progress Note    Patient name Tanika Lira  Location [facility] 705/[facility] 705-01 MRN [id number]  : 1965 Date 2024       LOS (days): 6  Geometric Mean LOS (GMLOS) (days):   Days to GMLOS:        OBJECTIVE:        Current admission status: Inpatient  Preferred Pharmacy:   [id number] - [place] - [address]  [address]  [place] [address]  Phone: [phone] Fax: [phone]    Primary Care Provider: MILAN Miller    Primary Insurance: [employer]  Secondary Insurance:     PROGRESS NOTE:  Patient accepted by [facility], CM requested Insurance auth through Discharge support. CM will follow for approval.     BP control still ongoing.

## 2024-04-01 NOTE — CASE MANAGEMENT
[facility] received request for authorization from Care Manager.  Authorization request for: Acute Rehab  Facility Name: [facility]  NPI: [id number]  Facility MD: Dr. Ashley DePadua   NPI: [id number]  Authorization initiated by contacting insurance: [employer] Via: Fax  Clinicals submitted via: fax # [phone]    Care Manager notified: Jojo MARQUEZ

## 2024-04-02 LAB
ANION GAP SERPL CALCULATED.3IONS-SCNC: 7 MMOL/L (ref 4–13)
BUN SERPL-MCNC: 16 MG/DL (ref 5–25)
CALCIUM SERPL-MCNC: 8.5 MG/DL (ref 8.4–10.2)
CHLORIDE SERPL-SCNC: 100 MMOL/L (ref 96–108)
CO2 SERPL-SCNC: 27 MMOL/L (ref 21–32)
CREAT SERPL-MCNC: 0.56 MG/DL (ref 0.6–1.3)
ERYTHROCYTE [DISTWIDTH] IN BLOOD BY AUTOMATED COUNT: 12.2 % (ref 11.6–15.1)
GFR SERPL CREATININE-BSD FRML MDRD: 102 ML/MIN/1.73SQ M
GLUCOSE SERPL-MCNC: 92 MG/DL (ref 65–140)
HCT VFR BLD AUTO: 43.9 % (ref 34.8–46.1)
HGB BLD-MCNC: 14.4 G/DL (ref 11.5–15.4)
MCH RBC QN AUTO: 32.1 PG (ref 26.8–34.3)
MCHC RBC AUTO-ENTMCNC: 32.8 G/DL (ref 31.4–37.4)
MCV RBC AUTO: 98 FL (ref 82–98)
PLATELET # BLD AUTO: 195 THOUSANDS/UL (ref 149–390)
PMV BLD AUTO: 11.4 FL (ref 8.9–12.7)
POTASSIUM SERPL-SCNC: 4.1 MMOL/L (ref 3.5–5.3)
RBC # BLD AUTO: 4.48 MILLION/UL (ref 3.81–5.12)
SODIUM SERPL-SCNC: 134 MMOL/L (ref 135–147)
WBC # BLD AUTO: 7.25 THOUSAND/UL (ref 4.31–10.16)

## 2024-04-02 PROCEDURE — 80048 BASIC METABOLIC PNL TOTAL CA: CPT | Performed by: PSYCHIATRY & NEUROLOGY

## 2024-04-02 PROCEDURE — 99232 SBSQ HOSP IP/OBS MODERATE 35: CPT | Performed by: STUDENT IN AN ORGANIZED HEALTH CARE EDUCATION/TRAINING PROGRAM

## 2024-04-02 PROCEDURE — 99232 SBSQ HOSP IP/OBS MODERATE 35: CPT | Performed by: PSYCHIATRY & NEUROLOGY

## 2024-04-02 PROCEDURE — 85027 COMPLETE CBC AUTOMATED: CPT | Performed by: PSYCHIATRY & NEUROLOGY

## 2024-04-02 RX ORDER — HYDROCHLOROTHIAZIDE 12.5 MG/1
12.5 TABLET ORAL DAILY
Status: DISCONTINUED | OUTPATIENT
Start: 2024-04-02 | End: 2024-04-03 | Stop reason: HOSPADM

## 2024-04-02 RX ADMIN — Medication 500 MG: at 05:50

## 2024-04-02 RX ADMIN — ESCITALOPRAM OXALATE 20 MG: 20 TABLET ORAL at 08:36

## 2024-04-02 RX ADMIN — LIDOCAINE 1 PATCH: 50 PATCH CUTANEOUS at 05:58

## 2024-04-02 RX ADMIN — THIAMINE HCL TAB 100 MG 100 MG: 100 TAB at 08:37

## 2024-04-02 RX ADMIN — LISINOPRIL 40 MG: 20 TABLET ORAL at 08:37

## 2024-04-02 RX ADMIN — HEPARIN SODIUM 5000 UNITS: 5000 INJECTION INTRAVENOUS; SUBCUTANEOUS at 21:07

## 2024-04-02 RX ADMIN — AMLODIPINE BESYLATE 10 MG: 10 TABLET ORAL at 08:36

## 2024-04-02 RX ADMIN — Medication 500 MG: at 16:24

## 2024-04-02 RX ADMIN — ATORVASTATIN CALCIUM 40 MG: 40 TABLET, FILM COATED ORAL at 16:24

## 2024-04-02 RX ADMIN — HYDROCHLOROTHIAZIDE 12.5 MG: 12.5 TABLET ORAL at 12:02

## 2024-04-02 RX ADMIN — CHLORHEXIDINE GLUCONATE 15 ML: 1.2 SOLUTION ORAL at 08:36

## 2024-04-02 RX ADMIN — HEPARIN SODIUM 5000 UNITS: 5000 INJECTION INTRAVENOUS; SUBCUTANEOUS at 05:50

## 2024-04-02 RX ADMIN — FOLIC ACID 1 MG: 1 TABLET ORAL at 08:37

## 2024-04-02 RX ADMIN — LORATADINE 10 MG: 10 TABLET ORAL at 08:37

## 2024-04-02 RX ADMIN — CHLORHEXIDINE GLUCONATE 15 ML: 1.2 SOLUTION ORAL at 21:07

## 2024-04-02 RX ADMIN — GABAPENTIN 300 MG: 300 CAPSULE ORAL at 21:07

## 2024-04-02 RX ADMIN — Medication 1 TABLET: at 08:37

## 2024-04-02 NOTE — ASSESSMENT & PLAN NOTE
Blood pressure elevated in the 160s today  Goal blood pressure between 120-140  Continue amlodipine 10 mg daily  Continue lisinopril 40 mg daily  Start hydrochlorothiazide 12.5 mg daily  Continue labetalol and hydralazine as needed

## 2024-04-02 NOTE — PROGRESS NOTES
[facility]  Progress Note  Name: Tanika Lira I  MRN: [id number]  Unit/Bed#: [facility] 705-01 I Date of Admission: 3/26/2024   Date of Service: 4/2/2024  Hospital Day: 7    Assessment/Plan   Alcohol use  Assessment & Plan  No signs of withdrawal  Last drink prior to admission which more than 1 week    Essential hypertension  Assessment & Plan  Blood pressure elevated in the 160s today  Goal blood pressure between 120-140  Continue amlodipine 10 mg daily  Continue lisinopril 40 mg daily  Start hydrochlorothiazide 12.5 mg daily  Continue labetalol and hydralazine as needed    * Cerebellar hemorrhage (HCC)  Assessment & Plan  Left cerebellar hemorrhage most likely in setting of hypertensive emergency  We are consulted for assistance to keep BP less than 140  Additional management per neurology             VTE Pharmacologic Prophylaxis:   Moderate Risk (Score 3-4) - Pharmacological DVT Prophylaxis Ordered: heparin.    Mobility:   Basic Mobility Inpatient Raw Score: 20  JH-HLM Goal: 6: Walk 10 steps or more  JH-HLM Achieved: 6: Walk 10 steps or more  JH-HLM Goal achieved. Continue to encourage appropriate mobility.    Patient Centered Rounds: I performed bedside rounds with nursing staff today.   Discussions with Specialists or Other Care Team Provider: neurology    Education and Discussions with Family / Patient:  Patient.     Total Time Spent on Date of Encounter in care of patient: 25 mins. This time was spent on one or more of the following: performing physical exam; counseling and coordination of care; obtaining or reviewing history; documenting in the medical record; reviewing/ordering tests, medications or procedures; communicating with other healthcare professionals and discussing with patient's family/caregivers.    Current Length of Stay: 7 day(s)  Current Patient Status: Inpatient   Certification Statement: The patient will continue to require additional inpatient hospital  stay due to per neurology  Discharge Plan: [facility] is following this patient on consult. They are medically stable for discharge when deemed appropriate by primary service.    Code Status: Level 1 - Full Code    Subjective:   Patient assessed at bedside.  No complaints.  Blood pressure in the 160s today.  Denies any headache, lightheadedness, dizziness.    Objective:     Vitals:   Temp (24hrs), Av.9 °F (37.2 °C), Min:97.5 °F (36.4 °C), Max:99.6 °F (37.6 °C)    Temp:  [97.5 °F (36.4 °C)-99.6 °F (37.6 °C)] 97.5 °F (36.4 °C)  HR:  [64-70] 70  Resp:  [18] 18  BP: (120-169)/(60-93) 140/81  SpO2:  [94 %-96 %] 96 %  Body mass index is 22.4 kg/m².     Input and Output Summary (last 24 hours):     Intake/Output Summary (Last 24 hours) at 2024 1507  Last data filed at 2024 1244  Gross per 24 hour   Intake 120 ml   Output 1600 ml   Net -1480 ml       Physical Exam:   Physical Exam  Vitals reviewed.   Constitutional:       General: She is not in acute distress.     Appearance: Normal appearance. She is not ill-appearing.   HENT:      Head: Normocephalic and atraumatic.   Cardiovascular:      Rate and Rhythm: Normal rate and regular rhythm.      Heart sounds: Normal heart sounds.   Pulmonary:      Effort: Pulmonary effort is normal. No respiratory distress.   Abdominal:      General: Bowel sounds are normal.      Tenderness: There is no abdominal tenderness.   Musculoskeletal:      Right lower leg: No edema.      Left lower leg: No edema.   Neurological:      General: No focal deficit present.      Mental Status: She is alert. Mental status is at baseline.          Additional Data:     Labs:  Results from last 7 days   Lab Units 24  0436 24  1053 24  0622   WBC Thousand/uL 7.25   < > 7.41   HEMOGLOBIN g/dL 14.4   < > 14.7   HEMATOCRIT % 43.9   < > 45.5   PLATELETS Thousands/uL 195   < > 189   NEUTROS PCT %  --   --  47   LYMPHS PCT %  --   --  39   MONOS PCT %  --   --  13*   EOS PCT %  --   --  1    <  > = values in this interval not displayed.     Results from last 7 days   Lab Units 04/02/24  0436 03/31/24  1053 03/29/24  0622   SODIUM mmol/L 134*   < > 134*   POTASSIUM mmol/L 4.1   < > 3.8   CHLORIDE mmol/L 100   < > 102   CO2 mmol/L 27   < > 26   BUN mg/dL 16   < > 17   CREATININE mg/dL 0.56*   < > 0.57*   ANION GAP mmol/L 7   < > 6   CALCIUM mg/dL 8.5   < > 8.4   ALBUMIN g/dL  --   --  3.2*   TOTAL BILIRUBIN mg/dL  --   --  0.58   ALK PHOS U/L  --   --  78   ALT U/L  --   --  65*   AST U/L  --   --  57*   GLUCOSE RANDOM mg/dL 92   < > 98    < > = values in this interval not displayed.     Results from last 7 days   Lab Units 03/26/24  1647   INR  1.11                   Lines/Drains:  Invasive Devices       Peripheral Intravenous Line  Duration             Peripheral IV 03/30/24 Distal;Left;Ventral (anterior) Forearm 3 days                          Imaging: Reviewed radiology reports from this admission including: CT head    Recent Cultures (last 7 days):         Last 24 Hours Medication List:   Current Facility-Administered Medications   Medication Dose Route Frequency Provider Last Rate    acetaminophen  650 mg Oral Q6H PRN MILAN Frank      amLODIPine  10 mg Oral Daily MILAN Frank      atorvastatin  40 mg Oral Daily With Dinner César Benavidez MD      chlorhexidine  15 mL Mouth/Throat Q12H NAOMI César Benavidez MD      escitalopram  20 mg Oral Daily César Benavidez MD      folic acid  1 mg Oral Daily César Benavidez MD      gabapentin  300 mg Oral HS César Benavidez MD      heparin (porcine)  5,000 Units Subcutaneous Q8H NAOMI Aun Jose Raul      hydrALAZINE  10 mg Intravenous Q4H PRN Clemente Mcwilliams MD      hydroCHLOROthiazide  12.5 mg Oral Daily Clemente Mcwilliams MD      labetalol  10 mg Intravenous Q4H PRN Clemente Mcwilliams MD      lidocaine  1 patch Topical Daily PRN Clemente Mcwilliams MD      lisinopril  40 mg Oral Daily Ryland Pierre, DO      loperamide  2 mg Oral TID PRN Johnie Cee DO      loratadine  10 mg  Oral Daily Johnie Cee DO      magnesium gluconate  500 mg Oral BID AC Johnie Cee DO      multivitamin-minerals  1 tablet Oral Daily César Benavidez MD      thiamine  100 mg Oral Daily César Benavidez MD          Today, Patient Was Seen By: Kailey Pierre DO    **Please Note: This note may have been constructed using a voice recognition system.**

## 2024-04-02 NOTE — H&P
[facility]  H&P  Name: Tanika Lira 58 y.o. female I MRN: [id number]  Unit/Bed#: [facility] 705-01 I Date of Admission: 3/26/2024   Date of Service: 4/1/2024 I Hospital Day: 6      Assessment/Plan   Alcohol use  Assessment & Plan  Last drink 6 days ago   Unlikely to have any withdrawal    Essential hypertension  Assessment & Plan  Continue norvasc lisinopril and nifedipine   Continue labetalol and hydralazine  So far did not need any hydralazine prn and used one dose of labetalol - will keep on current meds.     * Cerebellar hemorrhage (HCC)  Assessment & Plan  Management as per neurology   We are consulted for assistance to keep BP less than 140  As of now blood pressures are well controlled at 138/75  Continue current meds  As per neuro - goal SBP>140              VTE Prophylaxis:   Moderate Risk (Score 3-4) - Pharmacological DVT Prophylaxis Ordered: heparin.    Mobility:   Basic Mobility Inpatient Raw Score: 16  -[facility] Goal: 5: Stand one or more mins  -[facility] Achieved: 7: Walk 25 feet or more  As per PT    Recommendations for Discharge:  Likely DC on current oral meds    Total Time Spent on Date of Encounter in care of patient: 20 mins. This time was spent on one or more of the following: performing physical exam; counseling and coordination of care; obtaining or reviewing history; documenting in the medical record; reviewing/ordering tests, medications or procedures; communicating with other healthcare professionals and discussing with patient's family/caregivers.    Collaboration of Care: Were Recommendations Directly Discussed with Primary Treatment Team? No    History of Present Illness:  Tanika Lira is a 58 y.o. female who is originally admitted to the Neurology  service due to cerebellar hemorrhage . We are consulted for assistance with tight blood pressure control needed in setting on intracranial bleed.     Patient is on lisinopril, amlodipine, nifedipine at home as  "prescribed by her cardiologist.  Initially her blood pressure was not easily controlled this morning however today her most recent blood pressure is 138 systolic in the setting of only receiving 1 dose of as needed labetalol and none of her hydralazine being used.    She herself has no headaches and feels comfortable with no new neurological deficits.    For now we will keep the current dose education including lisinopril 40 mg once a day nifedipine 30 mg once a day and Norvasc 1 mg once a day.    Keep current as needed medications and will adjust scheduled meds if she needs a lot of PRNs    Review of Systems:  Review of Systems   Constitutional: Negative.    HENT: Negative.     Eyes: Negative.    Respiratory: Negative.     Cardiovascular: Negative.    Gastrointestinal: Negative.    Endocrine: Negative.    Genitourinary: Negative.    Musculoskeletal: Negative.    Skin: Negative.    Allergic/Immunologic: Negative.    Neurological: Negative.    Hematological: Negative.    Psychiatric/Behavioral: Negative.         Past Medical and Surgical History:   Past Medical History:   Diagnosis Date    CVA (cerebral vascular accident) ([facility])     Diverticulitis     Diverticulitis of colon     Diverticulosis     Hypertension     Stroke (HCC)        Past Surgical History:   Procedure Laterality Date    CARDIAC ELECTROPHYSIOLOGY PROCEDURE N/A 2/8/2023    Procedure: Cardiac loop recorder implant;  Surgeon: Duke Abraham MD;  Location: [facility];  Service: Cardiology    COLON SIGMOID RESECTION         Meds/Allergies:  all medications and allergies reviewed    Allergies: No Known Allergies    Social History:  Marital Status:   Substance Use History:   Social History     Substance and Sexual Activity   Alcohol Use Yes    Comment: \"twisted tea daily\"\"     Social History     Tobacco Use   Smoking Status Light Smoker    Types: Cigarettes   Smokeless Tobacco Never   Tobacco Comments    smokes 4 cigarettes daily     Social " "History     Substance and Sexual Activity   Drug Use Yes    Types: Marijuana    Comment: medical card       Family History:  Family History   Problem Relation Age of Onset    Coronary artery disease Mother     Heart disease Mother     Diabetes Father     No Known Problems Sister     Breast cancer Maternal Grandmother         age unknown    No Known Problems Maternal Grandfather     No Known Problems Paternal Grandmother     No Known Problems Paternal Grandfather     No Known Problems Paternal Aunt     No Known Problems Paternal Aunt     No Known Problems Maternal Aunt        Physical Exam:   Vitals:   Blood Pressure: 138/75 (04/01/24 1507)  Pulse: 59 (04/01/24 1507)  Temperature: 99 °F (37.2 °C) (04/01/24 1456)  Temp Source: Oral (03/28/24 1058)  Respirations: 18 (04/01/24 1456)  Height: 5' 6\" (167.6 cm) (03/26/24 1355)  Weight - Scale: 61.6 kg (135 lb 12.9 oz) (04/01/24 0600)  SpO2: 94 % (04/01/24 1507)    Physical Exam  Constitutional:       General: She is not in acute distress.     Appearance: She is not ill-appearing, toxic-appearing or diaphoretic.   HENT:      Head: Normocephalic.      Mouth/Throat:      Mouth: Mucous membranes are moist.   Eyes:      General: No scleral icterus.        Right eye: No discharge.         Left eye: No discharge.      Pupils: Pupils are equal, round, and reactive to light.   Cardiovascular:      Rate and Rhythm: Normal rate.      Heart sounds: No murmur heard.     No friction rub. No gallop.   Pulmonary:      Effort: No respiratory distress.      Breath sounds: No stridor. No wheezing, rhonchi or rales.   Chest:      Chest wall: No tenderness.   Abdominal:      General: There is no distension.      Palpations: There is no mass.      Tenderness: There is no abdominal tenderness. There is no right CVA tenderness, left CVA tenderness, guarding or rebound.      Hernia: No hernia is present.   Neurological:      Mental Status: She is alert and oriented to person, place, and time.      " Cranial Nerves: No cranial nerve deficit.   Psychiatric:         Mood and Affect: Mood normal.          Additional Data:   Lab Results:    Results from last 7 days   Lab Units 03/31/24  1053 03/29/24  0622   WBC Thousand/uL 8.46 7.41   HEMOGLOBIN g/dL 14.6 14.7   HEMATOCRIT % 44.3 45.5   PLATELETS Thousands/uL 224 189   NEUTROS PCT %  --  47   LYMPHS PCT %  --  39   MONOS PCT %  --  13*   EOS PCT %  --  1     Results from last 7 days   Lab Units 03/31/24  1053 03/29/24  0622   SODIUM mmol/L 133* 134*   POTASSIUM mmol/L 3.7 3.8   CHLORIDE mmol/L 101 102   CO2 mmol/L 25 26   BUN mg/dL 18 17   CREATININE mg/dL 0.57* 0.57*   ANION GAP mmol/L 7 6   CALCIUM mg/dL 8.4 8.4   ALBUMIN g/dL  --  3.2*   TOTAL BILIRUBIN mg/dL  --  0.58   ALK PHOS U/L  --  78   ALT U/L  --  65*   AST U/L  --  57*   GLUCOSE RANDOM mg/dL 106 98     Results from last 7 days   Lab Units 03/26/24  1647   INR  1.11         Lab Results   Component Value Date/Time    HGBA1C 5.2 09/02/2022 03:05 AM     Results from last 7 days   Lab Units 03/26/24  0844   POC GLUCOSE mg/dl 166*     Results from last 7 days   Lab Units 03/26/24  0820   LACTIC ACID mmol/L 1.1       Imaging: No pertinent imaging reviewed.  CT head wo contrast   Final Result by Riki Jacob MD (03/28 0354)      No significant interval change with findings detailed above.                  Workstation performed: [id number]         CT head wo contrast   Final Result by Gurwinder Cisneros DO (03/27 0515)      Redemonstration of hyperdense hemorrhage in the left paramedian cerebellum, as described with mass effect and extension into the fourth ventricle and left foramen of Luschka, similar in appearance to the prior.      No hydrocephalus.      Other findings as above. Overall there has been no significant interval change.      Follow-up as clinically warranted.                  Workstation performed: [id number]         CT head wo contrast    (Results Pending)       EKG, Pathology, and  Other Studies Reviewed on Admission:   EKG: NSR. HR 70s.    ** Please Note: This note may have been constructed using a voice recognition system. **

## 2024-04-02 NOTE — ASSESSMENT & PLAN NOTE
Left cerebellar hemorrhage most likely in setting of hypertensive emergency  We are consulted for assistance to keep BP less than 140  Additional management per neurology

## 2024-04-02 NOTE — PROGRESS NOTES
NEUROLOGY RESIDENCY PROGRESS NOTE     Name: Tanika Lira   Age & Sex: 58 y.o. female   MRN: [id number]  Unit/Bed#: [facility] 705-01   Encounter: [id number]    Tanika Lira will need follow up in in 6 weeks with neurovascular Attending (Dr. Martinez) . Recommend repeat CT head in 4/11 before clearing for retaking plavix.     Pending for discharge: Rehab    ASSESSMENT & PLAN     * Cerebellar hemorrhage (HCC)  Assessment & Plan  Patient is a 58 year old woman with hx of right hemisphere stroke (possibly right pontine infarct) on aspirin , HTN, HLD presenting for left cerebellar hemorrhage with IVH. Patient's symptoms are of nausea, vomiting, diarrhea, vertigo sensation but no focal numbness, weakness, visual deficits, nor headaches. Also, horizontal diplopia. Patient has history of right sided stroke in 2022 which she was placed on DAPT and to continue plavix after 21 days. Patient states she is on aspirin daily on this visit. Initial blood pressure recorded at [facility] is 222/136. On exam, continued but improving L>R dysmetria.  Thinners: aspirin reversed with DDAVP  Initial BP: Blood Pressure: 144/78  Presenting exam: Bilateral dysmetric L>R  Vascular risk factors: HTN, HLD, previous stroke    Workup:  CT head wo contrast 3/26/2024: Acute parenchymal hematoma left paramedian cerebellum measures 2.6 x 1.6 cm with mass effect on the fourth ventricle. Intraventricular extension of hemorrhage noted with blood in the fourth ventricle and left foramen of Luschka.   CT head wo contrast 3/27/2024: Redemonstration of hyperdense hemorrhage in the left paramedian cerebellum, as described with mass effect and extension into the fourth ventricle and left foramen of Luschka, similar in appearance to the prior.   CT head wo contrast 3/28: no significant changes from above  Lab Results   Component Value Date    INR 1.11 03/26/2024    HGBA1C 5.2 09/02/2022    SODIUM 136 03/26/2024       Pertinent scores:  - ICH: 2 due to IVH  and Infratentorial origin    Impression: Left cerebellar hemorrhage most likely in setting of hypertensive emergency with blood pressures in the 220s sbp 2/2 to poor medication adherence of antihypertensives and methamphetamine use but now stable.    Plan:  S/P DDAVP X2  Repeat CT head in 4 weeks and if stable, and hemorrhage is resolved, then consider resuming plavix 75mg PO qdaily at that time   Neurochecks  Repeat CTH if > 2 pt drop in GCS in one hour  Goal SBP between 120 and 140 as much as possible; improved  PT/OT/Speech/PMR consults when able  Replete lytes as needed as per CC  No chemical ppx due to hemorrhage, SCDs for ppx  Hold AP agents until 4/11 and then can retake plavix 75mg daily  BG < 180, SSI for coverage    Diarrhea  Assessment & Plan  - Patient reports having longstanding diarrhea prior to presentation  - Patient does not meet conditions for C. difficile testing, additionally there is no clinical suspicion for it  - Imodium PRN continue for now  - Medicine consulted  - Improved    Alcohol use  Assessment & Plan  Discuss alcohol reduction. Patient understands the risks of alcohol.    Continue thiamine 100mg daily    ROSANA (generalized anxiety disorder)  Assessment & Plan  Continue home lexapro.    Essential hypertension  Assessment & Plan  Patient noncompliant on antihypertensives at home.  Patient on amlodipine 10mg daily and lisinopril 40mg daily which are increase from previous home medications.    However, blood pressure had difficulty of being controlled. Medicine consulted- Blood pressure improved    Continue and control blood pressure acutely to sbp<140 with hydralazine 10mg PRN and labetalol 20mg PRN    Tobacco abuse  Assessment & Plan  Continue nicotine patch  Smoking cessation discussed    History of CVA (cerebrovascular accident)  Assessment & Plan  History of stroke 2022 pontine which patient was to be on DAPT and then continue only plavix. Patient not compliant on her AP  agents.    Holding AP agents until  and then repeat CT head to see if improving and continue plavix afterward.              SUBJECTIVE     Patient was seen and examined. No acute events overnight. Patient has improved bowel movements. No double vision, Improving vertigo. No new numbness, no new weakness, no new visual deficits, chest pain, palpitations.    ROS is negative unless stated above.    OBJECTIVE     Patient ID: Tanika Lira is a 58 y.o. female.    Vitals:    24 2213 24 0538 24 0725 24 0835   BP: 120/60  169/93 167/93   BP Location:       Pulse: 64  68 65   Resp: 18  18    Temp: 99.6 °F (37.6 °C)  99.5 °F (37.5 °C)    TempSrc:       SpO2: 96%  96% 94%   Weight:  63 kg (138 lb 12.8 oz)     Height:          Temperature:   Temp (24hrs), Av.4 °F (37.4 °C), Min:99 °F (37.2 °C), Max:99.6 °F (37.6 °C)    Temperature: 99.5 °F (37.5 °C)      Physical Exam  Eyes:      Extraocular Movements: EOM normal.      Pupils: Pupils are equal, round, and reactive to light.   Neurological:      Motor: Motor strength is normal.     Coordination: Finger-Nose-Finger Test abnormal (L>R dysmetria/hypermetria continues to improve).      Deep Tendon Reflexes:      Reflex Scores:       Bicep reflexes are 2+ on the right side and 2+ on the left side.       Brachioradialis reflexes are 2+ on the right side and 2+ on the left side.       Patellar reflexes are 2+ on the right side and 2+ on the left side.       Achilles reflexes are 1+ on the right side and 1+ on the left side.         Neurologic Exam     Mental Status   Oriented to person.   Oriented to place.   Oriented to year, month and date.   Level of consciousness: alert    Cranial Nerves     CN III, IV, VI   Pupils are equal, round, and reactive to light.  Extraocular motions are normal.   Nystagmus: none     CN V   Facial sensation intact.     CN VII   Facial expression full, symmetric.     CN VIII   CN VIII normal.     CN XI   CN XI normal.    Denies double vision     Motor Exam   Overall muscle tone: normal  Right arm tone: normal  Left arm tone: normal  Right leg tone: normal  Left leg tone: normal    Strength   Strength 5/5 throughout.     Sensory Exam   Light touch normal.     Gait, Coordination, and Reflexes     Coordination   Finger to nose coordination: abnormal (L>R dysmetria/hypermetria continues to improve)    Reflexes   Right brachioradialis: 2+  Left brachioradialis: 2+  Right biceps: 2+  Left biceps: 2+  Right patellar: 2+  Left patellar: 2+  Right achilles: 1+  Left achilles: 1+Improving dysmetria/hypermetria          LABORATORY DATA     Labs: I have personally reviewed pertinent reports.    Results from last 7 days   Lab Units 04/02/24  0436 03/31/24  1053 03/29/24  0622 03/28/24  0526   WBC Thousand/uL 7.25 8.46 7.41 6.85   HEMOGLOBIN g/dL 14.4 14.6 14.7 14.4   HEMATOCRIT % 43.9 44.3 45.5 43.7   PLATELETS Thousands/uL 195 224 189 183   NEUTROS PCT %  --   --  47 56   MONOS PCT %  --   --  13* 12   EOS PCT %  --   --  1 0      Results from last 7 days   Lab Units 04/02/24  0436 03/31/24  1053 03/29/24  0622 03/28/24  0526   SODIUM mmol/L 134* 133* 134* 133*   POTASSIUM mmol/L 4.1 3.7 3.8 3.8   CHLORIDE mmol/L 100 101 102 99   CO2 mmol/L 27 25 26 26   BUN mg/dL 16 18 17 16   CREATININE mg/dL 0.56* 0.57* 0.57* 0.61   CALCIUM mg/dL 8.5 8.4 8.4 8.8   ALK PHOS U/L  --   --  78 79   ALT U/L  --   --  65* 75*   AST U/L  --   --  57* 62*     Results from last 7 days   Lab Units 03/31/24  1053 03/29/24  0622 03/28/24  0526   MAGNESIUM mg/dL 1.7* 1.6* 1.8*     Results from last 7 days   Lab Units 03/31/24  1053 03/29/24  0622 03/28/24  0526   PHOSPHORUS mg/dL 3.0 3.6 3.2      Results from last 7 days   Lab Units 03/26/24  1804 03/26/24  1647   INR   --  1.11   PTT seconds 29  --                IMAGING & DIAGNOSTIC TESTING     Radiology Results: I have personally reviewed pertinent films in PACS    CT head wo contrast   Final Result by Riki ESTRADA  MD Cecil (03/28 8634)      No significant interval change with findings detailed above.                  Workstation performed: [id number]         CT head wo contrast   Final Result by Gurwinder Cisneros DO (03/27 0515)      Redemonstration of hyperdense hemorrhage in the left paramedian cerebellum, as described with mass effect and extension into the fourth ventricle and left foramen of Luschka, similar in appearance to the prior.      No hydrocephalus.      Other findings as above. Overall there has been no significant interval change.      Follow-up as clinically warranted.                  Workstation performed: [id number]         CT head wo contrast    (Results Pending)   CT head wo contrast    (Results Pending)       Other Diagnostic Testing: I have personally reviewed pertinent reports.      ACTIVE MEDICATIONS     Current Facility-Administered Medications   Medication Dose Route Frequency    acetaminophen (TYLENOL) tablet 650 mg  650 mg Oral Q6H PRN    amLODIPine (NORVASC) tablet 10 mg  10 mg Oral Daily    atorvastatin (LIPITOR) tablet 40 mg  40 mg Oral Daily With Dinner    chlorhexidine (PERIDEX) 0.12 % oral rinse 15 mL  15 mL Mouth/Throat Q12H NAOMI    escitalopram (LEXAPRO) tablet 20 mg  20 mg Oral Daily    folic acid (FOLVITE) tablet 1 mg  1 mg Oral Daily    gabapentin (NEURONTIN) capsule 300 mg  300 mg Oral HS    heparin (porcine) subcutaneous injection 5,000 Units  5,000 Units Subcutaneous Q8H NAOMI    hydrALAZINE (APRESOLINE) injection 10 mg  10 mg Intravenous Q4H PRN    labetalol (NORMODYNE) injection 10 mg  10 mg Intravenous Q4H PRN    lidocaine (LIDODERM) 5 % patch 1 patch  1 patch Topical Daily PRN    lisinopril (ZESTRIL) tablet 40 mg  40 mg Oral Daily    loperamide (IMODIUM) oral liquid 2 mg  2 mg Oral TID PRN    loratadine (CLARITIN) tablet 10 mg  10 mg Oral Daily    magnesium gluconate (MAGONATE) tablet 500 mg  500 mg Oral BID AC    multivitamin-minerals (CENTRUM) tablet 1 tablet  1 tablet  Oral Daily    thiamine tablet 100 mg  100 mg Oral Daily       Prior to Admission medications    Medication Sig Start Date End Date Taking? Authorizing Provider   aspirin (ECOTRIN LOW STRENGTH) 81 mg EC tablet Take 1 tablet (81 mg total) by mouth daily 10/7/22   Alec Kamara MD   atorvastatin (LIPITOR) 40 mg tablet Take 1 tablet (40 mg total) by mouth daily 6/13/23   Tanika Winter, MILAN   clopidogrel (PLAVIX) 75 mg tablet Take 1 tablet (75 mg total) by mouth daily 6/13/23   Tanika Winter, MICHAELNP   escitalopram (Lexapro) 20 mg tablet Take 1 tablet (20 mg total) by mouth daily 10/19/22   Tanika Winter, MICHAELNP   gabapentin (NEURONTIN) 300 mg capsule Take 1 capsule (300 mg total) by mouth daily at bedtime 6/13/23   Tnaika Winter, MICHAELNP   hydrOXYzine HCL (ATARAX) 50 mg tablet Take 1 tablet (50 mg total) by mouth every 6 (six) hours as needed for itching 6/13/23   Tanika Winter, MICHAELNP   lisinopril (ZESTRIL) 20 mg tablet Take 1 tablet (20 mg total) by mouth daily 6/13/23   Tanika Winter, MICHAELNP   NIFEdipine ER (ADALAT CC) 30 MG 24 hr tablet Take 1 tablet (30 mg total) by mouth daily 6/13/23   Tanika Winter, MILAN         VTE Pharmacologic Prophylaxis: Heparin  VTE Mechanical Prophylaxis: sequential compression device    ======    I have discussed the patient's history, physical exam findings, assessment, and plan in detail with attending, Dr. Khanna    Thank you for allowing me to participate in the care of your patient, Tanika Lira.    Clemente Mcwilliams MD  [facility] Neurology Residency, PGY-4

## 2024-04-02 NOTE — PLAN OF CARE
Problem: Prexisting or High Potential for Compromised Skin Integrity  Goal: Skin integrity is maintained or improved  Description: INTERVENTIONS:  - Identify patients at risk for skin breakdown  - Assess and monitor skin integrity  - Assess and monitor nutrition and hydration status  - Monitor labs   - Assess for incontinence   - Turn and reposition patient  - Assist with mobility/ambulation  - Relieve pressure over bony prominences  - Avoid friction and shearing  - Provide appropriate hygiene as needed including keeping skin clean and dry  - Evaluate need for skin moisturizer/barrier cream  - Collaborate with interdisciplinary team   - Patient/family teaching  - Consider wound care consult   4/1/2024 2220 by Doris Vizcarra RN  Outcome: Progressing  4/1/2024 2220 by Doris Vizcarra RN  Outcome: Progressing     Problem: Neurological Deficit  Goal: Neurological status is stable or improving  Description: Interventions:  - Monitor and assess patient's level of consciousness, motor function, sensory function, and level of assistance needed for ADLs.   - Monitor and report changes from baseline. Collaborate with interdisciplinary team to initiate plan and implement interventions as ordered.   - Provide and maintain a safe environment.  - Consider seizure precautions.  - Consider fall precautions.  - Consider aspiration precautions.  - Consider bleeding precautions.  4/1/2024 2220 by Doris Vizcarra RN  Outcome: Progressing  4/1/2024 2220 by Doris Vizcarra RN  Outcome: Progressing     Problem: Activity Intolerance/Impaired Mobility  Goal: Mobility/activity is maintained at optimum level for patient  Description: Interventions:  - Assess and monitor patient  barriers to mobility and need for assistive/adaptive devices.  - Assess patient's emotional response to limitations.  - Collaborate with interdisciplinary team and initiate plans and interventions as ordered.  - Encourage independent activity per ability.  - Maintain proper body  alignment.  - Perform active/passive rom as tolerated/ordered.  - Plan activities to conserve energy.  - Turn patient as appropriate  Outcome: Progressing     Problem: Communication Impairment  Goal: Ability to express needs and understand communication  Description: Assess patient's communication skills and ability to understand information.  Patient will demonstrate use of effective communication techniques, alternative methods of communication and understanding even if not able to speak.     - Encourage communication and provide alternate methods of communication as needed.  - Collaborate with case management/ for discharge needs.  - Include patient/family/caregiver in decisions related to communication.  Outcome: Progressing     Problem: Potential for Aspiration  Goal: Non-ventilated patient's risk of aspiration is minimized  Description: Assess and monitor vital signs, respiratory status, and labs (WBC).  Monitor for signs of aspiration (tachypnea, cough, rales, wheezing, cyanosis, fever).    - Assess and monitor patient's ability to swallow.  - Place patient up in chair to eat if possible.  - HOB up at 90 degrees to eat if unable to get patient up into chair.  - Supervise patient during oral intake.   - Instruct patient/ family to take small bites.  - Instruct patient/ family to take small single sips when taking liquids.  - Follow patient-specific strategies generated by speech pathologist.  Outcome: Progressing

## 2024-04-02 NOTE — CASE MANAGEMENT
Called into [facility] to check status of pending Acute Rehab auth. Spoke to Anali who stated auth is pending for review. Pending auth # [id number].     notified: Jojo MARQUEZ

## 2024-04-02 NOTE — PLAN OF CARE
Problem: NEUROSENSORY - ADULT  Goal: Achieves stable or improved neurological status  Description: INTERVENTIONS  - Monitor and report changes in neurological status  - Monitor vital signs such as temperature, blood pressure, glucose, and any other labs ordered   - Initiate measures to prevent increased intracranial pressure  - Monitor for seizure activity and implement precautions if appropriate      Outcome: Progressing    Outcome: Progressing  Goal: Achieves maximal functionality and self care  Description: INTERVENTIONS  - Monitor swallowing and airway patency with patient fatigue and changes in neurological status  - Encourage and assist patient to increase activity and self care.   - Encourage visually impaired, hearing impaired and aphasic patients to use assistive/communication devices  Outcome: Progressing

## 2024-04-03 ENCOUNTER — HOSPITAL ENCOUNTER (INPATIENT)
Facility: HOSPITAL | Age: 59
LOS: 13 days | Discharge: HOME HEALTH | DRG: 058 | End: 2024-04-16
Payer: COMMERCIAL

## 2024-04-03 VITALS
HEIGHT: 66 IN | RESPIRATION RATE: 18 BRPM | WEIGHT: 137 LBS | SYSTOLIC BLOOD PRESSURE: 149 MMHG | OXYGEN SATURATION: 93 % | DIASTOLIC BLOOD PRESSURE: 84 MMHG | BODY MASS INDEX: 22.02 KG/M2 | HEART RATE: 69 BPM | TEMPERATURE: 98.3 F

## 2024-04-03 DIAGNOSIS — G62.9 POLYNEUROPATHY: ICD-10-CM

## 2024-04-03 DIAGNOSIS — G47.00 INSOMNIA: ICD-10-CM

## 2024-04-03 DIAGNOSIS — N95.8 SIMPLE ADNEXAL CYST GREATER THAN 1 CM IN DIAMETER IN POSTMENOPAUSAL PATIENT: ICD-10-CM

## 2024-04-03 DIAGNOSIS — B19.20 HEPATITIS C: ICD-10-CM

## 2024-04-03 DIAGNOSIS — F41.9 ANXIETY: ICD-10-CM

## 2024-04-03 DIAGNOSIS — Z86.73 HISTORY OF CVA (CEREBROVASCULAR ACCIDENT): ICD-10-CM

## 2024-04-03 DIAGNOSIS — Z78.9 ALCOHOL USE: ICD-10-CM

## 2024-04-03 DIAGNOSIS — I61.4 CEREBELLAR HEMORRHAGE (HCC): Primary | ICD-10-CM

## 2024-04-03 DIAGNOSIS — J30.2 SEASONAL ALLERGIES: ICD-10-CM

## 2024-04-03 DIAGNOSIS — Z00.00 HEALTHCARE MAINTENANCE: ICD-10-CM

## 2024-04-03 DIAGNOSIS — K30 INDIGESTION: ICD-10-CM

## 2024-04-03 DIAGNOSIS — F41.1 GAD (GENERALIZED ANXIETY DISORDER): ICD-10-CM

## 2024-04-03 DIAGNOSIS — I10 ESSENTIAL HYPERTENSION: ICD-10-CM

## 2024-04-03 DIAGNOSIS — K81.1 CHRONIC CHOLECYSTITIS: ICD-10-CM

## 2024-04-03 DIAGNOSIS — R10.11 ABDOMINAL PAIN, RUQ (RIGHT UPPER QUADRANT): ICD-10-CM

## 2024-04-03 DIAGNOSIS — Z91.199 HISTORY OF NONCOMPLIANCE WITH MEDICAL TREATMENT: ICD-10-CM

## 2024-04-03 DIAGNOSIS — G62.9 NEUROPATHY: ICD-10-CM

## 2024-04-03 DIAGNOSIS — R93.5 ABNORMAL CT OF THE ABDOMEN: ICD-10-CM

## 2024-04-03 DIAGNOSIS — R10.9 ABDOMINAL DISCOMFORT: ICD-10-CM

## 2024-04-03 DIAGNOSIS — R74.01 TRANSAMINITIS: ICD-10-CM

## 2024-04-03 DIAGNOSIS — N94.89 SIMPLE ADNEXAL CYST GREATER THAN 1 CM IN DIAMETER IN POSTMENOPAUSAL PATIENT: ICD-10-CM

## 2024-04-03 DIAGNOSIS — E83.42 HYPOMAGNESEMIA: ICD-10-CM

## 2024-04-03 DIAGNOSIS — E55.9 VITAMIN D INSUFFICIENCY: ICD-10-CM

## 2024-04-03 PROBLEM — E87.1 HYPONATREMIA: Status: ACTIVE | Noted: 2024-04-03

## 2024-04-03 PROBLEM — F15.10 AMPHETAMINE ABUSE (HCC): Status: ACTIVE | Noted: 2024-04-03

## 2024-04-03 PROBLEM — Z91.89 AT RISK FOR VENOUS THROMBOEMBOLISM (VTE): Status: ACTIVE | Noted: 2024-04-03

## 2024-04-03 PROBLEM — F12.90 MARIJUANA USE: Status: ACTIVE | Noted: 2024-04-03

## 2024-04-03 LAB
ANION GAP SERPL CALCULATED.3IONS-SCNC: 8 MMOL/L (ref 4–13)
BUN SERPL-MCNC: 20 MG/DL (ref 5–25)
CALCIUM SERPL-MCNC: 8.5 MG/DL (ref 8.4–10.2)
CHLORIDE SERPL-SCNC: 99 MMOL/L (ref 96–108)
CO2 SERPL-SCNC: 26 MMOL/L (ref 21–32)
CREAT SERPL-MCNC: 0.55 MG/DL (ref 0.6–1.3)
GFR SERPL CREATININE-BSD FRML MDRD: 102 ML/MIN/1.73SQ M
GLUCOSE SERPL-MCNC: 96 MG/DL (ref 65–140)
POTASSIUM SERPL-SCNC: 4.1 MMOL/L (ref 3.5–5.3)
SODIUM SERPL-SCNC: 133 MMOL/L (ref 135–147)

## 2024-04-03 PROCEDURE — 97116 GAIT TRAINING THERAPY: CPT

## 2024-04-03 PROCEDURE — 99223 1ST HOSP IP/OBS HIGH 75: CPT

## 2024-04-03 PROCEDURE — 97535 SELF CARE MNGMENT TRAINING: CPT

## 2024-04-03 PROCEDURE — 84443 ASSAY THYROID STIM HORMONE: CPT

## 2024-04-03 PROCEDURE — 80048 BASIC METABOLIC PNL TOTAL CA: CPT | Performed by: PHYSICIAN ASSISTANT

## 2024-04-03 PROCEDURE — 99238 HOSP IP/OBS DSCHRG MGMT 30/<: CPT | Performed by: PSYCHIATRY & NEUROLOGY

## 2024-04-03 PROCEDURE — 97530 THERAPEUTIC ACTIVITIES: CPT

## 2024-04-03 RX ORDER — AMLODIPINE BESYLATE 10 MG/1
10 TABLET ORAL DAILY
Status: DISCONTINUED | OUTPATIENT
Start: 2024-04-04 | End: 2024-04-16 | Stop reason: HOSPADM

## 2024-04-03 RX ORDER — ACETAMINOPHEN 325 MG/1
650 TABLET ORAL EVERY 6 HOURS PRN
Status: DISCONTINUED | OUTPATIENT
Start: 2024-04-03 | End: 2024-04-09

## 2024-04-03 RX ORDER — GABAPENTIN 100 MG/1
100 CAPSULE ORAL 2 TIMES DAILY
Status: DISCONTINUED | OUTPATIENT
Start: 2024-04-03 | End: 2024-04-16 | Stop reason: HOSPADM

## 2024-04-03 RX ORDER — BISACODYL 10 MG
10 SUPPOSITORY, RECTAL RECTAL DAILY PRN
Status: DISCONTINUED | OUTPATIENT
Start: 2024-04-03 | End: 2024-04-16 | Stop reason: HOSPADM

## 2024-04-03 RX ORDER — ADHESIVE BANDAGE 3/4"
BANDAGE TOPICAL DAILY
Qty: 1 EACH | Refills: 0 | Status: ON HOLD | OUTPATIENT
Start: 2024-04-03

## 2024-04-03 RX ORDER — CHLORHEXIDINE GLUCONATE ORAL RINSE 1.2 MG/ML
15 SOLUTION DENTAL EVERY 12 HOURS SCHEDULED
Status: DISCONTINUED | OUTPATIENT
Start: 2024-04-03 | End: 2024-04-16 | Stop reason: HOSPADM

## 2024-04-03 RX ORDER — ATORVASTATIN CALCIUM 40 MG/1
40 TABLET, FILM COATED ORAL
Status: DISCONTINUED | OUTPATIENT
Start: 2024-04-03 | End: 2024-04-08

## 2024-04-03 RX ORDER — ESCITALOPRAM OXALATE 10 MG/1
20 TABLET ORAL DAILY
Status: DISCONTINUED | OUTPATIENT
Start: 2024-04-04 | End: 2024-04-04

## 2024-04-03 RX ORDER — POLYETHYLENE GLYCOL 3350 17 G/17G
17 POWDER, FOR SOLUTION ORAL DAILY PRN
Status: DISCONTINUED | OUTPATIENT
Start: 2024-04-03 | End: 2024-04-16 | Stop reason: HOSPADM

## 2024-04-03 RX ORDER — HYDRALAZINE HYDROCHLORIDE 10 MG/1
10 TABLET, FILM COATED ORAL EVERY 6 HOURS PRN
Status: DISCONTINUED | OUTPATIENT
Start: 2024-04-03 | End: 2024-04-16 | Stop reason: HOSPADM

## 2024-04-03 RX ORDER — GABAPENTIN 300 MG/1
300 CAPSULE ORAL
Status: DISCONTINUED | OUTPATIENT
Start: 2024-04-03 | End: 2024-04-16 | Stop reason: HOSPADM

## 2024-04-03 RX ORDER — HYDROCHLOROTHIAZIDE 12.5 MG/1
12.5 TABLET ORAL DAILY
Qty: 30 TABLET | Refills: 0 | Status: ON HOLD | OUTPATIENT
Start: 2024-04-03

## 2024-04-03 RX ORDER — HYDROXYZINE HYDROCHLORIDE 25 MG/1
25 TABLET, FILM COATED ORAL EVERY 6 HOURS PRN
Status: DISCONTINUED | OUTPATIENT
Start: 2024-04-03 | End: 2024-04-04

## 2024-04-03 RX ORDER — UREA 10 %
500 LOTION (ML) TOPICAL
Status: DISCONTINUED | OUTPATIENT
Start: 2024-04-03 | End: 2024-04-05

## 2024-04-03 RX ORDER — HEPARIN SODIUM 5000 [USP'U]/ML
5000 INJECTION, SOLUTION INTRAVENOUS; SUBCUTANEOUS EVERY 8 HOURS SCHEDULED
Status: DISCONTINUED | OUTPATIENT
Start: 2024-04-03 | End: 2024-04-08

## 2024-04-03 RX ORDER — AMLODIPINE BESYLATE 10 MG/1
10 TABLET ORAL DAILY
Qty: 30 TABLET | Refills: 0 | Status: ON HOLD | OUTPATIENT
Start: 2024-04-03

## 2024-04-03 RX ORDER — LISINOPRIL 20 MG/1
40 TABLET ORAL DAILY
Status: DISCONTINUED | OUTPATIENT
Start: 2024-04-04 | End: 2024-04-16 | Stop reason: HOSPADM

## 2024-04-03 RX ORDER — HYDROCHLOROTHIAZIDE 12.5 MG/1
12.5 TABLET ORAL DAILY
Status: DISCONTINUED | OUTPATIENT
Start: 2024-04-04 | End: 2024-04-16 | Stop reason: HOSPADM

## 2024-04-03 RX ORDER — FOLIC ACID 1 MG/1
1 TABLET ORAL DAILY
Status: DISCONTINUED | OUTPATIENT
Start: 2024-04-04 | End: 2024-04-16 | Stop reason: HOSPADM

## 2024-04-03 RX ORDER — LISINOPRIL 40 MG/1
40 TABLET ORAL DAILY
Qty: 30 TABLET | Refills: 0 | Status: ON HOLD | OUTPATIENT
Start: 2024-04-03

## 2024-04-03 RX ORDER — LANOLIN ALCOHOL/MO/W.PET/CERES
3 CREAM (GRAM) TOPICAL
Status: DISCONTINUED | OUTPATIENT
Start: 2024-04-03 | End: 2024-04-16 | Stop reason: HOSPADM

## 2024-04-03 RX ORDER — LORATADINE 10 MG/1
10 TABLET ORAL DAILY
Status: DISCONTINUED | OUTPATIENT
Start: 2024-04-04 | End: 2024-04-16 | Stop reason: HOSPADM

## 2024-04-03 RX ORDER — LANOLIN ALCOHOL/MO/W.PET/CERES
100 CREAM (GRAM) TOPICAL DAILY
Status: DISCONTINUED | OUTPATIENT
Start: 2024-04-04 | End: 2024-04-16 | Stop reason: HOSPADM

## 2024-04-03 RX ADMIN — AMLODIPINE BESYLATE 10 MG: 10 TABLET ORAL at 09:49

## 2024-04-03 RX ADMIN — LISINOPRIL 40 MG: 20 TABLET ORAL at 09:49

## 2024-04-03 RX ADMIN — Medication 500 MG: at 17:20

## 2024-04-03 RX ADMIN — HEPARIN SODIUM 5000 UNITS: 5000 INJECTION INTRAVENOUS; SUBCUTANEOUS at 21:31

## 2024-04-03 RX ADMIN — CHLORHEXIDINE GLUCONATE 0.12% ORAL RINSE 15 ML: 1.2 LIQUID ORAL at 21:31

## 2024-04-03 RX ADMIN — Medication 1 TABLET: at 09:49

## 2024-04-03 RX ADMIN — HEPARIN SODIUM 5000 UNITS: 5000 INJECTION INTRAVENOUS; SUBCUTANEOUS at 05:15

## 2024-04-03 RX ADMIN — GABAPENTIN 100 MG: 100 CAPSULE ORAL at 17:20

## 2024-04-03 RX ADMIN — ESCITALOPRAM OXALATE 20 MG: 20 TABLET ORAL at 09:50

## 2024-04-03 RX ADMIN — ATORVASTATIN CALCIUM 40 MG: 40 TABLET, FILM COATED ORAL at 17:20

## 2024-04-03 RX ADMIN — LORATADINE 10 MG: 10 TABLET ORAL at 09:50

## 2024-04-03 RX ADMIN — THIAMINE HCL TAB 100 MG 100 MG: 100 TAB at 09:50

## 2024-04-03 RX ADMIN — Medication 500 MG: at 09:49

## 2024-04-03 RX ADMIN — GABAPENTIN 300 MG: 300 CAPSULE ORAL at 21:31

## 2024-04-03 RX ADMIN — FOLIC ACID 1 MG: 1 TABLET ORAL at 09:49

## 2024-04-03 RX ADMIN — HYDROCHLOROTHIAZIDE 12.5 MG: 12.5 TABLET ORAL at 09:49

## 2024-04-03 RX ADMIN — MELATONIN TAB 3 MG 3 MG: 3 TAB at 21:31

## 2024-04-03 RX ADMIN — CHLORHEXIDINE GLUCONATE 15 ML: 1.2 SOLUTION ORAL at 09:50

## 2024-04-03 NOTE — TREATMENT PLAN
Individualized Plan of Care - [facility] Rehabilitation [place]  Tanika Lira 58 y.o. female MRN: [id number]  Unit/Bed#: [facility] 266-01 Encounter: [id number]     PATIENT INFORMATION  ADMISSION DATE: 4/3/2024  3:25 PM SHANI CATEGORY:Stroke:  01.4  No paresis   ADMISSION DIAGNOSIS: Left cerebellar hemorrhage EXPECTED LOS: 14 days     MEDICAL/FUNCTIONAL PROGNOSIS  Pre-admit screens and post-admit evaluations reviewed and are consistent.     Based on my assessment of the patient's medical conditions and current functional status, the prognosis for attaining medical and functional goals or the IRF stay is:  Good.    Patient is appropriate for acute rehabilitation.    Medical Goals:   Patient will be medically stable for discharge back to community setting upon completion or acute rehab program and patient/family will be able to manage medical conditions and comorbid conditions with medications and appropriate follow up upon completion of rehab program.    Cardiopulmonary function: Ensure cardiopulmonary stability and optimize cardiopulmonary function not only at rest but with activity as patient's activity level significantly increases in acute rehab compared with prior to transfer in preparation for safe discharge from [facility].  Must closely and frequently monitor blood pressure and HR to ensure adequate cardiac output during ADLs and ambulation as patient is at increased risk for orthostatic hypotension/syncope and potential injury if not monitored for and managed adequately.    Blood pressure management:    Frequent monitoring of blood pressure with appropriate adjustments in blood pressure medication management to optimize blood pressure control and prevent/limit renal complications.   Monitoring impact of blood pressure and side-effects of blood pressure medications at rest and with activity.  Neurologic Disorder: CVA/ICH causing impaired mobility, ADLs, and gait:  intensive skilled therapies with physical therapy and  occupational therapy with close oversight and management by rehab specialized physician in acute rehabilitation setting to most expeditiously and effectively improve functional mobility, transfers, upper and lower body strengthening, conditioning, balance, and gait training with appropriate assistive device.  Patient will have optimal supervision and management of patient's underlying neurologic disorder with specialized rehabilitation physician during this period of recovery to ensure most expeditious and optimal recovery with decreased risks of fall/injury and other complications including acute worsening of neuro disorder, decrease risk of VTE, PNA, and skin ulceration.  Inpatient rehabilitation education/teaching:  To be provided to patient and typically family/caregiver (if able to be identified) by all skilled therapists, rehab nursing, case management, and rehab specialized physician to ensure optimal recovery and decrease risks of complications in both acute rehabilitation setting as well as after discharge.   Anxiety: Patient's anxiety and it's impact on therapy participation and functional recovery will improve during course with supportive counseling, relaxation/breathing techniques and if necessary medication management.  Requires frequent re-assessment and close management to ensure anxiety/depression management during acute rehab course with planning for appropriate outpatient management to ensure optimal mental health and functional recovery.    Substance abuse hx: Amphetamine, MJ, Nicotine, Alcohol  history - provide teaching/resources on substance abuse to patient (and family if appropriate) as well as counseling on abstinence, coping with anxiety/depression, and provide outpatient resources.  Rehab physician oversight of behavioral management and if necessary pharmacologic management during course.  Recommend psychology consultation and management as well during acute rehab course and possible  additional recommendations after discharge from acute rehab setting.    Bladder dysfunction:  Appropriate bladder management with appropriate toileting program from rehab nursing and staff with oversight management by rehabilitation physicians which include appropriate monitoring and possible adjustments in medications to with goals to optimize bladder function and decrease risk of bladder retention, incontinence, and urinary tract infection.   Skin management: Increased risk of skin wounds/breakdown/rashes due to recent immobility and co-morbidities.   Appropriate skin checks from nursing and as needed physician.  Frequent turning, application of appropriate barrier creams/dressings, cushions.  Patient/caregiver education.  Optimal bowel/bladder hygiene and mobilization.        ANTICIPATED DISCHARGE DISPOSITION AND SERVICES  COMMUNITY SETTING:    Previous community setting.    ANTICIPATED FOLLOW-UP SERVICE:   Outpatient Therapy PT, OT     DISCIPLINE SPECIFIC PLANS:  Required Disciplines & Services: PT, OT, ST, Rehabilitation Physician, Rehabillitation Nursing, Case Management, Dietary, +/-Psychology    REQUIRED THERAPY (expected):  Therapy Hours per Day Days per Week Tx Days (Days in ARF)   Physical Therapy 1 5 14   Occupational Therapy 1 5 14   Speech/Language Therapy 1 5 7   NOTE: Additional therapy time(s) may be added as appropriate to meet patient needs and to achieve functional goals.      ANTICIPATED FUNCTIONAL OUTCOMES:  ADL: Patient will be largely modified independent with ADLs upon completion of rehab program    Bladder/Bowel: Patient will be modified independent with bladder/bowel management upon completion of rehab program   Transfers: Patient will be modified independent with transfers upon completion of rehab program   Locomotion: Patient will be at or near modified independent with locomotion (wheelchair or ambulation) upon completion of rehab program     DISCHARGE PLANNING NEEDS  Equipment needs:  To be determined at team conference.      REHAB ANTICIPATED PARTICIPATION RESTRICTIONS:  Uncertain at this time.  To be determined closer to discharge.

## 2024-04-03 NOTE — ASSESSMENT & PLAN NOTE
Was using several times per week   Drug cessation counseling and supportive counseling  Optimal mood/stress mgmt   Patient declined HOST referral for addiction/substance abuse resources

## 2024-04-03 NOTE — ASSESSMENT & PLAN NOTE
Improved but still requiring significant Mag Ox supplementation  Per IM d/c on Mag Ox 800mg TID   Repeat Mag in 1 week with follow-up with PCP  Mag 1.9 (low normal) 4/10 and 4/15   IM consulted, monitoring, repletion, and overall management at their discretion during ARC course

## 2024-04-03 NOTE — ASSESSMENT & PLAN NOTE
Gabapentin also for mood/sleep  Cessation counseling and supportive counseling  Optimal mood/stress mgmt   CM to assist with HOST referral if agrees

## 2024-04-03 NOTE — ASSESSMENT & PLAN NOTE
Nicotine patch declined by patient   Smoking cessation and supportive counseling  Optimal mood/stress mgmt

## 2024-04-03 NOTE — CASE MANAGEMENT
Case Management Discharge Planning Note    Patient name Tanika Lira  Location [facility]-01 MRN [id number]  : 1965 Date 4/3/2024       Current Admission Date: 3/26/2024  Current Admission Diagnosis:Cerebellar hemorrhage (HCC)   Patient Active Problem List    Diagnosis Date Noted    Diarrhea 2024    Alcohol use 2024    Cerebellar hemorrhage (HCC) 2024    Polyneuropathy 2023    Hallux valgus with bunions of right foot 2023    Hallux valgus with bunions of left foot 2023    Hammer toes of both feet 2023    Macrocytic anemia 2023    ROSANA (generalized anxiety disorder) 2022    Neuropathy 2022    History of CVA (cerebrovascular accident) 2022    Tobacco abuse 2022    Essential hypertension 2022      LOS (days): 8  Geometric Mean LOS (GMLOS) (days):   Days to GMLOS:     OBJECTIVE:  Risk of Unplanned Readmission Score: 11.73         Current admission status: Inpatient   Preferred Pharmacy:   [id number] - [place] - [address]  [address]  [place] [address]  Phone: [phone] Fax: [phone]    Primary Care Provider: MILAN Miller    Primary Insurance: [employer]  Secondary Insurance:     DISCHARGE DETAILS:                                                                                                               Facility Insurance Auth Number: [id number]

## 2024-04-03 NOTE — H&P
PHYSICAL MEDICINE AND REHABILITATION H&P/ADMISSION NOTE  Tanika Lira 58 y.o. female MRN: [id number]  Unit/Bed#: [facility] 266-01 Encounter: [id number]     Rehab Diagnosis: Stroke:  01.4  No paresis    Etiologic Diagnosis:  L cerebellar hemorrhage     History of Present Illness:   58-year-old female with a past medical history of ischemic strokes in 2022, hypertension, generalized anxiety disorder, smoking tobacco, alcohol use, marijuana use, methamphetamine abuse, medical noncompliance who presented to [facility] on 3/26/2024 with dizziness, nausea, and headache after recent fall and vomiting also accompanied by some diarrhea.  Patient noted to be ataxic on exam.  CT showed acute parenchymal hematoma in the left paramedian cerebellum with mass effect on the fourth ventricle with intraventricular extension of the hemorrhage with blood in the fourth ventricle and the left foramen of Luschka as well as some chronic lacunar infarcts.  Patient transferred to [facility] for neurosurgery oversight and evaluation.  Patient placed on EVD watch but ultimately did not need this.  Patient tells me she was not compliant with her blood thinners although she did receive DDAVP for aspirin reversal.  Neurology was consulted and suspected intracerebral hemorrhage related to hypertension and medication noncompliance with recent methamphetamine use.  Follow-up repeat CT head 7/3/2027 328 were stable.  Patient required multiple blood pressure medications and has blood pressure goal of less than 140 and ideally between 120 and 140 is much as possible.  Patient is to repeat CT head on 4/11 and per neurology if improving hemorrhage can resume Plavix without need for aspirin for secondary ischemic stroke prevention.  Neurosurgery would like to see patient in follow-up at about that same time also looking for stability and assess for delayed hydrocephalus.  Patient was evaluated by skilled therapies and was found to have  significant decline in ADLs and ambulation and appears appropriate for admission to [facility].     Chief complaint:  Dizziness/imbalance     Subjective: On evaluation, patient notes some dizziness with sitting up and standing.  She notes imbalance with more incoordination on the left.  She denies significant focal weakness or sensory changes.  She states she does have to be careful somehow she sometimes.  She notes some stable chronic cough without shortness of breath, chest pain, fever, chills, sweats calf pain.  She reports some chronic urinary leakage at times but without acute changes or dysuria.  She denies recent diarrhea or constipation.  She reports some acute on chronic anxiety along with her history of polysubstance abuse.  She reports sleeping poorly in the hospital.  She denies uncontrolled depression.     Review of Systems: A 10 point ROS was performed; negative except as noted above.     * Cerebellar hemorrhage (HCC)  Assessment & Plan  - CTA HN - No left cerebellar vascular malformation to account for the patient's acute parenchymal hematoma. No cervical or intracranial large vessel occlusion, dissection or aneurysm. Mild bilateral cervical carotid bifurcation atherosclerotic disease.  - CTH 3/26 - Acute parenchymal hematoma left paramedian cerebellum measures 2.6 x 1.6 cm with mass effect on the fourth ventricle. Intraventricular extension of hemorrhage noted with blood in the fourth ventricle and left foramen of Luschka.  - Follow-up CTH 3/27 -  Redemonstration of hyperdense hemorrhage in the left paramedian cerebellum, as described with mass effect and extension into the fourth ventricle and left foramen of Luschka, similar in appearance to the prior.    No hydrocephalus.  - Follow-up CTH 3/28 - No significant interval changes    - Residual impairments on admission to [facility]: Incoordination, imbalance, ataxia, possible dysphagia (assess for other impairments during [facility]  "course)     Secondary stroke prevention  - Antithrombotic: Full AC and antiplatelet on hold   - Per neuro, Repeat CTH on 4/11 - if improving hemorrhage, patient can resume plavix 75mg daily for stroke prevention; no need for aspirin in terms of stroke prevention   - Follow-up with Nsx in 1-2 weeks with repeat CTH \"for stability and assess for delayed hydrocephalus. If stable/improved will defer remaining management and follow up with neurology\"  - Statin  - Optimal management of blood pressure   -  on importance of abstinence from smoking alcohol and amphetamine  - Patient at increased risk for stroke particularly early in post-stroke period - monitor neuro exam closely with low threshold to repeat imaging  -  patient and if applicable caregiver on optimal stroke management  - Follow-up with neurology and PCP after d/c   - Recommend acute comprehensive interdisciplinary inpatient rehabilitation to include intensive skilled therapies (PT, OT, ST) as outlined with oversight and management by rehabilitation physician as well as inpatient rehab level nursing, case management and weekly interdisciplinary team meetings.       History of CVA (cerebrovascular accident)  Assessment & Plan  Hx of CVA's - 2022 - MRI 9/2022 - Acute infarcts in right frontal centrum semiovale and right posterior putamen. Small subacute infarct in left paramedian andreea.  Chronic lacunar infarcts in right corona radiata, bilateral basal ganglia, and bilateral thalami.  - Was to be on DAPT and statin but was not compliant    Secondary stroke prevention  - Antithrombotic: Full A/C and antiplatelet on hold for now   - Statin  - Optimal management of blood pressure   -  on importance of abstinence from smoking, alcohol, amphetamine  - Patient at increased risk for stroke particularly early in post-stroke period - monitor neuro exam closely with low threshold to repeat imaging  -  patient and if applicable caregiver on " optimal stroke management  - Follow-up with neurology and PCP after d/c     Essential hypertension  Assessment & Plan  Per neuro Goal SBP between 120 and 140 as much as possible  Internal medicine consulted and co-management with their service  Monitor vitals with and without activity; monitor for orthostasis  Monitor hemoglobin, electrolytes, kidney function, hydration status   Current meds: Amlodipine 10mg qday, HCTZ 12.5mg qday, lisinopril 40mg qday   PRN hydralazine       Amphetamine abuse (HCC)  Assessment & Plan  Was using several times per week   Drug cessation counseling and supportive counseling  Optimal mood/stress mgmt   Neuropsychology consult if available   CM assistance with HOST referral for addiction/substance abuse resources       Marijuana use  Assessment & Plan  Gabapentin [phone]   Cessation counseling and supportive counseling  Optimal mood/stress mgmt   CM to assist with HOST referral     Tobacco abuse  Assessment & Plan  Nicotine patch declined by patient   Smoking cessation and supportive counseling  Optimal mood/stress mgmt       Alcohol use  Assessment & Plan  Thiamine  Gabapentin [phone]   Alcohol cessation counseling and supportive counseling  Optimal mood/stress mgmt   Psychology consult if available        ROSANA (generalized anxiety disorder)  Assessment & Plan  Lexapro 20mg qday (restarted recently)   Hydroxyzine Q6H PRN Anxiety   Gabapentin [phone] (also for hx of EtOH/amphetamine abuse)  Optimal sleep mgmt  Supportive counseling  Psychology consult while in [facility] if available   Counseled on and continue to encourage deep breathing/relaxation/behavioral management techniques      Insomnia  Assessment & Plan  Significant at times  - Sleep log   - Melatonin 3mg HS  - Gabapentin 300mg HS - for anxiety, polysubstance hx as well   - Recommend appropriate sleep hygiene  - Patient counselled/will be counselled on this when appropriate     - Sleep only as much as necessary  to feel rested and then get up or sit up in bed, maintain regular sleep schedule (same bedtime and wake time everyday), do not force sleep, avoid caffeinated beverages after lunch, avoid alcohol at home near bedtime, do not smoke particularly in evening, do not go to bed hungry, adjust bedroom environment so you are comfortable prior to settling down for sleep, deal with concerns or worries before bedtime. Make a list of things to work on for next day so anxiety is reduced at night, exercise regularly when cleared by physician      History of noncompliance with medical treatment  Assessment & Plan  Was not taking meds at home appropriately and following up with OP providers needed after last CVA  - Drug/EtOH cessation counseling  - Patient education      Transaminitis  Assessment & Plan  Hx of alcohol use - alcohol cessation counseling  IM consulted and co-management with their team during AR[facility] course  Monitor LFTs intermittently during course and after discharge  Med adjustments when indicated       Hypomagnesemia  Assessment & Plan  Mag on Friday   IM consulted, monitoring, repletion, and overall management at their discretion during [facility] course      At risk for venous thromboembolism (VTE)  Assessment & Plan  SCDs, ambulation, and heparin Sq (cleared by prior providers)       Hyponatremia  Assessment & Plan  Mild  Repeat BMP Friday         Disposition:   Home with [facility] 2 weeks from admission     Follow-up:   PCP  Neuro  Nsx  HOST referral    CODE: Level 1: Full Code    Restrictions include:  Fall precautions and see above     ---------------------------------------------------------------------------------------------------------------------      OBJECTIVE:    Physical Exam:  Temp:  [98.3 °F (36.8 °C)] 98.3 °F (36.8 °C)  HR:  [65-69] 65  Resp:  [20] 20  BP: (133-149)/(79-85) 133/79  General: Awake, alert in NAD  HENT: NCAT, MMM  Neck: Supple, no lymphadenopathy  Respiratory: Unlabored breathing, breath sounds  "equal, Lungs CTA, no wheezes, rales, or rhonchi  Cardiovascular: Regular rate and rhythm, no murmurs, rubs, or gallops  Gastrointestinal: Soft, non-tender, non-distended, normoactive bowel sounds  Genitourinary: No carreno  SkiN/MSK/Extremities:  Distal extremities appropriately warm, normal cap refill distally, no cyanosis or calf edema, no calf tenderness to palpation  Neurologic/Psych:   MENTAL STATUS: awake, oriented to person, place, time, and situation  Affect: Somewhat anxious    CN II-XII: intact   Strength/MMT:  Near 5/5 throughout  L>R ataxia    Laboratory:    Results from last 7 days   Lab Units 04/02/24  0436 03/31/24  1053 03/29/24  0622   HEMOGLOBIN g/dL 14.4 14.6 14.7   HEMATOCRIT % 43.9 44.3 45.5   WBC Thousand/uL 7.25 8.46 7.41     Results from last 7 days   Lab Units 04/03/24  0340 04/02/24  0436 03/31/24  1053 03/29/24  0622 03/28/24  0526   BUN mg/dL 20 16 18 17 16   POTASSIUM mmol/L 4.1 4.1 3.7 3.8 3.8   CHLORIDE mmol/L 99 100 101 102 99   CREATININE mg/dL 0.55* 0.56* 0.57* 0.57* 0.61   AST U/L  --   --   --  57* 62*   ALT U/L  --   --   --  65* 75*            Wt Readings from Last 1 Encounters:   04/03/24 59.1 kg (130 lb 5 oz)     Estimated body mass index is 21.03 kg/m² as calculated from the following:    Height as of this encounter: 5' 6\" (1.676 m).    Weight as of this encounter: 59.1 kg (130 lb 5 oz).    Imaging: reviewed    Per EMR:  Past Medical History:   Past Surgical History:   Family History:   Social history:   Past Medical History:   Diagnosis Date    CVA (cerebral vascular accident) (HCC)     Diverticulitis     Diverticulitis of colon     Diverticulosis     Hypertension     Stroke (HCC)     Past Surgical History:   Procedure Laterality Date    CARDIAC ELECTROPHYSIOLOGY PROCEDURE N/A 2/8/2023    Procedure: Cardiac loop recorder implant;  Surgeon: Duke Abraham MD;  Location: [facility];  Service: Cardiology    COLON SIGMOID RESECTION       Family History   Problem Relation " "Age of Onset    Coronary artery disease Mother     Heart disease Mother     Diabetes Father     No Known Problems Sister     Breast cancer Maternal Grandmother         age unknown    No Known Problems Maternal Grandfather     No Known Problems Paternal Grandmother     No Known Problems Paternal Grandfather     No Known Problems Paternal Aunt     No Known Problems Paternal Aunt     No Known Problems Maternal Aunt       Social History     Socioeconomic History    Marital status:      Spouse name: None    Number of children: None    Years of education: None    Highest education level: None   Occupational History    None   Tobacco Use    Smoking status: Light Smoker     Types: Cigarettes    Smokeless tobacco: Never    Tobacco comments:     smokes 4 cigarettes daily   Vaping Use    Vaping status: Never Used   Substance and Sexual Activity    Alcohol use: Yes     Comment: \"twisted tea daily\"\"    Drug use: Yes     Types: Marijuana     Comment: medical card    Sexual activity: None   Other Topics Concern    None   Social History Narrative    None     Social Determinants of Health     Financial Resource Strain: Not on file   Food Insecurity: No Food Insecurity (4/3/2024)    Hunger Vital Sign     Worried About Running Out of Food in the Last Year: Never true     Ran Out of Food in the Last Year: Never true   Transportation Needs: No Transportation Needs (4/3/2024)    PRAPARE - Transportation     Lack of Transportation (Medical): No     Lack of Transportation (Non-Medical): No   Physical Activity: Not on file   Stress: Not on file   Social Connections: Not on file   Intimate Partner Violence: Not on file   Housing Stability: Low Risk  (4/3/2024)    Housing Stability Vital Sign     Unable to Pay for Housing in the Last Year: No     Number of Places Lived in the Last Year: 1     Unstable Housing in the Last Year: No          Current Medical Diagnosis Medications Allergies   Patient Active Problem List   Diagnosis    " History of CVA (cerebrovascular accident)    Tobacco abuse    Essential hypertension    ROSANA (generalized anxiety disorder)    Neuropathy    Macrocytic anemia    Polyneuropathy    Hallux valgus with bunions of right foot    Hallux valgus with bunions of left foot    Hammer toes of both feet    Cerebellar hemorrhage (HCC)    Alcohol use    Diarrhea    Hyponatremia    At risk for venous thromboembolism (VTE)    Insomnia    Amphetamine abuse (HCC)    Hypomagnesemia    Transaminitis    History of noncompliance with medical treatment    Marijuana use      Current Facility-Administered Medications:     acetaminophen (TYLENOL) tablet 650 mg, 650 mg, Oral, Q6H PRN, Matias Jennings MD    [START ON 4/4/2024] amLODIPine (NORVASC) tablet 10 mg, 10 mg, Oral, Daily, Matias Jennings MD    atorvastatin (LIPITOR) tablet 40 mg, 40 mg, Oral, Daily With Dinner, Matias Jennings MD    bisacodyl (DULCOLAX) rectal suppository 10 mg, 10 mg, Rectal, Daily PRN, Matias Jennings MD    chlorhexidine (PERIDEX) 0.12 % oral rinse 15 mL, 15 mL, Mouth/Throat, Q12H NAOMI, Matias Jennings MD    [START ON 4/4/2024] escitalopram (LEXAPRO) tablet 20 mg, 20 mg, Oral, Daily, Matias Jennings MD    [START ON 4/4/2024] folic acid (FOLVITE) tablet 1 mg, 1 mg, Oral, Daily, Matias Jennings MD    gabapentin (NEURONTIN) capsule 100 mg, 100 mg, Oral, BID, Matias Jennings MD    gabapentin (NEURONTIN) capsule 300 mg, 300 mg, Oral, HS, Matias Jennings MD    heparin (porcine) subcutaneous injection 5,000 Units, 5,000 Units, Subcutaneous, Q8H NAOMI, Matias Jennings MD    hydrALAZINE (APRESOLINE) tablet 10 mg, 10 mg, Oral, Q6H PRN, Matias Jennings MD    [START ON 4/4/2024] hydroCHLOROthiazide tablet 12.5 mg, 12.5 mg, Oral, Daily, Matias Jennings MD    hydrOXYzine HCL (ATARAX) tablet 25 mg, 25 mg, Oral, Q6H PRN, Matias Jennings MD    [START ON 4/4/2024] lisinopril (ZESTRIL) tablet 40 mg, 40 mg, Oral, Daily, Matias  Polo Jennings MD    [START ON 4/4/2024] loratadine (CLARITIN) tablet 10 mg, 10 mg, Oral, Daily, Matias Jennings MD    magnesium gluconate (MAGONATE) tablet 500 mg, 500 mg, Oral, BID AC, Matias Jennings MD    melatonin tablet 3 mg, 3 mg, Oral, HS, Matias Jennings MD    [START ON 4/4/2024] multivitamin-minerals (CENTRUM) tablet 1 tablet, 1 tablet, Oral, Daily, Matias Jennings MD    polyethylene glycol (MIRALAX) packet 17 g, 17 g, Oral, Daily PRN, Matias Jennings MD    [START ON 4/4/2024] thiamine tablet 100 mg, 100 mg, Oral, Daily, Matias Jennings MD No Known Allergies         Prior Level of Function and social history:    She lives in a(n) mobile home  Tanika Lira is  and lives with their sons.  Self Care: Independent, Indoor Mobility: Independent, Stairs (in/outdoor): Independent, and Cognition: Independent  Prior to patient's admission, patient was fully Independent with ADLs and IADLs, including working part-time. She was Independent without use of AD for mobility.     FALLS IN THE LAST 6 MONTHS: one     HOME ENVIRONMENT:  The living area: can live on one level  There are 3 steps to enter the home.    The patient will not have 24 hour supervision/physical assistance available upon discharge.  Patient's sons both work. However, one is off on Tuesdays and Wednesdays, and the other is off on Saturdays and Sundays. She also has supportive friends and family that are able to assist when sons are working.    Current Level of Function:    Mobility:  Transfers Min asssist   Ambulation/Mobility 10+75 ft min assist     ADLs:  LBD/LBB min assist   Other ADLs largely supervision     Potential Barriers to Discharge:  Co-morbidities (see above), new functional deficits, impaired balance, impaired coordination    Tolerance for three hours of therapy per therapy day: adequate     Rehabilitation Prognosis: good       Rehabilitation Plan/Therapy Interventions: This patient will have close  medical and rehabilitation oversight from a physical medicine and rehabilitation physician and if needed an internal medicine physician to manage the complexity of medical issues to optimize health, ability to participate in therapy, quality of life, and functional outcomes. This patient requires 24 hour rehabilitation nursing to address bowel and bladder management, (pain management if listed below), medication administration, positioning/skin monitoring, fall/injury prevention, and VTE prophylaxis.      Physical, occupational and speech therapy: Patient requires PT and OT to improve functional mobility, transfers, upper and lower body strengthening, conditioning, balance, and gait training with appropriate assistive device. Patient also requires ST to evaluate and if appropriate treat deficits in speech, swallowing, and cognition. PT, OT, ST will be provided approximately 5 times per week for 60 minutes per day. Skilled therapists and nursing will also provide patient/family safety education and training.  / will work to ensure proper communication between patient (+/- family) and staff regarding the overall rehabilitation and medical process while patient is in the acute rehabilitation center.  / will work with patient (and if applicable family and community resources) to optimize safe discharge.    Discharge Planning:    Estimated length of stay:   14 days.    Family/Patient Goals:  Patient/family's goals: Return to previous home/apartment.    Mobility Goals:   Largely modified independent    Activities of Daily Living (ADLs) Goals:  Largely modified independent      Rehabilitation and discharge goals discussed with the patient and/or family.    Case Managment and Social Work to review patient/family resources and to coordinate Discharge Planning.    Patient and Family Education and Training:  Rehabilitation and discharge goals discussed with the patient and/or  "family.  Patient/family education/training needs to be discussed in weekly team meeting.    Other equipment: To be determined    Medical Necessity Criteria for [facility] Admission:  The preadmission screen, post-admission physical evaluation, overall plan of care and admissions order demonstrate a reasonable expectation that the following criteria were met at the time of admission to the [facility].  (See \"Specific areas of management and oversight in ARC setting\" for additional details on medical necessity as outlined below).  The patient requires active and ongoing therapeutic intervention of multiple therapy disciplines (physical therapy, occupational therapy, speech-language pathology, or prosthetics/orthotics), one of which is physical or occupational therapy.    Patient requires an intensive rehabilitation therapy program, as defined in Chapter 1, section 110.2.2 of the CMS Medicare Policy Manual. This intensive rehabilitation therapy program will consist of at least 3 hours of therapy per day at least 5 days per week or at least 15 hours of intensive rehabilitation therapy within a 7 consecutive day period, beginning with the date of admission to the [facility].    The patient is reasonably expected to actively participate in, and benefit significantly from, the intensive rehabilitation therapy program as defined in Chapter 1, section 110.2.2 of the CMS Medicare Policy Manual at this time of admission to the [facility]. She can reasonably be expected to make measurable improvement (that will be of practical value to improve the patient’s functional capacity or adaptation to impairments) as a result of the rehabilitation treatment, as defined in section 110.3, and such improvement can be expected to be made within the prescribed period of time. As noted in the CMS Medicare Policy Manual, the patient need not be expected to achieve complete independence in the domain of self-care nor be expected to return to his or her prior level of " functioning in order to meet this standard.  The patient must require physician supervision by a rehabilitation physician. As such, a rehabilitation physician will conduct face-to-face visits with the patient at least 3 days per week throughout the patient’s stay in the AR[facility] to assess the patient both medically and functionally, as well as to modify the course of treatment as needed to maximize the patient’s capacity to benefit from the rehabilitation process.  The patient requires an intensive and coordinated interdisciplinary approach to providing rehabilitation, as defined in Chapter 1, section 110.2.5 of the CMS Medicare Policy Manual. This will be achieved through periodic team conferences, conducted at least once in a 7-day period, and comprising of an interdisciplinary team of medical professionals consisting of: a rehabilitation physician, registered nurse,  and/or , and a licensed/certified therapist from each therapy discipline involved in treating the patient.     Changes Since Pre-admission Assessment: None -This patient's participation in rehab continues to be reasonable, necessary and appropriate.    CMS Required Post-Admission Physician Evaluation Elements  History and Physical, including medical history, functional history and active comorbidities as in above text.     Post-Admission Physician Evaluation:  The patient has the potential to make improvement and is in need of physical, occupational, and/or therapy services. The patient may also need nutritional services. Given the patient's complex medical condition and risk of further medical complications, rehabilitative services cannot be safely provided at a lower level of care, such as a skilled nursing facility. I have reviewed the patient's functional and medical status at the time of the preadmission screening and they are the same as on the day of this admission. I acknowledge that I have personally performed a full  physical examination on this patient within 24 hours of admission. The patient demonstrated understanding the rehabilitation program and the discharge process after we discussed them.     Agree in entirety: yes  Minor adaptions: none    Major changes: none    Specific areas of management and oversight in ARC setting:  Cardiopulmonary function: Ensure cardiopulmonary stability and optimize cardiopulmonary function not only at rest but with activity as patient's activity level significantly increases in acute rehab compared with prior to transfer in preparation for safe discharge from ARC.  Must closely and frequently monitor blood pressure and HR to ensure adequate cardiac output during ADLs and ambulation as patient is at increased risk for orthostatic hypotension/syncope and potential injury if not monitored for and managed adequately.    Blood pressure management:    Frequent monitoring of blood pressure with appropriate adjustments in blood pressure medication management to optimize blood pressure control and prevent/limit renal complications.   Monitoring impact of blood pressure and side-effects of blood pressure medications at rest and with activity.  Neurologic Disorder: CVA/ICH causing impaired mobility, ADLs, and gait:  intensive skilled therapies with physical therapy and occupational therapy with close oversight and management by rehab specialized physician in acute rehabilitation setting to most expeditiously and effectively improve functional mobility, transfers, upper and lower body strengthening, conditioning, balance, and gait training with appropriate assistive device.  Patient will have optimal supervision and management of patient's underlying neurologic disorder with specialized rehabilitation physician during this period of recovery to ensure most expeditious and optimal recovery with decreased risks of fall/injury and other complications including acute worsening of neuro disorder, decrease  risk of VTE, PNA, and skin ulceration.  Inpatient rehabilitation education/teaching:  To be provided to patient and typically family/caregiver (if able to be identified) by all skilled therapists, rehab nursing, case management, and rehab specialized physician to ensure optimal recovery and decrease risks of complications in both acute rehabilitation setting as well as after discharge.   Anxiety: Patient's anxiety and it's impact on therapy participation and functional recovery will improve during course with supportive counseling, relaxation/breathing techniques and if necessary medication management.  Requires frequent re-assessment and close management to ensure anxiety/depression management during acute rehab course with planning for appropriate outpatient management to ensure optimal mental health and functional recovery.    Substance abuse hx: Amphetamine, MJ, Nicotine, Alcohol  history - provide teaching/resources on substance abuse to patient (and family if appropriate) as well as counseling on abstinence, coping with anxiety/depression, and provide outpatient resources.  Rehab physician oversight of behavioral management and if necessary pharmacologic management during course.  Recommend psychology consultation and management as well during acute rehab course and possible additional recommendations after discharge from acute rehab setting.    Bladder dysfunction:  Appropriate bladder management with appropriate toileting program from rehab nursing and staff with oversight management by rehabilitation physicians which include appropriate monitoring and possible adjustments in medications to with goals to optimize bladder function and decrease risk of bladder retention, incontinence, and urinary tract infection.   Skin management: Increased risk of skin wounds/breakdown/rashes due to recent immobility and co-morbidities.   Appropriate skin checks from nursing and as needed physician.  Frequent turning,  application of appropriate barrier creams/dressings, cushions.  Patient/caregiver education.  Optimal bowel/bladder hygiene and mobilization.      ** Please Note: Fluency Direct voice to text software may have been used in the creation of this document. **    75 minutes or greater spent for this encounter which included a combination of face-to-face time with patient and non-face-to-face time which in part specifically includes management of ICH/comorbids.  Face-to-face time included extended discussion with patient regarding current condition, medical history, mood, medical/rehabilitation management, and disposition.  Non face-to-face time included coordination of care with patient's co-managing AP and/or physician(s) thru communication and review of their recent documentation as well as reviewing vitals, bowel/bladder function, recent labs, diagnostic imaging, and notes from therapy, CM, and nursing.      Matias Jennings MD, MS  [facility]  Physical Medicine and Rehabilitation  Brain Injury Medicine

## 2024-04-03 NOTE — PLAN OF CARE
Problem: NEUROSENSORY - ADULT  Goal: Achieves stable or improved neurological status  Description: INTERVENTIONS  - Monitor and report changes in neurological status  - Monitor vital signs such as temperature, blood pressure, glucose, and any other labs ordered   - Initiate measures to prevent increased intracranial pressure  - Monitor for seizure activity and implement precautions if appropriate      Outcome: Progressing     Problem: SKIN/TISSUE INTEGRITY - ADULT  Goal: Skin Integrity remains intact(Skin Breakdown Prevention)  Description: Assess:  -Perform Werner assessment every   -Clean and moisturize skin every   -Inspect skin when repositioning, toileting, and assisting with ADLS  -Assess under medical devices such as  every   -Assess extremities for adequate circulation and sensation     Bed Management:  -Have minimal linens on bed & keep smooth, unwrinkled  -Change linens as needed when moist or perspiring  -Avoid sitting or lying in one position for more than  hours while in bed  -Keep HOB at degrees     Toileting:  -Offer bedside commode  -Assess for incontinence every   -Use incontinent care products after each incontinent episode such as     Activity:  -Mobilize patient  times a day  -Encourage activity and walks on unit  -Encourage or provide ROM exercises   -Turn and reposition patient every  Hours  -Use appropriate equipment to lift or move patient in bed  -Instruct/ Assist with weight shifting every  when out of bed in chair  -Consider limitation of chair time  hour intervals    Skin Care:  -Avoid use of baby powder, tape, friction and shearing, hot water or constrictive clothing  -Relieve pressure over bony prominences using   -Do not massage red bony areas    Next Steps:  -Teach patient strategies to minimize risks such as    -Consider consults to  interdisciplinary teams such as Outcome: Progressing

## 2024-04-03 NOTE — ASSESSMENT & PLAN NOTE
Mild hyponatremia in setting of hemorrhage. Remained stable without any symptoms. Monitor with outpatient BMP in 1 week per medicine.

## 2024-04-03 NOTE — PLAN OF CARE
Problem: PHYSICAL THERAPY ADULT  Goal: Performs mobility at highest level of function for planned discharge setting.  See evaluation for individualized goals.  Description: Treatment/Interventions: Functional transfer training, LE strengthening/ROM, Elevations, Therapeutic exercise, Endurance training, Patient/family training, Equipment eval/education, Bed mobility, Compensatory technique education, Gait training, Spoke to nursing, OT, Spoke to case management, Spoke to MD          See flowsheet documentation for full assessment, interventions and recommendations.  Outcome: Progressing  Note: Prognosis: Good  Problem List: Decreased strength, Decreased endurance, Decreased mobility, Impaired balance, Decreased coordination, Decreased cognition, Impaired judgement, Decreased safety awareness, Impaired vision  Assessment: Pt agreeable to participate in PT session. Pt performed functional mobility as outlined above. Continues to have short shuffling gait with decreased L foot clearance and able to increase step length with VC however LLE remains ataxic. Without RW, several large L LOB requiring modAx1 to remain upright. Pt reports she feels herself falling over to L but has difficulty correcting. Does report double vision at times throughout session but not consistent. Pt left seated in bed with bed alarm donned, call bell, phone, and all personal needs within reach. Pt will continue to benefit from skilled acute care PT to further address their functional mobility limitations.  Barriers to Discharge: Inaccessible home environment, Decreased caregiver support     Rehab Resource Intensity Level, PT: I (Maximum Resource Intensity)    See flowsheet documentation for full assessment.

## 2024-04-03 NOTE — ARC ADMISSION
Noted per DSC and CM that patient has been approved by insurance for inpatient acute rehab, with auth #: [id number].    Patient will admit to [facility] room 266 with transport at 2:30pm. Report can be called to [phone]. CM has been updated.

## 2024-04-03 NOTE — ASSESSMENT & PLAN NOTE
Hep C+ > will need treatment set-up by OP GI  Chronic cholecystitis based on imaging and presentation > may need sx but Gen Sx states not candidate with recent ICH     Repeat LFTs within 1 week - obtain thru PCP   Close OP follow-up with GI, Gen Sx, and PCP   Pt/family counseled to return to ED if worsening abdominal pain, nausea, vomiting, fever, or other concerns in interim     4/15 - AST/ALT trending up further 119/175 > 175/246 (AP/TBili wnl; Amm wnl); pt afebrile; no leukocytosis   - IM followed up with with both Gen Sx and GI about worsening LFTs but they did not recommend any changes to management at this time; still not sx candidate per Gen Sx; no plan to start anti-viral in IP setting per GI     Hep C+ titer 1.5 million copies/ml 4/8   Hep testing NR   4/10 HIDA scan  - Abnormal contractile response of the gallbladder to cholecystokinin infusion, ( 0%), which in the appropriate clinical context suggest gallbladder dysfunction.  -   4/10 Dilcia, iron panel, AFP wnl other serologies per GI    AST 57>88>114>130>119>119>175   ALT 65>107>144>168>162>172>246   AP/Tbili wnl     GI follow-up 4/11  Chronically elevated liver enzymes, appears to have plateaued, hepatocellular pattern with history of alcohol use and Hepatitis C. AM LFTs pending. Imaging suggestive of cirrhosis. Synthetic liver function appear intact. No history of variceal bleeding, ascites, hepatic encephalopathy. Liver serologies ordered to rule out hereditary, autoimmune etiologies, so far negative. HCV detected with viral load ([id number]/mL). AFP normal, no hepatoma noted on imaging. Will need outpatient follow up for HCV treatment. Avoid hepatotoxins including alcohol.  RUQ abdominal pain with nausea and cholelithiasis with possible early cholecystitis on imaging. Patient afebrile without leukocytosis. HIDA EF 0% c/w gallbladder dysfunction/chronic cholecystitis. Poor surgical candidate with recent hemorrhage, elective surgery will likely need to  "be delayed. Surgery following.        GI consult 4/11  Chronically elevated liver enzymes, appears to have plateaued, hepatocellular pattern with history of alcohol use and Hepatitis C. AM LFTs pending. Imaging suggestive of cirrhosis. Synthetic liver function appear intact. No history of variceal bleeding, ascites, hepatic encephalopathy. Liver serologies ordered to rule out hereditary, autoimmune etiologies, so far negative. HCV detected with viral load ([id number]/mL). AFP normal, no hepatoma noted on imaging. Will need outpatient follow up for HCV treatment. Avoid hepatotoxins including alcohol.  RUQ abdominal pain with nausea and cholelithiasis with possible early cholecystitis on imaging. Patient afebrile without leukocytosis. HIDA EF 0% c/w gallbladder dysfunction/chronic cholecystitis. Poor surgical candidate with recent hemorrhage, elective surgery will likely need to be delayed. Surgery following.      Gen Sx consult   \"Ultrasound shows cholelithiasis without cholecystitis.  Abdominal pain not related to p.o. intake which makes biliary colic less likely.  With recent cerebellar hemorrhage general anesthesia would be very risky.  Recommend low-fat diet  She may benefit from muscle relaxants as abdominal wall tenderness was not localized to the right upper quadrant.  Would minimize narcotics.\"       RUQ US 4/8  1. Cholelithiasis with borderline wall thickening. Correlate clinically to exclude early acute cholecystitis.  2. Slightly nodular liver contour as seen on recent CT, which may be associated with cirrhosis. Correlate clinically.      Hx of alcohol use - alcohol cessation counseling  IM consulted and co-management with their team during ARC course  Monitor LFTs intermittently during course and after discharge  Med adjustments when indicated     CT A/P 3/26  IMPRESSION:  1. Cholelithiasis, including a 3 mm gallstone within the gallbladder neck versus cystic duct. No findings of acute cholecystitis. If " there is concern for acute cholecystitis, right upper quadrant ultrasound can be obtained.  2. Findings suggesting cirrhosis with mild mesenteric edema, but no ascites. Enlarged ana hepatic and peripancreatic lymph nodes measuring up to 2 cm in short axis, indeterminate, although could be reactive, given suspicion for cirrhosis.

## 2024-04-03 NOTE — PHYSICAL THERAPY NOTE
"                                                                                  PHYSICAL THERAPY NOTE          Patient Name: Tanika Lira  Today's Date: 4/3/2024       04/03/24 1056   PT Last Visit   PT Visit Date 04/03/24   Note Type   Note Type Treatment   Pain Assessment   Pain Assessment Tool 0-10   Pain Score No Pain   Restrictions/Precautions   Weight Bearing Precautions Per Order No   Other Precautions Cognitive;Chair Alarm;Bed Alarm;Multiple lines;Telemetry;Fall Risk;Visual impairment  (\"foggy\" vision, occasionally double)   General   Chart Reviewed Yes   Family/Caregiver Present No   Cognition   Overall Cognitive Status WFL   Arousal/Participation Cooperative   Attention Attends with cues to redirect   Orientation Level Oriented X4   Following Commands Follows multistep commands with increased time or repetition   Comments in AM, irritable regarding staff coming in and out of room and not being able to rest.allowed pt to rest for 1 hour and returned. pt again irritable about staff however once neurologist entered she had an improved mood and was very pleasant to work with   Subjective   Subjective pt reports she is aware of her limitations and does not want to fall   Bed Mobility   Supine to Sit 5  Supervision   Additional items HOB elevated;Increased time required;Verbal cues   Sit to Supine 5  Supervision   Additional items Increased time required;Verbal cues   Transfers   Sit to Stand 4  Minimal assistance   Additional items Assist x 1;Increased time required;Verbal cues  (CGA)   Stand to Sit 4  Minimal assistance   Additional items Assist x 1;Increased time required;Verbal cues  (CGA)   Stand pivot 4  Minimal assistance   Additional items Assist x 1;Increased time required;Verbal cues   Toilet transfer 4  Minimal assistance   Additional items Assist x 1;Increased time required;Commode;Verbal cues  (x2 trials)   Additional Comments c RW vs HHA   Ambulation/Elevation   Gait pattern Improper Weight " shift;Decreased foot clearance;Short stride;Ataxia;Shuffling;Excessively slow  (L lean/LOB)   Gait Assistance 3  Moderate assist   Additional items Assist x 1;Verbal cues  (HHA, minAx1 with RW)   Assistive Device Rolling walker  (vs HHA)   Distance 10'+75' c RW +75' +10' HHA   Stair Management Assistance 4  Minimal assist   Additional items Assist x 1;Verbal cues;Increased time required   Stair Management Technique Two rails;Alternating pattern;Foreward;Reciprocal   Number of Stairs 7   Ambulation/Elevation Additional Comments large L LOB requiring modAx1 for upright posture   Balance   Static Sitting Fair -   Dynamic Sitting Poor +   Static Standing Poor +   Dynamic Standing Poor   Ambulatory Poor   Endurance Deficit   Endurance Deficit Yes   Endurance Deficit Description fatigue, weakness   Activity Tolerance   Activity Tolerance Patient limited by fatigue   Medical Staff Made Aware co-tx with OT ANDREA due to decreased participation this AM session   Nurse Made Aware yes-cleared   Assessment   Prognosis Good   Problem List Decreased strength;Decreased endurance;Decreased mobility;Impaired balance;Decreased coordination;Decreased cognition;Impaired judgement;Decreased safety awareness;Impaired vision   Assessment Pt agreeable to participate in PT session. Pt performed functional mobility as outlined above. Continues to have short shuffling gait with decreased L foot clearance and able to increase step length with VC however LLE remains ataxic. Without RW, several large L LOB requiring modAx1 to remain upright. Pt reports she feels herself falling over to L but has difficulty correcting. Does report double vision at times throughout session but not consistent. Pt left seated in bed with bed alarm donned, call bell, phone, and all personal needs within reach. Pt will continue to benefit from skilled acute care PT to further address their functional mobility limitations.   Barriers to Discharge Inaccessible home  environment;Decreased caregiver support   Goals   Patient Goals to improve   STG Expiration Date 04/10/24   PT Treatment Day 3   Plan   Treatment/Interventions Functional transfer training;LE strengthening/ROM;Elevations;Therapeutic exercise;Endurance training;Cognitive reorientation;Patient/family training;Equipment eval/education;Bed mobility;Gait training;Spoke to nursing;Spoke to case management;OT   Progress Progressing toward goals   PT Frequency 3-5x/wk   Discharge Recommendation   Rehab Resource Intensity Level, PT I (Maximum Resource Intensity)   Equipment Recommended Walker   Walker Package Recommended Wheeled walker   AM-PAC Basic Mobility Inpatient   Turning in Flat Bed Without Bedrails 3   Lying on Back to Sitting on Edge of Flat Bed Without Bedrails 3   Moving Bed to Chair 3   Standing Up From Chair Using Arms 3   Walk in Room 2   Climb 3-5 Stairs With Railing 3   Basic Mobility Inpatient Raw Score 17   Basic Mobility Standardized Score 39.67   [facility] Highest Level Of Mobility   -HLM Goal 5: Stand one or more mins   J[facility]-HLM Achieved 7: Walk 25 feet or more   Kit Munoz, PT, DPT

## 2024-04-03 NOTE — QUICK NOTE
Patient planned for rehab today. Patient not seen today. Chart reviewed. BP improved. BMP stable. Recommend continuing HCTZ 12.5, amlodipine 10 and lisinopril 40 at discharge.  Recommend BMP in 1 week.  Discussed with neurology resident.

## 2024-04-03 NOTE — PCC SPEECH THERAPY
Pt is currently being followed for skilled SLP services targeting cognitive linguistic skills. Pt completed the CLQT+ on initial evaluation with scores correlating to overall skills to be WNL, however, pt with highly independent and completed all IADLs prior to admission. During structured therapy sessions, pt presenting with minimal to mild deficits in memory, executive functions and higher level comprehension. Recent therapy sessions have focused on completion of IADLs to include finance management and medication management, as well as formalized stroke education. The following interventions are used to target these barriers, including verbal problem solving task, verbal working memory tasks, visual memory recall tasks, drawing conclusions activities, written sequencing tasks, verbal sequencing tasks, higher level deductive reasoning activities, written financial management tasks, tangible money tasks, verbal review of current medications, written review of medications, tangible medication management task, written calendar tasks, tangible scheduling tasks , verbal health management tasks, visual attention tasks, and time management activities. Currently, pt is functioning at supervision for comprehension, problem solving and memory and mod I for expression.     Additionally, pt was seen for dysphagia evaluation on admission where pt found to be presenting with overall functional oral and pharyngeal swallow skills across baseline diet of regular solids and thin liquids, therefore, no further dysphagia therapy services are warranted at this time. However, pt making progress towards goals for cognitive linguistic skills and is recommended for further skilled SLP services at this time in order to maximize functional independence and to achieve baseline level of functioning related to cognitive linguistic independence and functioning at time of discharge.

## 2024-04-03 NOTE — ASSESSMENT & PLAN NOTE
Controlled   Current and d/c meds: Amlodipine 10mg qday, HCTZ 12.5mg qday, lisinopril 40mg qday   OP PCP

## 2024-04-03 NOTE — PHYSICAL THERAPY NOTE
PHYSICAL THERAPY CANCEL NOTE          Patient Name: Tanika Lira  Today's Date: 4/3/2024       04/03/24 0930   PT Last Visit   PT Visit Date 04/03/24   Note Type   Note Type Cancelled Session   Cancel Reasons Refusal  (pt requesting to rest for 30-45 min with no staff entry as she has not had 30 min alone all morning. Will return with OT after 10:00.)     Kit Munoz, PT, DPT

## 2024-04-03 NOTE — PROGRESS NOTES
Physical Medicine and Rehabilitation Progress Note  Tanika Lira 58 y.o. female MRN: [id number]  Unit/Bed#: [facility] 266-01 Encounter: [id number]      Assessment & Plan:     Decline in ADLs and mobility: Functional assessment        FIM  Care Score  Admit Score Recent Score    Total assist  1-100% or 2p    Tot     Max assist 2-51-75%    Sub     Mod assist 3- 26-50%  Par Bathing    Min assist 3- 25% or < Par LBD    CG assist 4  TA     Sup/Setup 4-5 Sup To hygiene, UBD    Mod-I/Indep 6 MI      Transfers  Mod-total assist      Ambulation   Significant assist      Stairs   Min assist       Goal: Mod indep for most ADLs and  for mobility  Major barriers: Imbalance, incoordination  Dispo: Home with ELOS 14 days from admission      * Cerebellar hemorrhage (HCC)  Assessment & Plan  4/4 - improving ataxia on exam; tolerating ARC setting appropriately     - CTA HN - No left cerebellar vascular malformation to account for the patient's acute parenchymal hematoma. No cervical or intracranial large vessel occlusion, dissection or aneurysm. Mild bilateral cervical carotid bifurcation atherosclerotic disease.  - CTH 3/26 - Acute parenchymal hematoma left paramedian cerebellum measures 2.6 x 1.6 cm with mass effect on the fourth ventricle. Intraventricular extension of hemorrhage noted with blood in the fourth ventricle and left foramen of Luschka.  - Follow-up CTH 3/27 -  Redemonstration of hyperdense hemorrhage in the left paramedian cerebellum, as described with mass effect and extension into the fourth ventricle and left foramen of Luschka, similar in appearance to the prior.    No hydrocephalus.  - Follow-up CTH 3/28 - No significant interval changes    - Residual impairments on admission to ARC: Incoordination, imbalance, ataxia, possible dysphagia (assess for other impairments during ARC course)     Secondary stroke prevention  - Antithrombotic: Full AC and antiplatelet on hold   - Per neuro, Repeat CTH on 4/11 - if improving  "hemorrhage, patient can resume plavix 75mg daily for stroke prevention; no need for aspirin in terms of stroke prevention   - Follow-up with Nsx in 1-2 weeks with repeat CTH \"for stability and assess for delayed hydrocephalus. If stable/improved will defer remaining management and follow up with neurology\"  - Statin  - Optimal management of blood pressure   -  on importance of abstinence from smoking alcohol and amphetamine  - Patient at increased risk for stroke particularly early in post-stroke period - monitor neuro exam closely with low threshold to repeat imaging  -  patient and if applicable caregiver on optimal stroke management  - Follow-up with neurology and PCP after d/c   - Recommend acute comprehensive interdisciplinary inpatient rehabilitation to include intensive skilled therapies (PT, OT, ST) as outlined with oversight and management by rehabilitation physician as well as inpatient rehab level nursing, case management and weekly interdisciplinary team meetings.       History of CVA (cerebrovascular accident)  Assessment & Plan  Hx of CVA's - 2022 - MRI 9/2022 - Acute infarcts in right frontal centrum semiovale and right posterior putamen. Small subacute infarct in left paramedian andreea.  Chronic lacunar infarcts in right corona radiata, bilateral basal ganglia, and bilateral thalami.  - Was to be on DAPT and statin but was not compliant    Secondary stroke prevention  - Antithrombotic: Full A/C and antiplatelet on hold for now   - Statin  - Optimal management of blood pressure   -  on importance of abstinence from smoking, alcohol, amphetamine  - Patient at increased risk for stroke particularly early in post-stroke period - monitor neuro exam closely with low threshold to repeat imaging  -  patient and if applicable caregiver on optimal stroke management  - Follow-up with neurology and PCP after d/c     Essential hypertension  Assessment & Plan  Per neuro Goal SBP between " 120 and 140 as much as possible  Internal medicine consulted and co-management with their service  Monitor vitals with and without activity; monitor for orthostasis  Monitor hemoglobin, electrolytes, kidney function, hydration status   Current meds: Amlodipine 10mg qday, HCTZ 12.5mg qday, lisinopril 40mg qday   PRN hydralazine       Amphetamine abuse (HCC)  Assessment & Plan  Was using several times per week   Drug cessation counseling and supportive counseling  Optimal mood/stress mgmt   Neuropsychology consult if available   CM assistance with HOST referral for addiction/substance abuse resources       Marijuana use  Assessment & Plan  Gabapentin [phone]   Cessation counseling and supportive counseling  Optimal mood/stress mgmt   CM to assist with HOST referral     Tobacco abuse  Assessment & Plan  Nicotine patch declined by patient   Smoking cessation and supportive counseling  Optimal mood/stress mgmt       Alcohol use  Assessment & Plan  Thiamine  Gabapentin [phone]   Alcohol cessation counseling and supportive counseling  Optimal mood/stress mgmt   Psychology consult if available        ROSANA (generalized anxiety disorder)  Assessment & Plan  ROSANA with some acute anxiety/panic     Lexapro 20mg qday (restarted recently) > decrease to 10mg qday with mildly elevated Qtc > monitor intermittently   Hydroxyzine Q6H PRN Anxiety > decrease to TID PRN anxiety - finds this quite helpful   Gabapentin [id number] (also for hx of EtOH/amphetamine abuse)  Optimal sleep mgmt  Supportive counseling  Psychology consult while in ARC if available   Counseled on and continue to encourage deep breathing/relaxation/behavioral management techniques      Insomnia  Assessment & Plan  Improved some overnight   Significant at times  - Sleep log   - Melatonin 3mg HS  - Gabapentin 300mg HS - for anxiety, polysubstance hx as well   - PRN hydroxyzine   - Recommend appropriate sleep hygiene  - Patient counselled/will be  "counselled on this when appropriate     - Sleep only as much as necessary to feel rested and then get up or sit up in bed, maintain regular sleep schedule (same bedtime and wake time everyday), do not force sleep, avoid caffeinated beverages after lunch, avoid alcohol at home near bedtime, do not smoke particularly in evening, do not go to bed hungry, adjust bedroom environment so you are comfortable prior to settling down for sleep, deal with concerns or worries before bedtime. Make a list of things to work on for next day so anxiety is reduced at night, exercise regularly when cleared by physician      Simple adnexal cyst greater than 1 cm in diameter in postmenopausal patient  Assessment & Plan  Per IM  - \"incidental finding on CT A/P   - simple appearing right adnexal cyst measuring 3.7 cm  - according to current guidelines, pt needs follow up pelvic US in 6-12 months  - follow up with OBGYN at discharge \"       Prolonged QT interval  Assessment & Plan  IM consulted and with overall management at their discretion during ARC course  Per IM  Qtc PTA up to 534 > repeat EKG 4/4 - QT prolonged but shortened from prior to 472 (this was on lexapro 20mg and some hydroxyzine)   - Nevertheless will decrease lexapro and hydroxyzine dosing some and limit prolonged Qtc agents as much as possible  - Monitor EKG intermittently     History of noncompliance with medical treatment  Assessment & Plan  Was not taking meds at home appropriately and following up with OP providers needed after last CVA  - Drug/EtOH cessation counseling  - Patient education      Transaminitis  Assessment & Plan  Hx of alcohol use - alcohol cessation counseling  IM consulted and co-management with their team during ARC course  Monitor LFTs intermittently during course and after discharge  Med adjustments when indicated       Hypomagnesemia  Assessment & Plan  Mag on Friday   IM consulted, monitoring, repletion, and overall management at their discretion " during ARC course      At risk for venous thromboembolism (VTE)  Assessment & Plan  SCDs, ambulation, and heparin Sq (cleared by prior providers)       Hyponatremia  Assessment & Plan  Mild  Repeat BMP Friday     Neuropathy  Assessment & Plan  Significant b/l below knees chronic   - Risk of alcoholic neuropathy  - Alcohol cessation counseling  - OP Neuro EMG/NCS follow-up  - Fall precautions, PT, OT  - On gabapentin         Other Medical Issues:  Monitor for     Follow-up providers and other issues to be followed up after discharge  PCP  Neurology  Neurosurgery  HOST program referral    CODE: Level 1: Full Code    Restrictions include:  Fall precautions    Objective:    Allergies per EMR  Diagnostic Studies: Reviewed, no new imaging  No orders to display     See above as well    Laboratory: Labs reviewed  Results from last 7 days   Lab Units 04/02/24  0436 03/31/24  1053 03/29/24  0622   HEMOGLOBIN g/dL 14.4 14.6 14.7   HEMATOCRIT % 43.9 44.3 45.5   WBC Thousand/uL 7.25 8.46 7.41     Results from last 7 days   Lab Units 04/03/24  0340 04/02/24  0436 03/31/24  1053 03/29/24  0622   BUN mg/dL 20 16 18 17   SODIUM mmol/L 133* 134* 133* 134*   POTASSIUM mmol/L 4.1 4.1 3.7 3.8   CHLORIDE mmol/L 99 100 101 102   CREATININE mg/dL 0.55* 0.56* 0.57* 0.57*   AST U/L  --   --   --  57*   ALT U/L  --   --   --  65*            Drug regimen reviewed, all potential adverse effects identified and addressed:    Scheduled Meds:  Current Facility-Administered Medications   Medication Dose Route Frequency Provider Last Rate    acetaminophen  650 mg Oral Q6H PRN Matias Jennings MD      amLODIPine  10 mg Oral Daily Matias Jennings MD      atorvastatin  40 mg Oral Daily With Dinner Matias Jennings MD      bisacodyl  10 mg Rectal Daily PRN Matias Jennings MD      chlorhexidine  15 mL Mouth/Throat Q12H [facility] Matias Jennings MD      escitalopram  10 mg Oral Daily MILAN Marr      folic acid  1 mg Oral Daily  Matias Jennings MD      gabapentin  100 mg Oral BID Matias Jennings MD      gabapentin  300 mg Oral HS Matias Jennings MD      heparin (porcine)  5,000 Units Subcutaneous Q8H NAOMI Matias Jennings MD      hydrALAZINE  10 mg Oral Q6H PRN Matias Jennings MD      hydroCHLOROthiazide  12.5 mg Oral Daily Matias Jennings MD      hydrOXYzine HCL  25 mg Oral TID PRN Matias Jennings MD      lisinopril  40 mg Oral Daily Matias Jennings MD      loratadine  10 mg Oral Daily Matias Jennings MD      magnesium gluconate  500 mg Oral BID AC Matias Jennings MD      melatonin  3 mg Oral HS Matias Jennings MD      multivitamin-minerals  1 tablet Oral Daily Matias Jennings MD      polyethylene glycol  17 g Oral Daily PRN Matias Jennings MD      thiamine  100 mg Oral Daily Matias Jennings MD         Chief Complaints:  Rehab follow-up     Subjective:     On eval, patient reports balance and coordination still off but improving some.  She reports chronic decreased sensation and neuropathy below her knees.  Patient denies nausea.  She reports sleeping better and hydroxyzine helpful for anxiety.      ROS: A 10 point ROS was performed; negative except as noted above.       Physical Exam:  Temp:  [98 °F (36.7 °C)-98.6 °F (37 °C)] 98 °F (36.7 °C)  HR:  [65-72] 72  Resp:  [20] 20  BP: (123-144)/(79-90) 123/79  SpO2:  [92 %-98 %] 98 %  Vitals above reviewed on date of encounter    GEN:  Sitting in NAD   HEENT/NECK: MMM  CARDIAC: Regular rate rhythm, no murmers, no rubs, no gallops  LUNGS:  clear to auscultation, no wheezes, rales, or rhonchi  ABDOMEN: Soft, non-tender, non-distended, normal active bowel sounds  EXTREMITIES/SKIN:  no calf edema, no calf tenderness to palpation  NEURO:   MENTAL STATUS: awake, oriented to person, place, time, and situation, MENTAL STATUS:  Appropriate wakefulness and interaction , Strength/MMT:  Near full throughout, and improving L > R  ataxia  PSYCH:  Affect:  Euthymic     HPI:  58-year-old female with a past medical history of ischemic strokes in 2022, hypertension, generalized anxiety disorder, smoking tobacco, alcohol use, marijuana use, methamphetamine abuse, medical noncompliance who presented to [facility] on 3/26/2024 with dizziness, nausea, and headache after recent fall and vomiting also accompanied by some diarrhea.  Patient noted to be ataxic on exam.  CT showed acute parenchymal hematoma in the left paramedian cerebellum with mass effect on the fourth ventricle with intraventricular extension of the hemorrhage with blood in the fourth ventricle and the left foramen of Luschka as well as some chronic lacunar infarcts.  Patient transferred to [facility] for neurosurgery oversight and evaluation.  Patient placed on EVD watch but ultimately did not need this.  Patient tells me she was not compliant with her blood thinners although she did receive DDAVP for aspirin reversal.  Neurology was consulted and suspected intracerebral hemorrhage related to hypertension and medication noncompliance with recent methamphetamine use.  Follow-up repeat CT head 7/3/2027 328 were stable.  Patient required multiple blood pressure medications and has blood pressure goal of less than 140 and ideally between 120 and 140 is much as possible.  Patient is to repeat CT head on 4/11 and per neurology if improving hemorrhage can resume Plavix without need for aspirin for secondary ischemic stroke prevention.  Neurosurgery would like to see patient in follow-up at about that same time also looking for stability and assess for delayed hydrocephalus.  Patient was evaluated by skilled therapies and was found to have significant decline in ADLs and ambulation and appears appropriate for admission to [facility].     ** Please Note: Fluency Direct voice to text software may have been used in the creation of this document.  **    50 minutes or greater spent for this encounter which included a combination of face-to-face time with the patient and non-face-to-face time which in part specifically includes management of CVA.  Face-to-face time included extended discussion with patient regarding current condition, medical history, mood, medical/rehabilitation management, and disposition.  Non face-to-face time included coordination of care with patient's co-managing AP and/or physician(s) thru communication and review of their recent documentation as well as reviewing vitals, bowel/bladder function, recent labs, and notes from therapy, CM, and nursing.  In addition, this patient was discussed by the interdisciplinary team in weekly case conference today. The care of the patient was extensively discussed with all care providers and an appropriate rehabilitation plan was formulated unique for this patient. Barriers were identified preventing progression of therapy and appropriate interventions were discussed with each discipline. Please see the team note for input from all disciplines regarding barriers, intervention, and discharge planning    I personally performed the required components and examined the patient myself in person on 4/4/24.

## 2024-04-03 NOTE — ASSESSMENT & PLAN NOTE
Hx of CVA's - 2022 - MRI 9/2022 - Acute infarcts in right frontal centrum semiovale and right posterior putamen. Small subacute infarct in left paramedian andreea.  Chronic lacunar infarcts in right corona radiata, bilateral basal ganglia, and bilateral thalami.  - Was to be on DAPT and statin but was not compliant    Secondary stroke prevention  - Antithrombotic: Full A/C and antiplatelet on hold for now (neurology would like patient on plavix when cleared by neurosx based on most recent notes) - Repeat CTH around 4/29 and f/u with OP Nsx 4/30 - they may clear her then (no need for aspirin per last neurology not)   - Statin on HOLD per IM with recent transaminitis and liver issues > resume when cleared by GI   - Optimal management of blood pressure   -  on importance of abstinence from smoking, alcohol, amphetamine  - Patient at increased risk for stroke particularly early in post-stroke period - monitor neuro exam closely with low threshold to repeat imaging  -  patient and if applicable caregiver on optimal stroke management  - Follow-up with neurology and PCP after d/c

## 2024-04-03 NOTE — RESTORATIVE TECHNICIAN NOTE
Restorative Technician Note      Patient Name: Tanika Lira     Note Type: Mobility (Pt refused OOB/ambulation nursing aware.)    Ramandeep Ambrosio BS, Restorative Technician,

## 2024-04-03 NOTE — ASSESSMENT & PLAN NOTE
Better today, stable, no SI   Was having increased anxiety and difficulty coping  - Lexapro increased to 10mg qday recently > continue   - Repeat EKG 4/15 with improved QTc  - Hydroxyzine 25mg PRN helpful > d/c on BID PRN   - D/C on Gabapentin 300mg HS (also for hx of EtOH/amphetamine abuse)    Increased anxiety/frustration 4/11   - Provided supportive counseling   Mood Anxious at times    ROSANA with some acute anxiety/panic     Optimal sleep mgmt  Supportive counseling  Counseled on and continue to encourage deep breathing/relaxation/behavioral management techniques  OP PCP follow-up  OP Behavioral health c/s

## 2024-04-03 NOTE — ASSESSMENT & PLAN NOTE
Improving   - Melatonin 3mg HS PRN   - Gabapentin 300mg HS - for anxiety, polysubstance hx as well   - Recommend appropriate sleep hygiene  - Patient counselled/will be counselled on this when appropriate   - OP PCP

## 2024-04-03 NOTE — ASSESSMENT & PLAN NOTE
Was not taking meds at home appropriately and following up with OP providers needed after last CVA  - Drug/EtOH cessation counseling  - Patient and family education completed  - Patient adamant about being compliant   - OP PCP within 1 week  -  PT, OT, RN, MSW

## 2024-04-03 NOTE — ASSESSMENT & PLAN NOTE
Thiamine  Gabapentin also for mood/sleep  Alcohol cessation counseling and supportive counseling  Optimal mood/stress mgmt   Patient declined HOST referral for addiction/substance abuse resources

## 2024-04-03 NOTE — CASE MANAGEMENT
Case Management Discharge Planning Note    Patient name Tanika Lira  Location [facility] 705/[facility] 705-01 MRN [id number]  : 1965 Date 4/3/2024       Current Admission Date: 3/26/2024  Current Admission Diagnosis:Cerebellar hemorrhage (HCC)   Patient Active Problem List    Diagnosis Date Noted    Diarrhea 2024    Alcohol use 2024    Cerebellar hemorrhage (HCC) 2024    Polyneuropathy 2023    Hallux valgus with bunions of right foot 2023    Hallux valgus with bunions of left foot 2023    Hammer toes of both feet 2023    Macrocytic anemia 2023    ROSANA (generalized anxiety disorder) 2022    Neuropathy 2022    History of CVA (cerebrovascular accident) 2022    Tobacco abuse 2022    Essential hypertension 2022      LOS (days): 8  Geometric Mean LOS (GMLOS) (days):   Days to GMLOS:     OBJECTIVE:  Risk of Unplanned Readmission Score: 11.73         Current admission status: Inpatient   Preferred Pharmacy:   [id number] - [place] - [address]  [address]  [place] [address]  Phone: [phone] Fax: [phone]    Primary Care Provider: MILAN Miller    Primary Insurance: [employer]  Secondary Insurance:     DISCHARGE DETAILS:    Auth received, [facility] notified, pt notified, MD notified    Transport request via roundtrip    Transport 2:30 via [facility]  PT notified, she will notify family

## 2024-04-03 NOTE — ASSESSMENT & PLAN NOTE
SCDs, ambulation  (Holding heparin with improved mobility and some abdominal ecchymosis/small hematoma/pain)

## 2024-04-03 NOTE — OCCUPATIONAL THERAPY NOTE
Occupational Therapy Progress Note     Patient Name: Tanika Lira  Today's Date: 4/3/2024  Problem List  Principal Problem:    Cerebellar hemorrhage (HCC)  Active Problems:    History of CVA (cerebrovascular accident)    Tobacco abuse    Essential hypertension    ROSANA (generalized anxiety disorder)    Alcohol use    Diarrhea    Hyponatremia        04/03/24 1101   OT Last Visit   OT Visit Date 04/03/24   Note Type   Note Type Treatment   Pain Assessment   Pain Assessment Tool 0-10   Pain Score No Pain   Restrictions/Precautions   Weight Bearing Precautions Per Order No   Other Precautions Cognitive;Chair Alarm;Bed Alarm;Telemetry;Fall Risk   Lifestyle   Autonomy I w/ ADLS/IADLS, transfers and functional mobility PTA; (-)    Reciprocal Relationships Pt lives w/ her 2 sons; pt is alone during the day   Service to Others Unemployed   Intrinsic Gratification Cleaning   ADL   Where Assessed Sitting at sink   Grooming Assistance 5  Supervision/Setup   Grooming Deficit Setup;Increased time to complete   UB Bathing Assistance 5  Supervision/Setup   LB Bathing Assistance 4  Minimal Assistance    UB Dressing Assistance 5  Supervision/Setup   LB Dressing Assistance 4  Minimal Assistance   LB Dressing Deficit Don/doff R sock;Don/doff L sock-S, min a dynamic balance tasks.   Toileting Assistance  4  Minimal Assistance   Toileting Deficit Perineal hygiene, clothing management   Functional Standing Tolerance   Time approx 2 minutes   Activity washing hands   Bed Mobility   Supine to Sit 5  Supervision   Additional items Assist x 1   Sit to Supine 5  Supervision   Additional items Assist x 1   Transfers   Sit to Stand 4  Minimal assistance   Additional items Assist x 1   Stand to Sit 4  Minimal assistance   Additional items Assist x 1   Additional Comments +RW, HHA left lateral lean   Functional Mobility   Functional Mobility 3  Moderate assistance   Additional Comments mod a x 1 without HHA left lateral lean/LOB , min a  "with RW household distance functioanl mobilityn with safety/sequencing cues.   Toilet Transfers   Toilet Transfer From Rolling walker   Toilet Transfer Type To and from   Toilet Transfer to Standard toilet   Toilet Transfer Technique Stand pivot   Toilet Transfers Minimal assistance   Toilet Transfers Comments cues for safety   Cognition   Overall Cognitive Status Continue to assess   Arousal/Participation Alert;Cooperative   Attention Attends with cues to redirect   Orientation Level Oriented X4   Memory Decreased recall of precautions   Following Commands Follows multistep commands with increased time or repetition   Comments pt agreeable to pre-arranged time to participate in therapy session, anxious, initally perservating on 'resting' stating \"I cant sleep, Im so tired\" As session progressed pt with increased motivation. pt demonstrating higher level cognitive deficits  (due to anxiety?)-difficulty understanding complexity of discharge plans and rehab plans. pt would benefit from a scheduled therapy plan. Will continue to assess functional cognition.   Activity Tolerance   Activity Tolerance Patient tolerated treatment well   Medical Staff Made Aware RN cleared pt for therapy   Assessment   Assessment Patient participated in Skilled OT session this date with interventions consisting of self care tasks, standing tolerance sink side with grooming tasks, toileting, functional transfer/mobility tasks . Patient agreeable to OT treatment session, upon arrival patient was found supine in bed.  In comparison to previous session, patient with improvements in bed mobility. Patient requiring verbal cues for correct technique and verbal cues for pacing thru activity steps. Patient continues to be functioning below baseline level, occupational performance remains limited secondary to factors listed above and increased risk for falls and injury.   From OT standpoint, recommendation at time of d/c would be max level I.    The " patient's raw score on the AM-PAC Daily Activity Inpatient Short Form is 19. A raw score of greater than or equal to 19 suggests the patient may benefit from discharge to home. Please refer to the recommendation of the Occupational Therapist for safe discharge planning.  Patient to benefit from continued Occupational Therapy treatment while in the hospital to address deficits as defined above and maximize level of functional independence with ADLs and functional mobility.   Plan   Treatment Interventions ADL retraining;Functional transfer training;Endurance training;Compensatory technique education;Patient/family training;Continued evaluation;Energy conservation;Activityengagement   Goal Expiration Date 04/10/24   OT Treatment Day 3   OT Frequency 3-5x/wk   Discharge Recommendation   Rehab Resource Intensity Level, OT I (Maximum Resource Intensity)   AM-PAC Daily Activity Inpatient   Lower Body Dressing 3   Bathing 3   Toileting 3   Upper Body Dressing 3   Grooming 3   Eating 4   Daily Activity Raw Score 19   Daily Activity Standardized Score (Calc for Raw Score >=11) 40.22   AM-PAC Applied Cognition Inpatient   Following a Speech/Presentation 3   Understanding Ordinary Conversation 4   Taking Medications 4   Remembering Where Things Are Placed or Put Away 4   Remembering List of 4-5 Errands 3   Taking Care of Complicated Tasks 2   Applied Cognition Raw Score 20   Applied Cognition Standardized Score 41.76   Barthel Index   Feeding 10   Bathing 0   Grooming Score 0   Dressing Score 5   Bladder Score 10   Bowels Score 10   Toilet Use Score 5   Transfers (Bed/Chair) Score 10   Mobility (Level Surface) Score 10   Stairs Score 5   Barthel Index Score 65   Modified [place] Scale   Modified [place] Scale 3   End of Consult   Patient Position at End of Consult Bed/Chair alarm activated;All needs within reach;Bedside chair   Nurse Communication Nurse aware of consult   Ankita Funes OTR/L

## 2024-04-03 NOTE — CASE MANAGEMENT
[facility] has received APPROVED authorization.  Insurance: [employer]   Received via fax   Authorization received for: Acute Rehab  Facility: [facility]   Authorization #: [id number]  Start of Care: 4/3  Next Review Date: 4/10  Continued Stay Care Coordinator:  not provided   Submit next review to: fax# [phone]   P# [phone]     Care Manager notified: Celeste LUA

## 2024-04-03 NOTE — ASSESSMENT & PLAN NOTE
- CTH 3/26 - Acute parenchymal hematoma left paramedian cerebellum measures 2.6 x 1.6 cm with mass effect on the fourth ventricle. Intraventricular extension of hemorrhage noted with blood in the fourth ventricle and left foramen of Luschka.    Residual impairments on admission to [facility]: Incoordination, imbalance, ataxia, possible dysphagia > improving     Secondary stroke prevention  - Antithrombotic: Full AC and antiplatelet on hold - repeat CTH prior to follow-up Nsx appt on 4/30 - they may possibly clear her for antiplatelet at that time  - Neurology would like patient back on plavix when cleared by neurosurgery   - Optimal management of blood pressure - good control in [facility]  -  on importance of abstinence from smoking alcohol and amphetamine  - Follow-up with neurology and PCP after d/c   - Participated in acute comprehensive interdisciplinary inpatient rehabilitation to include intensive skilled therapies (PT, OT, ST) as outlined with oversight and management by rehabilitation physician as well as inpatient rehab level nursing, case management and weekly interdisciplinary team meetings.         She has made gains in function, balance, and coordination but continues with impairments that significantly increase risk of fall and injury some of which could be severe or even life-threatening  - She has been advanced to  largely supervision assist for ADLs and short distance mobility and contact guard for longer distances  - Patient's sons work during day and she could be home by herself at time which is not recommended  - Patient recommended additional rehab/functional recovery time to decrease risk of fall/injury but declines and wishes to be discharged now; pt and son, Abner, reviewed at length recommendations and dangers of leaving and they appear to have adequate understanding of risks at this time    - Recommend PCP follow-up within 1 week - CM made appt   - Recommend  PT, OT, ST, RN, MSW   - Recommend  "24-7 supervision  - Recommend family help patient with iADLs, med mgmt, and transport; pt does not chronically drive and should continue not driving     Follow-up with Nsx 4/15  - Continue to hold A/P and full A/C  - Repeat CTH in 2 weeks 4/29 followed by OP follow-up appt with NSx    CTH 4/12 - stable from 4/11  - Stable punctate hyperdense focus within the posterior right sylvian fissure, cortical versus subarachnoid hemorrhage. Stable evolving hematoma within the left paramedian cerebellar hemisphere. There is no new intra or extra-axial hemorrhage.  - Chronic right centrum semiovale ovale, left corona radiata, bilateral basal ganglia, bilateral thalamic, and pontine lacunar infarcts. Chronic microangiopathic ischemic changes.     CTH 4/11  -New punctate foci of cortical hemorrhage versus small amount of subarachnoid hemorrhage within the posterior right sylvian fissure. Recommend short interval follow-up exam.  -Decreased size of hyperdense component of the left paramedian cerebellar hemorrhage with mildly increased surrounding vasogenic edema as detailed above. Decreased hemorrhage within the fourth ventricle.  4/11 neuro exam stable  - Neuro c/s - \"-New punctate foci of cortical hemorrhage versus small amount of subarachnoid hemorrhage within the posterior right sylvian fissure. Recommend short interval follow-up exam.    Reviewed the imaging. There is a punctate hyperdensity in one cut only 2/29, without a clear significance. Actually a comparison to CT head on 3/27 may show even the same foci in series 304, image 43/76.   Team can repeat CT head tomorrow to ensure stability, and if so, follow plan as per discharge summary on 4/3\"  - NeuroSx c/s   Given new punctate foci of cortical hemorrhage, recommend repeat CTH tomorrow to ensure stability.   Continue to hold full dose AC/AP therapy.   Pt has upcoming follow up on Monday 4/15/24 for virtual visit with neurosurgery Please contact nsx with any questions or " concerns in the interim. (Per IM - patient to hold A/P until virtual decides Monday)     - CTA HN - No left cerebellar vascular malformation to account for the patient's acute parenchymal hematoma. No cervical or intracranial large vessel occlusion, dissection or aneurysm. Mild bilateral cervical carotid bifurcation atherosclerotic disease.  - CTH 3/26 - Acute parenchymal hematoma left paramedian cerebellum measures 2.6 x 1.6 cm with mass effect on the fourth ventricle. Intraventricular extension of hemorrhage noted with blood in the fourth ventricle and left foramen of Luschka.  - Follow-up CTH 3/27 -  Redemonstration of hyperdense hemorrhage in the left paramedian cerebellum, as described with mass effect and extension into the fourth ventricle and left foramen of Luschka, similar in appearance to the prior.    No hydrocephalus.  - Follow-up CTH 3/28 - No significant interval changes

## 2024-04-04 PROBLEM — R94.31 PROLONGED QT INTERVAL: Status: ACTIVE | Noted: 2024-04-04

## 2024-04-04 PROBLEM — N94.89 SIMPLE ADNEXAL CYST GREATER THAN 1 CM IN DIAMETER IN POSTMENOPAUSAL PATIENT: Status: ACTIVE | Noted: 2024-04-04

## 2024-04-04 PROBLEM — N95.8 SIMPLE ADNEXAL CYST GREATER THAN 1 CM IN DIAMETER IN POSTMENOPAUSAL PATIENT: Status: ACTIVE | Noted: 2024-04-04

## 2024-04-04 LAB
ATRIAL RATE: 60 BPM
P AXIS: 58 DEGREES
PR INTERVAL: 142 MS
QRS AXIS: 37 DEGREES
QRSD INTERVAL: 88 MS
QT INTERVAL: 472 MS
QTC INTERVAL: 472 MS
T WAVE AXIS: 59 DEGREES
TSH SERPL DL<=0.05 MIU/L-ACNC: 2.91 UIU/ML (ref 0.45–4.5)
VENTRICULAR RATE: 60 BPM

## 2024-04-04 PROCEDURE — 92610 EVALUATE SWALLOWING FUNCTION: CPT

## 2024-04-04 PROCEDURE — 93010 ELECTROCARDIOGRAM REPORT: CPT | Performed by: STUDENT IN AN ORGANIZED HEALTH CARE EDUCATION/TRAINING PROGRAM

## 2024-04-04 PROCEDURE — 97535 SELF CARE MNGMENT TRAINING: CPT

## 2024-04-04 PROCEDURE — 97166 OT EVAL MOD COMPLEX 45 MIN: CPT

## 2024-04-04 PROCEDURE — 99254 IP/OBS CNSLTJ NEW/EST MOD 60: CPT | Performed by: INTERNAL MEDICINE

## 2024-04-04 PROCEDURE — 97130 THER IVNTJ EA ADDL 15 MIN: CPT

## 2024-04-04 PROCEDURE — 97530 THERAPEUTIC ACTIVITIES: CPT

## 2024-04-04 PROCEDURE — 97116 GAIT TRAINING THERAPY: CPT

## 2024-04-04 PROCEDURE — 93005 ELECTROCARDIOGRAM TRACING: CPT

## 2024-04-04 PROCEDURE — 99233 SBSQ HOSP IP/OBS HIGH 50: CPT

## 2024-04-04 PROCEDURE — 97163 PT EVAL HIGH COMPLEX 45 MIN: CPT

## 2024-04-04 PROCEDURE — 97112 NEUROMUSCULAR REEDUCATION: CPT

## 2024-04-04 PROCEDURE — 97129 THER IVNTJ 1ST 15 MIN: CPT

## 2024-04-04 PROCEDURE — 92523 SPEECH SOUND LANG COMPREHEN: CPT

## 2024-04-04 RX ORDER — HYDROXYZINE HYDROCHLORIDE 25 MG/1
25 TABLET, FILM COATED ORAL 3 TIMES DAILY PRN
Status: DISCONTINUED | OUTPATIENT
Start: 2024-04-04 | End: 2024-04-16 | Stop reason: HOSPADM

## 2024-04-04 RX ORDER — ESCITALOPRAM OXALATE 10 MG/1
10 TABLET ORAL DAILY
Status: DISCONTINUED | OUTPATIENT
Start: 2024-04-05 | End: 2024-04-08

## 2024-04-04 RX ADMIN — HYDROXYZINE HYDROCHLORIDE 25 MG: 25 TABLET, FILM COATED ORAL at 07:41

## 2024-04-04 RX ADMIN — ESCITALOPRAM OXALATE 20 MG: 10 TABLET ORAL at 08:22

## 2024-04-04 RX ADMIN — MULTIPLE VITAMINS W/ MINERALS TAB 1 TABLET: TAB ORAL at 08:22

## 2024-04-04 RX ADMIN — HEPARIN SODIUM 5000 UNITS: 5000 INJECTION INTRAVENOUS; SUBCUTANEOUS at 21:18

## 2024-04-04 RX ADMIN — GABAPENTIN 300 MG: 300 CAPSULE ORAL at 21:18

## 2024-04-04 RX ADMIN — MELATONIN TAB 3 MG 3 MG: 3 TAB at 21:18

## 2024-04-04 RX ADMIN — LORATADINE 10 MG: 10 TABLET ORAL at 08:22

## 2024-04-04 RX ADMIN — GABAPENTIN 100 MG: 100 CAPSULE ORAL at 14:02

## 2024-04-04 RX ADMIN — HEPARIN SODIUM 5000 UNITS: 5000 INJECTION INTRAVENOUS; SUBCUTANEOUS at 14:02

## 2024-04-04 RX ADMIN — CHLORHEXIDINE GLUCONATE 0.12% ORAL RINSE 15 ML: 1.2 LIQUID ORAL at 08:24

## 2024-04-04 RX ADMIN — GABAPENTIN 100 MG: 100 CAPSULE ORAL at 06:16

## 2024-04-04 RX ADMIN — HYDROXYZINE HYDROCHLORIDE 25 MG: 25 TABLET, FILM COATED ORAL at 16:46

## 2024-04-04 RX ADMIN — LISINOPRIL 40 MG: 20 TABLET ORAL at 08:22

## 2024-04-04 RX ADMIN — Medication 500 MG: at 16:46

## 2024-04-04 RX ADMIN — THIAMINE HCL TAB 100 MG 100 MG: 100 TAB at 08:22

## 2024-04-04 RX ADMIN — ATORVASTATIN CALCIUM 40 MG: 40 TABLET, FILM COATED ORAL at 16:46

## 2024-04-04 RX ADMIN — HEPARIN SODIUM 5000 UNITS: 5000 INJECTION INTRAVENOUS; SUBCUTANEOUS at 06:15

## 2024-04-04 RX ADMIN — CHLORHEXIDINE GLUCONATE 0.12% ORAL RINSE 15 ML: 1.2 LIQUID ORAL at 21:18

## 2024-04-04 RX ADMIN — HYDROCHLOROTHIAZIDE 12.5 MG: 12.5 TABLET ORAL at 08:22

## 2024-04-04 RX ADMIN — AMLODIPINE BESYLATE 10 MG: 10 TABLET ORAL at 08:22

## 2024-04-04 RX ADMIN — FOLIC ACID 1 MG: 1 TABLET ORAL at 08:22

## 2024-04-04 RX ADMIN — Medication 500 MG: at 06:16

## 2024-04-04 NOTE — ASSESSMENT & PLAN NOTE
IM consulted and with overall management at their discretion during ARC course  Resolved on repeat EKG 4/15

## 2024-04-04 NOTE — NURSING NOTE
Pt. Violated bed alarm x1. She sat on the side of the bed when she needed to use the commode and did not ring the call bell. Pt. Reminded to utilize call bell for assistance. Pt. Stated understanding of same. Will continue to monitor closely for safety.

## 2024-04-04 NOTE — PLAN OF CARE
Problem: SKIN/TISSUE INTEGRITY - ADULT  Goal: Skin Integrity remains intact(Skin Breakdown Prevention)  Description: Assess:  -Inspect skin when repositioning, toileting, and assisting with ADLS  -Assess extremities for adequate circulation and sensation     Bed Management:  -Have minimal linens on bed & keep smooth, unwrinkled  -Change linens as needed when moist or perspiring    Toileting:  -Offer bedside commode    Activity:  -Encourage activity and walks on unit  -Encourage or provide ROM exercises   -Use appropriate equipment to lift or move patient in bed    Skin Care:  -Avoid use of baby powder, tape, friction and shearing, hot water or constrictive clothing  -Do not massage red bony areas    Outcome: Progressing

## 2024-04-04 NOTE — PROGRESS NOTES
04/04/24 0700   Patient Data   Rehab Impairment Stroke:  01.4  No paresis   Etiologic Diagnosis L cerebellar hemorrhage   Date of Onset 03/26/24   Support System   Name Frandy/Gurwinder Levine   Relationship sons, 28yo and 27yo and boyfriend  (Both sons work FT during the day, however have different days off during the week. Both are big gamers. 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 Independent - Patient completed the activities by him/herself, with or without an assistive device, with no assistance from a helper.   Indoor-Mobility (Ambulation) 3. Independent - Patient completed the activities by him/herself, with or without an assistive device, with no assistance from a helper.   Stairs 3. Independent - Patient completed the activities by him/herself, with or without an assistive device, with no assistance from a helper.   Functional Cognition 3. Independent - Patient completed the activities by  him/herself, with or without an assistive device, with no assistance from a helper.   Prior Assistance Needed for Household Chores/Cleaning;Meal Preparation;Medication Management;Money Management;Shopping   Falls in the Last Year   Number of falls in the past 12 months 1   Type of Injury Associated with Fall No injury   Patient Preference   Yayobess (Patient Preference) Tanika   Eating Habits (Patient Preference) Pt reports being a snacker with small meals/snacks t/o the day   Patient Normally Wakes at   (early riser, works at 7:30)   Psychosocial   Psychosocial (WDL) WDL   Patient Behaviors/Mood Anxious   Needs Expressed Physical;Emotional   Ability to Express Feelings Able to express   Ability to Express Needs Able to express   Ability to Express Thoughts Able to express   Ability to Understand Others Understands   Restrictions/Precautions   Precautions Fall Risk;Supervision on toilet/commode;Bed/chair alarms   Weight Bearing Restrictions No   ROM Restrictions No   Pain Assessment   Pain Assessment Tool 0-10   Pain Score No Pain   Eating Assessment   Food To Mouth Yes   Able To Cut Yes   Meal Assessed Breakfast   Current Diet Regular   Type of Assistance Needed Independent   Physical Assistance Level No physical assistance   Eating CARE Score 6   Oral Hygiene   Type of Assistance Needed Physical assistance   Physical Assistance Level 25% or less   Comment unable to mntn balance at sink, as she would at home, without CGA due to ataxia   Oral Hygiene CARE Score 3   Grooming   Able To Initiate Tasks;Acquire Items;Comb/Brush Hair;Wash/Dry Face;Brush/Clean Teeth;Wash/Dry Hands   Limitation Noted In Coordination;Strength   Tub/Shower Transfer   Limitations Noted In Coordination;Balance;Safety   Adaptive Equipment Grab Bars;Seat with out Back   Assessed Shower   Findings Pt is unable to step into a shower without UE support as she would at home PTA. However with use of a al bar she was CGA to transition into a walk-in  shower.   Shower/Bathe Self   Type of Assistance Needed Physical assistance   Physical Assistance Level 26%-50%   Comment Pt unable to maintain balance standing with single rear grab bar for entire bathing, using a hand held shower as she did PTA. Pt required stabilization in stance, assist to hold the shower head, and needed to sit down to improve safety and independence with bathing. While seated Pt required supervision to bathe anterior surfaces, CGA/min A for balance in stance for buttocks and periareas.   Shower/Bathe Self CARE Score 3   Bathing   Assessed Bath Style Shower   Anticipated D/C Bath Style Shower   Able to Gather/Transport No   Able to Adjust Water Temperature Yes   Able to Wash/Rinse/Dry (body part) Left Arm;Right Arm;L Upper Leg;R Upper Leg;L Lower Leg/Foot;R Lower Leg/Foot;Chest;Abdomen;Perineal Area;Buttocks   Limitations Noted in Balance;Coordination;Safety   Dressing/Undressing Clothing   Remove UB Clothes Other  (hospital gown)   Don UB Clothes Other  (hosptial gown)   Type of Assistance Needed Set-up / clean-up;Supervision   Physical Assistance Level No physical assistance   Comment son to bring in personal clothes today   Upper Body Dressing CARE Score 4   Remove LB Clothes Socks;Undergarment   Don LB Clothes Pants;Undergarment;Socks   Type of Assistance Needed Physical assistance   Physical Assistance Level 25% or less   Lower Body Dressing CARE Score 3   Limitations Noted In Balance;Safety;Coordination   Positioning Sit Edge Of Bed;Standing   Putting On/Taking Off Footwear   Type of Assistance Needed Incidental touching   Physical Assistance Level No physical assistance   Putting On/Taking Off Footwear CARE Score 4   Toileting Hygiene   Type of Assistance Needed Supervision   Physical Assistance Level No physical assistance   Comment seated on toilet   Toileting Hygiene CARE Score 4   Toilet Transfer   Surface Assessed Standard Toilet   Transfer Technique Standard   Limitations Noted In  Balance;Confidence;Safety;Sensation   Adaptive Equipment Walker  (Pt very anxious to attempt walk to bathroom without a AD. She reports using furniture and walls all the time at home for stabilization and was very unsteady ambulating in the middle of the room without UE support. Even with HHA Pt was expressing fear.)   Findings   (Using RW Pt able to demonstrate more fluid mvmt and confidence in gait, CGA with occasional min A to correct LOB to left.)   Type of Assistance Needed Physical assistance   Physical Assistance Level Total assistance   Comment Pt unable to ambulate without any AD as is her PLOF, with immediate LOB to the left requiring physical assistance to correct   Toilet Transfer CARE Score 1   Toileting   Able to Pull Clothing down yes, up yes.   Able to Manage Clothing Closures Other  (none present)   Manage Hygiene Bladder   Limitations Noted In Balance;Safety   Transfer Bed/Chair/Wheelchair   Limitations Noted In Balance;Confidence;Coordination;Sensation  (impulsive, sits prematurely without use of hands to brace herself)   Adaptive Equipment Hand Hold;Roller Walker   Physical Assistance Level 26%-50%   Comment initial attempts impulsive with LOB, grasping for any surface present to hold onto. Once instructed with RW Pt able to return demonstrate technique with CG and verbal cues.   Chair/Bed-to-Chair Transfer CARE Score 3   Roll Left and Right   Type of Assistance Needed Independent   Roll Left and Right CARE Score 6   Sit to Lying   Type of Assistance Needed Supervision   Physical Assistance Level No physical assistance   Sit to Lying CARE Score 4   Lying to Sitting on Side of Bed   Type of Assistance Needed Supervision   Physical Assistance Level No physical assistance   Lying to Sitting on Side of Bed CARE Score 4   Sit to Stand   Type of Assistance Needed Physical assistance   Physical Assistance Level 25% or less   Comment unable to find initial balance independently, Min A provided or Pt used  back of legs against bed and slow trunk extension. with RW and instruction Pt able to return demonstrate CGA by end of session.   Sit to Stand CARE Score 3   Picking Up Object   Reason if not Attempted Safety concerns   Picking Up Object CARE Score 88   Comprehension   QI: Comprehension 4. Undestands: Clear comprehension without cues or repetitions   Comprehension (FIM) 7 - Understands complex/abstract conversation in a reasonable time w/o devices or helper.   Expression   Findings Pt difficult to understand at times if OT was not facing Pt during the statement.   QI: Expression 3. Exhibits some difficulty with expressing needs and ideas (e.g., some words or finishing thoughts) or speech is not clear   Expression (FIM) 6 - Has only MILD difficulty with complex/abstract info   Social Interaction   Cooperation with staff   Participation Individual   Medications needed to control mood/behavior? Yes   Behaviors observed Other  (Pt requested anti-anxiety medication prior to evaluation.)   Problem Solving   Complex Manages finances;Manages discharge planning;Manages return to work;Manages medications   Routine Manages call bell   Findings Pt admits to mismanaging her medications at home and that her son recently got her a pill box, but she still was not consistently taking the medications. She pays her own bills, son will assist with shopping because he works at [employer] and can bring items home with him.   RLE Assessment   RLE Assessment WFL   LLE Assessment   LLE Assessment WFL   RUE Assessment   RUE Assessment WFL   LUE Assessment   LUE Assessment WFL   Sensation   Light Touch Partial deficits in the RUE;Partial deficits in the LUE;Partial deficits in the RLE;Partial deficits in the LLE  (numb in finger tips and feet, could not distinguish light touch distal to midcalf)   Cognition   Overall Cognitive Status Impaired   Arousal/Participation Alert;Cooperative   Attention Within functional limits   Orientation Level  "Oriented X4   Memory Decreased recall of precautions   Following Commands Follows multistep commands with increased time or repetition   Comments impulsive at times, need for cues to reduce rate of mvmt   Vision   Vision Comments Pt denied any occurance of double or blurred vision during the evaluation, no deficit in depth perception or hand eye coordination with Right dominant hand.   Objective Measure   OT Measure(s) 9 hole peg test   OT Findings right dominant hand 43sec, left hand 56sec with more concentration/effort for [facility]   Discharge Information   Vocational Plan Return to work;Part Time  (eventually)   Barriers to Return to Vocation Skill Set Limitation   Patient's Discharge Plan home with sons   Patient's Rehab Expectations mod I with selfcare and mobility, household mgmt   Barriers to Discharge Home Limited Family Support;Safety Considerations   Impressions Pt is a 54yo female with h/o HTN, anxiety, iscemic strokes in 2022, urinary leakage, tobacco/alcohol/marijuanna use, methamphetamine abuse and medical noncompliance. She was admitted to the hospital 3/26 with dizziness, nausea, headache s/p fall. CT scan (+) parenchymal hematoma  in cerebellum with extension of hemorrhage in 4th ventricle and left foramen of Luschka, chronic lacunar infarcts. Pt had multiple BP medication revisions in the hospital with a \"goal of less [id number], between 120-140 as much as possible). Today Pt 128/80 sitting and 115/74 standing with no symptoms of dizziness. Repeat heat CT is scheduled for 4/11 and if improving can resume plavix without aspirin. Pt was completely independent in B/IADLs prior to this event, without use of AD but does report using furniture and walls for stabilization due to peripheral neuropathy. She has two supportive sons that live with her but work full time, and a boyfriend that does not live in the home. She has one dog named Bhavya, a [place], which she reports is strong enough to knock her " over. Pt presents with deficits in safety awareness, impulsivity, coordination, and balance limiting her ability to complete daily functional routines independently. Pt would benefit form skilled OT intervnetion to improve deficit areas, provide training in compensatory and adpative techniques to maximize safety and independence prior to return home with family support. TANYA 2wks.   OT Therapy Minutes   OT Time In 0700   OT Time Out 0830   OT Total Time (minutes) 90   OT Mode of treatment - Individual (minutes) 90   OT Mode of treatment - Concurrent (minutes) 0   OT Mode of treatment - Group (minutes) 0   OT Mode of treatment - Co-treat (minutes) 0   OT Mode of Treatment - Total time(minutes) 90 minutes   OT Cumulative Minutes 90   Cumulative Minutes   Cumulative therapy minutes 90

## 2024-04-04 NOTE — ASSESSMENT & PLAN NOTE
"- CTA HN - No left cerebellar vascular malformation to account for the patient's acute parenchymal hematoma. No cervical or intracranial large vessel occlusion, dissection or aneurysm. Mild bilateral cervical carotid bifurcation atherosclerotic disease.  - CTH 3/26 - Acute parenchymal hematoma left paramedian cerebellum measures 2.6 x 1.6 cm with mass effect on the fourth ventricle. Intraventricular extension of hemorrhage noted with blood in the fourth ventricle and left foramen of Luschka.  - Follow-up CTH 3/27 -  Redemonstration of hyperdense hemorrhage in the left paramedian cerebellum, as described with mass effect and extension into the fourth ventricle and left foramen of Luschka, similar in appearance to the prior.    No hydrocephalus.  - Follow-up CTH 3/28 - No significant interval changes    - Residual impairments on admission to [facility]: Incoordination, imbalance, ataxia, possible dysphagia (assess for other impairments during [facility] course)     Secondary stroke prevention  - Antithrombotic: Full AC and antiplatelet on hold  - maintain -140      - Per neuro, Repeat CTH on 4/11 - if improving hemorrhage, patient can resume plavix 75mg daily for stroke prevention; no need for aspirin in terms of stroke prevention   - Follow-up with Nsx in 1-2 weeks with repeat CTH \"for stability and assess for delayed hydrocephalus. If stable/improved will defer remaining management and follow up with neurology\"    -rehab therapies per primary team       "

## 2024-04-04 NOTE — PROGRESS NOTES
SLP Bedside Swallow and Cognitive Linguistic Communication Assessment     04/04/24 1030   Pain Assessment   Pain Assessment Tool 0-10   Pain Score No Pain   Restrictions/Precautions   Precautions Aspiration;Bed/chair alarms;Fall Risk;Supervision on toilet/commode   Weight Bearing Restrictions No   ROM Restrictions No   Cognitive Linguisitic Assessments   Cognitive Linquistic Quick Test (CLQT) Pt completed the CLQT+ on initial evaluation with a Composite Severity Rating score of 4.0 out of 4.0, correlating to overall WNL  cognitive linguistic impairments at time of evaluation and in comparison to age matched peers ranging from 18-70 y/o. Pt scored at or above criterion cut score for 10 out of 10 tasks completed. Pt's Cognitive Domain Scores are as follows:      Cognitive Domain: Score:  Severity Rating:   Attention   195 WNL    Memory 167 WNL        Executive Functions 31 WNL       Language 34 WNL       Visuospatial Skills  92 WNL       Clock Drawing Screen    13 WNL    Overall Composite Severity Rating Score 4.0 out of 4.0 WNL       Pt was educated on results of assessment with review of overall results and performance on each cognitive domain. Discussed pt's baseline function as very independent and completes all IADLs at home. Plan to complete follow up for IADL mangement and stroke education. Pt engaged in informal interview as well were pt was oriented to all biographical and orientation information. Pt lives with 2 sons who both work but would be home 4/7 days for the week. Both work day shift. Pt also was working Part Time cleaning houses and bar, as well as cook sometimes. Pt worked long shifts and would walk to work or her sons would drive her and pick her up. Pt complete all IADL tasks at home including cooking, cleaning, laundry and meds. Sons took care of the bills. Pt noted with very mild to minimal dysarthria with fast rate of speech- pt reports this is baseline and is not worse since new stroke.  Discussed potential review of speech strategies for educational purposes- pt receptive. Pt was receptive to all information provided, along with recommendations for skilled SLP services during acute rehab stay to maximize overall cognitive linguistic skills.     Comprehension   Assist Devices Glasses   Auditory Basic;Complex   Visual Basic;Complex   Findings Pt completed the CLQT assessment tool- see SLP rehab note for details   QI: Comprehension 4. Undestands: Clear comprehension without cues or repetitions   Comprehension (FIM) 6 - Understands complex/abstract but requires more time   Expression   Verbal Basic;Complex   Non-Verbal Basic;Complex   Intelligibility Sentence   Findings Pt completed the CLQT assessment tool- see SLP rehab note for details   QI: Expression 3. Exhibits some difficulty with expressing needs and ideas (e.g., some words or finishing thoughts) or speech is not clear   Expression (FIM) 6 - Has only MILD difficulty with complex/abstract info   Social Interaction   Cooperation with staff   Participation Individual   Behaviors observed Appropriate   Findings Pt completed the CLQT assessment tool- see SLP rehab note for details   Social Interaction (FIM) 6 - Interacts appropriately with others BUT requires extra  time   Problem Solving   Complex Manages discharge planning;Manages medications   Routine Manages call bell;Manges precautions   Findings Pt completed the CLQT assessment tool- see SLP rehab note for details   Problem solving (FIM) 5 - Solves basic problems 90% of time   Memory   Recognize People Yes   Remember Routine Yes   Initiates Tasks Yes   Short-Term Intact   Long Term Intact   Recalls Precaution Yes   Findings Pt completed the CLQT assessment tool- see SLP rehab note for details   Memory (FIM) 5 - Recalls/performs request 90% of time   Eating   Type of Assistance Needed Independent   Physical Assistance Level No physical assistance   Eating CARE Score 6   Swallow Assessment    Swallow Treatment Assessment Bedside Swallow Evaluation      Patient Name: Tanika Lira    Today's Date: 4/4/2024     Problem List  Principal Problem:    Cerebellar hemorrhage (HCC)  Active Problems:    History of CVA (cerebrovascular accident)    Tobacco abuse    Essential hypertension    ROSANA (generalized anxiety disorder)    Neuropathy    Alcohol use    Hyponatremia    At risk for venous thromboembolism (VTE)    Insomnia    Amphetamine abuse (HCC)    Hypomagnesemia    Transaminitis    History of noncompliance with medical treatment    Marijuana use    Prolonged QT interval    Simple adnexal cyst greater than 1 cm in diameter in postmenopausal patient      Past Medical History  Past Medical History:   Diagnosis Date    CVA (cerebral vascular accident) (HCC)     Diverticulitis     Diverticulitis of colon     Diverticulosis     Hypertension     Stroke (HCC)        Past Surgical History  Past Surgical History:   Procedure Laterality Date    CARDIAC ELECTROPHYSIOLOGY PROCEDURE N/A 2/8/2023    Procedure: Cardiac loop recorder implant;  Surgeon: Duke Abraham MD;  Location:  CARDIAC CATH LAB;  Service: Cardiology    COLON SIGMOID RESECTION             Reason for consult: New CVA; pt reports some baseline dysphagia from previous CVA in 9/2022. Per chart review, pt did complete VFSS on 9/2/2022 which showed:   Pt presents w/ min-mild oral, mild pharyngeal dysphagia huber by reduced oral control w/ premature spill over base of tongue, delayed swallow initiation w/ delayed epiglottic inversion and delayed/incomplete laryngeal vestibule closure resulting in reduced airway protection during the swallow. All material spills to the at least the valleculae, thin liquids to the pyriforms. Kirt aspiration w/ thin liquids via tsp, and straw, trace/transient w/ cup sip thin. Cough response appreciated. No significant pharyngeal retention, no penetration or aspiration of other administered consistencies.     Recommendations:  Diet: Regular textures   Liquids: Nectar thick (plan for f/u regarding benefits/risks of modifications and acceptance of risk)   Meds: Whole in puree  Strategies: Chin tuck or prep set w/ effortful swallow for thin liquids   Frequent oral care    Pt reported following discharge home, pt received brief home therapy services and was drinking thin liquids since. Pt does report some occ dry cough with liquids and is mindful with harder/drier textures like bread and meat.     Swallow Information   Current Risks for Dysphagia & Aspiration: Dysarthria;General debilitation;New Neuro event;Brain injury;Cognitive deficit;HX neurologic dx;   Current Symptoms/Concerns: Cough;With liquids;HX Dysphagia;   Current Diet: regular diet and thin liquids   Baseline Diet: regular diet and thin liquids      Baseline Assessment   Behavior/Cognition: alert  Speech/Language Status: able to participate in conversation and able to follow commands  Patient Positioning: upright in recliner  Pain Status/Interventions/Response to Interventions: No report of or nonverbal indications of pain.      Swallow Mechanism Exam  Facial: symmetrical  Labial: WFL  Lingual: WFL  Velum: symmetrical  Mandible: adequate ROM  Dentition: adequate  Vocal quality:clear/adequate   Volitional Cough: strong/productive   Respiratory Status: on RA       Consistencies Assessed:  Consistencies Administered: thin liquids, soft solids, and hard solids  Materials administered included crab cake, cooked squash, mashed potatoes, fresh fruit cup, thin liquids by cup and straw    Total Amount Consumed:   100% meal  240cc thin liquids      Oral stage:WFL  Lip closure: functional to utensil and cup/straw  Anterior spillage: none  Mastication: functional  Bolus formation: functional  Bolus control: functional  Transfer: timely  Oral residue: none  Pocketing: none     Pharyngeal stage: minimal to functional  Swallow promptness: timely  Hyolaryngeal elevation:  present  Wet voice: none  Throat clear: none  Cough: cough present x2 with thin liquids  Secondary swallows: none  Audible swallows: none     Esophageal stage:WFL    Summary:      Pt presenting with WFL oral and minimal to functional pharyngeal dysphagia today. Symptoms or concerns included minimal coughing with thin liquids as pt reports baseline swallow deficits.     Pt assessed with lunch meal- pt able to set up and self feed independently. Pt reported prior to meal some baseline swallow deficits as she occ coughs with thin liquids from prior stroke and is mindful of harder items like bread and meat. Pt cognizant of this and demonstrated no impulsive behaviors during meal and was slow with liquids. Pt with good retrieval and control of all textures and liquids- no loss present. Oral processing functional and complete- timely transfers and swallows observed. Suspect some incoordination for swallow with thin liquids as she did have immediate cough x2 across 240cc thin liquids. Pt reports this is baseline and per chart review, pt completed dysphagia tx prior. Pt with no hx pneumonias and is mindful to stop, cough and clear when needed. Discussed modifications to diet and liquids which at this time does not seem necessary. Pt receptive- No further dysphagia tx warranted at this time.      Recommendations:  Diet: regular diet and thin liquids  Meds: whole with liquid  Strategies: upright posture, only feed when fully alert, and alternating bites and sips     Results reviewed with:  patient, Smita NICHOLS, Dr Jennings, Team   Aspiration precautions posted.    F/u ST tx: Pt WFL for swallow function- cont diet as tolerated. No further dysphagia tx warranted at this time- may reconsult as needed.      SLP Therapy Minutes   SLP Time In 1030   SLP Time Out 1200   SLP Total Time (minutes) 90   SLP Mode of treatment - Individual (minutes) 90   SLP Mode of treatment - Concurrent (minutes) 0   SLP Mode of treatment - Group (minutes)  0   SLP Mode of treatment - Co-treat (minutes) 0   SLP Mode of Treatment - Total time(minutes) 90 minutes   SLP Cumulative Minutes 90

## 2024-04-04 NOTE — PCC PHYSICAL THERAPY
Pt 58 yr old female to [facility] on 3/26/24 with c/o dizziness, nausea, HA, Diarrhea. CT revealed acute parenchymal hematoma, L paramedian cerebellar mass effect, with chronic lacunar infarcts. Transferred to [facility] for neurosurgical eval and folowup. PT with PMH: previous CVA 2022, HTN, Anxiety disorder, neuropathy, dec med compliance, +Tobacco/ETOH/Meth. At baseline pt fully I with out AD, lives with 2 sons both work full time (varying hours) in mobile home 3 [address] [employer], worked PT cleaning.     4/4/24 PT to eval this PM.     4/10/24  Pt making slow progress towards goals due to dec activity judith from ongoing abd discomfort. Gait remains ataxic and overall dec balance. Discussed dispo plan, and LOS given recent dec activity tolerance. Reports son (Abner) can come tuesday for FT, he will be pts main support, other son juan j can learn from Abner and will do whatever hes told to do. Barriers remain: ataxia, dec balance, dec safety, home alone at times, ELIZ[employer]ETH.   Continue per POC, Resume NPP, balance tasks as able pending need for surgical intervention. Await medical follow up and pt tolerance. Suggest reteam to maximize pt post stroke outcomes and functional I. Pt would benefit from out pt neuro PT at [facility] if able. Pt will need to self purchase RW as she gave away her previous one obtained through insurance.

## 2024-04-04 NOTE — PROGRESS NOTES
SLP ARC TAA       04/04/24 1030   Patient Data   Rehab Impairment Impairment of mobility, safety, Activtities of Daily Living and cognitive/communication skills due to Stroke: 01.4 No paresis   Etiologic Diagnosis Left Cerebellar Hemorrhage   Date of Onset 03/26/24   Support System   Name lives with 2 sons, both work full time but pt would have someone home 4 out of 7 days during the week.   Baseline Information   Transportation Family/friends drive  (walked)   Prior IADL Participation   Money Management Identify Money;Estimate Costs  (sons manage household bills)   Meal Preparation Full Participation   Laundry Full Participation   Home Cleaning Full Participation   Patient Preference   Nickname (Patient Preference) Tanika   Psychosocial   Psychosocial (WDL) WDL   Patient Behaviors/Mood Cooperative   Restrictions/Precautions   Precautions Aspiration;Bed/chair alarms;Fall Risk;Supervision on toilet/commode   Weight Bearing Restrictions No   ROM Restrictions No   Pain Assessment   Pain Assessment Tool 0-10   Pain Score No Pain   Eating Assessment   Swallow Precautions Yes   Bedside Swallow Results No   VBS Study Results No   Food To Mouth Yes   Able To Cut Yes   Noted Coughing   Positioning Upright;Out of Bed   Meal Assessed Lunch   Current Diet Regular;Thin   Intake Mode PO   Finishes Timely Yes   Opens Packages Yes   Findings Pt with grossly functional oral and pharyngeal swallow- see SLP rehab note for details.   Type of Assistance Needed Independent   Physical Assistance Level No physical assistance   Eating CARE Score 6   Comprehension   Assist Devices Glasses   Auditory Basic;Complex   Visual Basic;Complex   Findings Pt completed the CLQT assessment tool- see SLP rehab note for details   QI: Comprehension 4. Undestands: Clear comprehension without cues or repetitions   Comprehension (FIM) 6 - Understands complex/abstract but requires more time   Expression   Verbal Basic;Complex   Non-Verbal Basic;Complex  "  Intelligibility Sentence   Findings Pt completed the CLQT assessment tool- see SLP rehab note for details   QI: Expression 3. Exhibits some difficulty with expressing needs and ideas (e.g., some words or finishing thoughts) or speech is not clear   Expression (FIM) 6 - Has only MILD difficulty with complex/abstract info   Social Interaction   Cooperation with staff   Participation Individual   Behaviors observed Appropriate   Findings Pt completed the CLQT assessment tool- see SLP rehab note for details   Social Interaction (FIM) 6 - Interacts appropriately with others BUT requires extra  time   Problem Solving   Complex Manages discharge planning;Manages medications   Routine Manages call bell;Manges precautions   Findings Pt completed the CLQT assessment tool- see SLP rehab note for details   Problem solving (FIM) 5 - Solves basic problems 90% of time   Memory   Recognize People Yes   Remember Routine Yes   Initiates Tasks Yes   Short-Term Intact   Long Term Intact   Recalls Precaution Yes   Findings Pt completed the CLQT assessment tool- see SLP rehab note for details   Memory (FIM) 5 - Recalls/performs request 90% of time   Discharge Information   Vocational Plan Return to work;Part Time   Barriers to Return to Vocation Environmental Access;Skill Set Limitation;Strength;Endurance   Patient's Discharge Plan home with family support   Patient's Rehab Expectations \"to get home\"   Barriers to Discharge Home Limited Family Support;Decreased Cognitive Function;Decreased Strength;Decreased Endurance;Safety Considerations   Impressions Pt is a 59 yo female with PMHx  ischemic strokes in 2022, HTN, generalized anxiety disorder, tobacco, alcohol, and marijuana use, methamphetamine abuse, medical noncompliance who presented to [facility] on 3/26/2024 with dizziness, nausea, and headache after recent fall and vomiting. Pt noted to be ataxic on exam. CT showed acute parenchymal hematoma in the left paramedian " cerebellum with mass effect on the fourth ventricle with intraventricular extension of the hemorrhage with blood in the fourth ventricle and the left foramen of Luschka as well as some chronic lacunar infarcts. Patient transferred to [facility] for neurosurgery evaluation. Patient reports she was not compliant with her blood thinners although she did receive DDAVP for aspirin reversal. Neurology was consulted and suspected intracerebral hemorrhage related to HTN and medication noncompliance with recent methamphetamine use. Pt is to repeat CT head on 4/11 and per neurology if improving hemorrhage can resume Plavix without need for aspirin for secondary ischemic stroke prevention. Neurosurgery would like to see patient in follow-up at about that same time also looking for stability and assess for delayed hydrocephalus. Pt was approved for acute rehab and transferred on 4/3/2024. Orders received for skilled bedside swallow and cognitive assessment which was initiated using the CLQT assessment tool. Initial interview with review of history, orientation and events completed as well. Pt overall scoring WNL on cognitive testing however given high level baseline and fully independent, would benefit from follow up for IADL tasks and stroke education. At this time, pt is a good rehab candidate to achieve supervision to mod I for cognitive linguistic skills while in the acute rehab center w/ anticipated discharge home w/ family support/supervision. Current barriers which present at this time include: decreased ST/working memory, decreased executive function skills which currently impact safety and functional mobility. ELOS ~ 2 weeks. Pt will benefit from skilled SLP services at this time to maximize overall functional cognitive linguistic skills to decrease burden of care for family at time of discharge.   SLP Therapy Minutes   SLP Time In 1030   SLP Time Out 1200   SLP Total Time (minutes) 90   SLP Mode of treatment  - Individual (minutes) 90   SLP Mode of treatment - Concurrent (minutes) 0   SLP Mode of treatment - Group (minutes) 0   SLP Mode of treatment - Co-treat (minutes) 0   SLP Mode of Treatment - Total time(minutes) 90 minutes   SLP Cumulative Minutes 90   Cumulative Minutes   Cumulative therapy minutes 180

## 2024-04-04 NOTE — ASSESSMENT & PLAN NOTE
- current regimen: amlodipine 10mg, lisinopril 40mg, HCTZ 12.5mg   - Goal -140  - hydralazine prn   - monitor BP with routine VS

## 2024-04-04 NOTE — PLAN OF CARE
Problem: NEUROSENSORY - ADULT  Goal: Achieves stable or improved neurological status  Description: INTERVENTIONS  - Monitor and report changes in neurological status  - Monitor vital signs such as temperature, blood pressure, glucose, and any other labs ordered   - Initiate measures to prevent increased intracranial pressure  - Monitor for seizure activity and implement precautions if appropriate      Outcome: Progressing     Problem: GENITOURINARY - ADULT  Goal: Maintains or returns to baseline urinary function  Description: INTERVENTIONS:  - Assess urinary function  - Encourage oral fluids to ensure adequate hydration if ordered  - Administer IV fluids as ordered to ensure adequate hydration  - Administer ordered medications as needed  - Offer frequent toileting  - Follow urinary retention protocol if ordered  Outcome: Progressing

## 2024-04-04 NOTE — PROGRESS NOTES
04/04/24 0700   Rehab Team Goals   ADL Team Goal Patient will be independent with ADLs with least restrictive device upon completion of rehab program   Rehab Team Interventions   OT Interventions Self Care;Home Management;Therapeutic Exercise;Community Reintegration;Patient/Family Education   Eating Goal   Eating Goal 06. Independent - Patient completes the activity by him/herself with no assistance from a helper.   Meal Complete All meals   Grooming Goal   Oral Hygiene Goal 06. Independent - Patient completes the activity by him/herself with no assistance from a helper.   Task Complete Groom   Environment Stand at Sink   Safety Precautions Safety Precaution   Status Ongoing;Target goal - two weeks   Intervention Balance Work;Neuromuscular Education;Tolerance Work   Tub/Shower Transfer Goal   Method Tub Shower   Assist Device Seat with out Back   Status Ongoing;Target goal - two weeks   Interventions ADL Training;Assistive Device;Neuromuscular Education   Bathing Goal   Shower/bathe self Goal 06. Independent - Patient completes the activity by him/herself with no assistance from a helper.   Environment Seated;Shower;Standing   Adaptive Equipment Seat without back;Grab Bar;Hand Held Shower   Safety Precautions Safety Precaution   Status Ongoing;Target goal - two weeks   Intervention ADL Training;Assistive Device;Neuromuscular Education   Upper Body Dressing Goal   Upper body dressing Goal 06. Independent - Patient completes the activity by him/herself with no assistance from a helper.   Task Upper Body   Environment Seated   Safety Precautions Safety Precaution   Status Ongoing;Target goal - two weeks   Intervention Balance Work;Neuromuscular Education;Tolerance Work   Lower Body Dressing Goal   Lower body dressing Goal 06. Independent - Patient completes the activity by him/herself with no assistance from a helper.   Putting on/taking off footwear Goal 06. Independent - Patient completes the activity by  him/herself with no assistance from a helper.   Task Lower Body   Environment Seated;Standing   Safety Precautions Safety Precaution   Status Ongoing;Target goal - two weeks   Intervention Assistive Device;Balance Work;Neuromuscular Education;Tolerance Work   Toileting Transfer Goal   Toilet transfer Goal 06. Independent - Patient completes the activity by him/herself with no assistance from a helper.   Assistive Device Other  (least restrictive device)   Safety Safety Precaution   Status Ongoing;Target goal - two weeks   Intervention ADL Training;Balance Work;Assistive Device   Toileting Goal   Toileting hygiene Goal 06. Independent - Patient completes the activity by him/herself with no assistance from a helper.   Task Pants Up;Pants Down;Hygiene   Safety Balance   Status Ongoing;Target goal - two weeks   Intervention ADL Training;Balance Work   Meal Prep and Kitchen Mobility   Assist Level Independent   Status Ongoing;Target goal - two weeks   Medication Management   Assist Level Independent   Status Ongoing;Target goal - two weeks   Laundry   Assist Level Independent   Status Ongoing;Target goal - two weeks   Finance Management   Assist Level Independent   Status Ongoing;Target goal - two weeks   Additional Goal (other)   Goal Type (other) Pt will dmeonstrate improved left hand FMC as evident by ability to complete 9HPT in under 50seconds.   Status Ongoing;Target goal - two weeks

## 2024-04-04 NOTE — PCC OCCUPATIONAL THERAPY
4/10/24  ADL: s/u UB, CGA lB, CGA toileting  TRANSFER:  CGA/CS with RW  D/C PLAN: anticipate DC next week. During rehab stay have bee working on overall safety, standing balance, IADLs, med mnmnt, activity tolerance and UE NMR/FMC. Pt agreeable to use 3x/day pill organizer and folding walker tray at DC to inc indep and safety. Pt reporting son to purchase OOP. Plan to cont on the below mentioned deficits ot inc overall safety and indep to ensure safe DC home.     Pt barriers include balance, UE FMC/strength, safety, impacting participation in ADL/IADL's.  In order to address fx deficits, skilled OT services have worked on self-care, therapeutic exercise, therapeutic activity, modalities PRN in order to inc fx performance and independence.     Therapist has discussed POC with pt and areas of focus with pt verbalizing understanding.     Barriers Intervention   balance Balance training, therap act   UE FMC/strength NMR, strengthening, FMC activities, repetition   safety Educ, CVA educ           4/3/24  Tanika is 54yo female with new cerebellar hemorrhagic CVA and h/o ischemic lacunar infarcts. She has PMH of HTN, polysubstance abuse, anxiety, urinary leakage, peripheral neuropathy. Pt is  with two FT-working sons living with her in a mobile home, and a supportive boyfriend. 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 Pt was independent with all B/IADLs, even providing laundry and meals for her sons. Pt presents with ataxia in stance, frequent loss of balance to the left during dynamic mobility causing high fall risk and inability to complete daily functional routines independently. Pt would benefit from training in compensatory and adaptive strategies, intervention for improved coordination and balance. ELOS 2wks

## 2024-04-04 NOTE — CONSULTS
"[facility]  Consult  Name: Tanika Lira 58 y.o. female I MRN: [id number]  Unit/Bed#: [facility] 266-01 I Date of Admission: 4/3/2024   Date of Service: 4/4/2024 I Hospital Day: 1    Inpatient consult to Internal Medicine  Consult performed by: MILAN Marr  Consult ordered by: Matias Jennings MD          Assessment/Plan   Simple adnexal cyst greater than 1 cm in diameter in postmenopausal patient  Assessment & Plan  - incidental finding on CT A/P   - simple appearing right adnexal cyst measuring 3.7 cm  - according to current guidelines, pt needs follow up pelvic US in 6-12 months  - follow up with OBGYN at discharge     Prolonged QT interval  Assessment & Plan  - prolonged Qtc 534 on EKG from admission to acute setting  - repeat EKG on [facility] shows continued prolonged Qtc but improved. Qtc 472  - decrease lexapro to 10mg daily  - decrease hydroxyzine to 25mg tid prn   - repeat EKG in 1 week     Transaminitis  Assessment & Plan  -Hx of alcohol use   -AST/ALT improving. Continue to trend   -CT A/P in acute setting showed \"Findings suggesting cirrhosis with mild mesenteric edema, but no ascites. Enlarged ana hepatic and peripancreatic lymph nodes measuring up to 2 cm in short axis, indeterminate, although could be reactive, given suspicion for cirrhosis\"  -recommend outpatient follow up with hepatology   -encourage alcohol cessation     Hypomagnesemia  Assessment & Plan  - mag 1.7  - currently on magnesium gluconate 500mg bid ac  - repeat mag Friday     Amphetamine abuse (HCC)  Assessment & Plan  - admits to using 1 week prior to admission  - encouraged drug cessation     Insomnia  Assessment & Plan  - melatonin 3mg qhs     Hyponatremia  Assessment & Plan  Na+ 133  Repeat BMP tomorrow     Alcohol use  Assessment & Plan  - hx of alcohol abuse. Reports drinking 16 oz twisted teas anywhere from 1-6 per day and fireball whiskey   - reviewed with patient findings from CT A/P " "3/26  \"Findings suggesting cirrhosis with mild mesenteric edema, but no ascites. Enlarged ana hepatic and peripancreatic lymph nodes measuring up to 2 cm in short axis, indeterminate, although could be reactive, given suspicion for cirrhosis\"  - continue thiamine and folic acid   - discussed importance of alcohol cessation      Neuropathy  Assessment & Plan  - polyneuropathy   - continue gabapentin     ROSANA (generalized anxiety disorder)  Assessment & Plan  - continue lexapro 20mg daily   - hydroxyzine prn     Essential hypertension  Assessment & Plan  - current regimen: amlodipine 10mg, lisinopril 40mg, HCTZ 12.5mg   - Goal -140  - hydralazine prn   - monitor BP with routine VS     Tobacco abuse  Assessment & Plan  - reports smoking < 1/2 ppd   - encouraged smoking cessation  - patient declines nicotine patch     History of CVA (cerebrovascular accident)  Assessment & Plan  - Hx of CVA 9/2022   - MRI from 9/2022   \"Acute infarcts in right frontal centrum semiovale and right posterior putamen.  Small subacute infarct in left paramedian andreea.  Chronic lacunar infarcts in right corona radiata, bilateral basal ganglia, and bilateral thalami.  Findings consistent with hypertensive arteriopathy.  Mild chronic microangiopathy.\"  - treated with DAPT and statin but pt was noncompliant with medications     * Cerebellar hemorrhage (HCC)  Assessment & Plan  - CTA HN - No left cerebellar vascular malformation to account for the patient's acute parenchymal hematoma. No cervical or intracranial large vessel occlusion, dissection or aneurysm. Mild bilateral cervical carotid bifurcation atherosclerotic disease.  - CTH 3/26 - Acute parenchymal hematoma left paramedian cerebellum measures 2.6 x 1.6 cm with mass effect on the fourth ventricle. Intraventricular extension of hemorrhage noted with blood in the fourth ventricle and left foramen of Luschka.  - Follow-up CTH 3/27 -  Redemonstration of hyperdense hemorrhage in the " "left paramedian cerebellum, as described with mass effect and extension into the fourth ventricle and left foramen of Luschka, similar in appearance to the prior.    No hydrocephalus.  - Follow-up CTH 3/28 - No significant interval changes    - Residual impairments on admission to [facility]: Incoordination, imbalance, ataxia, possible dysphagia (assess for other impairments during ARC course)     Secondary stroke prevention  - Antithrombotic: Full AC and antiplatelet on hold  - maintain -140      - Per neuro, Repeat CTH on 4/11 - if improving hemorrhage, patient can resume plavix 75mg daily for stroke prevention; no need for aspirin in terms of stroke prevention   - Follow-up with Nsx in 1-2 weeks with repeat CTH \"for stability and assess for delayed hydrocephalus. If stable/improved will defer remaining management and follow up with neurology\"    -rehab therapies per primary team                VTE Prophylaxis: Heparin  / sequential compression device     Recommendations for Discharge:  Per primary team       History of Present Illness:    Tanika Lira is a 58 y.o. female who [facility]24 due to cerebellar hemorrhage. She has a PMH significant for HTN, HLD, previous CVA in 9/2022 on DAPT but noncompliant, anxiety, neuropathy, alcohol use, amphetamine use, tobacco use, marijuana use, and diverticulitis with hx of colon sigmoid resection initially presented to the [facility] ED on 3/26 via EMS with complaints of acute N/V/D and dizziness. EMS found her to be ataxic on exam. BP on arrival 226/126. In the ED CT A/P was significant for cholelithiasis, including a 3 mm gallstone within the gallbladder neck versus cystic duct. No findings of acute cholecystitis. Findings suggesting cirrhosis with mild mesenteric edema, but no ascites. Enlarged ana hepatic and peripancreatic lymph nodes measuring up to 2 cm in short axis, indeterminate, although could be " reactive, given suspicion for cirrhosis. And a simple appearing right adnexal cyst measuring 3.7 cm which she is recommended to have repeat pelvic US in 6-12 months. CTH showed acute parenchymal hematoma left paramedian cerebellum measures 2.6 x 1.6 cm with mass effect on the fourth ventricle. Intraventricular extension of hemorrhage noted with blood in the fourth ventricle and left foramen of Luschka as well as chronic lacunar infarcts. CTA head and neck showed no left cerebellar vascular malformation to account for the patient's acute parenchymal hematoma.No cervical or intracranial large vessel occlusion, dissection or aneurysm and mild bilateral cervical carotid bifurcation atherosclerotic disease. Patient was given DDAVP for ASA reversal and transferred to [facility] for neurosurgery evaluation. She was placed on EVD watch but did not need this. Neurology was consulted and suspected CVA likely secondary to medication noncompliance, HTN and methamphetamine use. Repeat CTH 3/27 was stable. Patient's blood pressure medications were adjusted for ongoing hypertension and required prn doses of IV hydralazine and labetalol. Goal BP is 120-140. Per neurology, pt is to have repeat CTH on 4/11. If improving hemorrhage, patient can resume Plavix for secondary stroke prevention, with no role for ASA at this time. Neurosurgery would like patient to follow up at this same time to evaluate for stability and any delayed hydrocephalus. Patient was evaluated and deemed to be appropriate for acute rehab.   We are consulted for medical management.       Patient seen in her room after lunch. She is in good spirits but tells me she is having some anxiety. She states she had anxiety this morning and then was given hydroxyzine and symptoms improved. She tells me she had no residual deficits from her stroke in 2022, but does have some left sided weakness from her current CVA and imbalance. She reports a chronic productive  cough, she is a current daily smoker, smoking 1/2 ppd but does not want a nicotine patch while she is here. She does endorse alcohol abuse. She currently drinks 16oz of twisted tea anywhere from 1-6 per day as well fireball whiskey daily. She denies any withdrawal symptoms. She also admits to using meth last week. She does not feel she needs assistance with quitting drinking. She tells me she has a good support system at home. I reviewed her hx of prolonged Qtc and plan for repeat EKG today. I also reviewed her imaging from her acute stay with her including the adnexal mass, concern for cirrhosis, and gallstone. Strongly encouraged alcohol cessation. Patient endorses some mild back from which she says is from sitting too long. She also reports a bowel movement this morning. She denies any other pain. She is eating well.      Review of Systems:    Review of Systems   Eyes:  Negative for visual disturbance.   Respiratory:  Positive for cough (chronic productive cough). Negative for chest tightness, shortness of breath and wheezing.    Cardiovascular:  Negative for chest pain.   Gastrointestinal:  Negative for abdominal distention, abdominal pain, constipation and diarrhea.   Genitourinary:  Negative for difficulty urinating.   Musculoskeletal:  Positive for gait problem.   Neurological:  Positive for weakness (reports feeling weak on the left side). Negative for dizziness, light-headedness and headaches.   Psychiatric/Behavioral:  Negative for dysphoric mood. The patient is nervous/anxious.        Past Medical and Surgical History:     Past Medical History:   Diagnosis Date    CVA (cerebral vascular accident) (HCC)     Diverticulitis     Diverticulitis of colon     Diverticulosis     Hypertension     Stroke (HCC)        Past Surgical History:   Procedure Laterality Date    CARDIAC ELECTROPHYSIOLOGY PROCEDURE N/A 2/8/2023    Procedure: Cardiac loop recorder implant;  Surgeon: Duke Abraham MD;  Location: [facility]ARDI[facility]  "CATH LAB;  Service: Cardiology    COLON SIGMOID RESECTION         Meds/Allergies:    all medications and allergies reviewed    Allergies: No Known Allergies    Social History:     Marital Status:     Substance Use History:   Social History     Substance and Sexual Activity   Alcohol Use Yes    Comment: \"twisted tea daily\"\"     Social History     Tobacco Use   Smoking Status Light Smoker    Types: Cigarettes   Smokeless Tobacco Never   Tobacco Comments    smokes 4 cigarettes daily     Social History     Substance and Sexual Activity   Drug Use Yes    Types: Marijuana    Comment: medical card       Family History:    non-contributory    Physical Exam:     Vitals:   Blood Pressure: 108/67 (04/04/24 1501)  Pulse: 56 (04/04/24 1501)  Temperature: 98.2 °F (36.8 °C) (04/04/24 1501)  Temp Source: Tympanic (04/04/24 1501)  Respirations: 18 (04/04/24 1501)  Height: 5' 6\" (167.6 cm) (04/03/24 1528)  Weight - Scale: 59.1 kg (130 lb 5 oz) (04/03/24 1528)  SpO2: 94 % (04/04/24 1501)    Physical Exam  Vitals reviewed.   Constitutional:       General: She is not in acute distress.     Appearance: Normal appearance. She is normal weight. She is not diaphoretic.   HENT:      Head: Normocephalic and atraumatic.   Eyes:      Extraocular Movements: Extraocular movements intact.      Conjunctiva/sclera: Conjunctivae normal.      Pupils: Pupils are equal, round, and reactive to light.   Cardiovascular:      Rate and Rhythm: Normal rate and regular rhythm.      Heart sounds: Normal heart sounds. No murmur heard.  Pulmonary:      Effort: Pulmonary effort is normal. No respiratory distress.      Breath sounds: Normal breath sounds. No wheezing, rhonchi or rales.   Abdominal:      General: Bowel sounds are normal. There is no distension.      Palpations: Abdomen is soft.      Tenderness: There is abdominal tenderness (tenderness where heparin injections were given).   Musculoskeletal:      Right lower leg: No edema.      Left lower " leg: No edema.   Neurological:      Mental Status: She is alert and oriented to person, place, and time. Mental status is at baseline.      Gait: Gait abnormal.   Psychiatric:         Mood and Affect: Mood normal.         Behavior: Behavior normal.         Additional Data:     Lab Results: I have personally reviewed pertinent reports.      Results from last 7 days   Lab Units 04/02/24  0436 03/31/24  1053 03/29/24  0622   WBC Thousand/uL 7.25   < > 7.41   HEMOGLOBIN g/dL 14.4   < > 14.7   HEMATOCRIT % 43.9   < > 45.5   PLATELETS Thousands/uL 195   < > 189   NEUTROS PCT %  --   --  47   LYMPHS PCT %  --   --  39   MONOS PCT %  --   --  13*   EOS PCT %  --   --  1    < > = values in this interval not displayed.     Results from last 7 days   Lab Units 04/03/24  0340 03/31/24  1053 03/29/24  0622   POTASSIUM mmol/L 4.1   < > 3.8   CHLORIDE mmol/L 99   < > 102   CO2 mmol/L 26   < > 26   BUN mg/dL 20   < > 17   CREATININE mg/dL 0.55*   < > 0.57*   CALCIUM mg/dL 8.5   < > 8.4   ALK PHOS U/L  --   --  78   ALT U/L  --   --  65*   AST U/L  --   --  57*    < > = values in this interval not displayed.           Imaging: I have personally reviewed pertinent reports.      CT head wo contrast    Result Date: 3/28/2024  Narrative: CT BRAIN - WITHOUT CONTRAST INDICATION:   New h/a with known cerebellar bleed. COMPARISON: 3/27/2024. TECHNIQUE:  CT examination of the brain was performed.  Multiplanar 2D reformatted images were created from the source data. Radiation dose length product (DLP) for this visit:  890.22 mGy-cm .  This examination, like all CT scans performed in the [facility], was performed utilizing techniques to minimize radiation dose exposure, including the use of iterative  reconstruction and automated exposure control. IMAGE QUALITY:  Diagnostic. FINDINGS: PARENCHYMA: Intraparenchymal hematoma in the left paramedian cerebellum is stable compared to the prior study without interval change. Mild mass  effect upon the adjacent brain parenchyma with extension into the fourth ventricle and left foramen of the Luschka is again seen, also unchanged. No new/additional areas of intracranial hemorrhage identified. No extra-axial/subdural hemorrhage. No midline shift or transtentorial herniation. Unchanged mild chronic microangiopathy. Sequelae of old lacunar infarcts in the periventricular and subcortical white matter, bilateral thalami, gangliocapsular regions, and andreea are again seen. VENTRICLES AND EXTRA-AXIAL SPACES:  Ventricles and extra-axial CSF spaces are mildly prominent commensurate with the degree of volume loss.  No hydrocephalus.  No acute extra-axial hemorrhage. VISUALIZED ORBITS: Normal visualized orbits. PARANASAL SINUSES: Minimal mucoperiosteal thickening of the ethmoid and left inferior maxillary sinuses. No air-fluid levels in the paranasal sinuses or mastoid air cells. CALVARIUM AND EXTRACRANIAL SOFT TISSUES: Bony calvarium and temporomandibular joints are intact. Scalp soft tissues appear grossly unremarkable.     Impression: No significant interval change with findings detailed above. Workstation performed: [id number]     CT head wo contrast    Result Date: 3/27/2024  Narrative: CT BRAIN - WITHOUT CONTRAST INDICATION:   Cerebellar bleed. Follow-up COMPARISON: CT head dated 3/26/2024 TECHNIQUE:  CT examination of the brain was performed.  Multiplanar 2D reformatted images were created from the source data. Radiation dose length product (DLP) for this visit:  859 mGy-cm .  This examination, like all CT scans performed in the [facility], was performed utilizing techniques to minimize radiation dose exposure, including the use of iterative reconstruction and automated exposure control. IMAGE QUALITY:  Diagnostic. FINDINGS: PARENCHYMA: Redemonstration of hyperdense hemorrhage in the left paramedian cerebellum measuring approximately 2.6 x 1.6 x 1.7 cm in size, similar in appearance to the  prior. There is some mass effect on the fourth ventricle as before with extension into  the fourth ventricle and the left foramen of Luschka, similar in appearance to the prior. No new parenchymal hemorrhage is identified. Several scattered areas of low density in the periventricular and subcortical white matter noted, nonspecific but probably sequela of chronic microvascular angiopathy. Several old lacunar infarcts are seen in the andreea and bilateral basal ganglia. Gray-white differentiation appears maintained. No territorial infarct is identified. Parenchymal atrophy. Intracranial structures otherwise without significant interval change. VENTRICLES AND EXTRA-AXIAL SPACES:  Ventricles and extra-axial CSF spaces are prominent commensurate with the degree of volume loss.  No hydrocephalus.  No acute extra-axial hemorrhage. VISUALIZED ORBITS: Normal visualized orbits. PARANASAL SINUSES: Grossly clear CALVARIUM AND EXTRACRANIAL SOFT TISSUES:  Normal.     Impression: Redemonstration of hyperdense hemorrhage in the left paramedian cerebellum, as described with mass effect and extension into the fourth ventricle and left foramen of Luschka, similar in appearance to the prior. No hydrocephalus. Other findings as above. Overall there has been no significant interval change. Follow-up as clinically warranted. Workstation performed: [id number]     XR chest 1 view portable    Result Date: 3/26/2024  Narrative: XR CHEST PORTABLE INDICATION: Epigastric abdominal pain, nausea and vomiting. COMPARISON: None FINDINGS: Clear lungs. No pneumothorax or pleural effusion. Normal cardiomediastinal silhouette. Loop recorder noted Normal upper abdomen.     Impression: No acute cardiopulmonary disease. Workstation performed: [id number]     CTA head and neck with and without contrast    Result Date: 3/26/2024  Narrative: CTA NECK AND BRAIN WITH CONTRAST INDICATION: Head bleed COMPARISON:   Head CT and CT angiography of the head and neck  from September 1, 2022 and follow-up MRI of the brain performed on that same day. TECHNIQUE: Post contrast imaging was performed after administration of iodinated contrast through the neck and brain. Post contrast axial 0.625 mm images timed to opacify the arterial system. 3D rendering was performed on an independent workstation.   MIP reconstructions performed. Coronal reconstructions were performed of the noncontrast portion of the brain. Radiation dose length product (DLP) for this visit:  346 mGy-cm .  This examination, like all CT scans performed in the [facility], was performed utilizing techniques to minimize radiation dose exposure, including the use of iterative reconstruction and automated exposure control. IV Contrast:  140 mL of iohexol (OMNIPAQUE) IMAGE QUALITY:   Diagnostic FINDINGS: CERVICAL VASCULATURE AORTIC ARCH AND GREAT VESSELS:  Normal aortic arch and great vessel origins. Normal visualized subclavian vessels. RIGHT VERTEBRAL ARTERY CERVICAL SEGMENT:  Normal origin. The vessel is normal in caliber throughout the neck. LEFT VERTEBRAL ARTERY CERVICAL SEGMENT:  Normal origin. The vessel is normal in caliber throughout the neck. RIGHT EXTRACRANIAL CAROTID SEGMENT: Mild atherosclerotic disease of the distal common carotid artery and proximal cervical internal carotid artery without significant stenosis compared to the more distal ICA. Less than 50% ICA origin stenosis. LEFT EXTRACRANIAL CAROTID SEGMENT: Mild atherosclerotic disease of the distal common carotid artery and proximal cervical internal carotid artery without significant stenosis compared to the more distal ICA. Less than 50% ICA origin stenosis. NASCET criteria was used to determine the degree of internal carotid artery diameter stenosis. INTRACRANIAL VASCULATURE INTERNAL CAROTID ARTERIES: Bilateral intracranial carotid artery atherosclerotic plaque with moderate right and mild left cavernous segment stenosis. There is  mild to moderate right and mild left proximal supraclinoid ICA stenosis. Ophthalmic artery origins and carotid termini are unremarkable. ANTERIOR CIRCULATION:  Symmetric A1 segments and anterior cerebral arteries with normal enhancement.  Normal anterior communicating artery. MIDDLE CEREBRAL ARTERY CIRCULATION:  M1 segment and middle cerebral artery branches demonstrate normal enhancement bilaterally. DISTAL VERTEBRAL ARTERIES: Mild atherosclerotic stenosis in the proximal intradural vertebral artery segments. Posterior inferior cerebellar artery origins are unremarkable. No evidence of left PICA aneurysm. No abnormal arterial or venous vasculature at  the site of the left cerebellar hematoma to suggest underlying vascular malformation. Vertebrobasilar junction is normal. BASILAR ARTERY:  Basilar artery is normal in caliber.  Normal superior cerebellar arteries. POSTERIOR CEREBRAL ARTERIES: Both posterior cerebral arteries arises from the basilar tip.  Both arteries demonstrate normal enhancement.   Neither posterior communicating artery is well visualized. VENOUS STRUCTURES:  Normal. NON VASCULAR ANATOMY BONY STRUCTURES:  No acute osseous abnormality. Osteopenia/osteoporosis. Reversal of the cervical lordosis with cervical spondylosis at C5-C6 and C6-C7 resulting in mild spinal stenosis. SOFT TISSUES OF THE NECK:  Unremarkable. THORACIC INLET: Mild apical pleural scarring, right more than left. Clear lung apices. Chronically enlarged 1.3 x 2.2 cm precarinal lymph node. This is a nonspecific finding which has been stable since September 1, 2022.     Impression: No left cerebellar vascular malformation to account for the patient's acute parenchymal hematoma. No cervical or intracranial large vessel occlusion, dissection or aneurysm. Mild bilateral cervical carotid bifurcation atherosclerotic disease. Workstation performed: [id number]     CT abdomen pelvis with contrast    Result Date: 3/26/2024  Narrative: CT ABDOMEN  AND PELVIS WITH IV CONTRAST INDICATION: Epigastric abd pain, N/V. COMPARISON: None. TECHNIQUE: CT examination of the abdomen and pelvis was performed. Multiplanar 2D reformatted images were created from the source data. This examination, like all CT scans performed in the [facility], was performed utilizing techniques to minimize radiation dose exposure, including the use of iterative reconstruction and automated exposure control. Radiation dose length product (DLP) for this visit: 489 mGy-cm IV Contrast: 140 mL of iohexol (OMNIPAQUE) Enteric Contrast: Not administered. FINDINGS: ABDOMEN LOWER CHEST: Small hiatal hernia. Cardiomegaly. LIVER/BILIARY TREE: Subtle liver surface nodularity with mild hypertrophy of the lateral left paddock lobe and widening of the fissure for the falciform ligament. GALLBLADDER: Cholelithiasis, including a 3 mm gallstone within the gallbladder neck versus cystic duct. No findings of acute cholecystitis. SPLEEN: Unremarkable. PANCREAS: Unremarkable. ADRENAL GLANDS: Unremarkable. KIDNEYS/URETERS: No hydronephrosis or urinary tract calculi. Subcentimeter hypoattenuating renal lesion(s), too small to characterize but statistically likely benign, which do not warrant follow-up (Radiology June 2019). STOMACH AND BOWEL: Sigmoid colonic anastomosis. Colonic diverticula Kalosis without acute diverticulitis. APPENDIX: No findings to suggest appendicitis. ABDOMINOPELVIC CAVITY: Mild mesenteric edema, of uncertain etiology. Scattered nonenlarged mesenteric lymph nodes. No ascites. No pneumoperitoneum. Enlarged ana hepatic and peripancreatic lymph nodes measuring up to 2 cm in short axis. VESSELS: Atherosclerosis without abdominal aortic aneurysm. PELVIS REPRODUCTIVE ORGANS: Simple appearing right adnexal cyst measuring 3.7 cm. URINARY BLADDER: Distended, but otherwise within normal limits. ABDOMINAL WALL/INGUINAL REGIONS: Unremarkable. BONES: No acute fracture or suspicious  osseous lesion.     Impression: 1. Cholelithiasis, including a 3 mm gallstone within the gallbladder neck versus cystic duct. No findings of acute cholecystitis. If there is concern for acute cholecystitis, right upper quadrant ultrasound can be obtained. 2. Findings suggesting cirrhosis with mild mesenteric edema, but no ascites. Enlarged ana hepatic and peripancreatic lymph nodes measuring up to 2 cm in short axis, indeterminate, although could be reactive, given suspicion for cirrhosis. 3. Simple appearing right adnexal cyst measuring 3.7 cm.  According to current guidelines (J Am Kayla Radiol 2020; 17:248-254) in this postmenopausal woman, this should be followed in 6 to 12 months by pelvic ultrasound. The study was marked in EPIC for immediate notification. Workstation performed: [id number]     CT head without contrast    Result Date: 3/26/2024  Narrative: CT BRAIN - WITHOUT CONTRAST INDICATION:   Ha, N/V. COMPARISON: MRI brain September 1, 2022. TECHNIQUE:  CT examination of the brain was performed.  Multiplanar 2D reformatted images were created from the source data. Radiation dose length product (DLP) for this visit: .  This examination, like all CT scans performed in the [facility], was performed utilizing techniques to minimize radiation dose exposure, including the use of iterative reconstruction  and automated exposure control. IMAGE QUALITY:  Diagnostic. FINDINGS: PARENCHYMA: Acute parenchymal hemorrhage identified in the left paramedian cerebellum measures 2.6 x 1.6 cm with extension into the fourth ventricle and left foramen of Luschka. There are chronic lacunar infarcts in the andreea, bilateral thalamus and bilateral basal ganglia as well as the right frontal periventricular region and bilateral centrum semiovale. No midline shift. No hydrocephalus although parenchymal hematoma results in mass effect on the fourth ventricle VENTRICLES AND EXTRA-AXIAL SPACES: Mass effect on the fourth  "ventricle noted without hydrocephalus. VISUALIZED ORBITS: Normal visualized orbits. PARANASAL SINUSES: Normal visualized paranasal sinuses. CALVARIUM AND EXTRACRANIAL SOFT TISSUES:  Normal.     Impression: Acute parenchymal hematoma left paramedian cerebellum measures 2.6 x 1.6 cm with mass effect on the fourth ventricle. Intraventricular extension of hemorrhage noted with blood in the fourth ventricle and left foramen of Luschka. Chronic lacunar infarcts are identified as described. I personally discussed this study with ABRAHAN UP on 3/26/2024 9:26 AM. Workstation performed: [id number]       EKG, Pathology, and Other Studies Reviewed on Admission:   EKG:   \"Normal sinus rhythm  Rightward axis  Incomplete right bundle branch block  Prolonged QT  Abnormal ECG  When compared with ECG of 11-FEB-2023 09:22,  Nonspecific T wave abnormality now evident in Inferior leads  Confirmed by Kyung Gonzalez ([id number]) on 3/27/2024 4:26:22 PM\"    M*EPV SOLAR software was used to dictate this note.  It may contain errors with dictating incorrect words or incorrect spelling. Please contact the provider directly with any questions.   "

## 2024-04-04 NOTE — ASSESSMENT & PLAN NOTE
"- hx of alcohol abuse. Reports drinking 16 oz twisted teas anywhere from 1-6 per day and fireball whiskey   - reviewed with patient findings from CT A/P 3/26  \"Findings suggesting cirrhosis with mild mesenteric edema, but no ascites. Enlarged ana hepatic and peripancreatic lymph nodes measuring up to 2 cm in short axis, indeterminate, although could be reactive, given suspicion for cirrhosis\"  - continue thiamine and folic acid   - discussed importance of alcohol cessation    "

## 2024-04-04 NOTE — NUTRITION
04/04/24 1308   Recommendations/Interventions   Summary Pt reports stable weight for ~8 years. Was 198# prior to 's death in 2015 then lost 102# in 6 months d/t depression. Pt does not cook with salt or add salt to food, mostly avoids fatty foods but will have pizza or wings occasionally. Eats at least 3 servings of fruits and vegetables daily. Enjoys cooking for her 2 adult sons, uses an air fryer. Eats 3-5 times/day. Does not follow an exercise program but never sits still. Reviewed basics of heart healthy diet.

## 2024-04-04 NOTE — ASSESSMENT & PLAN NOTE
"Per IM  - \"incidental finding on CT A/P   - simple appearing right adnexal cyst measuring 3.7 cm  - according to current guidelines, pt needs follow up pelvic US in 6-12 months  - follow up with OBGYN at discharge \"   - Discussed with patient/family   "

## 2024-04-04 NOTE — TEAM CONFERENCE
Acute RehabilitationTeam Conference Note  Date: 4/4/2024   Time: 12:48 PM       Patient Name:  Tanika Lira       Medical Record Number: [id number]   YOB: 1965  Sex: Female          Room/Bed:  [facility] 266/[facility] 266-01  Payor Info:  Payor: [employer] / Plan: [employer] / Product Type: Medicaid HMO /      Admitting Diagnosis: CVA (cerebral vascular accident) (HCC) [I63.9]   Admit Date/Time:  4/3/2024  3:25 PM  Admission Comments: No comment available     Primary Diagnosis:  Cerebellar hemorrhage (HCC)  Principal Problem: Cerebellar hemorrhage (HCC)    Patient Active Problem List    Diagnosis Date Noted    Prolonged QT interval 04/04/2024    Simple adnexal cyst greater than 1 cm in diameter in postmenopausal patient 04/04/2024    Hyponatremia 04/03/2024    At risk for venous thromboembolism (VTE) 04/03/2024    Insomnia 04/03/2024    Amphetamine abuse (HCC) 04/03/2024    Hypomagnesemia 04/03/2024    Transaminitis 04/03/2024    History of noncompliance with medical treatment 04/03/2024    Marijuana use 04/03/2024    Diarrhea 03/31/2024    Alcohol use 03/29/2024    Cerebellar hemorrhage (HCC) 03/26/2024    Polyneuropathy 07/05/2023    Hallux valgus with bunions of right foot 07/05/2023    Hallux valgus with bunions of left foot 07/05/2023    Hammer toes of both feet 07/05/2023    Macrocytic anemia 06/13/2023    ROSANA (generalized anxiety disorder) 09/20/2022    Neuropathy 09/20/2022    History of CVA (cerebrovascular accident) 09/01/2022    Tobacco abuse 09/01/2022    Essential hypertension 09/01/2022       Physical Therapy:         Pt 58 yr old female to [facility] on 3/26/24 with c/o dizziness, nausea, HA, Diarrhea. CT revealed acute parenchymal hematoma, L paramedian cerebellar mass effect, with chronic lacunar infarcts. Transferred to [facility] for neurosurgical eval and folowup. PT with PMH: previous CVA 2022, HTN, Anxiety disorder, neuropathy, dec med compliance, +Tobacco/ETOH/Meth. At baseline pt fully I  with out AD, lives with 2 sons both work full time (varying hours) in mobile home [address], worked PT cleaning.     4/4/24 PT to eval this PM.     Occupational Therapy:  Eating: Independent  Grooming: Incidental Touching  Bathing: Minimal Assistance  Bathing: Minimal Assistance  Upper Body Dressing: Supervision  Lower Body Dressing: Incidental Touching  Toileting: Incidental Touching  Tub/Shower Transfer: Minimal Assistance  Toilet Transfer: Minimal Assistance  Cognition: Within Defined Limits  Orientation: Person, Place, Time, Situation  Discharge Recommendations: Home with:  DC Home with:: Family Support, Home Occupational Therapy       Tanika is 56yo female with new cerebellar hemorrhagic CVA and h/o ischemic lacunar infarcts. She has PMH of HTN, polysubstance abuse, anxiety, urinary leakage, peripheral neuropathy. Pt is  with two FT-working sons living with her in a mobile home, and a supportive boyfriend. 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 Pt was independent with all B/IADLs, even providing laundry and meals for her sons. Pt presents with ataxia in stance, frequent loss of balance to the left during dynamic mobility causing high fall risk and inability to complete daily functional routines independently. Pt would benefit from training in compensatory and adaptive strategies, intervention for improved coordination and balance. ELOS 2wks    Speech Therapy:           Orders received for skilled SLP evaluation- results and recommendations pending assessment completion on 4/4/2024.    Nursing Notes:  Appetite: Good  Diet Type: Regular/House, Thin Liquids                                                                        Pain Score: 0                                  58-year-old female with a past medical history of ischemic strokes in 2022, hypertension, generalized anxiety disorder, smoking tobacco, alcohol use, marijuana use, methamphetamine abuse, medical  noncompliance who presented to [facility] on 3/26/2024 with dizziness, nausea, and headache after recent fall and vomiting also accompanied by some diarrhea.  Patient noted to be ataxic on exam.  CT showed acute parenchymal hematoma in the left paramedian cerebellum with mass effect on the fourth ventricle with intraventricular extension of the hemorrhage with blood in the fourth ventricle and the left foramen of Luschka as well as some chronic lacunar infarcts.  Patient transferred to [facility] for neurosurgery oversight and evaluation.  Patient placed on EVD watch but ultimately did not need this.  Patient tells me she was not compliant with her blood thinners although she did receive DDAVP for aspirin reversal.  Neurology was consulted and suspected intracerebral hemorrhage related to hypertension and medication noncompliance with recent methamphetamine use.  Follow-up repeat CT head 7/3/2027 328 were stable.  Patient required multiple blood pressure medications and has blood pressure goal of less than 140 and ideally between 120 and 140 is much as possible.  Patient is to repeat CT head on 4/11 and per neurology if improving hemorrhage can resume Plavix without need for aspirin for secondary ischemic stroke prevention.  Neurosurgery would like to see patient in follow-up at about that same time also looking for stability and assess for delayed hydrocephalus.  Patient was evaluated by skilled therapies and was found to have significant decline in ADLs and ambulation and appears appropriate for admission to [facility].    Pain is managed with Tylenol 650 mg every 6 hours prn and Neurontin 100 mg BID and 300 mg at bedtime. CVA is managed with Lipitor 40 mg daily and pt. Is currently on Heparin 5,000 units every 8 hours. Pt. Is for repeat CT of head on 4/11-if resolution of hemorrhage, pt. May start taking Plavix 75 mg daily. HTN managed with Amlodipine 10 mg daily,  HCTZ 12.5 mg daily and Lisinopril 40 mg daily and prn Hydralazine 10 mg every 6 hours prn for SBP>165. History of marijuana use managed with Neurontin 100 mg BID and 300 mg at bedtime. Alcohol abuse managed with Thiamine 100 mg daily and Centrum (Thiamine Folic Acid multivitamin) 1 tablet daily and Neurontin 100 mg BID and 300 mg at bedtime. Anxiety disorder managed with Lexapro 20 mg daily and Atarax 25 mg every 6 hours prn. Insomnia managed with Melatonin 3 mg at HS, sleep log and Neurontin 300 mg at HS. Hypomagnesemia managed with Magnesium gluconate 500 mg BID. DVT ppx-Heparin 5,000 units every 8 hours and SCDs.    This week we will monitor vital signs and lab values. We will manage pain so that pt. May optimally perform in therapy sessions. We will keep patient safe from falls by continuing with hourly rounding and making sure call bell is within reach and also maintaining bed and chair alarms. We will educate patient on importance of turning and repositioning to off load pressure to prevent skin break down. We will teach patient energy conservation and promote independence of ADLs.        Case Management:     Discharge Planning  Living Arrangements: Lives w/ Children  Support Systems: Self, Son, Children  Assistance Needed: TBD  Type of Current Residence: Private residence  Current Home Care Services: No  4/4- Pt new admit, cm to assess.     Is the patient actively participating in therapies? yes  List any modifications to the treatment plan: None    Barriers Interventions   Hypertension Monitoring   Medically non compliance Education   Polysubstance abuse Med mgmt, education, HOST   Imbalance/coordination Neuro [place]   Anxiety Med mgmt   Insomnia Sleep log   Safety awareness Education   Impulsive Education, bed and chair alarms   Mild cog SLP Services. Stroke education                 Is the patient making expected progress toward goals? yes  List any update or changes to goals: None    Medical Goals: Patient  will be medically stable for discharge to [facility] [place] upon completion of rehab program and Patient will be able to manage medical conditions and comorbid conditions with medications and follow up upon completion of rehab program    Weekly Team Goals:   Rehab Team Goals  ADL Team Goal: Patient will be independent with ADLs with least restrictive device upon completion of rehab program  Bowel/Bladder Team Goal: Patient will return to premorbid level for bladder/bowel management upon completion of rehab program    Discussion: Pt new admit and participating. Pt functioning at mild cog, e functioning, reviewing stroke ed. Mod assist for bathing, sup ub, min assist lb, total for toilet transfer. PT to eval. Team recommending reteam.     Anticipated Discharge Date:  reteam- 2 week goals   [facility] Team Members Present:  The following team members are supervising care for this patient and were present during this Weekly Team Conference.    Physician: Dr. Dick MD  : Eli Vyas MSW  Registered Nurse: Yue Martinez, RN  Physical Therapist: KIARA GantT  Occupational Therapist: Kat Byrd MS, OTR/L  Speech Therapist: Oswald Martinez MS, CCC-SLP

## 2024-04-04 NOTE — ASSESSMENT & PLAN NOTE
"- Hx of CVA 9/2022   - MRI from 9/2022   \"Acute infarcts in right frontal centrum semiovale and right posterior putamen.  Small subacute infarct in left paramedian andreea.  Chronic lacunar infarcts in right corona radiata, bilateral basal ganglia, and bilateral thalami.  Findings consistent with hypertensive arteriopathy.  Mild chronic microangiopathy.\"  - treated with DAPT and statin but pt was noncompliant with medications   "

## 2024-04-04 NOTE — ASSESSMENT & PLAN NOTE
"-Hx of alcohol use   -AST/ALT improving. Continue to trend   -CT A/P in acute setting showed \"Findings suggesting cirrhosis with mild mesenteric edema, but no ascites. Enlarged ana hepatic and peripancreatic lymph nodes measuring up to 2 cm in short axis, indeterminate, although could be reactive, given suspicion for cirrhosis\"  -recommend outpatient follow up with hepatology   -encourage alcohol cessation   "

## 2024-04-04 NOTE — PROGRESS NOTES
ARC ICP  PT LTGs  ELOS x2 weeks.        04/04/24 1330   Rehab Team Goals   Transfer Team Goal Patient will be independent with transfers with least restrictive device upon completion of rehab program   Locomotion Team Goal Patient will be independent with locomotion with least restrictive device upon completion of rehab program  (LRAD)   Rehab Team Interventions   PT Interventions Gait Training;Therapeutic Exercise;Neuromuscualr Reeducation;Transfer Training;Community Reintegration;Bed Mobility;Modalities;Patient/Family Education   PT Transfer Goal   Roll left and right Goal 06. Independent - Patient completes the activity by him/herself with no assistance from a helper.   Sit to lying Goal 06. Independent - Patient completes the activity by him/herself with no assistance from a helper.   Lying to sitting on side of bed Goal 06. Independent - Patient completes the activity by him/herself with no assistance from a helper.   Sit to stand Goal 06. Independent - Patient completes the activity by him/herself with no assistance from a helper.   Chair/bed-to-chair transfer Goal 06. Independent - Patient completes the activity by him/herself with no assistance from a helper.   Car Transfer Goal 04. Supervision or touching assistance- [place] provides VERBAL CUES or supervision throughout activity.   Assistive Device   (LRAD)   Status Ongoing;Target goal - two weeks   Locomotion Goal   Primary discharge mode of locomotion Walking   Target Walk Distance 350 ft   Assist Device   (LRAD)   Gait Pattern Improvement Ataxic   Walk 10 feet Goal 06. Independent - Patient completes the activity by him/herself with no assistance from a helper.   Walk 50 feet with 2 turns Goal 06. Independent - Patient completes the activity by him/herself with no assistance from a helper.   Walk 150 feet Goal 04. Supervision or touching assistance- [place] provides VERBAL CUES or supervision throughout activity.   Walking 10 feet on uneven surface 04.  Supervision or touching assistance- [place] provides VERBAL CUES or supervision throughout activity.   Walking Goal Status Ongoing;Target goal - two weeks   Wheel 50 feet with 2 turns Goal 09. Not applicable   Wheel 150 feet Goal 09. Not applicable   Stairs Goal   1 step or curb goal 04. Supervision or touching assistance- [place] provides VERBAL CUES or supervision throughout activity.   4 steps Goal 04. Supervision or touching assistance- [place] provides VERBAL CUES or supervision throughout activity.   12 steps Goal 04. Supervision or touching assistance- [place] provides VERBAL CUES or supervision throughout activity.   Number of Stairs 12   Technique Reciprocal   Hand Rail Bilateral   Status Ongoing;Target goal - two weeks   Object Retrieval Goal   Picking up object Goal 06. Independent - Patient completes the activity by him/herself with no assistance from a helper.   Assistive Device  Reacher   Small Object Picked Up marker from floor - target goal x2 weeks.

## 2024-04-04 NOTE — PCC NURSING
58-year-old female with a past medical history of ischemic strokes in 2022, hypertension, generalized anxiety disorder, smoking tobacco, alcohol use, marijuana use, methamphetamine abuse, medical noncompliance who presented to [facility] on 3/26/2024 with dizziness, nausea, and headache after recent fall and vomiting also accompanied by some diarrhea.  Patient noted to be ataxic on exam.  CT showed acute parenchymal hematoma in the left paramedian cerebellum with mass effect on the fourth ventricle with intraventricular extension of the hemorrhage with blood in the fourth ventricle and the left foramen of Luschka as well as some chronic lacunar infarcts.  Patient transferred to [facility] for neurosurgery oversight and evaluation.  Patient placed on EVD watch but ultimately did not need this.  Patient tells me she was not compliant with her blood thinners although she did receive DDAVP for aspirin reversal.  Neurology was consulted and suspected intracerebral hemorrhage related to hypertension and medication noncompliance with recent methamphetamine use.  Follow-up repeat CT head 7/3/2027 328 were stable.  Patient required multiple blood pressure medications and has blood pressure goal of less than 140 and ideally between 120 and 140 is much as possible.  Patient is to repeat CT head on 4/11 and per neurology if improving hemorrhage can resume Plavix without need for aspirin for secondary ischemic stroke prevention.  Neurosurgery would like to see patient in follow-up at about that same time also looking for stability and assess for delayed hydrocephalus.  Patient was evaluated by skilled therapies and was found to have significant decline in ADLs and ambulation and appears appropriate for admission to [facility].    Pain is managed with Tylenol 488 mg every 6 hours prn, Neurontin 100 mg BID and 300 mg at bedtime, Robaxin 500 mg TID prn, and Oxycodone 2.5 mg every 6  hours prn for recent c/o of RUQ pain. Ultrasound performed on 4/8 showed cholelithiasis. Nuclear med hepatobiliary scan completed on 4/10 which showed 0% ejection fraction. General surgery consulted-surgery not recommended at this time due to recent CVA. Continue regular, low fat diet and offer muscle relaxants and avoid narcotics. Lipitor is on hold at present time due to increasing liver enzymes.  Pt. Is for repeat CT of head on 4/11-if resolution of hemorrhage, pt. May start taking Plavix 75 mg daily. HTN managed with Amlodipine 10 mg daily, HCTZ 12.5 mg daily and Lisinopril 40 mg daily and prn Hydralazine 10 mg every 6 hours prn for SBP>165. History of marijuana use managed with Neurontin 100 mg BID and 300 mg at bedtime. Alcohol abuse managed with Thiamine 100 mg daily and Centrum (Thiamine Folic Acid multivitamin) 1 tablet daily and Neurontin 100 mg BID and 300 mg at bedtime. Anxiety disorder managed with Lexapro 5 mg daily and Atarax 25 mg ever TID prn. Insomnia managed with Melatonin 3 mg at HS, sleep log and Neurontin 300 mg at HS. Hypomagnesemia managed with Magnesium oxide 800 mg TID. DVT ppx-SCDs.     This week we will monitor vital signs and lab values. We will manage pain so that pt. May optimally perform in therapy sessions. We will keep patient safe from falls by continuing with hourly rounding and making sure call bell is within reach and also maintaining bed and chair alarms. We will educate patient on importance of turning and repositioning to off load pressure to prevent skin break down. We will teach patient energy conservation and promote independence of ADLs.

## 2024-04-04 NOTE — ASSESSMENT & PLAN NOTE
- prolonged Qtc 534 on EKG from admission to acute setting  - repeat EKG on [facility] shows continued prolonged Qtc but improved. Qtc 472  - decrease lexapro to 10mg daily  - decrease hydroxyzine to 25mg tid prn   - repeat EKG in 1 week

## 2024-04-04 NOTE — ASSESSMENT & PLAN NOTE
- incidental finding on CT A/P   - simple appearing right adnexal cyst measuring 3.7 cm  - according to current guidelines, pt needs follow up pelvic US in 6-12 months  - follow up with OBGYN at discharge

## 2024-04-04 NOTE — PROGRESS NOTES
PT GERRY DIAMOND [facility]       04/04/24 1328   Patient Data   Rehab Impairment mpairment of mobility, safety, Activities of Daily Living (ADLs), and cognitive/communication skills due to Stroke:  01.4  No paresis   Etiologic Diagnosis Left Cerebellar Hemorrhage   Date of Onset 03/26/24   Support System   Name sons - Ayaan/Abner; sig other Gurwinder   Relationship sons, 28yo and 25yo and boyfriend  (Both sons work FT during the day, however have different days off during the week. Pt at baseline provided meals, cleaning, and laundry for herself and the sons.)   Able to provide 24 hour supervision No   Able to provide physical help? Yes   Home Setup   Type of Home Mobile Home   Method of Entry Stairs;Hand Rail Bilateral   Number of Stairs 3   Number of Stairs in Home 0   First Floor Setup Available Yes   Home Modification Comment pending progress   Available Equipment   (has old canes from late LARRY, and did have RW from previous stroke however gave it away to neighbor who needed it more (got it through insurance within last  few years.))   Baseline Information   Vocation Work Part Time   Prior Level of Function   Self-Care 3. Independent - Patient completed the activities by him/herself, with or without an assistive device, with no assistance from a helper.   Indoor-Mobility (Ambulation) 3. Independent - Patient completed the activities by him/herself, with or without an assistive device, with no assistance from a helper.   Stairs 3. Independent - Patient completed the activities by him/herself, with or without an assistive device, with no assistance from a helper.   Functional Cognition 3. Independent - Patient completed the activities by him/herself, with or without an assistive device, with no assistance from a helper.   Prior Assistance Needed for Household Chores/Cleaning;Meal Preparation;Medication Management;Money Management;Shopping   Prior Device Used Z. None of the above   Falls in the Last Year   Number of falls in  the past 12 months 0  (denies fall with this episode,despite chart indicating such.)   Patient Preference   Nicknambess (Patient Preference) Tanika   Psychosocial   Patient Behaviors/Mood Cooperative   Restrictions/Precautions   Precautions Fall Risk;Supervision on toilet/commode;Bed/chair alarms   Weight Bearing Restrictions No   ROM Restrictions No   Pain Assessment   Pain Assessment Tool 0-10   Pain Score No Pain   Transfer Bed/Chair/Wheelchair   Limitations Noted In Balance;Coordination;Problem Solving;Sequencing   Adaptive Equipment Hand Hold   Physical Assistance Level 26%-50%   Comment min/mod A for balance for stand pivot without AD, - seeks UE support.   Chair/Bed-to-Chair Transfer CARE Score 3   Roll Left and Right   Type of Assistance Needed Independent   Roll Left and Right CARE Score 6   Sit to Lying   Type of Assistance Needed Supervision   Sit to Lying CARE Score 4   Lying to Sitting on Side of Bed   Type of Assistance Needed Supervision   Lying to Sitting on Side of Bed CARE Score 4   Sit to Stand   Type of Assistance Needed Physical assistance   Physical Assistance Level 25% or less   Comment min A for balance without AD, needs support to steady self.   Sit to Stand CARE Score 3   Picking Up Object   Type of Assistance Needed Physical assistance   Physical Assistance Level Total assistance   Comment pt unable to safely  marker from floor without UE support as she would at baseline, Mod A for balance with RW support.   Picking Up Object CARE Score 1   Car Transfer   Type of Assistance Needed Physical assistance   Physical Assistance Level 26%-50%   Comment mod A for balance for stand pivot for simulated car transfer to [facility] .   Car Transfer CARE Score 3   Ambulation   Primary Mode of Locomotion Prior to Admission Walk   Distance Walked (feet) 10 ft  (x2, 150ft HHA, 200ft with RW)   Assist Device Hand Hold;Roller Walker   Gait Pattern Ataxic;Inconsistant Katherine;Slow Katherine;Lateral  deviation;Narrow PAUL;Scissoring;Shuffle;Improper weight shift   Limitations Noted In Balance;Coordination;Endurance;Heel Strike;Speed;Strength;Swing   Provided Assistance with: Balance;Trunk Support   Walk Assist Level Moderate Assist;Maximum Assist   Findings max A without AD for balance, varied trunk sway with dec self recovery; min/mod A with RW   Walk 10 Feet   Type of Assistance Needed Physical assistance   Physical Assistance Level 51%-75%   Comment mod/max A HHA for balance   Walk 10 Feet CARE Score 2   Walk 50 Feet with Two Turns   Type of Assistance Needed Physical assistance   Physical Assistance Level 51%-75%   Comment mod/max A HHA for balance, min A with RW   Walk 50 Feet with Two Turns CARE Score 2   Walk 150 Feet   Type of Assistance Needed Physical assistance   Physical Assistance Level 51%-75%   Comment mod/max A HHA for balance, min A with Rw   Walk 150 Feet CARE Score 2   Walking 10 Feet on Uneven Surfaces   Type of Assistance Needed Physical assistance   Physical Assistance Level 76% or more   Comment max A for HHA on indoor ramp - mod A with RW for balance   Walking 10 Feet on Uneven Surfaces CARE Score 2   Wheelchair mobility   Findings anticipate ambulatory at Dc   Wheel 50 Feet with Two Turns   Reason if not Attempted Activity not applicable   Wheel 50 Feet with Two Turns CARE Score 9   Wheel 150 Feet   Reason if not Attempted Activity not applicable   Wheel 150 Feet CARE Score 9   Curb or Single Stair   Style negotiated Single stair   Type of Assistance Needed Physical assistance   Physical Assistance Level 25% or less   Comment B HR   1 Step (Curb) CARE Score 3   4 Steps   Type of Assistance Needed Physical assistance   Physical Assistance Level 25% or less   Comment min A for balance with B HR - reciprocally   4 Steps CARE Score 3   12 Steps   Type of Assistance Needed Physical assistance   Physical Assistance Level 25% or less   Comment min A for balance with B HR - reciprocally   12  "Steps CARE Score 3   Stairs   Type Stairs   # of Steps 12   Assist Devices Bilateral Rail   Findings pt has 4 ELIZABETH with B HR; FF completed for strength conditioning and NPP. FF not needed for DC home.   Memory   Initiates Tasks Yes   Short-Term Intact   Long Term Intact   Recalls Precaution Yes   RLE Assessment   RLE Assessment WFL   LLE Assessment   LLE Assessment WFL   Coordination   Movements are Fluid and Coordinated 0   Coordination and Movement Description ataxia w/ ambulation- grossly WNL toe taps and HTS.   Sensation   Light Touch Partial deficits in the RLE;Partial deficits in the LLE   Cognition   Overall Cognitive Status Impaired   Arousal/Participation Alert;Cooperative   Attention Within functional limits   Orientation Level Oriented X4   Memory Decreased recall of precautions   Following Commands Follows multistep commands with increased time or repetition   Comments Impulsive at times, dec insight to deficits.   Vision   Vision Comments per chart periodic doubled vision, pt denies vision issues currently- wears reading glasses.   Objective Measure   PT Measure(s) Suggest TUG, PRUETT and 5xSTS pending safety   PT Findings seated /69 standing /72   Discharge Information   Vocational Plan Return to work;Part Time   Barriers to Return to Vocation Skill Set Limitation;Strength;Other  (balance deficits)   Patient's Discharge Plan return home with family support   Patient's Rehab Expectations \"to be steady again\" - return to work in some capacity   Barriers to Discharge Home Limited Family Support;Decreased Cognitive Function;Decreased Strength;Decreased Endurance;Depression;Safety Considerations   Impressions Pt 58 yr old female to [facility] on 3/26/24 with c/o dizziness, nausea, HA, Diarrhea. CT revealed acute parenchymal hematoma, L paramedian cerebellar mass effect, with chronic lacunar infarcts. Transferred to [facility] for neurosurgical eval and followup. PMH: previous CVAs (x2), HTN, Anxiety disorder, " neuropathy, dec med compliance, +Tobacco/ETOH/Meth. At baseline pt fully I with out AD, lives with 2 sons both work full time (varying hours) in mobile home 3 ELIZABETH B HR, worked part-time cleaning, mainly under the table. Pt presents with dec balance, amb ataxia, with some impulsivity and dec insight to defcits (in the moment). Pt currently requires max A without AD, and min A with RW for balance and safety. Pt to greatly benefit from aggressive skilled PT intervention to optimize neuro recovery and improve functional balance and safety. Pt demonstrates good rehab potential to reach mod I goals with LRAD for safe DC home with likely out pt PT to follow in [facility] x2 weeks. May need to self purchase RW pending progress, pt aware and willing.   PT Therapy Minutes   PT Time In 1345   PT Time Out 1500   PT Total Time (minutes) 75   PT Mode of treatment - Individual (minutes) 75   PT Mode of treatment - Concurrent (minutes) 0   PT Mode of treatment - Group (minutes) 0   PT Mode of treatment - Co-treat (minutes) 0   PT Mode of Treatment - Total time(minutes) 75 minutes   PT Cumulative Minutes 75   Cumulative Minutes   Cumulative therapy minutes 165

## 2024-04-05 ENCOUNTER — PATIENT OUTREACH (OUTPATIENT)
Dept: CASE MANAGEMENT | Facility: OTHER | Age: 59
End: 2024-04-05

## 2024-04-05 LAB
25(OH)D3 SERPL-MCNC: 24.1 NG/ML (ref 30–100)
ALBUMIN SERPL BCP-MCNC: 3.4 G/DL (ref 3.5–5)
ALP SERPL-CCNC: 69 U/L (ref 34–104)
ALT SERPL W P-5'-P-CCNC: 107 U/L (ref 7–52)
ANION GAP SERPL CALCULATED.3IONS-SCNC: 9 MMOL/L (ref 4–13)
AST SERPL W P-5'-P-CCNC: 88 U/L (ref 13–39)
BASOPHILS # BLD AUTO: 0.02 THOUSANDS/ÂΜL (ref 0–0.1)
BASOPHILS NFR BLD AUTO: 0 % (ref 0–1)
BILIRUB SERPL-MCNC: 0.41 MG/DL (ref 0.2–1)
BUN SERPL-MCNC: 25 MG/DL (ref 5–25)
CALCIUM ALBUM COR SERPL-MCNC: 9.7 MG/DL (ref 8.3–10.1)
CALCIUM SERPL-MCNC: 9.2 MG/DL (ref 8.4–10.2)
CHLORIDE SERPL-SCNC: 100 MMOL/L (ref 96–108)
CO2 SERPL-SCNC: 25 MMOL/L (ref 21–32)
CREAT SERPL-MCNC: 0.62 MG/DL (ref 0.6–1.3)
EOSINOPHIL # BLD AUTO: 0.12 THOUSAND/ÂΜL (ref 0–0.61)
EOSINOPHIL NFR BLD AUTO: 2 % (ref 0–6)
ERYTHROCYTE [DISTWIDTH] IN BLOOD BY AUTOMATED COUNT: 12.1 % (ref 11.6–15.1)
GFR SERPL CREATININE-BSD FRML MDRD: 99 ML/MIN/1.73SQ M
GLUCOSE P FAST SERPL-MCNC: 93 MG/DL (ref 65–99)
GLUCOSE SERPL-MCNC: 93 MG/DL (ref 65–140)
HCT VFR BLD AUTO: 41.1 % (ref 34.8–46.1)
HCV AB SER QL: REACTIVE
HGB BLD-MCNC: 14.1 G/DL (ref 11.5–15.4)
IMM GRANULOCYTES # BLD AUTO: 0.02 THOUSAND/UL (ref 0–0.2)
IMM GRANULOCYTES NFR BLD AUTO: 0 % (ref 0–2)
LYMPHOCYTES # BLD AUTO: 2.95 THOUSANDS/ÂΜL (ref 0.6–4.47)
LYMPHOCYTES NFR BLD AUTO: 40 % (ref 14–44)
MAGNESIUM SERPL-MCNC: 1.6 MG/DL (ref 1.9–2.7)
MCH RBC QN AUTO: 33.3 PG (ref 26.8–34.3)
MCHC RBC AUTO-ENTMCNC: 34.3 G/DL (ref 31.4–37.4)
MCV RBC AUTO: 97 FL (ref 82–98)
MONOCYTES # BLD AUTO: 1.05 THOUSAND/ÂΜL (ref 0.17–1.22)
MONOCYTES NFR BLD AUTO: 14 % (ref 4–12)
NEUTROPHILS # BLD AUTO: 3.24 THOUSANDS/ÂΜL (ref 1.85–7.62)
NEUTS SEG NFR BLD AUTO: 44 % (ref 43–75)
NRBC BLD AUTO-RTO: 0 /100 WBCS
PLATELET # BLD AUTO: 226 THOUSANDS/UL (ref 149–390)
PMV BLD AUTO: 11.1 FL (ref 8.9–12.7)
POTASSIUM SERPL-SCNC: 4.1 MMOL/L (ref 3.5–5.3)
PROT SERPL-MCNC: 7.8 G/DL (ref 6.4–8.4)
RBC # BLD AUTO: 4.24 MILLION/UL (ref 3.81–5.12)
SODIUM SERPL-SCNC: 134 MMOL/L (ref 135–147)
WBC # BLD AUTO: 7.4 THOUSAND/UL (ref 4.31–10.16)

## 2024-04-05 PROCEDURE — 97130 THER IVNTJ EA ADDL 15 MIN: CPT

## 2024-04-05 PROCEDURE — 97530 THERAPEUTIC ACTIVITIES: CPT

## 2024-04-05 PROCEDURE — 97110 THERAPEUTIC EXERCISES: CPT

## 2024-04-05 PROCEDURE — 83735 ASSAY OF MAGNESIUM: CPT

## 2024-04-05 PROCEDURE — 97112 NEUROMUSCULAR REEDUCATION: CPT

## 2024-04-05 PROCEDURE — 80053 COMPREHEN METABOLIC PANEL: CPT

## 2024-04-05 PROCEDURE — 85025 COMPLETE CBC W/AUTO DIFF WBC: CPT

## 2024-04-05 PROCEDURE — 97129 THER IVNTJ 1ST 15 MIN: CPT

## 2024-04-05 PROCEDURE — 99232 SBSQ HOSP IP/OBS MODERATE 35: CPT | Performed by: INTERNAL MEDICINE

## 2024-04-05 PROCEDURE — 86803 HEPATITIS C AB TEST: CPT

## 2024-04-05 PROCEDURE — 82306 VITAMIN D 25 HYDROXY: CPT

## 2024-04-05 RX ORDER — MAGNESIUM SULFATE HEPTAHYDRATE 40 MG/ML
2 INJECTION, SOLUTION INTRAVENOUS ONCE
Status: COMPLETED | OUTPATIENT
Start: 2024-04-05 | End: 2024-04-05

## 2024-04-05 RX ORDER — LANOLIN ALCOHOL/MO/W.PET/CERES
800 CREAM (GRAM) TOPICAL 2 TIMES DAILY
Status: DISCONTINUED | OUTPATIENT
Start: 2024-04-05 | End: 2024-04-08

## 2024-04-05 RX ORDER — CALCIUM CARBONATE 500 MG/1
500 TABLET, CHEWABLE ORAL DAILY PRN
Status: DISCONTINUED | OUTPATIENT
Start: 2024-04-05 | End: 2024-04-16 | Stop reason: HOSPADM

## 2024-04-05 RX ADMIN — Medication 800 MG: at 17:05

## 2024-04-05 RX ADMIN — CHLORHEXIDINE GLUCONATE 0.12% ORAL RINSE 15 ML: 1.2 LIQUID ORAL at 08:48

## 2024-04-05 RX ADMIN — GABAPENTIN 100 MG: 100 CAPSULE ORAL at 06:12

## 2024-04-05 RX ADMIN — HYDROXYZINE HYDROCHLORIDE 25 MG: 25 TABLET, FILM COATED ORAL at 12:37

## 2024-04-05 RX ADMIN — MELATONIN TAB 3 MG 3 MG: 3 TAB at 21:07

## 2024-04-05 RX ADMIN — HYDROCHLOROTHIAZIDE 12.5 MG: 12.5 TABLET ORAL at 08:48

## 2024-04-05 RX ADMIN — FOLIC ACID 1 MG: 1 TABLET ORAL at 08:48

## 2024-04-05 RX ADMIN — MULTIPLE VITAMINS W/ MINERALS TAB 1 TABLET: TAB ORAL at 08:48

## 2024-04-05 RX ADMIN — ESCITALOPRAM OXALATE 10 MG: 10 TABLET ORAL at 08:48

## 2024-04-05 RX ADMIN — LISINOPRIL 40 MG: 20 TABLET ORAL at 08:48

## 2024-04-05 RX ADMIN — CALCIUM CARBONATE (ANTACID) CHEW TAB 500 MG 500 MG: 500 CHEW TAB at 22:40

## 2024-04-05 RX ADMIN — ATORVASTATIN CALCIUM 40 MG: 40 TABLET, FILM COATED ORAL at 17:05

## 2024-04-05 RX ADMIN — GABAPENTIN 300 MG: 300 CAPSULE ORAL at 21:07

## 2024-04-05 RX ADMIN — Medication 2000 UNITS: at 12:37

## 2024-04-05 RX ADMIN — AMLODIPINE BESYLATE 10 MG: 10 TABLET ORAL at 08:48

## 2024-04-05 RX ADMIN — Medication 500 MG: at 06:12

## 2024-04-05 RX ADMIN — HEPARIN SODIUM 5000 UNITS: 5000 INJECTION INTRAVENOUS; SUBCUTANEOUS at 13:14

## 2024-04-05 RX ADMIN — CHLORHEXIDINE GLUCONATE 0.12% ORAL RINSE 15 ML: 1.2 LIQUID ORAL at 21:06

## 2024-04-05 RX ADMIN — THIAMINE HCL TAB 100 MG 100 MG: 100 TAB at 08:48

## 2024-04-05 RX ADMIN — HEPARIN SODIUM 5000 UNITS: 5000 INJECTION INTRAVENOUS; SUBCUTANEOUS at 21:06

## 2024-04-05 RX ADMIN — HEPARIN SODIUM 5000 UNITS: 5000 INJECTION INTRAVENOUS; SUBCUTANEOUS at 06:12

## 2024-04-05 RX ADMIN — LORATADINE 10 MG: 10 TABLET ORAL at 08:48

## 2024-04-05 RX ADMIN — GABAPENTIN 100 MG: 100 CAPSULE ORAL at 13:13

## 2024-04-05 RX ADMIN — MAGNESIUM SULFATE IN WATER 2 G: 40 INJECTION, SOLUTION INTRAVENOUS at 10:27

## 2024-04-05 NOTE — PROGRESS NOTES
04/05/24 0901   Pain Assessment   Pain Assessment Tool 0-10   Pain Score 3   Pain Location/Orientation Orientation: Bilateral;Orientation: Lower;Location: Back   Pain Onset/Description Onset: Ongoing   Hospital Pain Intervention(s) Repositioned;Ambulation/increased activity   Restrictions/Precautions   Precautions Bed/chair alarms;Fall Risk;Impulsive;Supervision on toilet/commode   Weight Bearing Restrictions No   ROM Restrictions No   Sit to Lying   Type of Assistance Needed Supervision   Physical Assistance Level No physical assistance   Comment pt requested to return to bed at end of OT session to rest 2* generalized fatigue   Sit to Lying CARE Score 4   Sit to Stand   Type of Assistance Needed Incidental touching;Verbal cues;Adaptive equipment   Physical Assistance Level No physical assistance   Comment CGA w/RW, vc's for hand placement   Sit to Stand CARE Score 4   Bed-Chair Transfer   Type of Assistance Needed Incidental touching;Verbal cues;Adaptive equipment   Physical Assistance Level No physical assistance   Comment CGA SPT w/RW   Chair/Bed-to-Chair Transfer CARE Score 4   Exercise Tools   Exercise Tools Yes   Other Exercise Tool 1 Seated w/Sup, [id number] each of BUE chest press, elbow flexion, and OH press using 3# tbar for improved B/L coordination and UE strength, for increased independence during fxl transfers. Pt tolerated well with rest breaks to manage min proximal UE fatigue.   Coordination   Fine Motor Seated w/Sup, pt engaged in LUE fxl reaching crossing midline to retrieve marbles using alternating pincer grasp (D1-D5) then placing marbles onto spaces of powerweb on table ahead. Focus was on increasing LUE FMC and proximal LUE endurance w/repetitive fxl reaching during ADL tasks. Pt tolerated, initially with max vc's to direction follow and recall/sustain attention on alternating digit sequence for pinching (due to pt's impulsivity). With repetition, pt able to recall/follow pinch sequence  with min cues. Min droppage of marbles 2* peripheral neuropathy in B/L hands. Pt required rest breaks to manage L shoulder fatigue.   Cognition   Overall Cognitive Status Impaired   Arousal/Participation Alert;Cooperative   Attention Within functional limits   Orientation Level Oriented X4   Memory Decreased recall of precautions   Following Commands Follows multistep commands with increased time or repetition   Additional Activities   Additional Activities Comments Seated w/Sup, pt utilized alternating UEs to retrieve resistive clips from table ahead and place onto vertical pole ahead, using 3-jaw-cherelle pinch, for increased distal UE strength and coordination during fxl ADL/IADL tasks. Pt tolerated with mod droppage, but with increased time able to affix all clips to pole. Pt reported proximal BUE fatigue with repetitive OH reaching.   Activity Tolerance   Activity Tolerance Patient tolerated treatment well   Assessment   Treatment Assessment Pt seen for 60min skilled OT session focused on fxl transfer training w/RW, proximal/distal UE (pinch) strength and endurance, B/L GMC, LUE FMC, and activity tolerance for increased independence w/ADLs/IADLs and decreased caregiver burden. See detailed descriptions of fxl performance above. Pt tolerated session well, although continues to be limited by impulsivity during fxl tasks and transfers, requiring frequent vc's for safety awareness, direction following, sustained attention, and to decrease fall risk. Pt required frequent vc's to slow down pacing during activities. Pt also cont to be limited by chronic peripheral neuropathy (decreased sensation), decreased FMC (pt reports LUE deficit > RUE), and decreased standing balance. Pt would benefit from continued skilled OT focused on ADL retraining, UE strengthening, FMC/GMC, endurance, standing balance/tolerance, RW safety/mgmt, safety awareness, insight into deficits, return to work training, and IADL retraining.   Problem  List Decreased strength;Decreased endurance;Impaired balance;Decreased mobility;Decreased coordination;Decreased cognition;Impaired judgement;Decreased safety awareness;Impaired sensation;Pain   Plan   Treatment/Interventions ADL retraining;Functional transfer training;Therapeutic exercise;Endurance training;Patient/family training;Equipment eval/education;Bed mobility;Compensatory technique education   Progress Progressing toward goals   Discharge Recommendation   Rehab Resource Intensity Level, OT   (pending)   OT Therapy Minutes   OT Time In 0900   OT Time Out 1000   OT Total Time (minutes) 60   OT Mode of treatment - Individual (minutes) 60   OT Mode of treatment - Concurrent (minutes) 0   OT Mode of treatment - Group (minutes) 0   OT Mode of treatment - Co-treat (minutes) 0   OT Mode of Treatment - Total time(minutes) 60 minutes   OT Cumulative Minutes 150   Therapy Time missed   Time missed? No

## 2024-04-05 NOTE — PROGRESS NOTES
04/05/24 1100   Pain Assessment   Pain Assessment Tool 0-10   Pain Score 2   Pain Location/Orientation Orientation: Left;Orientation: Lower;Location: Back   Patient's Stated Pain Goal No pain   Hospital Pain Intervention(s) Ambulation/increased activity  (Strething of HS and piriformis with pt reporting relief of pain)   Restrictions/Precautions   Precautions Impulsive;Bed/chair alarms   Therapeutic Interventions   Flexibility Manual stretch B HS x 3 min, Left piriformis stretch x 2 min   Neuromuscular Re-Education heel slides on opp shin; Bridging with SLR/ABD/ADD; SLR with ABD/Add ; Supine bicycle x 1 min   Assessment   Treatment Assessment Pt recieving IV Mg with Iv in left hand, therefore just seen in bed for 30 min session. Focused on coordination  exercises fo B LE with eyes closed. Pt encouraged to work on L LE during down time in her room. Pt impulsive throughout.   PT Therapy Minutes   PT Time In 1100   PT Time Out 1130   PT Total Time (minutes) 30   PT Mode of treatment - Individual (minutes) 30   PT Mode of treatment - Concurrent (minutes) 0   PT Mode of treatment - Group (minutes) 0   PT Mode of treatment - Co-treat (minutes) 0   PT Mode of Treatment - Total time(minutes) 30 minutes   PT Cumulative Minutes 105   Therapy Time missed   Time missed? No

## 2024-04-05 NOTE — PROGRESS NOTES
[facility]  Progress Note  Name: Tanika Lira I  MRN: [id number]  Unit/Bed#: [facility] 266-01 I Date of Admission: 4/3/2024   Date of Service: 4/5/2024 I Hospital Day: 2    Assessment/Plan   Simple adnexal cyst greater than 1 cm in diameter in postmenopausal patient  Assessment & Plan  Incidental finding on CT A/P: simple appearing right adnexal cyst measuring 3.7 cm.  Follow-up pelvic US in 6-12 months.  Follow-up with PCP or OB/GYN as outpatient.    Prolonged QT interval  Assessment & Plan  Prolonged QTc of 534 on EKG from admission to acute setting.  EKG on admission to [facility] showed QTc of 472.  Currently on Lexapro.  Avoid QTc prolonging medications as able.  Repeat EKG as needed.    Transaminitis  Assessment & Plan  Mild - AST 88 and .  Alk phos 69.  CT abd/pelvis showed liver cirrhosis with mild mesenteric edema, subtle liver surface nodularity with mild hypertrophy of the lateral L paddock lobe, and widening of the fissure for the falciform ligament.  Cholelithiasis including a 3mm gallstone within the gallbladder neck vs cystic duct.  Currently denies abdominal pain.  If develops symptoms - recommend obtaining RUQ US.  May benefit from GI referral on discharge.  Continue to trend routine CMP.    Hypomagnesemia  Assessment & Plan  Mg currently 1.6.  Give 2g IV magnesium sulfate x1 today.  Increase supplementation to magnesium oxide 800mg BID on on 4/5.  Continue to trend Mg with routine labs.    Amphetamine abuse (HCC)  Assessment & Plan  Had been using prior to admission.  Encourage cessation.  Would benefit from HOST referral.    Insomnia  Assessment & Plan  Continue melatonin 3mg at HS.    Hyponatremia  Assessment & Plan  Mild.  Na+ currently 134.  Asymptomatic.  Continue to trend with routine labs.    Alcohol use  Assessment & Plan  Hx of alcohol abuse.  Continue thiamine and folic acid   Encourage alcohol cessation.    Neuropathy  Assessment & Plan  Continue gabapentin  100mg in AM, 100mg at lunch, and 300mg at HS.    ROSANA (generalized anxiety disorder)  Assessment & Plan  Continue lexapro 20mg daily and Atarax 25mg Q8 PRN.  Supportive counseling as needed.  Consider Neuropsych consult.    Essential hypertension  Assessment & Plan  Had not been taking medications at home.  Current regimen: amlodipine 10mg daily, lisinopril 40mg daily, and HCTZ 12.5mg daily.  Maintain normotension.  Monitor BP with routine VS.    Tobacco abuse  Assessment & Plan  Smokes less than a half a pack/day.  Encourage smoking cessation.  Declined nicotine patch.    History of CVA (cerebrovascular accident)  Assessment & Plan  Hx of CVA in 9/2022   MRI from 9/2022 showed acute infarcts in the R frontal centrum semiovale and R posterior putamen, small subacute infarct in the L paramedian andreea, and chronic lacunar infarcts in the R corona radiata, bilateral basal ganglia, and bilateral thalami.  Had been treated with DAPT originally but has been non-compliant with medications.  Restart Plavix pending stability of repeat CTH on 4/11.  Continue Lipitor 40mg daily.    * Cerebellar hemorrhage (HCC)  Assessment & Plan  Presented to acute setting with acute nausea/vomiting/dizziness.  CTH - Acute parenchymal hematoma left paramedian cerebellum measures 2.6 x 1.6 cm with mass effect on the fourth ventricle. Intraventricular extension of hemorrhage noted with blood in the fourth ventricle and left foramen of Luschka.  CTA head/neck - No left cerebellar vascular malformation to account for the patient's acute parenchymal hematoma. No cervical or intracranial large vessel occlusion, dissection or aneurysm. Mild bilateral cervical carotid bifurcation atherosclerotic disease.  Most recent CTH on 3/28 showed stability.    Maintain normotension.  Neurovascular checks Q shift.  Hold AC/AP at this time.    Repeat CTH on 4/11 - resume Plavix if stable.  Follow-up with Neurosurgery in 1-2 weeks with repeat CTH to assess  stability/delayed hydrocephalus.  If stable - management will be deferred to Neurology.    Primary team following.  PT/OT/ST.         VTE Pharmacologic Prophylaxis:   Pharmacologic: Heparin  Mechanical VTE Prophylaxis in Place: Yes - sequential compression devices.    Current Length of Stay: 2 day(s)    Current Patient Status: Inpatient Rehab     Discharge Plan: As per primary team.    Code Status: Level 1 - Full Code    Subjective:   Pt examined while pt sitting in bed in pt room.  Currently feeling anxious and asking for PRN Atarax.  Reviewed medical hx and medications from home - states that she was not consistent with taking her medications but had been on Lexapro in the past.  Discussed importance of compliance to help with her anxiety.  Denies any abdominal pain currently besides some tenderness from the areas that she has had heparin injections.  Had a BM today without any issues.  States that she knows that she has liver cirrhosis and was wondering if this would improve.  Discussed the importance of alcohol cessation and feels that she will be able to quit drinking when she is home.  Denies any nausea or diarrhea.  Denies any headaches, lightheadedness, or dizziness.  Sleeping well at night.    Objective:     Vitals:   Temp (24hrs), Av.8 °F (37.1 °C), Min:98.2 °F (36.8 °C), Max:99.5 °F (37.5 °C)    Temp:  [98.2 °F (36.8 °C)-99.5 °F (37.5 °C)] 98.6 °F (37 °C)  HR:  [56-72] 72  Resp:  [18] 18  BP: (108-158)/(67-94) 140/70  SpO2:  [92 %-98 %] 98 %  Body mass index is 21.03 kg/m².     Review of Systems   Constitutional:  Negative for appetite change, chills, fatigue and fever.   HENT:  Negative for trouble swallowing.    Eyes:  Negative for visual disturbance.   Respiratory:  Negative for cough, chest tightness, shortness of breath, wheezing and stridor.    Cardiovascular:  Negative for chest pain, palpitations and leg swelling.   Gastrointestinal:  Positive for abdominal pain (slight tenderness from heparin  injection sites). Negative for abdominal distention, constipation, diarrhea, nausea and vomiting.        LBM 4/5   Genitourinary:  Negative for difficulty urinating.   Musculoskeletal:  Positive for gait problem (off-balance since stroke). Negative for arthralgias and back pain.   Neurological:  Negative for dizziness, weakness, light-headedness, numbness and headaches.   Psychiatric/Behavioral:  Negative for dysphoric mood and sleep disturbance. The patient is nervous/anxious (asking for medication, feels anxious about IV in her arm).    All other systems reviewed and are negative.       Input and Output Summary (last 24 hours):       Intake/Output Summary (Last 24 hours) at 4/5/2024 1003  Last data filed at 4/5/2024 0900  Gross per 24 hour   Intake 540 ml   Output --   Net 540 ml       Physical Exam:     Physical Exam  Vitals and nursing note reviewed.   Constitutional:       General: She is not in acute distress.     Appearance: Normal appearance. She is not ill-appearing.   HENT:      Head: Normocephalic and atraumatic.   Cardiovascular:      Rate and Rhythm: Normal rate and regular rhythm.      Pulses: Normal pulses.      Heart sounds: Normal heart sounds. No murmur heard.     No friction rub.   Pulmonary:      Effort: Pulmonary effort is normal. No respiratory distress.      Breath sounds: Normal breath sounds. No wheezing or rhonchi.   Abdominal:      General: Abdomen is flat. Bowel sounds are normal. There is no distension.      Palpations: Abdomen is soft. There is no mass.      Tenderness: There is no abdominal tenderness. There is no guarding or rebound.      Hernia: No hernia is present.   Musculoskeletal:      Cervical back: Normal range of motion and neck supple.      Right lower leg: No edema.      Left lower leg: No edema.   Skin:     General: Skin is warm and dry.      Capillary Refill: Capillary refill takes less than 2 seconds.   Neurological:      Mental Status: She is alert and oriented to  person, place, and time.   Psychiatric:         Mood and Affect: Mood normal.         Behavior: Behavior normal.         Additional Data:     Labs:    Results from last 7 days   Lab Units 04/05/24  0603   WBC Thousand/uL 7.40   HEMOGLOBIN g/dL 14.1   HEMATOCRIT % 41.1   PLATELETS Thousands/uL 226   NEUTROS PCT % 44   LYMPHS PCT % 40   MONOS PCT % 14*   EOS PCT % 2     Results from last 7 days   Lab Units 04/05/24  0603   SODIUM mmol/L 134*   POTASSIUM mmol/L 4.1   CHLORIDE mmol/L 100   CO2 mmol/L 25   BUN mg/dL 25   CREATININE mg/dL 0.62   ANION GAP mmol/L 9   CALCIUM mg/dL 9.2   ALBUMIN g/dL 3.4*   TOTAL BILIRUBIN mg/dL 0.41   ALK PHOS U/L 69   ALT U/L 107*   AST U/L 88*   GLUCOSE RANDOM mg/dL 93                       Labs reviewed    Imaging:    Imaging reviewed    Recent Cultures (last 7 days):           Last 24 Hours Medication List:   Current Facility-Administered Medications   Medication Dose Route Frequency Provider Last Rate    acetaminophen  650 mg Oral Q6H PRN Matias Jennings MD      amLODIPine  10 mg Oral Daily Matias Jennings MD      atorvastatin  40 mg Oral Daily With Dinner Matias Jennings MD      bisacodyl  10 mg Rectal Daily PRN Matias Jennings MD      chlorhexidine  15 mL Mouth/Throat Q12H [facility] Matias Jennings MD      escitalopram  10 mg Oral Daily MILAN Marr      folic acid  1 mg Oral Daily Matias Jennings MD      gabapentin  100 mg Oral BID Matias Jennings MD      gabapentin  300 mg Oral HS Matias Jenninsg MD      heparin (porcine)  5,000 Units Subcutaneous Q8H [facility] Matias Jennings MD      hydrALAZINE  10 mg Oral Q6H PRN Matias Jennings MD      hydroCHLOROthiazide  12.5 mg Oral Daily Matias Jennings MD      hydrOXYzine HCL  25 mg Oral TID PRN Matias Jennings MD      lisinopril  40 mg Oral Daily Matias Jennings MD      loratadine  10 mg Oral Daily Matias Jennings MD      magnesium Oxide  800 mg Oral BID Tio Tenorio  CRNP      magnesium sulfate  2 g Intravenous Once MILAN Suresh      melatonin  3 mg Oral HS Matias Jennings MD      multivitamin-minerals  1 tablet Oral Daily Matias Jennings MD      polyethylene glycol  17 g Oral Daily PRN Matias Jennings MD      thiamine  100 mg Oral Daily Matias Jennings MD          M*Modal software was used to dictate this note.  It may contain errors with dictating incorrect words or incorrect spelling. Please contact the provider directly with any questions.

## 2024-04-05 NOTE — ASSESSMENT & PLAN NOTE
Presented to acute setting with acute nausea/vomiting/dizziness.  CTH - Acute parenchymal hematoma left paramedian cerebellum measures 2.6 x 1.6 cm with mass effect on the fourth ventricle. Intraventricular extension of hemorrhage noted with blood in the fourth ventricle and left foramen of Luschka.  CTA head/neck - No left cerebellar vascular malformation to account for the patient's acute parenchymal hematoma. No cervical or intracranial large vessel occlusion, dissection or aneurysm. Mild bilateral cervical carotid bifurcation atherosclerotic disease.  Most recent CTH on 3/28 showed stability.    Maintain normotension.  Neurovascular checks Q shift.  Hold AC/AP at this time.    Repeat CTH on 4/11 - resume Plavix if stable.  Follow-up with Neurosurgery in 1-2 weeks with repeat CTH to assess stability/delayed hydrocephalus.  If stable - management will be deferred to Neurology.    Primary team following.  PT/OT/ST.

## 2024-04-05 NOTE — ASSESSMENT & PLAN NOTE
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

## 2024-04-05 NOTE — ASSESSMENT & PLAN NOTE
Continue lexapro 20mg daily and Atarax 25mg Q8 PRN.  Supportive counseling as needed.  Consider Neuropsych consult.

## 2024-04-05 NOTE — CASE MANAGEMENT
Cm met with pt at bedside to introduce role and complete cm open. Pt lives in trailer home with sons, 3 louisa. Pt's son transport pt to appts, work etc. Pt works PT as . Pt has hx of slvna, no op no str. Prior to admission, pt was indpt with adls iadls. Pt has shower chair and grab bars. PCP is Tanika Nieto, preferred pharmacy is [facility] [place]. The patient was educated that other members of the patient's support are able to ask questions and observe as the patient wished. The patient was educated on the rehabilitation process including therapy program, the interdisciplinary team, and weekly team meetings. Estimated length of stay was reviewed with the patient as well as expectations of discussions of discharge planning. The role of the  was reviewed including providing care coordination, discharge planning and discharge facilitation. IMM was reviewed with the patient and a copy was provided for their reference. The patient verbalized understanding of the information provided and denied any further questions at this time. CM will continue to follow and assist the patient throughout their rehabilitation stay.    Insurance Info:  Insurance: [employer]   Received via fax   Authorization received for: Acute Rehab  Facility: [facility]   Authorization #: [id number]  Start of Care: 4/3  Next Review Date: 4/10  Continued Stay Care Coordinator:  not provided   Submit next review to: fax# [phone]   P# [phone]

## 2024-04-05 NOTE — ASSESSMENT & PLAN NOTE
Had not been taking medications at home.  Current regimen: amlodipine 10mg daily, lisinopril 40mg daily, and HCTZ 12.5mg daily.  Maintain normotension.  Monitor BP with routine VS.

## 2024-04-05 NOTE — PROGRESS NOTES
Outpatient care management referral via HRR report 4/4/24. Discharged to [facility] 4/3/24. This Admin Coordinator will continue to monitor via chart review.

## 2024-04-05 NOTE — ASSESSMENT & PLAN NOTE
Prolonged QTc of 534 on EKG from admission to acute setting.  EKG on admission to [facility] showed QTc of 472.  Currently on Lexapro.  Avoid QTc prolonging medications as able.  Repeat EKG as needed.

## 2024-04-05 NOTE — PLAN OF CARE
Problem: RESPIRATORY - ADULT  Goal: Achieves optimal ventilation and oxygenation  Description: INTERVENTIONS:  - Assess for changes in respiratory status  - Assess for changes in mentation and behavior  - Position to facilitate oxygenation and minimize respiratory effort  - Oxygen administered by appropriate delivery if ordered  - Initiate smoking cessation education as indicated  - Encourage broncho-pulmonary hygiene including cough, deep breathe, Incentive Spirometry  - Assess the need for suctioning and aspirate as needed  - Assess and instruct to report SOB or any respiratory difficulty  - Respiratory Therapy support as indicated  Outcome: Progressing     Problem: GENITOURINARY - ADULT  Goal: Maintains or returns to baseline urinary function  Description: INTERVENTIONS:  - Assess urinary function  - Encourage oral fluids to ensure adequate hydration if ordered  - Administer IV fluids as ordered to ensure adequate hydration  - Administer ordered medications as needed  - Offer frequent toileting  - Follow urinary retention protocol if ordered  Outcome: Progressing

## 2024-04-05 NOTE — ASSESSMENT & PLAN NOTE
Mg currently 1.6.  Give 2g IV magnesium sulfate x1 today.  Increase supplementation to magnesium oxide 800mg BID on on 4/5.  Continue to trend Mg with routine labs.

## 2024-04-05 NOTE — PROGRESS NOTES
"   04/05/24 1300   Pain Assessment   Pain Assessment Tool 0-10   Pain Score No Pain   Restrictions/Precautions   Precautions Bed/chair alarms;Fall Risk;Supervision on toilet/commode   Weight Bearing Restrictions No   ROM Restrictions No   Comprehension   Comprehension (FIM) 5 - Understands basic directions and conversation   Expression   Expression (FIM) 6 - Has only MILD difficulty with complex/abstract info   Social Interaction   Social Interaction (FIM) 6 - Interacts appropriately with others BUT requires extra  time   Problem Solving   Problem solving (FIM) 5 - Solves basic problems 90% of time   Memory   Memory (FIM) 4 - Recognizes/recalls/performs 75-89%   Speech/Language/Cognition Assessmetn   Treatment Assessment Pt seen for skilled speech therapy session targeting cognitive linguistic communication skills. Pt resting in bed as she just received IV mg and fluids but able to reposition self upright in bed to attend to session. Engaged in education and review of medications. Pt only able to recall being BP meds prior but admitted \"I didn't take my meds before\". Engaged in full education of each medication, creating cheatsheet with name, dosage, purpose and frequency. Incorporated stroke education with this in regards to how medications correlate. Pt current not on blood thinner- per chart, pt may go back on once cleared from head CT next week. Reviewed heparin injections as option for preventing blood clots while in hospital. Pt given cheatsheet to review once complete- no questions at this time. Plan to cont to review for increased comprehension and recall as well as determine plan for increased compliance at home for discharge. Next, engaged in formal stroke education using the What You Need To Know About Stroke- A Patient Resource Kit booklet. Reviewed the difference types of strokes and how pt had ischemic prior but this time hemorrhagic stroke. Pt recalls having head CT and MRI and SLP explained purpose " of this in determining medical plan of care. Reviewed s/s of stroke with BE FAST acronym. Pt required full education of this therefore time spent reviewing each component and common s/s that related. Discussed TIA and warning signs and how to still seek medical attention even if s/s resolve on their own. Discussed how to prevent strokes with managing risk factors like high BP, cholesterol, DM, diet, exercise, smoking and drinking. Last, reviewed changes caused by stroke- pt able to identify her balance is large barrier right now, discussed how this correlates to her stroke being in cerebellum. Reviewed plan for rehab stay and goals to maximize independence. SLP to aid in IADL management and stroke education as pt was fully independent prior. Pt overall is improving with skilled SLP services and will cont to benefit to maximize overall cognitive linguistic communication abilities at this time.   SLP Therapy Minutes   SLP Time In 1300   SLP Time Out 1400   SLP Total Time (minutes) 60   SLP Mode of treatment - Individual (minutes) 60   SLP Mode of treatment - Concurrent (minutes) 0   SLP Mode of treatment - Group (minutes) 0   SLP Mode of treatment - Co-treat (minutes) 0   SLP Mode of Treatment - Total time(minutes) 60 minutes   SLP Cumulative Minutes 150   Therapy Time missed   Time missed? No

## 2024-04-05 NOTE — SPEECH THERAPY NOTE
Stroke Education Series    Pt participated in skilled Stroke Education Series in an individual setting to address the topic of Stroke 101: Understanding the Basics of Stroke in both verbal and written formats. Education within this session reviewed the basic structural and functional components of the brain and included information on the causes of stroke, related signs/symptoms, risk factors, and the process of stroke rehabilitation. The goal of this education was to provide the patient with general understanding of how the brain functions and how a stroke can impact his/her functional mobility and independence. In addition, the intention of this education is to provide the patient with the information to reduce the risk of a second stroke. Following education, pt's response to education is: verbalizes understanding.    To individualize the education, the following topics were included based upon the patients' past and current medical history: hyperlipidemia, hypertension, and smoking.  Additional topics that were covered include weakness, decreased sensation, decreased coordination, cognitive changes, depression, anxiety, pain, nutrition, fall prevention, and prevention of new conditions and/or worsening condition.       Start Time: 1330    End Time: 1345       Stroke Education Series    Pt participated in skilled Stroke Education Series in an individual setting to address the topic of Purpose of Rehab post stroke in both verbal and written formats. Education within this session included a review of the individual roles of the rehab team, functions of the acute rehab center, and neuro rehabilitation treatment strategies. The goal of this education session was to provide the patient with an understanding of the overall importance of the therapy process. This section reviews neuroplasticity principles as well that includes how patiens can incorporate specificity, intensity, repetition and salience into their home  exercise program. This allows the patient to connect neuro rehabilitation treatment strategies to his or her individual therapy process. The patients are engaged in conversation to incorporate activities they like to do into therapy sessions. Following education, the patient's response to education is: verbalizes understanding.      Additional topics to individualize this section of stroke education include: adapting to stroke , cardiovascular components of rehab, involvement of therapy reducing risk of another stroke, and how family can help in the rehab process .    Start Time: 1345    End Time: 1400

## 2024-04-05 NOTE — ASSESSMENT & PLAN NOTE
Incidental finding on CT A/P: simple appearing right adnexal cyst measuring 3.7 cm.  Follow-up pelvic US in 6-12 months.  Follow-up with PCP or OB/GYN as outpatient.

## 2024-04-05 NOTE — ASSESSMENT & PLAN NOTE
Hx of CVA in 9/2022   MRI from 9/2022 showed acute infarcts in the R frontal centrum semiovale and R posterior putamen, small subacute infarct in the L paramedian andreea, and chronic lacunar infarcts in the R corona radiata, bilateral basal ganglia, and bilateral thalami.  Had been treated with DAPT originally but has been non-compliant with medications.  Restart Plavix pending stability of repeat CTH on 4/11.  Continue Lipitor 40mg daily.

## 2024-04-05 NOTE — PROGRESS NOTES
Physical Medicine and Rehabilitation Progress Note  Tanika Lira 58 y.o. female MRN: [id number]  Unit/Bed#: [facility] 266-01 Encounter: [id number]      Assessment & Plan:     Decline in ADLs and mobility: Functional assessment - improving         FIM  Care Score  Admit Score Recent Score    Total assist  1-100% or 2p    Tot     Max assist 2-51-75%    Sub     Mod assist 3- 26-50%  Par Bathing    Min assist 3- 25% or < Par LBD    CG assist 4  TA  To hygiene, bathing, LBD   Sup/Setup 4-5 Sup To hygiene, UBD    Mod-I/Indep 6 MI      Transfers  Mod-total assist  CGA    Ambulation   Significant assist  150 ft mod assist     Stairs   Min assist       Goal: Mod indep for most ADLs and  for mobility  Major barriers: Imbalance, incoordination  Dispo: Home with [facility] 14 days from admission      * Cerebellar hemorrhage (HCC)  Assessment & Plan  4/8 - neuro exam stable, function, improving, continue tx in ARC setting   4/4 - improving ataxia on exam; tolerating ARC setting appropriately     - CTA HN - No left cerebellar vascular malformation to account for the patient's acute parenchymal hematoma. No cervical or intracranial large vessel occlusion, dissection or aneurysm. Mild bilateral cervical carotid bifurcation atherosclerotic disease.  - CTH 3/26 - Acute parenchymal hematoma left paramedian cerebellum measures 2.6 x 1.6 cm with mass effect on the fourth ventricle. Intraventricular extension of hemorrhage noted with blood in the fourth ventricle and left foramen of Luschka.  - Follow-up CTH 3/27 -  Redemonstration of hyperdense hemorrhage in the left paramedian cerebellum, as described with mass effect and extension into the fourth ventricle and left foramen of Luschka, similar in appearance to the prior.    No hydrocephalus.  - Follow-up CTH 3/28 - No significant interval changes    - Residual impairments on admission to ARC: Incoordination, imbalance, ataxia, possible dysphagia (assess for other impairments during ARC course)  "    Secondary stroke prevention  - Antithrombotic: Full AC and antiplatelet on hold   - Per neuro, Repeat CTH on Thursday 4/11 - if improving hemorrhage, patient can resume plavix 75mg daily for stroke prevention; no need for aspirin in terms of stroke prevention   - Follow-up with Nsx in 1-2 weeks with repeat CTH \"for stability and assess for delayed hydrocephalus. If stable/improved will defer remaining management and follow up with neurology\"  - Statin  - Optimal management of blood pressure   -  on importance of abstinence from smoking alcohol and amphetamine  - Patient at increased risk for stroke particularly early in post-stroke period - monitor neuro exam closely with low threshold to repeat imaging  -  patient and if applicable caregiver on optimal stroke management  - Follow-up with neurology and PCP after d/c   - Recommend acute comprehensive interdisciplinary inpatient rehabilitation to include intensive skilled therapies (PT, OT, ST) as outlined with oversight and management by rehabilitation physician as well as inpatient rehab level nursing, case management and weekly interdisciplinary team meetings.       Transaminitis  Assessment & Plan  AST/ALT trending up again 4/8; now with some RUQ pain   Per IM   \"obtain RUQ US to r/o acute cholecystitis.  Hepatitis C antibody + on 4/5.  Acute/chronic hepatitis panels and hepatitis C genotype pending.  Consider GI consult pending results of labs/US.  Continue to trend routine CMP.     Lipitor on hold.  Lexapro decreased to 5mg daily.  Avoid hepatotoxic medications as able.\"    Hx of alcohol use - alcohol cessation counseling  IM consulted and co-management with their team during ARC course  Monitor LFTs intermittently during course and after discharge  Med adjustments when indicated     IMPRESSION:  1. Cholelithiasis, including a 3 mm gallstone within the gallbladder neck versus cystic duct. No findings of acute cholecystitis. If there is concern " for acute cholecystitis, right upper quadrant ultrasound can be obtained.  2. Findings suggesting cirrhosis with mild mesenteric edema, but no ascites. Enlarged ana hepatic and peripancreatic lymph nodes measuring up to 2 cm in short axis, indeterminate, although could be reactive, given suspicion for cirrhosis.      History of CVA (cerebrovascular accident)  Assessment & Plan  Hx of CVA's - 2022 - MRI 9/2022 - Acute infarcts in right frontal centrum semiovale and right posterior putamen. Small subacute infarct in left paramedian andreea.  Chronic lacunar infarcts in right corona radiata, bilateral basal ganglia, and bilateral thalami.  - Was to be on DAPT and statin but was not compliant    Secondary stroke prevention  - Antithrombotic: Full A/C and antiplatelet on hold for now   - Statin  - Optimal management of blood pressure   -  on importance of abstinence from smoking, alcohol, amphetamine  - Patient at increased risk for stroke particularly early in post-stroke period - monitor neuro exam closely with low threshold to repeat imaging  -  patient and if applicable caregiver on optimal stroke management  - Follow-up with neurology and PCP after d/c     Abdominal discomfort  Assessment & Plan  Currently RUQ; AST/ALT trending up some; vital stable-pt afebrile; some RUQ tenderness; no leukocytosis   - Risk of cholecystitis and other causes   - IM consulted to overall manage  - RUQ US pending  - Trend LFTs, monitor exam, vitals  - Statin held per IM    - CT A/P 3/26  1. Cholelithiasis, including a 3 mm gallstone within the gallbladder neck versus cystic duct. No findings of acute cholecystitis. If there is concern for acute cholecystitis, right upper quadrant ultrasound can be obtained.   2. Findings suggesting cirrhosis with mild mesenteric edema, but no ascites. Enlarged ana hepatic and peripancreatic lymph nodes measuring up to 2 cm in short axis, indeterminate, although could be reactive, given  suspicion for cirrhosis.   3. Simple appearing right adnexal cyst measuring 3.7 cm.  According to current guidelines (J Am Kayla Radiol 2020; 17:248-254) in this postmenopausal woman, this should be followed in 6 to 12 months by pelvic ultrasound.     Over the weekend; patient reported cramping abdominal discomfort across lower abdomen.   Patient has been stooling adequately  Pepcid and PRN tums ordered by weekend providers      Essential hypertension  Assessment & Plan  Per neuro Goal SBP between 120 and 140 as much as possible  Internal medicine consulted and co-management with their service  Monitor vitals with and without activity; monitor for orthostasis  Monitor hemoglobin, electrolytes, kidney function, hydration status   Current meds: Amlodipine 10mg qday, HCTZ 12.5mg qday, lisinopril 40mg qday   PRN hydralazine       Amphetamine abuse (HCC)  Assessment & Plan  Was using several times per week   Drug cessation counseling and supportive counseling  Optimal mood/stress mgmt   Neuropsychology consult if available   CM assistance with HOST referral for addiction/substance abuse resources       Marijuana use  Assessment & Plan  Gabapentin [phone]   Cessation counseling and supportive counseling  Optimal mood/stress mgmt   CM to assist with HOST referral     Tobacco abuse  Assessment & Plan  Nicotine patch declined by patient   Smoking cessation and supportive counseling  Optimal mood/stress mgmt       Alcohol use  Assessment & Plan  Thiamine  Gabapentin [phone]   Alcohol cessation counseling and supportive counseling  Optimal mood/stress mgmt   Psychology consult if available        ROSANA (generalized anxiety disorder)  Assessment & Plan  Mood Anxious at times    ROSANA with some acute anxiety/panic     Lexapro reduced to 5mg qday per IM with some transaminitis  Hydroxyzine 25mg TID PRN anxiety - finds this quite helpful   Gabapentin [id number] (also for hx of EtOH/amphetamine abuse)  Optimal sleep  "mgmt  Supportive counseling  Psychology consult while in ARC if available   Counseled on and continue to encourage deep breathing/relaxation/behavioral management techniques      Insomnia  Assessment & Plan  Improved some overnight   Significant at times  - Sleep log   - Melatonin 3mg HS  - Gabapentin 300mg HS - for anxiety, polysubstance hx as well   - PRN hydroxyzine   - Recommend appropriate sleep hygiene  - Patient counselled/will be counselled on this when appropriate     - Sleep only as much as necessary to feel rested and then get up or sit up in bed, maintain regular sleep schedule (same bedtime and wake time everyday), do not force sleep, avoid caffeinated beverages after lunch, avoid alcohol at home near bedtime, do not smoke particularly in evening, do not go to bed hungry, adjust bedroom environment so you are comfortable prior to settling down for sleep, deal with concerns or worries before bedtime. Make a list of things to work on for next day so anxiety is reduced at night, exercise regularly when cleared by physician      Simple adnexal cyst greater than 1 cm in diameter in postmenopausal patient  Assessment & Plan  Per IM  - \"incidental finding on CT A/P   - simple appearing right adnexal cyst measuring 3.7 cm  - according to current guidelines, pt needs follow up pelvic US in 6-12 months  - follow up with OBGYN at discharge \"       Prolonged QT interval  Assessment & Plan  IM consulted and with overall management at their discretion during ARC course  Per IM  Qtc PTA up to 534 > repeat EKG 4/4 - QT prolonged but shortened from prior to 472 (this was on lexapro 20mg and some hydroxyzine)   - Nevertheless will decrease lexapro and hydroxyzine dosing some and limit prolonged Qtc agents as much as possible  - Monitor EKG intermittently     History of noncompliance with medical treatment  Assessment & Plan  Was not taking meds at home appropriately and following up with OP providers needed after last " CVA  - Drug/EtOH cessation counseling  - Patient education      Hypomagnesemia  Assessment & Plan  Mag 1.7 on 4/8  Mag ox per IM   IM consulted, monitoring, repletion, and overall management at their discretion during ARC course      At risk for venous thromboembolism (VTE)  Assessment & Plan  SCDs, ambulation, and heparin Sq (cleared by prior providers)       Hyponatremia  Assessment & Plan  Mild  Repeat BMP Friday     Neuropathy  Assessment & Plan  Significant b/l below knees chronic   - Risk of alcoholic neuropathy  - Alcohol cessation counseling  - OP Neuro EMG/NCS follow-up  - Fall precautions, PT, OT  - On gabapentin         Other Medical Issues:  Monitor for     Follow-up providers and other issues to be followed up after discharge  PCP  Neurology  Neurosurgery  HOST program referral    CODE: Level 1: Full Code    Restrictions include:  Fall precautions    Objective:    Allergies per EMR  Diagnostic Studies: Reviewed, no new imaging  US right upper quadrant    (Results Pending)     See above as well    Laboratory: Labs reviewed  Current Facility-Administered Medications   Medication Dose Route Frequency Provider Last Rate    acetaminophen  650 mg Oral Q6H PRN Matias Jennings MD      amLODIPine  10 mg Oral Daily Matias Jennings MD      bisacodyl  10 mg Rectal Daily PRN Matias Jennings MD      calcium carbonate  500 mg Oral Daily PRN Ruth Ann Mahmoodbo, DO      chlorhexidine  15 mL Mouth/Throat Q12H NAOMI Matias Jennings MD      Cholecalciferol  2,000 Units Oral Daily MILAN Suresh      [START ON 4/9/2024] escitalopram  5 mg Oral Daily MILAN Suresh      famotidine  20 mg Oral BID Ruth Ann Earl, DO      folic acid  1 mg Oral Daily Matias Jennings MD      gabapentin  100 mg Oral BID Matias Jennings MD      gabapentin  300 mg Oral HS Matias Jennings MD      hydrALAZINE  10 mg Oral Q6H PRN Matias Jennings MD      hydroCHLOROthiazide  12.5 mg Oral Daily Matias Cuellar  MD Dick      hydrOXYzine HCL  25 mg Oral TID PRN Matias Jennings MD      lisinopril  40 mg Oral Daily Matias Jennings MD      loratadine  10 mg Oral Daily Matias Jennings MD      magnesium Oxide  800 mg Oral TID With Meals MILAN Suresh      melatonin  3 mg Oral HS Matias Jennings MD      multivitamin-minerals  1 tablet Oral Daily Matias Jennings MD      polyethylene glycol  17 g Oral Daily PRN Matias Jennings MD      thiamine  100 mg Oral Daily Matias Jennings MD           acetaminophen    bisacodyl    calcium carbonate    hydrALAZINE    hydrOXYzine HCL    polyethylene glycol      Drug regimen reviewed, all potential adverse effects identified and addressed:    Scheduled Meds:  Current Facility-Administered Medications   Medication Dose Route Frequency Provider Last Rate    acetaminophen  650 mg Oral Q6H PRN Matias Jennings MD      amLODIPine  10 mg Oral Daily Matias Jennings MD      bisacodyl  10 mg Rectal Daily PRN Matias Jennings MD      calcium carbonate  500 mg Oral Daily PRN Ruth Ann Earl DO      chlorhexidine  15 mL Mouth/Throat Q12H NAOMI Matias Jennings MD      Cholecalciferol  2,000 Units Oral Daily MILAN Suresh      [START ON 4/9/2024] escitalopram  5 mg Oral Daily MILAN Suresh      famotidine  20 mg Oral BID Ruth Ann Earl DO      folic acid  1 mg Oral Daily Matias Jennings MD      gabapentin  100 mg Oral BID Matias Jennings MD      gabapentin  300 mg Oral HS Matias Jennings MD      hydrALAZINE  10 mg Oral Q6H PRN Matias Jennings MD      hydroCHLOROthiazide  12.5 mg Oral Daily Matias Jennings MD      hydrOXYzine HCL  25 mg Oral TID PRN Matias Jennings MD      lisinopril  40 mg Oral Daily Matias Jennings MD      loratadine  10 mg Oral Daily Matias Jennings MD      magnesium Oxide  800 mg Oral TID With Meals MILAN Suresh      melatonin  3 mg Oral HS Matias Jennings MD       multivitamin-minerals  1 tablet Oral Daily Matias Jennings MD      polyethylene glycol  17 g Oral Daily PRN Matias Jennings MD      thiamine  100 mg Oral Daily Matias Jennings MD         Chief Complaints:  Rehab follow-up     Subjective:     On eval, patient reports some right upper quadrant pain with stabbing quality at times that started over the weekend.  She denies nausea, vomiting, diarrhea, constipation, worsening strength, sensation.  She reports balance is still off but not worsening.  She denies vertigo, headache, or other new complaints.    ROS: A 10 point ROS was performed; negative except as noted above.       Physical Exam:  Vitals:    04/08/24 0822   BP: 115/75   Pulse: 62   Resp:    Temp:    SpO2:          GEN:  Sitting in NAD   HEENT/NECK: MMM  CARDIAC: Regular rate rhythm, no murmers, no rubs, no gallops  LUNGS:  clear to auscultation, no wheezes, rales, or rhonchi  ABDOMEN: Soft, some TTP RUQ, normoactive  EXTREMITIES/SKIN:  no calf edema, no calf tenderness to palpation  NEURO:   MENTAL STATUS: awake, oriented to person, place, time, and situation, MENTAL STATUS:  Appropriate wakefulness and interaction , CN II-XII: grossly intact , and Strength/MMT:  Near 5/5 throughout; L>R ataxia improving  PSYCH:  Affect:  Euthymic     HPI:  58-year-old female with a past medical history of ischemic strokes in 2022, hypertension, generalized anxiety disorder, smoking tobacco, alcohol use, marijuana use, methamphetamine abuse, medical noncompliance who presented to [facility] on 3/26/[facility]24 with dizziness, nausea, and headache after recent fall and vomiting also accompanied by some diarrhea.  Patient noted to be ataxic on exam.  CT showed acute parenchymal hematoma in the left paramedian cerebellum with mass effect on the fourth ventricle with intraventricular extension of the hemorrhage with blood in the fourth ventricle and the left foramen of Luschka as well as some chronic lacunar  infarcts.  Patient transferred to [facility] for neurosurgery oversight and evaluation.  Patient placed on EVD watch but ultimately did not need this.  Patient tells me she was not compliant with her blood thinners although she did receive DDAVP for aspirin reversal.  Neurology was consulted and suspected intracerebral hemorrhage related to hypertension and medication noncompliance with recent methamphetamine use.  Follow-up repeat CT head 7/3/2027 328 were stable.  Patient required multiple blood pressure medications and has blood pressure goal of less than 140 and ideally between 120 and 140 is much as possible.  Patient is to repeat CT head on 4/11 and per neurology if improving hemorrhage can resume Plavix without need for aspirin for secondary ischemic stroke prevention.  Neurosurgery would like to see patient in follow-up at about that same time also looking for stability and assess for delayed hydrocephalus.  Patient was evaluated by skilled therapies and was found to have significant decline in ADLs and ambulation and appears appropriate for admission to [facility] [place].     ** Please Note: Fluency Direct voice to text software may have been used in the creation of this document. **

## 2024-04-05 NOTE — ASSESSMENT & PLAN NOTE
Mild - AST 88 and .  Alk phos 69.  CT abd/pelvis showed liver cirrhosis with mild mesenteric edema, subtle liver surface nodularity with mild hypertrophy of the lateral L paddock lobe, and widening of the fissure for the falciform ligament.  Cholelithiasis including a 3mm gallstone within the gallbladder neck vs cystic duct.  Currently denies abdominal pain.  If develops symptoms - recommend obtaining RUQ US.  May benefit from GI referral on discharge.  Continue to trend routine CMP.

## 2024-04-05 NOTE — PROGRESS NOTES
04/05/24 1400   Pain Assessment   Pain Assessment Tool 0-10   Pain Score No Pain   Restrictions/Precautions   Precautions Bed/chair alarms;Supervision on toilet/commode;Fall Risk;Impulsive  (Severe Neuropathy B feet)   Subjective   Subjective reports no longer having back pain,   Roll Left and Right   Type of Assistance Needed Independent   Roll Left and Right CARE Score 6   Sit to Lying   Type of Assistance Needed Independent   Sit to Lying CARE Score 6   Lying to Sitting on Side of Bed   Type of Assistance Needed Supervision   Lying to Sitting on Side of Bed CARE Score 4   Sit to Stand   Type of Assistance Needed Physical assistance   Physical Assistance Level 25% or less   Comment Requires UE support or Assist for balance   Sit to Stand CARE Score 3   Bed-Chair Transfer   Type of Assistance Needed Physical assistance   Physical Assistance Level 25% or less   Comment Pt requires B UE contact on bed or arm of chair   Chair/Bed-to-Chair Transfer CARE Score 3   Walk 10 Feet   Type of Assistance Needed Physical assistance;Adaptive equipment   Physical Assistance Level 25% or less   Comment witH RW   Walk 10 Feet CARE Score 3   Walk 50 Feet with Two Turns   Type of Assistance Needed Physical assistance;Adaptive equipment   Physical Assistance Level 26%-50%   Comment with RW, assist for balance more with turns   Walk 50 Feet with Two Turns CARE Score 3   Walk 150 Feet   Type of Assistance Needed Physical assistance;Adaptive equipment   Physical Assistance Level 26%-50%   Comment RW   Walk 150 Feet CARE Score 3   Ambulation   Primary Mode of Locomotion Prior to Admission Walk   Distance Walked (feet) 150 ft   Assist Device Roller Walker   Gait Pattern Ataxic;Inconsistant Katherine;Step to;Step through;Narrow PAUL;Decreased foot clearance   Limitations Noted In Balance;Coordination;Safety;Heel Strike   Provided Assistance with: Balance;Direction   Findings Pt with decreased step length and flat footed gait which is her  "\"normal\" due to neuropathy. Reports of inconsistent \"dizziness\" when scanning environment quickly   Does the patient walk? 2. Yes   Therapeutic Interventions   Balance worked on navigating around therapy gym with RW, approaching chairs and safely completing turns. Static standing w/o UE support while turning head to scan x 4 bouts of 1 min   Equipment Use   NuStep 12 min total lvl 3 for coordination, priming and condidtioning   Other Comments   Comments PRUETT balance assessent completed scoring 13/56, Very High Fall risk, therefore will work with RW for mobility. Poor righting reactions with pt reaching for UE support vs moving her feet with any LOB. Narrow PAUL or SLS, scoring 0s.   Assessment   Treatment Assessment Pt seen for skille dtherapy working on balance, safety and mobility using RW at this time due to poor righting reactions and reports of dizziness with quick head movements. Pt impulsive adding to fall risk at this time. Will continue with Neuro re-ed activities with emphasis on balance and safety.   PT Therapy Minutes   PT Time In 1400   PT Time Out 1500   PT Total Time (minutes) 60   PT Mode of treatment - Individual (minutes) 60   PT Mode of treatment - Concurrent (minutes) 0   PT Mode of treatment - Group (minutes) 0   PT Mode of treatment - Co-treat (minutes) 0   PT Mode of Treatment - Total time(minutes) 60 minutes   PT Cumulative Minutes 165   Therapy Time missed   Time missed? No       "

## 2024-04-06 PROBLEM — R10.9 ABDOMINAL DISCOMFORT: Status: ACTIVE | Noted: 2024-04-06

## 2024-04-06 PROCEDURE — 97129 THER IVNTJ 1ST 15 MIN: CPT

## 2024-04-06 PROCEDURE — 97530 THERAPEUTIC ACTIVITIES: CPT

## 2024-04-06 PROCEDURE — 97112 NEUROMUSCULAR REEDUCATION: CPT

## 2024-04-06 PROCEDURE — 97130 THER IVNTJ EA ADDL 15 MIN: CPT

## 2024-04-06 PROCEDURE — 97110 THERAPEUTIC EXERCISES: CPT

## 2024-04-06 PROCEDURE — 97535 SELF CARE MNGMENT TRAINING: CPT

## 2024-04-06 RX ORDER — DICYCLOMINE HYDROCHLORIDE 10 MG/1
10 CAPSULE ORAL
Status: DISCONTINUED | OUTPATIENT
Start: 2024-04-06 | End: 2024-04-08

## 2024-04-06 RX ADMIN — GABAPENTIN 300 MG: 300 CAPSULE ORAL at 21:22

## 2024-04-06 RX ADMIN — CHLORHEXIDINE GLUCONATE 0.12% ORAL RINSE 15 ML: 1.2 LIQUID ORAL at 21:22

## 2024-04-06 RX ADMIN — Medication 800 MG: at 08:24

## 2024-04-06 RX ADMIN — LORATADINE 10 MG: 10 TABLET ORAL at 08:23

## 2024-04-06 RX ADMIN — ESCITALOPRAM OXALATE 10 MG: 10 TABLET ORAL at 08:24

## 2024-04-06 RX ADMIN — HYDRALAZINE HYDROCHLORIDE 10 MG: 10 TABLET, FILM COATED ORAL at 07:04

## 2024-04-06 RX ADMIN — Medication 2000 UNITS: at 08:24

## 2024-04-06 RX ADMIN — HYDROXYZINE HYDROCHLORIDE 25 MG: 25 TABLET, FILM COATED ORAL at 08:28

## 2024-04-06 RX ADMIN — MELATONIN TAB 3 MG 3 MG: 3 TAB at 21:22

## 2024-04-06 RX ADMIN — DICYCLOMINE HYDROCHLORIDE 10 MG: 10 CAPSULE ORAL at 21:30

## 2024-04-06 RX ADMIN — CHLORHEXIDINE GLUCONATE 0.12% ORAL RINSE 15 ML: 1.2 LIQUID ORAL at 08:25

## 2024-04-06 RX ADMIN — AMLODIPINE BESYLATE 10 MG: 10 TABLET ORAL at 08:24

## 2024-04-06 RX ADMIN — HEPARIN SODIUM 5000 UNITS: 5000 INJECTION INTRAVENOUS; SUBCUTANEOUS at 21:23

## 2024-04-06 RX ADMIN — MULTIPLE VITAMINS W/ MINERALS TAB 1 TABLET: TAB ORAL at 08:23

## 2024-04-06 RX ADMIN — THIAMINE HCL TAB 100 MG 100 MG: 100 TAB at 08:23

## 2024-04-06 RX ADMIN — GABAPENTIN 100 MG: 100 CAPSULE ORAL at 14:43

## 2024-04-06 RX ADMIN — Medication 800 MG: at 17:31

## 2024-04-06 RX ADMIN — FOLIC ACID 1 MG: 1 TABLET ORAL at 08:24

## 2024-04-06 RX ADMIN — ATORVASTATIN CALCIUM 40 MG: 40 TABLET, FILM COATED ORAL at 17:31

## 2024-04-06 RX ADMIN — DICYCLOMINE HYDROCHLORIDE 10 MG: 10 CAPSULE ORAL at 17:57

## 2024-04-06 RX ADMIN — HEPARIN SODIUM 5000 UNITS: 5000 INJECTION INTRAVENOUS; SUBCUTANEOUS at 14:43

## 2024-04-06 RX ADMIN — GABAPENTIN 100 MG: 100 CAPSULE ORAL at 06:40

## 2024-04-06 RX ADMIN — HYDROCHLOROTHIAZIDE 12.5 MG: 12.5 TABLET ORAL at 08:24

## 2024-04-06 RX ADMIN — LISINOPRIL 40 MG: 20 TABLET ORAL at 08:24

## 2024-04-06 NOTE — ASSESSMENT & PLAN NOTE
Better again today  - Chronic cholecystitis - not currently sx candidate but may be in future - Gen Sx follow-up OP  - +Hep C - OP GI follow-up and treatment     See management of transaminitis as outlined above   > AST/ALT trending up 4/15 - Gen Sx and GI spoke with IM 4/15 and no adjustments recommended with mgmt   Patient on PPI per GI > discussed with IM prior to d/c and qday should be adequate with OP PCP follow-up

## 2024-04-06 NOTE — PLAN OF CARE
Problem: RESPIRATORY - ADULT  Goal: Achieves optimal ventilation and oxygenation  Description: INTERVENTIONS:  - Assess for changes in respiratory status  - Assess for changes in mentation and behavior  - Position to facilitate oxygenation and minimize respiratory effort  - Oxygen administered by appropriate delivery if ordered  - Initiate smoking cessation education as indicated  - Encourage broncho-pulmonary hygiene including cough, deep breathe, Incentive Spirometry  - Assess the need for suctioning and aspirate as needed  - Assess and instruct to report SOB or any respiratory difficulty  - Respiratory Therapy support as indicated  Outcome: Progressing     Problem: GASTROINTESTINAL - ADULT  Goal: Maintains or returns to baseline bowel function  Description: INTERVENTIONS:  - Assess bowel function  - Encourage oral fluids to ensure adequate hydration  - Administer IV fluids if ordered to ensure adequate hydration  - Administer ordered medications as needed  - Encourage mobilization and activity  - Consider nutritional services referral to assist patient with adequate nutrition and appropriate food choices  Outcome: Progressing

## 2024-04-06 NOTE — QUICK NOTE
"PM&R Coverage Progress Note:    Rehabilitation Diagnosis: Cerebellar hemorrhage     ASSESSMENT: Stable      PLAN:    Rehabilitation  Continue current rehabilitation plan of care to maximize function.    Functional barriers: Imbalance, incoordination     Medical issues  Abdominal discomfort.   Continue current medical plan of care.  Appreciate  consultants co-management.        SUBJECTIVE: Patient seen face to face while working with therapy.  She reports using deep breathing techniques Dr. Jennings taught her. She feels it does help but still needs anxiety medicine at times. She reports some abdominal discomfort that she feels may be gas. She denies constipation or diarrhea. She does report neuropathy in her hands and distal lower extremities in a sock like distrubution. She denies any pain.     Addendum: patient continues cramping type pain across her lower abdomen. Discussed case with attending physician and will order Bentyl. If no improvement will obtain CT A/P. Patient and nursing report large bowel movement today, soft, no diarrhea. No nausea or vomiting. She states pain does not get worse with eating. She reports she is passing gas.     ROS:  A ten point review of systems was completed on 04/06/24 and pertinent positives are listed in subjective section. All other systems reviewed were negative.     OBJECTIVE:   /68 (BP Location: Right arm)   Pulse 72   Temp 99.1 °F (37.3 °C) (Tympanic)   Resp 20   Ht 5' 6\" (1.676 m)   Wt 59.1 kg (130 lb 5 oz)   SpO2 91%   BMI 21.03 kg/m²     Physical Exam  Vitals and nursing note reviewed.   HENT:      Head: Normocephalic and atraumatic.      Right Ear: External ear normal.      Left Ear: External ear normal.      Nose: Nose normal.      Mouth/Throat:      Mouth: Mucous membranes are moist.      Pharynx: Oropharynx is clear.   Eyes:      Extraocular Movements: Extraocular movements intact.   Pulmonary:      Effort: Pulmonary effort is normal. No respiratory " distress.   Abdominal:      General: Bowel sounds are normal. There is no distension.      Tenderness: There is abdominal tenderness.      Comments: Good bowel sounds   Musculoskeletal:      Comments: Stable left hemiparesis    Skin:     General: Skin is warm and dry.   Neurological:      Mental Status: She is alert and oriented to person, place, and time.   Psychiatric:         Mood and Affect: Mood normal.          Personally reviewed on 04/06/24:   Lab Results   Component Value Date    WBC 7.40 04/05/2024    HGB 14.1 04/05/2024    HCT 41.1 04/05/2024    MCV 97 04/05/2024     04/05/2024     Lab Results   Component Value Date    SODIUM 134 (L) 04/05/2024    K 4.1 04/05/2024     04/05/2024    CO2 25 04/05/2024    BUN 25 04/05/2024    CREATININE 0.62 04/05/2024    GLUC 93 04/05/2024    CALCIUM 9.2 04/05/2024     Lab Results   Component Value Date    INR 1.11 03/26/2024    INR 1.05 02/11/2023    INR 1.08 09/01/2022    PROTIME 14.2 03/26/2024    PROTIME 14.4 02/11/2023    PROTIME 14.8 (H) 09/01/2022           Current Facility-Administered Medications:     acetaminophen (TYLENOL) tablet 650 mg, 650 mg, Oral, Q6H PRN, Matias Jennings MD    amLODIPine (NORVASC) tablet 10 mg, 10 mg, Oral, Daily, Matias Jennings MD, 10 mg at 04/06/24 0824    atorvastatin (LIPITOR) tablet 40 mg, 40 mg, Oral, Daily With Dinner, Matias Jennings MD, 40 mg at 04/05/24 1705    bisacodyl (DULCOLAX) rectal suppository 10 mg, 10 mg, Rectal, Daily PRN, Matias Jennings MD    calcium carbonate (TUMS) chewable tablet 500 mg, 500 mg, Oral, Daily PRN, Ruth Ann Earl DO, 500 mg at 04/05/24 2240    chlorhexidine (PERIDEX) 0.12 % oral rinse 15 mL, 15 mL, Mouth/Throat, Q12H NAOMI, Matias Jennings MD, 15 mL at 04/06/24 0825    Cholecalciferol (VITAMIN D3) tablet 2,000 Units, 2,000 Units, Oral, Daily, MILAN Suresh, 2,000 Units at 04/06/24 0824    escitalopram (LEXAPRO) tablet 10 mg, 10 mg, Oral, Daily, Lindsey CERVANTES  MILAN Jimenez, 10 mg at 04/06/24 0824    folic acid (FOLVITE) tablet 1 mg, 1 mg, Oral, Daily, Matias Jennings MD, 1 mg at 04/06/24 0824    gabapentin (NEURONTIN) capsule 100 mg, 100 mg, Oral, BID, Matias Jennings MD, 100 mg at 04/06/24 0640    gabapentin (NEURONTIN) capsule 300 mg, 300 mg, Oral, HS, Matias Jennings MD, 300 mg at 04/05/24 2107    heparin (porcine) subcutaneous injection 5,000 Units, 5,000 Units, Subcutaneous, Q8H NAOMI, Matias Jennings MD, 5,000 Units at 04/05/24 2106    hydrALAZINE (APRESOLINE) tablet 10 mg, 10 mg, Oral, Q6H PRN, Matias Jennings MD, 10 mg at 04/06/24 0704    hydroCHLOROthiazide tablet 12.5 mg, 12.5 mg, Oral, Daily, Matias Jennings MD, 12.5 mg at 04/06/24 0824    hydrOXYzine HCL (ATARAX) tablet 25 mg, 25 mg, Oral, TID PRN, Matias Jennings MD, 25 mg at 04/06/24 0828    lisinopril (ZESTRIL) tablet 40 mg, 40 mg, Oral, Daily, Matias Jennings MD, 40 mg at 04/06/24 0824    loratadine (CLARITIN) tablet 10 mg, 10 mg, Oral, Daily, Matias Jennings MD, 10 mg at 04/06/24 0823    magnesium Oxide (MAG-OX) tablet 800 mg, 800 mg, Oral, BID, MILAN Suresh, 800 mg at 04/06/24 0824    melatonin tablet 3 mg, 3 mg, Oral, HS, Matias Jennings MD, 3 mg at 04/05/24 2107    multivitamin-minerals (CENTRUM) tablet 1 tablet, 1 tablet, Oral, Daily, Matias Jennings MD, 1 tablet at 04/06/24 0823    polyethylene glycol (MIRALAX) packet 17 g, 17 g, Oral, Daily PRN, Matias Jennings MD    thiamine tablet 100 mg, 100 mg, Oral, Daily, Matias Jennings MD, 100 mg at 04/06/24 0823    Past Medical History:   Diagnosis Date    CVA (cerebral vascular accident) (HCC)     Diverticulitis     Diverticulitis of colon     Diverticulosis     Hypertension     Stroke (HCC)        Patient Active Problem List    Diagnosis Date Noted    Prolonged QT interval 04/04/2024    Simple adnexal cyst greater than 1 cm in diameter in postmenopausal patient 04/04/2024    Hyponatremia  04/03/2024    At risk for venous thromboembolism (VTE) 04/03/2024    Insomnia 04/03/2024    Amphetamine abuse (HCC) 04/03/2024    Hypomagnesemia 04/03/2024    Transaminitis 04/03/2024    History of noncompliance with medical treatment 04/03/2024    Marijuana use 04/03/2024    Diarrhea 03/31/2024    Alcohol use 03/29/2024    Cerebellar hemorrhage (HCC) 03/26/2024    Polyneuropathy 07/05/2023    Hallux valgus with bunions of right foot 07/05/2023    Hallux valgus with bunions of left foot 07/05/2023    Hammer toes of both feet 07/05/2023    Macrocytic anemia 06/13/2023    ROSANA (generalized anxiety disorder) 09/20/2022    Neuropathy 09/20/2022    History of CVA (cerebrovascular accident) 09/01/2022    Tobacco abuse 09/01/2022    Essential hypertension 09/01/2022        Jagruti YATES  PM&R    I have spent a total time of 15 minutes on 04/06/24 in caring for this patient including Patient and family education, Counseling / Coordination of care, Documenting in the medical record, Obtaining or reviewing history  , and Communicating with other healthcare professionals .      ** Please Note:  voice to text software may have been used in the creation of this document. Although proof errors in transcription or interpretation are a potential of such software**

## 2024-04-06 NOTE — PROGRESS NOTES
04/06/24 0900   Pain Assessment   Pain Assessment Tool 0-10   Pain Score 5   Pain Location/Orientation Location: Abdomen   Pain Onset/Description Descriptor: Sore   Effect of Pain on Daily Activities (S)  distracted due to abd pain from injections- reports refusing it this AM   Restrictions/Precautions   Precautions Bed/chair alarms;Supervision on toilet/commode;Fall Risk;Impulsive   General   Change In Medical/Functional Status +L hand IV site (low magnesium)   Cognition   Overall Cognitive Status Impaired   Arousal/Participation Alert;Cooperative   Attention Within functional limits   Orientation Level Oriented X4   Memory Decreased recall of precautions   Following Commands Follows multistep commands with increased time or repetition   Comments Impulsive at times, dec insight to deficits.   Subjective   Subjective Reports being tired, once would fall asleep would get waken up.   Sit to Lying   Type of Assistance Needed Independent   Physical Assistance Level No physical assistance   Sit to Lying CARE Score 6   Sit to Stand   Type of Assistance Needed Incidental touching   Physical Assistance Level No physical assistance   Comment Requires UE support or Assist for balance, impulsive.   Sit to Stand CARE Score 4   Bed-Chair Transfer   Type of Assistance Needed Physical assistance   Physical Assistance Level 25% or less   Comment Cg/min A with Rw, impulsive   Chair/Bed-to-Chair Transfer CARE Score 3   Car Transfer   Type of Assistance Needed Physical assistance   Physical Assistance Level 25% or less   Comment min A stand pivot for balance and safety with RW.   Car Transfer CARE Score 3   Walk 10 Feet   Type of Assistance Needed Physical assistance   Physical Assistance Level 25% or less   Walk 10 Feet CARE Score 3   Walk 50 Feet with Two Turns   Type of Assistance Needed Physical assistance   Physical Assistance Level 26%-50%   Comment with RW, assist for balance more with turns- gait becomes shuffled with  turns.   Walk 50 Feet with Two Turns CARE Score 3   Walk 150 Feet   Type of Assistance Needed Physical assistance   Physical Assistance Level 26%-50%   Comment RW   Walk 150 Feet CARE Score 3   Ambulation   Primary Mode of Locomotion Prior to Admission Walk   Distance Walked (feet) 150 ft  (x3)   Assist Device Roller Walker   Gait Pattern Ataxic;Inconsistant Jaxson;Step to;Step through;Narrow PAUL;Decreased foot clearance   Limitations Noted In Balance;Coordination;Safety;Heel Strike   Provided Assistance with: Balance;Direction   Walk Assist Level Moderate Assist;Maximum Assist;Minimum Assist   Findings min A for balance with RW, mod A for trial with A for RW advancing to facilitate smooth jaxson. Max A without AD, HHA.   Does the patient walk? 2. Yes   Wheelchair mobility   Does the patient use a wheelchair? 0. No   Therapeutic Interventions   Neuromuscular Re-Education static stand with mirror feeback 3x30 sec, feet together unsupported with mirror [id number]. Unsupported cone transfer - R<>L; High/low. x10 cones each.   Equipment Use   NuStep x10 min L3 all extremeties SPM > 75 - x2 short rest breaks.   Assessment   Treatment Assessment Pt engaged in 60 min skilled PT intervention with ongoing focus on gait and and balance, feel more tired today. Sig other brought clothes but not shoes/sneakers. Ongoing + L lean with HHA ambulation. inc sway with unsupported balance trials, impulsiveness persists, cues for hand placment with transfers and pacing self with activity. Use of BR at end of session, S for self hygiene, min A for balance for pant mgmt, returned to supine at end of session per pt request, Frequent grasping abdomen t/o session. Continue aggressive PT for balance and safety to improve functional I.   Barriers to Discharge Inaccessible home environment;Decreased caregiver support   Barriers to Discharge Comments sons work, ELIZABETH   Plan   Progress Progressing toward goals   PT Therapy Minutes   PT Time In 0900    PT Time Out 1000   PT Total Time (minutes) 60   PT Mode of treatment - Individual (minutes) 60   PT Mode of treatment - Concurrent (minutes) 0   PT Mode of treatment - Group (minutes) 0   PT Mode of treatment - Co-treat (minutes) 0   PT Mode of Treatment - Total time(minutes) 60 minutes   PT Cumulative Minutes 225   Therapy Time missed   Time missed? No

## 2024-04-06 NOTE — PROGRESS NOTES
04/06/24 1300   Pain Assessment   Pain Assessment Tool 0-10   Pain Score No Pain   Restrictions/Precautions   Precautions Bed/chair alarms;Fall Risk;Supervision on toilet/commode   Weight Bearing Restrictions No   ROM Restrictions No   Lifestyle   Autonomy Pt agreeable to OT Tx session.   Grooming   Able To Initiate Tasks;Wash/Dry Hands   Limitation Noted In Coordination;Strength   Findings CGA in stance at sink top to complete hand hygiene following toileting routine, x2.   Sit to Lying   Type of Assistance Needed Supervision   Physical Assistance Level No physical assistance   Comment HOB flat w/o use of BR's   Sit to Lying CARE Score 4   Lying to Sitting on Side of Bed   Type of Assistance Needed Supervision   Physical Assistance Level No physical assistance   Comment HOB flat w/o use of BR's   Lying to Sitting on Side of Bed CARE Score 4   Sit to Stand   Type of Assistance Needed Incidental touching;Adaptive equipment   Physical Assistance Level No physical assistance   Comment CGA with RW   Sit to Stand CARE Score 4   Bed-Chair Transfer   Type of Assistance Needed Incidental touching   Physical Assistance Level No physical assistance   Comment CGA SPT with RW   Chair/Bed-to-Chair Transfer CARE Score 4   Toileting Hygiene   Type of Assistance Needed Incidental touching;Adaptive equipment   Physical Assistance Level No physical assistance   Comment CGA in stance at RW for CM and randell/rear hygiene, x2   Toileting Hygiene CARE Score 4   Toilet Transfer   Type of Assistance Needed Incidental touching;Adaptive equipment   Physical Assistance Level No physical assistance   Comment CGA with RW to over-the-toilet commode, x2   Toilet Transfer CARE Score 4   Kitchen Mobility   Kitchen-Mobility Level Walker  (folding RW tray)   Kitchen Activity Retrieve items;Transport items   Kitchen Mobility Comments Introduction to folding RW tray with EDU provided on benefit of utilizing item for safe item transport, as pt reports  enjoyment in cooking. Completed fxnl kitchen mobility with RW req pt to retrieve indicated items from OH cabinet, fridge, freezer, and kitchen drawer to simulate making PB & J sandwich. Overall CGA w/o LOB, dec recall of safe positioning of RW w/ tray attachment. Pt would benefit from cont'd practice.   Therapeutic Excerise-Strength   UE Strength Yes   Right Upper Extremity- Strength   R Shoulder Flexion;Extension;External rotation;Internal rotation   R Elbow Elbow flexion;Elbow extension   R Position Seated;Standing   Equipment Dumbbell  (3#)   R Weight/Reps/Sets 2 sets of 15 reps   RUE Strength Comment Engaged in UE TE, seated and in stance, with focus on improving UE strength (particularly LUE), improving fxnl standing balance w/ unilateral release, and improving overall activity tolerance. Pt tolerated well w/ rets breaks between each set. No LOB while performing TE in stance w/ pt maintaining unilateral suppor ton RW.   Left Upper Extremity-Strength   LUE Strength Comment See above.   Cognition   Overall Cognitive Status Impaired   Arousal/Participation Alert;Cooperative   Attention Within functional limits   Orientation Level Oriented X4   Memory Decreased recall of precautions   Following Commands Follows multistep commands with increased time or repetition   Activity Tolerance   Activity Tolerance Patient tolerated treatment well   Assessment   Treatment Assessment Pt participated in 90 min skilled OT Tx session with focus on ADL of toileting, fxnl kitchen mobility, fxnl transfer's/mobility, UE TE, fxnl standing balance w/ unilateral release, and improving overall activity tolerance. See above for further Tx details. Pt is making steady gains towards OT goals. Overall CGA for ADL of toileting and fxnl transfer's. Introduction to folding RW tray, as pt reports enjoyment in cooking PTA. Completed fxnl kitchen mobility at CGA, no LOB. Dec recall of safe positioning of RW w/ tray attached. Improvement w/ overall  standing balance w/ unilateral release during TE in stance at RW. Pt would continue to benefit from skilled OT services to continue maximizing fxnl performance in order to progress towards OT goals and dec caregiver burden upon D/C.   Prognosis Good   Problem List Decreased strength;Decreased endurance;Impaired balance;Decreased mobility;Decreased coordination;Decreased cognition;Impaired judgement;Decreased safety awareness   Barriers to Discharge Inaccessible home environment;Decreased caregiver support   Plan   Treatment/Interventions ADL retraining;Functional transfer training;Therapeutic exercise;Endurance training;Patient/family training   Progress Progressing toward goals   Discharge Recommendation   Rehab Resource Intensity Level, OT   (Pending progress)   Equipment Recommended   (folding RW tray)   OT Therapy Minutes   OT Time In 1300   OT Time Out 1430   OT Total Time (minutes) 90   OT Mode of treatment - Individual (minutes) 90   OT Mode of treatment - Concurrent (minutes) 0   OT Mode of treatment - Group (minutes) 0   OT Mode of treatment - Co-treat (minutes) 0   OT Mode of Treatment - Total time(minutes) 90 minutes   OT Cumulative Minutes 240   Therapy Time missed   Time missed? No

## 2024-04-06 NOTE — SPEECH THERAPY NOTE
Stroke Education Series    Pt participated in skilled Stroke Education Series in an individualized setting to address the topic of Preparing To Go Home. This education was provided in both verbal and written formats.Education within this session focused on providing safety strategies including environmental and lifestyle changes that if made will support a safe discharge to his/her home setting. One goal of this session is to focus on ways to improve the patient's independence while maintaining safety and decreasing fall risk. Following education, pt's response to education is: verbalizes understanding and demonstrates understanding.    To individualize this session, the following topics were reviewed: caregiver considerations, assistive devices, ADL recommendations, IADL recommendations , emergency preparedness, and family training.      Start Time: 1230    End Time: 1245       Stroke Education Series    Pt participated in skilled Stroke Education Series in an individualized setting to address the topic of What Comes Next post stroke in both verbal and written formats. Education within this session included information on recommendations and resources after leaving the [facility]. This education session links together what has been covered in previous stroke education classes to improve the patient's understanding of how managing risk factors for stroke, participating in therapy, working with family and friends in the rehab process, and reviewing their role in the rehab process will maximize outcomes and their functional gains. This education also incorporates what the patient wants to achieve and helps them set realistic goals. To individualize this education the following topics were covered: continued therapy (home versus outpatient), driving programs, vocational rehab, support groups, including [facility], caregiver support, and participation in research . Following education, pt's response to education is: verbalizes  understanding and demonstrates understanding.      Start Time: 1245    End Time: 1300

## 2024-04-06 NOTE — PROGRESS NOTES
04/06/24 1230   Pain Assessment   Pain Assessment Tool 0-10   Pain Score No Pain   Restrictions/Precautions   Precautions Bed/chair alarms;Fall Risk;Supervision on toilet/commode   Comprehension   Comprehension (FIM) 5 - Needs help/cues, repetition only RARELY ( < 10% of the time)   Expression   Expression (FIM) 6 - Expresses complex/abstract but requires device (trach valve, communication boards)   Social Interaction   Social Interaction (FIM) 6 - Interacts appropriately with others BUT requires extra  time   Problem Solving   Problem solving (FIM) 5 - Solves basic problems 90% of time   Memory   Memory (FIM) 5 - Recalls/performs request 90% of time   Speech/Language/Cognition Assessment   Treatment Assessment Pt was seen in room for skilled ST session targeting stroke education. Pt and SLP participated in brief rapport building at beginning of session as this SLP is new to pt's care team. Pt was able to recall PT from earlier in day. Began with recall of stroke education to which pt was able to recall 3/3 items from previous stroke education with previous SLP. Focus of session was on completion of the rest of stroke education material: Preparing to Go Home, What Comes Next and Finalizing HEP. Began with referencing 'What You Need to Know About Stroke' and began talking about stroke and rehabilitation. SLP discussed what is stroke rehabilitation, who is apart of her rehab team and need for OP and these therapists continuing to see pt (PT, OT, ST). Discussed what will someone do in rehabilitation and the focus of the programs such as ADLs, mobility, communication, cognitive kills, social skills and psychological functioning to improve coping skills and treatment to overcome anxiety. Then, discussed living at home after stroke. Pt noted that her 2 sons and boyfriend are great support systems for her and have already begun to make adjustments at home such as a ramp. SLP discussed when the team decides when the  right choice is to go home. SLP reviewed importance of the ability to care for yourself, ability to follow medical advice, having a caregiver and being able to move around and communicate. Pt voiced understanding and noted feeling better each day. SLP discussed changes that might need to be made at home such as safety with taking up throw rugs, installing handrails, accessibility such as ramp and independence such as adding transfer benches. Pt asked when all this could be verified and finalized. SLP noted in the coming days/weeks a family training will be completed 1-2+ times depending on families comfort ability after the sessions and this will be completed with OT and PT and sometimes ST. SLP discussed what family training is and various activities that may occur. Pt agreeable and noted that she would love to have this occur soon to help her boyfriend and sons feel more comfortable with her coming home. Discussed with pt role of caregiver and what should be completed such as providing physical help, monitoring safety, managing housework, coordinating health care and providing medication. Pt noted that she feels she is independent in some things but is agreeable for asking for help. SLP referenced family training again and importance of completing with team to ensure carryover. Lastly, reviewed BE FAST acronym with pt, to which pt was able to recall previous review with other SLP and was able to recall 3/6. SLP provided visual during review and provided pt additional visual to review. Discussed with pt [facility] program and pt verbalized interest in wanting to attend and learn more. By end of session, SLP assisted pt in traditional oral care as pt had completed meal prior to SLP arrival and asked appropriately to clean mouth prior to OT after ST. Overall, pt understood most of stroke education and asked appropriate questions and referenced family appropriately when discussing care once home.  At this time, pt  continues to benefit from ongoing skilled SLP services to maximize overall cognitive linguistic skills in attempts to decrease caregiver burden over time.   SLP Therapy Minutes   SLP Time In 1230   SLP Time Out 1300   SLP Total Time (minutes) 30   SLP Mode of treatment - Individual (minutes) 30   SLP Mode of treatment - Concurrent (minutes) 0   SLP Mode of treatment - Group (minutes) 0   SLP Mode of treatment - Co-treat (minutes) 0   SLP Mode of Treatment - Total time(minutes) 30 minutes   SLP Cumulative Minutes 180   Therapy Time missed   Time missed? No

## 2024-04-06 NOTE — NURSING NOTE
Pt had complained of belly discomfort, she did have a large BM today and is passing gas. Some tenderness noted in lower abdomen, NP was informed.

## 2024-04-07 PROCEDURE — 97535 SELF CARE MNGMENT TRAINING: CPT

## 2024-04-07 RX ORDER — FAMOTIDINE 20 MG/1
20 TABLET, FILM COATED ORAL 2 TIMES DAILY
Status: DISCONTINUED | OUTPATIENT
Start: 2024-04-07 | End: 2024-04-09

## 2024-04-07 RX ADMIN — FOLIC ACID 1 MG: 1 TABLET ORAL at 09:19

## 2024-04-07 RX ADMIN — Medication 2000 UNITS: at 09:19

## 2024-04-07 RX ADMIN — GABAPENTIN 300 MG: 300 CAPSULE ORAL at 22:00

## 2024-04-07 RX ADMIN — DICYCLOMINE HYDROCHLORIDE 10 MG: 10 CAPSULE ORAL at 22:00

## 2024-04-07 RX ADMIN — Medication 800 MG: at 09:19

## 2024-04-07 RX ADMIN — DICYCLOMINE HYDROCHLORIDE 10 MG: 10 CAPSULE ORAL at 16:32

## 2024-04-07 RX ADMIN — CHLORHEXIDINE GLUCONATE 0.12% ORAL RINSE 15 ML: 1.2 LIQUID ORAL at 09:19

## 2024-04-07 RX ADMIN — HYDROXYZINE HYDROCHLORIDE 25 MG: 25 TABLET, FILM COATED ORAL at 17:17

## 2024-04-07 RX ADMIN — ATORVASTATIN CALCIUM 40 MG: 40 TABLET, FILM COATED ORAL at 16:40

## 2024-04-07 RX ADMIN — LORATADINE 10 MG: 10 TABLET ORAL at 09:19

## 2024-04-07 RX ADMIN — GABAPENTIN 100 MG: 100 CAPSULE ORAL at 06:04

## 2024-04-07 RX ADMIN — LISINOPRIL 40 MG: 20 TABLET ORAL at 09:19

## 2024-04-07 RX ADMIN — DICYCLOMINE HYDROCHLORIDE 10 MG: 10 CAPSULE ORAL at 11:37

## 2024-04-07 RX ADMIN — AMLODIPINE BESYLATE 10 MG: 10 TABLET ORAL at 09:19

## 2024-04-07 RX ADMIN — CALCIUM CARBONATE (ANTACID) CHEW TAB 500 MG 500 MG: 500 CHEW TAB at 22:19

## 2024-04-07 RX ADMIN — HEPARIN SODIUM 5000 UNITS: 5000 INJECTION INTRAVENOUS; SUBCUTANEOUS at 06:06

## 2024-04-07 RX ADMIN — THIAMINE HCL TAB 100 MG 100 MG: 100 TAB at 09:19

## 2024-04-07 RX ADMIN — HYDROCHLOROTHIAZIDE 12.5 MG: 12.5 TABLET ORAL at 09:19

## 2024-04-07 RX ADMIN — Medication 800 MG: at 17:15

## 2024-04-07 RX ADMIN — DICYCLOMINE HYDROCHLORIDE 10 MG: 10 CAPSULE ORAL at 06:05

## 2024-04-07 RX ADMIN — FAMOTIDINE 20 MG: 20 TABLET ORAL at 23:07

## 2024-04-07 RX ADMIN — GABAPENTIN 100 MG: 100 CAPSULE ORAL at 14:17

## 2024-04-07 RX ADMIN — MULTIPLE VITAMINS W/ MINERALS TAB 1 TABLET: TAB ORAL at 09:19

## 2024-04-07 RX ADMIN — ESCITALOPRAM OXALATE 10 MG: 10 TABLET ORAL at 09:19

## 2024-04-07 RX ADMIN — MELATONIN TAB 3 MG 3 MG: 3 TAB at 22:01

## 2024-04-07 NOTE — PROGRESS NOTES
"   04/07/24 8528   Pain Assessment   Pain Assessment Tool 0-10   Pain Score No Pain   Restrictions/Precautions   Precautions Bed/chair alarms;Fall Risk;Supervision on toilet/commode   Lifestyle   Autonomy \"I'd really like a shower\"   Eating   Type of Assistance Needed Independent   Comment pt finishing breakfast upon arrival   Eating CARE Score 6   Oral Hygiene   Type of Assistance Needed Set-up / clean-up   Comment pt requesting to complete seated   Oral Hygiene CARE Score 5   Grooming   Findings PIERRE seated   Shower/Bathe Self   Type of Assistance Needed Incidental touching   Comment Shower completed primarily while seated on tub bench. While seated pt bathes UB and LB w/ CS using HHSH. In stance w/ support from GB pt washes groin/buttocks w/ CGA.   Shower/Bathe Self CARE Score 4   Upper Body Dressing   Type of Assistance Needed Set-up / clean-up   Comment seated to don/doff shirt. Extra time to manage buttons on shirt   Upper Body Dressing CARE Score 5   Lower Body Dressing   Type of Assistance Needed Incidental touching   Comment CS while seated to thread BLE. CGA in stance at RW for CM over hips. Noted ataxia in BLE   Lower Body Dressing CARE Score 4   Putting On/Taking Off Footwear   Type of Assistance Needed Incidental touching   Comment CGA-CS don/doff socks using cross leg tech   Putting On/Taking Off Footwear CARE Score 4   Sit to Stand   Type of Assistance Needed Incidental touching   Comment CG to RW   Sit to Stand CARE Score 4   Activity Tolerance   Activity Tolerance Patient tolerated treatment well   Assessment   Treatment Assessment OT session focusing on ADL completion including shower routine. Pt agreeable to session and tolerated well, pt is demo'ing progress towards OT goals using modified techniques. She continues to demo ataxia, especially w/ BLE duirng mobility. OT to continue to follow PTs rec for mobility device which at this time remains RW. OT to continue POC.   Plan "   Treatment/Interventions ADL retraining;Functional transfer training;Therapeutic exercise;Endurance training;Patient/family training;Equipment eval/education;Continued evaluation;Compensatory technique education;Spoke to nursing   Progress Progressing toward goals   OT Therapy Minutes   OT Time In 0750   OT Time Out 0850   OT Total Time (minutes) 60   OT Mode of treatment - Individual (minutes) 60   OT Mode of treatment - Concurrent (minutes) 0   OT Mode of treatment - Group (minutes) 0   OT Mode of treatment - Co-treat (minutes) 0   OT Mode of Treatment - Total time(minutes) 60 minutes   OT Cumulative Minutes 300   Therapy Time missed   Time missed? No

## 2024-04-07 NOTE — PLAN OF CARE
Problem: GASTROINTESTINAL - ADULT  Goal: Maintains or returns to baseline bowel function  Description: INTERVENTIONS:  - Assess bowel function  - Encourage oral fluids to ensure adequate hydration  - Administer IV fluids if ordered to ensure adequate hydration  - Administer ordered medications as needed  - Encourage mobilization and activity  - Consider nutritional services referral to assist patient with adequate nutrition and appropriate food choices  Outcome: Progressing  Goal: Oral mucous membranes remain intact  Description: INTERVENTIONS  - Assess oral mucosa and hygiene practices  - Implement preventative oral hygiene regimen  - Implement oral medicated treatments as ordered  - Initiate Nutrition services referral as needed  Outcome: Progressing

## 2024-04-07 NOTE — QUICK NOTE
"PM&R Coverage Progress Note:    Rehabilitation Diagnosis: Cerebellar hemorrhage    ASSESSMENT: Stable      PLAN:    Rehabilitation  Continue current rehabilitation plan of care to maximize function.    Functional barriers: Imbalance, incoordination     Medical issues  No acute concerns. Abdominal discomfort improved.   Continue current medical plan of care.  Appreciate IM consultants co-management.      SUBJECTIVE: Patient seen face to face.  No acute issues overnight. Has significant improvement in abdominal discomfort and about to participate in therapy. Reports she is feeling much better today. Progressing as expected in rehabilitation program.       ROS:  A ten point review of systems was completed on 04/07/24 and pertinent positives are listed in subjective section. All other systems reviewed were negative.     OBJECTIVE:   /82 (BP Location: Right arm)   Pulse 61   Temp 98 °F (36.7 °C) (Tympanic)   Resp 20   Ht 5' 6\" (1.676 m)   Wt 59.1 kg (130 lb 5 oz)   SpO2 93%   BMI 21.03 kg/m²     Physical Exam  Constitutional:       General: She is not in acute distress.     Appearance: She is not toxic-appearing.   HENT:      Head: Normocephalic and atraumatic.      Right Ear: External ear normal.      Left Ear: External ear normal.      Nose: Nose normal.      Mouth/Throat:      Mouth: Mucous membranes are moist.      Pharynx: Oropharynx is clear.   Pulmonary:      Effort: Pulmonary effort is normal. No respiratory distress.   Abdominal:      General: Bowel sounds are normal. There is no distension.      Palpations: Abdomen is soft.      Tenderness: There is no abdominal tenderness. There is no guarding.   Musculoskeletal:      Comments: Left hemiparesis    Skin:     General: Skin is warm and dry.   Neurological:      Mental Status: She is alert and oriented to person, place, and time.   Psychiatric:         Mood and Affect: Mood normal.            Personally reviewed on 04/07/24:   Lab Results   Component " Value Date    WBC 7.40 04/05/2024    HGB 14.1 04/05/2024    HCT 41.1 04/05/2024    MCV 97 04/05/2024     04/05/2024     Lab Results   Component Value Date    SODIUM 134 (L) 04/05/2024    K 4.1 04/05/2024     04/05/2024    CO2 25 04/05/2024    BUN 25 04/05/2024    CREATININE 0.62 04/05/2024    GLUC 93 04/05/2024    CALCIUM 9.2 04/05/2024     Lab Results   Component Value Date    INR 1.11 03/26/2024    INR 1.05 02/11/2023    INR 1.08 09/01/2022    PROTIME 14.2 03/26/2024    PROTIME 14.4 02/11/2023    PROTIME 14.8 (H) 09/01/2022           Current Facility-Administered Medications:     acetaminophen (TYLENOL) tablet 650 mg, 650 mg, Oral, Q6H PRN, Matias Jennings MD    amLODIPine (NORVASC) tablet 10 mg, 10 mg, Oral, Daily, Matias Jennings MD, 10 mg at 04/06/24 0824    atorvastatin (LIPITOR) tablet 40 mg, 40 mg, Oral, Daily With Dinner, Matias Jennings MD, 40 mg at 04/06/24 1731    bisacodyl (DULCOLAX) rectal suppository 10 mg, 10 mg, Rectal, Daily PRN, Matias Jennings MD    calcium carbonate (TUMS) chewable tablet 500 mg, 500 mg, Oral, Daily PRN, Ruth Ann Earl DO, 500 mg at 04/05/24 2240    chlorhexidine (PERIDEX) 0.12 % oral rinse 15 mL, 15 mL, Mouth/Throat, Q12H NAOMI, Matias Jennings MD, 15 mL at 04/06/24 2122    Cholecalciferol (VITAMIN D3) tablet 2,000 Units, 2,000 Units, Oral, Daily, MILAN Suresh, 2,000 Units at 04/06/24 0824    dicyclomine (BENTYL) capsule 10 mg, 10 mg, Oral, 4x Daily (AC & HS), MILAN Danielson, 10 mg at 04/07/24 0605    escitalopram (LEXAPRO) tablet 10 mg, 10 mg, Oral, Daily, MILAN Marr, 10 mg at 04/06/24 0824    folic acid (FOLVITE) tablet 1 mg, 1 mg, Oral, Daily, Matias Jennings MD, 1 mg at 04/06/24 0824    gabapentin (NEURONTIN) capsule 100 mg, 100 mg, Oral, BID, Matias Jennings MD, 100 mg at 04/07/24 0604    gabapentin (NEURONTIN) capsule 300 mg, 300 mg, Oral, HS, Matias Jennings MD, 300 mg at 04/06/24 2122     heparin (porcine) subcutaneous injection 5,000 Units, 5,000 Units, Subcutaneous, Q8H NAOMI, Matias Jennings MD, 5,000 Units at 04/07/24 0606    hydrALAZINE (APRESOLINE) tablet 10 mg, 10 mg, Oral, Q6H PRN, Matias Jennings MD, 10 mg at 04/06/24 0704    hydroCHLOROthiazide tablet 12.5 mg, 12.5 mg, Oral, Daily, Matias Jennings MD, 12.5 mg at 04/06/24 0824    hydrOXYzine HCL (ATARAX) tablet 25 mg, 25 mg, Oral, TID PRN, Matias Jennings MD, 25 mg at 04/06/24 0828    lisinopril (ZESTRIL) tablet 40 mg, 40 mg, Oral, Daily, Matias Jennings MD, 40 mg at 04/06/24 0824    loratadine (CLARITIN) tablet 10 mg, 10 mg, Oral, Daily, Matias Jennings MD, 10 mg at 04/06/24 0823    magnesium Oxide (MAG-OX) tablet 800 mg, 800 mg, Oral, BID, MILAN Suresh, 800 mg at 04/06/24 1731    melatonin tablet 3 mg, 3 mg, Oral, HS, Matias Jennings MD, 3 mg at 04/06/24 2122    multivitamin-minerals (CENTRUM) tablet 1 tablet, 1 tablet, Oral, Daily, Matias Jennings MD, 1 tablet at 04/06/24 0823    polyethylene glycol (MIRALAX) packet 17 g, 17 g, Oral, Daily PRN, Matias Jennings MD    thiamine tablet 100 mg, 100 mg, Oral, Daily, Matias Jennings MD, 100 mg at 04/06/24 0823    Past Medical History:   Diagnosis Date    CVA (cerebral vascular accident) (HCC)     Diverticulitis     Diverticulitis of colon     Diverticulosis     Hypertension     Stroke (HCC)        Patient Active Problem List    Diagnosis Date Noted    Abdominal discomfort 04/06/2024    Prolonged QT interval 04/04/2024    Simple adnexal cyst greater than 1 cm in diameter in postmenopausal patient 04/04/2024    Hyponatremia 04/03/2024    At risk for venous thromboembolism (VTE) 04/03/2024    Insomnia 04/03/2024    Amphetamine abuse (HCC) 04/03/2024    Hypomagnesemia 04/03/2024    Transaminitis 04/03/2024    History of noncompliance with medical treatment 04/03/2024    Marijuana use 04/03/2024    Diarrhea 03/31/2024    Alcohol use 03/29/2024     Cerebellar hemorrhage (HCC) 03/26/2024    Polyneuropathy 07/05/2023    Hallux valgus with bunions of right foot 07/05/2023    Hallux valgus with bunions of left foot 07/05/2023    Hammer toes of both feet 07/05/2023    Macrocytic anemia 06/13/2023    ROSANA (generalized anxiety disorder) 09/20/2022    Neuropathy 09/20/2022    History of CVA (cerebrovascular accident) 09/01/2022    Tobacco abuse 09/01/2022    Essential hypertension 09/01/2022          Jagruti YATES  PM&R      I have spent a total time of 10 minutes on 04/07/24 in caring for this patient including Patient and family education, Documenting in the medical record, and Reviewing / ordering tests, medicine, procedures  .      ** Please Note:  voice to text software may have been used in the creation of this document. Although proof errors in transcription or interpretation are a potential of such software**

## 2024-04-07 NOTE — PLAN OF CARE
Problem: NEUROSENSORY - ADULT  Goal: Achieves maximal functionality and self care  Description: INTERVENTIONS  - Monitor swallowing and airway patency with patient fatigue and changes in neurological status  - Encourage and assist patient to increase activity and self care.   - Encourage visually impaired, hearing impaired and aphasic patients to use assistive/communication devices  Outcome: Progressing

## 2024-04-08 ENCOUNTER — APPOINTMENT (OUTPATIENT)
Dept: ULTRASOUND IMAGING | Facility: HOSPITAL | Age: 59
DRG: 058 | End: 2024-04-08
Payer: COMMERCIAL

## 2024-04-08 LAB
ALBUMIN SERPL BCP-MCNC: 3.3 G/DL (ref 3.5–5)
ALP SERPL-CCNC: 57 U/L (ref 34–104)
ALT SERPL W P-5'-P-CCNC: 144 U/L (ref 7–52)
ANION GAP SERPL CALCULATED.3IONS-SCNC: 7 MMOL/L (ref 4–13)
AST SERPL W P-5'-P-CCNC: 114 U/L (ref 13–39)
BASOPHILS # BLD AUTO: 0.01 THOUSANDS/ÂΜL (ref 0–0.1)
BASOPHILS NFR BLD AUTO: 0 % (ref 0–1)
BILIRUB SERPL-MCNC: 0.38 MG/DL (ref 0.2–1)
BUN SERPL-MCNC: 23 MG/DL (ref 5–25)
CALCIUM ALBUM COR SERPL-MCNC: 9.6 MG/DL (ref 8.3–10.1)
CALCIUM SERPL-MCNC: 9 MG/DL (ref 8.4–10.2)
CHLORIDE SERPL-SCNC: 99 MMOL/L (ref 96–108)
CO2 SERPL-SCNC: 28 MMOL/L (ref 21–32)
CREAT SERPL-MCNC: 0.61 MG/DL (ref 0.6–1.3)
EOSINOPHIL # BLD AUTO: 0.12 THOUSAND/ÂΜL (ref 0–0.61)
EOSINOPHIL NFR BLD AUTO: 2 % (ref 0–6)
ERYTHROCYTE [DISTWIDTH] IN BLOOD BY AUTOMATED COUNT: 12.3 % (ref 11.6–15.1)
GFR SERPL CREATININE-BSD FRML MDRD: 99 ML/MIN/1.73SQ M
GLUCOSE P FAST SERPL-MCNC: 93 MG/DL (ref 65–99)
GLUCOSE SERPL-MCNC: 93 MG/DL (ref 65–140)
HAV IGM SER QL: ABNORMAL
HBV CORE AB SER QL: ABNORMAL
HBV CORE IGM SER QL: ABNORMAL
HBV CORE IGM SER QL: ABNORMAL
HBV SURFACE AG SER QL: ABNORMAL
HBV SURFACE AG SER QL: ABNORMAL
HCT VFR BLD AUTO: 40 % (ref 34.8–46.1)
HCV AB SER QL: REACTIVE
HCV AB SER QL: REACTIVE
HGB BLD-MCNC: 13.1 G/DL (ref 11.5–15.4)
IMM GRANULOCYTES # BLD AUTO: 0.03 THOUSAND/UL (ref 0–0.2)
IMM GRANULOCYTES NFR BLD AUTO: 0 % (ref 0–2)
LIPASE SERPL-CCNC: 74 U/L (ref 11–82)
LYMPHOCYTES # BLD AUTO: 2.42 THOUSANDS/ÂΜL (ref 0.6–4.47)
LYMPHOCYTES NFR BLD AUTO: 35 % (ref 14–44)
MAGNESIUM SERPL-MCNC: 1.7 MG/DL (ref 1.9–2.7)
MCH RBC QN AUTO: 32.5 PG (ref 26.8–34.3)
MCHC RBC AUTO-ENTMCNC: 32.8 G/DL (ref 31.4–37.4)
MCV RBC AUTO: 99 FL (ref 82–98)
MONOCYTES # BLD AUTO: 0.83 THOUSAND/ÂΜL (ref 0.17–1.22)
MONOCYTES NFR BLD AUTO: 12 % (ref 4–12)
NEUTROPHILS # BLD AUTO: 3.59 THOUSANDS/ÂΜL (ref 1.85–7.62)
NEUTS SEG NFR BLD AUTO: 51 % (ref 43–75)
NRBC BLD AUTO-RTO: 0 /100 WBCS
PLATELET # BLD AUTO: 208 THOUSANDS/UL (ref 149–390)
PMV BLD AUTO: 11.6 FL (ref 8.9–12.7)
POTASSIUM SERPL-SCNC: 4.1 MMOL/L (ref 3.5–5.3)
PROT SERPL-MCNC: 7.3 G/DL (ref 6.4–8.4)
RBC # BLD AUTO: 4.03 MILLION/UL (ref 3.81–5.12)
SODIUM SERPL-SCNC: 134 MMOL/L (ref 135–147)
WBC # BLD AUTO: 7 THOUSAND/UL (ref 4.31–10.16)

## 2024-04-08 PROCEDURE — 87522 HEPATITIS C REVRS TRNSCRPJ: CPT | Performed by: INTERNAL MEDICINE

## 2024-04-08 PROCEDURE — 85025 COMPLETE CBC W/AUTO DIFF WBC: CPT | Performed by: NURSE PRACTITIONER

## 2024-04-08 PROCEDURE — 80053 COMPREHEN METABOLIC PANEL: CPT | Performed by: NURSE PRACTITIONER

## 2024-04-08 PROCEDURE — 83735 ASSAY OF MAGNESIUM: CPT | Performed by: NURSE PRACTITIONER

## 2024-04-08 PROCEDURE — 99232 SBSQ HOSP IP/OBS MODERATE 35: CPT | Performed by: INTERNAL MEDICINE

## 2024-04-08 PROCEDURE — 86704 HEP B CORE ANTIBODY TOTAL: CPT | Performed by: NURSE PRACTITIONER

## 2024-04-08 PROCEDURE — 87521 HEPATITIS C PROBE&RVRS TRNSC: CPT | Performed by: INTERNAL MEDICINE

## 2024-04-08 PROCEDURE — 83690 ASSAY OF LIPASE: CPT

## 2024-04-08 PROCEDURE — 97130 THER IVNTJ EA ADDL 15 MIN: CPT

## 2024-04-08 PROCEDURE — 76705 ECHO EXAM OF ABDOMEN: CPT

## 2024-04-08 PROCEDURE — 97535 SELF CARE MNGMENT TRAINING: CPT

## 2024-04-08 PROCEDURE — 80074 ACUTE HEPATITIS PANEL: CPT | Performed by: NURSE PRACTITIONER

## 2024-04-08 PROCEDURE — 99232 SBSQ HOSP IP/OBS MODERATE 35: CPT

## 2024-04-08 PROCEDURE — 97129 THER IVNTJ 1ST 15 MIN: CPT

## 2024-04-08 PROCEDURE — 86803 HEPATITIS C AB TEST: CPT | Performed by: NURSE PRACTITIONER

## 2024-04-08 PROCEDURE — 87902 NFCT AGT GNTYP ALYS HEP C: CPT | Performed by: INTERNAL MEDICINE

## 2024-04-08 PROCEDURE — 87340 HEPATITIS B SURFACE AG IA: CPT | Performed by: NURSE PRACTITIONER

## 2024-04-08 PROCEDURE — 86705 HEP B CORE ANTIBODY IGM: CPT | Performed by: NURSE PRACTITIONER

## 2024-04-08 PROCEDURE — 97530 THERAPEUTIC ACTIVITIES: CPT

## 2024-04-08 RX ORDER — LANOLIN ALCOHOL/MO/W.PET/CERES
800 CREAM (GRAM) TOPICAL
Status: DISCONTINUED | OUTPATIENT
Start: 2024-04-08 | End: 2024-04-16 | Stop reason: HOSPADM

## 2024-04-08 RX ORDER — OXYCODONE HYDROCHLORIDE 5 MG/1
5 TABLET ORAL EVERY 4 HOURS PRN
Status: DISCONTINUED | OUTPATIENT
Start: 2024-04-08 | End: 2024-04-09

## 2024-04-08 RX ORDER — ESCITALOPRAM OXALATE 5 MG/1
5 TABLET ORAL DAILY
Status: DISCONTINUED | OUTPATIENT
Start: 2024-04-09 | End: 2024-04-12

## 2024-04-08 RX ORDER — OXYCODONE HYDROCHLORIDE 5 MG/1
5 TABLET ORAL ONCE
Status: COMPLETED | OUTPATIENT
Start: 2024-04-08 | End: 2024-04-08

## 2024-04-08 RX ADMIN — Medication 800 MG: at 12:09

## 2024-04-08 RX ADMIN — HEPARIN SODIUM 5000 UNITS: 5000 INJECTION INTRAVENOUS; SUBCUTANEOUS at 07:28

## 2024-04-08 RX ADMIN — THIAMINE HCL TAB 100 MG 100 MG: 100 TAB at 08:23

## 2024-04-08 RX ADMIN — FAMOTIDINE 20 MG: 20 TABLET ORAL at 08:23

## 2024-04-08 RX ADMIN — LISINOPRIL 40 MG: 20 TABLET ORAL at 08:23

## 2024-04-08 RX ADMIN — GABAPENTIN 300 MG: 300 CAPSULE ORAL at 21:29

## 2024-04-08 RX ADMIN — MELATONIN TAB 3 MG 3 MG: 3 TAB at 21:30

## 2024-04-08 RX ADMIN — GABAPENTIN 100 MG: 100 CAPSULE ORAL at 14:05

## 2024-04-08 RX ADMIN — HYDROXYZINE HYDROCHLORIDE 25 MG: 25 TABLET, FILM COATED ORAL at 08:26

## 2024-04-08 RX ADMIN — OXYCODONE HYDROCHLORIDE 5 MG: 5 TABLET ORAL at 14:14

## 2024-04-08 RX ADMIN — FAMOTIDINE 20 MG: 20 TABLET ORAL at 21:29

## 2024-04-08 RX ADMIN — GABAPENTIN 100 MG: 100 CAPSULE ORAL at 07:23

## 2024-04-08 RX ADMIN — MULTIPLE VITAMINS W/ MINERALS TAB 1 TABLET: TAB ORAL at 08:23

## 2024-04-08 RX ADMIN — Medication 800 MG: at 16:53

## 2024-04-08 RX ADMIN — Medication 800 MG: at 08:23

## 2024-04-08 RX ADMIN — CHLORHEXIDINE GLUCONATE 0.12% ORAL RINSE 15 ML: 1.2 LIQUID ORAL at 08:23

## 2024-04-08 RX ADMIN — Medication 2000 UNITS: at 08:23

## 2024-04-08 RX ADMIN — Medication 2.5 MG: at 21:29

## 2024-04-08 RX ADMIN — AMLODIPINE BESYLATE 10 MG: 10 TABLET ORAL at 08:23

## 2024-04-08 RX ADMIN — ESCITALOPRAM OXALATE 10 MG: 10 TABLET ORAL at 08:23

## 2024-04-08 RX ADMIN — HYDROCHLOROTHIAZIDE 12.5 MG: 12.5 TABLET ORAL at 08:23

## 2024-04-08 RX ADMIN — HYDROXYZINE HYDROCHLORIDE 25 MG: 25 TABLET, FILM COATED ORAL at 14:14

## 2024-04-08 RX ADMIN — DICYCLOMINE HYDROCHLORIDE 10 MG: 10 CAPSULE ORAL at 07:23

## 2024-04-08 RX ADMIN — FOLIC ACID 1 MG: 1 TABLET ORAL at 08:23

## 2024-04-08 RX ADMIN — LORATADINE 10 MG: 10 TABLET ORAL at 08:23

## 2024-04-08 RX ADMIN — CHLORHEXIDINE GLUCONATE 0.12% ORAL RINSE 15 ML: 1.2 LIQUID ORAL at 21:29

## 2024-04-08 NOTE — ASSESSMENT & PLAN NOTE
Mg currently 1.7 from 1.6.  Given 2g IV magnesium sulfate x1 on 4/5.  Currently on magnesium oxide 800mg BID on 4/5.  Increase to 800mg TID today.  Repeat Mg tomorrow and consider further IV magnesium supplementation if no improvement.

## 2024-04-08 NOTE — QUICK NOTE
"I got a tiger connect message from the nursing staff regarding pt who is complaining of a sharp pain in her right breast area with associated nausea and abdominal tenderness in the \"mid abdomen\".   2 nights ago, I also got a message that she complained of pain in her entire lower abdomen that radiates to her back.  Pt was given Tums and I have ordered Famotidine for her and counseled that she be told to sleep with the head of her bed elevated and to obtain and EKG and a troponin if pain continues and to reach out to have a physician or AP examine her if pain continues.   "

## 2024-04-08 NOTE — PROGRESS NOTES
[facility]  Progress Note  Name: Tanika Lira I  MRN: [id number]  Unit/Bed#: [facility] 266-01 I Date of Admission: 4/3/2024   Date of Service: 4/8/2024 I Hospital Day: 5    Assessment/Plan   Abdominal discomfort  Assessment & Plan  Complaints of abdominal pain over the weekend.  EKG obtained and was unremarkable.  Started on Pepcid 20mg BID.  RUQ US ordered due to worsening transaminitis.    Simple adnexal cyst greater than 1 cm in diameter in postmenopausal patient  Assessment & Plan  Incidental finding on CT A/P: simple appearing right adnexal cyst measuring 3.7 cm.  Follow-up pelvic US in 6-12 months.  Follow-up with PCP or OB/GYN as outpatient.    Prolonged QT interval  Assessment & Plan  Prolonged QTc of 534 on EKG from admission to acute setting.  EKG on admission to [facility] showed QTc of 472.  Currently on Lexapro.  Avoid QTc prolonging medications as able.  Repeat EKG as needed.    Transaminitis  Assessment & Plan  Worsening.   (88),  (107), and alk phos stable.  CT abd/pelvis showed liver cirrhosis with mild mesenteric edema, subtle liver surface nodularity with mild hypertrophy of the lateral L paddock lobe, and widening of the fissure for the falciform ligament.  Cholelithiasis including a 3mm gallstone within the gallbladder neck vs cystic duct.  Complaints of abdominal pain over the weekend - obtain RUQ US to r/o acute cholecystitis.  Hepatitis C antibody + on 4/5.  Acute/chronic hepatitis panels and hepatitis C genotype pending.  Consider GI consult pending results of labs/US.  Continue to trend routine CMP.    Lipitor on hold.  Lexapro decreased to 5mg daily.  Avoid hepatotoxic medications as able.    Hypomagnesemia  Assessment & Plan  Mg currently 1.7 from 1.6.  Given 2g IV magnesium sulfate x1 on 4/5.  Currently on magnesium oxide 800mg BID on 4/5.  Increase to 800mg TID today.  Repeat Mg tomorrow and consider further IV magnesium supplementation if no  improvement.    Amphetamine abuse (HCC)  Assessment & Plan  Had been using prior to admission.  Encourage cessation.  Would benefit from HOST referral.    Insomnia  Assessment & Plan  Continue melatonin 3mg at HS.    Hyponatremia  Assessment & Plan  Mild.  Na+ currently 134.  Asymptomatic.  Continue to trend with routine labs.    Alcohol use  Assessment & Plan  Hx of alcohol abuse.  Continue thiamine and folic acid   Encourage alcohol cessation.    Neuropathy  Assessment & Plan  Continue gabapentin 100mg in AM, 100mg at lunch, and 300mg at HS.    ROSANA (generalized anxiety disorder)  Assessment & Plan  Currently on Lexapro 10mg daily and Atarax 25mg Q8 PRN.  Supportive counseling as needed.  Consider Neuropsych consult.    Decreased Lexapro to 5mg daily on 4/8 due to transaminitis.    Essential hypertension  Assessment & Plan  Had not been taking medications at home.  Current regimen: amlodipine 10mg daily, lisinopril 40mg daily, and HCTZ 12.5mg daily.  Maintain normotension.  Monitor BP with routine VS.    Tobacco abuse  Assessment & Plan  Smokes less than a half a pack/day.  Encourage smoking cessation.  Declined nicotine patch.    History of CVA (cerebrovascular accident)  Assessment & Plan  Hx of CVA in 9/2022   MRI from 9/2022 showed acute infarcts in the R frontal centrum semiovale and R posterior putamen, small subacute infarct in the L paramedian andreea, and chronic lacunar infarcts in the R corona radiata, bilateral basal ganglia, and bilateral thalami.  Had been treated with DAPT originally but has been non-compliant with medications.  Restart Plavix pending stability of repeat CTH on 4/11.  Continue Lipitor 40mg daily.    * Cerebellar hemorrhage (HCC)  Assessment & Plan  Presented to acute setting with acute nausea/vomiting/dizziness.  CTH - Acute parenchymal hematoma left paramedian cerebellum measures 2.6 x 1.6 cm with mass effect on the fourth ventricle. Intraventricular extension of hemorrhage noted with  blood in the fourth ventricle and left foramen of Luschka.  CTA head/neck - No left cerebellar vascular malformation to account for the patient's acute parenchymal hematoma. No cervical or intracranial large vessel occlusion, dissection or aneurysm. Mild bilateral cervical carotid bifurcation atherosclerotic disease.  Most recent CTH on 3/28 showed stability.    Maintain normotension.  Neurovascular checks Q shift.  Hold AC/AP at this time.    Repeat CTH on  - resume Plavix if stable.  Follow-up with Neurosurgery in 1-2 weeks with repeat CTH to assess stability/delayed hydrocephalus.  If stable - management will be deferred to Neurology.    Primary team following.  PT/OT/ST.         VTE Pharmacologic Prophylaxis:   Pharmacologic:  none - walking >300ft  Mechanical VTE Prophylaxis in Place: Yes - sequential compression devices.    Current Length of Stay: 5 day(s)    Current Patient Status: Inpatient Rehab     Discharge Plan: As per primary team.    Code Status: Level 1 - Full Code    Subjective:   Pt examined while pt sitting in recliner in pt room.  Complaints of RUQ and mid epigastric pain that started over the weekend.  Constant, stabbing, radiates to the back, and tender to touch.  Does not seem to become worse after eating.  Denies any nausea or vomiting.  Had a  BM today that was normal.  Denies any urinary complaints.  Denies any fevers or chills.  Has hx of gallstones - RUQ US to be obtained today.  Denies any lightheadedness, dizziness, SOB, palpitations, or CP.  Is having discomfort from the heparin injections - stopped at this time since pt is ambulating more.    Objective:     Vitals:   Temp (24hrs), Av.7 °F (37.1 °C), Min:98.1 °F (36.7 °C), Max:99.8 °F (37.7 °C)    Temp:  [98.1 °F (36.7 °C)-99.8 °F (37.7 °C)] 98.2 °F (36.8 °C)  HR:  [60-66] 62  Resp:  [18-20] 18  BP: (115-140)/(71-81) 115/75  SpO2:  [92 %-97 %] 92 %  Body mass index is 21.03 kg/m².     Review of Systems   Constitutional:   Positive for appetite change. Negative for chills, fatigue and fever.   HENT:  Negative for trouble swallowing.    Eyes:  Negative for visual disturbance.   Respiratory:  Negative for cough, shortness of breath, wheezing and stridor.    Cardiovascular:  Negative for chest pain, palpitations and leg swelling.   Gastrointestinal:  Positive for abdominal pain (RUQ and mid epigastric pain, started over the weekend, constant, radiates to the back, tender to touch). Negative for abdominal distention, constipation, diarrhea, nausea and vomiting.        LBM 4/8   Genitourinary:  Negative for decreased urine volume, difficulty urinating, dysuria, frequency, hematuria and urgency.   Musculoskeletal:  Positive for back pain (radiating from the abdomen) and gait problem (imbalanced). Negative for arthralgias.   Neurological:  Positive for weakness. Negative for dizziness, light-headedness, numbness and headaches.   Psychiatric/Behavioral:  Negative for dysphoric mood and sleep disturbance. The patient is not nervous/anxious.    All other systems reviewed and are negative.       Input and Output Summary (last 24 hours):       Intake/Output Summary (Last 24 hours) at 4/8/2024 0937  Last data filed at 4/8/2024 0801  Gross per 24 hour   Intake 780 ml   Output --   Net 780 ml       Physical Exam:     Physical Exam  Vitals and nursing note reviewed.   Constitutional:       General: She is not in acute distress.     Appearance: Normal appearance. She is not ill-appearing.   HENT:      Head: Normocephalic and atraumatic.   Cardiovascular:      Rate and Rhythm: Normal rate and regular rhythm.      Pulses: Normal pulses.      Heart sounds: Normal heart sounds. No murmur heard.     No friction rub.   Pulmonary:      Effort: Pulmonary effort is normal. No respiratory distress.      Breath sounds: Normal breath sounds. No wheezing or rhonchi.   Abdominal:      General: Abdomen is flat. Bowel sounds are normal. There is no distension.       Palpations: Abdomen is soft. There is no mass.      Tenderness: There is abdominal tenderness in the right upper quadrant and epigastric area. There is no guarding or rebound.      Hernia: No hernia is present.   Musculoskeletal:      Cervical back: Normal range of motion and neck supple. No tenderness.      Right lower leg: No edema.      Left lower leg: No edema.   Skin:     General: Skin is warm and dry.      Capillary Refill: Capillary refill takes less than 2 seconds.   Neurological:      Mental Status: She is alert and oriented to person, place, and time.   Psychiatric:         Mood and Affect: Mood normal.         Behavior: Behavior normal.         Additional Data:     Labs:    Results from last 7 days   Lab Units 04/08/24  0531   WBC Thousand/uL 7.00   HEMOGLOBIN g/dL 13.1   HEMATOCRIT % 40.0   PLATELETS Thousands/uL 208   NEUTROS PCT % 51   LYMPHS PCT % 35   MONOS PCT % 12   EOS PCT % 2     Results from last 7 days   Lab Units 04/08/24  0531   SODIUM mmol/L 134*   POTASSIUM mmol/L 4.1   CHLORIDE mmol/L 99   CO2 mmol/L 28   BUN mg/dL 23   CREATININE mg/dL 0.61   ANION GAP mmol/L 7   CALCIUM mg/dL 9.0   ALBUMIN g/dL 3.3*   TOTAL BILIRUBIN mg/dL 0.38   ALK PHOS U/L 57   ALT U/L 144*   AST U/L 114*   GLUCOSE RANDOM mg/dL 93                       Labs reviewed    Imaging:    Imaging reviewed    Recent Cultures (last 7 days):           Last 24 Hours Medication List:   Current Facility-Administered Medications   Medication Dose Route Frequency Provider Last Rate    acetaminophen  650 mg Oral Q6H PRN Matias Jennings MD      amLODIPine  10 mg Oral Daily Matias Jennings MD      bisacodyl  10 mg Rectal Daily PRN Matias Jennings MD      calcium carbonate  500 mg Oral Daily PRN Ruth Ann Earl,       chlorhexidine  15 mL Mouth/Throat Q12H [facility] Matias Jennings MD      Cholecalciferol  2,000 Units Oral Daily MILAN Suresh      dicyclomine  10 mg Oral 4x Daily (AC & HS) MILAN Danielson       [START ON 4/9/2024] escitalopram  5 mg Oral Daily MILAN Suresh      famotidine  20 mg Oral BID Ruth Ann Earl DO      folic acid  1 mg Oral Daily Matias Jennings MD      gabapentin  100 mg Oral BID Matias Jennings MD      gabapentin  300 mg Oral HS Matias Jennings MD      hydrALAZINE  10 mg Oral Q6H PRN Matias Jennings MD      hydroCHLOROthiazide  12.5 mg Oral Daily Matias Jennings MD      hydrOXYzine HCL  25 mg Oral TID PRN Matias Jennings MD      lisinopril  40 mg Oral Daily Matias Jennings MD      loratadine  10 mg Oral Daily Matias Jennings MD      magnesium Oxide  800 mg Oral TID With Meals MILAN Suresh      melatonin  3 mg Oral HS Matias Jennings MD      multivitamin-minerals  1 tablet Oral Daily Matias Jennings MD      polyethylene glycol  17 g Oral Daily PRN Matias Jennings MD      thiamine  100 mg Oral Daily Matias Jennings MD          M*Modal software was used to dictate this note.  It may contain errors with dictating incorrect words or incorrect spelling. Please contact the provider directly with any questions.

## 2024-04-08 NOTE — PROGRESS NOTES
04/08/24 0830   Pain Assessment   Pain Assessment Tool 0-10   Pain Score 4   Pain Location/Orientation Orientation: Right;Location: Abdomen  (below R breast. LUIS FERNANDO Conner aware reports pt to have ultrasound this PM)   Pain Onset/Description Onset: Ongoing   Patient's Stated Pain Goal No pain   Hospital Pain Intervention(s) Rest;Repositioned   Multiple Pain Sites No   Restrictions/Precautions   Precautions Bed/chair alarms;Fall Risk;Supervision on toilet/commode   Weight Bearing Restrictions No   ROM Restrictions No   Lying to Sitting on Side of Bed   Type of Assistance Needed Supervision   Comment HOB flat   Lying to Sitting on Side of Bed CARE Score 4   Sit to Stand   Type of Assistance Needed Incidental touching;Adaptive equipment   Comment CGA with RW   Sit to Stand CARE Score 4   Bed-Chair Transfer   Type of Assistance Needed Physical assistance;Adaptive equipment   Physical Assistance Level 25% or less   Comment Meaghan/CGA with RW   Chair/Bed-to-Chair Transfer CARE Score 3   Toileting Hygiene   Type of Assistance Needed Incidental touching   Comment pt had med BM. Pt completed hygiene seated. CGA in stance for CM   Toileting Hygiene CARE Score 4   Toilet Transfer   Type of Assistance Needed Physical assistance;Adaptive equipment   Physical Assistance Level 25% or less   Comment Meaghan/CGA SPT to regular toilet with RW   Toilet Transfer CARE Score 3   Meal Prep   Meal Preparation complete next session with use of folding walker tray   Health Management   Health Management Level of Assistance Minimum assistance;Close supervision   Health Management Pt reporting PTA she was not compliant with med mngmnt. Reports she would place pills in pill organizer and sons would double check.  With reference sheet that was provided by SLP. At first pt only wanitng to use single slot pill organizer, but therapist educ pt that she has 3-4x/day pills and can benefit from use of 3x/day pill organizer and pt agreed. Pt currently on 14  meds. Pt currently on bentyl 3x/day and agreeable to place early AM at bedside.  Pt also did make mistakes on gabapenting 100mg as she thought it was the 300mg and place it in bedtime slot, but she had to place in AM/PM, claritin only palced AM when freq is AM/PM as well as magnesium oxide only placed in AM freq is AM/PM. At this time pt correctly ablet o identify 11/14 meds. Can benefit from further practice and updating of med reference sheet   Cognition   Overall Cognitive Status Impaired   Arousal/Participation Alert;Cooperative   Attention Within functional limits   Orientation Level Oriented X4   Memory Decreased recall of precautions   Additional Activities   Additional Activities Reassessment performed   Additional Activities Comments completed  strength and box and blocks, see below for details   Activity Tolerance   Activity Tolerance Patient tolerated treatment well   Assessment   Treatment Assessment Engaged pt in 90mins of skilled OT services with focus toileting, fx mobility, transfers, UE assessments. Pt overall fx at CGA/Meaghan. Pt can benefit from SUP at DC for med mngmnt at this time. Pt does reports she plans to cook at home, educ/recommend use of tray which she is agreeable to use, can benefit from meal prep next session. Pt as well demosntrates dec LUE FMC as evidenced by mod/max dropping of beads during med task. Cont OT POC with focus on activity tolerance, balance, UE FMC, transfers, meal prep, laundry, finances to inc fx performance, indep and safety.   Prognosis Good   Problem List Decreased strength;Decreased endurance;Impaired balance;Decreased mobility;Decreased coordination;Decreased cognition;Impaired judgement;Decreased safety awareness   Barriers to Discharge Inaccessible home environment;Decreased caregiver support   Barriers to Discharge Comments sons work during the day   Discharge Recommendation   Rehab Resource Intensity Level, OT   (pending progress)   Equipment Recommended    (3x/day pill orgnizer and folding walker tray (handouts provided this session for both))   OT Therapy Minutes   OT Time In 0830   OT Time Out 1000   OT Total Time (minutes) 90   OT Mode of treatment - Individual (minutes) 90   OT Mode of treatment - Concurrent (minutes) 0   OT Mode of treatment - Group (minutes) 0   OT Mode of treatment - Co-treat (minutes) 0   OT Mode of Treatment - Total time(minutes) 90 minutes   OT Cumulative Minutes 390   Therapy Time missed   Time missed? No        STRENGTH 4/8/24  Right: 50#, 40#, 40# avg 43#  Left: 40#, 37#, 40# avg 39#    Box and Block Test 4/8/24  The Box and Block Test (BBT) is a measure of manual dexterity that requires repeatedly moving 1-inch blocks from one side of a box to another in 60 seconds. It is commonly used in the stroke population to determine manual dexterity. It measures unilateral function, not bilateral function.    Box & block administered with the following results: R= 40 blocks, L= 26 blocks (average for age R= 74 blocks, L= 73 blocks) demonstrating impairments B/L, L<R. Pt is right hand dominant.

## 2024-04-08 NOTE — ASSESSMENT & PLAN NOTE
Worsening.   (88),  (107), and alk phos stable.  CT abd/pelvis showed liver cirrhosis with mild mesenteric edema, subtle liver surface nodularity with mild hypertrophy of the lateral L paddock lobe, and widening of the fissure for the falciform ligament.  Cholelithiasis including a 3mm gallstone within the gallbladder neck vs cystic duct.  Complaints of abdominal pain over the weekend - obtain RUQ US to r/o acute cholecystitis.  Hepatitis C antibody + on 4/5.  Acute/chronic hepatitis panels and hepatitis C genotype pending.  Consider GI consult pending results of labs/US.  Continue to trend routine CMP.    Lipitor on hold.  Lexapro decreased to 5mg daily.  Avoid hepatotoxic medications as able.

## 2024-04-08 NOTE — ASSESSMENT & PLAN NOTE
Complaints of abdominal pain over the weekend.  EKG obtained and was unremarkable.  Started on Pepcid 20mg BID.  RUQ US ordered due to worsening transaminitis.

## 2024-04-08 NOTE — ASSESSMENT & PLAN NOTE
Currently on Lexapro 10mg daily and Atarax 25mg Q8 PRN.  Supportive counseling as needed.  Consider Neuropsych consult.    Decreased Lexapro to 5mg daily on 4/8 due to transaminitis.

## 2024-04-08 NOTE — PROGRESS NOTES
04/08/24 1030   Pain Assessment   Pain Assessment Tool 0-10   Pain Score No Pain   Restrictions/Precautions   Precautions Bed/chair alarms;Cognitive;Fall Risk;Supervision on toilet/commode   Comprehension   Comprehension (FIM) 5 - Understands basic directions and conversation   Expression   Expression (FIM) 6 - Expresses complex/abstract but requires device (trach valve, communication boards)   Social Interaction   Social Interaction (FIM) 6 - Interacts appropriately with others BUT requires extra  time   Problem Solving   Problem solving (FIM) 5 - Solves complex problems But requires cues from helper   Memory   Memory (FIM) 5 - Recalls/performs request 90% of time   Speech/Language/Cognition Assessmetn   Treatment Assessment Pt seen for skilled speech therapy session targeting cognitive linguistic communication skills. Pt alert and interactive- engaged in recall of OT session where review of meds was completed in continuation from Friday review. Pt recalled OT updating list and completed tangible sorting task. Pt recalled needing to get a 3x/day organizer which OT provided [employer] printout for. Pt to receive CT head this week prior to starting blood thinners therefore suspect list to be updated end of week for changes. Pt motivated to stay compliant with meds at home- discussed memory strategies of leaving in common place for her to see (e.g. in bathroom, kitchen counter, bedside table) and setting reminders like alarms on phone for recalling when to take. Family also too will be recommended to supervise initially to ensure compliance and taking from organizer. Pt receptive to their assistance as well. Pt completed stroke education over the weekend- SLP asking pt to recall 3 things she learned which pt was able to complete with 3/3 acc. Pt last completed functional money management task with adding two coin amounts with 27/30acc increasing with vebral cues to self check. Pt completed reverse of breaking down  total amounts into denominations- 14/15acc increasing with verbal cues. Plan to cont to target IADL tasks to maximize independence for at home. Pt overall is improving with skilled SLP services and will cont to benefit to maximize overall cognitive linguistic communication abilities at this time.   SLP Therapy Minutes   SLP Time In 1030   SLP Time Out 1100   SLP Total Time (minutes) 30   SLP Mode of treatment - Individual (minutes) 30   SLP Mode of treatment - Concurrent (minutes) 0   SLP Mode of treatment - Group (minutes) 0   SLP Mode of treatment - Co-treat (minutes) 0   SLP Mode of Treatment - Total time(minutes) 30 minutes   SLP Cumulative Minutes 210   Therapy Time missed   Time missed? No

## 2024-04-08 NOTE — NURSING NOTE
I went in to take pts vital signs a little after 2100.  Low grade temp 99.8 and she said  she had this stabbing pain at her right breast area.  Heart rate regular.  I asked her if she wanted tylenol or atarax and she said no.  I asked when it started and she said about 45 minutes ago.  I notified the doctor  I gave her a tums and the doctor ordered pepcid.  Will continue to monitor.

## 2024-04-09 LAB
ALBUMIN SERPL BCP-MCNC: 3.3 G/DL (ref 3.5–5)
ALP SERPL-CCNC: 58 U/L (ref 34–104)
ALT SERPL W P-5'-P-CCNC: 168 U/L (ref 7–52)
ANION GAP SERPL CALCULATED.3IONS-SCNC: 7 MMOL/L (ref 4–13)
AST SERPL W P-5'-P-CCNC: 130 U/L (ref 13–39)
BILIRUB SERPL-MCNC: 0.37 MG/DL (ref 0.2–1)
BUN SERPL-MCNC: 22 MG/DL (ref 5–25)
CALCIUM ALBUM COR SERPL-MCNC: 9.8 MG/DL (ref 8.3–10.1)
CALCIUM SERPL-MCNC: 9.2 MG/DL (ref 8.4–10.2)
CHLORIDE SERPL-SCNC: 98 MMOL/L (ref 96–108)
CO2 SERPL-SCNC: 30 MMOL/L (ref 21–32)
CREAT SERPL-MCNC: 0.71 MG/DL (ref 0.6–1.3)
GFR SERPL CREATININE-BSD FRML MDRD: 93 ML/MIN/1.73SQ M
GLUCOSE P FAST SERPL-MCNC: 101 MG/DL (ref 65–99)
GLUCOSE SERPL-MCNC: 101 MG/DL (ref 65–140)
MAGNESIUM SERPL-MCNC: 1.8 MG/DL (ref 1.9–2.7)
POTASSIUM SERPL-SCNC: 4.5 MMOL/L (ref 3.5–5.3)
PROT SERPL-MCNC: 7.4 G/DL (ref 6.4–8.4)
SODIUM SERPL-SCNC: 135 MMOL/L (ref 135–147)

## 2024-04-09 PROCEDURE — 97530 THERAPEUTIC ACTIVITIES: CPT

## 2024-04-09 PROCEDURE — 97110 THERAPEUTIC EXERCISES: CPT

## 2024-04-09 PROCEDURE — 80053 COMPREHEN METABOLIC PANEL: CPT | Performed by: NURSE PRACTITIONER

## 2024-04-09 PROCEDURE — 99254 IP/OBS CNSLTJ NEW/EST MOD 60: CPT | Performed by: SURGERY

## 2024-04-09 PROCEDURE — 97535 SELF CARE MNGMENT TRAINING: CPT

## 2024-04-09 PROCEDURE — 99232 SBSQ HOSP IP/OBS MODERATE 35: CPT

## 2024-04-09 PROCEDURE — 99232 SBSQ HOSP IP/OBS MODERATE 35: CPT | Performed by: INTERNAL MEDICINE

## 2024-04-09 PROCEDURE — 83735 ASSAY OF MAGNESIUM: CPT | Performed by: NURSE PRACTITIONER

## 2024-04-09 PROCEDURE — 97116 GAIT TRAINING THERAPY: CPT

## 2024-04-09 RX ORDER — PANTOPRAZOLE SODIUM 40 MG/1
40 TABLET, DELAYED RELEASE ORAL
Status: DISCONTINUED | OUTPATIENT
Start: 2024-04-09 | End: 2024-04-16 | Stop reason: HOSPADM

## 2024-04-09 RX ORDER — ACETAMINOPHEN 325 MG/1
500 TABLET ORAL EVERY 6 HOURS PRN
Status: DISCONTINUED | OUTPATIENT
Start: 2024-04-09 | End: 2024-04-16 | Stop reason: HOSPADM

## 2024-04-09 RX ORDER — METHOCARBAMOL 500 MG/1
500 TABLET, FILM COATED ORAL 3 TIMES DAILY PRN
Status: DISCONTINUED | OUTPATIENT
Start: 2024-04-09 | End: 2024-04-16 | Stop reason: HOSPADM

## 2024-04-09 RX ADMIN — CHLORHEXIDINE GLUCONATE 0.12% ORAL RINSE 15 ML: 1.2 LIQUID ORAL at 08:04

## 2024-04-09 RX ADMIN — Medication 800 MG: at 12:28

## 2024-04-09 RX ADMIN — HYDROCHLOROTHIAZIDE 12.5 MG: 12.5 TABLET ORAL at 08:04

## 2024-04-09 RX ADMIN — FAMOTIDINE 20 MG: 20 TABLET ORAL at 08:04

## 2024-04-09 RX ADMIN — MULTIPLE VITAMINS W/ MINERALS TAB 1 TABLET: TAB ORAL at 08:04

## 2024-04-09 RX ADMIN — Medication 2000 UNITS: at 08:04

## 2024-04-09 RX ADMIN — ESCITALOPRAM OXALATE 5 MG: 5 TABLET, FILM COATED ORAL at 08:04

## 2024-04-09 RX ADMIN — LISINOPRIL 40 MG: 20 TABLET ORAL at 08:04

## 2024-04-09 RX ADMIN — GABAPENTIN 300 MG: 300 CAPSULE ORAL at 21:18

## 2024-04-09 RX ADMIN — LORATADINE 10 MG: 10 TABLET ORAL at 08:04

## 2024-04-09 RX ADMIN — FOLIC ACID 1 MG: 1 TABLET ORAL at 08:04

## 2024-04-09 RX ADMIN — Medication 2.5 MG: at 10:11

## 2024-04-09 RX ADMIN — AMLODIPINE BESYLATE 10 MG: 10 TABLET ORAL at 08:04

## 2024-04-09 RX ADMIN — Medication 800 MG: at 08:04

## 2024-04-09 RX ADMIN — Medication 2.5 MG: at 06:00

## 2024-04-09 RX ADMIN — Medication 800 MG: at 16:55

## 2024-04-09 RX ADMIN — GABAPENTIN 100 MG: 100 CAPSULE ORAL at 06:00

## 2024-04-09 RX ADMIN — MELATONIN TAB 3 MG 3 MG: 3 TAB at 21:18

## 2024-04-09 RX ADMIN — CHLORHEXIDINE GLUCONATE 0.12% ORAL RINSE 15 ML: 1.2 LIQUID ORAL at 21:18

## 2024-04-09 RX ADMIN — GABAPENTIN 100 MG: 100 CAPSULE ORAL at 14:00

## 2024-04-09 RX ADMIN — PANTOPRAZOLE SODIUM 40 MG: 40 TABLET, DELAYED RELEASE ORAL at 16:55

## 2024-04-09 RX ADMIN — THIAMINE HCL TAB 100 MG 100 MG: 100 TAB at 08:04

## 2024-04-09 RX ADMIN — Medication 2.5 MG: at 16:56

## 2024-04-09 NOTE — PROGRESS NOTES
04/09/24 1005   Pain Assessment   Pain Assessment Tool 0-10   Pain Score 6   Pain Location/Orientation Location: Abdomen   Restrictions/Precautions   Precautions Bed/chair alarms;Fall Risk;Impulsive;Supervision on toilet/commode   Weight Bearing Restrictions No   ROM Restrictions No   Cognition   Overall Cognitive Status Impaired   Arousal/Participation Alert;Cooperative   Attention Within functional limits   Orientation Level Oriented X4   Memory Decreased recall of precautions   Following Commands Follows one step commands with increased time or repetition   Subjective   Subjective Pt ready for PT   Sit to Stand   Type of Assistance Needed Incidental touching   Physical Assistance Level No physical assistance   Comment CG RW   Sit to Stand CARE Score 4   Bed-Chair Transfer   Type of Assistance Needed Incidental touching   Physical Assistance Level No physical assistance   Comment CG RW   Chair/Bed-to-Chair Transfer CARE Score 4   Walk 10 Feet   Type of Assistance Needed Physical assistance   Physical Assistance Level 25% or less   Comment RW   Walk 10 Feet CARE Score 3   Walk 50 Feet with Two Turns   Type of Assistance Needed Physical assistance   Physical Assistance Level 25% or less   Comment RW   Walk 50 Feet with Two Turns CARE Score 3   Walk 150 Feet   Type of Assistance Needed Physical assistance   Physical Assistance Level 25% or less   Comment RW   Walk 150 Feet CARE Score 3   Ambulation   Primary Mode of Locomotion Prior to Admission Walk   Distance Walked (feet) 150 ft   Assist Device Roller Walker   Gait Pattern Ataxic;Inconsistant Katherine;Step to;Step through;Narrow PAUL;Decreased foot clearance   Limitations Noted In Balance;Coordination;Safety;Heel Strike   Provided Assistance with: Balance;Direction   Walk Assist Level Minimum Assist   Findings Meaghan this trial with one instance of lateral loss of balance to L with Meaghan to remain upright. Pt able to take standing breaks to manage ataxia during  gait.   Does the patient walk? 2. Yes   Assessment   Treatment Assessment Tanika engaged in 30 minute skilled PT session. Pt reporting increased abdominal pain at beginning of session, RN made aware and administered pain medication prior to session. Pt demonstrated decreased assist needed for gait however still needs up to min assist to maintain balance secondary to lateral loss of balance to L. Pt demonstrated improved awareness of ataxia and ability to correct with standing rest breaks. Continue to progress mobility as tolerated to maximize independence.   Problem List Decreased strength;Decreased endurance;Impaired balance;Decreased mobility;Decreased coordination;Impaired judgement;Decreased cognition;Decreased safety awareness   Barriers to Discharge Decreased caregiver support;Inaccessible home environment   Plan   Treatment/Interventions ADL retraining;Functional transfer training;LE strengthening/ROM;Elevations;Therapeutic exercise;Endurance training;Gait training;Patient/family training   Progress Progressing toward goals   PT Therapy Minutes   PT Time In 1005   PT Time Out 1035   PT Total Time (minutes) 30   PT Mode of treatment - Individual (minutes) 30   PT Mode of treatment - Concurrent (minutes) 0   PT Mode of treatment - Group (minutes) 0   PT Mode of treatment - Co-treat (minutes) 0   PT Mode of Treatment - Total time(minutes) 30 minutes   PT Cumulative Minutes 255   Therapy Time missed   Time missed? No

## 2024-04-09 NOTE — ASSESSMENT & PLAN NOTE
Previous Lexapro 10mg daily and Atarax 25mg Q8 PRN.  Supportive counseling as needed.  Consider Neuropsych consult.    Decreased Lexapro to 5mg daily on 4/8 due to transaminitis.

## 2024-04-09 NOTE — PROGRESS NOTES
04/09/24 0830   Pain Assessment   Pain Assessment Tool 0-10   Pain Score 5   Pain Location/Orientation Orientation: Right;Location: Abdomen  (below breast area)   Pain Onset/Description Onset: Ongoing   Patient's Stated Pain Goal No pain   Hospital Pain Intervention(s) Emotional support;Relaxation technique  (PLB techniques)   Restrictions/Precautions   Precautions Bed/chair alarms;Fall Risk;Impulsive;Supervision on toilet/commode   Weight Bearing Restrictions No   ROM Restrictions No   Oral Hygiene   Type of Assistance Needed Incidental touching   Comment CGA in stance for balance while in stance   Oral Hygiene CARE Score 4   Shower/Bathe Self   Type of Assistance Needed Incidental touching   Comment seated on shower chair. pt able tow rogers 10/10 parts. washed buttocks seated. reports she has shower chair at home   Shower/Bathe Self CARE Score 4   Bathing   Assessed Bath Style Shower   Anticipated D/C Bath Style Tub;Shower   Able to Gather/Transport No   Tub/Shower Transfer   Findings CGA for shower transfer. engaged in dry tub transfer with use of wall. pt side stepping in with LLE first. Req CGA/CS.   Upper Body Dressing   Type of Assistance Needed Set-up / clean-up   Comment seated   Upper Body Dressing CARE Score 5   Lower Body Dressing   Type of Assistance Needed Incidental touching   Comment seated able to don undergarmet/pants. CGA in stance for balance   Lower Body Dressing CARE Score 4   Putting On/Taking Off Footwear   Type of Assistance Needed Set-up / clean-up   Comment seated to don socks   Putting On/Taking Off Footwear CARE Score 5   Sit to Stand   Type of Assistance Needed Incidental touching;Adaptive equipment   Comment CGA with RW   Sit to Stand CARE Score 4   Bed-Chair Transfer   Type of Assistance Needed Physical assistance;Adaptive equipment   Physical Assistance Level 25% or less   Comment Meaghan/CGA with RW   Chair/Bed-to-Chair Transfer CARE Score 3   Toileting Hygiene   Type of Assistance  Needed Incidental touching   Comment pt had med BM this session. Pt completed hygiene seated, CGA in stance for CM   Toileting Hygiene CARE Score 4   Toilet Transfer   Type of Assistance Needed Physical assistance;Adaptive equipment   Physical Assistance Level 25% or less   Comment Meaghan/CGA SPT with RW to regular toilet   Toilet Transfer CARE Score 3   Therapeutic Excerise-Strength   UE Strength Yes   Right Upper Extremity- Strength   R Shoulder Flexion;Extension;External rotation;Internal rotation   R Elbow Elbow flexion;Elbow extension   R Position Seated   Equipment Dumbbell  (2#)   R Weight/Reps/Sets 2x15   RUE Strength Comment seated engaged pt in UE TE with 2# 2x15 to cont to inc fx strength.   Left Upper Extremity-Strength   LUE Strength Comment see above   Cognition   Overall Cognitive Status Impaired   Arousal/Participation Alert;Cooperative   Attention Within functional limits   Orientation Level Oriented X4   Memory Decreased recall of precautions   Following Commands Follows multistep commands with increased time or repetition   Activity Tolerance   Activity Tolerance Patient tolerated treatment well   Assessment   Treatment Assessment Engaged pt in 60mins of skilled OT services with focus on ADL retraining, tub transfer, UE TE. Pt overall fx at CGA/CS with RW cont to demonstrate dec standing balance and dec UE FMC. Pt is making steady gains. Pt reporting that son will be present during 1pm PT session. Cont OT POC with focus on meal prep, standing balance, UE FMC/NMR, fx mobility to inc fx performance and safety.   OT Family training done with: (S)  son to be present during FT PT 1pm   Prognosis Good   Problem List Decreased strength;Decreased endurance;Impaired balance;Decreased mobility;Decreased coordination;Decreased cognition;Impaired judgement;Decreased safety awareness   Barriers to Discharge Inaccessible home environment;Decreased caregiver support   Plan   Treatment/Interventions ADL  retraining;Functional transfer training;Therapeutic exercise;Endurance training;Patient/family training;Bed mobility;Compensatory technique education   Progress Progressing toward goals   Discharge Recommendation   Rehab Resource Intensity Level, OT   (pending progress)   Equipment Recommended Shower/Tub chair with back ($)   Additional Comments  pt reports having a shower chair at home   OT Therapy Minutes   OT Time In 0830   OT Time Out 0930   OT Total Time (minutes) 60   OT Mode of treatment - Individual (minutes) 60   OT Mode of treatment - Concurrent (minutes) 0   OT Mode of treatment - Group (minutes) 0   OT Mode of treatment - Co-treat (minutes) 0   OT Mode of Treatment - Total time(minutes) 60 minutes   OT Cumulative Minutes 450   Therapy Time missed   Time missed? No

## 2024-04-09 NOTE — ASSESSMENT & PLAN NOTE
Mg improved 1.8 this am, increased from 1.7,1.6.  Given 2g IV magnesium sulfate x1 on 4/5.  Increase Mg to 800mg TID as of 4/9.    No further diarrhea   Will continue to follow

## 2024-04-09 NOTE — PROGRESS NOTES
"[facility]  Progress Note  Name: Tanika Lira I  MRN: [id number]  Unit/Bed#: -01 I Date of Admission: 4/3/2024   Date of Service: 4/9/2024 I Hospital Day: 6    Assessment/Plan   * Cerebellar hemorrhage (HCC)  Assessment & Plan  Presented to acute setting with acute nausea/vomiting/dizziness.  CTH - Acute parenchymal hematoma left paramedian cerebellum measures 2.6 x 1.6 cm with mass effect on the fourth ventricle. Intraventricular extension of hemorrhage noted with blood in the fourth ventricle and left foramen of Luschka.  CTA head/neck - No left cerebellar vascular malformation to account for the patient's acute parenchymal hematoma. No cervical or intracranial large vessel occlusion, dissection or aneurysm. Mild bilateral cervical carotid bifurcation atherosclerotic disease.  Most recent CTH on 3/28 showed stability.    Maintain normotension.  Neurovascular checks Q shift.  Hold AC/AP at this time.    Repeat CTH on 4/11 - resume Plavix if stable.  Follow-up with Neurosurgery in 1-2 weeks with repeat CTH to assess stability/delayed hydrocephalus.  If stable - management will be deferred to Neurology.    Primary team following.  PT/OT/ST.    Transaminitis  Assessment & Plan  Worsening.   (114) (88),  (144) (107), and alk phos stable.  CT abd/pelvis showed liver cirrhosis with mild mesenteric edema, subtle liver surface nodularity with mild hypertrophy of the lateral L paddock lobe, and widening of the fissure for the falciform ligament.  Cholelithiasis including a 3mm gallstone within the gallbladder neck vs cystic duct.  Complaints of abdominal pain over the weekend  RUQ US 4/8: \"Cholelithiasis with borderline wall thickening. Correlate clinically to exclude early acute cholecystitis.  Slightly nodular liver contour as seen on recent CT, which may be associated with cirrhosis\"  Hepatitis C antibody + on 4/5.  Acute/chronic hepatitis panels and hepatitis C " genotype pending.  GI consulted, HIDA ordered for am. Pt may have meds in am, npo   General surgery consult.   Lipitor on hold.  Lexapro decreased to 5mg daily.  Avoid hepatotoxic medications as able.      Hypomagnesemia  Assessment & Plan  Mg improved 1.8 this am, increased from 1.7,1.6.  Given 2g IV magnesium sulfate x1 on 4/5.  Increase Mg to 800mg TID as of 4/9.    No further diarrhea   Will continue to follow     Insomnia  Assessment & Plan  Continue melatonin 3mg at HS.    Alcohol use  Assessment & Plan  Hx of alcohol abuse.  Continue thiamine and folic acid   Encourage alcohol cessation.    Neuropathy  Assessment & Plan  Continue gabapentin 100mg in AM, 100mg at lunch, and 300mg at HS.    ROSANA (generalized anxiety disorder)  Assessment & Plan  Previous Lexapro 10mg daily and Atarax 25mg Q8 PRN.  Supportive counseling as needed.  Consider Neuropsych consult.    Decreased Lexapro to 5mg daily on 4/8 due to transaminitis.    Essential hypertension  Assessment & Plan  Had not been taking medications at home.  Current regimen: amlodipine 10mg daily, lisinopril 40mg daily, and HCTZ 12.5mg daily.  Maintain normotension.  Monitor BP with routine VS.    Tobacco abuse  Assessment & Plan  Smokes less than a half a pack/day.  Encourage smoking cessation.  Declined nicotine patch.    History of CVA (cerebrovascular accident)  Assessment & Plan  Hx of CVA in 9/2022   MRI from 9/2022 showed acute infarcts in the R frontal centrum semiovale and R posterior putamen, small subacute infarct in the L paramedian andreea, and chronic lacunar infarcts in the R corona radiata, bilateral basal ganglia, and bilateral thalami.  Had been treated with DAPT originally but has been non-compliant with medications.  Restart Plavix pending stability of repeat CTH on 4/11.  Lipitor on hold due to elevated LFT              VTE Pharmacologic Prophylaxis:   Pharmacologic:  s/p cerebellar hemorrhage   Mechanical VTE Prophylaxis in Place:  Yes    Current Length of Stay: 6 day(s)    Current Patient Status: Inpatient Rehab     Discharge Plan: As per treatment team     Code Status: Level 1 - Full Code    Subjective:   Pt examined this am, sitting in chair. Pt reports RUQ pain mid morning, 5/10, no associated nausea or vomiting. Pt reports RUQ does not seem to coincide with meals. Pt initially had diarrhea but this has resolved. LFT elevated, trending up over the past few days. Lipitor d/c'd. U/S RUQ shows cholelithiasis. GI saw Pt today and ordered HIDA scan which will be done tomorrow am. Pt remains afebrile. Pt reports BM this am, formed. Pt reports she slept well. Progressing with therapy.      Objective:     Vitals:   Temp (24hrs), Av.9 °F (36.6 °C), Min:96.7 °F (35.9 °C), Max:98.7 °F (37.1 °C)    Temp:  [96.7 °F (35.9 °C)-98.7 °F (37.1 °C)] 96.7 °F (35.9 °C)  HR:  [52-66] 66  Resp:  [16-18] 18  BP: (113-125)/(63-82) 125/82  SpO2:  [92 %-94 %] 92 %  Body mass index is 21.03 kg/m².     Review of Systems   Constitutional:  Negative for appetite change, chills, diaphoresis and fever.   HENT:  Negative for congestion and sore throat.    Eyes:  Negative for pain and redness.   Respiratory:  Negative for cough, shortness of breath and wheezing.    Cardiovascular:  Negative for chest pain and leg swelling.   Gastrointestinal:  Positive for abdominal pain (RUQ). Negative for diarrhea, nausea and vomiting.   Genitourinary:  Negative for dysuria and frequency.   Musculoskeletal:  Positive for gait problem. Negative for arthralgias.   Skin:  Negative for rash.   Neurological:  Positive for weakness. Negative for dizziness and headaches.        Input and Output Summary (last 24 hours):       Intake/Output Summary (Last 24 hours) at 2024 1125  Last data filed at 2024 1726  Gross per 24 hour   Intake 220 ml   Output --   Net 220 ml       Physical Exam:     Physical Exam  Vitals reviewed.   Constitutional:       General: She is not in acute  distress.  HENT:      Head: Normocephalic.      Nose: Nose normal.   Eyes:      General:         Right eye: No discharge.         Left eye: No discharge.      Conjunctiva/sclera: Conjunctivae normal.   Cardiovascular:      Rate and Rhythm: Normal rate and regular rhythm.   Pulmonary:      Effort: Pulmonary effort is normal. No respiratory distress.      Breath sounds: Normal breath sounds. No wheezing or rales.   Abdominal:      General: Bowel sounds are normal. There is no distension.      Tenderness: There is abdominal tenderness (RUQ).   Musculoskeletal:         General: No swelling.      Cervical back: Normal range of motion.      Right lower leg: No edema.      Left lower leg: No edema.   Neurological:      Mental Status: She is alert.      Motor: Weakness (L sided) present.   Psychiatric:         Mood and Affect: Mood normal.         Additional Data:     Labs:    Results from last 7 days   Lab Units 04/08/24  0531   WBC Thousand/uL 7.00   HEMOGLOBIN g/dL 13.1   HEMATOCRIT % 40.0   PLATELETS Thousands/uL 208   SEGS PCT % 51   LYMPHO PCT % 35   MONO PCT % 12   EOS PCT % 2     Results from last 7 days   Lab Units 04/09/24  0555   SODIUM mmol/L 135   POTASSIUM mmol/L 4.5   CHLORIDE mmol/L 98   CO2 mmol/L 30   BUN mg/dL 22   CREATININE mg/dL 0.71   ANION GAP mmol/L 7   CALCIUM mg/dL 9.2   ALBUMIN g/dL 3.3*   TOTAL BILIRUBIN mg/dL 0.37   ALK PHOS U/L 58   ALT U/L 168*   AST U/L 130*   GLUCOSE RANDOM mg/dL 101                       Labs reviewed        Last 24 Hours Medication List:   Current Facility-Administered Medications   Medication Dose Route Frequency Provider Last Rate    acetaminophen  488 mg Oral Q6H PRN Matias Jennings MD      amLODIPine  10 mg Oral Daily Matias Jennings MD      bisacodyl  10 mg Rectal Daily PRN Matias Jennings MD      calcium carbonate  500 mg Oral Daily PRN Ruth Ann Earl DO      chlorhexidine  15 mL Mouth/Throat Q12H [facility] Matias Jennings MD      Cholecalciferol   2,000 Units Oral Daily MILAN Suresh      escitalopram  5 mg Oral Daily MILAN Suresh      folic acid  1 mg Oral Daily Matias Jennings MD      gabapentin  100 mg Oral BID Matias Jennings MD      gabapentin  300 mg Oral HS Matias Jennings MD      hydrALAZINE  10 mg Oral Q6H PRN Matias Jennings MD      hydroCHLOROthiazide  12.5 mg Oral Daily Matias Jennings MD      hydrOXYzine HCL  25 mg Oral TID PRN Matias Jennings MD      lisinopril  40 mg Oral Daily Matias Jennings MD      loratadine  10 mg Oral Daily Matias Jennings MD      magnesium Oxide  800 mg Oral TID With Meals MILAN Suresh      melatonin  3 mg Oral HS Matias Jennings MD      multivitamin-minerals  1 tablet Oral Daily Matias Jennings MD      oxyCODONE  5 mg Oral Q4H PRN Matias Jennings MD      oxyCODONE  2.5 mg Oral Q4H PRN Matias Jennings MD      pantoprazole  40 mg Oral BID EFFIE Salinas PA-C      polyethylene glycol  17 g Oral Daily PRN Matias Jennings MD      thiamine  100 mg Oral Daily Matias Jennings MD          M*Modal software was used to dictate this note.  It may contain errors with dictating incorrect words or incorrect spelling. Please contact the provider directly with any questions.

## 2024-04-09 NOTE — PROGRESS NOTES
"Physical Medicine and Rehabilitation Progress Note  Tanika Lira 58 y.o. female MRN: [id number]  Unit/Bed#: [facility] 266-01 Encounter: [id number]      Assessment & Plan:     Decline in ADLs and mobility: Functional assessment - improving         FIM  Care Score  Admit Score Recent Score    Total assist  1-100% or 2p    Tot     Max assist 2-51-75%    Sub     Mod assist 3- 26-50%  Par Bathing    Min assist 3- 25% or < Par LBD    CG assist 4  TA  To hygiene, bathing, LBD   Sup/Setup 4-5 Sup To hygiene, UBD UBD   Mod-I/Indep 6 MI      Transfers  Mod-total assist  Min-CGA assist     Ambulation   Significant assist  150 ft min assist     Stairs   Min assist       Goal: Mod indep for most ADLs and  for mobility  Major barriers: Imbalance, incoordination  Dispo: Home with [facility] 14 days from admission      Transaminitis  Assessment & Plan  Uptrending > repeat LFTs tomorrow along with CBC   AST 57>88>114>130  ALT 65>107>144>168  AP/Tbili wnl   GI consult   \"Chronically elevated liver enzymes, rising over past several days, hepatocellular pattern with history of alcohol use and Hepatitis C. Imaging suggestive of cirrhosis and gallstones. Synthetic liver function appear intact. No history of variceal bleeding, ascites, hepatic encephalopathy. Will check other liver serologies to rule out hereditary, autoimmune etiologies. DDx: DILI, gallbladder dz. Await HCV viral load. Check AFP, no hepatoma noted on imaging. Will need outpatient follow up. Avoid hepatotoxins including alcohol.  RUQ abdominal pain with nausea and cholelithiasis with possible early cholecystitis on imaging. Patient afebrile without leukocytosis. Recommend surgical evaluation and HIDA. Discussed with Dr. Jennings. \"  Gen Sx consult   \"Ultrasound shows cholelithiasis without cholecystitis.  Abdominal pain not related to p.o. intake which makes biliary colic less likely.  With recent cerebellar hemorrhage general anesthesia would be very risky.  Recommend low-fat " "diet  She may benefit from muscle relaxants as abdominal wall tenderness was not localized to the right upper quadrant.  Would minimize narcotics.\"  - Reduced oxy   - Discussed with Gi and Robaxin ok        RUQ US 4/8  1. Cholelithiasis with borderline wall thickening. Correlate clinically to exclude early acute cholecystitis.  2. Slightly nodular liver contour as seen on recent CT, which may be associated with cirrhosis. Correlate clinically.      Per IM   \"Hepatitis C antibody + on 4/5.  Acute/chronic hepatitis panels and hepatitis C genotype pending.\"  Lipitor on hold.  Lexapro decreased to 5mg daily.  Avoid hepatotoxic medications as able.\"    Hx of alcohol use - alcohol cessation counseling  IM consulted and co-management with their team during ARC course  Monitor LFTs intermittently during course and after discharge  Med adjustments when indicated     CT A/P 3/26  IMPRESSION:  1. Cholelithiasis, including a 3 mm gallstone within the gallbladder neck versus cystic duct. No findings of acute cholecystitis. If there is concern for acute cholecystitis, right upper quadrant ultrasound can be obtained.  2. Findings suggesting cirrhosis with mild mesenteric edema, but no ascites. Enlarged ana hepatic and peripancreatic lymph nodes measuring up to 2 cm in short axis, indeterminate, although could be reactive, given suspicion for cirrhosis.      * Cerebellar hemorrhage (HCC)  Assessment & Plan  Repeat CTH Thursday 4/9 - neuro exam stable  4/8 - neuro exam stable, function, improving, continue tx in ARC setting   4/4 - improving ataxia on exam; tolerating ARC setting appropriately     - CTA HN - No left cerebellar vascular malformation to account for the patient's acute parenchymal hematoma. No cervical or intracranial large vessel occlusion, dissection or aneurysm. Mild bilateral cervical carotid bifurcation atherosclerotic disease.  - CTH 3/26 - Acute parenchymal hematoma left paramedian cerebellum measures 2.6 " "x 1.6 cm with mass effect on the fourth ventricle. Intraventricular extension of hemorrhage noted with blood in the fourth ventricle and left foramen of Luschka.  - Follow-up CTH 3/27 -  Redemonstration of hyperdense hemorrhage in the left paramedian cerebellum, as described with mass effect and extension into the fourth ventricle and left foramen of Luschka, similar in appearance to the prior.    No hydrocephalus.  - Follow-up CTH 3/28 - No significant interval changes    - Residual impairments on admission to ARC: Incoordination, imbalance, ataxia, possible dysphagia (assess for other impairments during ARC course)     Secondary stroke prevention  - Antithrombotic: Full AC and antiplatelet on hold   - Per neuro, Repeat CTH on Thursday 4/11 - if improving hemorrhage, patient can resume plavix 75mg daily for stroke prevention; no need for aspirin in terms of stroke prevention   - Follow-up with Nsx in 1-2 weeks with repeat CTH \"for stability and assess for delayed hydrocephalus. If stable/improved will defer remaining management and follow up with neurology\"  - Statin  - Optimal management of blood pressure   -  on importance of abstinence from smoking alcohol and amphetamine  - Patient at increased risk for stroke particularly early in post-stroke period - monitor neuro exam closely with low threshold to repeat imaging  -  patient and if applicable caregiver on optimal stroke management  - Follow-up with neurology and PCP after d/c   - Continue acute comprehensive interdisciplinary inpatient rehabilitation to include intensive skilled therapies (PT, OT, ST) as outlined with oversight and management by rehabilitation physician as well as inpatient rehab level nursing, case management and weekly interdisciplinary team meetings.       History of CVA (cerebrovascular accident)  Assessment & Plan  Hx of CVA's - 2022 - MRI 9/2022 - Acute infarcts in right frontal centrum semiovale and right posterior " putamen. Small subacute infarct in left paramedian andreea.  Chronic lacunar infarcts in right corona radiata, bilateral basal ganglia, and bilateral thalami.  - Was to be on DAPT and statin but was not compliant    Secondary stroke prevention  - Antithrombotic: Full A/C and antiplatelet on hold for now   - Statin  - Optimal management of blood pressure   -  on importance of abstinence from smoking, alcohol, amphetamine  - Patient at increased risk for stroke particularly early in post-stroke period - monitor neuro exam closely with low threshold to repeat imaging  -  patient and if applicable caregiver on optimal stroke management  - Follow-up with neurology and PCP after d/c     Abdominal discomfort  Assessment & Plan  See management of transaminitis as outlined above   > AST/ALT uptrending   AP, Tbili, Lipase wnl   - Risk of cholecystitis and other causes   - Comgmt with IM, GI, and Gen Sx  who are consulted  - Monitor vitals, labs, exam closely    - Avoid excessive acetaminophen; hold NSAIDs with possible GI issues and recent CVA  - Oxy reduce to 2.5mg Q6H PRN > care with hx of polysubtance abuse > wean off ASAP  - Robaxin 500mg TID for spasms as Gen Sx feels may have spasmodic component - ok per Gen Sx          Essential hypertension  Assessment & Plan  Per neuro Goal SBP between 120 and 140 as much as possible  Internal medicine consulted and co-management with their service  Monitor vitals with and without activity; monitor for orthostasis  Monitor hemoglobin, electrolytes, kidney function, hydration status   Current meds: Amlodipine 10mg qday, HCTZ 12.5mg qday, lisinopril 40mg qday   PRN hydralazine       Amphetamine abuse (HCC)  Assessment & Plan  Was using several times per week   Drug cessation counseling and supportive counseling  Optimal mood/stress mgmt   Neuropsychology consult if available   CM assistance with HOST referral for addiction/substance abuse resources       Marijuana  use  Assessment & Plan  Gabapentin [phone]   Cessation counseling and supportive counseling  Optimal mood/stress mgmt   CM to assist with HOST referral     Tobacco abuse  Assessment & Plan  Nicotine patch declined by patient   Smoking cessation and supportive counseling  Optimal mood/stress mgmt       Alcohol use  Assessment & Plan  Thiamine  Gabapentin [phone]   Alcohol cessation counseling and supportive counseling  Optimal mood/stress mgmt   Psychology consult if available        ROSANA (generalized anxiety disorder)  Assessment & Plan  Mood Anxious at times    ROSANA with some acute anxiety/panic     Lexapro reduced to 5mg qday per IM with some transaminitis  Hydroxyzine 25mg TID PRN anxiety - finds this quite helpful   Gabapentin [phone] (also for hx of EtOH/amphetamine abuse)  Optimal sleep mgmt  Supportive counseling  Psychology consult while in ARC if available   Counseled on and continue to encourage deep breathing/relaxation/behavioral management techniques      Insomnia  Assessment & Plan  Improved some overnight   Significant at times  - Sleep log   - Melatonin 3mg HS  - Gabapentin 300mg HS - for anxiety, polysubstance hx as well   - PRN hydroxyzine   - Recommend appropriate sleep hygiene  - Patient counselled/will be counselled on this when appropriate     - Sleep only as much as necessary to feel rested and then get up or sit up in bed, maintain regular sleep schedule (same bedtime and wake time everyday), do not force sleep, avoid caffeinated beverages after lunch, avoid alcohol at home near bedtime, do not smoke particularly in evening, do not go to bed hungry, adjust bedroom environment so you are comfortable prior to settling down for sleep, deal with concerns or worries before bedtime. Make a list of things to work on for next day so anxiety is reduced at night, exercise regularly when cleared by physician      Simple adnexal cyst greater than 1 cm in diameter in postmenopausal  "patient  Assessment & Plan  Per IM  - \"incidental finding on CT A/P   - simple appearing right adnexal cyst measuring 3.7 cm  - according to current guidelines, pt needs follow up pelvic US in 6-12 months  - follow up with OBGYN at discharge \"       Prolonged QT interval  Assessment & Plan  IM consulted and with overall management at their discretion during ARC course  Per IM  Qtc PTA up to 534 > repeat EKG 4/4 - QT prolonged but shortened from prior to 472 (this was on lexapro 20mg and some hydroxyzine)   - Nevertheless will decrease lexapro and hydroxyzine dosing some and limit prolonged Qtc agents as much as possible  - Monitor EKG intermittently     History of noncompliance with medical treatment  Assessment & Plan  Was not taking meds at home appropriately and following up with OP providers needed after last CVA  - Drug/EtOH cessation counseling  - Patient education      Hypomagnesemia  Assessment & Plan  Mag 1.7 on 4/8 > 1.8 on 4/9   Mag ox per IM   IM consulted, monitoring, repletion, and overall management at their discretion during ARC course      At risk for venous thromboembolism (VTE)  Assessment & Plan  SCDs, ambulation, and heparin Sq (cleared by prior providers)       Hyponatremia  Assessment & Plan  Improved 4/9     Neuropathy  Assessment & Plan  Significant b/l below knees chronic   - Risk of alcoholic neuropathy  - Alcohol cessation counseling  - OP Neuro EMG/NCS follow-up  - Fall precautions, PT, OT  - On gabapentin         Other Medical Issues:  Monitor for     Follow-up providers and other issues to be followed up after discharge  PCP  Neurology  Neurosurgery  HOST program referral    CODE: Level 1: Full Code    Restrictions include:  Fall precautions    Objective:    Allergies per EMR  Diagnostic Studies: Reviewed, no new imaging  US right upper quadrant   Final Result by Michael Brooks MD (04/08 5392)      1. Cholelithiasis with borderline wall thickening. Correlate clinically to exclude early " acute cholecystitis.   2. Slightly nodular liver contour as seen on recent CT, which may be associated with cirrhosis. Correlate clinically.      Workstation performed: [id number]         NM Hepatobiliary w RX    (Results Pending)     See above as well    Laboratory: Labs reviewed  Current Facility-Administered Medications   Medication Dose Route Frequency Provider Last Rate    acetaminophen  488 mg Oral Q6H PRN Matias Jennings MD      amLODIPine  10 mg Oral Daily Matias Jennings MD      bisacodyl  10 mg Rectal Daily PRN Matias Jennings MD      calcium carbonate  500 mg Oral Daily PRN Ruth Ann Earl DO      chlorhexidine  15 mL Mouth/Throat Q12H NAOMI Matias Jennings MD      Cholecalciferol  2,000 Units Oral Daily MILAN Suresh      escitalopram  5 mg Oral Daily MILAN Suresh      folic acid  1 mg Oral Daily Matias Jennings MD      gabapentin  100 mg Oral BID Matias Jennings MD      gabapentin  300 mg Oral HS Matias Jennings MD      hydrALAZINE  10 mg Oral Q6H PRN Matias Jennings MD      hydroCHLOROthiazide  12.5 mg Oral Daily Matias Jennings MD      hydrOXYzine HCL  25 mg Oral TID PRN Matias Jennings MD      lisinopril  40 mg Oral Daily Matias Jennings MD      loratadine  10 mg Oral Daily Matias Jennings MD      magnesium Oxide  800 mg Oral TID With Meals MILAN Suresh      melatonin  3 mg Oral HS Matias Jennings MD      methocarbamol  500 mg Oral TID PRN Matias Jennings MD      multivitamin-minerals  1 tablet Oral Daily Matias Jennings MD      oxyCODONE  2.5 mg Oral Q6H PRN Matias Jennings MD      pantoprazole  40 mg Oral BID EFFIE Salinas PA-C      polyethylene glycol  17 g Oral Daily PRN Matias Jennings MD      thiamine  100 mg Oral Daily Matias Jennings MD           acetaminophen    bisacodyl    calcium carbonate    hydrALAZINE    hydrOXYzine HCL    methocarbamol    oxyCODONE    polyethylene  glycol      Drug regimen reviewed, all potential adverse effects identified and addressed:    Scheduled Meds:  Current Facility-Administered Medications   Medication Dose Route Frequency Provider Last Rate    acetaminophen  488 mg Oral Q6H PRN Matias Jennings MD      amLODIPine  10 mg Oral Daily Matias Jennings MD      bisacodyl  10 mg Rectal Daily PRN Matias Jennings MD      calcium carbonate  500 mg Oral Daily PRN Ruth Ann Earl DO      chlorhexidine  15 mL Mouth/Throat Q12H [facility] Matias Jennings MD      Cholecalciferol  2,000 Units Oral Daily MILAN Suresh      escitalopram  5 mg Oral Daily MILAN Suresh      folic acid  1 mg Oral Daily Matias Jennings MD      gabapentin  100 mg Oral BID Matias Jennings MD      gabapentin  300 mg Oral HS Matias Jennings MD      hydrALAZINE  10 mg Oral Q6H PRN Matias Jennings MD      hydroCHLOROthiazide  12.5 mg Oral Daily Matias Jennings MD      hydrOXYzine HCL  25 mg Oral TID PRN Matias Jennings MD      lisinopril  40 mg Oral Daily Matias Jennings MD      loratadine  10 mg Oral Daily Matias Jennings MD      magnesium Oxide  800 mg Oral TID With Meals MILAN Suresh      melatonin  3 mg Oral HS Matias Jennings MD      methocarbamol  500 mg Oral TID PRN Matias Jennings MD      multivitamin-minerals  1 tablet Oral Daily Matias Jennings MD      oxyCODONE  2.5 mg Oral Q6H PRN Matias Jennings MD      pantoprazole  40 mg Oral BID EFFEI Salinas PA-C      polyethylene glycol  17 g Oral Daily PRN Matias Jennings MD      thiamine  100 mg Oral Daily Matias Jennings MD         Chief Complaints:  Rehab follow-up     Subjective:     On eval, patient reports still having some right upper abdomen pain intermittently at times helps somewhat with pain medications.  She denies nausea or vomiting.  She did have a appropriately formed bowel movement earlier today.  She reports little bit of  lightheadedness and is still some imbalance with standing and walking but not worsening.  She denies significant headache or other new complaints.    ROS: A 10 point ROS was performed; negative except as noted above.       Physical Exam:  Vitals:    04/09/24 0802   BP: 125/82   Pulse: 66   Resp:    Temp:    SpO2:          GEN:  Sitting in NAD   HEENT/NECK: MMM  CARDIAC: Regular rate rhythm, no murmers, no rubs, no gallops  LUNGS:  clear to auscultation, no wheezes, rales, or rhonchi  ABDOMEN: Soft, some TTP RUQ, normoactive - stable  EXTREMITIES/SKIN:  no calf edema, no calf tenderness to palpation  NEURO:   MENTAL STATUS: awake, oriented to person, place, time, and situation, MENTAL STATUS:  Appropriate wakefulness and interaction , CN II-XII: grossly intact , and Strength/MMT:  Near 5/5 throughout; L>R ataxia improving more today   PSYCH:  Affect:  Euthymic     HPI:  58-year-old female with a past medical history of ischemic strokes in 2022, hypertension, generalized anxiety disorder, smoking tobacco, alcohol use, marijuana use, methamphetamine abuse, medical noncompliance who presented to [facility] on 3/26/2024 with dizziness, nausea, and headache after recent fall and vomiting also accompanied by some diarrhea.  Patient noted to be ataxic on exam.  CT showed acute parenchymal hematoma in the left paramedian cerebellum with mass effect on the fourth ventricle with intraventricular extension of the hemorrhage with blood in the fourth ventricle and the left foramen of Luschka as well as some chronic lacunar infarcts.  Patient transferred to [facility] for neurosurgery oversight and evaluation.  Patient placed on EVD watch but ultimately did not need this.  Patient tells me she was not compliant with her blood thinners although she did receive DDAVP for aspirin reversal.  Neurology was consulted and suspected intracerebral hemorrhage related to hypertension and medication noncompliance with  recent methamphetamine use.  Follow-up repeat CT head 7/3/2027 328 were stable.  Patient required multiple blood pressure medications and has blood pressure goal of less than 140 and ideally between 120 and 140 is much as possible.  Patient is to repeat CT head on 4/11 and per neurology if improving hemorrhage can resume Plavix without need for aspirin for secondary ischemic stroke prevention.  Neurosurgery would like to see patient in follow-up at about that same time also looking for stability and assess for delayed hydrocephalus.  Patient was evaluated by skilled therapies and was found to have significant decline in ADLs and ambulation and appears appropriate for admission to [facility] [place].     ** Please Note: Fluency Direct voice to text software may have been used in the creation of this document. **

## 2024-04-09 NOTE — CONSULTS
Consultation - General Surgery   Tanika Lira 58 y.o. female MRN: [id number]  Unit/Bed#: [facility] 266-01 Encounter: [id number]    Assessment/Plan     Assessment:  Abdominal pain, recent hemorrhagic stroke  Plan:  Ultrasound shows cholelithiasis without cholecystitis.  Abdominal pain not related to p.o. intake which makes biliary colic less likely.  With recent cerebellar hemorrhage general anesthesia would be very risky.  Recommend low-fat diet  She may benefit from muscle relaxants as abdominal wall tenderness was not localized to the right upper quadrant.  Would minimize narcotics.  History of Present Illness     HPI:  Tanika Lira is a 58 y.o. female who presents with intraparenchymal hemorrhage in the cerebellum on 3/26.  She was discharged to acute rehab on 4/3.  She developed some abdominal pain over the last few days, reports a sharp right upper quadrant pain.  The pain today is every few hours, no exacerbating or aggravating factors not associated with food.  No nausea or vomiting.  She had a CT scan with her initial presentation that showed cirrhosis with some mild mesenteric edema and gallstones without signs of cholecystitis.  A right upper quadrant ultrasound was performed yesterday that showed cholelithiasis with borderline gallbladder wall thickening, no pericholecystic fluid and negative Workman sign.  She has no leukocytosis, LFTs mildly increased at 130 and 168, alk phos is within normal limits and bilirubin is normal.    Inpatient consult to Acute Care Surgery  Consult performed by: Rosa Maria Dixon MD  Consult ordered by: Matias Jennings MD          Review of Systems   Gastrointestinal:  Positive for abdominal pain. Negative for constipation, diarrhea, nausea and vomiting.   Musculoskeletal:  Positive for back pain.   Neurological:  Positive for weakness.   All other systems reviewed and are negative.      Historical Information   Past Medical History:   Diagnosis Date    CVA (cerebral  "vascular accident) (HCC)     Diverticulitis     Diverticulitis of colon     Diverticulosis     Hypertension     Stroke (HCC)      Past Surgical History:   Procedure Laterality Date    CARDIAC ELECTROPHYSIOLOGY PROCEDURE N/A 2/8/2023    Procedure: Cardiac loop recorder implant;  Surgeon: Duke Abraham MD;  Location:  CARDIAC CATH LAB;  Service: Cardiology    COLON SIGMOID RESECTION       Social History   Social History     Substance and Sexual Activity   Alcohol Use Yes    Comment: \"twisted tea daily\"\"     Social History     Substance and Sexual Activity   Drug Use Yes    Types: Marijuana    Comment: medical card     E-Cigarette/Vaping    E-Cigarette Use Never User      E-Cigarette/Vaping Substances    Nicotine No     Flavoring No      Social History     Tobacco Use   Smoking Status Light Smoker    Types: Cigarettes   Smokeless Tobacco Never   Tobacco Comments    smokes 4 cigarettes daily     Family History: non-contributory    Meds/Allergies   all current active meds have been reviewed  No Known Allergies    Objective   First Vitals:   Blood Pressure: 133/79 (04/03/24 1528)  Pulse: 65 (04/03/24 1528)  Temperature: 98.3 °F (36.8 °C) (04/03/24 1528)  Temp Source: Tympanic (04/03/24 1528)  Respirations: 20 (04/03/24 1528)  Height: 5' 6\" (167.6 cm) (04/03/24 1528)  Weight - Scale: 59.1 kg (130 lb 5 oz) (04/03/24 1528)  SpO2: 95 % (04/03/24 1528)    Current Vitals:   Blood Pressure: 125/82 (04/09/24 0802)  Pulse: 66 (04/09/24 0802)  Temperature: (!) 96.7 °F (35.9 °C) (04/09/24 0538)  Temp Source: Tympanic (04/09/24 0538)  Respirations: 18 (04/09/24 0538)  Height: 5' 6\" (167.6 cm) (04/03/24 1528)  Weight - Scale: 59.1 kg (130 lb 5 oz) (04/03/24 1528)  SpO2: 92 % (04/09/24 0538)      Intake/Output Summary (Last 24 hours) at 4/9/2024 1316  Last data filed at 4/8/2024 1726  Gross per 24 hour   Intake 220 ml   Output --   Net 220 ml       Invasive Devices       None                   Physical Exam  Vitals reviewed. " "  Constitutional:       General: She is not in acute distress.     Appearance: Normal appearance. She is normal weight.   HENT:      Head: Normocephalic and atraumatic.      Nose: Nose normal.      Mouth/Throat:      Mouth: Mucous membranes are moist.      Pharynx: Oropharynx is clear.   Eyes:      Extraocular Movements: Extraocular movements intact.      Conjunctiva/sclera: Conjunctivae normal.      Pupils: Pupils are equal, round, and reactive to light.   Cardiovascular:      Rate and Rhythm: Normal rate and regular rhythm.      Heart sounds: Normal heart sounds.   Pulmonary:      Effort: Pulmonary effort is normal. No respiratory distress.      Breath sounds: Normal breath sounds.   Abdominal:      General: Abdomen is flat. There is no distension.      Palpations: Abdomen is soft.      Tenderness: There is abdominal tenderness in the right upper quadrant and right lower quadrant. There is no guarding or rebound.   Musculoskeletal:         General: No swelling or tenderness. Normal range of motion.      Cervical back: Normal range of motion and neck supple.   Skin:     General: Skin is warm and dry.   Neurological:      General: No focal deficit present.      Mental Status: She is alert and oriented to person, place, and time.         Lab Results: I have personally reviewed pertinent lab results.  , CBC: No results found for: \"WBC\", \"HGB\", \"HCT\", \"MCV\", \"PLT\", \"ADJUSTEDWBC\", \"RBC\", \"MCH\", \"MCHC\", \"RDW\", \"MPV\", \"NRBC\", CMP:   Lab Results   Component Value Date    SODIUM 135 04/09/2024    K 4.5 04/09/2024    CL 98 04/09/2024    CO2 30 04/09/2024    BUN 22 04/09/2024    CREATININE 0.71 04/09/2024    CALCIUM 9.2 04/09/2024     (H) 04/09/2024     (H) 04/09/2024    ALKPHOS 58 04/09/2024    EGFR 93 04/09/2024     Imaging: I have personally reviewed pertinent reports.   and I have personally reviewed pertinent films in PACS  EKG, Pathology, and Other Studies: I have personally reviewed pertinent reports.  "

## 2024-04-09 NOTE — PLAN OF CARE
Problem: CARDIOVASCULAR - ADULT  Goal: Maintains optimal cardiac output and hemodynamic stability  Description: INTERVENTIONS:  - Monitor I/O, vital signs and rhythm  - Monitor for S/S and trends of decreased cardiac output  - Administer and titrate ordered vasoactive medications to optimize hemodynamic stability  - Assess quality of pulses, skin color and temperature  - Assess for signs of decreased coronary artery perfusion  - Instruct patient to report change in severity of symptoms  Outcome: Progressing     Problem: RESPIRATORY - ADULT  Goal: Achieves optimal ventilation and oxygenation  Description: INTERVENTIONS:  - Assess for changes in respiratory status  - Assess for changes in mentation and behavior  - Position to facilitate oxygenation and minimize respiratory effort  - Oxygen administered by appropriate delivery if ordered  - Initiate smoking cessation education as indicated  - Encourage broncho-pulmonary hygiene including cough, deep breathe, Incentive Spirometry  - Assess the need for suctioning and aspirate as needed  - Assess and instruct to report SOB or any respiratory difficulty  - Respiratory Therapy support as indicated  Outcome: Progressing

## 2024-04-09 NOTE — ASSESSMENT & PLAN NOTE
"Worsening.   (114) (88),  (144) (107), and alk phos stable.  CT abd/pelvis showed liver cirrhosis with mild mesenteric edema, subtle liver surface nodularity with mild hypertrophy of the lateral L paddock lobe, and widening of the fissure for the falciform ligament.  Cholelithiasis including a 3mm gallstone within the gallbladder neck vs cystic duct.  Complaints of abdominal pain over the weekend  RUQ US 4/8: \"Cholelithiasis with borderline wall thickening. Correlate clinically to exclude early acute cholecystitis.  Slightly nodular liver contour as seen on recent CT, which may be associated with cirrhosis\"  Hepatitis C antibody + on 4/5.  Acute/chronic hepatitis panels and hepatitis C genotype pending.  GI consulted, HIDA ordered for am. Pt may have meds in am, npo   General surgery consult.   Lipitor on hold.  Lexapro decreased to 5mg daily.  Avoid hepatotoxic medications as able.    "

## 2024-04-09 NOTE — PROGRESS NOTES
04/09/24 1300   Pain Assessment   Pain Assessment Tool 0-10   Pain Score 4   Pain Location/Orientation Location: Abdomen   Effect of Pain on Daily Activities reports pain is increasing slowly t/o session   Restrictions/Precautions   Precautions Bed/chair alarms;Fall Risk;Impulsive;Supervision on toilet/commode   Weight Bearing Restrictions No   ROM Restrictions No   Cognition   Overall Cognitive Status Impaired   Arousal/Participation Alert;Cooperative   Subjective   Subjective Pt agreeable to PT session despite c/o abdominal pain   Sit to Lying   Type of Assistance Needed Supervision   Sit to Lying CARE Score 4   Sit to Stand   Type of Assistance Needed Physical assistance   Physical Assistance Level 25% or less   Comment occ minAx1 for balance initially and to slowly lower into chair; otherwise CG   Sit to Stand CARE Score 3   Bed-Chair Transfer   Type of Assistance Needed Physical assistance   Physical Assistance Level 25% or less   Comment RW   Chair/Bed-to-Chair Transfer CARE Score 3   Walk 10 Feet   Type of Assistance Needed Physical assistance   Physical Assistance Level 25% or less   Comment RW   Walk 10 Feet CARE Score 3   Walk 50 Feet with Two Turns   Type of Assistance Needed Physical assistance   Physical Assistance Level 25% or less   Comment RW   Walk 50 Feet with Two Turns CARE Score 3   Walking 10 Feet on Uneven Surfaces   Type of Assistance Needed Physical assistance   Physical Assistance Level 26%-50%   Comment with RW across floor mat, min-modAx1 for balance   Walking 10 Feet on Uneven Surfaces CARE Score 3   Ambulation   Primary Mode of Locomotion Prior to Admission Walk   Distance Walked (feet) 115 ft  (x1, 15'x2, 20'x1)   Assist Device Roller Walker   Gait Pattern Ataxic;Inconsistant Katherine;Step to;Step through;Narrow PAUL;Decreased foot clearance   Limitations Noted In Balance;Coordination;Safety;Heel Strike   Provided Assistance with: Balance;Direction   Walk Assist Level Minimum Assist  "  Findings CG primarily wiht RW, occ minAx1 for balance   Does the patient walk? 2. Yes   Wheel 50 Feet with Two Turns   Reason if not Attempted Activity not applicable   Wheel 50 Feet with Two Turns CARE Score 9   Wheel 150 Feet   Reason if not Attempted Activity not applicable   Wheel 150 Feet CARE Score 9   Curb or Single Stair   Style negotiated Single stair   Type of Assistance Needed Physical assistance   Physical Assistance Level 25% or less   Comment CG with b/l HR, minAx1 for turning at top of steps for balance   1 Step (Curb) CARE Score 3   4 Steps   Type of Assistance Needed Physical assistance   Physical Assistance Level 25% or less   Comment CG with b/l HR, minAx1 for turning at top of steps for balance   4 Steps CARE Score 3   Stairs   Type Stairs   # of Steps 4   Weight Bearing Precautions Fall Risk   Assist Devices Bilateral Rail   Therapeutic Interventions   Strengthening 5xSTS 5 sets total, first 3 sets with hands on knees, last 2 sets with hands out in front of pt for added challenge, min-modAx1 for balance when performing, focusing on slow eccentric lowering into chair   Flexibility seated b/l manual HS/gastroc stretch 3x30 sec hold   Balance alt step taps with single UE support on RW to 2\" block 2x10   Assessment   Treatment Assessment Pt engaged in 60 min skilled PT session with focus on improving pt's balance, activity tolerance and strength. Start of session pt reports ongoing abdominal pain but agreeable to PT tx. Requesting to be wheeled down to PT gym. With therapeutic rest breaks tolerated all interventions well, continues with delayed righting reactions as well as balance impairments due to severe neuropathy, cont to recommend RW at this time for pt safety as balance improved with use of AD.  Pt cont to function well below her baseline as currently requires physical A for all mobilities with use of RW, cont with POC to improve pt's safety with mobilities, improve pt's balance, improve " pts ability to complete stairs safely so pt can access home upon d/c, improve pt's righting reactions as well as ability to turn head while mobilizing and maintaining balance all to reduce risk for falls and caregiver burden upon dc.   Family/Caregiver Present no   Problem List Decreased strength;Decreased endurance;Impaired balance;Decreased mobility;Decreased coordination;Impaired judgement;Decreased cognition;Decreased safety awareness   Barriers to Discharge Decreased caregiver support;Inaccessible home environment   PT Barriers   Functional Limitation Car transfers;Ramp negotiation;Standing;Stair negotiation;Walking;Transfers   Plan   Treatment/Interventions Functional transfer training;LE strengthening/ROM;Elevations;Therapeutic exercise;Endurance training;Bed mobility;Compensatory technique education;Patient/family training;Gait training   Progress Progressing toward goals   Discharge Recommendation   Equipment Recommended Walker   PT Therapy Minutes   PT Time In 1300   PT Time Out 1400   PT Total Time (minutes) 60   PT Mode of treatment - Individual (minutes) 60   PT Mode of treatment - Concurrent (minutes) 0   PT Mode of treatment - Group (minutes) 0   PT Mode of treatment - Co-treat (minutes) 0   PT Mode of Treatment - Total time(minutes) 60 minutes   PT Cumulative Minutes 315   Therapy Time missed   Time missed? No

## 2024-04-09 NOTE — ASSESSMENT & PLAN NOTE
Hx of CVA in 9/2022   MRI from 9/2022 showed acute infarcts in the R frontal centrum semiovale and R posterior putamen, small subacute infarct in the L paramedian andreea, and chronic lacunar infarcts in the R corona radiata, bilateral basal ganglia, and bilateral thalami.  Had been treated with DAPT originally but has been non-compliant with medications.  Restart Plavix pending stability of repeat CTH on 4/11.  Lipitor on hold due to elevated LFT

## 2024-04-09 NOTE — PROGRESS NOTES
04/09/24 1300   Pain Assessment   Pain Assessment Tool 0-10   Pain Score 4   Pain Location/Orientation Location: Abdomen   Effect of Pain on Daily Activities reports pain is increasing slowly t/o session   Restrictions/Precautions   Precautions Bed/chair alarms;Fall Risk;Impulsive;Supervision on toilet/commode   Weight Bearing Restrictions No   ROM Restrictions No   Cognition   Overall Cognitive Status Impaired   Arousal/Participation Alert;Cooperative   Subjective   Subjective Pt agreeable to PT session despite c/o abdominal pain   Sit to Lying   Type of Assistance Needed Supervision   Sit to Lying CARE Score 4   Sit to Stand   Type of Assistance Needed Physical assistance   Physical Assistance Level 25% or less   Comment occ minAx1 for balance initially and to slowly lower into chair; otherwise CG   Sit to Stand CARE Score 3   Bed-Chair Transfer   Type of Assistance Needed Physical assistance   Physical Assistance Level 25% or less   Comment RW   Chair/Bed-to-Chair Transfer CARE Score 3   Walk 10 Feet   Type of Assistance Needed Physical assistance   Physical Assistance Level 25% or less   Comment RW   Walk 10 Feet CARE Score 3   Walk 50 Feet with Two Turns   Type of Assistance Needed Physical assistance   Physical Assistance Level 25% or less   Comment RW   Walk 50 Feet with Two Turns CARE Score 3   Walking 10 Feet on Uneven Surfaces   Type of Assistance Needed Physical assistance   Physical Assistance Level 26%-50%   Comment with RW across floor mat, min-modAx1 for balance   Walking 10 Feet on Uneven Surfaces CARE Score 3   Ambulation   Primary Mode of Locomotion Prior to Admission Walk   Distance Walked (feet) 115 ft  (x1, 15'x2, 20'x1)   Assist Device Roller Walker   Gait Pattern Ataxic;Inconsistant Katherine;Step to;Step through;Narrow PAUL;Decreased foot clearance   Limitations Noted In Balance;Coordination;Safety;Heel Strike   Provided Assistance with: Balance;Direction   Walk Assist Level Minimum Assist  "  Findings CG primarily wiht RW, occ minAx1 for balance   Does the patient walk? 2. Yes   Wheel 50 Feet with Two Turns   Reason if not Attempted Activity not applicable   Wheel 50 Feet with Two Turns CARE Score 9   Wheel 150 Feet   Reason if not Attempted Activity not applicable   Wheel 150 Feet CARE Score 9   Curb or Single Stair   Style negotiated Single stair   Type of Assistance Needed Physical assistance   Physical Assistance Level 25% or less   Comment CG with b/l HR, minAx1 for turning at top of steps for balance   1 Step (Curb) CARE Score 3   4 Steps   Type of Assistance Needed Physical assistance   Physical Assistance Level 25% or less   Comment CG with b/l HR, minAx1 for turning at top of steps for balance   4 Steps CARE Score 3   Stairs   Type Stairs   # of Steps 4   Weight Bearing Precautions Fall Risk   Assist Devices Bilateral Rail   Picking Up Object   Type of Assistance Needed Physical assistance   Physical Assistance Level 25% or less   Comment minAx1 with reacher and RW to retrieve marker from floor   Picking Up Object CARE Score 3   Therapeutic Interventions   Strengthening 5xSTS 5 sets total, first 3 sets with hands on knees, last 2 sets with hands out in front of pt for added challenge, min-modAx1 for balance when performing, focusing on slow eccentric lowering into chair   Flexibility seated b/l manual HS/gastroc stretch 3x30 sec hold   Balance alt step taps with single UE support on RW to 2\" block 2x10   Assessment   Treatment Assessment Pt engaged in 60 min skilled PT session with focus on improving pt's balance, activity tolerance and strength. Start of session pt reports ongoing abdominal pain but agreeable to PT tx. Requesting to be wheeled down to PT gym. With therapeutic rest breaks tolerated all interventions well, continues with delayed righting reactions as well as balance impairments due to severe neuropathy, cont to recommend RW at this time for pt safety as balance improved with " use of AD.  Pt cont to function well below her baseline as currently requires physical A for all mobilities with use of RW, cont with POC to improve pt's safety with mobilities, improve pt's balance, improve pts ability to complete stairs safely so pt can access home upon d/c, improve pt's righting reactions as well as ability to turn head while mobilizing and maintaining balance all to reduce risk for falls and caregiver burden upon dc.   Family/Caregiver Present no   Problem List Decreased strength;Decreased endurance;Impaired balance;Decreased mobility;Decreased coordination;Impaired judgement;Decreased cognition;Decreased safety awareness   Barriers to Discharge Decreased caregiver support;Inaccessible home environment   PT Barriers   Functional Limitation Car transfers;Ramp negotiation;Standing;Stair negotiation;Walking;Transfers   Plan   Treatment/Interventions Functional transfer training;LE strengthening/ROM;Elevations;Therapeutic exercise;Endurance training;Bed mobility;Compensatory technique education;Patient/family training;Gait training   Progress Progressing toward goals   Discharge Recommendation   Equipment Recommended Walker   PT Therapy Minutes   PT Time In 1300   PT Time Out 1400   PT Total Time (minutes) 60   PT Mode of treatment - Individual (minutes) 60   PT Mode of treatment - Concurrent (minutes) 0   PT Mode of treatment - Group (minutes) 0   PT Mode of treatment - Co-treat (minutes) 0   PT Mode of Treatment - Total time(minutes) 60 minutes   PT Cumulative Minutes 315   Therapy Time missed   Time missed? No

## 2024-04-09 NOTE — PROGRESS NOTES
"   04/09/24 1500   Pain Assessment   Pain Assessment Tool 0-10   Pain Score No Pain   Restrictions/Precautions   Precautions Bed/chair alarms;Cognitive;Fall Risk;Supervision on toilet/commode   Comprehension   Comprehension (FIM) 5 - Needs help to insert/set-up hearing aids (auditory)   Expression   Expression (FIM) 6 - Expresses complex/abstract but requires device (trach valve, communication boards)   Social Interaction   Social Interaction (FIM) 6 - Interacts appropriately with others BUT requires extra  time   Problem Solving   Problem solving (FIM) 5 - Solves complex problems But requires cues from helper   Speech/Language/Cognition Assessmetn   Treatment Assessment Patient did not want to be disturbed when sleeping, then refused therapy as she was \"just waking up\", then refused d/t dinner schedule, refused tx. After dinner noting she had company and hadn't even seen the schedule for the day.     SLP Therapy Minutes   SLP Time In 1500   SLP Time Out 1500   SLP Total Time (minutes) 0   Therapy Time missed   Time missed? Yes   Amount of time missed 30   Reason for time missed Extreme fatigue  (did not want to be distured when sleeping)   Time(s) multiple attempts made x4       "

## 2024-04-09 NOTE — CONSULTS
"Patient MRN: [id number]  Date of Service: 4/9/2024  Referring Provider: Matias Jennings MD   Provider Creating Note: Melina Salinas PA-C  PCP: Tanika Nieto    Reason for Consult:   Transaminitis [R74.01]     HISTORY OF PRESENT ILLNESS:  Tanika Lira is a 58 y.o. female with PMH including HTN, ischemic CVAs 2022, generalized anxiety disorder, history of diverticulitis s/p sigmoid resection, tobacco use, new finding ob HCV Ab, alcohol use, marijuana/meth use and medical noncompliance. She presented to [facility] 3/26 with cerebella hemorrhage, transferred to [facility] for Neurosurgery care. Transferred to [facility] for rehab 4/3/2024.   GI evaluation requested for transaminitis and RUQ abdominal pain. CT imaging 3/26 suggesting cirrhosis, reactive hepatic/peripancreatic lymph nodes, no ascites. Cholelithiasis also noted. 4/8 RUQ US shows cholelithiasis with borderline wall thickening, correlate clinically to exclude early acute cholecystitis. Slightly nodular liver contour as seen on CT...may be associated with cirrhosis. No liver mass, ascites. She states she started having intermittent RUQ abdominal pain a few days ago. No prior similar history. Pain is currently 5 /10, some relief with pain meds. No aggravating factors. No fevers, chills. Had some diarrhea a few days ago, resolved. No jaundice. +history of IVDU \"years ago\" also has homemade tattoo on R shoulder blade. Labs show hepatocellular elevation of liver enzymes. Low albumin noted, but platelets, PT/INR normal more suggestive of intact synthetic liver function. HCV viral load, genotype pending.       Review of Systems:    General:   No fever or chills; No significant weight loss or gain.   EENT:   No ear pain, facial swelling; No sneezing, sore throat.   Skin:   No rashes, color changes.   Respiratory:     No shortness of breath, cough, wheezing, stridor.   Cardiovascular:     No chest pain, palpitations.   Gastrointestinal:    As per HPI. "   Musculoskeletal:     No arthralgias, myalgias, swelling.   Neurologic:   No dizziness, numbness, weakness. No speech difficulties.   Psych:   No agitation, suicidal ideations    Otherwise, All other twelve-point review of systems normal.     Past Medical History:   Diagnosis Date    CVA (cerebral vascular accident) (HCC)     Diverticulitis     Diverticulitis of colon     Diverticulosis     Hypertension     Stroke (HCC)      Past Surgical History:   Procedure Laterality Date    CARDIAC ELECTROPHYSIOLOGY PROCEDURE N/A 2/8/2023    Procedure: Cardiac loop recorder implant;  Surgeon: Duke Abraham MD;  Location: [facility];  Service: Cardiology    COLON SIGMOID RESECTION       No Known Allergies    Medications:  Home Medications  Prior to Admission medications    Medication Sig Start Date End Date Taking? Authorizing Provider   amLODIPine (NORVASC) 10 mg tablet Take 1 tablet (10 mg total) by mouth daily 4/3/24  Yes Clemente Mcwilliams MD   atorvastatin (LIPITOR) 40 mg tablet Take 1 tablet (40 mg total) by mouth daily 6/13/23  Yes MILAN Miller   escitalopram (Lexapro) 20 mg tablet Take 1 tablet (20 mg total) by mouth daily 10/19/22  Yes MILAN Miller   gabapentin (NEURONTIN) 300 mg capsule Take 1 capsule (300 mg total) by mouth daily at bedtime 6/13/23  Yes MILAN Miller   hydroCHLOROthiazide 12.5 mg tablet Take 1 tablet (12.5 mg total) by mouth daily 4/3/24  Yes Clemente Mcwilliams MD   lisinopril (ZESTRIL) 40 mg tablet Take 1 tablet (40 mg total) by mouth daily 4/3/24  Yes Clemente Mcwilliams MD   Blood Pressure Monitoring (Blood Pressure Cuff) MISC Use daily 4/3/24   Clemente Mcwilliams MD   hydrOXYzine HCL (ATARAX) 50 mg tablet Take 1 tablet (50 mg total) by mouth every 6 (six) hours as needed for itching  Patient not taking: Reported on 4/3/2024 6/13/23   MILAN Miller       Inhouse Medications    Current Facility-Administered Medications:     acetaminophen (TYLENOL) tablet 488 mg, 488 mg, Oral, Q6H PRN    amLODIPine  (NORVASC) tablet 10 mg, 10 mg, Oral, Daily, 10 mg at 04/09/24 0804    bisacodyl (DULCOLAX) rectal suppository 10 mg, 10 mg, Rectal, Daily PRN    calcium carbonate (TUMS) chewable tablet 500 mg, 500 mg, Oral, Daily PRN, 500 mg at 04/07/24 2219    chlorhexidine (PERIDEX) 0.12 % oral rinse 15 mL, 15 mL, Mouth/Throat, Q12H NAOMI, 15 mL at 04/09/24 0804    Cholecalciferol (VITAMIN D3) tablet 2,000 Units, 2,000 Units, Oral, Daily, 2,000 Units at 04/09/24 0804    escitalopram (LEXAPRO) tablet 5 mg, 5 mg, Oral, Daily, 5 mg at 04/09/24 0804    famotidine (PEPCID) tablet 20 mg, 20 mg, Oral, BID, 20 mg at 04/09/24 0804    folic acid (FOLVITE) tablet 1 mg, 1 mg, Oral, Daily, 1 mg at 04/09/24 0804    gabapentin (NEURONTIN) capsule 100 mg, 100 mg, Oral, BID, 100 mg at 04/09/24 0600    gabapentin (NEURONTIN) capsule 300 mg, 300 mg, Oral, HS, 300 mg at 04/08/24 2129    hydrALAZINE (APRESOLINE) tablet 10 mg, 10 mg, Oral, Q6H PRN, 10 mg at 04/06/24 0704    hydroCHLOROthiazide tablet 12.5 mg, 12.5 mg, Oral, Daily, 12.5 mg at 04/09/24 0804    hydrOXYzine HCL (ATARAX) tablet 25 mg, 25 mg, Oral, TID PRN, 25 mg at 04/08/24 1414    lisinopril (ZESTRIL) tablet 40 mg, 40 mg, Oral, Daily, 40 mg at 04/09/24 0804    loratadine (CLARITIN) tablet 10 mg, 10 mg, Oral, Daily, 10 mg at 04/09/24 0804    magnesium Oxide (MAG-OX) tablet 800 mg, 800 mg, Oral, TID With Meals, 800 mg at 04/09/24 0804    melatonin tablet 3 mg, 3 mg, Oral, HS, 3 mg at 04/08/24 2130    multivitamin-minerals (CENTRUM) tablet 1 tablet, 1 tablet, Oral, Daily, 1 tablet at 04/09/24 0804    oxyCODONE (ROXICODONE) IR tablet 5 mg, 5 mg, Oral, Q4H PRN    oxyCODONE (ROXICODONE) split tablet 2.5 mg, 2.5 mg, Oral, Q4H PRN, 2.5 mg at 04/09/24 0600    polyethylene glycol (MIRALAX) packet 17 g, 17 g, Oral, Daily PRN    thiamine tablet 100 mg, 100 mg, Oral, Daily, 100 mg at 04/09/24 0804      Social History   reports that she has been smoking cigarettes. She has never used smokeless tobacco.  "She reports current alcohol use. She reports current drug use. Drug: Marijuana.    Family History  Family History   Problem Relation Age of Onset    Coronary artery disease Mother     Heart disease Mother     Diabetes Father     No Known Problems Sister     Breast cancer Maternal Grandmother         age unknown    No Known Problems Maternal Grandfather     No Known Problems Paternal Grandmother     No Known Problems Paternal Grandfather     No Known Problems Paternal Aunt     No Known Problems Paternal Aunt     No Known Problems Maternal Aunt          OBJECTIVE:    /82 (BP Location: Right arm)   Pulse 66   Temp (!) 96.7 °F (35.9 °C) (Tympanic)   Resp 18   Ht 5' 6\" (1.676 m)   Wt 59.1 kg (130 lb 5 oz)   SpO2 92%   BMI 21.03 kg/m²   Physical Exam:     General Appearance:    Awake, alert, oriented x3, no distress, well developed, well    nourished   Head:    Normocephalic without obvious abnormality   Eyes:    PERRL, conjunctiva/corneas clear, EOM's intact        Neck:   Supple, no adenopathy   Throat:   Mucous membranes moist   Lungs:     Clear to auscultation bilaterally, no wheezing or rhonchi   Heart:    Regular rate and rhythm, S1 and S2 normal, no murmur   Abdomen:     Soft, +RUQ ttp, non-distended. bowel sounds active. No    masses, rebound + guarding.    Extremities:   Extremities normal. No clubbing, cyanosis or edema   Psych  Derm:   Normal affect    No jaundice +tattoo R shoulder blade. No spider angiomata, palmar erythema.    Neurologic:   CNII-XII grossly intact. Speech intact. No tremor/asterixis         Laboratory Studies:  Results from last 7 days   Lab Units 04/08/24  0531 04/05/24  0603   WBC Thousand/uL 7.00 7.40   HEMOGLOBIN g/dL 13.1 14.1   HEMATOCRIT % 40.0 41.1   MCV fL 99* 97   PLATELETS Thousands/uL 208 226     Results from last 7 days   Lab Units 04/09/24  0555 04/08/24  0531 04/05/24  0603 04/03/24  0340   SODIUM mmol/L 135 134* 134* 133*   POTASSIUM mmol/L 4.5 4.1 4.1 4.1 "   CHLORIDE mmol/L 98 99 100 99   CO2 mmol/L 30 28 25 26   BUN mg/dL 22 23 25 20   CREATININE mg/dL 0.71 0.61 0.62 0.55*   CALCIUM mg/dL 9.2 9.0 9.2 8.5   ALBUMIN g/dL 3.3* 3.3* 3.4*  --    TOTAL BILIRUBIN mg/dL 0.37 0.38 0.41  --    ALK PHOS U/L 58 57 69  --    ALT U/L 168* 144* 107*  --    AST U/L 130* 114* 88*  --            Imaging and Other Studies:  US right upper quadrant    Result Date: 4/8/2024  Narrative: RIGHT UPPER QUADRANT ULTRASOUND INDICATION: Transaminitis, abdominal pain, rule out cholecystitis. COMPARISON: CT 3/26/2024 TECHNIQUE: Real-time ultrasound of the right upper quadrant was performed with a curvilinear transducer with both volumetric sweeps and still imaging techniques. FINDINGS: PANCREAS: Visualized portions of the pancreas are within normal limits. AORTA AND IVC: Visualized portions are normal for patient age. LIVER: Size: Within normal range. The liver measures 16.6 cm in the midclavicular line. Contour: Surface contour is nodular. Parenchyma: Echogenicity and echotexture are within normal limits. No liver mass identified. Limited imaging of the main portal vein shows it to be patent and hepatopetal. BILIARY: The gallbladder is normal in caliber. Borderline wall thickness of 3 mm. No significant pericholecystic fluid. Shadowing gallstone(s) identified. No sonographic Workman's sign. No intrahepatic biliary dilatation. CBD measures 6.0 mm. No choledocholithiasis. KIDNEY: Right kidney measures 10.5 x 4.9 x 5.9 cm. Volume 159.2 mL Kidney within normal limits. ASCITES: None.     Impression: 1. Cholelithiasis with borderline wall thickening. Correlate clinically to exclude early acute cholecystitis. 2. Slightly nodular liver contour as seen on recent CT, which may be associated with cirrhosis. Correlate clinically. Workstation performed: [id number]     CT head wo contrast    Result Date: 3/28/2024  Narrative: CT BRAIN - WITHOUT CONTRAST INDICATION:   New h/a with known cerebellar bleed.  COMPARISON: 3/27/2024. TECHNIQUE:  CT examination of the brain was performed.  Multiplanar 2D reformatted images were created from the source data. Radiation dose length product (DLP) for this visit:  890.22 mGy-cm .  This examination, like all CT scans performed in the [facility], was performed utilizing techniques to minimize radiation dose exposure, including the use of iterative  reconstruction and automated exposure control. IMAGE QUALITY:  Diagnostic. FINDINGS: PARENCHYMA: Intraparenchymal hematoma in the left paramedian cerebellum is stable compared to the prior study without interval change. Mild mass effect upon the adjacent brain parenchyma with extension into the fourth ventricle and left foramen of the Luschka is again seen, also unchanged. No new/additional areas of intracranial hemorrhage identified. No extra-axial/subdural hemorrhage. No midline shift or transtentorial herniation. Unchanged mild chronic microangiopathy. Sequelae of old lacunar infarcts in the periventricular and subcortical white matter, bilateral thalami, gangliocapsular regions, and andreea are again seen. VENTRICLES AND EXTRA-AXIAL SPACES:  Ventricles and extra-axial CSF spaces are mildly prominent commensurate with the degree of volume loss.  No hydrocephalus.  No acute extra-axial hemorrhage. VISUALIZED ORBITS: Normal visualized orbits. PARANASAL SINUSES: Minimal mucoperiosteal thickening of the ethmoid and left inferior maxillary sinuses. No air-fluid levels in the paranasal sinuses or mastoid air cells. CALVARIUM AND EXTRACRANIAL SOFT TISSUES: Bony calvarium and temporomandibular joints are intact. Scalp soft tissues appear grossly unremarkable.     Impression: No significant interval change with findings detailed above. Workstation performed: [id number]     CT head wo contrast    Result Date: 3/27/2024  Narrative: CT BRAIN - WITHOUT CONTRAST INDICATION:   Cerebellar bleed. Follow-up COMPARISON: CT head dated  3/26/2024 TECHNIQUE:  CT examination of the brain was performed.  Multiplanar 2D reformatted images were created from the source data. Radiation dose length product (DLP) for this visit:  859 mGy-cm .  This examination, like all CT scans performed in the [facility], was performed utilizing techniques to minimize radiation dose exposure, including the use of iterative reconstruction and automated exposure control. IMAGE QUALITY:  Diagnostic. FINDINGS: PARENCHYMA: Redemonstration of hyperdense hemorrhage in the left paramedian cerebellum measuring approximately 2.6 x 1.6 x 1.7 cm in size, similar in appearance to the prior. There is some mass effect on the fourth ventricle as before with extension into  the fourth ventricle and the left foramen of Luschka, similar in appearance to the prior. No new parenchymal hemorrhage is identified. Several scattered areas of low density in the periventricular and subcortical white matter noted, nonspecific but probably sequela of chronic microvascular angiopathy. Several old lacunar infarcts are seen in the andreea and bilateral basal ganglia. Gray-white differentiation appears maintained. No territorial infarct is identified. Parenchymal atrophy. Intracranial structures otherwise without significant interval change. VENTRICLES AND EXTRA-AXIAL SPACES:  Ventricles and extra-axial CSF spaces are prominent commensurate with the degree of volume loss.  No hydrocephalus.  No acute extra-axial hemorrhage. VISUALIZED ORBITS: Normal visualized orbits. PARANASAL SINUSES: Grossly clear CALVARIUM AND EXTRACRANIAL SOFT TISSUES:  Normal.     Impression: Redemonstration of hyperdense hemorrhage in the left paramedian cerebellum, as described with mass effect and extension into the fourth ventricle and left foramen of Luschka, similar in appearance to the prior. No hydrocephalus. Other findings as above. Overall there has been no significant interval change. Follow-up as clinically  warranted. Workstation performed: [id number]     XR chest 1 view portable    Result Date: 3/26/2024  Narrative: XR CHEST PORTABLE INDICATION: Epigastric abdominal pain, nausea and vomiting. COMPARISON: None FINDINGS: Clear lungs. No pneumothorax or pleural effusion. Normal cardiomediastinal silhouette. Loop recorder noted Normal upper abdomen.     Impression: No acute cardiopulmonary disease. Workstation performed: [id number]     CTA head and neck with and without contrast    Result Date: 3/26/2024  Narrative: CTA NECK AND BRAIN WITH CONTRAST INDICATION: Head bleed COMPARISON:   Head CT and CT angiography of the head and neck from September 1, 2022 and follow-up MRI of the brain performed on that same day. TECHNIQUE: Post contrast imaging was performed after administration of iodinated contrast through the neck and brain. Post contrast axial 0.625 mm images timed to opacify the arterial system. 3D rendering was performed on an independent workstation.   MIP reconstructions performed. Coronal reconstructions were performed of the noncontrast portion of the brain. Radiation dose length product (DLP) for this visit:  346 mGy-cm .  This examination, like all CT scans performed in the [facility], was performed utilizing techniques to minimize radiation dose exposure, including the use of iterative reconstruction and automated exposure control. IV Contrast:  140 mL of iohexol (OMNIPAQUE) IMAGE QUALITY:   Diagnostic FINDINGS: CERVICAL VASCULATURE AORTIC ARCH AND GREAT VESSELS:  Normal aortic arch and great vessel origins. Normal visualized subclavian vessels. RIGHT VERTEBRAL ARTERY CERVICAL SEGMENT:  Normal origin. The vessel is normal in caliber throughout the neck. LEFT VERTEBRAL ARTERY CERVICAL SEGMENT:  Normal origin. The vessel is normal in caliber throughout the neck. RIGHT EXTRACRANIAL CAROTID SEGMENT: Mild atherosclerotic disease of the distal common carotid artery and proximal cervical internal  carotid artery without significant stenosis compared to the more distal ICA. Less than 50% ICA origin stenosis. LEFT EXTRACRANIAL CAROTID SEGMENT: Mild atherosclerotic disease of the distal common carotid artery and proximal cervical internal carotid artery without significant stenosis compared to the more distal ICA. Less than 50% ICA origin stenosis. NASCET criteria was used to determine the degree of internal carotid artery diameter stenosis. INTRACRANIAL VASCULATURE INTERNAL CAROTID ARTERIES: Bilateral intracranial carotid artery atherosclerotic plaque with moderate right and mild left cavernous segment stenosis. There is mild to moderate right and mild left proximal supraclinoid ICA stenosis. Ophthalmic artery origins and carotid termini are unremarkable. ANTERIOR CIRCULATION:  Symmetric A1 segments and anterior cerebral arteries with normal enhancement.  Normal anterior communicating artery. MIDDLE CEREBRAL ARTERY CIRCULATION:  M1 segment and middle cerebral artery branches demonstrate normal enhancement bilaterally. DISTAL VERTEBRAL ARTERIES: Mild atherosclerotic stenosis in the proximal intradural vertebral artery segments. Posterior inferior cerebellar artery origins are unremarkable. No evidence of left PICA aneurysm. No abnormal arterial or venous vasculature at  the site of the left cerebellar hematoma to suggest underlying vascular malformation. Vertebrobasilar junction is normal. BASILAR ARTERY:  Basilar artery is normal in caliber.  Normal superior cerebellar arteries. POSTERIOR CEREBRAL ARTERIES: Both posterior cerebral arteries arises from the basilar tip.  Both arteries demonstrate normal enhancement.   Neither posterior communicating artery is well visualized. VENOUS STRUCTURES:  Normal. NON VASCULAR ANATOMY BONY STRUCTURES:  No acute osseous abnormality. Osteopenia/osteoporosis. Reversal of the cervical lordosis with cervical spondylosis at C5-C6 and C6-C7 resulting in mild spinal stenosis. SOFT  TISSUES OF THE NECK:  Unremarkable. THORACIC INLET: Mild apical pleural scarring, right more than left. Clear lung apices. Chronically enlarged 1.3 x 2.2 cm precarinal lymph node. This is a nonspecific finding which has been stable since September 1, 2022.     Impression: No left cerebellar vascular malformation to account for the patient's acute parenchymal hematoma. No cervical or intracranial large vessel occlusion, dissection or aneurysm. Mild bilateral cervical carotid bifurcation atherosclerotic disease. Workstation performed: [id number]     CT abdomen pelvis with contrast    Result Date: 3/26/2024  Narrative: CT ABDOMEN AND PELVIS WITH IV CONTRAST INDICATION: Epigastric abd pain, N/V. COMPARISON: None. TECHNIQUE: CT examination of the abdomen and pelvis was performed. Multiplanar 2D reformatted images were created from the source data. This examination, like all CT scans performed in the [facility], was performed utilizing techniques to minimize radiation dose exposure, including the use of iterative reconstruction and automated exposure control. Radiation dose length product (DLP) for this visit: 489 mGy-cm IV Contrast: 140 mL of iohexol (OMNIPAQUE) Enteric Contrast: Not administered. FINDINGS: ABDOMEN LOWER CHEST: Small hiatal hernia. Cardiomegaly. LIVER/BILIARY TREE: Subtle liver surface nodularity with mild hypertrophy of the lateral left paddock lobe and widening of the fissure for the falciform ligament. GALLBLADDER: Cholelithiasis, including a 3 mm gallstone within the gallbladder neck versus cystic duct. No findings of acute cholecystitis. SPLEEN: Unremarkable. PANCREAS: Unremarkable. ADRENAL GLANDS: Unremarkable. KIDNEYS/URETERS: No hydronephrosis or urinary tract calculi. Subcentimeter hypoattenuating renal lesion(s), too small to characterize but statistically likely benign, which do not warrant follow-up (Radiology June 2019). STOMACH AND BOWEL: Sigmoid colonic anastomosis. Colonic  diverticula Kalosis without acute diverticulitis. APPENDIX: No findings to suggest appendicitis. ABDOMINOPELVIC CAVITY: Mild mesenteric edema, of uncertain etiology. Scattered nonenlarged mesenteric lymph nodes. No ascites. No pneumoperitoneum. Enlarged ana hepatic and peripancreatic lymph nodes measuring up to 2 cm in short axis. VESSELS: Atherosclerosis without abdominal aortic aneurysm. PELVIS REPRODUCTIVE ORGANS: Simple appearing right adnexal cyst measuring 3.7 cm. URINARY BLADDER: Distended, but otherwise within normal limits. ABDOMINAL WALL/INGUINAL REGIONS: Unremarkable. BONES: No acute fracture or suspicious osseous lesion.     Impression: 1. Cholelithiasis, including a 3 mm gallstone within the gallbladder neck versus cystic duct. No findings of acute cholecystitis. If there is concern for acute cholecystitis, right upper quadrant ultrasound can be obtained. 2. Findings suggesting cirrhosis with mild mesenteric edema, but no ascites. Enlarged ana hepatic and peripancreatic lymph nodes measuring up to 2 cm in short axis, indeterminate, although could be reactive, given suspicion for cirrhosis. 3. Simple appearing right adnexal cyst measuring 3.7 cm.  According to current guidelines (J Am Kayla Radiol 2020; 17:248-254) in this postmenopausal woman, this should be followed in 6 to 12 months by pelvic ultrasound. The study was marked in EPIC for immediate notification. Workstation performed: [id number]     CT head without contrast    Result Date: 3/26/2024  Narrative: CT BRAIN - WITHOUT CONTRAST INDICATION:   Ha, N/V. COMPARISON: MRI brain September 1, 2022. TECHNIQUE:  CT examination of the brain was performed.  Multiplanar 2D reformatted images were created from the source data. Radiation dose length product (DLP) for this visit: .  This examination, like all CT scans performed in the [facility], was performed utilizing techniques to minimize radiation dose exposure, including the use of  iterative reconstruction  and automated exposure control. IMAGE QUALITY:  Diagnostic. FINDINGS: PARENCHYMA: Acute parenchymal hemorrhage identified in the left paramedian cerebellum measures 2.6 x 1.6 cm with extension into the fourth ventricle and left foramen of Luschka. There are chronic lacunar infarcts in the andreea, bilateral thalamus and bilateral basal ganglia as well as the right frontal periventricular region and bilateral centrum semiovale. No midline shift. No hydrocephalus although parenchymal hematoma results in mass effect on the fourth ventricle VENTRICLES AND EXTRA-AXIAL SPACES: Mass effect on the fourth ventricle noted without hydrocephalus. VISUALIZED ORBITS: Normal visualized orbits. PARANASAL SINUSES: Normal visualized paranasal sinuses. CALVARIUM AND EXTRACRANIAL SOFT TISSUES:  Normal.     Impression: Acute parenchymal hematoma left paramedian cerebellum measures 2.6 x 1.6 cm with mass effect on the fourth ventricle. Intraventricular extension of hemorrhage noted with blood in the fourth ventricle and left foramen of Luschka. Chronic lacunar infarcts are identified as described. I personally discussed this study with ABRAHAN UP on 3/26/2024 9:26 AM. Workstation performed: [id number]         ASSESSMENT AND PLAN:  Chronically elevated liver enzymes, rising over past several days, hepatocellular pattern with history of alcohol use and Hepatitis C. Imaging suggestive of cirrhosis and gallstones. Synthetic liver function appear intact. No history of variceal bleeding, ascites, hepatic encephalopathy. Will check other liver serologies to rule out hereditary, autoimmune etiologies. DDx: DILI, gallbladder dz. Await HCV viral load. Check AFP, no hepatoma noted on imaging. Will need outpatient follow up. Avoid hepatotoxins including alcohol.  RUQ abdominal pain with nausea and cholelithiasis with possible early cholecystitis on imaging. Patient afebrile without leukocytosis. Recommend surgical  evaluation and HIDA. Discussed with Dr. Jennings.         Melina Salinas PA-C

## 2024-04-10 ENCOUNTER — APPOINTMENT (INPATIENT)
Dept: RADIOLOGY | Facility: HOSPITAL | Age: 59
DRG: 058 | End: 2024-04-10
Payer: COMMERCIAL

## 2024-04-10 LAB
AFP-TM SERPL-MCNC: 7.13 NG/ML (ref 0–9)
ALBUMIN SERPL BCP-MCNC: 3.4 G/DL (ref 3.5–5)
ALP SERPL-CCNC: 55 U/L (ref 34–104)
ALT SERPL W P-5'-P-CCNC: 162 U/L (ref 7–52)
ANA SER QL IA: NEGATIVE
ANION GAP SERPL CALCULATED.3IONS-SCNC: 7 MMOL/L (ref 4–13)
AST SERPL W P-5'-P-CCNC: 119 U/L (ref 13–39)
BASOPHILS # BLD AUTO: 0.03 THOUSANDS/ÂΜL (ref 0–0.1)
BASOPHILS NFR BLD AUTO: 0 % (ref 0–1)
BILIRUB SERPL-MCNC: 0.29 MG/DL (ref 0.2–1)
BUN SERPL-MCNC: 26 MG/DL (ref 5–25)
CALCIUM ALBUM COR SERPL-MCNC: 9.4 MG/DL (ref 8.3–10.1)
CALCIUM SERPL-MCNC: 8.9 MG/DL (ref 8.4–10.2)
CHLORIDE SERPL-SCNC: 99 MMOL/L (ref 96–108)
CO2 SERPL-SCNC: 29 MMOL/L (ref 21–32)
CREAT SERPL-MCNC: 0.66 MG/DL (ref 0.6–1.3)
EOSINOPHIL # BLD AUTO: 0.14 THOUSAND/ÂΜL (ref 0–0.61)
EOSINOPHIL NFR BLD AUTO: 2 % (ref 0–6)
ERYTHROCYTE [DISTWIDTH] IN BLOOD BY AUTOMATED COUNT: 12 % (ref 11.6–15.1)
FERRITIN SERPL-MCNC: 107 NG/ML (ref 11–307)
GFR SERPL CREATININE-BSD FRML MDRD: 97 ML/MIN/1.73SQ M
GLUCOSE P FAST SERPL-MCNC: 103 MG/DL (ref 65–99)
GLUCOSE SERPL-MCNC: 103 MG/DL (ref 65–140)
HCT VFR BLD AUTO: 39.3 % (ref 34.8–46.1)
HCV RNA SERPL NAA+PROBE-ACNC: ABNORMAL IU/ML
HCV RNA SERPL NAA+PROBE-ACNC: DETECTED K[IU]/ML
HGB BLD-MCNC: 13 G/DL (ref 11.5–15.4)
IMM GRANULOCYTES # BLD AUTO: 0.02 THOUSAND/UL (ref 0–0.2)
IMM GRANULOCYTES NFR BLD AUTO: 0 % (ref 0–2)
IRON SATN MFR SERPL: 24 % (ref 15–50)
IRON SERPL-MCNC: 84 UG/DL (ref 50–212)
LYMPHOCYTES # BLD AUTO: 2.68 THOUSANDS/ÂΜL (ref 0.6–4.47)
LYMPHOCYTES NFR BLD AUTO: 40 % (ref 14–44)
MAGNESIUM SERPL-MCNC: 1.9 MG/DL (ref 1.9–2.7)
MCH RBC QN AUTO: 32.3 PG (ref 26.8–34.3)
MCHC RBC AUTO-ENTMCNC: 33.1 G/DL (ref 31.4–37.4)
MCV RBC AUTO: 98 FL (ref 82–98)
MONOCYTES # BLD AUTO: 0.81 THOUSAND/ÂΜL (ref 0.17–1.22)
MONOCYTES NFR BLD AUTO: 12 % (ref 4–12)
NEUTROPHILS # BLD AUTO: 3.02 THOUSANDS/ÂΜL (ref 1.85–7.62)
NEUTS SEG NFR BLD AUTO: 46 % (ref 43–75)
NRBC BLD AUTO-RTO: 0 /100 WBCS
PLATELET # BLD AUTO: 202 THOUSANDS/UL (ref 149–390)
PMV BLD AUTO: 11.3 FL (ref 8.9–12.7)
POTASSIUM SERPL-SCNC: 4.3 MMOL/L (ref 3.5–5.3)
PROT SERPL-MCNC: 7.1 G/DL (ref 6.4–8.4)
RBC # BLD AUTO: 4.02 MILLION/UL (ref 3.81–5.12)
SODIUM SERPL-SCNC: 135 MMOL/L (ref 135–147)
TIBC SERPL-MCNC: 343 UG/DL (ref 250–450)
UIBC SERPL-MCNC: 259 UG/DL (ref 155–355)
WBC # BLD AUTO: 6.7 THOUSAND/UL (ref 4.31–10.16)

## 2024-04-10 PROCEDURE — 97116 GAIT TRAINING THERAPY: CPT

## 2024-04-10 PROCEDURE — 83735 ASSAY OF MAGNESIUM: CPT | Performed by: PHYSICIAN ASSISTANT

## 2024-04-10 PROCEDURE — 82104 ALPHA-1-ANTITRYPSIN PHENO: CPT | Performed by: PHYSICIAN ASSISTANT

## 2024-04-10 PROCEDURE — 82728 ASSAY OF FERRITIN: CPT | Performed by: PHYSICIAN ASSISTANT

## 2024-04-10 PROCEDURE — 97110 THERAPEUTIC EXERCISES: CPT

## 2024-04-10 PROCEDURE — 97535 SELF CARE MNGMENT TRAINING: CPT

## 2024-04-10 PROCEDURE — 83550 IRON BINDING TEST: CPT | Performed by: PHYSICIAN ASSISTANT

## 2024-04-10 PROCEDURE — A9537 TC99M MEBROFENIN: HCPCS

## 2024-04-10 PROCEDURE — 78227 HEPATOBIL SYST IMAGE W/DRUG: CPT

## 2024-04-10 PROCEDURE — 86381 MITOCHONDRIAL ANTIBODY EACH: CPT | Performed by: PHYSICIAN ASSISTANT

## 2024-04-10 PROCEDURE — 83540 ASSAY OF IRON: CPT | Performed by: PHYSICIAN ASSISTANT

## 2024-04-10 PROCEDURE — 86015 ACTIN ANTIBODY EACH: CPT | Performed by: PHYSICIAN ASSISTANT

## 2024-04-10 PROCEDURE — 99232 SBSQ HOSP IP/OBS MODERATE 35: CPT | Performed by: INTERNAL MEDICINE

## 2024-04-10 PROCEDURE — 85025 COMPLETE CBC W/AUTO DIFF WBC: CPT

## 2024-04-10 PROCEDURE — 99232 SBSQ HOSP IP/OBS MODERATE 35: CPT

## 2024-04-10 PROCEDURE — 97530 THERAPEUTIC ACTIVITIES: CPT

## 2024-04-10 PROCEDURE — 82390 ASSAY OF CERULOPLASMIN: CPT | Performed by: PHYSICIAN ASSISTANT

## 2024-04-10 PROCEDURE — 82103 ALPHA-1-ANTITRYPSIN TOTAL: CPT | Performed by: PHYSICIAN ASSISTANT

## 2024-04-10 PROCEDURE — 82105 ALPHA-FETOPROTEIN SERUM: CPT | Performed by: PHYSICIAN ASSISTANT

## 2024-04-10 PROCEDURE — 86038 ANTINUCLEAR ANTIBODIES: CPT | Performed by: PHYSICIAN ASSISTANT

## 2024-04-10 PROCEDURE — 80053 COMPREHEN METABOLIC PANEL: CPT

## 2024-04-10 RX ORDER — SINCALIDE 5 UG/5ML
0.02 INJECTION, POWDER, LYOPHILIZED, FOR SOLUTION INTRAVENOUS ONCE
Status: COMPLETED | OUTPATIENT
Start: 2024-04-10 | End: 2024-04-10

## 2024-04-10 RX ADMIN — ESCITALOPRAM OXALATE 5 MG: 5 TABLET, FILM COATED ORAL at 08:01

## 2024-04-10 RX ADMIN — CHLORHEXIDINE GLUCONATE 0.12% ORAL RINSE 15 ML: 1.2 LIQUID ORAL at 21:21

## 2024-04-10 RX ADMIN — LORATADINE 10 MG: 10 TABLET ORAL at 12:37

## 2024-04-10 RX ADMIN — CHLORHEXIDINE GLUCONATE 0.12% ORAL RINSE 15 ML: 1.2 LIQUID ORAL at 12:37

## 2024-04-10 RX ADMIN — METHOCARBAMOL TABLETS 500 MG: 500 TABLET, COATED ORAL at 21:21

## 2024-04-10 RX ADMIN — GABAPENTIN 100 MG: 100 CAPSULE ORAL at 06:09

## 2024-04-10 RX ADMIN — PANTOPRAZOLE SODIUM 40 MG: 40 TABLET, DELAYED RELEASE ORAL at 06:09

## 2024-04-10 RX ADMIN — PANTOPRAZOLE SODIUM 40 MG: 40 TABLET, DELAYED RELEASE ORAL at 17:37

## 2024-04-10 RX ADMIN — MULTIPLE VITAMINS W/ MINERALS TAB 1 TABLET: TAB ORAL at 12:37

## 2024-04-10 RX ADMIN — LISINOPRIL 40 MG: 20 TABLET ORAL at 08:01

## 2024-04-10 RX ADMIN — GABAPENTIN 300 MG: 300 CAPSULE ORAL at 21:21

## 2024-04-10 RX ADMIN — AMLODIPINE BESYLATE 10 MG: 10 TABLET ORAL at 08:01

## 2024-04-10 RX ADMIN — Medication 2.5 MG: at 06:09

## 2024-04-10 RX ADMIN — HYDROCHLOROTHIAZIDE 12.5 MG: 12.5 TABLET ORAL at 12:37

## 2024-04-10 RX ADMIN — Medication 2000 UNITS: at 12:37

## 2024-04-10 RX ADMIN — FOLIC ACID 1 MG: 1 TABLET ORAL at 12:37

## 2024-04-10 RX ADMIN — GABAPENTIN 100 MG: 100 CAPSULE ORAL at 14:25

## 2024-04-10 RX ADMIN — MELATONIN TAB 3 MG 3 MG: 3 TAB at 21:21

## 2024-04-10 RX ADMIN — Medication 800 MG: at 17:39

## 2024-04-10 RX ADMIN — METHOCARBAMOL TABLETS 500 MG: 500 TABLET, COATED ORAL at 12:45

## 2024-04-10 RX ADMIN — THIAMINE HCL TAB 100 MG 100 MG: 100 TAB at 12:38

## 2024-04-10 RX ADMIN — SINCALIDE 1.2 MCG: 5 INJECTION, POWDER, LYOPHILIZED, FOR SOLUTION INTRAVENOUS at 11:10

## 2024-04-10 RX ADMIN — Medication 800 MG: at 12:37

## 2024-04-10 NOTE — PROGRESS NOTES
04/08/24 1430   Therapy Time missed   Time missed? Yes   Amount of time missed 60   Reason for time missed   (pt refused due to high level pain.)   Time(s) multiple attempts made (S)  Per RN (Dalila) pt with sig outburst of frustration and anger stating she does not get any rest here and no one is doing anything for her pain. RN spoke to MD and will provide one time oxy dose, given kpad to abd. And pt refusing any therapy at this time.

## 2024-04-10 NOTE — PROGRESS NOTES
04/10/24 1400   Pain Assessment   Pain Assessment Tool 0-10   Pain Score No Pain   Restrictions/Precautions   Precautions Bed/chair alarms;Cognitive;Fall Risk;Supervision on toilet/commode   General   Change In Medical/Functional Status +have extensive abd ultrasound most of the day today, given results by CRNP that gall bladder is not working right, surgery is going to follow up with pt later today.   Cognition   Overall Cognitive Status Impaired   Arousal/Participation Alert;Cooperative   Attention Within functional limits   Orientation Level Oriented X4   Memory Decreased recall of precautions   Following Commands Follows one step commands with increased time or repetition   Subjective   Subjective Frustrated with low fat diet - still hungry from not eating since last night and lunch was not enough- cleared by CRNP for reg diet however to watch how she feels with fatty foods given GB US results   Roll Left and Right   Type of Assistance Needed Independent   Roll Left and Right CARE Score 6   Sit to Lying   Type of Assistance Needed Independent   Sit to Lying CARE Score 6   Lying to Sitting on Side of Bed   Type of Assistance Needed Independent   Lying to Sitting on Side of Bed CARE Score 6   Sit to Stand   Type of Assistance Needed Supervision   Physical Assistance Level No physical assistance   Comment s with UE support.   Sit to Stand CARE Score 4   Bed-Chair Transfer   Type of Assistance Needed Incidental touching   Physical Assistance Level No physical assistance   Comment CS CG with RW for stand pivot.   Chair/Bed-to-Chair Transfer CARE Score 4   Walk 10 Feet   Type of Assistance Needed Incidental touching   Physical Assistance Level No physical assistance   Walk 10 Feet CARE Score 4   Walk 50 Feet with Two Turns   Type of Assistance Needed Physical assistance   Physical Assistance Level 25% or less   Comment RW   Walk 50 Feet with Two Turns CARE Score 3   Walk 150 Feet   Type of Assistance Needed  Physical assistance   Physical Assistance Level 25% or less   Comment RW   Walk 150 Feet CARE Score 3   Walking 10 Feet on Uneven Surfaces   Type of Assistance Needed Physical assistance   Physical Assistance Level 25% or less   Comment min A on outdoor sidewalks with Rw   Walking 10 Feet on Uneven Surfaces CARE Score 3   Ambulation   Primary Mode of Locomotion Prior to Admission Walk   Distance Walked (feet) 150 ft  (70ft outdoors.)   Assist Device Roller Walker   Gait Pattern Ataxic;Inconsistant Jaxson;Step to;Step through;Narrow PAUL;Decreased foot clearance   Limitations Noted In Balance;Coordination;Safety;Heel Strike   Provided Assistance with: Balance;Direction   Walk Assist Level Contact Guard;Minimum Assist   Findings mainly CGA with RW support, however varied jaxson and balance requiring min A at times. Min A provided on outdoor surface with RW support.   Does the patient walk? 2. Yes   Wheelchair mobility   Does the patient use a wheelchair? 0. No   Toilet Transfer   Type of Assistance Needed Incidental touching   Physical Assistance Level No physical assistance   Comment CS/CG with RW for stand pivot to standard toilet.   Toilet Transfer CARE Score 4   Therapeutic Interventions   Strengthening seated B LE TE 2# LAQ and hip flexion x30 each. Grn tband hip abd and HS curls x20 each.   Flexibility seated manual passive B LE DF/knee ext stretch x5 min each.   Balance unsupported stand for pant mgmt post void and hand washing at sink.   Assessment   Treatment Assessment Pt participated in 90 min skilled PT intervention despite pt having long day after extensive testing. C/o abd discomfort however not terrrible, reports not liking to take the medications but its the only thing that helps, and the kpad. Pt seeks guidance on what to ask surgeon and what she should do about if needing surgery and just having a stroke. Support and education provided, A to help organize questioning for surgical follow up for when  they arrive. Taken pt outside for air, TE completed outdoors along with x70 ft amb with RW. Gait remains ataxic and overall dec balance. Discussed dispo plan, and LOS given recent dec activity tolerance. Reports son (Abner) can come tuesday for FT, he will be pts main support, other son juan j can learn from Abner and will do whatever hes told to do. Much time spent supporting pt including setup for facial hair removal to improve pt confidence. Continue per POC, Resume NPP, balance tasks as able pending need fo rsurgical intervention.   Barriers to Discharge Decreased caregiver support;Inaccessible home environment   Plan   Progress Slow progress, medical status limitations   Discharge Recommendation   Walker Package Recommended Wheeled walker   PT Therapy Minutes   PT Time In 1400   PT Time Out 1530   PT Total Time (minutes) 90   PT Mode of treatment - Individual (minutes) 90   PT Mode of treatment - Concurrent (minutes) 0   PT Mode of treatment - Group (minutes) 0   PT Mode of treatment - Co-treat (minutes) 0   PT Mode of Treatment - Total time(minutes) 90 minutes   PT Cumulative Minutes 405   Therapy Time missed   Time missed? No

## 2024-04-10 NOTE — PROGRESS NOTES
"   04/10/24 0700   Pain Assessment   Pain Assessment Tool 0-10   Pain Score 2   Pain Location/Orientation Orientation: Right;Location: Abdomen   Pain Onset/Description Onset: Ongoing   Effect of Pain on Daily Activities tolerated   Patient's Stated Pain Goal No pain   Hospital Pain Intervention(s) Heat applied  (aqua k pad end of session)   Multiple Pain Sites No   Restrictions/Precautions   Precautions Bed/chair alarms;Cognitive;Fall Risk;Supervision on toilet/commode   Weight Bearing Restrictions No   ROM Restrictions No   Lifestyle   Autonomy \"I want breakfast\" educ pt is NPO 2* procedure   Eating   Comment pt NPO this AM   Reason if not Attempted Medical concerns   Eating CARE Score 88   Oral Hygiene   Type of Assistance Needed Supervision   Comment SUP in stance at sink   Oral Hygiene CARE Score 4   Shower/Bathe Self   Type of Assistance Needed Supervision   Comment completed shower this session, pt able to wash 10/10 parts, SUP ins tance with use of Gb when washing randell/buttock   Shower/Bathe Self CARE Score 4   Bathing   Assessed Bath Style Shower   Anticipated D/C Bath Style Tub;Shower   Able to Gather/Transport No   Tub/Shower Transfer   Findings CGA/CS for shower transfer   Upper Body Dressing   Type of Assistance Needed Independent   Comment seated for bra and OH shirt   Upper Body Dressing CARE Score 6   Lower Body Dressing   Type of Assistance Needed Supervision   Comment seated donned undergarment/pants, SUP in stance for Cm   Lower Body Dressing CARE Score 4   Putting On/Taking Off Footwear   Type of Assistance Needed Independent   Comment xleg method to don socks   Putting On/Taking Off Footwear CARE Score 6   Sit to Lying   Type of Assistance Needed Independent   Sit to Lying CARE Score 6   Lying to Sitting on Side of Bed   Type of Assistance Needed Independent   Lying to Sitting on Side of Bed CARE Score 6   Sit to Stand   Type of Assistance Needed Supervision   Comment SUP with RW   Sit to Stand " CARE Score 4   Bed-Chair Transfer   Type of Assistance Needed Incidental touching;Adaptive equipment   Comment CGA/CS with RW   Chair/Bed-to-Chair Transfer CARE Score 4   Toileting Hygiene   Type of Assistance Needed Supervision   Comment SUP for balance in stance for CM   Toileting Hygiene CARE Score 4   Toilet Transfer   Type of Assistance Needed Incidental touching;Adaptive equipment   Comment CGA SPT with RW   Toilet Transfer CARE Score 4   Meal Prep   Meal Preparation (S)  complete next session withfolding walker tray   Therapeutic Excerise-Strength   UE Strength Yes   Right Upper Extremity- Strength   R Shoulder Flexion;Extension;External rotation;Internal rotation   R Elbow Elbow flexion;Elbow extension   R Position Seated   Equipment Theraband  (green)   R Weight/Reps/Sets 2x15   RUE Strength [facility] tband exercises, tputty and CVA educ. This session mainly foucsed on tband exercises. Completed 2x15 with rest break in between   Left Upper Extremity-Strength   LUE Strength Comment see above   Cognition   Overall Cognitive Status Impaired   Arousal/Participation Alert;Cooperative   Attention Within functional limits   Orientation Level Oriented X4   Memory Decreased recall of precautions   Following Commands Follows one step commands with increased time or repetition   Additional Activities   Additional Activities Comments Engaged pt in bed mobility from higher bed in day space as pt reports bed is higher at home. t able to complete at indep level. pt reports BF to make step stool to inc indep   Activity Tolerance   Activity Tolerance Patient tolerated treatment well   Assessment   Treatment Assessment Engaged pt in 90mins of skilled OT services with focus on ADl retraining, UE TE, fx mobility, bed mobility and UE HEP. Pt overall fx at CGA/CS for ADLs and tranfers. pt with mild LOB during mobility but able to regain. Pt beginning of session a bit frustrated  when therapist asking her questions, but then upset she cannot have breakfast as she is NPO for testing this AM. Therapist provided pt with educ. Cont OT POC with focus on meal prep, med mngmnt, standing balance, UE FMC/strengthening to inc fx performance and indep.   Prognosis Good   Problem List Decreased strength;Decreased endurance;Impaired balance;Decreased mobility;Decreased coordination;Impaired judgement;Decreased cognition;Decreased safety awareness   Barriers to Discharge Decreased caregiver support;Inaccessible home environment   Plan   Treatment/Interventions ADL retraining;Functional transfer training;Therapeutic exercise;Endurance training;Patient/family training;Bed mobility;Compensatory technique education   Progress Progressing toward goals   Discharge Recommendation   Equipment Recommended Shower/Tub chair with back ($)  (folding walker tray)   Additional Comments  pt reports she has shower chair, son to purchase tray   OT Therapy Minutes   OT Time In 0700   OT Time Out 0830   OT Total Time (minutes) 90   OT Mode of treatment - Individual (minutes) 90   OT Mode of treatment - Concurrent (minutes) 0   OT Mode of treatment - Group (minutes) 0   OT Mode of treatment - Co-treat (minutes) 0   OT Mode of Treatment - Total time(minutes) 90 minutes   OT Cumulative Minutes 540   Therapy Time missed   Time missed? No

## 2024-04-10 NOTE — ASSESSMENT & PLAN NOTE
Prolonged QTc of 534 on EKG from admission to acute setting.  EKG on admission to [facility] showed QTc of 472.  Currently on Lexapro.  Avoid QTc prolonging medications as able.  Repeat EKG as needed.

## 2024-04-10 NOTE — PROGRESS NOTES
Physical Medicine and Rehabilitation Progress Note  Tanika Lira 58 y.o. female MRN: [id number]  Unit/Bed#: [facility] 266-01 Encounter: [id number]      Assessment & Plan:     Decline in ADLs and mobility: Functional assessment - overall improving - had increased anxiety/depressed mood today impairing tx          FIM  Care Score  Admit Score Recent Score    Total assist  1-100% or 2p    Tot     Max assist 2-51-75%    Sub     Mod assist 3- 26-50%  Par Bathing    Min assist 3- 25% or < Par LBD    CG assist 4  TA     Sup/Setup 4-5 Sup To hygiene, UBD To hygiene, bathing, LBD   Mod-I/Indep 6 MI  UBD    Transfers  Mod-total assist  CGA assist     Ambulation   Significant assist   ft min assist     Stairs   Min assist  4 steps min assist      Goal: Mod indep for most ADLs and  for mobility  Major barriers: Imbalance, incoordination  Dispo: Home with [facility] 14 days from admission      Transaminitis  Assessment & Plan  4/11 still with some RUQ pain at times; pt afebrile; LFTs tomorrow   Recently uptrending, 4/10 today slightly improving; still having some RUQ pain; pt afebrile, no leukocytosis   Hep C+ titer 1.5 million copies/ml 4/8   Hep testing NR   4/10 HIDA scan  - Abnormal contractile response of the gallbladder to cholecystokinin infusion, ( 0%), which in the appropriate clinical context suggest gallbladder dysfunction.  -   4/10 Dilcia, iron panel, AFP wnl other serologies per GI    AST 57>88>114>130>119  ALT 65>107>144>168>162  AP/Tbili wnl   - Trend LFTs intermittently     GI follow-up 4/11  Chronically elevated liver enzymes, appears to have plateaued, hepatocellular pattern with history of alcohol use and Hepatitis C. AM LFTs pending. Imaging suggestive of cirrhosis. Synthetic liver function appear intact. No history of variceal bleeding, ascites, hepatic encephalopathy. Liver serologies ordered to rule out hereditary, autoimmune etiologies, so far negative. HCV detected with viral load ([id number]/mL). AFP  "normal, no hepatoma noted on imaging. Will need outpatient follow up for HCV treatment. Avoid hepatotoxins including alcohol.  RUQ abdominal pain with nausea and cholelithiasis with possible early cholecystitis on imaging. Patient afebrile without leukocytosis. HIDA EF 0% c/w gallbladder dysfunction/chronic cholecystitis. Poor surgical candidate with recent hemorrhage, elective surgery will likely need to be delayed. Surgery following.        GI consult 4/11  Chronically elevated liver enzymes, appears to have plateaued, hepatocellular pattern with history of alcohol use and Hepatitis C. AM LFTs pending. Imaging suggestive of cirrhosis. Synthetic liver function appear intact. No history of variceal bleeding, ascites, hepatic encephalopathy. Liver serologies ordered to rule out hereditary, autoimmune etiologies, so far negative. HCV detected with viral load ([id number]/mL). AFP normal, no hepatoma noted on imaging. Will need outpatient follow up for HCV treatment. Avoid hepatotoxins including alcohol.  RUQ abdominal pain with nausea and cholelithiasis with possible early cholecystitis on imaging. Patient afebrile without leukocytosis. HIDA EF 0% c/w gallbladder dysfunction/chronic cholecystitis. Poor surgical candidate with recent hemorrhage, elective surgery will likely need to be delayed. Surgery following.      Gen Sx consult   \"Ultrasound shows cholelithiasis without cholecystitis.  Abdominal pain not related to p.o. intake which makes biliary colic less likely.  With recent cerebellar hemorrhage general anesthesia would be very risky.  Recommend low-fat diet  She may benefit from muscle relaxants as abdominal wall tenderness was not localized to the right upper quadrant.  Would minimize narcotics.\"  - Reduced oxy   - Discussed with Gi and Lillie kwong        RUQ US 4/8  1. Cholelithiasis with borderline wall thickening. Correlate clinically to exclude early acute cholecystitis.  2. Slightly nodular liver contour as " "seen on recent CT, which may be associated with cirrhosis. Correlate clinically.      Hx of alcohol use - alcohol cessation counseling  IM consulted and co-management with their team during ARC course  Monitor LFTs intermittently during course and after discharge  Med adjustments when indicated     CT A/P 3/26  IMPRESSION:  1. Cholelithiasis, including a 3 mm gallstone within the gallbladder neck versus cystic duct. No findings of acute cholecystitis. If there is concern for acute cholecystitis, right upper quadrant ultrasound can be obtained.  2. Findings suggesting cirrhosis with mild mesenteric edema, but no ascites. Enlarged ana hepatic and peripancreatic lymph nodes measuring up to 2 cm in short axis, indeterminate, although could be reactive, given suspicion for cirrhosis.      * Cerebellar hemorrhage (HCC)  Assessment & Plan  Repeat CTH 4/12     CTH 4/11  -New punctate foci of cortical hemorrhage versus small amount of subarachnoid hemorrhage within the posterior right sylvian fissure. Recommend short interval follow-up exam.  -Decreased size of hyperdense component of the left paramedian cerebellar hemorrhage with mildly increased surrounding vasogenic edema as detailed above. Decreased hemorrhage within the fourth ventricle.  4/11 neuro exam stable  - Neuro c/s - \"-New punctate foci of cortical hemorrhage versus small amount of subarachnoid hemorrhage within the posterior right sylvian fissure. Recommend short interval follow-up exam.    Reviewed the imaging. There is a punctate hyperdensity in one cut only 2/29, without a clear significance. Actually a comparison to CT head on 3/27 may show even the same foci in series 304, image 43/76.   Team can repeat CT head tomorrow to ensure stability, and if so, follow plan as per discharge summary on 4/3\"  - NeuroSx c/s   Given new punctate foci of cortical hemorrhage, recommend repeat CTH tomorrow to ensure stability.   Continue to hold full dose AC/AP therapy. " "  Pt has upcoming follow up on Monday 4/15/24 for virtual visit with neurosurgery Please contact nsx with any questions or concerns in the interim. (Per IM - patient to hold A/P until virtual decides Monday)     4/9 - neuro exam stable  4/8 - neuro exam stable, function, improving, continue tx in ARC setting   4/4 - improving ataxia on exam; tolerating ARC setting appropriately     - CTA HN - No left cerebellar vascular malformation to account for the patient's acute parenchymal hematoma. No cervical or intracranial large vessel occlusion, dissection or aneurysm. Mild bilateral cervical carotid bifurcation atherosclerotic disease.  - CTH 3/26 - Acute parenchymal hematoma left paramedian cerebellum measures 2.6 x 1.6 cm with mass effect on the fourth ventricle. Intraventricular extension of hemorrhage noted with blood in the fourth ventricle and left foramen of Luschka.  - Follow-up CTH 3/27 -  Redemonstration of hyperdense hemorrhage in the left paramedian cerebellum, as described with mass effect and extension into the fourth ventricle and left foramen of Luschka, similar in appearance to the prior.    No hydrocephalus.  - Follow-up CTH 3/28 - No significant interval changes    - Residual impairments on admission to ARC: Incoordination, imbalance, ataxia, possible dysphagia (assess for other impairments during ARC course)     Secondary stroke prevention  - Antithrombotic: Full AC and antiplatelet on hold   - Per neuro, Repeat CTH on Thursday 4/11 - if improving hemorrhage, patient can resume plavix 75mg daily for stroke prevention; no need for aspirin in terms of stroke prevention   - Follow-up with Nsx in 1-2 weeks with repeat CTH \"for stability and assess for delayed hydrocephalus. If stable/improved will defer remaining management and follow up with neurology\"  - Statin  - Optimal management of blood pressure   -  on importance of abstinence from smoking alcohol and amphetamine  - Patient at increased " risk for stroke particularly early in post-stroke period - monitor neuro exam closely with low threshold to repeat imaging  -  patient and if applicable caregiver on optimal stroke management  - Follow-up with neurology and PCP after d/c   - Continue acute comprehensive interdisciplinary inpatient rehabilitation to include intensive skilled therapies (PT, OT, ST) as outlined with oversight and management by rehabilitation physician as well as inpatient rehab level nursing, case management and weekly interdisciplinary team meetings.       History of CVA (cerebrovascular accident)  Assessment & Plan  Hx of CVA's - 2022 - MRI 9/2022 - Acute infarcts in right frontal centrum semiovale and right posterior putamen. Small subacute infarct in left paramedian andreea.  Chronic lacunar infarcts in right corona radiata, bilateral basal ganglia, and bilateral thalami.  - Was to be on DAPT and statin but was not compliant    Secondary stroke prevention  - Antithrombotic: Full A/C and antiplatelet on hold for now   - Statin  - Optimal management of blood pressure   -  on importance of abstinence from smoking, alcohol, amphetamine  - Patient at increased risk for stroke particularly early in post-stroke period - monitor neuro exam closely with low threshold to repeat imaging  -  patient and if applicable caregiver on optimal stroke management  - Follow-up with neurology and PCP after d/c     Abdominal discomfort  Assessment & Plan  Still present at times   - Chronic cholecystitis - not currently sx candidate but may be in future - Gen Sx follow-up OP unless increased concerns in interm  - +Hep C - OP GI follow-up and treatment   - She does have area of older approaching ecchymosis and small superficial possible palpable hematoma in region as well from heparin shot which have been held with improving mobility    See management of transaminitis as outlined above   > AST/ALT uptrending but somewhat better 4/9   AP,  Tbili, Lipase wnl   - Comgmt with IM, GI, and Gen Sx  who are consulted  - Monitor vitals, labs, exam closely    - Avoid excessive acetaminophen; hold NSAIDs with possible GI issues and recent CVA  - Oxy 2.5mg Q6H PRN > care with hx of polysubtance abuse > wean off ASAP  - Robaxin 500mg TID for spasms as Gen Sx feels may have spasmodic component - ok per Gen Sx          Essential hypertension  Assessment & Plan  BP goal normotensive per discussion with IM who reviewed notes earlier   Internal medicine consulted and co-management with their service  Monitor vitals with and without activity; monitor for orthostasis  Monitor hemoglobin, electrolytes, kidney function, hydration status   Current meds: Amlodipine 10mg qday, HCTZ 12.5mg qday, lisinopril 40mg qday   PRN hydralazine       Amphetamine abuse (HCC)  Assessment & Plan  Was using several times per week   Drug cessation counseling and supportive counseling  Optimal mood/stress mgmt   Neuropsychology consult if available   CM assistance with HOST referral for addiction/substance abuse resources       Marijuana use  Assessment & Plan  Gabapentin [phone]   Cessation counseling and supportive counseling  Optimal mood/stress mgmt   CM to assist with HOST referral     Tobacco abuse  Assessment & Plan  Nicotine patch declined by patient   Smoking cessation and supportive counseling  Optimal mood/stress mgmt       Alcohol use  Assessment & Plan  Thiamine  Gabapentin [phone]   Alcohol cessation counseling and supportive counseling  Optimal mood/stress mgmt   Psychology consult if available        ROSANA (generalized anxiety disorder)  Assessment & Plan  Increased anxiety/frustration 4/11   - Provided supportive counseling   Mood Anxious at times    ROSANA with some acute anxiety/panic     Lexapro reduced to 5mg qday per IM with some transaminitis  Hydroxyzine 25mg TID PRN anxiety - not adequately helpful today   Ultra low dose ativan 0.25mg BID PRN as 2nd line  "  Gabapentin [id number] (also for hx of EtOH/amphetamine abuse)  Optimal sleep mgmt  Supportive counseling  Psychology consult while in ARC if available   Counseled on and continue to encourage deep breathing/relaxation/behavioral management techniques      Insomnia  Assessment & Plan  Controlled   - Sleep log   - Melatonin 3mg HS  - Gabapentin 300mg HS - for anxiety, polysubstance hx as well   - PRN hydroxyzine   - Recommend appropriate sleep hygiene  - Patient counselled/will be counselled on this when appropriate     - Sleep only as much as necessary to feel rested and then get up or sit up in bed, maintain regular sleep schedule (same bedtime and wake time everyday), do not force sleep, avoid caffeinated beverages after lunch, avoid alcohol at home near bedtime, do not smoke particularly in evening, do not go to bed hungry, adjust bedroom environment so you are comfortable prior to settling down for sleep, deal with concerns or worries before bedtime. Make a list of things to work on for next day so anxiety is reduced at night, exercise regularly when cleared by physician      Hepatitis C  Assessment & Plan  Per IM  \"HCV detected, viral load of 1,550,000.  GI consulted and following.  Follow-up with Hepatology as outpatient for treatment.\"       Chronic cholecystitis  Assessment & Plan  Per IM  \"HIDA scan on 4/10 showed EF 0%, gallbladder dysfunction.  Likely representing chronic cholecystitis.  Poor surgical candidate with recent cerebellar hemorrhage.  Will likely need elective surgery as outpatient.  Declined low fat diet.  Continue current pain regimen.  Will require follow-up with GI and General Surgery as outpatient.\"    Simple adnexal cyst greater than 1 cm in diameter in postmenopausal patient  Assessment & Plan  Per IM  - \"incidental finding on CT A/P   - simple appearing right adnexal cyst measuring 3.7 cm  - according to current guidelines, pt needs follow up pelvic US in 6-12 months  - follow " "up with OBGYN at discharge \"       Prolonged QT interval  Assessment & Plan  IM consulted and with overall management at their discretion during ARC course  Per IM  Qtc PTA up to 534 > repeat EKG 4/4 - QT prolonged but shortened from prior to 472 (this was on lexapro 20mg and some hydroxyzine)   - Nevertheless will decrease lexapro and hydroxyzine dosing some and limit prolonged Qtc agents as much as possible  - Monitor EKG intermittently     History of noncompliance with medical treatment  Assessment & Plan  Was not taking meds at home appropriately and following up with OP providers needed after last CVA  - Drug/EtOH cessation counseling  - Patient education      Hypomagnesemia  Assessment & Plan  Improved  Mag 1.9 (low normal) 4/10   Mag ox per IM   IM consulted, monitoring, repletion, and overall management at their discretion during ARC course      At risk for venous thromboembolism (VTE)  Assessment & Plan  SCDs, ambulation  (Holding heparin with improved mobility and some abdominal ecchymosis/small hematoma/pain)     Hyponatremia  Assessment & Plan  Improved 4/9-10    Neuropathy  Assessment & Plan  Significant b/l below knees chronic   - Risk of alcoholic neuropathy  - Alcohol cessation counseling  - OP Neuro EMG/NCS follow-up  - Fall precautions, PT, OT  - On gabapentin         Other Medical Issues:  Monitor for     Follow-up providers and other issues to be followed up after discharge  PCP  Neurology  Neurosurgery  HOST program referral    CODE: Level 1: Full Code    Restrictions include:  Fall precautions    Objective:    Allergies per EMR  Diagnostic Studies: Reviewed, no new imaging  CT head wo contrast   Final Result by Xavier Pierre MD (04/11 1015)      -New punctate foci of cortical hemorrhage versus small amount of subarachnoid hemorrhage within the posterior right sylvian fissure. Recommend short interval follow-up exam.      -Decreased size of hyperdense component of the left paramedian cerebellar " hemorrhage with mildly increased surrounding vasogenic edema as detailed above. Decreased hemorrhage within the fourth ventricle.      The study was marked in EPIC for immediate notification.                  Workstation performed: [id number]         NM Hepatobiliary w RX   Final Result by Luis Wu MD (04/10 1334)      1. Normal visualization of the gallbladder.      2. Abnormal contractile response of the gallbladder to cholecystokinin infusion, ( 0%), which in the appropriate clinical context suggest gallbladder dysfunction.         Resident: ISATU ELLIOTT I, the attending radiologist, have reviewed the images and agree with the final report above.      Workstation performed: [id number]         US right upper quadrant   Final Result by Michael Brooks MD (04/08 1604)      1. Cholelithiasis with borderline wall thickening. Correlate clinically to exclude early acute cholecystitis.   2. Slightly nodular liver contour as seen on recent CT, which may be associated with cirrhosis. Correlate clinically.      Workstation performed: [id number]         CT head wo contrast    (Results Pending)     See above as well    Laboratory: Labs reviewed  Current Facility-Administered Medications   Medication Dose Route Frequency Provider Last Rate    acetaminophen  488 mg Oral Q6H PRN Matias Jennings MD      amLODIPine  10 mg Oral Daily Matias Jennings MD      bisacodyl  10 mg Rectal Daily PRN Matias Jennings MD      calcium carbonate  500 mg Oral Daily PRN Ruth Ann Earl DO      chlorhexidine  15 mL Mouth/Throat Q12H [facility] Matias Jennings MD      Cholecalciferol  2,000 Units Oral Daily MILAN Suresh      escitalopram  5 mg Oral Daily MILAN Suresh      folic acid  1 mg Oral Daily Matias Jennings MD      gabapentin  100 mg Oral BID Matias Jennings MD      gabapentin  300 mg Oral HS Matias Jennings MD      hydrALAZINE  10 mg Oral Q6H PRN Matias Jennings MD       hydroCHLOROthiazide  12.5 mg Oral Daily Matias Jennings MD      hydrOXYzine HCL  25 mg Oral TID PRN Matias Jennings MD      lisinopril  40 mg Oral Daily Matias Jennings MD      loratadine  10 mg Oral Daily Matias Jennings MD      LORazepam  0.25 mg Oral BID PRN Matias Jennings MD      magnesium Oxide  800 mg Oral TID With Meals MILAN Suresh      melatonin  3 mg Oral HS Matias Jennings MD      methocarbamol  500 mg Oral TID PRN Matias Jennings MD      multivitamin-minerals  1 tablet Oral Daily Matias Jennings MD      oxyCODONE  2.5 mg Oral Q6H PRN Matias Jennings MD      pantoprazole  40 mg Oral BID EFFIE Salinas PA-C      polyethylene glycol  17 g Oral Daily PRN Matias Jennings MD      thiamine  100 mg Oral Daily Matias Jennings MD           acetaminophen    bisacodyl    calcium carbonate    hydrALAZINE    hydrOXYzine HCL    LORazepam    methocarbamol    oxyCODONE    polyethylene glycol      Drug regimen reviewed, all potential adverse effects identified and addressed:    Scheduled Meds:  Current Facility-Administered Medications   Medication Dose Route Frequency Provider Last Rate    acetaminophen  488 mg Oral Q6H PRN Matias Jennings MD      amLODIPine  10 mg Oral Daily Matias Jennings MD      bisacodyl  10 mg Rectal Daily PRN Matias Jennings MD      calcium carbonate  500 mg Oral Daily PRN Ruth Ann Earl DO      chlorhexidine  15 mL Mouth/Throat Q12H [facility] Matias Jennings MD      Cholecalciferol  2,000 Units Oral Daily MILAN Suresh      escitalopram  5 mg Oral Daily MILAN Suresh      folic acid  1 mg Oral Daily Matias Jennings MD      gabapentin  100 mg Oral BID Matias Jennings MD      gabapentin  300 mg Oral HS Matias Jennings MD      hydrALAZINE  10 mg Oral Q6H PRN Matias Jennings MD      hydroCHLOROthiazide  12.5 mg Oral Daily Matias Jennings MD      hydrOXYzine HCL  25 mg Oral TID PRN  Matias Jennings MD      lisinopril  40 mg Oral Daily Matias Jennings MD      loratadine  10 mg Oral Daily Matias Jennings MD      LORazepam  0.25 mg Oral BID PRN Matias Jennings MD      magnesium Oxide  800 mg Oral TID With Meals MILAN Suresh      melatonin  3 mg Oral HS Matias Jennings MD      methocarbamol  500 mg Oral TID PRN Matias Jennings MD      multivitamin-minerals  1 tablet Oral Daily Matias Jennings MD      oxyCODONE  2.5 mg Oral Q6H PRN Matias Jennings MD      pantoprazole  40 mg Oral BID AC Melina Salinas PA-C      polyethylene glycol  17 g Oral Daily PRN Matias Jennings MD      thiamine  100 mg Oral Daily Matias Jennings MD         Chief Complaints:  Rehab follow-up     Subjective:     On eval, patient reports still some RUQ pain at times.  She denies nausea, vomiting and reports eating some.  She denies worsening strength, balance, difficulty swallowing.  She reports increased anxiety and feeling down at times but still hopeful to get better.  She felt Atarax was not adequately helpful today.  She denied other complaints.    ROS: A 10 point ROS was performed; negative except as noted above.     Patient reviewed Hep C diagnoses by IM and myself including risk factors and safety precautions - has to follow-up with OP GI.      Physical Exam:  Vitals:    04/11/24 2059   BP: 124/74   Pulse: 65   Resp: 18   Temp: 98.2 °F (36.8 °C)   SpO2: 95%         GEN:  Lying in bed in NAD   HEENT/NECK: MMM  CARDIAC: Regular rate rhythm, no murmers, no rubs, no gallops  LUNGS:  clear to auscultation, no wheezes, rales, or rhonchi  ABDOMEN: Nondistended (deferred exam to IM)  EXTREMITIES/SKIN:  no calf edema, no calf tenderness to palpation  NEURO:   MENTAL STATUS: awake, oriented to person, place, time, and situation, MENTAL STATUS:  Appropriate wakefulness and interaction , CN II-XII: grossly intact , and Strength/MMT:  Near 5/5 throughout; L>R ataxia stable    PSYCH:  Affect:  Slightly down    HPI:  58-year-old female with a past medical history of ischemic strokes in 2022, hypertension, generalized anxiety disorder, smoking tobacco, alcohol use, marijuana use, methamphetamine abuse, medical noncompliance who presented to [facility] on 3/26/2024 with dizziness, nausea, and headache after recent fall and vomiting also accompanied by some diarrhea.  Patient noted to be ataxic on exam.  CT showed acute parenchymal hematoma in the left paramedian cerebellum with mass effect on the fourth ventricle with intraventricular extension of the hemorrhage with blood in the fourth ventricle and the left foramen of Luschka as well as some chronic lacunar infarcts.  Patient transferred to [facility] for neurosurgery oversight and evaluation.  Patient placed on EVD watch but ultimately did not need this.  Patient tells me she was not compliant with her blood thinners although she did receive DDAVP for aspirin reversal.  Neurology was consulted and suspected intracerebral hemorrhage related to hypertension and medication noncompliance with recent methamphetamine use.  Follow-up repeat CT head 7/3/2027 328 were stable.  Patient required multiple blood pressure medications and has blood pressure goal of less than 140 and ideally between 120 and 140 is much as possible.  Patient is to repeat CT head on 4/11 and per neurology if improving hemorrhage can resume Plavix without need for aspirin for secondary ischemic stroke prevention.  Neurosurgery would like to see patient in follow-up at about that same time also looking for stability and assess for delayed hydrocephalus.  Patient was evaluated by skilled therapies and was found to have significant decline in ADLs and ambulation and appears appropriate for admission to [facility].     ** Please Note: Fluency Direct voice to text software may have been used in the creation of this document.  **    50 minutes or greater spent for this encounter which included a combination of face-to-face time with the patient and non-face-to-face time which in part specifically includes management of CVA, mood, GI issues.  Face-to-face time included extended discussion with patient regarding current condition, medical history, mood, medical/rehabilitation management, and disposition.  Non face-to-face time included coordination of care with patient's co-managing AP and/or physician(s) thru communication and review of their recent documentation as well as reviewing vitals, bowel/bladder function, recent labs, and notes from therapy, CM, and nursing.  In addition, this patient was discussed by the interdisciplinary team in weekly case conference today. The care of the patient was extensively discussed with all care providers and an appropriate rehabilitation plan was formulated unique for this patient. Barriers were identified preventing progression of therapy and appropriate interventions were discussed with each discipline. Please see the team note for input from all disciplines regarding barriers, intervention, and discharge planning

## 2024-04-10 NOTE — PROGRESS NOTES
"Physical Medicine and Rehabilitation Progress Note  Tanika Lira 58 y.o. female MRN: [id number]  Unit/Bed#: -01 Encounter: [id number]      Assessment & Plan:     Decline in ADLs and mobility: Functional assessment - improving         FIM  Care Score  Admit Score Recent Score    Total assist  1-100% or 2p    Tot     Max assist 2-51-75%    Sub     Mod assist 3- 26-50%  Par Bathing    Min assist 3- 25% or < Par LBD    CG assist 4  TA     Sup/Setup 4-5 Sup To hygiene, UBD To hygiene, bathing, LBD   Mod-I/Indep 6 MI  UBD    Transfers  Mod-total assist  CGA assist     Ambulation   Significant assist   ft min assist     Stairs   Min assist  4 steps min assist      Goal: Mod indep for most ADLs and  for mobility  Major barriers: Imbalance, incoordination  Dispo: Home with [facility] 14 days from admission      Transaminitis  Assessment & Plan  Recently uptrending, 4/10 today slightly improving; still having some RUQ pain; pt afebrile, no leukocytosis   4/10 Dilcia, AFP wnl other serologies per GI pending    AST 57>88>114>130>119  ALT 65>107>144>168>162  AP/Tbili wnl   - Await follow-up HIDA scan  - Trend LFTs intermittently     GI follow-up 4/10  \"Chronically elevated liver enzymes, appears to have plateaued, hepatocellular pattern with history of alcohol use and Hepatitis C. Imaging suggestive of cirrhosis and gallstones. Synthetic liver function appear intact. No history of variceal bleeding, ascites, hepatic encephalopathy. Liver serologies ordered to rule out hereditary, autoimmune etiologies. DDx: DILI, gallbladder dz. Await HCV viral load. AFP pending, no hepatoma noted on imaging. Will need outpatient follow up. Avoid hepatotoxins including alcohol.  RUQ abdominal pain with nausea and cholelithiasis with possible early cholecystitis on imaging. Patient afebrile without leukocytosis. Pain not related to meals. HIDA pending. Appreciate surgical evaluation. Symptomatic treatment with Robaxin per primary " "service and surgery. Limit narcotics.\"       GI consult 4/9  \"Chronically elevated liver enzymes, rising over past several days, hepatocellular pattern with history of alcohol use and Hepatitis C. Imaging suggestive of cirrhosis and gallstones. Synthetic liver function appear intact. No history of variceal bleeding, ascites, hepatic encephalopathy. Will check other liver serologies to rule out hereditary, autoimmune etiologies. DDx: DILI, gallbladder dz. Await HCV viral load. Check AFP, no hepatoma noted on imaging. Will need outpatient follow up. Avoid hepatotoxins including alcohol.  RUQ abdominal pain with nausea and cholelithiasis with possible early cholecystitis on imaging. Patient afebrile without leukocytosis. Recommend surgical evaluation and HIDA. Discussed with Dr. Jennings. \"  Gen Sx consult   \"Ultrasound shows cholelithiasis without cholecystitis.  Abdominal pain not related to p.o. intake which makes biliary colic less likely.  With recent cerebellar hemorrhage general anesthesia would be very risky.  Recommend low-fat diet  She may benefit from muscle relaxants as abdominal wall tenderness was not localized to the right upper quadrant.  Would minimize narcotics.\"  - Reduced oxy   - Discussed with Gi and Lillie kwong        RUQ US 4/8  1. Cholelithiasis with borderline wall thickening. Correlate clinically to exclude early acute cholecystitis.  2. Slightly nodular liver contour as seen on recent CT, which may be associated with cirrhosis. Correlate clinically.      Per IM   \"Hepatitis C antibody + on 4/5.  Acute/chronic hepatitis panels and hepatitis C genotype pending.\"  Lipitor on hold.  Lexapro decreased to 5mg daily.  Avoid hepatotoxic medications as able.\"    Hx of alcohol use - alcohol cessation counseling  IM consulted and co-management with their team during ARC course  Monitor LFTs intermittently during course and after discharge  Med adjustments when indicated     CT A/P 3/26  IMPRESSION:  1. " Cholelithiasis, including a 3 mm gallstone within the gallbladder neck versus cystic duct. No findings of acute cholecystitis. If there is concern for acute cholecystitis, right upper quadrant ultrasound can be obtained.  2. Findings suggesting cirrhosis with mild mesenteric edema, but no ascites. Enlarged ana hepatic and peripancreatic lymph nodes measuring up to 2 cm in short axis, indeterminate, although could be reactive, given suspicion for cirrhosis.      * Cerebellar hemorrhage (HCC)  Assessment & Plan  Repeat CTH Thursday   Obtain MOCA cog screen     4/9 - neuro exam stable  4/8 - neuro exam stable, function, improving, continue tx in ARC setting   4/4 - improving ataxia on exam; tolerating ARC setting appropriately     - CTA HN - No left cerebellar vascular malformation to account for the patient's acute parenchymal hematoma. No cervical or intracranial large vessel occlusion, dissection or aneurysm. Mild bilateral cervical carotid bifurcation atherosclerotic disease.  - CTH 3/26 - Acute parenchymal hematoma left paramedian cerebellum measures 2.6 x 1.6 cm with mass effect on the fourth ventricle. Intraventricular extension of hemorrhage noted with blood in the fourth ventricle and left foramen of Luschka.  - Follow-up CTH 3/27 -  Redemonstration of hyperdense hemorrhage in the left paramedian cerebellum, as described with mass effect and extension into the fourth ventricle and left foramen of Luschka, similar in appearance to the prior.    No hydrocephalus.  - Follow-up CTH 3/28 - No significant interval changes    - Residual impairments on admission to ARC: Incoordination, imbalance, ataxia, possible dysphagia (assess for other impairments during ARC course)     Secondary stroke prevention  - Antithrombotic: Full AC and antiplatelet on hold   - Per neuro, Repeat CTH on Thursday 4/11 - if improving hemorrhage, patient can resume plavix 75mg daily for stroke prevention; no need for aspirin in terms of  "stroke prevention   - Follow-up with Nsx in 1-2 weeks with repeat CTH \"for stability and assess for delayed hydrocephalus. If stable/improved will defer remaining management and follow up with neurology\"  - Statin  - Optimal management of blood pressure   -  on importance of abstinence from smoking alcohol and amphetamine  - Patient at increased risk for stroke particularly early in post-stroke period - monitor neuro exam closely with low threshold to repeat imaging  -  patient and if applicable caregiver on optimal stroke management  - Follow-up with neurology and PCP after d/c   - Continue acute comprehensive interdisciplinary inpatient rehabilitation to include intensive skilled therapies (PT, OT, ST) as outlined with oversight and management by rehabilitation physician as well as inpatient rehab level nursing, case management and weekly interdisciplinary team meetings.       History of CVA (cerebrovascular accident)  Assessment & Plan  Hx of CVA's - 2022 - MRI 9/2022 - Acute infarcts in right frontal centrum semiovale and right posterior putamen. Small subacute infarct in left paramedian andreea.  Chronic lacunar infarcts in right corona radiata, bilateral basal ganglia, and bilateral thalami.  - Was to be on DAPT and statin but was not compliant    Secondary stroke prevention  - Antithrombotic: Full A/C and antiplatelet on hold for now   - Statin  - Optimal management of blood pressure   -  on importance of abstinence from smoking, alcohol, amphetamine  - Patient at increased risk for stroke particularly early in post-stroke period - monitor neuro exam closely with low threshold to repeat imaging  -  patient and if applicable caregiver on optimal stroke management  - Follow-up with neurology and PCP after d/c     Abdominal discomfort  Assessment & Plan  Still present  - Risk of cholecystitis and other causes   - She does have area of older approaching ecchymosis and small superficial " possible palpable hematoma in region as well from heparin shot which have been held with improving mobility    See management of transaminitis as outlined above   > AST/ALT uptrending but somewhat better 4/9   AP, Tbili, Lipase wnl   - Comgmt with IM, GI, and Gen Sx  who are consulted  - Monitor vitals, labs, exam closely    - Avoid excessive acetaminophen; hold NSAIDs with possible GI issues and recent CVA  - Oxy 2.5mg Q6H PRN > care with hx of polysubtance abuse > wean off ASAP  - Robaxin 500mg TID for spasms as Gen Sx feels may have spasmodic component - ok per Gen Sx          Essential hypertension  Assessment & Plan  Per neuro Goal SBP between 120 and 140 as much as possible  Internal medicine consulted and co-management with their service  Monitor vitals with and without activity; monitor for orthostasis  Monitor hemoglobin, electrolytes, kidney function, hydration status   Current meds: Amlodipine 10mg qday, HCTZ 12.5mg qday, lisinopril 40mg qday   PRN hydralazine       Amphetamine abuse (HCC)  Assessment & Plan  Was using several times per week   Drug cessation counseling and supportive counseling  Optimal mood/stress mgmt   Neuropsychology consult if available   CM assistance with [facility] referral for addiction/substance abuse resources       Marijuana use  Assessment & Plan  Gabapentin [phone]   Cessation counseling and supportive counseling  Optimal mood/stress mgmt   CM to assist with HOST referral     Tobacco abuse  Assessment & Plan  Nicotine patch declined by patient   Smoking cessation and supportive counseling  Optimal mood/stress mgmt       Alcohol use  Assessment & Plan  Thiamine  Gabapentin [phone]   Alcohol cessation counseling and supportive counseling  Optimal mood/stress mgmt   Psychology consult if available        ROSNAA (generalized anxiety disorder)  Assessment & Plan  Mood Anxious at times    ROSANA with some acute anxiety/panic     Lexapro reduced to 5mg qday per IM with some  "transaminitis  Hydroxyzine 25mg TID PRN anxiety - finds this quite helpful   Gabapentin [id number] (also for hx of EtOH/amphetamine abuse)  Optimal sleep mgmt  Supportive counseling  Psychology consult while in ARC if available   Counseled on and continue to encourage deep breathing/relaxation/behavioral management techniques      Insomnia  Assessment & Plan  Controlled   - Sleep log   - Melatonin 3mg HS  - Gabapentin 300mg HS - for anxiety, polysubstance hx as well   - PRN hydroxyzine   - Recommend appropriate sleep hygiene  - Patient counselled/will be counselled on this when appropriate     - Sleep only as much as necessary to feel rested and then get up or sit up in bed, maintain regular sleep schedule (same bedtime and wake time everyday), do not force sleep, avoid caffeinated beverages after lunch, avoid alcohol at home near bedtime, do not smoke particularly in evening, do not go to bed hungry, adjust bedroom environment so you are comfortable prior to settling down for sleep, deal with concerns or worries before bedtime. Make a list of things to work on for next day so anxiety is reduced at night, exercise regularly when cleared by physician      Simple adnexal cyst greater than 1 cm in diameter in postmenopausal patient  Assessment & Plan  Per IM  - \"incidental finding on CT A/P   - simple appearing right adnexal cyst measuring 3.7 cm  - according to current guidelines, pt needs follow up pelvic US in 6-12 months  - follow up with OBGYN at discharge \"       Prolonged QT interval  Assessment & Plan  IM consulted and with overall management at their discretion during ARC course  Per IM  Qtc PTA up to 534 > repeat EKG 4/4 - QT prolonged but shortened from prior to 472 (this was on lexapro 20mg and some hydroxyzine)   - Nevertheless will decrease lexapro and hydroxyzine dosing some and limit prolonged Qtc agents as much as possible  - Monitor EKG intermittently     History of noncompliance with medical " treatment  Assessment & Plan  Was not taking meds at home appropriately and following up with OP providers needed after last CVA  - Drug/EtOH cessation counseling  - Patient education      Hypomagnesemia  Assessment & Plan  Improved  Mag 1.9 (low normal) 4/10   Mag ox per IM   IM consulted, monitoring, repletion, and overall management at their discretion during ARC course      At risk for venous thromboembolism (VTE)  Assessment & Plan  SCDs, ambulation  (Holding heparin with improved mobility and some abdominal ecchymosis/small hematoma/pain)     Hyponatremia  Assessment & Plan  Improved 4/9-10    Neuropathy  Assessment & Plan  Significant b/l below knees chronic   - Risk of alcoholic neuropathy  - Alcohol cessation counseling  - OP Neuro EMG/NCS follow-up  - Fall precautions, PT, OT  - On gabapentin         Other Medical Issues:  Monitor for     Follow-up providers and other issues to be followed up after discharge  PCP  Neurology  Neurosurgery  HOST program referral    CODE: Level 1: Full Code    Restrictions include:  Fall precautions    Objective:    Allergies per EMR  Diagnostic Studies: Reviewed, no new imaging  US right upper quadrant   Final Result by Michael Brooks MD (04/08 1604)      1. Cholelithiasis with borderline wall thickening. Correlate clinically to exclude early acute cholecystitis.   2. Slightly nodular liver contour as seen on recent CT, which may be associated with cirrhosis. Correlate clinically.      Workstation performed: [id number]         NM Hepatobiliary w RX    (Results Pending)     See above as well    Laboratory: Labs reviewed  Current Facility-Administered Medications   Medication Dose Route Frequency Provider Last Rate    acetaminophen  488 mg Oral Q6H PRN Matias Jennings MD      amLODIPine  10 mg Oral Daily Matias Jennings MD      bisacodyl  10 mg Rectal Daily PRN Matias Jennings MD      calcium carbonate  500 mg Oral Daily PRN Ruth Ann Earl DO       chlorhexidine  15 mL Mouth/Throat Q12H [facility] Matias Jennings MD      Cholecalciferol  2,000 Units Oral Daily MILAN Suresh      escitalopram  5 mg Oral Daily MILAN Suresh      folic acid  1 mg Oral Daily Matias Jennings MD      gabapentin  100 mg Oral BID Matias Jennings MD      gabapentin  300 mg Oral HS Matias Jennings MD      hydrALAZINE  10 mg Oral Q6H PRN Matias Jennings MD      hydroCHLOROthiazide  12.5 mg Oral Daily Matias Jennings MD      hydrOXYzine HCL  25 mg Oral TID PRN Matias Jennings MD      lisinopril  40 mg Oral Daily Matias Jennings MD      loratadine  10 mg Oral Daily Matias Jennings MD      magnesium Oxide  800 mg Oral TID With Meals MILAN uSresh      melatonin  3 mg Oral HS Matias Jennings MD      methocarbamol  500 mg Oral TID PRN Matias Jennings MD      multivitamin-minerals  1 tablet Oral Daily Matias Jennings MD      oxyCODONE  2.5 mg Oral Q6H PRN Matias Jennings MD      pantoprazole  40 mg Oral BID EFFIE Salinas PA-C      polyethylene glycol  17 g Oral Daily PRN Matias Jennings MD      thiamine  100 mg Oral Daily Matias Jennings MD           acetaminophen    bisacodyl    calcium carbonate    hydrALAZINE    hydrOXYzine HCL    methocarbamol    oxyCODONE    polyethylene glycol      Drug regimen reviewed, all potential adverse effects identified and addressed:    Scheduled Meds:  Current Facility-Administered Medications   Medication Dose Route Frequency Provider Last Rate    acetaminophen  488 mg Oral Q6H PRN Matias Jennings MD      amLODIPine  10 mg Oral Daily Matias Jennings MD      bisacodyl  10 mg Rectal Daily PRN Matias Jennings MD      calcium carbonate  500 mg Oral Daily PRN Ruth Ann Earl DO      chlorhexidine  15 mL Mouth/Throat Q12H [facility] Matias Jennings MD      Cholecalciferol  2,000 Units Oral Daily MILAN Suresh      escitalopram  5 mg Oral Daily MILAN Suresh       folic acid  1 mg Oral Daily Matias Jennings MD      gabapentin  100 mg Oral BID Matias Jennings MD      gabapentin  300 mg Oral HS Matias Jennings MD      hydrALAZINE  10 mg Oral Q6H PRN Matias Jennings MD      hydroCHLOROthiazide  12.5 mg Oral Daily Matias Jennings MD      hydrOXYzine HCL  25 mg Oral TID PRN Matias Jennings MD      lisinopril  40 mg Oral Daily Matias Jennings MD      loratadine  10 mg Oral Daily Matias Jennings MD      magnesium Oxide  800 mg Oral TID With Meals MILAN Suresh      melatonin  3 mg Oral HS Matias Jennings MD      methocarbamol  500 mg Oral TID PRN Matias Jennings MD      multivitamin-minerals  1 tablet Oral Daily Matias Jennings MD      oxyCODONE  2.5 mg Oral Q6H PRN Matias Jennings MD      pantoprazole  40 mg Oral BID AC Melina Salinas PA-C      polyethylene glycol  17 g Oral Daily PRN Matias Jennings MD      thiamine  100 mg Oral Daily Matias Jennings MD         Chief Complaints:  Rehab follow-up     Subjective:     On eval, patient reports still having some RUQ abdominal pain and tenderness at times.  She denies lightheadedness, SOB, worsening strength, balance, headache, nausea, vomiting, constipation, fever, chills, or other new complaints.     ROS: A 10 point ROS was performed; negative except as noted above.       Physical Exam:  Vitals:    04/10/24 1230   BP: 124/85   Pulse: 74   Resp: 18   Temp: (!) 97.3 °F (36.3 °C)   SpO2: 94%         GEN:  Lying in bed in NAD   HEENT/NECK: MMM  CARDIAC: Regular rate rhythm, no murmers, no rubs, no gallops  LUNGS:  clear to auscultation, no wheezes, rales, or rhonchi  ABDOMEN: Soft, some TTP RUQ, some areas of older appearing ecchymosis and small superficial possible hematoma without increased warmth or erythema; normoactive  EXTREMITIES/SKIN:  no calf edema, no calf tenderness to palpation  NEURO:   MENTAL STATUS: awake, oriented to person, place, time,  and situation, MENTAL STATUS:  Appropriate wakefulness and interaction , CN II-XII: grossly intact , Strength/MMT:  Full, and FTN mildly impaired on L  PSYCH:  Affect:  Euthymic     HPI:  58-year-old female with a past medical history of ischemic strokes in 2022, hypertension, generalized anxiety disorder, smoking tobacco, alcohol use, marijuana use, methamphetamine abuse, medical noncompliance who presented to [facility] on 3/26/2024 with dizziness, nausea, and headache after recent fall and vomiting also accompanied by some diarrhea.  Patient noted to be ataxic on exam.  CT showed acute parenchymal hematoma in the left paramedian cerebellum with mass effect on the fourth ventricle with intraventricular extension of the hemorrhage with blood in the fourth ventricle and the left foramen of Luschka as well as some chronic lacunar infarcts.  Patient transferred to [facility] for neurosurgery oversight and evaluation.  Patient placed on EVD watch but ultimately did not need this.  Patient tells me she was not compliant with her blood thinners although she did receive DDAVP for aspirin reversal.  Neurology was consulted and suspected intracerebral hemorrhage related to hypertension and medication noncompliance with recent methamphetamine use.  Follow-up repeat CT head 7/3/2027 328 were stable.  Patient required multiple blood pressure medications and has blood pressure goal of less than 140 and ideally between 120 and 140 is much as possible.  Patient is to repeat CT head on 4/11 and per neurology if improving hemorrhage can resume Plavix without need for aspirin for secondary ischemic stroke prevention.  Neurosurgery would like to see patient in follow-up at about that same time also looking for stability and assess for delayed hydrocephalus.  Patient was evaluated by skilled therapies and was found to have significant decline in ADLs and ambulation and appears appropriate for admission to [facility]  [facility] [place].     ** Please Note: Fluency Direct voice to text software may have been used in the creation of this document. **

## 2024-04-10 NOTE — PROGRESS NOTES
"Patient Name: Tanika Lira  Patient MRN: [id number]  Date: 04/10/24  Service: [facility]    Subjective   Patient off floor for HIDA, not seen. Discussed with RN -  Narcotics given for pain this morning at 6am, was effective. Vitals stable. Last BM 4/9 described as soft/brown.    Vitals  Blood pressure 137/80, pulse 65, temperature 97.8 °F (36.6 °C), temperature source Tympanic, resp. rate 18, height 5' 6\" (1.676 m), weight 59.1 kg (130 lb 5 oz), SpO2 90%.  Physical Exam: patient off floor, not seen.      Laboratory Studies  Results from last 7 days   Lab Units 04/10/24  0543 04/08/24  0531 04/05/24  0603   WBC Thousand/uL 6.70 7.00 7.40   HEMOGLOBIN g/dL 13.0 13.1 14.1   HEMATOCRIT % 39.3 40.0 41.1   PLATELETS Thousands/uL 202 208 226     Results from last 7 days   Lab Units 04/09/24  0555 04/08/24  0531 04/05/24  0603   SODIUM mmol/L 135 134* 134*   POTASSIUM mmol/L 4.5 4.1 4.1   CHLORIDE mmol/L 98 99 100   CO2 mmol/L 30 28 25   BUN mg/dL 22 23 25   CREATININE mg/dL 0.71 0.61 0.62   CALCIUM mg/dL 9.2 9.0 9.2   ALBUMIN g/dL 3.3* 3.3* 3.4*   TOTAL BILIRUBIN mg/dL 0.37 0.38 0.41   ALK PHOS U/L 58 57 69   ALT U/L 168* 144* 107*   AST U/L 130* 114* 88*         Imaging and Other Studies      Inhouse Medications     Current Facility-Administered Medications:     acetaminophen (TYLENOL) tablet 488 mg, 488 mg, Oral, Q6H PRN    amLODIPine (NORVASC) tablet 10 mg, 10 mg, Oral, Daily, 10 mg at 04/09/24 0804    bisacodyl (DULCOLAX) rectal suppository 10 mg, 10 mg, Rectal, Daily PRN    calcium carbonate (TUMS) chewable tablet 500 mg, 500 mg, Oral, Daily PRN, 500 mg at 04/07/24 2219    chlorhexidine (PERIDEX) 0.12 % oral rinse 15 mL, 15 mL, Mouth/Throat, Q12H NAOMI, 15 mL at 04/09/24 2118    Cholecalciferol (VITAMIN D3) tablet 2,000 Units, 2,000 Units, Oral, Daily, 2,000 Units at 04/09/24 0804    escitalopram (LEXAPRO) tablet 5 mg, 5 mg, Oral, Daily, 5 mg at 04/09/24 0804    folic acid (FOLVITE) tablet 1 mg, 1 " mg, Oral, Daily, 1 mg at 04/09/24 0804    gabapentin (NEURONTIN) capsule 100 mg, 100 mg, Oral, BID, 100 mg at 04/10/24 0609    gabapentin (NEURONTIN) capsule 300 mg, 300 mg, Oral, HS, 300 mg at 04/09/24 2118    hydrALAZINE (APRESOLINE) tablet 10 mg, 10 mg, Oral, Q6H PRN, 10 mg at 04/06/24 0704    hydroCHLOROthiazide tablet 12.5 mg, 12.5 mg, Oral, Daily, 12.5 mg at 04/09/24 0804    hydrOXYzine HCL (ATARAX) tablet 25 mg, 25 mg, Oral, TID PRN, 25 mg at 04/08/24 1414    lisinopril (ZESTRIL) tablet 40 mg, 40 mg, Oral, Daily, 40 mg at 04/09/24 0804    loratadine (CLARITIN) tablet 10 mg, 10 mg, Oral, Daily, 10 mg at 04/09/24 0804    magnesium Oxide (MAG-OX) tablet 800 mg, 800 mg, Oral, TID With Meals, 800 mg at 04/09/24 1655    melatonin tablet 3 mg, 3 mg, Oral, HS, 3 mg at 04/09/24 2118    methocarbamol (ROBAXIN) tablet 500 mg, 500 mg, Oral, TID PRN    multivitamin-minerals (CENTRUM) tablet 1 tablet, 1 tablet, Oral, Daily, 1 tablet at 04/09/24 0804    oxyCODONE (ROXICODONE) split tablet 2.5 mg, 2.5 mg, Oral, Q6H PRN, 2.5 mg at 04/10/24 0609    pantoprazole (PROTONIX) EC tablet 40 mg, 40 mg, Oral, BID AC, 40 mg at 04/10/24 0609    polyethylene glycol (MIRALAX) packet 17 g, 17 g, Oral, Daily PRN    thiamine tablet 100 mg, 100 mg, Oral, Daily, 100 mg at 04/09/24 0804      Assessment/Plan:    Chronically elevated liver enzymes, appears to have plateaued, hepatocellular pattern with history of alcohol use and Hepatitis C. Imaging suggestive of cirrhosis and gallstones. Synthetic liver function appear intact. No history of variceal bleeding, ascites, hepatic encephalopathy. Liver serologies ordered to rule out hereditary, autoimmune etiologies. DDx: DILI, gallbladder dz. Await HCV viral load. AFP pending, no hepatoma noted on imaging. Will need outpatient follow up. Avoid hepatotoxins including alcohol.  RUQ abdominal pain with nausea and cholelithiasis with possible early cholecystitis on imaging. Patient afebrile without  leukocytosis. Pain not related to meals. HIDA pending. Appreciate surgical evaluation. Symptomatic treatment with Robaxin per primary service and surgery. Limit narcotics.      Melina Salinas PA-C

## 2024-04-10 NOTE — ASSESSMENT & PLAN NOTE
"Worsening.   (130),  (168), and alk phos stable.  CT abd/pelvis showed liver cirrhosis with mild mesenteric edema, subtle liver surface nodularity with mild hypertrophy of the lateral L paddock lobe, and widening of the fissure for the falciform ligament.  Cholelithiasis including a 3mm gallstone within the gallbladder neck vs cystic duct.  Complaints of abdominal pain over the weekend  RUQ US 4/8: \"Cholelithiasis with borderline wall thickening. Correlate clinically to exclude early acute cholecystitis.  Slightly nodular liver contour as seen on recent CT, which may be associated with cirrhosis\"  HIDA scan ordered for today to r/o acute cholecystitis.  General surgery consulted - not convinced to be acute cholecystitis and with recent hemorrhage would be high risk for anesthesia.  Change to low fat diet when no longer NPO.  Muscle relaxants for abdominal pain.  Lipitor on hold.  Lexapro decreased to 5mg daily.  Avoid hepatotoxic medications as able.  Hepatitis C antibody + on 4/5.  Hepatitis C genotype pending.  Other liver serologies pending to r/o autoimmune and hereditary causes.  Continue to trend routine CMP.  Follow-up with GI as outpatient.  "

## 2024-04-10 NOTE — PLAN OF CARE
Problem: METABOLIC, FLUID AND ELECTROLYTES - ADULT  Goal: Fluid balance maintained  Description: INTERVENTIONS:  - Monitor labs   - Monitor I/O and WT  - Instruct patient on fluid and nutrition as appropriate  - Assess for signs & symptoms of volume excess or deficit  Outcome: Progressing     Problem: MUSCULOSKELETAL - ADULT  Goal: Maintain or return mobility to safest level of function  Description: INTERVENTIONS:  - Assess patient's ability to carry out ADLs; assess patient's baseline for ADL function and identify physical deficits which impact ability to perform ADLs (bathing, care of mouth/teeth, toileting, grooming, dressing, etc.)  - Assess/evaluate cause of self-care deficits   - Assess range of motion  - Assess patient's mobility  - Assess patient's need for assistive devices and provide as appropriate  - Encourage maximum independence but intervene and supervise when necessary  - Involve family in performance of ADLs  - Assess for home care needs following discharge   - Consider OT consult to assist with ADL evaluation and planning for discharge  - Provide patient education as appropriate  Outcome: Progressing

## 2024-04-10 NOTE — PROGRESS NOTES
"[facility]  Progress Note  Name: Tanika Lira I  MRN: [id number]  Unit/Bed#: [facility] 266-01 I Date of Admission: 4/3/2024   Date of Service: 4/10/2024 I Hospital Day: 7    Assessment/Plan   Simple adnexal cyst greater than 1 cm in diameter in postmenopausal patient  Assessment & Plan  Incidental finding on CT A/P: simple appearing right adnexal cyst measuring 3.7 cm.  Follow-up pelvic US in 6-12 months.  Follow-up with PCP or OB/GYN as outpatient.    Prolonged QT interval  Assessment & Plan  Prolonged QTc of 534 on EKG from admission to acute setting.  EKG on admission to [facility] showed QTc of 472.  Currently on Lexapro.  Avoid QTc prolonging medications as able.  Repeat EKG as needed.    Transaminitis  Assessment & Plan  Worsening.   (130),  (168), and alk phos stable.  CT abd/pelvis showed liver cirrhosis with mild mesenteric edema, subtle liver surface nodularity with mild hypertrophy of the lateral L paddock lobe, and widening of the fissure for the falciform ligament.  Cholelithiasis including a 3mm gallstone within the gallbladder neck vs cystic duct.  Complaints of abdominal pain over the weekend  RUQ US 4/8: \"Cholelithiasis with borderline wall thickening. Correlate clinically to exclude early acute cholecystitis.  Slightly nodular liver contour as seen on recent CT, which may be associated with cirrhosis\"  HIDA scan ordered for today to r/o acute cholecystitis.  General surgery consulted - not convinced to be acute cholecystitis and with recent hemorrhage would be high risk for anesthesia.  Change to low fat diet when no longer NPO.  Muscle relaxants for abdominal pain.  Lipitor on hold.  Lexapro decreased to 5mg daily.  Avoid hepatotoxic medications as able.  Hepatitis C antibody + on 4/5.  Hepatitis C genotype pending.  Other liver serologies pending to r/o autoimmune and hereditary causes.  Continue to trend routine CMP.  Follow-up with GI as " outpatient.    Hypomagnesemia  Assessment & Plan  Improved, Mg currently 1.9.  Continue magnesium oxide 800mg TID.  Obtain repeat Mg next week.    Amphetamine abuse (HCC)  Assessment & Plan  Had been using prior to admission.  Encourage cessation.  Would benefit from HOST referral.    Insomnia  Assessment & Plan  Continue melatonin 3mg at HS.    Alcohol use  Assessment & Plan  Hx of alcohol abuse.  Continue thiamine and folic acid   Encourage alcohol cessation.    Neuropathy  Assessment & Plan  Continue gabapentin 100mg in AM, 100mg at lunch, and 300mg at HS.    ROSANA (generalized anxiety disorder)  Assessment & Plan  Previous Lexapro 10mg daily and Atarax 25mg Q8 PRN.  Supportive counseling as needed.  Consider Neuropsych consult.    Decreased Lexapro to 5mg daily on 4/8 due to transaminitis.    Essential hypertension  Assessment & Plan  Had not been taking medications at home.  Current regimen: amlodipine 10mg daily, lisinopril 40mg daily, and HCTZ 12.5mg daily.  Maintain normotension.  Monitor BP with routine VS.    Tobacco abuse  Assessment & Plan  Smokes less than a half a pack/day.  Encourage smoking cessation.  Declined nicotine patch.    History of CVA (cerebrovascular accident)  Assessment & Plan  Hx of CVA in 9/2022   MRI from 9/2022 showed acute infarcts in the R frontal centrum semiovale and R posterior putamen, small subacute infarct in the L paramedian andreea, and chronic lacunar infarcts in the R corona radiata, bilateral basal ganglia, and bilateral thalami.  Had been treated with DAPT originally but has been non-compliant with medications.  Restart Plavix pending stability of repeat CTH on 4/11.  Lipitor on hold due to elevated LFT     * Cerebellar hemorrhage (HCC)  Assessment & Plan  Presented to acute setting with acute nausea/vomiting/dizziness.  CTH - Acute parenchymal hematoma left paramedian cerebellum measures 2.6 x 1.6 cm with mass effect on the fourth ventricle. Intraventricular extension of  hemorrhage noted with blood in the fourth ventricle and left foramen of Luschka.  CTA head/neck - No left cerebellar vascular malformation to account for the patient's acute parenchymal hematoma. No cervical or intracranial large vessel occlusion, dissection or aneurysm. Mild bilateral cervical carotid bifurcation atherosclerotic disease.  Most recent CTH on 3/28 showed stability.    Maintain normotension.  Neurovascular checks Q shift.  Hold AC/AP at this time.    Repeat CTH on  - resume Plavix if stable.  Follow-up with Neurosurgery in 1-2 weeks with repeat CTH to assess stability/delayed hydrocephalus.  If stable - management will be deferred to Neurology.    Primary team following.  PT/OT/ST.         VTE Pharmacologic Prophylaxis:   Pharmacologic:  none - walking > 300 ft.  Mechanical VTE Prophylaxis in Place: Yes - sequential compression devices.    Current Length of Stay: 7 day(s)    Current Patient Status: Inpatient Rehab     Discharge Plan: As per primary team.    Code Status: Level 1 - Full Code    Subjective:   Pt examined while pt sitting in bed in pt room.  Still has tenderness and intermittent sharp pains to the RUQ.  Has pain currently - had just eaten turkey, broccoli/peppers, and salad.  Denies any nausea, vomiting, or diarrhea.  Had a BM yesterday without any issues.  Slept decently last night until being woken up for blood work.  Anxiety seems to be improving with deep breathing exercises.    Objective:     Vitals:   Temp (24hrs), Av.3 °F (36.8 °C), Min:97.8 °F (36.6 °C), Max:98.8 °F (37.1 °C)    Temp:  [97.8 °F (36.6 °C)-98.8 °F (37.1 °C)] 97.8 °F (36.6 °C)  HR:  [64-65] 65  Resp:  [18] 18  BP: (116-137)/(70-80) 132/70  SpO2:  [90 %] 90 %  Body mass index is 21.03 kg/m².     Review of Systems   Constitutional:  Negative for appetite change, chills, fatigue and fever.   HENT:  Negative for trouble swallowing.    Eyes:  Negative for visual disturbance.   Respiratory:  Negative for cough,  chest tightness, shortness of breath, wheezing and stridor.    Cardiovascular:  Negative for chest pain, palpitations and leg swelling.   Gastrointestinal:  Positive for abdominal pain (RUQ intermittent sharp pains, tenderness). Negative for abdominal distention, constipation, diarrhea, nausea and vomiting.        LBM 4/9   Genitourinary:  Negative for difficulty urinating.   Musculoskeletal:  Positive for gait problem. Negative for arthralgias and back pain.   Neurological:  Negative for dizziness, weakness, light-headedness, numbness and headaches.   Psychiatric/Behavioral:  Negative for dysphoric mood and sleep disturbance. The patient is nervous/anxious (improving).    All other systems reviewed and are negative.       Input and Output Summary (last 24 hours):     No intake or output data in the 24 hours ending 04/10/24 0918    Physical Exam:     Physical Exam  Vitals and nursing note reviewed.   Constitutional:       General: She is not in acute distress.     Appearance: Normal appearance. She is not ill-appearing.   HENT:      Head: Normocephalic and atraumatic.   Cardiovascular:      Rate and Rhythm: Normal rate and regular rhythm.      Pulses: Normal pulses.      Heart sounds: Murmur heard.      Systolic murmur is present with a grade of 1/6.      No friction rub.   Pulmonary:      Effort: Pulmonary effort is normal. No respiratory distress.      Breath sounds: Normal breath sounds. No wheezing or rhonchi.   Abdominal:      General: Abdomen is flat. Bowel sounds are normal. There is no distension.      Palpations: Abdomen is soft. There is no mass.      Tenderness: There is abdominal tenderness in the right upper quadrant and right lower quadrant. There is no guarding or rebound.      Hernia: No hernia is present.      Comments: Bruising and small hematoma to RLQ   Musculoskeletal:      Cervical back: Normal range of motion and neck supple. No tenderness.      Right lower leg: No edema.      Left lower leg:  No edema.   Skin:     General: Skin is warm and dry.      Capillary Refill: Capillary refill takes less than 2 seconds.   Neurological:      Mental Status: She is alert and oriented to person, place, and time.   Psychiatric:         Mood and Affect: Mood normal.         Behavior: Behavior normal.         Additional Data:     Labs:    Results from last 7 days   Lab Units 04/10/24  0543   WBC Thousand/uL 6.70   HEMOGLOBIN g/dL 13.0   HEMATOCRIT % 39.3   PLATELETS Thousands/uL 202   SEGS PCT % 46   LYMPHO PCT % 40   MONO PCT % 12   EOS PCT % 2     Results from last 7 days   Lab Units 04/10/24  0543   SODIUM mmol/L 135   POTASSIUM mmol/L 4.3   CHLORIDE mmol/L 99   CO2 mmol/L 29   BUN mg/dL 26*   CREATININE mg/dL 0.66   ANION GAP mmol/L 7   CALCIUM mg/dL 8.9   ALBUMIN g/dL 3.4*   TOTAL BILIRUBIN mg/dL 0.29   ALK PHOS U/L 55   ALT U/L 162*   AST U/L 119*   GLUCOSE RANDOM mg/dL 103                       Labs reviewed    Imaging:    Imaging reviewed    Recent Cultures (last 7 days):           Last 24 Hours Medication List:   Current Facility-Administered Medications   Medication Dose Route Frequency Provider Last Rate    acetaminophen  488 mg Oral Q6H PRN Matias Jennings MD      amLODIPine  10 mg Oral Daily Matias Jennings MD      bisacodyl  10 mg Rectal Daily PRN Matias Jennings MD      calcium carbonate  500 mg Oral Daily PRN Ruth Ann Earl DO      chlorhexidine  15 mL Mouth/Throat Q12H [facility] Matias Jennings MD      Cholecalciferol  2,000 Units Oral Daily MILAN Suresh      escitalopram  5 mg Oral Daily MIALN Suresh      folic acid  1 mg Oral Daily Matias Jennings MD      gabapentin  100 mg Oral BID Matias Jennings MD      gabapentin  300 mg Oral HS Matias Jennings MD      hydrALAZINE  10 mg Oral Q6H PRN Matias Jennings MD      hydroCHLOROthiazide  12.5 mg Oral Daily Matias Jennings MD      hydrOXYzine HCL  25 mg Oral TID PRN Matias Jennings MD      lisinopril  40  mg Oral Daily Matias Jennings MD      loratadine  10 mg Oral Daily Matias Jennings MD      magnesium Oxide  800 mg Oral TID With Meals MILAN Suresh      melatonin  3 mg Oral HS Matias Jennings MD      methocarbamol  500 mg Oral TID PRN Matias Jennings MD      multivitamin-minerals  1 tablet Oral Daily Matias Jennings MD      oxyCODONE  2.5 mg Oral Q6H PRN Matias Jennings MD      pantoprazole  40 mg Oral BID AC Melina Salinas PA-C      polyethylene glycol  17 g Oral Daily PRN Matias Jennings MD      thiamine  100 mg Oral Daily Matias Jennings MD          M*Modal software was used to dictate this note.  It may contain errors with dictating incorrect words or incorrect spelling. Please contact the provider directly with any questions.

## 2024-04-11 ENCOUNTER — APPOINTMENT (INPATIENT)
Dept: CT IMAGING | Facility: HOSPITAL | Age: 59
DRG: 058 | End: 2024-04-11
Payer: COMMERCIAL

## 2024-04-11 PROBLEM — B19.20 HEPATITIS C: Status: ACTIVE | Noted: 2024-04-11

## 2024-04-11 PROBLEM — K81.1 CHRONIC CHOLECYSTITIS: Status: ACTIVE | Noted: 2024-04-11

## 2024-04-11 LAB
CERULOPLASMIN SERPL-MCNC: 22.9 MG/DL (ref 19–39)
HCV GENTYP SERPL NAA+PROBE: NORMAL

## 2024-04-11 PROCEDURE — 99232 SBSQ HOSP IP/OBS MODERATE 35: CPT | Performed by: INTERNAL MEDICINE

## 2024-04-11 PROCEDURE — 70450 CT HEAD/BRAIN W/O DYE: CPT

## 2024-04-11 PROCEDURE — NC001 PR NO CHARGE: Performed by: PSYCHIATRY & NEUROLOGY

## 2024-04-11 PROCEDURE — 99233 SBSQ HOSP IP/OBS HIGH 50: CPT

## 2024-04-11 RX ORDER — LORAZEPAM 0.5 MG/1
0.25 TABLET ORAL 2 TIMES DAILY PRN
Status: DISCONTINUED | OUTPATIENT
Start: 2024-04-11 | End: 2024-04-16 | Stop reason: HOSPADM

## 2024-04-11 RX ADMIN — Medication 800 MG: at 08:40

## 2024-04-11 RX ADMIN — Medication 800 MG: at 17:00

## 2024-04-11 RX ADMIN — GABAPENTIN 100 MG: 100 CAPSULE ORAL at 14:17

## 2024-04-11 RX ADMIN — Medication 800 MG: at 11:55

## 2024-04-11 RX ADMIN — GABAPENTIN 300 MG: 300 CAPSULE ORAL at 21:00

## 2024-04-11 RX ADMIN — HYDROXYZINE HYDROCHLORIDE 25 MG: 25 TABLET, FILM COATED ORAL at 20:50

## 2024-04-11 RX ADMIN — CHLORHEXIDINE GLUCONATE 0.12% ORAL RINSE 15 ML: 1.2 LIQUID ORAL at 08:42

## 2024-04-11 RX ADMIN — Medication 2.5 MG: at 08:47

## 2024-04-11 RX ADMIN — THIAMINE HCL TAB 100 MG 100 MG: 100 TAB at 08:41

## 2024-04-11 RX ADMIN — Medication 2000 UNITS: at 08:41

## 2024-04-11 RX ADMIN — MULTIPLE VITAMINS W/ MINERALS TAB 1 TABLET: TAB ORAL at 08:41

## 2024-04-11 RX ADMIN — LORATADINE 10 MG: 10 TABLET ORAL at 08:41

## 2024-04-11 RX ADMIN — LISINOPRIL 40 MG: 20 TABLET ORAL at 08:41

## 2024-04-11 RX ADMIN — AMLODIPINE BESYLATE 10 MG: 10 TABLET ORAL at 08:41

## 2024-04-11 RX ADMIN — HYDROXYZINE HYDROCHLORIDE 25 MG: 25 TABLET, FILM COATED ORAL at 08:42

## 2024-04-11 RX ADMIN — PANTOPRAZOLE SODIUM 40 MG: 40 TABLET, DELAYED RELEASE ORAL at 06:08

## 2024-04-11 RX ADMIN — FOLIC ACID 1 MG: 1 TABLET ORAL at 08:41

## 2024-04-11 RX ADMIN — HYDROCHLOROTHIAZIDE 12.5 MG: 12.5 TABLET ORAL at 08:42

## 2024-04-11 RX ADMIN — LORAZEPAM 0.25 MG: 0.5 TABLET ORAL at 10:50

## 2024-04-11 RX ADMIN — CHLORHEXIDINE GLUCONATE 0.12% ORAL RINSE 15 ML: 1.2 LIQUID ORAL at 20:50

## 2024-04-11 RX ADMIN — PANTOPRAZOLE SODIUM 40 MG: 40 TABLET, DELAYED RELEASE ORAL at 17:00

## 2024-04-11 RX ADMIN — MELATONIN TAB 3 MG 3 MG: 3 TAB at 20:50

## 2024-04-11 RX ADMIN — GABAPENTIN 100 MG: 100 CAPSULE ORAL at 06:08

## 2024-04-11 RX ADMIN — ESCITALOPRAM OXALATE 5 MG: 5 TABLET, FILM COATED ORAL at 08:41

## 2024-04-11 NOTE — ASSESSMENT & PLAN NOTE
Hx of CVA in 9/2022   MRI from 9/2022 showed acute infarcts in the R frontal centrum semiovale and R posterior putamen, small subacute infarct in the L paramedian andreea, and chronic lacunar infarcts in the R corona radiata, bilateral basal ganglia, and bilateral thalami.  Had been treated with DAPT originally but has been non-compliant with medications.  Restart Plavix pending stability of repeat CTH on 4/11.  Lipitor on hold due to elevated LFTs.

## 2024-04-11 NOTE — ASSESSMENT & PLAN NOTE
"Per IM  \"HCV detected, viral load of 1,550,000.  GI consulted and following.  Follow-up with Hepatology as outpatient for treatment.\"     "

## 2024-04-11 NOTE — ASSESSMENT & PLAN NOTE
Prolonged QTc of 534 on EKG from admission to acute setting.  EKG on admission to [facility] showed QTc of 472.  Currently on Lexapro.  Avoid QTc prolonging medications as able.  Repeat EKG as needed.

## 2024-04-11 NOTE — ASSESSMENT & PLAN NOTE
HIDA scan on 4/10 showed EF 0%, gallbladder dysfunction.  Likely representing chronic cholecystitis.  Poor surgical candidate with recent cerebellar hemorrhage.  Will likely need elective surgery as outpatient.  Declined low fat diet.  Continue current pain regimen.  Will require follow-up with GI and General Surgery as outpatient.

## 2024-04-11 NOTE — CONSULTS
Asked by team to review new CT head after report read:    -New punctate foci of cortical hemorrhage versus small amount of subarachnoid hemorrhage within the posterior right sylvian fissure. Recommend short interval follow-up exam.     Reviewed the imaging. There is a punctate hyperdensity in one cut only 2/29, without a clear significance. Actually a comparison to CT head on 3/27 may show even the same foci in series 304, image 43/76.    Team can repeat CT head tomorrow to ensure stability, and if so, follow plan as per discharge summary on 4/3

## 2024-04-11 NOTE — ASSESSMENT & PLAN NOTE
Presented to acute setting with acute nausea/vomiting/dizziness.  CTH - Acute parenchymal hematoma left paramedian cerebellum measures 2.6 x 1.6 cm with mass effect on the fourth ventricle. Intraventricular extension of hemorrhage noted with blood in the fourth ventricle and left foramen of Luschka.  CTA head/neck - No left cerebellar vascular malformation to account for the patient's acute parenchymal hematoma. No cervical or intracranial large vessel occlusion, dissection or aneurysm. Mild bilateral cervical carotid bifurcation atherosclerotic disease.  Most recent CTH on 3/28 showed stability.    Maintain normotension.  Neurovascular checks Q shift.  Hold AC/AP at this time.    Repeat CTH on 4/11 - resume Plavix if stable.  Follow-up with Neurosurgery in 1-2 weeks with repeat CTH to assess stability/delayed hydrocephalus.  If stable - management will be deferred to Neurology.    Primary team following.  PT/OT/ST.    Repeat CTH ordered for today.

## 2024-04-11 NOTE — PLAN OF CARE
Problem: RESPIRATORY - ADULT  Goal: Achieves optimal ventilation and oxygenation  Description: INTERVENTIONS:  - Assess for changes in respiratory status  - Assess for changes in mentation and behavior  - Position to facilitate oxygenation and minimize respiratory effort  - Oxygen administered by appropriate delivery if ordered  - Initiate smoking cessation education as indicated  - Encourage broncho-pulmonary hygiene including cough, deep breathe, Incentive Spirometry  - Assess the need for suctioning and aspirate as needed  - Assess and instruct to report SOB or any respiratory difficulty  - Respiratory Therapy support as indicated  Outcome: Progressing     Problem: GASTROINTESTINAL - ADULT  Goal: Maintains adequate nutritional intake  Description: INTERVENTIONS:  - Monitor percentage of each meal consumed  - Identify factors contributing to decreased intake, treat as appropriate  - Assist with meals as needed  - Monitor I&O, weight, and lab values if indicated  - Obtain nutrition services referral as needed  Outcome: Progressing

## 2024-04-11 NOTE — PROGRESS NOTES
"Patient Name: Tanika Lira  Patient MRN: [id number]  Date: 04/11/24  Service: [facility]    Subjective   Lab, notes reviewed. Vitals stable. Reviewed Hep C lab results and plan. Patient does complain of more constant RUQ abdominal pain. Refused low fat diet, changed back to regular diet yesterday. Last BM yesterday described as normal.     Vitals  Blood pressure 142/89, pulse 66, temperature 97.9 °F (36.6 °C), temperature source Tympanic, resp. rate 18, height 5' 6\" (1.676 m), weight 59.1 kg (130 lb 5 oz), SpO2 95%.  Physical Exam:     General Appearance:    Awake, alert, oriented x3, no distress, well developed, well    nourished   Head:    Normocephalic without obvious abnormality   Eyes:    PERRL, conjunctiva/corneas clear, EOM's intact        Neck:   Supple, no adenopathy   Throat:   Mucous membranes moist   Lungs:     Clear to auscultation bilaterally, no wheezing or rhonchi   Heart:    Regular rate and rhythm, S1 and S2 normal, no murmur   Abdomen:     Soft, +R-sided abdominal ttp, non-distended. bowel sounds active. No masses, rebound +guarding.    Extremities:   Extremities without edema   Psych    Derm:   Initially irritable but mood improved once aware I was with GI not PT    No jaundice   Neurologic:   CNII-XII grossly intact. Speech intact         Laboratory Studies  Results from last 7 days   Lab Units 04/10/24  0543 04/08/24  0531 04/05/24  0603   WBC Thousand/uL 6.70 7.00 7.40   HEMOGLOBIN g/dL 13.0 13.1 14.1   HEMATOCRIT % 39.3 40.0 41.1   PLATELETS Thousands/uL 202 208 226     Results from last 7 days   Lab Units 04/10/24  0543 04/09/24  0555 04/08/24  0531 04/05/24  0603   SODIUM mmol/L 135 135 134* 134*   POTASSIUM mmol/L 4.3 4.5 4.1 4.1   CHLORIDE mmol/L 99 98 99 100   CO2 mmol/L 29 30 28 25   BUN mg/dL 26* 22 23 25   CREATININE mg/dL 0.66 0.71 0.61 0.62   CALCIUM mg/dL 8.9 9.2 9.0 9.2   ALBUMIN g/dL 3.4* 3.3* 3.3* 3.4*   TOTAL BILIRUBIN mg/dL 0.29 0.37 0.38 0.41   ALK PHOS " U/L 55 58 57 69   ALT U/L 162* 168* 144* 107*   AST U/L 119* 130* 114* 88*     MARCY negative  AMA pending  ASMA pending  AFP 7.13  Ceruloplasmin pending  A1AT pending  Iron panel: iron 84, ferritin 107, 24% sat, TIBC 343  HCV viral load 1,550,000 IU/mL    Imaging and Other Studies      Inhouse Medications     Current Facility-Administered Medications:     acetaminophen (TYLENOL) tablet 488 mg, 488 mg, Oral, Q6H PRN    amLODIPine (NORVASC) tablet 10 mg, 10 mg, Oral, Daily, 10 mg at 04/10/24 0801    bisacodyl (DULCOLAX) rectal suppository 10 mg, 10 mg, Rectal, Daily PRN    calcium carbonate (TUMS) chewable tablet 500 mg, 500 mg, Oral, Daily PRN, 500 mg at 04/07/24 2219    chlorhexidine (PERIDEX) 0.12 % oral rinse 15 mL, 15 mL, Mouth/Throat, Q12H NAOMI, 15 mL at 04/10/24 2121    Cholecalciferol (VITAMIN D3) tablet 2,000 Units, 2,000 Units, Oral, Daily, 2,000 Units at 04/10/24 1237    escitalopram (LEXAPRO) tablet 5 mg, 5 mg, Oral, Daily, 5 mg at 04/10/24 0801    folic acid (FOLVITE) tablet 1 mg, 1 mg, Oral, Daily, 1 mg at 04/10/24 1237    gabapentin (NEURONTIN) capsule 100 mg, 100 mg, Oral, BID, 100 mg at 04/11/24 0608    gabapentin (NEURONTIN) capsule 300 mg, 300 mg, Oral, HS, 300 mg at 04/10/24 2121    hydrALAZINE (APRESOLINE) tablet 10 mg, 10 mg, Oral, Q6H PRN, 10 mg at 04/06/24 0704    hydroCHLOROthiazide tablet 12.5 mg, 12.5 mg, Oral, Daily, 12.5 mg at 04/10/24 1237    hydrOXYzine HCL (ATARAX) tablet 25 mg, 25 mg, Oral, TID PRN, 25 mg at 04/08/24 1414    lisinopril (ZESTRIL) tablet 40 mg, 40 mg, Oral, Daily, 40 mg at 04/10/24 0801    loratadine (CLARITIN) tablet 10 mg, 10 mg, Oral, Daily, 10 mg at 04/10/24 1237    magnesium Oxide (MAG-OX) tablet 800 mg, 800 mg, Oral, TID With Meals, 800 mg at 04/10/24 1739    melatonin tablet 3 mg, 3 mg, Oral, HS, 3 mg at 04/10/24 2121    methocarbamol (ROBAXIN) tablet 500 mg, 500 mg, Oral, TID PRN, 500 mg at 04/10/24 2121    multivitamin-minerals (CENTRUM) tablet 1 tablet, 1 tablet,  Oral, Daily, 1 tablet at 04/10/24 1237    oxyCODONE (ROXICODONE) split tablet 2.5 mg, 2.5 mg, Oral, Q6H PRN, 2.5 mg at 04/10/24 0609    pantoprazole (PROTONIX) EC tablet 40 mg, 40 mg, Oral, BID AC, 40 mg at 04/11/24 0608    polyethylene glycol (MIRALAX) packet 17 g, 17 g, Oral, Daily PRN    thiamine tablet 100 mg, 100 mg, Oral, Daily, 100 mg at 04/10/24 1238      Assessment/Plan:    Chronically elevated liver enzymes, appears to have plateaued, hepatocellular pattern with history of alcohol use and Hepatitis C. AM LFTs pending. Imaging suggestive of cirrhosis. Synthetic liver function appear intact. No history of variceal bleeding, ascites, hepatic encephalopathy. Liver serologies ordered to rule out hereditary, autoimmune etiologies, so far negative. HCV detected with viral load ([id number]/mL). AFP normal, no hepatoma noted on imaging. Will need outpatient follow up for HCV treatment. Avoid hepatotoxins including alcohol.  RUQ abdominal pain with nausea and cholelithiasis with possible early cholecystitis on imaging. Patient afebrile without leukocytosis. HIDA EF 0% c/w gallbladder dysfunction/chronic cholecystitis. Poor surgical candidate with recent hemorrhage, elective surgery will likely need to be delayed. Surgery following.           Melina Salinas PA-C

## 2024-04-11 NOTE — PROGRESS NOTES
[facility]  Progress Note  Name: Tanika Lira I  MRN: [id number]  Unit/Bed#: [facility] 266-01 I Date of Admission: 4/3/2024   Date of Service: 4/11/2024 I Hospital Day: 8    Assessment/Plan   Hepatitis C  Assessment & Plan  HCV detected, viral load of 1,550,000.  GI consulted and following.  Follow-up with Hepatology as outpatient for treatment.    Chronic cholecystitis  Assessment & Plan  HIDA scan on 4/10 showed EF 0%, gallbladder dysfunction.  Likely representing chronic cholecystitis.  Poor surgical candidate with recent cerebellar hemorrhage.  Will likely need elective surgery as outpatient.  Declined low fat diet.  Continue current pain regimen.  Will require follow-up with GI and General Surgery as outpatient.    Abdominal discomfort  Assessment & Plan  Complaints of abdominal pain over the weekend.  EKG obtained and was unremarkable.  Started on Pepcid 20mg BID.  RUQ US ordered due to worsening transaminitis.    Simple adnexal cyst greater than 1 cm in diameter in postmenopausal patient  Assessment & Plan  Incidental finding on CT A/P: simple appearing right adnexal cyst measuring 3.7 cm.  Follow-up pelvic US in 6-12 months.  Follow-up with PCP or OB/GYN as outpatient.    Prolonged QT interval  Assessment & Plan  Prolonged QTc of 534 on EKG from admission to acute setting.  EKG on admission to [facility] showed QTc of 472.  Currently on Lexapro.  Avoid QTc prolonging medications as able.  Repeat EKG as needed.    Transaminitis  Assessment & Plan   (130),  (168), and alk phos stable.  Repeat hepatic panel for today per GI.  CT abd/pelvis showed liver cirrhosis with mild mesenteric edema, subtle liver surface nodularity with mild hypertrophy of the lateral L paddock lobe, and widening of the fissure for the falciform ligament.  Cholelithiasis including a 3mm gallstone within the gallbladder neck vs cystic duct.  Complaints of abdominal pain over the weekend  RUQ US 4/8:  "\"Cholelithiasis with borderline wall thickening. Correlate clinically to exclude early acute cholecystitis.  Slightly nodular liver contour as seen on recent CT, which may be associated with cirrhosis\"  HIDA scan on 4/10 showed EF of 0% with gallbladder dysfunction.  Likely chronic cholecystitis.  Recommending elective surgical intervention once recovered from recent cerebellar hemorrhage.  Lipitor on hold.  Lexapro decreased to 5mg daily.  Avoid hepatotoxic agents including alcohol.  Hepatitis C antibody + on 4/5.  HCV detected, viral load of 1,550,000.      Will require GI/General Surgery follow-up as outpatient.    Hypomagnesemia  Assessment & Plan  Improved, Mg currently 1.9.  Continue magnesium oxide 800mg TID.  Obtain repeat Mg next week.    Amphetamine abuse (HCC)  Assessment & Plan  Had been using prior to admission.  Encourage cessation.  Would benefit from HOST referral.    Insomnia  Assessment & Plan  Continue melatonin 3mg at HS.    Alcohol use  Assessment & Plan  Hx of alcohol abuse.  Continue thiamine and folic acid   Encourage alcohol cessation.    Neuropathy  Assessment & Plan  Continue gabapentin 100mg in AM, 100mg at lunch, and 300mg at HS.    ROSANA (generalized anxiety disorder)  Assessment & Plan  Previous Lexapro 10mg daily and Atarax 25mg Q8 PRN.  Supportive counseling as needed.  Consider Neuropsych consult.    Decreased Lexapro to 5mg daily on 4/8 due to transaminitis.    Essential hypertension  Assessment & Plan  Had not been taking medications at home.  Current regimen: amlodipine 10mg daily, lisinopril 40mg daily, and HCTZ 12.5mg daily.  Maintain normotension.  Monitor BP with routine VS.    Tobacco abuse  Assessment & Plan  Smokes less than a half a pack/day.  Encourage smoking cessation.  Declined nicotine patch.    History of CVA (cerebrovascular accident)  Assessment & Plan  Hx of CVA in 9/2022   MRI from 9/2022 showed acute infarcts in the R frontal centrum semiovale and R posterior " putamen, small subacute infarct in the L paramedian andreea, and chronic lacunar infarcts in the R corona radiata, bilateral basal ganglia, and bilateral thalami.  Had been treated with DAPT originally but has been non-compliant with medications.  Restart Plavix pending stability of repeat CTH on 4/11.  Lipitor on hold due to elevated LFTs.    * Cerebellar hemorrhage (HCC)  Assessment & Plan  Presented to acute setting with acute nausea/vomiting/dizziness.  CTH - Acute parenchymal hematoma left paramedian cerebellum measures 2.6 x 1.6 cm with mass effect on the fourth ventricle. Intraventricular extension of hemorrhage noted with blood in the fourth ventricle and left foramen of Luschka.  CTA head/neck - No left cerebellar vascular malformation to account for the patient's acute parenchymal hematoma. No cervical or intracranial large vessel occlusion, dissection or aneurysm. Mild bilateral cervical carotid bifurcation atherosclerotic disease.  Most recent CTH on 3/28 showed stability.    Maintain normotension.  Neurovascular checks Q shift.  Hold AC/AP at this time.    Repeat CTH on 4/11 - resume Plavix if stable.  Follow-up with Neurosurgery in 1-2 weeks with repeat CTH to assess stability/delayed hydrocephalus.  If stable - management will be deferred to Neurology.    Primary team following.  PT/OT/ST.    Repeat CTH ordered for today.         VTE Pharmacologic Prophylaxis:   Pharmacologic:  none - walking > 300 ft  Mechanical VTE Prophylaxis in Place: Yes - sequential compression devices.    Current Length of Stay: 8 day(s)    Current Patient Status: Inpatient Rehab     Discharge Plan: As per primary team.    Code Status: Level 1 - Full Code    Subjective:   Pt examined while pt lying in bed in pt room.  Currently frustrated about finding out that she has active hepatitis.  Has questions related to it but is so frustrated right now that she does not want to discuss further.  Explained to pt that I am here to answer  any further questions/provide support when she is ready.  Feeling anxious about having a CTH done and had been refusing therapy.  Had taken Atarax this morning.  Still has intermittent abdominal pain.  Feels overwhelmed about the issues with her gallbladder.  States that neurologically nothing has changed but does not want to get out of bed this morning to ambulate/work with therapy.    Objective:     Vitals:   Temp (24hrs), Av.9 °F (36.6 °C), Min:97.3 °F (36.3 °C), Max:98.8 °F (37.1 °C)    Temp:  [97.3 °F (36.3 °C)-98.8 °F (37.1 °C)] 97.9 °F (36.6 °C)  HR:  [66-74] 66  Resp:  [18] 18  BP: (113-142)/(62-89) 142/89  SpO2:  [93 %-95 %] 95 %  Body mass index is 21.03 kg/m².     Review of Systems   Constitutional:  Negative for appetite change, chills, fatigue and fever.   HENT:  Negative for trouble swallowing.    Eyes:  Negative for visual disturbance.   Respiratory:  Negative for cough, shortness of breath, wheezing and stridor.    Cardiovascular:  Negative for chest pain, palpitations and leg swelling.   Gastrointestinal:  Negative for abdominal distention, abdominal pain, constipation, diarrhea, nausea and vomiting.        LBM 4/10   Genitourinary:  Negative for difficulty urinating.   Musculoskeletal:  Negative for arthralgias, back pain and gait problem.   Neurological:  Negative for dizziness, weakness, light-headedness, numbness and headaches.   Psychiatric/Behavioral:  Positive for agitation and dysphoric mood. Negative for sleep disturbance. The patient is nervous/anxious.    All other systems reviewed and are negative.       Input and Output Summary (last 24 hours):       Intake/Output Summary (Last 24 hours) at 2024 0938  Last data filed at 2024 0847  Gross per 24 hour   Intake 917 ml   Output --   Net 917 ml       Physical Exam:     Physical Exam  Vitals and nursing note reviewed.   Constitutional:       General: She is not in acute distress.     Appearance: Normal appearance. She is not  ill-appearing.   HENT:      Head: Normocephalic and atraumatic.   Cardiovascular:      Rate and Rhythm: Normal rate and regular rhythm.      Pulses: Normal pulses.      Heart sounds: Murmur heard.      Systolic murmur is present with a grade of 1/6.      No friction rub.   Pulmonary:      Effort: Pulmonary effort is normal. No respiratory distress.      Breath sounds: Normal breath sounds. No wheezing or rhonchi.   Abdominal:      General: Abdomen is flat. Bowel sounds are normal. There is no distension.      Palpations: Abdomen is soft. There is no mass.      Tenderness: There is abdominal tenderness in the right upper quadrant. There is no guarding or rebound.      Hernia: No hernia is present.   Musculoskeletal:      Cervical back: Normal range of motion and neck supple. No tenderness.      Right lower leg: No edema.      Left lower leg: No edema.   Skin:     General: Skin is warm and dry.      Capillary Refill: Capillary refill takes less than 2 seconds.   Neurological:      Mental Status: She is alert and oriented to person, place, and time.   Psychiatric:         Mood and Affect: Mood normal.         Behavior: Behavior normal.         Additional Data:     Labs:    Results from last 7 days   Lab Units 04/10/24  0543   WBC Thousand/uL 6.70   HEMOGLOBIN g/dL 13.0   HEMATOCRIT % 39.3   PLATELETS Thousands/uL 202   SEGS PCT % 46   LYMPHO PCT % 40   MONO PCT % 12   EOS PCT % 2     Results from last 7 days   Lab Units 04/10/24  0543   SODIUM mmol/L 135   POTASSIUM mmol/L 4.3   CHLORIDE mmol/L 99   CO2 mmol/L 29   BUN mg/dL 26*   CREATININE mg/dL 0.66   ANION GAP mmol/L 7   CALCIUM mg/dL 8.9   ALBUMIN g/dL 3.4*   TOTAL BILIRUBIN mg/dL 0.29   ALK PHOS U/L 55   ALT U/L 162*   AST U/L 119*   GLUCOSE RANDOM mg/dL 103                       Labs reviewed    Imaging:    Imaging reviewed    Recent Cultures (last 7 days):           Last 24 Hours Medication List:   Current Facility-Administered Medications   Medication Dose  Route Frequency Provider Last Rate    acetaminophen  488 mg Oral Q6H PRN Matias Jennings MD      amLODIPine  10 mg Oral Daily Matias Jennings MD      bisacodyl  10 mg Rectal Daily PRN Matias Jennings MD      calcium carbonate  500 mg Oral Daily PRN Ruth Ann Earl DO      chlorhexidine  15 mL Mouth/Throat Q12H [facility] Matias Jennings MD      Cholecalciferol  2,000 Units Oral Daily MILAN Suresh      escitalopram  5 mg Oral Daily MILAN Suresh      folic acid  1 mg Oral Daily Matias Jennings MD      gabapentin  100 mg Oral BID Matias Jennings MD      gabapentin  300 mg Oral HS Matias Jennings MD      hydrALAZINE  10 mg Oral Q6H PRN Matias Jennings MD      hydroCHLOROthiazide  12.5 mg Oral Daily Matias Jennings MD      hydrOXYzine HCL  25 mg Oral TID PRN Matias Jennings MD      lisinopril  40 mg Oral Daily Matias Jennings MD      loratadine  10 mg Oral Daily Matias Jennings MD      magnesium Oxide  800 mg Oral TID With Meals MILAN Suresh      melatonin  3 mg Oral HS Matias Jennings MD      methocarbamol  500 mg Oral TID PRN Matias Jennings MD      multivitamin-minerals  1 tablet Oral Daily Matias Jennings MD      oxyCODONE  2.5 mg Oral Q6H PRN Matias Jennings MD      pantoprazole  40 mg Oral BID EFFIE Salinas PA-C      polyethylene glycol  17 g Oral Daily PRN Matias Jennings MD      thiamine  100 mg Oral Daily Matias Jennings MD          M*Modal software was used to dictate this note.  It may contain errors with dictating incorrect words or incorrect spelling. Please contact the provider directly with any questions.

## 2024-04-11 NOTE — QUICK NOTE
Pt had repeat CTH on 4/11/24 for 2 week f/u for left cerebellar hemorrhage with 4th ventricular extension.     Imaging reviewed.   CTH 4/11/24: New punctate foci of cortical hemorrhage versus small amount of subarachnoid hemorrhage within the posterior right sylvian fissure. Decreased size of hyperdense component of the left paramedian cerebellar hemorrhage with mildly increased surrounding vasogenic edema as detailed above. Decreased hemorrhage within the fourth ventricle.    Plan  Given new punctate foci of cortical hemorrhage, recommend repeat CTH tomorrow to ensure stability.   Continue to hold full dose AC/AP therapy.   Pt has upcoming follow up on Monday 4/15/24 for virtual visit with neurosurgery Please contact nsx with any questions or concerns in the interim.

## 2024-04-11 NOTE — ASSESSMENT & PLAN NOTE
" (130),  (168), and alk phos stable.  Repeat hepatic panel for today per GI.  CT abd/pelvis showed liver cirrhosis with mild mesenteric edema, subtle liver surface nodularity with mild hypertrophy of the lateral L paddock lobe, and widening of the fissure for the falciform ligament.  Cholelithiasis including a 3mm gallstone within the gallbladder neck vs cystic duct.  Complaints of abdominal pain over the weekend  RUQ US 4/8: \"Cholelithiasis with borderline wall thickening. Correlate clinically to exclude early acute cholecystitis.  Slightly nodular liver contour as seen on recent CT, which may be associated with cirrhosis\"  HIDA scan on 4/10 showed EF of 0% with gallbladder dysfunction.  Likely chronic cholecystitis.  Recommending elective surgical intervention once recovered from recent cerebellar hemorrhage.  Lipitor on hold.  Lexapro decreased to 5mg daily.  Avoid hepatotoxic agents including alcohol.  Hepatitis C antibody + on 4/5.  HCV detected, viral load of 1,550,000.      Will require GI/General Surgery follow-up as outpatient.  "

## 2024-04-11 NOTE — PROGRESS NOTES
04/11/24 0830   Therapy Time missed   Time missed? Yes   Amount of time missed 60   Reason for time missed   (Pt anxious this AM and perseverating on CT scan. Pt refusing to complete any therapy until scan completed. Educ on importance of completing therapy to inc fx performance and safety for safe DC home. pt cont to refuse this AM until scan completed. LUIS FERNANDO Fernandez made aware)   Time(s) multiple attempts made 0830; 0910

## 2024-04-11 NOTE — PROGRESS NOTES
04/11/24 0930   Therapy Time missed   Time missed? Yes   Amount of time missed 30   Reason for time missed Medical procedure   Time(s) multiple attempts made transport arrived to take pt to CT scan - pt previously advised OT she plans to refuse therapy until results are provided. WIll follow pending pt agreeable to participate.

## 2024-04-11 NOTE — NURSING NOTE
"Pt was irritated at 1709 , was upset that the CT scan of her head was not ordered this morning EARLY, stated that her son was here 2 days ago!! And why wasn't family training done then--this RN had told Tanika that we were planning a family meeting for next week and she wanted to know why wasn't she told this information. Pt was upset about being told she was having her blood drawn this am and  then not being told that she was not having the blood work done until tomorrow. Pt was asking when am I being discharged-RN stated that it depends on how next week goes with therapy, family training--PT stated \"I bet this whole place knows when I am going home. PT stated that the CT scan didn't get done until I bitched about it\". RN did try to soothe the pt and reassure her but she just kept perseverating about the Ct scan, family training, will continue to monitor.   "

## 2024-04-11 NOTE — PROGRESS NOTES
"   04/11/24 1300   Therapy Time missed   Time missed? Yes   Amount of time missed 30   Reason for time missed   (Pt refused)    Attempted to see pt for skilled SLP session- pt resting in bed, stating \"not today\" and continued to shake head and hands when attempted to redirect with conversation regarding discharge plan from team. Educated on reattempts later today which pt cont'd to state \"not today\". Pt will cont to benefit from skilled SLP services targeting cognitive linguistic communication skills for increased independence and decreased burden of care.        "

## 2024-04-11 NOTE — PROGRESS NOTES
04/11/24 1110   Assessment   Treatment Assessment Pt attempted to be seen by PT after cleared by Dr. Jennings for skilled PT session. Pt lying in bed, refused to participate in PT at this time. PT insured all needs in reach at this time, PT scheduled for PM session, will attempt to see pt again.   Therapy Time missed   Time missed? Yes   Amount of time missed 30   Reason for time missed   (pt refused)

## 2024-04-11 NOTE — PLAN OF CARE
Problem: GASTROINTESTINAL - ADULT  Goal: Maintains adequate nutritional intake  Description: INTERVENTIONS:  - Monitor percentage of each meal consumed  - Identify factors contributing to decreased intake, treat as appropriate  - Assist with meals as needed  - Monitor I&O, weight, and lab values if indicated  - Obtain nutrition services referral as needed  Outcome: Progressing

## 2024-04-11 NOTE — ASSESSMENT & PLAN NOTE
"Per IM  \"HIDA scan on 4/10 showed EF 0%, gallbladder dysfunction.  Likely representing chronic cholecystitis.  Poor surgical candidate with recent cerebellar hemorrhage.  Will likely need elective surgery as outpatient.  Declined low fat diet.  Continue current pain regimen.  Will require follow-up with GI and General Surgery as outpatient.\"  "

## 2024-04-11 NOTE — PROGRESS NOTES
04/11/24 1330   Therapy Time missed   Time missed? Yes   Amount of time missed 60   Reason for time missed   (pt refusal.)   Time(s) multiple attempts made per staff pt aggitated and frustrated today. Refused ST just prior to PT session, Pt not interrupted for therapy request. Will f/u next day pending pt cooperation to participate.

## 2024-04-11 NOTE — ASSESSMENT & PLAN NOTE
HCV detected, viral load of 1,550,000.  GI consulted and following.  Follow-up with Hepatology as outpatient for treatment.

## 2024-04-11 NOTE — TEAM CONFERENCE
Acute RehabilitationTeam Conference Note  Date: 4/11/2024   Time: 12:57 PM       Patient Name:  Tanika Lira       Medical Record Number: [id number]   YOB: 1965  Sex: Female          Room/Bed:  [facility] 266/[facility] 266-01  Payor Info:  Payor: [employer] / Plan: [employer] / Product Type: Medicaid HMO /      Admitting Diagnosis: CVA (cerebral vascular accident) (HCC) [I63.9]   Admit Date/Time:  4/3/2024  3:25 PM  Admission Comments: No comment available     Primary Diagnosis:  Cerebellar hemorrhage (HCC)  Principal Problem: Cerebellar hemorrhage (HCC)    Patient Active Problem List    Diagnosis Date Noted    Chronic cholecystitis 04/11/2024    Hepatitis C 04/11/2024    Abdominal discomfort 04/06/2024    Prolonged QT interval 04/04/2024    Simple adnexal cyst greater than 1 cm in diameter in postmenopausal patient 04/04/2024    Hyponatremia 04/03/2024    At risk for venous thromboembolism (VTE) 04/03/2024    Insomnia 04/03/2024    Amphetamine abuse (HCC) 04/03/2024    Hypomagnesemia 04/03/2024    Transaminitis 04/03/2024    History of noncompliance with medical treatment 04/03/2024    Marijuana use 04/03/2024    Diarrhea 03/31/2024    Alcohol use 03/29/2024    Cerebellar hemorrhage (HCC) 03/26/2024    Polyneuropathy 07/05/2023    Hallux valgus with bunions of right foot 07/05/2023    Hallux valgus with bunions of left foot 07/05/2023    Hammer toes of both feet 07/05/2023    Macrocytic anemia 06/13/2023    ROSANA (generalized anxiety disorder) 09/20/2022    Neuropathy 09/20/2022    History of CVA (cerebrovascular accident) 09/01/2022    Tobacco abuse 09/01/2022    Essential hypertension 09/01/2022       Physical Therapy:    Weight Bearing Status: Full Weight Bearing  Transfers: Incidental Touching  Bed Mobility: Independent  Amulation Distance (ft): 150 feet  Ambulation: Minimal Assistance, Incidental Touching  Assistive Device for Ambulation: Roller Walker  Number of Stairs: 12  Assistive Device for  Stairs: Bilateral Hand Rails  Stair Assistance: Incidental Touching  Ramp: Minimal Assistance  Assistive Device for Ramp: Roller Walker  Discharge Recommendations: Home with:  DC Home with:: Family Support, First Floor Setup, Outpatient Physical Therapy    Pt 58 yr old female to [facility] on 3/26/24 with c/o dizziness, nausea, HA, Diarrhea. CT revealed acute parenchymal hematoma, L paramedian cerebellar mass effect, with chronic lacunar infarcts. Transferred to [facility] for neurosurgical eval and folowup. PT with PMH: previous CVA 2022, HTN, Anxiety disorder, neuropathy, dec med compliance, +Tobacco/ETOH/Meth. At baseline pt fully I with out AD, lives with 2 sons both work full time (varying hours) in mobile home 3 [employer] B [employer], worked PT cleaning.     4/4/24 PT to eval this PM.     4/10/24  Pt making slow progress towards goals due to dec activity judith from ongoing abd discomfort. Gait remains ataxic and overall dec balance. Discussed dispo plan, and LOS given recent dec activity tolerance. Reports son (Abner) can come tuesday for FT, he will be pts main support, other son juan j can learn from Abner and will do whatever hes told to do. Barriers remain: ataxia, dec balance, dec safety, home alone at times, ELIZABETH.   Continue per POC, Resume NPP, balance tasks as able pending need for surgical intervention. Await medical follow up and pt tolerance. Suggest reteam to maximize pt post stroke outcomes and functional I. Pt would benefit from out pt neuro PT at [place] if able. Pt will need to self purchase RW as she gave away her previous one obtained through insurance.     Occupational Therapy:  Eating: Independent  Grooming: Incidental Touching  Bathing: Incidental Touching  Bathing: Incidental Touching  Upper Body Dressing:  (setup)  Lower Body Dressing: Incidental Touching  Toileting: Incidental Touching  Tub/Shower Transfer: Incidental Touching  Toilet Transfer: Incidental Touching  Cognition: Within Defined Limits  Orientation:  Person, Place, Time, Situation  Discharge Recommendations: Home with:  DC Home with:: Family Support, First Floor Setup, Home Occupational Therapy       4/10/24  ADL: s/u UB, CGA lB, CGA toileting  TRANSFER:  CGA/CS with RW  D/C PLAN: anticipate DC next week. During rehab stay have bee working on overall safety, standing balance, IADLs, med mnmnt, activity tolerance and UE NMR/FMC. Pt agreeable to use 3x/day pill organizer and folding walker tray at DC to inc indep and safety. Pt reporting son to purchase OOP. Plan to cont on the below mentioned deficits ot inc overall safety and indep to ensure safe DC home.     Pt barriers include balance, UE FMC/strength, safety, impacting participation in ADL/IADL's.  In order to address fx deficits, skilled OT services have worked on self-care, therapeutic exercise, therapeutic activity, modalities PRN in order to inc fx performance and independence.     Therapist has discussed POC with pt and areas of focus with pt verbalizing understanding.     Barriers Intervention   balance Balance training, therap act   UE FMC/strength NMR, strengthening, FMC activities, repetition   safety Educ, CVA educ           4/3/24  Tanika is 56yo female with new cerebellar hemorrhagic CVA and h/o ischemic lacunar infarcts. She has PMH of HTN, polysubstance abuse, anxiety, urinary leakage, peripheral neuropathy. Pt is  with two FT-working sons living with her in a mobile home, and a supportive boyfriend. 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 Pt was independent with all B/IADLs, even providing laundry and meals for her sons. Pt presents with ataxia in stance, frequent loss of balance to the left during dynamic mobility causing high fall risk and inability to complete daily functional routines independently. Pt would benefit from training in compensatory and adaptive strategies, intervention for improved coordination and balance. ELOS 2wks    Speech  Therapy:  Mode of Communication: Verbal  Cognition: Exceptions to WNL  Cognition: Decreased Memory, Decreased Executive Functions  Orientation: Person, Place, Time, Situation  Swallowing: Within Defined Limits  Discharge Recommendations: Home with:  DC Home with:: Family Support  Pt is currently being followed for skilled SLP services targeting cognitive linguistic skills. Pt completed the CLQT+ on initial evaluation with scores correlating to overall skills to be WNL, however, pt with highly independent and completed all IADLs prior to admission. During structured therapy sessions, pt presenting with minimal to mild deficits in memory, executive functions and higher level comprehension. Recent therapy sessions have focused on completion of IADLs to include finance management and medication management, as well as formalized stroke education. The following interventions are used to target these barriers, including verbal problem solving task, verbal working memory tasks, visual memory recall tasks, drawing conclusions activities, written sequencing tasks, verbal sequencing tasks, higher level deductive reasoning activities, written financial management tasks, tangible money tasks, verbal review of current medications, written review of medications, tangible medication management task, written calendar tasks, tangible scheduling tasks , verbal health management tasks, visual attention tasks, and time management activities. Currently, pt is functioning at supervision for comprehension, problem solving and memory and mod I for expression.     Additionally, pt was seen for dysphagia evaluation on admission where pt found to be presenting with overall functional oral and pharyngeal swallow skills across baseline diet of regular solids and thin liquids, therefore, no further dysphagia therapy services are warranted at this time. However, pt making progress towards goals for cognitive linguistic skills and is recommended  for further skilled SLP services at this time in order to maximize functional independence and to achieve baseline level of functioning related to cognitive linguistic independence and functioning at time of discharge.     Nursing Notes:  Appetite: Good  Diet Type: Regular/House                                                                     Pain Location/Orientation: Location: Abdomen  Pain Score: 6                       Hospital Pain Intervention(s): Medication (See MAR)          58-year-old female with a past medical history of ischemic strokes in 2022, hypertension, generalized anxiety disorder, smoking tobacco, alcohol use, marijuana use, methamphetamine abuse, medical noncompliance who presented to [facility] on 3/26/2024 with dizziness, nausea, and headache after recent fall and vomiting also accompanied by some diarrhea.  Patient noted to be ataxic on exam.  CT showed acute parenchymal hematoma in the left paramedian cerebellum with mass effect on the fourth ventricle with intraventricular extension of the hemorrhage with blood in the fourth ventricle and the left foramen of Luschka as well as some chronic lacunar infarcts.  Patient transferred to [facility] for neurosurgery oversight and evaluation.  Patient placed on EVD watch but ultimately did not need this.  Patient tells me she was not compliant with her blood thinners although she did receive DDAVP for aspirin reversal.  Neurology was consulted and suspected intracerebral hemorrhage related to hypertension and medication noncompliance with recent methamphetamine use.  Follow-up repeat CT head 7/3/2027 328 were stable.  Patient required multiple blood pressure medications and has blood pressure goal of less than 140 and ideally between 120 and 140 is much as possible.  Patient is to repeat CT head on 4/11 and per neurology if improving hemorrhage can resume Plavix without need for aspirin for secondary ischemic stroke  prevention.  Neurosurgery would like to see patient in follow-up at about that same time also looking for stability and assess for delayed hydrocephalus.  Patient was evaluated by skilled therapies and was found to have significant decline in ADLs and ambulation and appears appropriate for admission to [facility].    Pain is managed with Tylenol 488 mg every 6 hours prn, Neurontin 100 mg BID and 300 mg at bedtime, Robaxin 500 mg TID prn, and Oxycodone 2.5 mg every 6 hours prn for recent c/o of RUQ pain. Ultrasound performed on 4/8 showed cholelithiasis. Nuclear med hepatobiliary scan completed on 4/10 which showed 0% ejection fraction. General surgery consulted-surgery not recommended at this time due to recent CVA. Continue regular, low fat diet and offer muscle relaxants and avoid narcotics. Lipitor is on hold at present time due to increasing liver enzymes.  Pt. Is for repeat CT of head on 4/11-if resolution of hemorrhage, pt. May start taking Plavix 75 mg daily. HTN managed with Amlodipine 10 mg daily, HCTZ 12.5 mg daily and Lisinopril 40 mg daily and prn Hydralazine 10 mg every 6 hours prn for SBP>165. History of marijuana use managed with Neurontin 100 mg BID and 300 mg at bedtime. Alcohol abuse managed with Thiamine 100 mg daily and Centrum (Thiamine Folic Acid multivitamin) 1 tablet daily and Neurontin 100 mg BID and 300 mg at bedtime. Anxiety disorder managed with Lexapro 5 mg daily and Atarax 25 mg ever TID prn. Insomnia managed with Melatonin 3 mg at HS, sleep log and Neurontin 300 mg at HS. Hypomagnesemia managed with Magnesium oxide 800 mg TID. DVT ppx-SCDs.     This week we will monitor vital signs and lab values. We will manage pain so that pt. May optimally perform in therapy sessions. We will keep patient safe from falls by continuing with hourly rounding and making sure call bell is within reach and also maintaining bed and chair alarms. We will educate patient on  importance of turning and repositioning to off load pressure to prevent skin break down. We will teach patient energy conservation and promote independence of ADLs.        Case Management:     Discharge Planning  Living Arrangements: Lives w/ Children  Support Systems: Self, Son, Children  Assistance Needed: TBD  Type of Current Residence: Private residence  Current Home Care Services: No  4/4- Pt new admit, cm to assess.     4/11-     Cm met with pt at bedside to introduce role and complete cm open. Pt lives in trailer home with sons, 3 louisa. Pt's son transport pt to appts, work etc. Pt works PT as . Pt has hx of slvna, no op no str. Prior to admission, pt was indpt with adls iadls. Pt has shower chair and grab bars. PCP is Tanika Nieto, preferred pharmacy is [facility] [place]. The patient was educated that other members of the patient's support are able to ask questions and observe as the patient wished. The patient was educated on the rehabilitation process including therapy program, the interdisciplinary team, and weekly team meetings. Estimated length of stay was reviewed with the patient as well as expectations of discussions of discharge planning. The role of the  was reviewed including providing care coordination, discharge planning and discharge facilitation. IMM was reviewed with the patient and a copy was provided for their reference. The patient verbalized understanding of the information provided and denied any further questions at this time. CM will continue to follow and assist the patient throughout their rehabilitation stay.             Is the patient actively participating in therapies? yes  List any modifications to the treatment plan: None         Barriers Interventions   Hypertension Monitoring   Medically non compliance Education   Polysubstance abuse Med mgmt, education, HOST   Imbalance/coordination Neuro [place]   Anxiety Med mgmt   Insomnia Sleep log   Safety  awareness Education   Impulsive Education, bed and chair alarms   Mild [facility] SLP Services. Stroke education    Elevated enzymes, positive for hep c  OP Hep     Chronic colicystitis   Monitoring, sx and GI consulted and following     CT head Stable                  Is the patient making expected progress toward goals? yes  List any update or changes to goals: None    Medical Goals: Patient will be medically stable for discharge to [facility] restrictive envrionment upon completion of rehab program and Patient will be able to manage medical conditions and comorbid conditions with medications and follow up upon completion of rehab program    Weekly Team Goals:   Rehab Team Goals  ADL Team Goal: Patient will be independent with ADLs with least restrictive device upon completion of rehab program  Bowel/Bladder Team Goal: Patient will return to premorbid level for bladder/bowel management upon completion of rehab program  Transfer Team Goal: Patient will be independent with transfers with least restrictive device upon completion of rehab program  Locomotion Team Goal: Patient will be independent with locomotion with least restrictive device upon completion of rehab program (LRAD)  Cognitive Team Goal: Patient will be independent for basic  tasks and require supervision for complex tasks upon completion of rehab program    Discussion: Pt functioning cg to min assist with walker. Sup for adls, transfers min to cg. Cog supervision. Team recommending dc 4/23 with OP PT OT.     Anticipated Discharge Date:  dc 4/23 Tuesday op pt ot   [facility] Team Members Present:  The following team members are supervising care for this patient and were present during this Weekly Team Conference.    Physician: Dr. Dick MD  : Eli Vyas MSW  Registered Nurse: Rebecca Llamas, RN  Physical Therapist: KIARA GantT  Occupational Therapist: Kat Byrd MS, OTR/L  Speech Therapist: Oswald Martinez

## 2024-04-12 ENCOUNTER — APPOINTMENT (INPATIENT)
Dept: CT IMAGING | Facility: HOSPITAL | Age: 59
DRG: 058 | End: 2024-04-12
Payer: COMMERCIAL

## 2024-04-12 ENCOUNTER — PATIENT OUTREACH (OUTPATIENT)
Dept: CASE MANAGEMENT | Facility: OTHER | Age: 59
End: 2024-04-12

## 2024-04-12 PROBLEM — Z91.89 POTENTIAL FOR COGNITIVE IMPAIRMENT: Status: ACTIVE | Noted: 2024-04-12

## 2024-04-12 LAB
ACTIN IGG SERPL-ACNC: 10 UNITS (ref 0–19)
ALBUMIN SERPL BCP-MCNC: 3.4 G/DL (ref 3.5–5)
ALP SERPL-CCNC: 59 U/L (ref 34–104)
ALT SERPL W P-5'-P-CCNC: 175 U/L (ref 7–52)
AST SERPL W P-5'-P-CCNC: 119 U/L (ref 13–39)
BILIRUB DIRECT SERPL-MCNC: 0.09 MG/DL (ref 0–0.2)
BILIRUB SERPL-MCNC: 0.36 MG/DL (ref 0.2–1)
HIV 1+2 AB+HIV1 P24 AG SERPL QL IA: NORMAL
HIV 2 AB SERPL QL IA: NORMAL
HIV1 AB SERPL QL IA: NORMAL
HIV1 P24 AG SERPL QL IA: NORMAL
MITOCHONDRIA M2 IGG SER-ACNC: <20 UNITS (ref 0–20)
PROT SERPL-MCNC: 7.5 G/DL (ref 6.4–8.4)

## 2024-04-12 PROCEDURE — 99232 SBSQ HOSP IP/OBS MODERATE 35: CPT | Performed by: INTERNAL MEDICINE

## 2024-04-12 PROCEDURE — 97116 GAIT TRAINING THERAPY: CPT

## 2024-04-12 PROCEDURE — 99233 SBSQ HOSP IP/OBS HIGH 50: CPT

## 2024-04-12 PROCEDURE — 70450 CT HEAD/BRAIN W/O DYE: CPT

## 2024-04-12 PROCEDURE — 97129 THER IVNTJ 1ST 15 MIN: CPT

## 2024-04-12 PROCEDURE — 87389 HIV-1 AG W/HIV-1&-2 AB AG IA: CPT | Performed by: NURSE PRACTITIONER

## 2024-04-12 PROCEDURE — 97130 THER IVNTJ EA ADDL 15 MIN: CPT

## 2024-04-12 PROCEDURE — 80076 HEPATIC FUNCTION PANEL: CPT | Performed by: NURSE PRACTITIONER

## 2024-04-12 PROCEDURE — 97530 THERAPEUTIC ACTIVITIES: CPT

## 2024-04-12 PROCEDURE — 97112 NEUROMUSCULAR REEDUCATION: CPT

## 2024-04-12 RX ORDER — ESCITALOPRAM OXALATE 10 MG/1
10 TABLET ORAL DAILY
Status: DISCONTINUED | OUTPATIENT
Start: 2024-04-13 | End: 2024-04-16 | Stop reason: HOSPADM

## 2024-04-12 RX ADMIN — Medication 800 MG: at 08:16

## 2024-04-12 RX ADMIN — HYDROCHLOROTHIAZIDE 12.5 MG: 12.5 TABLET ORAL at 08:15

## 2024-04-12 RX ADMIN — LORAZEPAM 0.25 MG: 0.5 TABLET ORAL at 13:45

## 2024-04-12 RX ADMIN — FOLIC ACID 1 MG: 1 TABLET ORAL at 08:16

## 2024-04-12 RX ADMIN — LORATADINE 10 MG: 10 TABLET ORAL at 08:16

## 2024-04-12 RX ADMIN — MELATONIN TAB 3 MG 3 MG: 3 TAB at 21:09

## 2024-04-12 RX ADMIN — GABAPENTIN 300 MG: 300 CAPSULE ORAL at 21:09

## 2024-04-12 RX ADMIN — CHLORHEXIDINE GLUCONATE 0.12% ORAL RINSE 15 ML: 1.2 LIQUID ORAL at 21:09

## 2024-04-12 RX ADMIN — Medication 2.5 MG: at 08:47

## 2024-04-12 RX ADMIN — GABAPENTIN 100 MG: 100 CAPSULE ORAL at 06:13

## 2024-04-12 RX ADMIN — PANTOPRAZOLE SODIUM 40 MG: 40 TABLET, DELAYED RELEASE ORAL at 17:59

## 2024-04-12 RX ADMIN — MULTIPLE VITAMINS W/ MINERALS TAB 1 TABLET: TAB ORAL at 08:16

## 2024-04-12 RX ADMIN — Medication 2.5 MG: at 13:48

## 2024-04-12 RX ADMIN — Medication 800 MG: at 13:23

## 2024-04-12 RX ADMIN — Medication 2.5 MG: at 19:01

## 2024-04-12 RX ADMIN — THIAMINE HCL TAB 100 MG 100 MG: 100 TAB at 08:16

## 2024-04-12 RX ADMIN — GABAPENTIN 100 MG: 100 CAPSULE ORAL at 13:23

## 2024-04-12 RX ADMIN — LISINOPRIL 40 MG: 20 TABLET ORAL at 08:15

## 2024-04-12 RX ADMIN — ESCITALOPRAM OXALATE 5 MG: 5 TABLET, FILM COATED ORAL at 08:16

## 2024-04-12 RX ADMIN — Medication 2000 UNITS: at 08:16

## 2024-04-12 RX ADMIN — Medication 800 MG: at 17:59

## 2024-04-12 RX ADMIN — PANTOPRAZOLE SODIUM 40 MG: 40 TABLET, DELAYED RELEASE ORAL at 06:13

## 2024-04-12 RX ADMIN — AMLODIPINE BESYLATE 10 MG: 10 TABLET ORAL at 08:16

## 2024-04-12 RX ADMIN — HYDROXYZINE HYDROCHLORIDE 25 MG: 25 TABLET, FILM COATED ORAL at 08:25

## 2024-04-12 RX ADMIN — CHLORHEXIDINE GLUCONATE 0.12% ORAL RINSE 15 ML: 1.2 LIQUID ORAL at 08:16

## 2024-04-12 NOTE — ASSESSMENT & PLAN NOTE
Presented to acute setting with acute nausea/vomiting/dizziness.  CTH - Acute parenchymal hematoma left paramedian cerebellum measures 2.6 x 1.6 cm with mass effect on the fourth ventricle. Intraventricular extension of hemorrhage noted with blood in the fourth ventricle and left foramen of Luschka.  CTA head/neck - No left cerebellar vascular malformation to account for the patient's acute parenchymal hematoma. No cervical or intracranial large vessel occlusion, dissection or aneurysm. Mild bilateral cervical carotid bifurcation atherosclerotic disease.  Most recent CTH on 3/28 showed stability.    Maintain normotension.  Neurovascular checks Q shift.  Hold AC/AP at this time.    Repeat CTH on 4/11 - resume Plavix if stable.  Follow-up with Neurosurgery in 1-2 weeks with repeat CTH to assess stability/delayed hydrocephalus.  If stable - management will be deferred to Neurology.    Primary team following.  PT/OT/ST.    CTH obtained on 4/11: New punctate foci of cortical hemorrhage vs small amount of subarachnoid hemorrhage within the posterior R sylvian fissure, decreased size of hyperdense component of the L paramedian cerebellar hemorrhage with mildly increased surrounding vasogenic edema, decreased hemorrhage within the 4th ventricle.  Discussed with Neurosurgery - hold off on starting Plavix until appt on 4/15.  Repeat CTH today for stability.  Neurology also recommending repeat CTH today for stability.

## 2024-04-12 NOTE — PROGRESS NOTES
Chart review complete the patient is currently admitted to [facility] for STR. This Admin Coordinator will continue to monitor via chart review.

## 2024-04-12 NOTE — ASSESSMENT & PLAN NOTE
Hx of CVA in 9/2022   MRI from 9/2022 showed acute infarcts in the R frontal centrum semiovale and R posterior putamen, small subacute infarct in the L paramedian andreea, and chronic lacunar infarcts in the R corona radiata, bilateral basal ganglia, and bilateral thalami.  Had been treated with DAPT originally but has been non-compliant with medications.  Plavix on hold until appt with Neurosurgery on 4/15.  Lipitor on hold due to elevated LFTs.

## 2024-04-12 NOTE — ASSESSMENT & PLAN NOTE
Prolonged QTc of 534 on EKG from admission to acute setting.  EKG on admission to [facility] showed QTc of 472.  Currently on Lexapro.  Avoid QTc prolonging medications as able.  Repeat EKG as needed.   19-Jun-2023 23:59

## 2024-04-12 NOTE — PROGRESS NOTES
"   04/12/24 3607   Pain Assessment   Pain Assessment Tool 0-10   Pain Score 9   Pain Location/Orientation Orientation: Right;Orientation: Upper;Location: Abdomen   Therapy Time missed   Time missed? Yes   Amount of time missed 30   Reason for time missed   (pt refusal)   Time(s) multiple attempts made Pt refused OT just prior to session 30 min ago, stating she was going to check herself out AMA. Dr Jennings spoke with  patient, meds given. Dr Jennings encouraged PT to ask pt, possibly to go outside. PT politely asked pt if she wanted to get some air, pt declined \"Im getting out of here\" Dr Jennings notified of pt response, no further efforts made to encourage pt to participate at this caitlin.       "

## 2024-04-12 NOTE — PLAN OF CARE
Problem: GASTROINTESTINAL - ADULT  Goal: Maintains or returns to baseline bowel function  Description: INTERVENTIONS:  - Assess bowel function  - Encourage oral fluids to ensure adequate hydration  - Administer IV fluids if ordered to ensure adequate hydration  - Administer ordered medications as needed  - Encourage mobilization and activity  - Consider nutritional services referral to assist patient with adequate nutrition and appropriate food choices  Outcome: Progressing     Problem: SKIN/TISSUE INTEGRITY - ADULT  Goal: Skin Integrity remains intact(Skin Breakdown Prevention)  Description: Assess:  -Perform Werner assessment every shift  -Clean and moisturize skin every   -Inspect skin when repositioning, toileting, and assisting with ADLS  -Assess under medical devices such as  every   -Assess extremities for adequate circulation and sensation     Bed Management:  -Have minimal linens on bed & keep smooth, unwrinkled  -Change linens as needed when moist or perspiring  -Avoid sitting or lying in one position for more than  hours while in bed  -Keep HOB at degrees     Toileting:  -Offer bedside commode  -Assess for incontinence every   -Use incontinent care products after each incontinent episode such as     Activity:  -Mobilize patient  times a day  -Encourage activity and walks on unit  -Encourage or provide ROM exercises   -Turn and reposition patient every  Hours  -Use appropriate equipment to lift or move patient in bed  -Instruct/ Assist with weight shifting every  when out of bed in chair  -Consider limitation of chair time hour intervals    Skin Care:  -Avoid use of baby powder, tape, friction and shearing, hot water or constrictive clothing  -Relieve pressure over bony prominences using   -Do not massage red bony areas    Next Steps:  -Teach patient strategies to minimize risks such as    -Consider consults to  interdisciplinary teams such as   Outcome: Progressing

## 2024-04-12 NOTE — PROGRESS NOTES
"   04/12/24 1000   Pain Assessment   Pain Assessment Tool 0-10   Pain Score No Pain   Restrictions/Precautions   Precautions Bed/chair alarms;Cognitive;Fall Risk;Supervision on toilet/commode   Weight Bearing Restrictions No   ROM Restrictions No   Cognition   Overall Cognitive Status Impaired   Arousal/Participation Alert;Cooperative   Subjective   Subjective Pt agreeable to skilled PT tx   Sit to Stand   Type of Assistance Needed Supervision   Comment CS RW   Sit to Stand CARE Score 4   Bed-Chair Transfer   Type of Assistance Needed Incidental touching   Comment CG/CS RW   Chair/Bed-to-Chair Transfer CARE Score 4   Walk 10 Feet   Type of Assistance Needed Incidental touching   Comment CG/CS RW   Walk 10 Feet CARE Score 4   Walk 50 Feet with Two Turns   Type of Assistance Needed Incidental touching   Comment CG/CS RW   Walk 50 Feet with Two Turns CARE Score 4   Walk 150 Feet   Type of Assistance Needed Physical assistance   Physical Assistance Level 25% or less   Comment CG primarily, minAx1 1 occ due to L lateral LOB with head turn   Walk 150 Feet CARE Score 3   Ambulation   Primary Mode of Locomotion Prior to Admission Walk   Distance Walked (feet) 200 ft  (x1, 115'x1)   Assist Device Roller Walker   Gait Pattern Ataxic;Inconsistant Katherine;Step to;Step through;Narrow PAUL;Decreased foot clearance   Limitations Noted In Balance;Coordination;Safety;Heel Strike   Provided Assistance with: Balance   Walk Assist Level Close Supervision;Contact Guard;Minimum Assist   Findings CG/CS with RW primarily, did have 1 LOB to L side with RW needing minAx1 to correct   Does the patient walk? 2. Yes   Wheel 50 Feet with Two Turns   Reason if not Attempted Activity not applicable   Wheel 50 Feet with Two Turns CARE Score 9   Wheel 150 Feet   Reason if not Attempted Activity not applicable   Wheel 150 Feet CARE Score 9   Curb or Single Stair   Style negotiated Curb   Type of Assistance Needed Incidental touching   Comment CG 8\" " curb step with RW, cues for proper sequence   1 Step (Curb) CARE Score 4   4 Steps   Type of Assistance Needed Incidental touching   Comment CG/CS b/l HRs   4 Steps CARE Score 4   12 Steps   Type of Assistance Needed Incidental touching   Comment CG/CS b/l HRs   12 Steps CARE Score 4   Stairs   Type Stairs;Curb   # of Steps 12   Weight Bearing Precautions Fall Risk   Assist Devices Bilateral Rail   Therapeutic Interventions   Balance alt step taps to half bolster with 1 UE support on // bar (first with full hand on // bar, then just fingers) CG/minAx1 for balance, 4' total   Assessment   Treatment Assessment Pt engaged in brief 30 min skilled PT session focusing on gait trng with RW as well as balance trng to reduce risk for falls upon d/c. Pt progressing with ambulation and transfers with use of RW, did have 1 minor LOB to L side with head turn needing minAx1 to correct, otherwise CG/CS. Standing balance task with decreased UE support challenges pt, especially with R stance, will cont to work on standing balance tasks with pt as well as cont to work on ambulation with head turns to maximize pt's independence with mobilities and reduce risk for falls.   Family/Caregiver Present no   Problem List Decreased strength;Decreased endurance;Impaired balance;Decreased mobility;Decreased coordination;Impaired judgement;Decreased cognition;Decreased safety awareness   Barriers to Discharge Decreased caregiver support;Inaccessible home environment   PT Barriers   Functional Limitation Car transfers;Ramp negotiation;Standing;Stair negotiation;Walking;Transfers   Plan   Treatment/Interventions Functional transfer training;Elevations;LE strengthening/ROM;Therapeutic exercise;Endurance training;Bed mobility;Gait training   Progress Progressing toward goals   PT Therapy Minutes   PT Time In 1000   PT Time Out 1030   PT Total Time (minutes) 30   PT Mode of treatment - Individual (minutes) 30   PT Mode of treatment - Concurrent  (minutes) 0   PT Mode of treatment - Group (minutes) 0   PT Mode of treatment - Co-treat (minutes) 0   PT Mode of Treatment - Total time(minutes) 30 minutes   PT Cumulative Minutes 435   Therapy Time missed   Time missed? No

## 2024-04-12 NOTE — PROGRESS NOTES
"[facility]  Progress Note  Name: Tanika Lira I  MRN: [id number]  Unit/Bed#: [facility] 266-01 I Date of Admission: 4/3/2024   Date of Service: 4/12/2024 I Hospital Day: 9    Assessment/Plan   Hepatitis C  Assessment & Plan  HCV detected, viral load of 1,550,000.  GI consulted and following.  Follow-up with Hepatology as outpatient for treatment.    Chronic cholecystitis  Assessment & Plan  HIDA scan on 4/10 showed EF 0%, gallbladder dysfunction.  Likely representing chronic cholecystitis.  Poor surgical candidate with recent cerebellar hemorrhage.  Will likely need elective surgery as outpatient.  Declined low fat diet.  Continue current pain regimen.  Will require follow-up with GI and General Surgery as outpatient.    Simple adnexal cyst greater than 1 cm in diameter in postmenopausal patient  Assessment & Plan  Incidental finding on CT A/P: simple appearing right adnexal cyst measuring 3.7 cm.  Follow-up pelvic US in 6-12 months.  Follow-up with PCP or OB/GYN as outpatient.    Prolonged QT interval  Assessment & Plan  Prolonged QTc of 534 on EKG from admission to acute setting.  EKG on admission to [facility] showed QTc of 472.  Currently on Lexapro.  Avoid QTc prolonging medications as able.  Repeat EKG as needed.    Transaminitis  Assessment & Plan   (119),  (162), and alk phos stable.   CT abd/pelvis showed liver cirrhosis with mild mesenteric edema, subtle liver surface nodularity with mild hypertrophy of the lateral L paddock lobe, and widening of the fissure for the falciform ligament.  Cholelithiasis including a 3mm gallstone within the gallbladder neck vs cystic duct.  Complaints of abdominal pain over the weekend  RUQ US 4/8: \"Cholelithiasis with borderline wall thickening. Correlate clinically to exclude early acute cholecystitis.  Slightly nodular liver contour as seen on recent CT, which may be associated with cirrhosis\"  HIDA scan on 4/10 showed EF of 0% with " gallbladder dysfunction.  Likely chronic cholecystitis.  Recommending elective surgical intervention once recovered from recent cerebellar hemorrhage.  Lipitor on hold.  Lexapro decreased to 5mg daily.  Avoid hepatotoxic agents including alcohol.  Hepatitis C antibody + on 4/5.  HCV detected, viral load of 1,550,000.      Will require GI/General Surgery follow-up as outpatient.    Hypomagnesemia  Assessment & Plan  Improved, Mg currently 1.9.  Continue magnesium oxide 800mg TID.  Obtain repeat Mg next week.    Amphetamine abuse (HCC)  Assessment & Plan  Had been using prior to admission.  Encourage cessation.  Would benefit from HOST referral.    Insomnia  Assessment & Plan  Continue melatonin 3mg at HS.    Alcohol use  Assessment & Plan  Hx of alcohol abuse.  Continue thiamine and folic acid   Encourage alcohol cessation.    Neuropathy  Assessment & Plan  Continue gabapentin 100mg in AM, 100mg at lunch, and 300mg at HS.    ROSANA (generalized anxiety disorder)  Assessment & Plan  Previous Lexapro 10mg daily and Atarax 25mg Q8 PRN.  Supportive counseling as needed.  Consider Neuropsych consult.    Decreased Lexapro to 5mg daily on 4/8 due to transaminitis.    Essential hypertension  Assessment & Plan  Had not been taking medications at home.  Current regimen: amlodipine 10mg daily, lisinopril 40mg daily, and HCTZ 12.5mg daily.  Maintain normotension.  Monitor BP with routine VS.    Tobacco abuse  Assessment & Plan  Smokes less than a half a pack/day.  Encourage smoking cessation.  Declined nicotine patch.    History of CVA (cerebrovascular accident)  Assessment & Plan  Hx of CVA in 9/2022   MRI from 9/2022 showed acute infarcts in the R frontal centrum semiovale and R posterior putamen, small subacute infarct in the L paramedian andreea, and chronic lacunar infarcts in the R corona radiata, bilateral basal ganglia, and bilateral thalami.  Had been treated with DAPT originally but has been non-compliant with  medications.  Plavix on hold until appt with Neurosurgery on 4/15.  Lipitor on hold due to elevated LFTs.    * Cerebellar hemorrhage (HCC)  Assessment & Plan  Presented to acute setting with acute nausea/vomiting/dizziness.  CTH - Acute parenchymal hematoma left paramedian cerebellum measures 2.6 x 1.6 cm with mass effect on the fourth ventricle. Intraventricular extension of hemorrhage noted with blood in the fourth ventricle and left foramen of Luschka.  CTA head/neck - No left cerebellar vascular malformation to account for the patient's acute parenchymal hematoma. No cervical or intracranial large vessel occlusion, dissection or aneurysm. Mild bilateral cervical carotid bifurcation atherosclerotic disease.  Most recent CTH on 3/28 showed stability.    Maintain normotension.  Neurovascular checks Q shift.  Hold AC/AP at this time.    Repeat CTH on 4/11 - resume Plavix if stable.  Follow-up with Neurosurgery in 1-2 weeks with repeat CTH to assess stability/delayed hydrocephalus.  If stable - management will be deferred to Neurology.    Primary team following.  PT/OT/ST.    CTH obtained on 4/11: New punctate foci of cortical hemorrhage vs small amount of subarachnoid hemorrhage within the posterior R sylvian fissure, decreased size of hyperdense component of the L paramedian cerebellar hemorrhage with mildly increased surrounding vasogenic edema, decreased hemorrhage within the 4th ventricle.  Discussed with Neurosurgery - hold off on starting Plavix until appt on 4/15.  Repeat CTH today for stability.  Neurology also recommending repeat CTH today for stability.         VTE Pharmacologic Prophylaxis:   Pharmacologic:  none - walking >300ft.  Mechanical VTE Prophylaxis in Place: Yes - sequential compression devices.    Current Length of Stay: 9 day(s)    Current Patient Status: Inpatient Rehab     Discharge Plan: As per primary team.    Code Status: Level 1 - Full Code    Subjective:   Pt examined while pt lying in  bed in pt room.  Currently denies any RUQ pain but has been having it intermittently.  Slightly tender on exam.  Still has tenderness to the RLQ but states that it is mild.  Denies any headaches, lightheadedness, or dizziness.  Feels that she is doing well with therapy.  Appears depressed during exam and admits to feeling down.  Agreeable to changing medications for her mood.  Will discuss medication adjustments with GI.    Objective:     Vitals:   Temp (24hrs), Av.3 °F (36.8 °C), Min:97.8 °F (36.6 °C), Max:98.8 °F (37.1 °C)    Temp:  [97.8 °F (36.6 °C)-98.8 °F (37.1 °C)] 97.8 °F (36.6 °C)  HR:  [58-65] 58  Resp:  [18] 18  BP: (112-143)/(71-81) 143/81  SpO2:  [95 %] 95 %  Body mass index is 21.03 kg/m².     Review of Systems   Constitutional:  Negative for appetite change, chills, fatigue and fever.   HENT:  Negative for trouble swallowing.    Eyes:  Negative for visual disturbance.   Respiratory:  Negative for cough, shortness of breath, wheezing and stridor.    Cardiovascular:  Negative for chest pain, palpitations and leg swelling.   Gastrointestinal:  Negative for abdominal distention, abdominal pain, constipation, diarrhea, nausea and vomiting.        LBM 4   Genitourinary:  Negative for difficulty urinating.   Musculoskeletal:  Positive for gait problem. Negative for arthralgias and back pain.   Neurological:  Negative for dizziness, weakness, light-headedness, numbness and headaches.   Psychiatric/Behavioral:  Positive for dysphoric mood (feels down, agreeable to medication adjustments). Negative for sleep disturbance. The patient is not nervous/anxious.    All other systems reviewed and are negative.       Input and Output Summary (last 24 hours):       Intake/Output Summary (Last 24 hours) at 2024 0923  Last data filed at 2024 0830  Gross per 24 hour   Intake 360 ml   Output --   Net 360 ml       Physical Exam:     Physical Exam  Vitals and nursing note reviewed.   Constitutional:        General: She is not in acute distress.     Appearance: Normal appearance. She is not ill-appearing.   HENT:      Head: Normocephalic and atraumatic.   Cardiovascular:      Rate and Rhythm: Normal rate and regular rhythm.      Pulses: Normal pulses.      Heart sounds: Murmur heard.      Systolic murmur is present with a grade of 1/6.      No friction rub.   Pulmonary:      Effort: Pulmonary effort is normal. No respiratory distress.      Breath sounds: Normal breath sounds. No wheezing or rhonchi.   Abdominal:      General: Abdomen is flat. Bowel sounds are normal. There is no distension.      Palpations: Abdomen is soft. There is no mass.      Tenderness: There is abdominal tenderness in the right upper quadrant. There is no guarding or rebound.      Hernia: No hernia is present.   Musculoskeletal:      Cervical back: Normal range of motion and neck supple. No tenderness.      Right lower leg: No edema.      Left lower leg: No edema.   Skin:     General: Skin is warm and dry.      Capillary Refill: Capillary refill takes less than 2 seconds.   Neurological:      Mental Status: She is alert and oriented to person, place, and time.   Psychiatric:         Mood and Affect: Mood normal.         Behavior: Behavior normal.         Additional Data:     Labs:    Results from last 7 days   Lab Units 04/10/24  0543   WBC Thousand/uL 6.70   HEMOGLOBIN g/dL 13.0   HEMATOCRIT % 39.3   PLATELETS Thousands/uL 202   SEGS PCT % 46   LYMPHO PCT % 40   MONO PCT % 12   EOS PCT % 2     Results from last 7 days   Lab Units 04/12/24  0622 04/10/24  0543   SODIUM mmol/L  --  135   POTASSIUM mmol/L  --  4.3   CHLORIDE mmol/L  --  99   CO2 mmol/L  --  29   BUN mg/dL  --  26*   CREATININE mg/dL  --  0.66   ANION GAP mmol/L  --  7   CALCIUM mg/dL  --  8.9   ALBUMIN g/dL 3.4* 3.4*   TOTAL BILIRUBIN mg/dL 0.36 0.29   ALK PHOS U/L 59 55   ALT U/L 175* 162*   AST U/L 119* 119*   GLUCOSE RANDOM mg/dL  --  103                       Labs  reviewed    Imaging:    Imaging reviewed    Recent Cultures (last 7 days):           Last 24 Hours Medication List:   Current Facility-Administered Medications   Medication Dose Route Frequency Provider Last Rate    acetaminophen  488 mg Oral Q6H PRN Matias Jennings MD      amLODIPine  10 mg Oral Daily Matias Jennings MD      bisacodyl  10 mg Rectal Daily PRN Matias Jennings MD      calcium carbonate  500 mg Oral Daily PRN Ruth Ann Earl DO      chlorhexidine  15 mL Mouth/Throat Q12H [facility] Matias Jennings MD      Cholecalciferol  2,000 Units Oral Daily MILAN Suresh      escitalopram  5 mg Oral Daily MILAN Suresh      folic acid  1 mg Oral Daily Matias Jennings MD      gabapentin  100 mg Oral BID Matias Jennings MD      gabapentin  300 mg Oral HS Matias Jennings MD      hydrALAZINE  10 mg Oral Q6H PRN Matias Jennings MD      hydroCHLOROthiazide  12.5 mg Oral Daily Matias Jennings MD      hydrOXYzine HCL  25 mg Oral TID PRN Matias Jennings MD      lisinopril  40 mg Oral Daily Matias Jennings MD      loratadine  10 mg Oral Daily Matias Jennings MD      LORazepam  0.25 mg Oral BID PRN Matias Jennings MD      magnesium Oxide  800 mg Oral TID With Meals MILAN Suresh      melatonin  3 mg Oral HS Matias Jennings MD      methocarbamol  500 mg Oral TID PRN Matias Jennings MD      multivitamin-minerals  1 tablet Oral Daily Matias Jennings MD      oxyCODONE  2.5 mg Oral Q6H PRN Matias Jennings MD      pantoprazole  40 mg Oral BID EFFIE Salinas PA-C      polyethylene glycol  17 g Oral Daily PRN Matias Jennings MD      thiamine  100 mg Oral Daily Matias Jennings MD          M*Modal software was used to dictate this note.  It may contain errors with dictating incorrect words or incorrect spelling. Please contact the provider directly with any questions.

## 2024-04-12 NOTE — PLAN OF CARE

## 2024-04-12 NOTE — PROGRESS NOTES
04/12/24 1330   Therapy Time missed   Time missed? Yes   Amount of time missed 30   Reason for time missed   (Upon therapist arrival pt seated up in bed. Pt appeared upset. Reporting she has 13/10 pain on R abdomen below breast. Pt frustrated that she is not allowed to have pain meds until 3pm. Agitated about who decides when she can take her meds. Therapist communicated with Dr. Jennings who was going in to speak with patient.)   Time(s) multiple attempts made refused multiple attempts

## 2024-04-12 NOTE — ASSESSMENT & PLAN NOTE
" (119),  (162), and alk phos stable.   CT abd/pelvis showed liver cirrhosis with mild mesenteric edema, subtle liver surface nodularity with mild hypertrophy of the lateral L paddock lobe, and widening of the fissure for the falciform ligament.  Cholelithiasis including a 3mm gallstone within the gallbladder neck vs cystic duct.  Complaints of abdominal pain over the weekend  RUQ US 4/8: \"Cholelithiasis with borderline wall thickening. Correlate clinically to exclude early acute cholecystitis.  Slightly nodular liver contour as seen on recent CT, which may be associated with cirrhosis\"  HIDA scan on 4/10 showed EF of 0% with gallbladder dysfunction.  Likely chronic cholecystitis.  Recommending elective surgical intervention once recovered from recent cerebellar hemorrhage.  Lipitor on hold.  Lexapro decreased to 5mg daily.  Avoid hepatotoxic agents including alcohol.  Hepatitis C antibody + on 4/5.  HCV detected, viral load of 1,550,000.      Will require GI/General Surgery follow-up as outpatient.  "

## 2024-04-12 NOTE — PROGRESS NOTES
Physical Medicine and Rehabilitation Progress Note  Tanika Lira 58 y.o. female MRN: [id number]  Unit/Bed#: [facility] 266-01 Encounter: [id number]      Assessment & Plan:     Decline in ADLs and mobility: Functional assessment - refusing tx at times due to mood and some abdominal discomfort - providing encouragement         FIM  Care Score  Admit Score Recent Score    Total assist  1-100% or 2p    Tot     Max assist 2-51-75%    Sub     Mod assist 3- 26-50%  Par Bathing    Min assist 3- 25% or < Par LBD    CG assist 4  TA     Sup/Setup 4-5 Sup To hygiene, UBD To hygiene, bathing, LBD   Mod-I/Indep 6 MI  UBD    Transfers  Mod-total assist  CGA assist     Ambulation   Significant assist   ft min assist     Stairs   Min assist  4 steps min assist      Goal: Mod indep for most ADLs and  for mobility  Major barriers: Imbalance, incoordination  Dispo: Home with TANYA White 4/23/24     Transaminitis  Assessment & Plan  4/12 - ALT stable; AST up somewhat to 175; stable RUQ pain at times; pt afebrile; monitor abdominal exam, vitals closely - Repeat CBC/CMP Monday; if pain worsens or patient declines notify Gen Sx     Hep C+ titer 1.5 million copies/ml 4/8   Hep testing NR   4/10 HIDA scan  - Abnormal contractile response of the gallbladder to cholecystokinin infusion, ( 0%), which in the appropriate clinical context suggest gallbladder dysfunction.  -   4/10 Dilcia, iron panel, AFP wnl other serologies per GI    AST 57>88>114>130>119>119>stable   ALT 65>107>144>168>162>172>increasing some  AP/Tbili wnl   - Trend LFTs intermittently     GI follow-up 4/11  Chronically elevated liver enzymes, appears to have plateaued, hepatocellular pattern with history of alcohol use and Hepatitis C. AM LFTs pending. Imaging suggestive of cirrhosis. Synthetic liver function appear intact. No history of variceal bleeding, ascites, hepatic encephalopathy. Liver serologies ordered to rule out hereditary, autoimmune etiologies, so far negative.  "HCV detected with viral load ([id number]/mL). AFP normal, no hepatoma noted on imaging. Will need outpatient follow up for HCV treatment. Avoid hepatotoxins including alcohol.  RUQ abdominal pain with nausea and cholelithiasis with possible early cholecystitis on imaging. Patient afebrile without leukocytosis. HIDA EF 0% c/w gallbladder dysfunction/chronic cholecystitis. Poor surgical candidate with recent hemorrhage, elective surgery will likely need to be delayed. Surgery following.        GI consult 4/11  Chronically elevated liver enzymes, appears to have plateaued, hepatocellular pattern with history of alcohol use and Hepatitis C. AM LFTs pending. Imaging suggestive of cirrhosis. Synthetic liver function appear intact. No history of variceal bleeding, ascites, hepatic encephalopathy. Liver serologies ordered to rule out hereditary, autoimmune etiologies, so far negative. HCV detected with viral load ([id number]/mL). AFP normal, no hepatoma noted on imaging. Will need outpatient follow up for HCV treatment. Avoid hepatotoxins including alcohol.  RUQ abdominal pain with nausea and cholelithiasis with possible early cholecystitis on imaging. Patient afebrile without leukocytosis. HIDA EF 0% c/w gallbladder dysfunction/chronic cholecystitis. Poor surgical candidate with recent hemorrhage, elective surgery will likely need to be delayed. Surgery following.      Gen Sx consult   \"Ultrasound shows cholelithiasis without cholecystitis.  Abdominal pain not related to p.o. intake which makes biliary colic less likely.  With recent cerebellar hemorrhage general anesthesia would be very risky.  Recommend low-fat diet  She may benefit from muscle relaxants as abdominal wall tenderness was not localized to the right upper quadrant.  Would minimize narcotics.\"  - Reduced oxy   - Discussed with Gi and Lillie kwong        RUQ US 4/8  1. Cholelithiasis with borderline wall thickening. Correlate clinically to exclude early acute " "cholecystitis.  2. Slightly nodular liver contour as seen on recent CT, which may be associated with cirrhosis. Correlate clinically.      Hx of alcohol use - alcohol cessation counseling  IM consulted and co-management with their team during ARC course  Monitor LFTs intermittently during course and after discharge  Med adjustments when indicated     CT A/P 3/26  IMPRESSION:  1. Cholelithiasis, including a 3 mm gallstone within the gallbladder neck versus cystic duct. No findings of acute cholecystitis. If there is concern for acute cholecystitis, right upper quadrant ultrasound can be obtained.  2. Findings suggesting cirrhosis with mild mesenteric edema, but no ascites. Enlarged ana hepatic and peripancreatic lymph nodes measuring up to 2 cm in short axis, indeterminate, although could be reactive, given suspicion for cirrhosis.      * Cerebellar hemorrhage (HCC)  Assessment & Plan  Follow-up with Nsx Monday - consider resumption of plavix at that time     CTH 4/12 - stable from 4/11  - Stable punctate hyperdense focus within the posterior right sylvian fissure, cortical versus subarachnoid hemorrhage. Stable evolving hematoma within the left paramedian cerebellar hemisphere. There is no new intra or extra-axial hemorrhage.  - Chronic right centrum semiovale ovale, left corona radiata, bilateral basal ganglia, bilateral thalamic, and pontine lacunar infarcts. Chronic microangiopathic ischemic changes.       CTH 4/11  -New punctate foci of cortical hemorrhage versus small amount of subarachnoid hemorrhage within the posterior right sylvian fissure. Recommend short interval follow-up exam.  -Decreased size of hyperdense component of the left paramedian cerebellar hemorrhage with mildly increased surrounding vasogenic edema as detailed above. Decreased hemorrhage within the fourth ventricle.  4/11 neuro exam stable  - Neuro c/s - \"-New punctate foci of cortical hemorrhage versus small amount of subarachnoid " "hemorrhage within the posterior right sylvian fissure. Recommend short interval follow-up exam.    Reviewed the imaging. There is a punctate hyperdensity in one cut only 2/29, without a clear significance. Actually a comparison to CT head on 3/27 may show even the same foci in series 304, image 43/76.   Team can repeat CT head tomorrow to ensure stability, and if so, follow plan as per discharge summary on 4/3\"  - NeuroSx c/s   Given new punctate foci of cortical hemorrhage, recommend repeat CTH tomorrow to ensure stability.   Continue to hold full dose AC/AP therapy.   Pt has upcoming follow up on Monday 4/15/24 for virtual visit with neurosurgery Please contact nsx with any questions or concerns in the interim. (Per IM - patient to hold A/P until virtual decides Monday)     4/9 - neuro exam stable  4/8 - neuro exam stable, function, improving, continue tx in ARC setting   4/4 - improving ataxia on exam; tolerating ARC setting appropriately     - CTA HN - No left cerebellar vascular malformation to account for the patient's acute parenchymal hematoma. No cervical or intracranial large vessel occlusion, dissection or aneurysm. Mild bilateral cervical carotid bifurcation atherosclerotic disease.  - CTH 3/26 - Acute parenchymal hematoma left paramedian cerebellum measures 2.6 x 1.6 cm with mass effect on the fourth ventricle. Intraventricular extension of hemorrhage noted with blood in the fourth ventricle and left foramen of Luschka.  - Follow-up CTH 3/27 -  Redemonstration of hyperdense hemorrhage in the left paramedian cerebellum, as described with mass effect and extension into the fourth ventricle and left foramen of Luschka, similar in appearance to the prior.    No hydrocephalus.  - Follow-up CTH 3/28 - No significant interval changes    - Residual impairments on admission to ARC: Incoordination, imbalance, ataxia, possible dysphagia (assess for other impairments during ARC course)     Secondary stroke " "prevention  - Antithrombotic: Full AC and antiplatelet on hold   - Per neuro, Repeat CTH on Thursday 4/11 - if improving hemorrhage, patient can resume plavix 75mg daily for stroke prevention; no need for aspirin in terms of stroke prevention   - Follow-up with Nsx in 1-2 weeks with repeat CTH \"for stability and assess for delayed hydrocephalus. If stable/improved will defer remaining management and follow up with neurology\"  - Statin  - Optimal management of blood pressure   -  on importance of abstinence from smoking alcohol and amphetamine  - Patient at increased risk for stroke particularly early in post-stroke period - monitor neuro exam closely with low threshold to repeat imaging  -  patient and if applicable caregiver on optimal stroke management  - Follow-up with neurology and PCP after d/c   - Continue acute comprehensive interdisciplinary inpatient rehabilitation to include intensive skilled therapies (PT, OT, ST) as outlined with oversight and management by rehabilitation physician as well as inpatient rehab level nursing, case management and weekly interdisciplinary team meetings.       History of CVA (cerebrovascular accident)  Assessment & Plan  Hx of CVA's - 2022 - MRI 9/2022 - Acute infarcts in right frontal centrum semiovale and right posterior putamen. Small subacute infarct in left paramedian andreea.  Chronic lacunar infarcts in right corona radiata, bilateral basal ganglia, and bilateral thalami.  - Was to be on DAPT and statin but was not compliant    Secondary stroke prevention  - Antithrombotic: Full A/C and antiplatelet on hold for now   - Statin  - Optimal management of blood pressure   -  on importance of abstinence from smoking, alcohol, amphetamine  - Patient at increased risk for stroke particularly early in post-stroke period - monitor neuro exam closely with low threshold to repeat imaging  -  patient and if applicable caregiver on optimal stroke " management  - Follow-up with neurology and PCP after d/c     Abdominal discomfort  Assessment & Plan  Still present at times   - Chronic cholecystitis - not currently sx candidate but may be in future - Gen Sx follow-up OP unless increased concerns in interm  - +Hep C - OP GI follow-up and treatment   - She does have area of older approaching ecchymosis and small superficial possible palpable hematoma in region as well from heparin shot which have been held with improving mobility    See management of transaminitis as outlined above   > AST/ALT uptrending but somewhat better 4/9   AP, Tbili, Lipase wnl   - Comgmt with IM, GI, and Gen Sx  who are consulted  - Monitor vitals, labs, exam closely    - Avoid excessive acetaminophen; hold NSAIDs with possible GI issues and recent CVA  - Oxy 2.5mg Q4H PRN > care with hx of polysubtance abuse > wean off ASAP  - Robaxin 500mg TID for spasms as Gen Sx feels may have spasmodic component - ok per Gen Sx          ROSANA (generalized anxiety disorder)  Assessment & Plan  Increased anxiety and difficulty coping  - Increase lexapro to 10mg qday   - Repeat EKG Monday with hx of prolonged Qtc per IM     Increased anxiety/frustration 4/11   - Provided supportive counseling   Mood Anxious at times    ROSANA with some acute anxiety/panic     Lexapro 10mg qday  Hydroxyzine 25mg TID PRN anxiety - not adequately helpful today   Ultra low dose ativan 0.25mg BID PRN as 2nd line   Gabapentin [id number] (also for hx of EtOH/amphetamine abuse)  Optimal sleep mgmt  Supportive counseling  Psychology consult while in ARC if available   Counseled on and continue to encourage deep breathing/relaxation/behavioral management techniques      Potential for cognitive impairment  Assessment & Plan  History of multiple ischemic strokes in past and now with recent ICH  - Patient slightly confused at times, with some problems with memory  -Acute comprehensive interdisciplinary inpatient rehabilitation to  include intensive skilled therapies as outlined with oversight and management by rehabilitation physician.  Continue inpatient rehab level nursing, case management and weekly interdisciplinary team meetings.   - Recommend MOCA during ARC course   -Recommend eval of med mgmt and IADLs   -Monitor neuro-exam, wakefulness, mood, cognition, insight into deficits and safety awareness  - If cog impairment noted recommend - patient (+/- family/caregiver education) and training and compensatory strategies to optimize safety, function, and decrease risk of complications  -Optimize sleep-wake cycle  -Limit sedating medications when possible  -Monitor and ensure optimal management electrolytes, nutrition, and hydration  -Monitor for signs or symptoms of infection, medication intolerances, other systemic etiologies  -Fall precautions with frequent rounding; proactive toileting program, patient should not be unattended in bathroom    -OP follow-up PCP  and neuro       Essential hypertension  Assessment & Plan  BP goal normotensive per discussion with IM who reviewed notes earlier   Internal medicine consulted and co-management with their service  Monitor vitals with and without activity; monitor for orthostasis  Monitor hemoglobin, electrolytes, kidney function, hydration status   Current meds: Amlodipine 10mg qday, HCTZ 12.5mg qday, lisinopril 40mg qday   PRN hydralazine       Amphetamine abuse (HCC)  Assessment & Plan  Was using several times per week   Drug cessation counseling and supportive counseling  Optimal mood/stress mgmt   Neuropsychology consult if available   CM assistance with [facility]OST referral for addiction/substance abuse resources       Marijuana use  Assessment & Plan  Gabapentin [phone]   Cessation counseling and supportive counseling  Optimal mood/stress mgmt   CM to assist with HOST referral     Tobacco abuse  Assessment & Plan  Nicotine patch declined by patient   Smoking cessation and supportive  "counseling  Optimal mood/stress mgmt       Alcohol use  Assessment & Plan  Thiamine  Gabapentin [phone]   Alcohol cessation counseling and supportive counseling  Optimal mood/stress mgmt   Psychology consult if available        Insomnia  Assessment & Plan  Controlled   - Sleep log   - Melatonin 3mg HS  - Gabapentin 300mg HS - for anxiety, polysubstance hx as well   - PRN hydroxyzine   - Recommend appropriate sleep hygiene  - Patient counselled/will be counselled on this when appropriate     - Sleep only as much as necessary to feel rested and then get up or sit up in bed, maintain regular sleep schedule (same bedtime and wake time everyday), do not force sleep, avoid caffeinated beverages after lunch, avoid alcohol at home near bedtime, do not smoke particularly in evening, do not go to bed hungry, adjust bedroom environment so you are comfortable prior to settling down for sleep, deal with concerns or worries before bedtime. Make a list of things to work on for next day so anxiety is reduced at night, exercise regularly when cleared by physician      Hepatitis C  Assessment & Plan  Per IM  \"HCV detected, viral load of 1,550,000.  GI consulted and following.  Follow-up with Hepatology as outpatient for treatment.\"       Chronic cholecystitis  Assessment & Plan  Per IM  \"HIDA scan on 4/10 showed EF 0%, gallbladder dysfunction.  Likely representing chronic cholecystitis.  Poor surgical candidate with recent cerebellar hemorrhage.  Will likely need elective surgery as outpatient.  Declined low fat diet.  Continue current pain regimen.  Will require follow-up with GI and General Surgery as outpatient.\"    Simple adnexal cyst greater than 1 cm in diameter in postmenopausal patient  Assessment & Plan  Per IM  - \"incidental finding on CT A/P   - simple appearing right adnexal cyst measuring 3.7 cm  - according to current guidelines, pt needs follow up pelvic US in 6-12 months  - follow up with OBGYN at discharge \" "       Prolonged QT interval  Assessment & Plan  IM consulted and with overall management at their discretion during ARC course  Per IM  Qtc PTA up to 534 > repeat EKG 4/4 - QT prolonged but shortened from prior to 472 (this was on lexapro 20mg and some hydroxyzine)   - Nevertheless will decrease lexapro and hydroxyzine dosing some and limit prolonged Qtc agents as much as possible  - Monitor EKG intermittently     History of noncompliance with medical treatment  Assessment & Plan  Was not taking meds at home appropriately and following up with OP providers needed after last CVA  - Drug/EtOH cessation counseling  - Patient education      Hypomagnesemia  Assessment & Plan  Improved  Mag 1.9 (low normal) 4/10   Mag ox per IM   IM consulted, monitoring, repletion, and overall management at their discretion during ARC course      At risk for venous thromboembolism (VTE)  Assessment & Plan  SCDs, ambulation  (Holding heparin with improved mobility and some abdominal ecchymosis/small hematoma/pain)     Hyponatremia  Assessment & Plan  Improved 4/9-10    Neuropathy  Assessment & Plan  Significant b/l below knees chronic   - Risk of alcoholic neuropathy  - Alcohol cessation counseling  - OP Neuro EMG/NCS follow-up  - Fall precautions, PT, OT  - On gabapentin         Other Medical Issues:  Monitor for     Follow-up providers and other issues to be followed up after discharge  PCP  Neurology  Neurosurgery  [facility] program referral    CODE: Level 1: Full Code    Restrictions include:  Fall precautions    Objective:    Allergies per EMR  Diagnostic Studies: Reviewed, no new imaging  CT head wo contrast   Final Result by Joao Mora MD (04/12 8509)      Stable punctate hyperdense focus within the posterior right sylvian fissure, cortical versus subarachnoid hemorrhage. Stable evolving hematoma within the left paramedian cerebellar hemisphere. There is no new intra or extra-axial hemorrhage.      Chronic right centrum  semiovale ovale, left corona radiata, bilateral basal ganglia, bilateral thalamic, and pontine lacunar infarcts. Chronic microangiopathic ischemic changes.                     Workstation performed: [id number]         CT head wo contrast   Final Result by Xavier Pierre MD (04/11 1015)      -New punctate foci of cortical hemorrhage versus small amount of subarachnoid hemorrhage within the posterior right sylvian fissure. Recommend short interval follow-up exam.      -Decreased size of hyperdense component of the left paramedian cerebellar hemorrhage with mildly increased surrounding vasogenic edema as detailed above. Decreased hemorrhage within the fourth ventricle.      The study was marked in EPIC for immediate notification.                  Workstation performed: [id number]         NM Hepatobiliary w RX   Final Result by Luis Wu MD (04/10 1334)      1. Normal visualization of the gallbladder.      2. Abnormal contractile response of the gallbladder to cholecystokinin infusion, ( 0%), which in the appropriate clinical context suggest gallbladder dysfunction.         Resident: ISATU ELLIOTT I, the attending radiologist, have reviewed the images and agree with the final report above.      Workstation performed: [id number]         US right upper quadrant   Final Result by Michael Brooks MD (04/08 1604)      1. Cholelithiasis with borderline wall thickening. Correlate clinically to exclude early acute cholecystitis.   2. Slightly nodular liver contour as seen on recent CT, which may be associated with cirrhosis. Correlate clinically.      Workstation performed: [id number]           See above as well    Laboratory: Labs reviewed  Current Facility-Administered Medications   Medication Dose Route Frequency Provider Last Rate    acetaminophen  488 mg Oral Q6H PRN Matias Jennings MD      amLODIPine  10 mg Oral Daily Matias Jennings MD      bisacodyl  10 mg Rectal Daily PRN Matias Jennings MD       calcium carbonate  500 mg Oral Daily PRN Ruth Ann Earl,       chlorhexidine  15 mL Mouth/Throat Q12H [facility] Matias Jennings MD      Cholecalciferol  2,000 Units Oral Daily MILAN Suresh      [START ON 4/13/2024] escitalopram  10 mg Oral Daily MILAN Suresh      folic acid  1 mg Oral Daily Matias Jennings MD      gabapentin  100 mg Oral BID Matias Jennings MD      gabapentin  300 mg Oral HS Matias Jennings MD      hydrALAZINE  10 mg Oral Q6H PRN Matias Jennings MD      hydroCHLOROthiazide  12.5 mg Oral Daily Matias Jennings MD      hydrOXYzine HCL  25 mg Oral TID PRN Matias Jennings MD      lisinopril  40 mg Oral Daily Matias Jennings MD      loratadine  10 mg Oral Daily Matias Jennings MD      LORazepam  0.25 mg Oral BID PRN Matias Jennings MD      magnesium Oxide  800 mg Oral TID With Meals MILAN Suresh      melatonin  3 mg Oral HS Matias Jennings MD      methocarbamol  500 mg Oral TID PRN Matias Jennings MD      multivitamin-minerals  1 tablet Oral Daily Matias Jennings MD      oxyCODONE  2.5 mg Oral Q4H PRN Matias Jennings MD      pantoprazole  40 mg Oral BID EFFIE Salinas PA-C      polyethylene glycol  17 g Oral Daily PRN Matias Jennings MD      thiamine  100 mg Oral Daily Matias Jennings MD           acetaminophen    bisacodyl    calcium carbonate    hydrALAZINE    hydrOXYzine HCL    LORazepam    methocarbamol    oxyCODONE    polyethylene glycol      Drug regimen reviewed, all potential adverse effects identified and addressed:    Scheduled Meds:  Current Facility-Administered Medications   Medication Dose Route Frequency Provider Last Rate    acetaminophen  488 mg Oral Q6H PRN Matias Jennings MD      amLODIPine  10 mg Oral Daily Matias Jennings MD      bisacodyl  10 mg Rectal Daily PRN Matias Jennings MD      calcium carbonate  500 mg Oral Daily PRN Ruth Ann Earl,       chlorhexidine  15  mL Mouth/Throat Q12H [facility] Matias Jennings MD      Cholecalciferol  2,000 Units Oral Daily MILAN Suresh      [START ON 4/13/2024] escitalopram  10 mg Oral Daily MILAN Suresh      folic acid  1 mg Oral Daily Matias Jennings MD      gabapentin  100 mg Oral BID Matias Jennings MD      gabapentin  300 mg Oral HS Matias Jennings MD      hydrALAZINE  10 mg Oral Q6H PRN Matias Jennings MD      hydroCHLOROthiazide  12.5 mg Oral Daily Matias Jennings MD      hydrOXYzine HCL  25 mg Oral TID PRN Matias Jennings MD      lisinopril  40 mg Oral Daily Matias Jennings MD      loratadine  10 mg Oral Daily Matias Jennings MD      LORazepam  0.25 mg Oral BID PRN Matias Jennings MD      magnesium Oxide  800 mg Oral TID With Meals MILAN Suresh      melatonin  3 mg Oral HS Matias Jennings MD      methocarbamol  500 mg Oral TID PRN Matias Jennings MD      multivitamin-minerals  1 tablet Oral Daily Matias Jennings MD      oxyCODONE  2.5 mg Oral Q4H PRN Matias Jennings MD      pantoprazole  40 mg Oral BID EFFIE Salinas PA-C      polyethylene glycol  17 g Oral Daily PRN Matias Jennings MD      thiamine  100 mg Oral Daily Matias Jennings MD         Chief Complaints:  Rehab follow-up     Subjective:     On eval, patient reports some right upper quadrant abdominal pain at times fairly similar but states sometimes she has pain and has to wait too long for next pain medication.  Patient notes some ongoing frustration and anxiety with her overall condition.  She reports mood suboptimally controlled.  She denies headache, worsening balance, lightheadedness, sensation, fever, chills, or other new complaints.    ROS: A 10 point ROS was performed; negative except as noted above.     Physical Exam:  Vitals:    04/12/24 0630   BP: 143/81   Pulse: 58   Resp: 18   Temp: 97.8 °F (36.6 °C)   SpO2: 95%         GEN:  Sitting concerned appearing  HEENT/NECK:  MMM  CARDIAC: Regular rate rhythm, no murmers, no rubs, no gallops  LUNGS:  clear to auscultation, no wheezes, rales, or rhonchi  ABDOMEN: Nondistended, some tenderness RUQ, normoactive  EXTREMITIES/SKIN:  no calf edema, no calf tenderness to palpation  NEURO:   MENTAL STATUS: Adequate wakefulness, mildly confused at times with some decreased memory, able to follow commands, CN II-XII: grossly intact , and Strength/MMT:  Near 5/5 throughout; L>R ataxia stable   PSYCH:  Affect:  Anxious    HPI:  58-year-old female with a past medical history of ischemic strokes in 2022, hypertension, generalized anxiety disorder, smoking tobacco, alcohol use, marijuana use, methamphetamine abuse, medical noncompliance who presented to [facility] on 3/26/2024 with dizziness, nausea, and headache after recent fall and vomiting also accompanied by some diarrhea.  Patient noted to be ataxic on exam.  CT showed acute parenchymal hematoma in the left paramedian cerebellum with mass effect on the fourth ventricle with intraventricular extension of the hemorrhage with blood in the fourth ventricle and the left foramen of Luschka as well as some chronic lacunar infarcts.  Patient transferred to [facility] for neurosurgery oversight and evaluation.  Patient placed on EVD watch but ultimately did not need this.  Patient tells me she was not compliant with her blood thinners although she did receive DDAVP for aspirin reversal.  Neurology was consulted and suspected intracerebral hemorrhage related to hypertension and medication noncompliance with recent methamphetamine use.  Follow-up repeat CT head 7/3/2027 328 were stable.  Patient required multiple blood pressure medications and has blood pressure goal of less than 140 and ideally between 120 and 140 is much as possible.  Patient is to repeat CT head on 4/11 and per neurology if improving hemorrhage can resume Plavix without need for aspirin for secondary ischemic stroke  prevention.  Neurosurgery would like to see patient in follow-up at about that same time also looking for stability and assess for delayed hydrocephalus.  Patient was evaluated by skilled therapies and was found to have significant decline in ADLs and ambulation and appears appropriate for admission to [facility].     ** Please Note: Fluency Direct voice to text software may have been used in the creation of this document. **

## 2024-04-12 NOTE — PROGRESS NOTES
04/12/24 0830   Pain Assessment   Pain Assessment Tool 0-10   Pain Score 3   Pain Location/Orientation Location: Abdomen;Orientation: Right;Orientation: Upper   Pain Onset/Description Onset: Ongoing;Frequency: Intermittent;Descriptor: Cramping;Descriptor: Discomfort;Descriptor: Pressure   Hospital Pain Intervention(s) Medication (See MAR)   Restrictions/Precautions   Precautions Bed/chair alarms;Cognitive;Fall Risk;Supervision on toilet/commode   Cognition   Overall Cognitive Status Impaired   Arousal/Participation Alert;Cooperative   Attention Within functional limits   Orientation Level Oriented X4   Memory Decreased recall of precautions;Decreased short term memory   Following Commands Follows one step commands with increased time or repetition   Comments anxious and impulsive at times. distracted by all going on with her medically and some inconsistencies from medical team about her status.   Subjective   Subjective Sitting EOB upon entry, finishing breakfast. Feels ok, fearful pain will increase with mobility, Asked PT to ask RN for pain meds. RN notified.   Sit to Stand   Type of Assistance Needed Supervision;Verbal cues   Physical Assistance Level No physical assistance   Comment CS with RW (cues for hand placement)   Sit to Stand CARE Score 4   Bed-Chair Transfer   Type of Assistance Needed Incidental touching   Physical Assistance Level No physical assistance   Comment CG/CS RW   Chair/Bed-to-Chair Transfer CARE Score 4   Walk 10 Feet   Type of Assistance Needed Incidental touching   Physical Assistance Level No physical assistance   Comment CG/CS RW   Walk 10 Feet CARE Score 4   Walk 50 Feet with Two Turns   Type of Assistance Needed Incidental touching   Physical Assistance Level No physical assistance   Comment CG/CS RW   Walk 50 Feet with Two Turns CARE Score 4   Walk 150 Feet   Type of Assistance Needed Incidental touching   Physical Assistance Level No physical assistance   Comment CG/CS RW   Walk  150 Feet CARE Score 4   Walking 10 Feet on Uneven Surfaces   Type of Assistance Needed Incidental touching   Physical Assistance Level No physical assistance   Comment wtih RW on indoor ramp and over mat on floor with RW.   Walking 10 Feet on Uneven Surfaces CARE Score 4   Ambulation   Primary Mode of Locomotion Prior to Admission Walk   Distance Walked (feet) 115 ft  (300, HHA x60ft.)   Assist Device Roller Walker   Gait Pattern Ataxic;Inconsistant Katherine;Step to;Step through;Narrow PAUL;Decreased foot clearance   Limitations Noted In Balance;Coordination;Safety;Heel Strike   Walk Assist Level Close Supervision;Contact Guard;Minimum Assist   Findings CG/Cs with RW, staggered ataxic gait, with dec balance. HHA for balance challenge.   Does the patient walk? 2. Yes   Wheelchair mobility   Does the patient use a wheelchair? 0. No   Therapeutic Interventions   Balance unsupported bean bag toss, x15min total reaching R and L unsupported as able. Amb over mat on floor with RW.   Assessment   Treatment Assessment Pt participated in 60 min skilled PT intervention expressing ongoing frustration with all things going on. Reports her son encouraged her to stay as long as she needs to get better and get answers to her questions. Ongoing support and education provided regarding need for therapy to maximize her balance and safety and reduce fall risk. Pt currently agreeable to out pt PT as follow up, however does report getting home PT for balance in the past. Discussed current [place] date set for 4/23, with son to come next tuesday for FT. Pt notified [employer] virtual appt monday at 1pm, and repeat CT scan today (to compare from yesterday) HIV results still pending. Dec balance and ataxia persist, RW highly recommend for use at TX. Discussed will have OT A with ordering RW and tray from [employer] since her son works there. Pt returned to supine at end of session per pt request. Continue dynamic balance tasks to improve functional  safety.   Plan   Progress Slow progress, multiple tests/procedures   Discharge Recommendation   Walker Package Recommended Wheeled walker  (will self purchase)   PT Therapy Minutes   PT Time In 0830   PT Time Out 0930   PT Total Time (minutes) 60   PT Mode of treatment - Individual (minutes) 60   PT Mode of treatment - Concurrent (minutes) 0   PT Mode of treatment - Group (minutes) 0   PT Mode of treatment - Co-treat (minutes) 0   PT Mode of Treatment - Total time(minutes) 60 minutes   PT Cumulative Minutes 465   Therapy Time missed   Time missed? No

## 2024-04-12 NOTE — ASSESSMENT & PLAN NOTE
History of multiple ischemic strokes in past and now with recent ICH  - Did fairly well on cog screens; oriented x3 currently   - MOCA on 4/16 25 out of 30 - mild cog impairment   - MMSE 4/13 - 26 out of 30 wnl - slight impairments in orientation   - She did fairly well on CLQT+ earlier in course however   - Appears to understand risks/dangers (son reviewed risks and both appear to have adequate understanding)  - Reviewed with son need for 24-7 supervision, assistance with iADLs, med mgmt, transport and he reports adequate understanding   - Recommend  OT, ST, RN, MSW   -OP follow-up PCP  and neuro

## 2024-04-12 NOTE — PROGRESS NOTES
"   04/12/24 1030   Pain Assessment   Pain Assessment Tool 0-10   Pain Score No Pain   Restrictions/Precautions   Precautions Bed/chair alarms;Cognitive;Fall Risk;Supervision on toilet/commode   Weight Bearing Restrictions No   ROM Restrictions No   Comprehension   Comprehension (FIM) 5 - Understands basic directions and conversation   Expression   Expression (FIM) 5 - Needs help/cues only RARELY (< 10% of the time)   Social Interaction   Social Interaction (FIM) 5 - Interacts appropriately with others 90% of time   Problem Solving   Problem solving (FIM) 4 - Solves basic problems 75-89% of time   Memory   Memory (FIM) 4 - Recognizes/recalls/performs 75-89%   Speech/Language/Cognition Assessmetn   Treatment Assessment Pt seen for skilled speech therapy session targeting cognitive linguistic communication skills. Pt alert and interactive- seen resting in bed. Pt with heating aqua pad across stomach but not complaining of any pain currently. Pt participated with PT session this morning, only stated \"it went well\". Pt responding to most questions with one word responses. SLP attempting to complete recall of previous sessions with SLP, pt stated \"I don't know I\"m just done\". SLP provided empathetic listening as pt voiced her frustrations with the last few days. Pt voicing about communications or lack of and feeling frustrated to the point of wanting to leave. Pt asking SLP about discharge plan to which SLP stated date was tentative pending medical status and FT to occur next Tuesday. Pt asking about equipment and what she would need her son to get from [facility]. Spoke with Joslyn PT and what is recommended is standard RW with 5 inch wheels. OT to review with pt as well this afternoon regarding walker and tray to attach and help with ordering from [facility] site. Communicated this to pt which she was receptive. No further questions at this time. Reviewed plan for further follow up over the weekend to update medication sheet " and cont to coordinate for discharge. Pt in agreement. Pt overall is improving with skilled SLP services and will cont to benefit to maximize overall cognitive linguistic communication abilities at this time.   SLP Therapy Minutes   SLP Time In 1030   SLP Time Out 1100   SLP Total Time (minutes) 30   SLP Mode of treatment - Individual (minutes) 30   SLP Mode of treatment - Concurrent (minutes) 0   SLP Mode of treatment - Group (minutes) 0   SLP Mode of treatment - Co-treat (minutes) 0   SLP Mode of Treatment - Total time(minutes) 30 minutes   SLP Cumulative Minutes 240   Therapy Time missed   Time missed? Yes

## 2024-04-13 PROCEDURE — 97535 SELF CARE MNGMENT TRAINING: CPT

## 2024-04-13 PROCEDURE — 97530 THERAPEUTIC ACTIVITIES: CPT

## 2024-04-13 RX ADMIN — Medication 2000 UNITS: at 08:50

## 2024-04-13 RX ADMIN — CHLORHEXIDINE GLUCONATE 0.12% ORAL RINSE 15 ML: 1.2 LIQUID ORAL at 08:50

## 2024-04-13 RX ADMIN — AMLODIPINE BESYLATE 10 MG: 10 TABLET ORAL at 08:50

## 2024-04-13 RX ADMIN — Medication 800 MG: at 17:07

## 2024-04-13 RX ADMIN — PANTOPRAZOLE SODIUM 40 MG: 40 TABLET, DELAYED RELEASE ORAL at 06:07

## 2024-04-13 RX ADMIN — LORATADINE 10 MG: 10 TABLET ORAL at 08:50

## 2024-04-13 RX ADMIN — Medication 2.5 MG: at 14:36

## 2024-04-13 RX ADMIN — GABAPENTIN 300 MG: 300 CAPSULE ORAL at 20:34

## 2024-04-13 RX ADMIN — GABAPENTIN 100 MG: 100 CAPSULE ORAL at 06:06

## 2024-04-13 RX ADMIN — Medication 800 MG: at 08:50

## 2024-04-13 RX ADMIN — ESCITALOPRAM OXALATE 10 MG: 10 TABLET ORAL at 08:50

## 2024-04-13 RX ADMIN — HYDROCHLOROTHIAZIDE 12.5 MG: 12.5 TABLET ORAL at 08:50

## 2024-04-13 RX ADMIN — Medication 2.5 MG: at 08:50

## 2024-04-13 RX ADMIN — MULTIPLE VITAMINS W/ MINERALS TAB 1 TABLET: TAB ORAL at 08:50

## 2024-04-13 RX ADMIN — MELATONIN TAB 3 MG 3 MG: 3 TAB at 20:34

## 2024-04-13 RX ADMIN — FOLIC ACID 1 MG: 1 TABLET ORAL at 08:50

## 2024-04-13 RX ADMIN — THIAMINE HCL TAB 100 MG 100 MG: 100 TAB at 08:50

## 2024-04-13 RX ADMIN — HYDROXYZINE HYDROCHLORIDE 25 MG: 25 TABLET, FILM COATED ORAL at 08:50

## 2024-04-13 RX ADMIN — PANTOPRAZOLE SODIUM 40 MG: 40 TABLET, DELAYED RELEASE ORAL at 17:07

## 2024-04-13 RX ADMIN — LISINOPRIL 40 MG: 20 TABLET ORAL at 08:50

## 2024-04-13 RX ADMIN — Medication 800 MG: at 11:58

## 2024-04-13 RX ADMIN — CHLORHEXIDINE GLUCONATE 0.12% ORAL RINSE 15 ML: 1.2 LIQUID ORAL at 20:34

## 2024-04-13 RX ADMIN — GABAPENTIN 100 MG: 100 CAPSULE ORAL at 14:36

## 2024-04-13 NOTE — PROGRESS NOTES
"   04/13/24 1440   Therapy Time missed   Time missed? Yes   Reason for time missed   (refusal)   Time(s) multiple attempts made third atempt for the day, she is still declining therapy, rpeorting a headache (RN provided meds), feeling frustrated and not wanting to do therapy.     Varianced 60' total PT time today.       When entering patients room, she was awake, and reported having a headache, notified RN and meds were provided. I asked if she wanted to work on walking, stairs, balance, and she said no, that she already did that today. I reviewed that we need to offer the therapy to meet the therapy time requirement for this type of unit. She reported \"I don't want to be treated like a number, I'm not a number\". Education was provided/apologized for her feeling that way, explaining that we don't have the perspective that she is a number and that's not how we are trying to view her.     She continued that she is upset she was lied to, and stated \" I've been lied to and no one can tell me I haven't been lied to\". She voiced this as part of her frustration, and that she just wants to go home.     She said she did her therapy for the day, she is wasting her time and our time here. I replied that she's not wasting my time if we walk, practice stairs and balance exercises as this is all part of stroke recovery. I reviewed that stroke recovery takes many months and we want to help people get a good start to their overall recovery. I asked if she had any questions on overall stroke recovery/rehab and she said no.     She continued to say she doesn't want to do any more therapy today and she just wants to go home.   " Carac Counseling:  I discussed with the patient the risks of Carac including but not limited to erythema, scaling, itching, weeping, crusting, and pain.

## 2024-04-13 NOTE — PLAN OF CARE
Problem: MUSCULOSKELETAL - ADULT  Goal: Maintain proper alignment of affected body part  Description: INTERVENTIONS:  - Support, maintain and protect limb and body alignment  - Provide patient/ family with appropriate education  Outcome: Progressing     Problem: PAIN - ADULT  Goal: Verbalizes/displays adequate comfort level or baseline comfort level  Description: Interventions:  - Encourage patient to monitor pain and request assistance  - Assess pain using appropriate pain scale  - Administer analgesics based on type and severity of pain and evaluate response  - Implement non-pharmacological measures as appropriate and evaluate response  - Consider cultural and social influences on pain and pain management  - Notify physician/advanced practitioner if interventions unsuccessful or patient reports new pain  Outcome: Progressing

## 2024-04-13 NOTE — PROGRESS NOTES
04/13/24 1310   Therapy Time missed   Time missed? Yes   Amount of time missed 60   Reason for time missed Extreme fatigue   Time(s) multiple attempts made 1310, 1344. patient sleeping at 1310 with a sign on her door to not disturb if sleeping. attempted again at 1344 with RN as she was due to 2pm meds. she said she doesn't want to get up and hasn't been sleeping for hte past hour because people have been coming in every 10 minutes. will attempt again later.

## 2024-04-13 NOTE — PROGRESS NOTES
04/13/24 0930   Pain Assessment   Pain Assessment Tool 0-10   Pain Score 3   Pain Location/Orientation Location: Abdomen   Pain Onset/Description Onset: Ongoing   Restrictions/Precautions   Precautions Bed/chair alarms;Cognitive;Fall Risk;Supervision on toilet/commode   Weight Bearing Restrictions No   ROM Restrictions No   Comprehension   Comprehension (FIM) 5 - Understands basic directions and conversation   Expression   Expression (FIM) 5 - Needs help/cues only RARELY (< 10% of the time)   Social Interaction   Social Interaction (FIM) 5 - Needs monitoring/encouragement  to participate/interact   Problem Solving   Problem solving (FIM) 5 - Solves basic problems 90% of time   Memory   Memory (FIM) 5 - [place] cues patient   Speech/Language/Cognition Assessmetn   Treatment Assessment Pt completed the mini-mental state examination (MMSE) per physician request. Please see scanned form in chart for full details. Results as follows:     Orientation (10 points) 8/10   Registration (3 points) 3/3   Attention and Calculation (5 points) 5/5   Recall (3 points) 2/3   Language and Praxis (9 points) 8/9   Total Score:  26/30   Severity  WNL        Pt also participate in informal discussion- made aware of therapy schedule today and plan for full day tomorrow as pt missed days during the week therefore her participating is crucial for over the weekend. Pt receptive. Discussed plan for discharge- pt stated son ordered walker and tray for home as well as shower chair. Reviewed again some stroke education in regards to cont'd deficits in balance and coordination which is a barrier to returning home safely at this time. Encouraged to maximize time with PT/OT sessions in order to discharge sooner and safer. Reviewed plan for FT Tuesday with son to discuss plans for discharge pending medical update as well. No further questions at this time. Plan to cont to target functional cognitive tasks to aid in maximizing IADL management and  discharge planning.    SLP Therapy Minutes   SLP Time In 0930   SLP Time Out 1000   SLP Total Time (minutes) 30   SLP Mode of treatment - Individual (minutes) 30   SLP Mode of treatment - Concurrent (minutes) 0   SLP Mode of treatment - Group (minutes) 0   SLP Mode of treatment - Co-treat (minutes) 0   SLP Mode of Treatment - Total time(minutes) 30 minutes   SLP Cumulative Minutes 270   Therapy Time missed   Time missed? No

## 2024-04-13 NOTE — PROGRESS NOTES
Physical Medicine and Rehabilitation Progress Note  Tanika Lira 58 y.o. female MRN: [id number]  Unit/Bed#: -01 Encounter: [id number]      Assessment & Plan:     Decline in ADLs and mobility: Functional assessment -         FIM  Care Score  Admit Score Recent Score    Total assist  1-100% or 2p    Tot     Max assist 2-51-75%    Sub     Mod assist 3- 26-50%  Par Bathing    Min assist 3- 25% or < Par LBD    CG assist 4  TA  Bahting   Sup/Setup 4-5 Sup To hygiene, UBD To hygiene, bathing, LBD   Mod-I/Indep 6 MI  UBD    Transfers  Mod-total assist  CGA-S assist     Ambulation   Significant assist  150 ft CG assist     Stairs   Min assist  4 steps min assist      Goal: Mod indep for most ADLs and  for mobility  Major barriers: Imbalance, incoordination  Dispo: Home with TANYA White 4/23/24     Transaminitis  Assessment & Plan  4/15 - AST/ALT trending up further 119/175 > 175/246 (AP/TBili wnl; Amm wnl); pt afebrile; no leukocytosis   - Follow-up with both Gen Sx and GI today with worsening LFTs - IM reached to both services and they did not recommend any changes to management at this time   - Continue to monitor exam, vitals, and labs intermittently     Hep C+ titer 1.5 million copies/ml 4/8   Hep testing NR   4/10 HIDA scan  - Abnormal contractile response of the gallbladder to cholecystokinin infusion, ( 0%), which in the appropriate clinical context suggest gallbladder dysfunction.  -   4/10 Dilcia, iron panel, AFP wnl other serologies per GI    AST 57>88>114>130>119>119>175   ALT 65>107>144>168>162>172>246   AP/Tbili wnl   - Trend LFTs intermittently     GI follow-up 4/11  Chronically elevated liver enzymes, appears to have plateaued, hepatocellular pattern with history of alcohol use and Hepatitis C. AM LFTs pending. Imaging suggestive of cirrhosis. Synthetic liver function appear intact. No history of variceal bleeding, ascites, hepatic encephalopathy. Liver serologies ordered to rule out hereditary,  "autoimmune etiologies, so far negative. HCV detected with viral load ([id number]/mL). AFP normal, no hepatoma noted on imaging. Will need outpatient follow up for HCV treatment. Avoid hepatotoxins including alcohol.  RUQ abdominal pain with nausea and cholelithiasis with possible early cholecystitis on imaging. Patient afebrile without leukocytosis. HIDA EF 0% c/w gallbladder dysfunction/chronic cholecystitis. Poor surgical candidate with recent hemorrhage, elective surgery will likely need to be delayed. Surgery following.        GI consult 4/11  Chronically elevated liver enzymes, appears to have plateaued, hepatocellular pattern with history of alcohol use and Hepatitis C. AM LFTs pending. Imaging suggestive of cirrhosis. Synthetic liver function appear intact. No history of variceal bleeding, ascites, hepatic encephalopathy. Liver serologies ordered to rule out hereditary, autoimmune etiologies, so far negative. HCV detected with viral load ([id number]/mL). AFP normal, no hepatoma noted on imaging. Will need outpatient follow up for HCV treatment. Avoid hepatotoxins including alcohol.  RUQ abdominal pain with nausea and cholelithiasis with possible early cholecystitis on imaging. Patient afebrile without leukocytosis. HIDA EF 0% c/w gallbladder dysfunction/chronic cholecystitis. Poor surgical candidate with recent hemorrhage, elective surgery will likely need to be delayed. Surgery following.      Gen Sx consult   \"Ultrasound shows cholelithiasis without cholecystitis.  Abdominal pain not related to p.o. intake which makes biliary colic less likely.  With recent cerebellar hemorrhage general anesthesia would be very risky.  Recommend low-fat diet  She may benefit from muscle relaxants as abdominal wall tenderness was not localized to the right upper quadrant.  Would minimize narcotics.\"  - Reduced oxy   - Discussed with Gi and Lillie kwong        RUQ US 4/8  1. Cholelithiasis with borderline wall thickening. " Correlate clinically to exclude early acute cholecystitis.  2. Slightly nodular liver contour as seen on recent CT, which may be associated with cirrhosis. Correlate clinically.      Hx of alcohol use - alcohol cessation counseling  IM consulted and co-management with their team during [facility] course  Monitor LFTs intermittently during course and after discharge  Med adjustments when indicated     CT A/P 3/26  IMPRESSION:  1. Cholelithiasis, including a 3 mm gallstone within the gallbladder neck versus cystic duct. No findings of acute cholecystitis. If there is concern for acute cholecystitis, right upper quadrant ultrasound can be obtained.  2. Findings suggesting cirrhosis with mild mesenteric edema, but no ascites. Enlarged ana hepatic and peripancreatic lymph nodes measuring up to 2 cm in short axis, indeterminate, although could be reactive, given suspicion for cirrhosis.      * Cerebellar hemorrhage (HCC)  Assessment & Plan  Follow-up with Nsx 4/15  - Continue to hold A/P and full A/C  - Repeat CTH in 2 weeks 4/29 followed by OP follow-up appt with NSx    CTH 4/12 - stable from 4/11  - Stable punctate hyperdense focus within the posterior right sylvian fissure, cortical versus subarachnoid hemorrhage. Stable evolving hematoma within the left paramedian cerebellar hemisphere. There is no new intra or extra-axial hemorrhage.  - Chronic right centrum semiovale ovale, left corona radiata, bilateral basal ganglia, bilateral thalamic, and pontine lacunar infarcts. Chronic microangiopathic ischemic changes.       CTH 4/11  -New punctate foci of cortical hemorrhage versus small amount of subarachnoid hemorrhage within the posterior right sylvian fissure. Recommend short interval follow-up exam.  -Decreased size of hyperdense component of the left paramedian cerebellar hemorrhage with mildly increased surrounding vasogenic edema as detailed above. Decreased hemorrhage within the fourth ventricle.  4/11 neuro exam  "stable  - Neuro c/s - \"-New punctate foci of cortical hemorrhage versus small amount of subarachnoid hemorrhage within the posterior right sylvian fissure. Recommend short interval follow-up exam.    Reviewed the imaging. There is a punctate hyperdensity in one cut only 2/29, without a clear significance. Actually a comparison to CT head on 3/27 may show even the same foci in series 304, image 43/76.   Team can repeat CT head tomorrow to ensure stability, and if so, follow plan as per discharge summary on 4/3\"  - NeuroSx c/s   Given new punctate foci of cortical hemorrhage, recommend repeat CTH tomorrow to ensure stability.   Continue to hold full dose AC/AP therapy.   Pt has upcoming follow up on Monday 4/15/24 for virtual visit with neurosurgery Please contact nsx with any questions or concerns in the interim. (Per IM - patient to hold A/P until virtual decides Monday)     4/9 - neuro exam stable  4/8 - neuro exam stable, function, improving, continue tx in [facility]C setting   4/4 - improving ataxia on exam; tolerating ARC setting appropriately     - CTA HN - No left cerebellar vascular malformation to account for the patient's acute parenchymal hematoma. No cervical or intracranial large vessel occlusion, dissection or aneurysm. Mild bilateral cervical carotid bifurcation atherosclerotic disease.  - CTH 3/26 - Acute parenchymal hematoma left paramedian cerebellum measures 2.6 x 1.6 cm with mass effect on the fourth ventricle. Intraventricular extension of hemorrhage noted with blood in the fourth ventricle and left foramen of Luschka.  - Follow-up CTH 3/27 -  Redemonstration of hyperdense hemorrhage in the left paramedian cerebellum, as described with mass effect and extension into the fourth ventricle and left foramen of Luschka, similar in appearance to the prior.    No hydrocephalus.  - Follow-up CTH 3/28 - No significant interval changes    - Residual impairments on admission to ARC: Incoordination, imbalance, " "ataxia, possible dysphagia (assess for other impairments during ARC course)     Secondary stroke prevention  - Antithrombotic: Full AC and antiplatelet on hold   - Per neuro, Repeat CTH on Thursday 4/11 - if improving hemorrhage, patient can resume plavix 75mg daily for stroke prevention; no need for aspirin in terms of stroke prevention   - Follow-up with Nsx in 1-2 weeks with repeat CTH \"for stability and assess for delayed hydrocephalus. If stable/improved will defer remaining management and follow up with neurology\"  - Statin  - Optimal management of blood pressure   -  on importance of abstinence from smoking alcohol and amphetamine  - Patient at increased risk for stroke particularly early in post-stroke period - monitor neuro exam closely with low threshold to repeat imaging  -  patient and if applicable caregiver on optimal stroke management  - Follow-up with neurology and PCP after d/c   - Continue acute comprehensive interdisciplinary inpatient rehabilitation to include intensive skilled therapies (PT, OT, ST) as outlined with oversight and management by rehabilitation physician as well as inpatient rehab level nursing, case management and weekly interdisciplinary team meetings.       History of CVA (cerebrovascular accident)  Assessment & Plan  Hx of CVA's - 2022 - MRI 9/2022 - Acute infarcts in right frontal centrum semiovale and right posterior putamen. Small subacute infarct in left paramedian andreea.  Chronic lacunar infarcts in right corona radiata, bilateral basal ganglia, and bilateral thalami.  - Was to be on DAPT and statin but was not compliant    Secondary stroke prevention  - Antithrombotic: Full A/C and antiplatelet on hold for now   - Statin  - Optimal management of blood pressure   -  on importance of abstinence from smoking, alcohol, amphetamine  - Patient at increased risk for stroke particularly early in post-stroke period - monitor neuro exam closely with low " "threshold to repeat imaging  -  patient and if applicable caregiver on optimal stroke management  - Follow-up with neurology and PCP after d/c     Chronic cholecystitis  Assessment & Plan  Per IM  \"HIDA scan on 4/10 showed EF 0%, gallbladder dysfunction.  Likely representing chronic cholecystitis.  Poor surgical candidate with recent cerebellar hemorrhage.  Will likely need elective surgery as outpatient.  Declined low fat diet.  Continue current pain regimen.  Will require follow-up with GI and General Surgery as outpatient.\"    Abdominal discomfort  Assessment & Plan  Better today   - Chronic cholecystitis - not currently sx candidate but may be in future - Gen Sx follow-up OP unless increased concerns in interm  - +Hep C - OP GI follow-up and treatment   - She does have area of older approaching ecchymosis and small superficial possible palpable hematoma in region as well from heparin shot which have been held with improving mobility    See management of transaminitis as outlined above   > AST/ALT trending up 4/15 - Gen Sx and GI spoke with IM 4/15 and no adjustments recommended with mgmt     AP, Tbili, Lipase wnl   - Comgmt with IM, GI, and Gen Sx  who are consulted  - Monitor vitals, labs, exam closely    - Avoid excessive acetaminophen; hold NSAIDs with possible GI issues and recent CVA  - Oxy 2.5mg Q4H PRN > care with hx of polysubtance abuse > rarely take - will continues cautiously for now with limited other options  - Robaxin 500mg TID for spasms as Gen Sx feels may have spasmodic component - ok per Gen Sx          ROSANA (generalized anxiety disorder)  Assessment & Plan  Increased anxiety and difficulty coping  - Lexapro increased to 10mg qday late last week   - Repeat EKG Monday with hx of prolonged Qtc per IM     Increased anxiety/frustration 4/11   - Provided supportive counseling   Mood Anxious at times    ROSANA with some acute anxiety/panic     Lexapro 10mg qday  Hydroxyzine 25mg TID PRN anxiety - " "not adequately helpful today   Ultra low dose ativan 0.25mg BID PRN as 2nd line   Gabapentin [id number] (also for hx of EtOH/amphetamine abuse)  Optimal sleep mgmt  Supportive counseling  Psychology consult while in ARC if available   Counseled on and continue to encourage deep breathing/relaxation/behavioral management techniques      Potential for cognitive impairment  Assessment & Plan  History of multiple ischemic strokes in past and now with recent ICH  - Patient slightly confused at times, with some problems with memory  -Acute comprehensive interdisciplinary inpatient rehabilitation to include intensive skilled therapies as outlined with oversight and management by rehabilitation physician.  Continue inpatient rehab level nursing, case management and weekly interdisciplinary team meetings.   - Recommend MOCA during ARC course   - MMSE 4/13 - 26 out of 30 wnl - slight impairments in orientation   - She did fairly well on CLQT+ earlier in course however   -Recommend eval of med mgmt and IADLs   -Monitor neuro-exam, wakefulness, mood, cognition, insight into deficits and safety awareness  - If cog impairment noted recommend - patient (+/- family/caregiver education) and training and compensatory strategies to optimize safety, function, and decrease risk of complications  -Optimize sleep-wake cycle  -Limit sedating medications when possible  -Monitor and ensure optimal management electrolytes, nutrition, and hydration  -Monitor for signs or symptoms of infection, medication intolerances, other systemic etiologies  -Fall precautions with frequent rounding; proactive toileting program, patient should not be unattended in bathroom    -OP follow-up PCP  and neuro       Hepatitis C  Assessment & Plan  Per IM  \"HCV detected, viral load of 1,550,000.  GI consulted and following.  Follow-up with Hepatology as outpatient for treatment.\"       Essential hypertension  Assessment & Plan  BP goal normotensive per " discussion with IM who reviewed notes earlier   Internal medicine consulted and co-management with their service  Monitor vitals with and without activity; monitor for orthostasis  Monitor hemoglobin, electrolytes, kidney function, hydration status   Current meds: Amlodipine 10mg qday, HCTZ 12.5mg qday, lisinopril 40mg qday   PRN hydralazine       Amphetamine abuse (HCC)  Assessment & Plan  Was using several times per week   Drug cessation counseling and supportive counseling  Optimal mood/stress mgmt   Neuropsychology consult if available   CM assistance with [facility] referral for addiction/substance abuse resources       Marijuana use  Assessment & Plan  Gabapentin [phone]   Cessation counseling and supportive counseling  Optimal mood/stress mgmt   CM to assist with [facility] referral     Tobacco abuse  Assessment & Plan  Nicotine patch declined by patient   Smoking cessation and supportive counseling  Optimal mood/stress mgmt       Alcohol use  Assessment & Plan  Thiamine  Gabapentin [phone]   Alcohol cessation counseling and supportive counseling  Optimal mood/stress mgmt   Psychology consult if available        Insomnia  Assessment & Plan  Controlled   - Sleep log   - Melatonin 3mg HS  - Gabapentin 300mg HS - for anxiety, polysubstance hx as well   - PRN hydroxyzine   - Recommend appropriate sleep hygiene  - Patient counselled/will be counselled on this when appropriate     - Sleep only as much as necessary to feel rested and then get up or sit up in bed, maintain regular sleep schedule (same bedtime and wake time everyday), do not force sleep, avoid caffeinated beverages after lunch, avoid alcohol at home near bedtime, do not smoke particularly in evening, do not go to bed hungry, adjust bedroom environment so you are comfortable prior to settling down for sleep, deal with concerns or worries before bedtime. Make a list of things to work on for next day so anxiety is reduced at night, exercise regularly  "when cleared by physician      Simple adnexal cyst greater than 1 cm in diameter in postmenopausal patient  Assessment & Plan  Per IM  - \"incidental finding on CT A/P   - simple appearing right adnexal cyst measuring 3.7 cm  - according to current guidelines, pt needs follow up pelvic US in 6-12 months  - follow up with OBGYN at discharge \"       Prolonged QT interval  Assessment & Plan  IM consulted and with overall management at their discretion during ARC course  Per IM  Qtc PTA up to 534 > repeat EKG 4/4 - QT prolonged but shortened from prior to 472 (this was on lexapro 20mg and some hydroxyzine)   - Nevertheless will decrease lexapro and hydroxyzine dosing some and limit prolonged Qtc agents as much as possible  - Monitor EKG intermittently     History of noncompliance with medical treatment  Assessment & Plan  Was not taking meds at home appropriately and following up with OP providers needed after last CVA  - Drug/EtOH cessation counseling  - Patient education      Hypomagnesemia  Assessment & Plan  Improved  Mag 1.9 (low normal) 4/10 and 4/15   Mag ox per IM   IM consulted, monitoring, repletion, and overall management at their discretion during ARC course      At risk for venous thromboembolism (VTE)  Assessment & Plan  SCDs, ambulation  (Holding heparin with improved mobility and some abdominal ecchymosis/small hematoma/pain)     Hyponatremia  Assessment & Plan  Improved 4/9-10    Neuropathy  Assessment & Plan  Significant b/l below knees chronic   - Risk of alcoholic neuropathy  - Alcohol cessation counseling  - OP Neuro EMG/NCS follow-up  - Fall precautions, PT, OT  - On gabapentin         Other Medical Issues:  Monitor for     Follow-up providers and other issues to be followed up after discharge  PCP  Neurology  Neurosurgery  [facility] program referral    CODE: Level 1: Full Code    Restrictions include:  Fall precautions    Objective:    Allergies per EMR  Diagnostic Studies: Reviewed, no new imaging  CT " head wo contrast   Final Result by Joao Mora MD (04/12 1256)      Stable punctate hyperdense focus within the posterior right sylvian fissure, cortical versus subarachnoid hemorrhage. Stable evolving hematoma within the left paramedian cerebellar hemisphere. There is no new intra or extra-axial hemorrhage.      Chronic right centrum semiovale ovale, left corona radiata, bilateral basal ganglia, bilateral thalamic, and pontine lacunar infarcts. Chronic microangiopathic ischemic changes.                     Workstation performed: [id number]         CT head wo contrast   Final Result by Xavier Pierre MD (04/11 1015)      -New punctate foci of cortical hemorrhage versus small amount of subarachnoid hemorrhage within the posterior right sylvian fissure. Recommend short interval follow-up exam.      -Decreased size of hyperdense component of the left paramedian cerebellar hemorrhage with mildly increased surrounding vasogenic edema as detailed above. Decreased hemorrhage within the fourth ventricle.      The study was marked in EPIC for immediate notification.                  Workstation performed: [id number]         NM Hepatobiliary w RX   Final Result by Luis uW MD (04/10 1334)      1. Normal visualization of the gallbladder.      2. Abnormal contractile response of the gallbladder to cholecystokinin infusion, ( 0%), which in the appropriate clinical context suggest gallbladder dysfunction.         Resident: ISATU ELLIOTT I, the attending radiologist, have reviewed the images and agree with the final report above.      Workstation performed: [id number]         US right upper quadrant   Final Result by Michael Brooks MD (04/08 1604)      1. Cholelithiasis with borderline wall thickening. Correlate clinically to exclude early acute cholecystitis.   2. Slightly nodular liver contour as seen on recent CT, which may be associated with cirrhosis. Correlate clinically.      Workstation performed:  [id number]         CT head wo contrast    (Results Pending)     See above as well    Laboratory: Labs reviewed  Current Facility-Administered Medications   Medication Dose Route Frequency Provider Last Rate    acetaminophen  488 mg Oral Q6H PRN Matias Jennings MD      amLODIPine  10 mg Oral Daily Matias Jennings MD      bisacodyl  10 mg Rectal Daily PRN Matias Jennings MD      calcium carbonate  500 mg Oral Daily PRN Ruth Ann Earl DO      chlorhexidine  15 mL Mouth/Throat Q12H NAOMI Matias Jennings MD      Cholecalciferol  2,000 Units Oral Daily MILAN Suresh      escitalopram  10 mg Oral Daily MILAN Suresh      folic acid  1 mg Oral Daily Matias Jennings MD      gabapentin  100 mg Oral BID Matias Jennings MD      gabapentin  300 mg Oral HS Matias Jennings MD      hydrALAZINE  10 mg Oral Q6H PRN Matias Jennings MD      hydroCHLOROthiazide  12.5 mg Oral Daily Matias Jennings MD      hydrOXYzine HCL  25 mg Oral TID PRN Matias Jennings MD      lisinopril  40 mg Oral Daily Matias Jennings MD      loratadine  10 mg Oral Daily Matias Jennings MD      LORazepam  0.25 mg Oral BID PRN Matias Jennings MD      magnesium Oxide  800 mg Oral TID With Meals MILAN Suresh      melatonin  3 mg Oral HS Matias Jennings MD      methocarbamol  500 mg Oral TID PRN Matias Jennings MD      multivitamin-minerals  1 tablet Oral Daily Matias Jennings MD      oxyCODONE  2.5 mg Oral Q4H PRN Matias Jennings MD      pantoprazole  40 mg Oral BID EFFIE Salinas PA-C      polyethylene glycol  17 g Oral Daily PRN Matias Jennings MD      thiamine  100 mg Oral Daily Matias Jennings MD           acetaminophen    bisacodyl    calcium carbonate    hydrALAZINE    hydrOXYzine HCL    LORazepam    methocarbamol    oxyCODONE    polyethylene glycol      Drug regimen reviewed, all potential adverse effects identified and addressed:    Scheduled  Meds:  Current Facility-Administered Medications   Medication Dose Route Frequency Provider Last Rate    acetaminophen  488 mg Oral Q6H PRN Matias Jennings MD      amLODIPine  10 mg Oral Daily Matias Jennings MD      bisacodyl  10 mg Rectal Daily PRN Matias Jennings MD      calcium carbonate  500 mg Oral Daily PRN Ruth Ann Earl DO      chlorhexidine  15 mL Mouth/Throat Q12H [facility] Matias Jennings MD      Cholecalciferol  2,000 Units Oral Daily MILAN Suresh      escitalopram  10 mg Oral Daily MILAN Suresh      folic acid  1 mg Oral Daily Matias Jennings MD      gabapentin  100 mg Oral BID Matias Jennings MD      gabapentin  300 mg Oral HS Matias Jennings MD      hydrALAZINE  10 mg Oral Q6H PRN Matias Jennings MD      hydroCHLOROthiazide  12.5 mg Oral Daily Matias Jennings MD      hydrOXYzine HCL  25 mg Oral TID PRN Matias Jennings MD      lisinopril  40 mg Oral Daily Matias Jennings MD      loratadine  10 mg Oral Daily Matias Jennings MD      LORazepam  0.25 mg Oral BID PRN Matias Jennings MD      magnesium Oxide  800 mg Oral TID With Meals MILAN Suresh      melatonin  3 mg Oral HS Matias Jennings MD      methocarbamol  500 mg Oral TID PRN Matias Jennings MD      multivitamin-minerals  1 tablet Oral Daily Matias Jennings MD      oxyCODONE  2.5 mg Oral Q4H PRN Matias Jennings MD      pantoprazole  40 mg Oral BID AC Melina Salinas PA-C      polyethylene glycol  17 g Oral Daily PRN Matias Jennings MD      thiamine  100 mg Oral Daily Matias Jennings MD         Chief Complaints:  Rehab follow-up     Subjective:     On eval, patient denies significant abdominal pain, worsening strength, sensation, lightheadedness, or other new medical complaints.      She reports being frustrated that she does not understand some of her test results.  I spent time reviewing her test results to the best of my ability and made  sure to answer all questions she had at this time.      ROS: A 10 point ROS was performed; negative except as noted above.     Call placed again to son, again went to , number left; recommend CG training tomorrow if able.      Physical Exam:  Vitals:    04/15/24 0837   BP: 122/81   Pulse: 64   Resp: 18   Temp:    SpO2:          GEN:  Sitting in NAD   HEENT/NECK: MMM  CARDIAC: Regular rate rhythm, no murmers, no rubs, no gallops  LUNGS:  clear to auscultation, no wheezes, rales, or rhonchi  ABDOMEN: Nondistended, normoactive, some RUQ TTP  EXTREMITIES/SKIN:  no calf edema, no calf tenderness to palpation  NEURO:   MENTAL STATUS: Adequate wakefulness and interaction, CN II-XII: grossly intact , and Strength/MMT:  Near 5/5 throughout; L>R ataxia improving   PSYCH:  Affect:  Frustrated at times     HPI:  58-year-old female with a past medical history of ischemic strokes in 2022, hypertension, generalized anxiety disorder, smoking tobacco, alcohol use, marijuana use, methamphetamine abuse, medical noncompliance who presented to [facility] on 3/26/2024 with dizziness, nausea, and headache after recent fall and vomiting also accompanied by some diarrhea.  Patient noted to be ataxic on exam.  CT showed acute parenchymal hematoma in the left paramedian cerebellum with mass effect on the fourth ventricle with intraventricular extension of the hemorrhage with blood in the fourth ventricle and the left foramen of Luschka as well as some chronic lacunar infarcts.  Patient transferred to [facility] for neurosurgery oversight and evaluation.  Patient placed on EVD watch but ultimately did not need this.  Patient tells me she was not compliant with her blood thinners although she did receive DDAVP for aspirin reversal.  Neurology was consulted and suspected intracerebral hemorrhage related to hypertension and medication noncompliance with recent methamphetamine use.  Follow-up repeat CT head 7/3/2027 328  were stable.  Patient required multiple blood pressure medications and has blood pressure goal of less than 140 and ideally between 120 and 140 is much as possible.  Patient is to repeat CT head on 4/11 and per neurology if improving hemorrhage can resume Plavix without need for aspirin for secondary ischemic stroke prevention.  Neurosurgery would like to see patient in follow-up at about that same time also looking for stability and assess for delayed hydrocephalus.  Patient was evaluated by skilled therapies and was found to have significant decline in ADLs and ambulation and appears appropriate for admission to [facility].     ** Please Note: Fluency Direct voice to text software may have been used in the creation of this document. **

## 2024-04-13 NOTE — PLAN OF CARE
Problem: GASTROINTESTINAL - ADULT  Goal: Minimal or absence of nausea and/or vomiting  Description: INTERVENTIONS:  - Administer IV fluids if ordered to ensure adequate hydration  - Maintain NPO status until nausea and vomiting are resolved  - Nasogastric tube if ordered  - Administer ordered antiemetic medications as needed  - Provide nonpharmacologic comfort measures as appropriate  - Advance diet as tolerated, if ordered  - Consider nutrition services referral to assist patient with adequate nutrition and appropriate food choices  Outcome: Progressing     Problem: GENITOURINARY - ADULT  Goal: Maintains or returns to baseline urinary function  Description: INTERVENTIONS:  - Assess urinary function  - Encourage oral fluids to ensure adequate hydration if ordered  - Administer IV fluids as ordered to ensure adequate hydration  - Administer ordered medications as needed  - Offer frequent toileting  - Follow urinary retention protocol if ordered  Outcome: Progressing

## 2024-04-13 NOTE — PROGRESS NOTES
"OT TREATMENT NOTE       04/13/24 1000   Pain Assessment   Pain Assessment Tool 0-10   Pain Score No Pain   Restrictions/Precautions   Precautions Bed/chair alarms;Cognitive;Fall Risk;Supervision on toilet/commode   Weight Bearing Restrictions No   ROM Restrictions No   Lifestyle   Autonomy \"I'm doing alright\"   Roll Left and Right   Type of Assistance Needed Independent   Physical Assistance Level No physical assistance   Roll Left and Right CARE Score 6   Sit to Lying   Type of Assistance Needed Independent   Physical Assistance Level No physical assistance   Comment standard bed   Sit to Lying CARE Score 6   Lying to Sitting on Side of Bed   Type of Assistance Needed Independent   Physical Assistance Level No physical assistance   Comment standard bed   Lying to Sitting on Side of Bed CARE Score 6   Sit to Stand   Type of Assistance Needed Supervision   Physical Assistance Level No physical assistance   Comment with use of RW   Sit to Stand CARE Score 4   Bed-Chair Transfer   Type of Assistance Needed Incidental touching   Physical Assistance Level No physical assistance   Comment with use of RW. multiple lateral/posterior bouts of LOB noted requiring no more than CGA to maintain upright   Chair/Bed-to-Chair Transfer CARE Score 4   Light Housekeeping   Light Housekeeping Level Walker   Light Housekeeping Level of Assistance Contact guard   Light Housekeeping Pt participated in home management task of bed making with intermittent UE support of RW. Pt noted with poor safety awareness and x1 bout of posterior LOB requiring Mod A to maintain upright. Pt educated on environmental considerations for increased safety with IADL completion, pt verbalized understanding.   Health Management   Health Management Level of Assistance Minimal verbal cues   Health Management Pt completed med management task requiring pt to sort current list of 14 medications into 3x day pill organizer. Pt completed with min verbal cuing for " identification of medication and recall of schedule, x1 error throughout. Pt medication list reviewed and updated, pt educated on changes and verbalized understanding.   Cognition   Overall Cognitive Status Impaired   Arousal/Participation Alert;Cooperative   Attention Within functional limits   Orientation Level Oriented X4   Memory Decreased recall of precautions;Decreased short term memory   Following Commands Follows one step commands with increased time or repetition   Comments Pt requires verbal cues for encouragement and education on benefits of participation in therapy.   Additional Activities   Additional Activities Other (Comment)   Additional Activities Comments Pt completed ambulation of household distances with use of RW and CGA x3 trials with minor bouts of LOB requiring no more than CGA to remain upright.    Pt completed x3 trials of short distance ambulatory transfers to/from arm chair and recliner without use of AD and CGA with multiple minor bouts of LOB noted requiring no more than CGA to remain upright.    Pt completed 2 sets x10 trials sit to stands from arm chair without use of AD and no UE support with CGA.   Activity Tolerance   Activity Tolerance Patient tolerated treatment well   Assessment   Treatment Assessment Pt participated in 90 min skilled OT session with focus on medication management, balance and IADLs. Pt tolerated treatment well, pt remained seated in recliner with all immediate needs met, call bell accessible, chair alarm secured . Pt will continue to benefit from skilled OT services to ensure safe discharge. Continue plan of care with focus on IADLs, balance, endurance and safety awareness.   Prognosis Good   Problem List Decreased strength;Decreased endurance;Impaired balance;Decreased cognition;Decreased safety awareness;Impaired judgement   Barriers to Discharge Decreased caregiver support   Plan   Treatment/Interventions ADL retraining;Functional transfer  training;Therapeutic exercise;Endurance training;Patient/family training;Equipment eval/education   Progress Progressing toward goals   Discharge Recommendation   Rehab Resource Intensity Level, OT   (pending progress)   OT Therapy Minutes   OT Time In 1000   OT Time Out 1130   OT Total Time (minutes) 90   OT Mode of treatment - Individual (minutes) 90   OT Mode of treatment - Concurrent (minutes) 0   OT Mode of treatment - Group (minutes) 0   OT Mode of treatment - Co-treat (minutes) 0   OT Mode of Treatment - Total time(minutes) 90 minutes   OT Cumulative Minutes 630   Therapy Time missed   Time missed? No

## 2024-04-14 PROCEDURE — 97130 THER IVNTJ EA ADDL 15 MIN: CPT

## 2024-04-14 PROCEDURE — 97535 SELF CARE MNGMENT TRAINING: CPT

## 2024-04-14 PROCEDURE — 97110 THERAPEUTIC EXERCISES: CPT

## 2024-04-14 PROCEDURE — 97112 NEUROMUSCULAR REEDUCATION: CPT

## 2024-04-14 PROCEDURE — 97116 GAIT TRAINING THERAPY: CPT

## 2024-04-14 PROCEDURE — 97129 THER IVNTJ 1ST 15 MIN: CPT

## 2024-04-14 PROCEDURE — 97530 THERAPEUTIC ACTIVITIES: CPT

## 2024-04-14 RX ADMIN — MELATONIN TAB 3 MG 3 MG: 3 TAB at 20:37

## 2024-04-14 RX ADMIN — LORATADINE 10 MG: 10 TABLET ORAL at 08:16

## 2024-04-14 RX ADMIN — HYDROXYZINE HYDROCHLORIDE 25 MG: 25 TABLET, FILM COATED ORAL at 08:13

## 2024-04-14 RX ADMIN — GABAPENTIN 300 MG: 300 CAPSULE ORAL at 22:00

## 2024-04-14 RX ADMIN — Medication 2000 UNITS: at 08:16

## 2024-04-14 RX ADMIN — Medication 2.5 MG: at 08:13

## 2024-04-14 RX ADMIN — HYDROCHLOROTHIAZIDE 12.5 MG: 12.5 TABLET ORAL at 08:16

## 2024-04-14 RX ADMIN — CHLORHEXIDINE GLUCONATE 0.12% ORAL RINSE 15 ML: 1.2 LIQUID ORAL at 08:16

## 2024-04-14 RX ADMIN — Medication 800 MG: at 08:16

## 2024-04-14 RX ADMIN — GABAPENTIN 100 MG: 100 CAPSULE ORAL at 13:31

## 2024-04-14 RX ADMIN — THIAMINE HCL TAB 100 MG 100 MG: 100 TAB at 08:16

## 2024-04-14 RX ADMIN — CHLORHEXIDINE GLUCONATE 0.12% ORAL RINSE 15 ML: 1.2 LIQUID ORAL at 20:37

## 2024-04-14 RX ADMIN — MULTIPLE VITAMINS W/ MINERALS TAB 1 TABLET: TAB ORAL at 08:16

## 2024-04-14 RX ADMIN — ESCITALOPRAM OXALATE 10 MG: 10 TABLET ORAL at 08:16

## 2024-04-14 RX ADMIN — LISINOPRIL 40 MG: 20 TABLET ORAL at 08:16

## 2024-04-14 RX ADMIN — Medication 2.5 MG: at 12:35

## 2024-04-14 RX ADMIN — AMLODIPINE BESYLATE 10 MG: 10 TABLET ORAL at 08:16

## 2024-04-14 RX ADMIN — Medication 800 MG: at 12:05

## 2024-04-14 RX ADMIN — FOLIC ACID 1 MG: 1 TABLET ORAL at 08:16

## 2024-04-14 NOTE — PROGRESS NOTES
04/14/24 1220   Pain Assessment   Pain Assessment Tool 0-10   Pain Score 4   Pain Location/Orientation Orientation: Right;Location: Abdomen   Cognition   Overall Cognitive Status Impaired   Arousal/Participation Alert;Cooperative   Attention Within functional limits   Orientation Level Oriented X4   Memory Decreased short term memory;Decreased recall of precautions   Following Commands Follows one step commands with increased time or repetition   Subjective   Subjective Pt ready for PT   Sit to Stand   Type of Assistance Needed Supervision   Physical Assistance Level No physical assistance   Comment RW   Sit to Stand CARE Score 4   Bed-Chair Transfer   Type of Assistance Needed Incidental touching   Physical Assistance Level No physical assistance   Comment RW   Chair/Bed-to-Chair Transfer CARE Score 4   Transfer Bed/Chair/Wheelchair   Findings VCs for stand to sit hand placement with noted decreased eccentric control   Walk 10 Feet   Type of Assistance Needed Incidental touching   Physical Assistance Level No physical assistance   Comment /[facility] RW   Walk 10 Feet CARE Score 4   Walk 50 Feet with Two Turns   Type of Assistance Needed Incidental touching   Physical Assistance Level No physical assistance   Comment /[facility] RW   Walk 50 Feet with Two Turns CARE Score 4   Walk 150 Feet   Type of Assistance Needed Incidental touching   Physical Assistance Level No physical assistance   Comment /[facility] RW   Walk 150 Feet CARE Score 4   Ambulation   Primary Mode of Locomotion Prior to Admission Walk   Distance Walked (feet) 200 ft  (x2)   Assist Device Roller Walker   Gait Pattern Ataxic;Inconsistant Katherine;Decreased foot clearance;Step to;Step through   Limitations Noted In Balance;Coordination;Safety;Heel Strike   Provided Assistance with: Balance   Walk Assist Level Close Supervision;Contact Guard   Findings mostly CG periods of supervision   Does the patient walk? 2. Yes   Curb or Single Stair   Style negotiated Curb  "  Type of Assistance Needed Physical assistance   Physical Assistance Level 25% or less   Comment Meaghan-CG with RW for curb x2 trials 8\"   1 Step (Curb) CARE Score 3   4 Steps   Type of Assistance Needed Supervision   Physical Assistance Level No physical assistance   Comment [facility]   4 Steps CARE Score 4   12 Steps   Type of Assistance Needed Supervision   Physical Assistance Level No physical assistance   Comment [facility]   12 Steps CARE Score 4   Stairs   # of Steps 12   Weight Bearing Precautions Fall Risk   Assist Devices Bilateral Rail   Findings needed VCs for curb negotiation with Meaghan for balance 2/2 lateral LOB to L   Toilet Transfer   Type of Assistance Needed Supervision   Physical Assistance Level No physical assistance   Comment CS RW   Toilet Transfer CARE Score 4   Toilet Transfer   Surface Assessed Standard Toilet   Adaptive Equipment Grab Bar   Therapeutic Interventions   Balance airex pad BUE // bar 30 s x 4 vertical and horizontal head turns with CG   Neuromuscular Re-Education 4\" step taps (30 s RLE, 30s LLE, 30 s alternating (LUE on // bar + CG) x 3 rounds. 25 ft ambulation with RW and CG vertical head turns, horizontal head turns x4 sets   Assessment   Treatment Assessment Tanika participated in 90 min skilled PT session focusing on balance, neuro muscular re-ed, gait and elevations training. Pt agreeable to participate, initially with pain but premedicated by RN prior to session. Tanika pleasant this session and agreeable to participate in therapy - at times a bit anxious with curb negotiation and balance training saying she has neuropathy but able to complete tasks. Progressing to wards supervision for ambulation on even surfaces with RW and steps but CG with dynamic balance challenges.   Family/Caregiver Present no   Problem List Decreased strength;Decreased range of motion;Decreased endurance;Impaired balance;Decreased mobility;Decreased cognition;Decreased safety awareness;Impaired judgement "   Barriers to Discharge Decreased caregiver support   PT Barriers   Functional Limitation Car transfers;Ramp negotiation;Stair negotiation;Transfers;Walking   Plan   Treatment/Interventions ADL retraining;LE strengthening/ROM;Functional transfer training;Elevations;Therapeutic exercise;Endurance training;Equipment eval/education;Patient/family training;Gait training   Progress Progressing toward goals   PT Therapy Minutes   PT Time In 1220   PT Time Out 1350   PT Total Time (minutes) 90   PT Mode of treatment - Individual (minutes) 90   PT Mode of treatment - Concurrent (minutes) 0   PT Mode of treatment - Group (minutes) 0   PT Mode of treatment - Co-treat (minutes) 0   PT Mode of Treatment - Total time(minutes) 90 minutes   PT Cumulative Minutes 585   Therapy Time missed   Time missed? No

## 2024-04-14 NOTE — DISCHARGE INSTR - AVS FIRST PAGE
DISCHARGE INSTRUCTIONS: [facility] [place]    **You were recommended additional inpatient rehab and recovery time as you still remain fall/injury risk which can be severe.  You and your son were reviewed this and appear to have adequate understanding of risks of discharging at this time against medical recommendations**  **You should ideally have 24-7 supervision for activities of daily living and mobility when you first go home which was discussed with you  **Please continue therapy starting with home health PT, OT to decrease risk of falls and serious injuries    **Follow-up with primary care within 1 week; obtain labs within 1 week as recommended without orders through primary care office  **Follow-up with other specialists as outlined as well  **Lack of, or sub-optimal follow-up and management of your medical issues places you at higher risk of complications which can be severe     Bring these instructions with you to your Outpatient Physician appointments so they can order and follow-up any additional lab work or imaging recommended at time of discharge.    Resume follow-up with all your prior providers that you have established care prior to this hospitalization.  Discuss with primary care physician (PCP) if you have additional questions.     It  is you or your caregivers responsibility to obtain follow-up MEDICATION REFILLS  As indicated through your Primary Care Physician (PCP) and other outpatient specialty provider(s) after discharge.  Please follow-up with your PCP as soon as possible after discharge to set-up follow-up management and when appropriate refills.      It is YOU and YOUR FAMILY's RESPONSIBILITY to ensure appropriate follow-up which includes:  - APPOINTMENTS are scheduled and safe transportation is arranged  - LABS and IMAGING are completed  - MEDICATION MANAGEMENT at home is carried out appropriately     You remain a fall and injury risk which could  "be severe.  - Your risk of fall has decreased however since admission to acute rehab.  Caregiver training has been completed with our staff.  - Appropriate supervision +/- assistance as instructed during your rehab course is recommended to decrease risk of fall and injury.    - Continue skilled therapy as discussed after discharge to further decrease this risk    If you (or your health care proxy) have any questions or concerns regarding your acute rehabilitation stay including issues with medications, rehabilitation, and follow-up plan, please call:          [facility] Unit at [place] at [phone]    Should you develop increased abdominal pain, nausea, vomiting, black/bloody bowel movement, fevers, chills, new weakness, changes in sensation, difficulty speaking, facial weakness, confusion, shortness of breath, chest pain, or other concerning symptoms please call 911 and/or obtain transportation to nearest ER immediately.    Should you develop feelings of significant hopelessness, severe depression, severe anxiety, or suicide you should call 911 or obtain transportation to nearest ER.    24-7 [employer] suicide and crisis lifeline call \"458\"  National Suicide Prevention Lifeline is 1-288.957.1240 and is available 24 hrs/7 days a week.   [facility] Crisis [phone] is available 24 hrs/7 days for mental health  [facility] Crisis [phone] is available 24 hrs/7 days for mental health   [facility] Crisis [phone]    PHYSICIANS to see:  Please see your doctors listed in the follow up providers section of your discharge paperwork, and take the discharge paperwork with you to your appointments.    Home health has been ordered for you:  Your home health agency should reach out to you or your family soon to arrange follow-up.    (If [facility] health is your provider their phone number is [phone])    If you are unable to get in touch " "with home health you may contact your  at [phone]. [place]    LAB WORK recommended after discharge:  Follow-up lab work at discretion of your outpatient physicians to be determined at time of your future appointments.    Also, obtain the following lab orders and follow-up management through primary care physician and outpatient specialists.     Obtain following labs CBC, CMP, and magnesium within 1 week - obtain order through primary care - monitor recently elevated liver enzymes, and low magnesium and mildly low sodium; adjustments in medications at discretion of outpatient medical providers     Excessive delay in labs and appropriate follow-up could potentially increase your risk of complications which could be severe and even life-threatening.  It is you or your caregiver's responsibility to ensure these tests are ordered by your outpatient providers and followed up with accordingly.      IMAGING, ADDITIONAL FINDINGS and ISSUES to follow-up:  Check under the \"DISCHARGE PROVIDER\" section of these DISCHARGE INSTRUCTIONS for provider contact information and specific issues as well.      Follow-up imaging as discussed with your recent physicians or at discretion of your outpatient physicians to be determined at time of your appointments.    Follow-up below issues with gastroenterology:   - Hepatitis C, elevated liver enzymes, as well as cholecystitis and possible cirrhosis     Follow-up below issues with general surgery:   - Cholecystitis     Follow-up below issues with gastroenterology and general surgery:     Abnormal right upper quadrant ultrasound 4/8/24  1. Cholelithiasis with borderline wall thickening. Correlate clinically to exclude early acute cholecystitis.  2. Slightly nodular liver contour as seen on recent CT, which may be associated with cirrhosis. Correlate clinically.     Abnormal HIDA scan 4/10  1. Normal visualization of the gallbladder.   2. Abnormal contractile response of " the gallbladder to cholecystokinin infusion, ( 0%), which in the appropriate clinical context suggest gallbladder dysfunction.     CT abdomen and pelvis 3/26/24  1. Cholelithiasis, including a 3 mm gallstone within the gallbladder neck versus cystic duct. No findings of acute cholecystitis. If there is concern for acute cholecystitis, right upper quadrant ultrasound can be obtained.   2. Findings suggesting cirrhosis with mild mesenteric edema, but no ascites. Enlarged ana hepatic and peripancreatic lymph nodes measuring up to 2 cm in short axis, indeterminate, although could be reactive, given suspicion for cirrhosis.       Follow-up below issues with primary care and OB/GYN:   CT abdomen and pelvis 3/26/24   Simple appearing right adnexal cyst measuring 3.7 cm.  According to current guidelines (J Am Kayla Radiol 2020; 17:248-254) in this postmenopausal woman, this should be followed in 6 to 12 months by pelvic ultrasound.    Excessive delay or lack of appropriate follow-up could potentially increase your risk of complications which could be severe and even life-threatening.  It is you or your caregiver's responsibility to ensure these tests are ordered by your outpatient providers and followed up with accordingly.      SKIN CARE INSTRUCTIONS to follow:    Monitor skin for increased redness or breadown and promptly notify your physician should these develop    WEIGHTBEARING/ACTIVITY PRECAUTIONS to follow:    24 hours/7 days per week supervision by caregiver recommended    Weightbearing as tolerated.    Driving restrictions:  You are recommended against driving until cleared by an outpatient physician and cleared through a formal driving evaluation.  Driving at current time can increase your risk of injury and can increase risk of injury of others.      Work restrictions:  You should NOT return to work (if working) until cleared by an outpatient physician.    You should not operate heavy machinery (if applicable)  until cleared by an outpatient physician.      Alcohol restrictions:  You are recommended to not drink alcohol at this time unless cleared by an outpatient physician.  Drinking alcohol in your current functional condition can increase your risk of injury which could be severe.  Drinking alcohol given your current health problems can lead to increased medical complications which could be severe.    Combining alcohol with your current medications can increase your risk of injury which could be severe.      Smoking restrictions:  You are recommended to not smoke nicotine.  Smoking increases your risk of heart attack, stroke, emphysema/COPD, and lung cancer.      Cannabis restrictions:  You are recommended to not smoke/ingest/consume/use cannabis/marijuana at this time unless cleared by an outpatient physician.  Cannabis use/intoxication in your current functional condition can increase your risk of injury which could be severe.  Cannabis use/intoxication given your current health problems may lead to increased medical complications and sub-optimal functional recovery    Illicit drug use restrictions:   You should not use cocaine, crack, speed/methamphetamine, heroin, LSD, ecstasy or other illicit substances as they can increase your risk of injury and medical complication which could be severe and lead to sub-optimal functional recovery.    MEDICATIONS:  Please see a full list of your medications outlined in the After Visit Summary that is attached to these Discharge Instructions.  Please note changes may have been made to your medications please refer to your discharge paperwork for your current medications and take this list with you to all your doctors appointments for your doctors to review.  Please do not resume a home medication unless the medication reconciliation sheet indicates to do so, please do not assume that a medication that you were given a prescription for is the same as a medication you have at home  based on both medications having the same name as dosages and frequency may have changed.      Unless specifically noted in your medication list provided to you in your discharge paper work do not resume prior vitamins, minerals, or supplements you may have been taking prior to your hospitalization unless instructed by an outpatient physician in the future.  Discuss with your primary care at next visit if applicable.      For now; AVOID blood thinners and NSAIDs (Non-steroidal anti-inflammatory drugs):  At this time, your medical providers are recommending you do NOT take any blood thinners unless instructed by another physician in the future.  Follow-up with your primary care physician and if applicable appropriate specialists after discharge for follow-up management.  For example: No Aspirin.  No warfarin.  No Eliquis.  No Xarelto.  No Advil.  No Alleve.  No Motrin/Ibuprofen.  No Naproxen.  No meloxicam. No oral diclofenac.  Etc.    NSAID Warning:  Please avoid NSAID (including but not limited to advil, aleve, motrin, naproxen, ibuprofen, mobic, meloxicam, diclofenac etc) medications as NSAID medications may increase your risk of bleeding (which can be life-threatening), stroke    Plavix (clopidogrel) instructions  DO NOTE TAKE plavix - follow-up with neurosurgery and neurology to determine if an when you can take Plavix or other blood thinner in future   (Obtain CT head prior to neurosurgery appointment)    Sedating Medications with increased risk of complications:    Gabapentin has been used to help pain and mood and to curb drinking/substance abuse.  You tolerated this medication adequately during your recent hospital stay.     - Do not take with alcohol (or marijuana/cannabis) while on this medication as this can cause increased confusion, breathing problems, falls, and severe injury.  - This medication can increase risk of confusion, fall, and injury although you did tolerate adequately during rehab  course.  - Follow-up with your primary care physician within 1 week for additional management of your medical conditions, potential refills, or adjustments of this medication.        Psychotropic Medications (Medications intended to improve mental, cognitive, and functional health)  You were evaluated recently and found to have signs and symptoms of depression and anxiety that can negatively impact your function and quality of life.      You were managed by both medications and non-pharmacologic (non-medication) methods to try to improve mood sleep and quality of life.      Non-pharmacologic methods included supportive counseling and discussion of deep breathing/relaxation/behavioral management techniques, and skilled functional therapies.       Psychiatric(psychotropic) medications were adjusted during acute rehabilitation course.  To attempt to improve depression anxiety you were started on serotonin modulating medication and antidepressant escitalopram (Lexapro)    Medications were adjusted during your course based on their effectiveness, tolerability, and safety.  You tolerated the medication(s) adequately during your course without significant side-effects noted by rehab staff or by yourself (family member).       You denied any suicidal ideation, intent or plan at the time of discharge. There was no obvious psychosis  at the time of discharge. The patient was tolerating medications and was not reporting any significant side effects at the time of discharge.    You have functionally improved significantly and appear to be doing better.  Treatment team (rehab physician, internal medicine team, skilled therapists, nursing, and social work) feel you are adequately appropriate for discharge.  You nevertheless remain at risk for fall, injury, and additional medical and physical complications in the future.  Obtain transportation to nearest hospital from family/friends or call 911 for any concerning new symptoms.       Please follow-up with recommended discharge plan to optimize your mental and physical health and decrease risk of mental and physical health complications and hospital re-admission.    MEDICAL MANAGEMENT AT HOME specific to you:    Hypertension Management:  Only take the medications prescribed for you at time of discharge - overly high or low blood pressure increases your risk for health complications    Follow-up with PCP/family doctor regularly to ensure blood pressure remains adequately controlled.      Cardiac Loop recorder (Implanted heart rhythm monitor) IN PLACE: device to monitor for atrial fibrillation (abnormal heart rhythm that increases risk of stroke) -  follow-up with cardiology/device clinic to monitor and for follow-up management; call and schedule follow-up appointment after discharge - see your appopriate office listed in the follow up providers section of your discharge paperwork    Elevated liver enzymes, cholecystitis, and possible cirrhosis Management:  Follow-up with Gastroenterology (GI) doctor after discharge.      In order to protect your liver AVOID alcohol.  Avoid NSAIDs (including but not limited to advil, aleve, motrin, naproxen, ibuprofen, mobic, meloxicam, diclofenac etc) as they can increase your risk of bleeding.      Please note a summary of your hospital stay with relevant information for your doctors will try to be sent to them.  Please confirm with your doctors at your follow up visits that they have received this summary and have them contact [facility] Medical Records if they have not received them along with any other medical records they may require.     [facility] Phone Number:  [phone]      Discharge Instructions - Neurosurgery    Do not take any blood thinning medications (ie. No Advil. No motrin. No ibuprofen. No Aleve. No Aspirin. No fishoil. No heparin. No antiplatelet / no anticoagulation medication).  Refrain from activity that increases chance  of trauma to head or falls. Recommend you take fall precaution.  No strenuous activity or sports.  Return to hospital Emergency Room if you experience worsening / new headache, nausea/vomiting, speech/vision change, seizure, confusion / mental status change, weakness, or other neurological changes.      Please follow up in 2 weeks with the Neurosurgical group with repeat CT head without contrast 2-3 days prior to your appointment.  You may go to any [facility] facility to get your imaging done.  Please call [phone] to schedule your imaging appointment.

## 2024-04-14 NOTE — PLAN OF CARE
Problem: NEUROSENSORY - ADULT  Goal: Achieves stable or improved neurological status  Description: INTERVENTIONS  - Monitor and report changes in neurological status  - Monitor vital signs such as temperature, blood pressure, glucose, and any other labs ordered   - Initiate measures to prevent increased intracranial pressure  - Monitor for seizure activity and implement precautions if appropriate      Outcome: Progressing     Problem: PAIN - ADULT  Goal: Verbalizes/displays adequate comfort level or baseline comfort level  Description: Interventions:  - Encourage patient to monitor pain and request assistance  - Assess pain using appropriate pain scale  - Administer analgesics based on type and severity of pain and evaluate response  - Implement non-pharmacological measures as appropriate and evaluate response  - Consider cultural and social influences on pain and pain management  - Notify physician/advanced practitioner if interventions unsuccessful or patient reports new pain  Outcome: Progressing

## 2024-04-14 NOTE — PROGRESS NOTES
"   04/14/24 0935   Pain Assessment   Pain Assessment Tool 0-10   Pain Score No Pain   Restrictions/Precautions   Precautions Bed/chair alarms;Fall Risk;Supervision on toilet/commode   Lifestyle   Autonomy \"I have really good upper body strength\".... \"my boys will do anything for me, but I'm going to do everything for myself because I won't put them through that\"... \"I'm not worried about anything for home, I just want to get there\".   Oral Hygiene   Type of Assistance Needed Supervision   Physical Assistance Level No physical assistance   Comment pt S in stance w/ RW at sink to brush teeth   Oral Hygiene CARE Score 4   Grooming   Able To Initiate Tasks;Comb/Brush Hair;Brush/Clean Teeth;Wash/Dry Face   Limitation Noted In Coordination;Safety;Strength;Timeliness   Findings pt S in stance for oral hygiene, pt requesting to sit to comb hair stating \"it's too much hair, I need to sit\". Pt seated to comb hair w/ inc time and pt seated to wash face in shower.   Shower/Bathe Self   Type of Assistance Needed Incidental touching   Physical Assistance Level No physical assistance   Comment Pt seated on shower chair for full shower and able to bathe 10/10 parts with inc time, pt CGA in stance with use of grab bar to bathe buttocks/randell   Shower/Bathe Self CARE Score 4   Bathing   Assessed Bath Style Shower   Anticipated D/C Bath Style Tub;Shower   Able to Gather/Transport No   Able to Adjust Water Temperature Yes   Able to Wash/Rinse/Dry (body part) Left Arm;Right Arm;L Upper Leg;R Upper Leg;L Lower Leg/Foot;R Lower Leg/Foot;Chest;Abdomen;Buttocks;Perineal Area   Limitations Noted in Balance;Coordination;Endurance;Safety;Strength;Timeliness   Positioning Seated;Standing   Adaptive Equipment Shower Bars;Shower Seat;Hand Held Shower   Tub/Shower Transfer   Limitations Noted In Coordination;Balance;Problem Solving;Safety;LE Strength   Adaptive Equipment Grab Bars;Seat with Back;Other  (RW)   Assessed Shower   Findings pt CGA w/ RW " and min VC for safety for correct placement of RW to dec fall risk, inc time warranted   Upper Body Dressing   Type of Assistance Needed Independent   Physical Assistance Level No physical assistance   Comment seated in recliner to don bra and pullover shirt   Upper Body Dressing CARE Score 6   Lower Body Dressing   Type of Assistance Needed Incidental touching   Physical Assistance Level No physical assistance   Comment pt able to thread underwear/pants over BL LE and CGA in stance w/ RW to manage clothing over hips   Lower Body Dressing CARE Score 4   Putting On/Taking Off Footwear   Type of Assistance Needed Independent   Physical Assistance Level No physical assistance   Comment cross leg method to don/doff socks without difficulty   Putting On/Taking Off Footwear CARE Score 6   Dressing/Undressing Clothing   Remove UB Clothes Pullover Shirt;Bra   Don UB Clothes Pullover Shirt;Bra   Remove LB Clothes Pants;Undergarment;Socks   Don LB Clothes Pants;Undergarment;Socks   Positioning Supported Sit;Standing   Sit to Lying   Type of Assistance Needed Independent   Physical Assistance Level No physical assistance   Comment pt requesting to return to bed at end of session and able to complete bed mobility   Sit to Lying CARE Score 6   Sit to Stand   Type of Assistance Needed Supervision   Physical Assistance Level No physical assistance   Comment RW   Sit to Stand CARE Score 4   Bed-Chair Transfer   Type of Assistance Needed Incidental touching   Physical Assistance Level No physical assistance   Comment CGA/CS w/ RW, pt at end of session not reaching back with slight LOB to L when moving to sit on bed, OT emphasized throughout session to push up to stand and reach back to sit for safety, pt with limited carryover for edu   Chair/Bed-to-Chair Transfer CARE Score 4   Transfer Bed/Chair/Wheelchair   Limitations Noted In Balance;Coordination;Problem Solving;Sequencing   Adaptive Equipment Roller Walker   Therapeutic  "Excerise-Strength   UE Strength Yes   Right Upper Extremity- Strength   R Shoulder Flexion;ABduction;Extension;Horizontal ABduction;External rotation;Internal rotation   R Elbow Elbow flexion;Elbow extension   R Position Seated   Equipment Theraband  (green moderate resistance TB)   R Weight/Reps/Sets 2x15   RUE Strength Comment Review for HEP with TB seated in WC, pt with G recall though pt stating \"I have good upper body strength\". Pt benefits from HEP and encouraged to complete daily to increase fxnl strength for transfers and ADL/IADL fxn.   Left Upper Extremity-Strength   LUE Strength Comment see RUE for detail.   Neuromuscular Education   Comments pt S in stance at raised table, LUE crossing midline to retrieve 1 bead from ziplock bag and place into matching color cone for hand to target, pt completing for green, red, yellow beads and alternating L hand/R hand for hand to target and BL coord and improved motor planning, pt notes that LUE hand to target is more challenging than RUE with some improved insight to LUE weakness. Pt then seated holding ziplock bag in R hand and picking up 3 beads at a time L hand and completing palm to finger translocation to return 1 bead at a time into ziplock bag with G challenge noted. Pt dropping 25% of beads throughout hand to target task from L hand 2* impaired sensation from neuropathy and impaired FMC post CVA   Cognition   Overall Cognitive Status Impaired   Arousal/Participation Alert;Cooperative   Attention Within functional limits   Orientation Level Oriented X4   Memory Decreased short term memory;Decreased recall of precautions   Following Commands Follows one step commands with increased time or repetition   Comments pt pleasant this Tx session and motivated to participate, pt grateful for shower, pt does demo dec insight to deficits   Activity Tolerance   Activity Tolerance Patient tolerated treatment well   Assessment   Treatment Assessment Pt participated in " skilled OT Tx with focus on ADL routine, LUE NMR/hand to target, L hand FMC, BLUE strengthening, and stand tolerance/standing balance for fxnl reach. See above note for activity detail. Pt continues to benefit from skilled OT services with ongoing focus on LUE NMR, stand tolerance/standing balance, ADL/IADL retraining, and BL coordination to reduce caregiver burden.   Problem List Decreased strength;Decreased endurance;Impaired balance;Decreased mobility;Decreased coordination;Decreased cognition;Impaired judgement;Decreased safety awareness   Barriers to Discharge Decreased caregiver support   Plan   Treatment/Interventions ADL retraining;Functional transfer training;Therapeutic exercise;Endurance training;Cognitive reorientation;Patient/family training   Progress Progressing toward goals   Discharge Recommendation   Rehab Resource Intensity Level, OT   (pending pt progress)   OT Therapy Minutes   OT Time In 0935   OT Time Out 1105   OT Total Time (minutes) 90   OT Mode of treatment - Individual (minutes) 90   OT Mode of treatment - Concurrent (minutes) 0   OT Mode of treatment - Group (minutes) 0   OT Mode of treatment - Co-treat (minutes) 0   OT Mode of Treatment - Total time(minutes) 90 minutes   OT Cumulative Minutes 720   Therapy Time missed   Time missed? No

## 2024-04-14 NOTE — PROGRESS NOTES
"   04/14/24 1400   Pain Assessment   Pain Assessment Tool 0-10   Pain Score No Pain   Restrictions/Precautions   Precautions Bed/chair alarms;Fall Risk;Supervision on toilet/commode   Weight Bearing Restrictions No   ROM Restrictions No   Comprehension   Comprehension (FIM) 5 - Understands basic directions and conversation   Expression   Expression (FIM) 5 - Needs help/cues only RARELY (< 10% of the time)   Social Interaction   Social Interaction (FIM) 5 - Requires redirection but less than 10% of the time.   Problem Solving   Problem solving (FIM) 4 - Solves basic problems 75-89% of time   Memory   Memory (FIM) 4 - Recognizes/recalls/performs 75-89%   Speech/Language/Cognition Assessmetn   Treatment Assessment Pt seen for skilled speech therapy session targeting cognitive linguistic communication skills. Pt alert and interactive- expressed frustration with changes in morning schedule and always being the \"last to know\". SLP apologized for any miscommunication and expressed if changes occur, pt is to be notified for any inconvenience as schedules are subject to change. Asked if pt would prefer a set schedule to limit changes but pt declined stating \"it's fine\". Pt redirected with structured tasks- category matrix where pt was able to name items in categories given initial letter cues with  21/24 on first trial and 15/16 on second trial. Pt benefitting from extended processing time, verbal and contextual cues. Engaged in review of week to come- full team back tomorrow and will get update on medical status. FT planned for Tuesday with son and then solidifying discharge date post this. Pt receptive and shaking head in agreement. Attempted memory recall of tasks completed with other therapist as pt did get all her time with PT and OT today- pt stating \"Jose I did all of it\" and not receptive to elaborating or engaging in further recall. Plan to cont to target IADL tasks, memory strategies and cont'd review of stroke " education. Pt overall is improving with skilled SLP services and will cont to benefit to maximize overall cognitive linguistic communication abilities at this time.   SLP Therapy Minutes   SLP Time In 1400   SLP Time Out 1430   SLP Total Time (minutes) 30   SLP Mode of treatment - Individual (minutes) 30   SLP Mode of treatment - Concurrent (minutes) 0   SLP Mode of treatment - Group (minutes) 0   SLP Mode of treatment - Co-treat (minutes) 0   SLP Mode of Treatment - Total time(minutes) 30 minutes   SLP Cumulative Minutes 300   Therapy Time missed   Time missed? No

## 2024-04-15 ENCOUNTER — TELEPHONE (OUTPATIENT)
Dept: NEUROSURGERY | Facility: CLINIC | Age: 59
End: 2024-04-15

## 2024-04-15 LAB
ALBUMIN SERPL BCP-MCNC: 3.5 G/DL (ref 3.5–5)
ALP SERPL-CCNC: 68 U/L (ref 34–104)
ALT SERPL W P-5'-P-CCNC: 246 U/L (ref 7–52)
AMMONIA PLAS-SCNC: 36 UMOL/L (ref 18–72)
ANION GAP SERPL CALCULATED.3IONS-SCNC: 7 MMOL/L (ref 4–13)
AST SERPL W P-5'-P-CCNC: 175 U/L (ref 13–39)
ATRIAL RATE: 58 BPM
BASOPHILS # BLD AUTO: 0.02 THOUSANDS/ÂΜL (ref 0–0.1)
BASOPHILS NFR BLD AUTO: 0 % (ref 0–1)
BILIRUB SERPL-MCNC: 0.35 MG/DL (ref 0.2–1)
BUN SERPL-MCNC: 19 MG/DL (ref 5–25)
CALCIUM SERPL-MCNC: 9.3 MG/DL (ref 8.4–10.2)
CHLORIDE SERPL-SCNC: 100 MMOL/L (ref 96–108)
CO2 SERPL-SCNC: 30 MMOL/L (ref 21–32)
CREAT SERPL-MCNC: 0.73 MG/DL (ref 0.6–1.3)
EOSINOPHIL # BLD AUTO: 0.15 THOUSAND/ÂΜL (ref 0–0.61)
EOSINOPHIL NFR BLD AUTO: 3 % (ref 0–6)
ERYTHROCYTE [DISTWIDTH] IN BLOOD BY AUTOMATED COUNT: 11.9 % (ref 11.6–15.1)
GFR SERPL CREATININE-BSD FRML MDRD: 90 ML/MIN/1.73SQ M
GLUCOSE P FAST SERPL-MCNC: 102 MG/DL (ref 65–99)
GLUCOSE SERPL-MCNC: 102 MG/DL (ref 65–140)
HCT VFR BLD AUTO: 40.7 % (ref 34.8–46.1)
HGB BLD-MCNC: 13.6 G/DL (ref 11.5–15.4)
IMM GRANULOCYTES # BLD AUTO: 0 THOUSAND/UL (ref 0–0.2)
IMM GRANULOCYTES NFR BLD AUTO: 0 % (ref 0–2)
LYMPHOCYTES # BLD AUTO: 2.39 THOUSANDS/ÂΜL (ref 0.6–4.47)
LYMPHOCYTES NFR BLD AUTO: 45 % (ref 14–44)
MAGNESIUM SERPL-MCNC: 1.9 MG/DL (ref 1.9–2.7)
MCH RBC QN AUTO: 32.7 PG (ref 26.8–34.3)
MCHC RBC AUTO-ENTMCNC: 33.4 G/DL (ref 31.4–37.4)
MCV RBC AUTO: 98 FL (ref 82–98)
MONOCYTES # BLD AUTO: 0.78 THOUSAND/ÂΜL (ref 0.17–1.22)
MONOCYTES NFR BLD AUTO: 15 % (ref 4–12)
NEUTROPHILS # BLD AUTO: 1.98 THOUSANDS/ÂΜL (ref 1.85–7.62)
NEUTS SEG NFR BLD AUTO: 37 % (ref 43–75)
NRBC BLD AUTO-RTO: 0 /100 WBCS
P AXIS: 46 DEGREES
PLATELET # BLD AUTO: 179 THOUSANDS/UL (ref 149–390)
PMV BLD AUTO: 11.2 FL (ref 8.9–12.7)
POTASSIUM SERPL-SCNC: 4.2 MMOL/L (ref 3.5–5.3)
PR INTERVAL: 166 MS
PROT SERPL-MCNC: 7.5 G/DL (ref 6.4–8.4)
QRS AXIS: 22 DEGREES
QRSD INTERVAL: 94 MS
QT INTERVAL: 458 MS
QTC INTERVAL: 449 MS
RBC # BLD AUTO: 4.16 MILLION/UL (ref 3.81–5.12)
SODIUM SERPL-SCNC: 137 MMOL/L (ref 135–147)
T WAVE AXIS: 36 DEGREES
VENTRICULAR RATE: 58 BPM
WBC # BLD AUTO: 5.32 THOUSAND/UL (ref 4.31–10.16)

## 2024-04-15 PROCEDURE — 99232 SBSQ HOSP IP/OBS MODERATE 35: CPT | Performed by: FAMILY MEDICINE

## 2024-04-15 PROCEDURE — 80053 COMPREHEN METABOLIC PANEL: CPT | Performed by: NURSE PRACTITIONER

## 2024-04-15 PROCEDURE — 97535 SELF CARE MNGMENT TRAINING: CPT

## 2024-04-15 PROCEDURE — 97112 NEUROMUSCULAR REEDUCATION: CPT

## 2024-04-15 PROCEDURE — 97116 GAIT TRAINING THERAPY: CPT

## 2024-04-15 PROCEDURE — 97129 THER IVNTJ 1ST 15 MIN: CPT

## 2024-04-15 PROCEDURE — 82140 ASSAY OF AMMONIA: CPT

## 2024-04-15 PROCEDURE — 97130 THER IVNTJ EA ADDL 15 MIN: CPT

## 2024-04-15 PROCEDURE — 93010 ELECTROCARDIOGRAM REPORT: CPT | Performed by: STUDENT IN AN ORGANIZED HEALTH CARE EDUCATION/TRAINING PROGRAM

## 2024-04-15 PROCEDURE — 99255 IP/OBS CONSLTJ NEW/EST HI 80: CPT | Performed by: PHYSICIAN ASSISTANT

## 2024-04-15 PROCEDURE — 93005 ELECTROCARDIOGRAM TRACING: CPT

## 2024-04-15 PROCEDURE — 85025 COMPLETE CBC W/AUTO DIFF WBC: CPT | Performed by: NURSE PRACTITIONER

## 2024-04-15 PROCEDURE — 83735 ASSAY OF MAGNESIUM: CPT | Performed by: NURSE PRACTITIONER

## 2024-04-15 PROCEDURE — 97530 THERAPEUTIC ACTIVITIES: CPT

## 2024-04-15 PROCEDURE — 97110 THERAPEUTIC EXERCISES: CPT

## 2024-04-15 PROCEDURE — 99232 SBSQ HOSP IP/OBS MODERATE 35: CPT

## 2024-04-15 RX ADMIN — PANTOPRAZOLE SODIUM 40 MG: 40 TABLET, DELAYED RELEASE ORAL at 16:43

## 2024-04-15 RX ADMIN — Medication 2.5 MG: at 14:55

## 2024-04-15 RX ADMIN — PANTOPRAZOLE SODIUM 40 MG: 40 TABLET, DELAYED RELEASE ORAL at 06:35

## 2024-04-15 RX ADMIN — AMLODIPINE BESYLATE 10 MG: 10 TABLET ORAL at 08:38

## 2024-04-15 RX ADMIN — ESCITALOPRAM OXALATE 10 MG: 10 TABLET ORAL at 08:37

## 2024-04-15 RX ADMIN — Medication 2.5 MG: at 08:37

## 2024-04-15 RX ADMIN — MULTIPLE VITAMINS W/ MINERALS TAB 1 TABLET: TAB ORAL at 08:37

## 2024-04-15 RX ADMIN — LISINOPRIL 40 MG: 20 TABLET ORAL at 08:37

## 2024-04-15 RX ADMIN — HYDROCHLOROTHIAZIDE 12.5 MG: 12.5 TABLET ORAL at 08:37

## 2024-04-15 RX ADMIN — Medication 800 MG: at 08:37

## 2024-04-15 RX ADMIN — GABAPENTIN 300 MG: 300 CAPSULE ORAL at 21:26

## 2024-04-15 RX ADMIN — THIAMINE HCL TAB 100 MG 100 MG: 100 TAB at 08:37

## 2024-04-15 RX ADMIN — LORATADINE 10 MG: 10 TABLET ORAL at 08:38

## 2024-04-15 RX ADMIN — GABAPENTIN 100 MG: 100 CAPSULE ORAL at 14:55

## 2024-04-15 RX ADMIN — Medication 2000 UNITS: at 08:37

## 2024-04-15 RX ADMIN — FOLIC ACID 1 MG: 1 TABLET ORAL at 08:38

## 2024-04-15 RX ADMIN — MELATONIN TAB 3 MG 3 MG: 3 TAB at 21:26

## 2024-04-15 RX ADMIN — Medication 800 MG: at 16:43

## 2024-04-15 RX ADMIN — CHLORHEXIDINE GLUCONATE 0.12% ORAL RINSE 15 ML: 1.2 LIQUID ORAL at 21:25

## 2024-04-15 RX ADMIN — Medication 800 MG: at 12:06

## 2024-04-15 RX ADMIN — HYDROXYZINE HYDROCHLORIDE 25 MG: 25 TABLET, FILM COATED ORAL at 14:55

## 2024-04-15 RX ADMIN — CHLORHEXIDINE GLUCONATE 0.12% ORAL RINSE 15 ML: 1.2 LIQUID ORAL at 08:37

## 2024-04-15 RX ADMIN — GABAPENTIN 100 MG: 100 CAPSULE ORAL at 06:35

## 2024-04-15 NOTE — PROGRESS NOTES
"   04/15/24 1100   Pain Assessment   Pain Assessment Tool 0-10   Pain Score No Pain   Comprehension   Comprehension (FIM) 4 - Understands basic info/conversation 75-90% of time   Expression   Expression (FIM) 5 - Needs help/cues only RARELY (< 10% of the time)   Social Interaction   Social Interaction (FIM) 5 - Interacts appropriately with others 90% of time   Problem Solving   Problem solving (FIM) 4 - Solves basic problems 75-89% of time   Memory   Memory (FIM) 4 - Recognizes/recalls/performs 75-89%   Speech/Language/Cognition Assessmetn   Treatment Assessment Pt lying in bed after OT appt as SLP entered; agreeable to ST session targeting attention, memory, sequencing, and problem solving. SLP attempting to build rapport with pt as SLP is new to pt's care, but pt demonstrating increased frustration and stating \"what are we doing here? I just want to go home?\" With SLP's attempts. Therefore, pt engaged in first task of visual logic where she was provided with a calendar that had several errors on it. She was encouraged to find the errors and [place] them, and then she was informed that they were going to be discussed as in WHY they are in error, after she identifies all of them. Once pt was finished, SLP inquired about her choices. Pt demonstrating difficulty with over-personalizing the \"errors\" she chose (e.g., circled \"dad's birthday,\" and stated it's wrong because that's not her dad's birthday, and circled November, stating that it wasn't November). Even as SLP attempted to explain on other examples she circled, pt poorly insightful, in denial, and with increased agitation. In attempts of keeping the pt's tolerance to therapy, SLP diverted to different task.Pt provided with written word problems targeting fxl and meaningful tasks. She required some increased processing that would not be typical for age matched peers. She did however complete with 9/10, increasing to 10/10 when given awareness to error. Next, pt " engaged in sequencing task F05. She required assistance in organizing the first set, placing items out of order. When SLP read aloud the items in the task, pt then able to correct. Encouraged to read aloud on later attempts to assist with organizing appropriately. Pt not observed to use strategy, however, completed remainder of sets with 100% acc. Finally, pt provided with visual memory task from given picture scene, with follow-up questions. She was modeled rehearsal strategy and encouraged to use. Pt did not use, but completed with 7/10 accuracy. Pt did not want to discuss her errors, rather lay down. Pt would continue to benefit from skilled ST services while at the [facility], specifically targeting attention, insight, safety awareness, meta cognition, stroke education and memory necessary for optimizing independence and reducing burden of care for family upon discharge.   SLP Therapy Minutes   SLP Time In 1100   SLP Time Out 1130   SLP Total Time (minutes) 30   SLP Mode of treatment - Individual (minutes) 30   SLP Mode of treatment - Concurrent (minutes) 0   SLP Mode of treatment - Group (minutes) 0   SLP Mode of treatment - Co-treat (minutes) 0   SLP Mode of Treatment - Total time(minutes) 30 minutes   SLP Cumulative Minutes 330   Therapy Time missed   Time missed? No

## 2024-04-15 NOTE — ASSESSMENT & PLAN NOTE
Patient seen today virtually for 2 week hospital follow up regarding left cerebellar hemorrhage  Presented end of March 2024 with acute onset nausea, diarrhea and dizziness, noted to have cerebellar hemorrhage with effacement of the 4th ventricle  History of ischemic strokes in 2022, supposed to be on plavix and asa but admits she was not taking either of these    Imaging:  CT head wo contrast 4/12/2023: Stable punctate hyperdense focus within the posterior right sylvian fissure, cortical versus subarachnoid hemorrhage. Stable evolving hematoma within the left paramedian cerebellar hemisphere. There is no new intra or extra-axial hemorrhage. Chronic right centrum semiovale ovale, left corona radiata, bilateral basal ganglia, bilateral thalamic, and pontine lacunar infarcts. Chronic microangiopathic ischemic changes.  CT head wo contrast 4/11/2024: New punctate foci of cortical hemorrhage versus small amount of subarachnoid hemorrhage within the posterior right sylvian fissure. Recommend short interval follow-up exam. Decreased size of hyperdense component of the left paramedian cerebellar hemorrhage with mildly increased surrounding vasogenic edema as detailed above. Decreased hemorrhage within the fourth ventricle.    Plan:  Imaging results reviewed with patient.  New punctate hemorrhage appears stable on repeat scan.  She also has decreasing size of cerebellar hemorrhage with some increased vasogenic edema which has not abnormal.  No delayed hydrocephalus appreciated.  Plan for repeat CT head in 2 weeks to ensure no worsening of punctate hemorrhage / new hemorrhage.  If stable to improved, plan to sign off at that time and defer rest of management to neurology (follow up with neurology on 5/15)  Repeat CT head stat if GCS declines more than 2 points in 1 hour  Systolic blood pressure less than 160  Continue to hold therapeutic doses of AC/AP therapy  Continue to monitor neuro exam  Medical management and pain  control per primary team  DVT ppx:  SCDs  Mobilize as tolerated with assistance, PT / OT per rehab recommendations    Neurosurgery will sign off.  Outpatient follow up in 2 weeks with repeat CT head.  Please call with questions or concerns.

## 2024-04-15 NOTE — ASSESSMENT & PLAN NOTE
Presented to acute setting with acute nausea/vomiting/dizziness.  CTH - Acute parenchymal hematoma left paramedian cerebellum measures 2.6 x 1.6 cm with mass effect on the fourth ventricle. Intraventricular extension of hemorrhage noted with blood in the fourth ventricle and left foramen of Luschka.  CTA head/neck - No left cerebellar vascular malformation to account for the patient's acute parenchymal hematoma. No cervical or intracranial large vessel occlusion, dissection or aneurysm. Mild bilateral cervical carotid bifurcation atherosclerotic disease.  Most recent CTH on 3/28 showed stability.    Maintain normotension.  Neurovascular checks Q shift.  Hold AC/AP at this time.    Primary team following.  PT/OT/ST.    CTH obtained on 4/11: New punctate foci of cortical hemorrhage vs small amount of subarachnoid hemorrhage within the posterior R sylvian fissure, decreased size of hyperdense component of the L paramedian cerebellar hemorrhage with mildly increased surrounding vasogenic edema, decreased hemorrhage within the 4th ventricle.  Repeat CTH on 4/12 was stable.    Appt with Neurosurgery today - discuss restarting Plavix at this appointment.  Follow-up with Neurology as outpatient.

## 2024-04-15 NOTE — CONSULTS
[facility]  Consult  Name: Tanika Lira 58 y.o. female I MRN: [id number]  Unit/Bed#: -01 I Date of Admission: 4/3/2024   Date of Service: 4/15/2024 I Hospital Day: 12    Inpatient consult to Neurosurgery  Consult performed by: Iliana Quintana PA-C  Consult ordered by: Matias Jennings MD          Assessment/Plan   * Cerebellar hemorrhage (HCC)  Assessment & Plan  Patient seen today virtually for 2 week hospital follow up regarding left cerebellar hemorrhage  Presented end of March 2024 with acute onset nausea, diarrhea and dizziness, noted to have cerebellar hemorrhage with effacement of the 4th ventricle  History of ischemic strokes in 2022, supposed to be on plavix and asa but admits she was not taking either of these    Imaging:  CT head wo contrast 4/12/2023: Stable punctate hyperdense focus within the posterior right sylvian fissure, cortical versus subarachnoid hemorrhage. Stable evolving hematoma within the left paramedian cerebellar hemisphere. There is no new intra or extra-axial hemorrhage. Chronic right centrum semiovale ovale, left corona radiata, bilateral basal ganglia, bilateral thalamic, and pontine lacunar infarcts. Chronic microangiopathic ischemic changes.  CT head wo contrast 4/11/2024: New punctate foci of cortical hemorrhage versus small amount of subarachnoid hemorrhage within the posterior right sylvian fissure. Recommend short interval follow-up exam. Decreased size of hyperdense component of the left paramedian cerebellar hemorrhage with mildly increased surrounding vasogenic edema as detailed above. Decreased hemorrhage within the fourth ventricle.    Plan:  Imaging results reviewed with patient.  New punctate hemorrhage appears stable on repeat scan.  She also has decreasing size of cerebellar hemorrhage with some increased vasogenic edema which has not abnormal.  No delayed hydrocephalus appreciated.  Plan for repeat CT head in 2 weeks to  ensure no worsening of punctate hemorrhage / new hemorrhage.  If stable to improved, plan to sign off at that time and defer rest of management to neurology (follow up with neurology on 5/15)  Repeat CT head stat if GCS declines more than 2 points in 1 hour  Systolic blood pressure less than 160  Continue to hold therapeutic doses of AC/AP therapy  Continue to monitor neuro exam  Medical management and pain control per primary team  DVT ppx:  SCDs  Mobilize as tolerated with assistance, PT / OT per rehab recommendations    Neurosurgery will sign off.  Outpatient follow up in 2 weeks with repeat CT head.  Please call with questions or concerns.           History of Present Illness   HPI: Tanika Lira is a 58 y.o. year old female with PMH including with past medical history significant for prior stroke who presented at the end of March 2024 with acute onset nausea, diarrhea and dizziness.  She was noted to have a cerebellar hemorrhage at that time with effacement of the fourth ventricle.  She is seen today for her 2-week follow-up to ensure no delayed hydrocephalus.  She had a scan on 4/11/2024 which noted improving cerebellar hemorrhage but new punctate hemorrhage in the posterior right sylvian fissure.  In the past patient was supposed to be on dual antiplatelet therapy however reports she was not taking any of these and she currently remains off of them.  She offers no complaints and is eager to go home.      Review of Systems   Constitutional:  Negative for activity change, chills and fever.   HENT:  Negative for hearing loss, tinnitus and trouble swallowing.    Eyes:  Negative for visual disturbance.   Respiratory:  Negative for chest tightness and shortness of breath.    Cardiovascular:  Negative for chest pain.   Gastrointestinal:  Negative for abdominal pain, constipation, diarrhea, nausea and vomiting.   Genitourinary:  Negative for difficulty urinating.   Musculoskeletal:  Negative for back pain and neck  "pain.   Skin:  Negative for wound.   Allergic/Immunologic: Negative for environmental allergies and food allergies.   Neurological:  Negative for dizziness, facial asymmetry, speech difficulty, weakness, numbness and headaches.   Hematological:  Does not bruise/bleed easily.   Psychiatric/Behavioral:  Negative for confusion.        Historical Information   Past Medical History:   Diagnosis Date    CVA (cerebral vascular accident) ([facility])     Diverticulitis     Diverticulitis of colon     Diverticulosis     Hypertension     Stroke (HCC)      Past Surgical History:   Procedure Laterality Date    CARDIAC ELECTROPHYSIOLOGY PROCEDURE N/A 2/8/2023    Procedure: Cardiac loop recorder implant;  Surgeon: Duke Abraham MD;  Location: [facility];  Service: Cardiology    COLON SIGMOID RESECTION       Social History     Substance and Sexual Activity   Alcohol Use Yes    Comment: \"twisted tea daily\"\"     Social History     Substance and Sexual Activity   Drug Use Yes    Types: Marijuana    Comment: medical card     Social History     Tobacco Use   Smoking Status Light Smoker    Types: Cigarettes   Smokeless Tobacco Never   Tobacco Comments    smokes 4 cigarettes daily     Family History   Problem Relation Age of Onset    Coronary artery disease Mother     Heart disease Mother     Diabetes Father     No Known Problems Sister     Breast cancer Maternal Grandmother         age unknown    No Known Problems Maternal Grandfather     No Known Problems Paternal Grandmother     No Known Problems Paternal Grandfather     No Known Problems Paternal Aunt     No Known Problems Paternal Aunt     No Known Problems Maternal Aunt        Meds/Allergies   all current active meds have been reviewed, current meds:   Current Facility-Administered Medications   Medication Dose Route Frequency    acetaminophen (TYLENOL) tablet 488 mg  488 mg Oral Q6H PRN    amLODIPine (NORVASC) tablet 10 mg  10 mg Oral Daily    bisacodyl (DULCOLAX) rectal " suppository 10 mg  10 mg Rectal Daily PRN    calcium carbonate (TUMS) chewable tablet 500 mg  500 mg Oral Daily PRN    chlorhexidine (PERIDEX) 0.12 % oral rinse 15 mL  15 mL Mouth/Throat Q12H NAOMI    Cholecalciferol (VITAMIN D3) tablet 2,000 Units  2,000 Units Oral Daily    escitalopram (LEXAPRO) tablet 10 mg  10 mg Oral Daily    folic acid (FOLVITE) tablet 1 mg  1 mg Oral Daily    gabapentin (NEURONTIN) capsule 100 mg  100 mg Oral BID    gabapentin (NEURONTIN) capsule 300 mg  300 mg Oral HS    hydrALAZINE (APRESOLINE) tablet 10 mg  10 mg Oral Q6H PRN    hydroCHLOROthiazide tablet 12.5 mg  12.5 mg Oral Daily    hydrOXYzine HCL (ATARAX) tablet 25 mg  25 mg Oral TID PRN    lisinopril (ZESTRIL) tablet 40 mg  40 mg Oral Daily    loratadine (CLARITIN) tablet 10 mg  10 mg Oral Daily    LORazepam (ATIVAN) tablet 0.25 mg  0.25 mg Oral BID PRN    magnesium Oxide (MAG-OX) tablet 800 mg  800 mg Oral TID With Meals    melatonin tablet 3 mg  3 mg Oral HS    methocarbamol (ROBAXIN) tablet 500 mg  500 mg Oral TID PRN    multivitamin-minerals (CENTRUM) tablet 1 tablet  1 tablet Oral Daily    oxyCODONE (ROXICODONE) split tablet 2.5 mg  2.5 mg Oral Q4H PRN    pantoprazole (PROTONIX) EC tablet 40 mg  40 mg Oral BID AC    polyethylene glycol (MIRALAX) packet 17 g  17 g Oral Daily PRN    thiamine tablet 100 mg  100 mg Oral Daily   , and PTA meds:   Prior to Admission Medications   Prescriptions Last Dose Informant Patient Reported? Taking?   Blood Pressure Monitoring (Blood Pressure Cuff) MISC Unknown  No No   Sig: Use daily   amLODIPine (NORVASC) 10 mg tablet 4/3/2024  No Yes   Sig: Take 1 tablet (10 mg total) by mouth daily   atorvastatin (LIPITOR) 40 mg tablet 4/2/2024  No Yes   Sig: Take 1 tablet (40 mg total) by mouth daily   escitalopram (Lexapro) 20 mg tablet 4/3/2024 Self No Yes   Sig: Take 1 tablet (20 mg total) by mouth daily   gabapentin (NEURONTIN) 300 mg capsule 4/2/2024  No Yes   Sig: Take 1 capsule (300 mg total) by mouth  daily at bedtime   hydrOXYzine HCL (ATARAX) 50 mg tablet Not Taking  No No   Sig: Take 1 tablet (50 mg total) by mouth every 6 (six) hours as needed for itching   Patient not taking: Reported on 4/3/2024   hydroCHLOROthiazide 12.5 mg tablet 4/3/2024  No Yes   Sig: Take 1 tablet (12.5 mg total) by mouth daily   lisinopril (ZESTRIL) 40 mg tablet 4/3/2024  No Yes   Sig: Take 1 tablet (40 mg total) by mouth daily      Facility-Administered Medications: None     No Known Allergies    Objective   I/O         04/13 0701 04/14 0700 04/14 0701  04/15 0700 04/15 0701 04/16 0700    P.O. 540 540 240    Total Intake(mL/kg) 540 (9.1) 540 (9.1) 240 (4.1)    Net +540 +540 +240           Unmeasured Urine Occurrence 1 x      Unmeasured Stool Occurrence 1 x              Physical Exam  Constitutional:       General: She is awake.      Appearance: Normal appearance.   HENT:      Head: Normocephalic and atraumatic.   Eyes:      Conjunctiva/sclera: Conjunctivae normal.   Cardiovascular:      Rate and Rhythm: Normal rate.   Pulmonary:      Effort: Pulmonary effort is normal. No respiratory distress.   Neurological:      Mental Status: She is alert and oriented to person, place, and time.      Coordination: Finger-Nose-Finger Test normal.   Psychiatric:         Attention and Perception: Attention and perception normal.         Mood and Affect: Mood and affect normal.         Speech: Speech normal.         Behavior: Behavior normal. Behavior is cooperative.         Thought Content: Thought content normal.         Cognition and Memory: Cognition and memory normal.         Judgment: Judgment normal.       Neurologic Exam     Mental Status   Oriented to person, place, and time.   Follows 1 step commands.   Attention: normal. Concentration: normal.   Speech: speech is normal   Level of consciousness: alert  Knowledge: good.   Normal comprehension.     Cranial Nerves     CN III, IV, VI   Conjugate gaze: present    CN VII   Right facial  "weakness: none  Left facial weakness: none    CN VIII   Hearing: intact    CN XII   Tongue: not atrophic  Fasciculations: absent  Tongue deviation: none    Motor Exam   Right arm pronator drift: absent  Left arm pronator drift: absent  Moving arms without focal weakness, limited exam given completed virtually     Gait, Coordination, and Reflexes     Coordination   Finger to nose coordination: normal      Vitals:Blood pressure 122/81, pulse 64, temperature (!) 96.7 °F (35.9 °C), temperature source Tympanic, resp. rate 18, height 5' 6\" (1.676 m), weight 59.1 kg (130 lb 5 oz), SpO2 96%.,Body mass index is 21.03 kg/m².     Lab Results:   Results from last 7 days   Lab Units 04/15/24  0613 04/10/24  0543   WBC Thousand/uL 5.32 6.70   HEMOGLOBIN g/dL 13.6 13.0   HEMATOCRIT % 40.7 39.3   PLATELETS Thousands/uL 179 202   SEGS PCT % 37* 46   MONO PCT % 15* 12   EOS PCT % 3 2     Results from last 7 days   Lab Units 04/15/24  0613 04/12/24  0622 04/10/24  0543 04/09/24  0555   POTASSIUM mmol/L 4.2  --  4.3 4.5   CHLORIDE mmol/L 100  --  99 98   CO2 mmol/L 30  --  29 30   BUN mg/dL 19  --  26* 22   CREATININE mg/dL 0.73  --  0.66 0.71   CALCIUM mg/dL 9.3  --  8.9 9.2   ALK PHOS U/L 68 59 55 58   ALT U/L 246* 175* 162* 168*   AST U/L 175* 119* 119* 130*     Results from last 7 days   Lab Units 04/15/24  0613 04/10/24  0543 04/09/24  0555   MAGNESIUM mg/dL 1.9 1.9 1.8*         Imaging Studies: I have personally reviewed pertinent reports.   and I have personally reviewed pertinent films in PACS    US right upper quadrant    Result Date: 4/8/2024  Impression: 1. Cholelithiasis with borderline wall thickening. Correlate clinically to exclude early acute cholecystitis. 2. Slightly nodular liver contour as seen on recent CT, which may be associated with cirrhosis. Correlate clinically. Workstation performed: [id number]        EKG, Pathology, and Other Studies: I have personally reviewed pertinent reports.      VTE Prophylaxis: " Sequential compression device (Venodyne)     Code Status: Level 1 - Full Code  Advance Directive and Living Will:      Power of :    POLST:      Counseling / Coordination of Care  I spent 20 minutes with the patient.

## 2024-04-15 NOTE — ASSESSMENT & PLAN NOTE
"Worsening,  (119),  (175), and alk phos stable.   CT abd/pelvis showed liver cirrhosis with mild mesenteric edema, subtle liver surface nodularity with mild hypertrophy of the lateral L paddock lobe, and widening of the fissure for the falciform ligament.  Cholelithiasis including a 3mm gallstone within the gallbladder neck vs cystic duct.  Complaints of abdominal pain over the weekend  RUQ US 4/8: \"Cholelithiasis with borderline wall thickening. Correlate clinically to exclude early acute cholecystitis.  Slightly nodular liver contour as seen on recent CT, which may be associated with cirrhosis\"  HIDA scan on 4/10 showed EF of 0% with gallbladder dysfunction.  Likely chronic cholecystitis.  Recommending elective surgical intervention once recovered from recent cerebellar hemorrhage.  Lipitor on hold.  Lexapro decreased to 5mg daily.  Discussed Lexapro dosing with GI on 4/12 - ok to increase back to 10mg daily.  Avoid hepatotoxic agents including alcohol.  Hepatitis C antibody + on 4/5.  HCV detected, viral load of 1,550,000.      Will require GI/General Surgery follow-up as outpatient.  "

## 2024-04-15 NOTE — TELEPHONE ENCOUNTER
4/22/24 - PT DISCHARGED TO HOME  4/30/24 2 WK HFU W/CTH    [facility] CALLED FACILITY [phone] AND SPOKE TO MORAIMA CONFIRMING VIRTUAL ROUNDING W/CTH NEEDED PRIOR. SHE INFORMED ME THAT PT MAY NOT BE IN FACILITY AT TIME OF APPT. WILL F/U.     [facility]  4/30/24 2 WK HFU W/CTH    [facility]  2 week follow up with AP and CTH to ensure ongoing resolution of hemorrhages.   Has follow up with neurology on 5/15 for stroke management.

## 2024-04-15 NOTE — ASSESSMENT & PLAN NOTE
Prolonged QTc of 534 on EKG from admission to acute setting.  EKG on admission to [facility] showed QTc of 472.  Currently on Lexapro.  Avoid QTc prolonging medications as able.  Repeat EKG as needed.    Lexapro increased on 4/12.  Repeat EKG ordered for today.

## 2024-04-15 NOTE — PROGRESS NOTES
"OT TREATMENT NOTE       04/15/24 6330   Pain Assessment   Pain Assessment Tool 0-10   Pain Score No Pain   Restrictions/Precautions   Precautions Bed/chair alarms;Fall Risk;Supervision on toilet/commode   Weight Bearing Restrictions No   ROM Restrictions No   Lifestyle   Autonomy \"I do all the cooking at home, but I have to learn to take it easy\"   Oral Hygiene   Type of Assistance Needed Supervision   Physical Assistance Level No physical assistance   Comment supervision for standing balance at sink with RW   Oral Hygiene CARE Score 4   Sit to Lying   Type of Assistance Needed Independent   Physical Assistance Level No physical assistance   Sit to Lying CARE Score 6   Lying to Sitting on Side of Bed   Type of Assistance Needed Independent   Physical Assistance Level No physical assistance   Lying to Sitting on Side of Bed CARE Score 6   Sit to Stand   Type of Assistance Needed Supervision   Physical Assistance Level No physical assistance   Comment RW   Sit to Stand CARE Score 4   Bed-Chair Transfer   Type of Assistance Needed Incidental touching   Physical Assistance Level No physical assistance   Comment ambulatory transfer with multiple bouts of lateral and posterior LOB requiring no physical assistance to maintain upright with use of RW   Chair/Bed-to-Chair Transfer CARE Score 4   Toileting Hygiene   Type of Assistance Needed Supervision   Physical Assistance Level No physical assistance   Comment supervision for standing balance with clothing management and perineal hygiene.   Toileting Hygiene CARE Score 4   Toilet Transfer   Type of Assistance Needed Incidental touching   Physical Assistance Level No physical assistance   Comment with use of RW   Toilet Transfer CARE Score 4   Meal Prep   Meal Prep Level Walker   Meal Prep Level of Assistance Contact guard   Meal Preparation Pt engaged in simple meal prep task of preparing eggs over stove top. Pt demonstrated good use of body mechanics and planning to " navigate kitchen space with use of RW and walker tray during item retrieval, cooking and clean up. Pt requires CGA for dynamic standing balance, no bouts of LOB noted throughout task. Pt reports walker tray was purchased and delivered to home and anticipates making use of it upon d/c.   Kitchen Mobility   Kitchen-Mobility Level Walker   Kitchen Activity Retrieve items;Transport items   Kitchen Mobility Comments see meal prep for details   Cognition   Overall Cognitive Status Impaired   Arousal/Participation Alert;Cooperative   Attention Within functional limits   Orientation Level Oriented X4   Memory Decreased recall of precautions   Following Commands Follows one step commands without difficulty   Comments Pt requires verbal encouragement throughout session for participation   Additional Activities   Additional Activities Other (Comment)   Additional Activities Comments Pt completed ambulation of household distances with use of RW and CGA x3 trials with minor bouts of LOB requiring no more than CGA to remain upright.  Pt completed x3 trials of household distance ambulation transfers to/from arm chair  without use of AD and CGA with multiple minor bouts of LOB noted requiring no more than CGA to remain upright. Pt noted to benefit from verbal cuing to increase stride length with noted decreased bouts of LOB. Pt participated in balloon tap 3 sets x10 trials in  stance with intermittent UE support of RW and multiple bouts of LOB requiring no physical assistance to maintain upright.   Activity Tolerance   Activity Tolerance Patient tolerated treatment well   Assessment   Treatment Assessment Pt participated in 90 min skilled OT session with focus on dynamic standing balance, IADLs and endurance. Pt tolerated treatment well, pt remained supine in bed with all immediate needs met, call bell accessible, alarm secured . Pt will continue to benefit from skilled OT services to ensure safe discharge. Continue plan of care  with focus on balance, endurance, safety awareness and IADLs.   Prognosis Good   Problem List Impaired balance;Decreased endurance;Decreased safety awareness   Barriers to Discharge Decreased caregiver support   Plan   Treatment/Interventions ADL retraining;Functional transfer training;Therapeutic exercise;Endurance training;Patient/family training;Equipment eval/education   Progress Progressing toward goals   Discharge Recommendation   Rehab Resource Intensity Level, OT   (Home with support)   OT Therapy Minutes   OT Time In 0930   OT Time Out 1100   OT Total Time (minutes) 90   OT Mode of treatment - Individual (minutes) 90   OT Mode of treatment - Concurrent (minutes) 0   OT Mode of treatment - Group (minutes) 0   OT Mode of treatment - Co-treat (minutes) 0   OT Mode of Treatment - Total time(minutes) 90 minutes   OT Cumulative Minutes 810   Therapy Time missed   Time missed? No   Amount of time missed 90

## 2024-04-15 NOTE — PROGRESS NOTES
04/15/24 1430   Pain Assessment   Pain Assessment Tool 0-10   Pain Score 5   Pain Location/Orientation Location: Abdomen   Restrictions/Precautions   Precautions Bed/chair alarms;Fall Risk;Supervision on toilet/commode   Cognition   Overall Cognitive Status Impaired   Arousal/Participation Alert;Cooperative   Attention Within functional limits   Orientation Level Oriented X4   Memory Decreased recall of precautions   Following Commands Follows one step commands without difficulty   Subjective   Subjective talking with MD upon entry, epxressing frustration about wanting to go home and being stuck here.   Roll Left and Right   Type of Assistance Needed Independent   Roll Left and Right CARE Score 6   Sit to Lying   Type of Assistance Needed Independent   Sit to Lying CARE Score 6   Lying to Sitting on Side of Bed   Type of Assistance Needed Independent   Lying to Sitting on Side of Bed CARE Score 6   Sit to Stand   Type of Assistance Needed Supervision   Sit to Stand CARE Score 4   Bed-Chair Transfer   Type of Assistance Needed Supervision   Chair/Bed-to-Chair Transfer CARE Score 4   Walk 10 Feet   Type of Assistance Needed Supervision   Physical Assistance Level No physical assistance   Comment CS   Walk 10 Feet CARE Score 4   Walk 50 Feet with Two Turns   Type of Assistance Needed Incidental touching   Physical Assistance Level No physical assistance   Comment CG/CS with RW   Walk 50 Feet with Two Turns CARE Score 4   Walk 150 Feet   Type of Assistance Needed Incidental touching   Physical Assistance Level No physical assistance   Comment CG/CS with RW   Walk 150 Feet CARE Score 4   Walking 10 Feet on Uneven Surfaces   Type of Assistance Needed Supervision   Physical Assistance Level No physical assistance   Comment CS on outdoor sidewalk with RW   Walking 10 Feet on Uneven Surfaces CARE Score 4   Ambulation   Primary Mode of Locomotion Prior to Admission Walk   Distance Walked (feet) 200 ft   Assist Device  Roller Walker   Gait Pattern Ataxic;Inconsistant Katherine;Decreased foot clearance;Step to;Step through   Provided Assistance with: Balance   Walk Assist Level Contact Guard;Close Supervision   Findings out door amb with RW, generally CS however up incline, with pt talking and attempting to use her hands to talk + posterior LOB requiring min A for safety.   Does the patient walk? 2. Yes   Wheelchair mobility   Does the patient use a wheelchair? 0. No   Therapeutic Interventions   Strengthening seated B LE TE: 2# LAQ x20, grn tband hip abd and HS curls x20 each. Standing unsupport march 2 # B LE x15 steps, x20 steps, then x24 stepss.   Balance unsupported trunk rotations with ball in both hands 2x10 each direction, as above unsupported march.   Assessment   Treatment Assessment Pt engaged in 70 min skilled PT intervention, conducted mainly out doors per pt request to get some air. Phone call made and message left for son by MD to come for FT tomorrow at 1230. Pt highly eager to go home, feeling she is not getting better here. Reports RW and walker tray was delivered yesterday. Balance and ataxia remain pts major barrier, pt reports feeling she is not going to fall at home. Education and support provided. Out pt PT encouraged however pt feels she would like to start with home PT. Ideallly goal is to be mod I for household mobility as pt will be alone during the day 3 days a week. To repeat curb, ramp, car xfer, and item pickup next day, hopefully with son present to observe pt mobility.   Barriers to Discharge Decreased caregiver support   Plan   Progress Slow progress, medical status limitations   Discharge Recommendation   Rehab Resource Intensity Level, PT   (home PT)   PT Equipment ordered pt self purchased   PT Therapy Minutes   PT Time In 1430   PT Time Out 1540   PT Total Time (minutes) 70   PT Mode of treatment - Individual (minutes) 70   PT Mode of treatment - Concurrent (minutes) 0   PT Mode of treatment -  Group (minutes) 0   PT Mode of treatment - Co-treat (minutes) 0   PT Mode of Treatment - Total time(minutes) 70 minutes   PT Cumulative Minutes 655   Therapy Time missed   Time missed? No

## 2024-04-15 NOTE — ASSESSMENT & PLAN NOTE
Previous Lexapro 10mg daily and Atarax 25mg Q8 PRN.  Supportive counseling as needed.  Consider Neuropsych consult.    Decreased Lexapro to 5mg daily on 4/8 due to transaminitis.  Discussed with GI on 4/12 - ok to increase Lexapro back to 10mg daily.

## 2024-04-15 NOTE — PROGRESS NOTES
[facility]  Progress Note  Name: Tanika Lira I  MRN: [id number]  Unit/Bed#: [facility] 266-01 I Date of Admission: 4/3/2024   Date of Service: 4/15/2024 I Hospital Day: 12    Assessment/Plan   Hepatitis C  Assessment & Plan  HCV detected, viral load of 1,550,000.  GI consulted and following.  Follow-up with Hepatology as outpatient for treatment.    Chronic cholecystitis  Assessment & Plan  HIDA scan on 4/10 showed EF 0%, gallbladder dysfunction.  Likely representing chronic cholecystitis.  Poor surgical candidate with recent cerebellar hemorrhage.  Will likely need elective surgery as outpatient.  Declined low fat diet.  Continue current pain regimen.  Will require follow-up with GI and General Surgery as outpatient.    Abdominal discomfort  Assessment & Plan  Complaints of abdominal pain over the weekend.  EKG obtained and was unremarkable.  Started on Pepcid 20mg BID.  RUQ US ordered due to worsening transaminitis.    Simple adnexal cyst greater than 1 cm in diameter in postmenopausal patient  Assessment & Plan  Incidental finding on CT A/P: simple appearing right adnexal cyst measuring 3.7 cm.  Follow-up pelvic US in 6-12 months.  Follow-up with PCP or OB/GYN as outpatient.    Prolonged QT interval  Assessment & Plan  Prolonged QTc of 534 on EKG from admission to acute setting.  EKG on admission to [facility] showed QTc of 472.  Currently on Lexapro.  Avoid QTc prolonging medications as able.  Repeat EKG as needed.    Lexapro increased on 4/12.  Repeat EKG ordered for today.    Transaminitis  Assessment & Plan  Worsening,  (119),  (175), and alk phos stable.   CT abd/pelvis showed liver cirrhosis with mild mesenteric edema, subtle liver surface nodularity with mild hypertrophy of the lateral L paddock lobe, and widening of the fissure for the falciform ligament.  Cholelithiasis including a 3mm gallstone within the gallbladder neck vs cystic duct.  Complaints of abdominal pain  "over the weekend  RUQ US 4/8: \"Cholelithiasis with borderline wall thickening. Correlate clinically to exclude early acute cholecystitis.  Slightly nodular liver contour as seen on recent CT, which may be associated with cirrhosis\"  HIDA scan on 4/10 showed EF of 0% with gallbladder dysfunction.  Likely chronic cholecystitis.  Recommending elective surgical intervention once recovered from recent cerebellar hemorrhage.  Lipitor on hold.  Lexapro decreased to 5mg daily.  Discussed Lexapro dosing with GI on 4/12 - ok to increase back to 10mg daily.  Avoid hepatotoxic agents including alcohol.  Hepatitis C antibody + on 4/5.  HCV detected, viral load of 1,550,000.      Will require GI/General Surgery follow-up as outpatient.    Hypomagnesemia  Assessment & Plan  Improved, Mg currently 1.9.  Continue magnesium oxide 800mg TID.  Continue to trend routine Mg.    Amphetamine abuse (HCC)  Assessment & Plan  Had been using prior to admission.  Encourage cessation.  Would benefit from HOST referral.    Insomnia  Assessment & Plan  Continue melatonin 3mg at HS.    Alcohol use  Assessment & Plan  Hx of alcohol abuse.  Continue thiamine and folic acid   Encourage alcohol cessation.    Neuropathy  Assessment & Plan  Continue gabapentin 100mg in AM, 100mg at lunch, and 300mg at HS.    ROSANA (generalized anxiety disorder)  Assessment & Plan  Previous Lexapro 10mg daily and Atarax 25mg Q8 PRN.  Supportive counseling as needed.  Consider Neuropsych consult.    Decreased Lexapro to 5mg daily on 4/8 due to transaminitis.  Discussed with GI on 4/12 - ok to increase Lexapro back to 10mg daily.    Essential hypertension  Assessment & Plan  Had not been taking medications at home.  Current regimen: amlodipine 10mg daily, lisinopril 40mg daily, and HCTZ 12.5mg daily.  Maintain normotension.  Monitor BP with routine VS.    Tobacco abuse  Assessment & Plan  Smokes less than a half a pack/day.  Encourage smoking cessation.  Declined nicotine " patch.    History of CVA (cerebrovascular accident)  Assessment & Plan  Hx of CVA in 9/2022   MRI from 9/2022 showed acute infarcts in the R frontal centrum semiovale and R posterior putamen, small subacute infarct in the L paramedian andreea, and chronic lacunar infarcts in the R corona radiata, bilateral basal ganglia, and bilateral thalami.  Had been treated with DAPT originally but has been non-compliant with medications.  Plavix on hold until appt with Neurosurgery on 4/15.  Lipitor on hold due to elevated LFTs.    * Cerebellar hemorrhage (HCC)  Assessment & Plan  Presented to acute setting with acute nausea/vomiting/dizziness.  CTH - Acute parenchymal hematoma left paramedian cerebellum measures 2.6 x 1.6 cm with mass effect on the fourth ventricle. Intraventricular extension of hemorrhage noted with blood in the fourth ventricle and left foramen of Luschka.  CTA head/neck - No left cerebellar vascular malformation to account for the patient's acute parenchymal hematoma. No cervical or intracranial large vessel occlusion, dissection or aneurysm. Mild bilateral cervical carotid bifurcation atherosclerotic disease.  Most recent CTH on 3/28 showed stability.    Maintain normotension.  Neurovascular checks Q shift.  Hold AC/AP at this time.    Primary team following.  PT/OT/ST.    CTH obtained on 4/11: New punctate foci of cortical hemorrhage vs small amount of subarachnoid hemorrhage within the posterior R sylvian fissure, decreased size of hyperdense component of the L paramedian cerebellar hemorrhage with mildly increased surrounding vasogenic edema, decreased hemorrhage within the 4th ventricle.  Repeat CTH on 4/12 was stable.    Appt with Neurosurgery today - discuss restarting Plavix at this appointment.  Follow-up with Neurology as outpatient.         VTE Pharmacologic Prophylaxis:   Pharmacologic:  none - walking >300ft.  Mechanical VTE Prophylaxis in Place: Yes - sequential compression devices.    Current  Length of Stay: 12 day(s)    Current Patient Status: Inpatient Rehab     Discharge Plan: As per primary team.    Code Status: Level 1 - Full Code    Subjective:   Pt examined while pt lying in bed in pt room.  Complaints of intermittent abdominal pain.  Currently denies while lying down.  States that the pain has not worsened and has remained the same.  Denies any nausea or vomiting.  Denies any headaches, lightheadedness, dizziness, SOB, or palpitations.  Plans for Neurosurgery appt later today.  Has no other concerns or complaints at this time.    Objective:     Vitals:   Temp (24hrs), Av.5 °F (36.4 °C), Min:96.7 °F (35.9 °C), Max:98.2 °F (36.8 °C)    Temp:  [96.7 °F (35.9 °C)-98.2 °F (36.8 °C)] 96.7 °F (35.9 °C)  HR:  [62-73] 64  Resp:  [18-20] 18  BP: (111-154)/(64-84) 122/81  SpO2:  [93 %-96 %] 96 %  Body mass index is 21.03 kg/m².     Review of Systems   Constitutional:  Negative for appetite change, chills, fatigue and fever.   HENT:  Negative for trouble swallowing.    Eyes:  Negative for visual disturbance.   Respiratory:  Negative for cough, shortness of breath, wheezing and stridor.    Cardiovascular:  Negative for chest pain, palpitations and leg swelling.   Gastrointestinal:  Positive for abdominal pain (intermittent sharp RUQ pain, denies any worsening pain, currently denies pain while lying in bed). Negative for abdominal distention, constipation, diarrhea, nausea and vomiting.        LBM 4/15   Genitourinary:  Negative for difficulty urinating.   Musculoskeletal:  Positive for gait problem. Negative for arthralgias and back pain.   Neurological:  Negative for dizziness, weakness, light-headedness, numbness and headaches.   Psychiatric/Behavioral:  Negative for dysphoric mood and sleep disturbance. The patient is not nervous/anxious.    All other systems reviewed and are negative.       Input and Output Summary (last 24 hours):       Intake/Output Summary (Last 24 hours) at 4/15/2024 1001  Last  data filed at 4/15/2024 0900  Gross per 24 hour   Intake 600 ml   Output --   Net 600 ml       Physical Exam:     Physical Exam  Vitals and nursing note reviewed.   Constitutional:       General: She is not in acute distress.     Appearance: Normal appearance. She is not ill-appearing.   HENT:      Head: Normocephalic and atraumatic.   Cardiovascular:      Rate and Rhythm: Normal rate and regular rhythm.      Pulses: Normal pulses.      Heart sounds: Normal heart sounds. No murmur heard.     No friction rub.   Pulmonary:      Effort: Pulmonary effort is normal. No respiratory distress.      Breath sounds: Normal breath sounds. No wheezing or rhonchi.   Abdominal:      General: Abdomen is flat. Bowel sounds are normal. There is no distension.      Palpations: Abdomen is soft. There is no mass.      Tenderness: There is abdominal tenderness in the right upper quadrant. There is no guarding or rebound.      Hernia: No hernia is present.   Musculoskeletal:      Cervical back: Normal range of motion and neck supple. No tenderness.      Right lower leg: No edema.      Left lower leg: No edema.   Skin:     General: Skin is warm and dry.   Neurological:      Mental Status: She is alert and oriented to person, place, and time.   Psychiatric:         Mood and Affect: Mood normal.         Behavior: Behavior normal.         Additional Data:     Labs:    Results from last 7 days   Lab Units 04/15/24  0613   WBC Thousand/uL 5.32   HEMOGLOBIN g/dL 13.6   HEMATOCRIT % 40.7   PLATELETS Thousands/uL 179   SEGS PCT % 37*   LYMPHO PCT % 45*   MONO PCT % 15*   EOS PCT % 3     Results from last 7 days   Lab Units 04/15/24  0613   SODIUM mmol/L 137   POTASSIUM mmol/L 4.2   CHLORIDE mmol/L 100   CO2 mmol/L 30   BUN mg/dL 19   CREATININE mg/dL 0.73   ANION GAP mmol/L 7   CALCIUM mg/dL 9.3   ALBUMIN g/dL 3.5   TOTAL BILIRUBIN mg/dL 0.35   ALK PHOS U/L 68   ALT U/L 246*   AST U/L 175*   GLUCOSE RANDOM mg/dL 102                       Labs  reviewed    Imaging:    Imaging reviewed    Recent Cultures (last 7 days):           Last 24 Hours Medication List:   Current Facility-Administered Medications   Medication Dose Route Frequency Provider Last Rate    acetaminophen  488 mg Oral Q6H PRN Matias Jennings MD      amLODIPine  10 mg Oral Daily Matias Jennings MD      bisacodyl  10 mg Rectal Daily PRN Matias Jennings MD      calcium carbonate  500 mg Oral Daily PRN Ruth Ann Earl DO      chlorhexidine  15 mL Mouth/Throat Q12H [facility] Matias Jennings MD      Cholecalciferol  2,000 Units Oral Daily MILAN Suresh      escitalopram  10 mg Oral Daily MILAN Suresh      folic acid  1 mg Oral Daily Matias Jennings MD      gabapentin  100 mg Oral BID Matias Jennings MD      gabapentin  300 mg Oral HS Matias Jennings MD      hydrALAZINE  10 mg Oral Q6H PRN Matias Jennings MD      hydroCHLOROthiazide  12.5 mg Oral Daily Matias Jennings MD      hydrOXYzine HCL  25 mg Oral TID PRN Matias Jennings MD      lisinopril  40 mg Oral Daily Matias Jennings MD      loratadine  10 mg Oral Daily Matias Jennings MD      LORazepam  0.25 mg Oral BID PRN Matias Jennings MD      magnesium Oxide  800 mg Oral TID With Meals MILAN Suresh      melatonin  3 mg Oral HS Matias Jennings MD      methocarbamol  500 mg Oral TID PRN Matias Jennings MD      multivitamin-minerals  1 tablet Oral Daily Matias Jennings MD      oxyCODONE  2.5 mg Oral Q4H PRN Matias Jennings MD      pantoprazole  40 mg Oral BID EFFIE Salinas PA-C      polyethylene glycol  17 g Oral Daily PRN Matias Jennings MD      thiamine  100 mg Oral Daily Matias Jennings MD          M*Modal software was used to dictate this note.  It may contain errors with dictating incorrect words or incorrect spelling. Please contact the provider directly with any questions.

## 2024-04-15 NOTE — CASE MANAGEMENT
Clinical review update faxed to [facility] via [facility], awaiting determination.

## 2024-04-16 ENCOUNTER — TELEPHONE (OUTPATIENT)
Dept: FAMILY MEDICINE CLINIC | Facility: HOSPITAL | Age: 59
End: 2024-04-16

## 2024-04-16 VITALS
HEIGHT: 66 IN | TEMPERATURE: 98.2 F | RESPIRATION RATE: 18 BRPM | DIASTOLIC BLOOD PRESSURE: 69 MMHG | SYSTOLIC BLOOD PRESSURE: 110 MMHG | WEIGHT: 130.31 LBS | HEART RATE: 68 BPM | BODY MASS INDEX: 20.94 KG/M2 | OXYGEN SATURATION: 95 %

## 2024-04-16 PROBLEM — Z91.89 AT RISK FOR VENOUS THROMBOEMBOLISM (VTE): Status: RESOLVED | Noted: 2024-04-03 | Resolved: 2024-04-16

## 2024-04-16 PROCEDURE — 97130 THER IVNTJ EA ADDL 15 MIN: CPT

## 2024-04-16 PROCEDURE — 97535 SELF CARE MNGMENT TRAINING: CPT

## 2024-04-16 PROCEDURE — 97112 NEUROMUSCULAR REEDUCATION: CPT

## 2024-04-16 PROCEDURE — 99239 HOSP IP/OBS DSCHRG MGMT >30: CPT

## 2024-04-16 PROCEDURE — 99232 SBSQ HOSP IP/OBS MODERATE 35: CPT | Performed by: FAMILY MEDICINE

## 2024-04-16 PROCEDURE — 97116 GAIT TRAINING THERAPY: CPT

## 2024-04-16 PROCEDURE — 97530 THERAPEUTIC ACTIVITIES: CPT

## 2024-04-16 PROCEDURE — 97129 THER IVNTJ 1ST 15 MIN: CPT

## 2024-04-16 RX ORDER — PANTOPRAZOLE SODIUM 40 MG/1
40 TABLET, DELAYED RELEASE ORAL
Qty: 30 TABLET | Refills: 0 | Status: SHIPPED | OUTPATIENT
Start: 2024-04-16

## 2024-04-16 RX ORDER — FOLIC ACID 1 MG/1
1 TABLET ORAL DAILY
Qty: 30 TABLET | Refills: 0 | Status: SHIPPED | OUTPATIENT
Start: 2024-04-17

## 2024-04-16 RX ORDER — ESCITALOPRAM OXALATE 10 MG/1
10 TABLET ORAL DAILY
Qty: 30 TABLET | Refills: 0 | Status: SHIPPED | OUTPATIENT
Start: 2024-04-17

## 2024-04-16 RX ORDER — LANOLIN ALCOHOL/MO/W.PET/CERES
3 CREAM (GRAM) TOPICAL
Start: 2024-04-16

## 2024-04-16 RX ORDER — AMLODIPINE BESYLATE 10 MG/1
10 TABLET ORAL DAILY
Qty: 30 TABLET | Refills: 0 | Status: SHIPPED | OUTPATIENT
Start: 2024-04-16

## 2024-04-16 RX ORDER — HYDROCHLOROTHIAZIDE 12.5 MG/1
12.5 TABLET ORAL DAILY
Qty: 30 TABLET | Refills: 0 | Status: SHIPPED | OUTPATIENT
Start: 2024-04-16

## 2024-04-16 RX ORDER — HYDROXYZINE HYDROCHLORIDE 25 MG/1
25 TABLET, FILM COATED ORAL 2 TIMES DAILY PRN
Qty: 30 TABLET | Refills: 0 | Status: SHIPPED | OUTPATIENT
Start: 2024-04-16

## 2024-04-16 RX ORDER — LISINOPRIL 40 MG/1
40 TABLET ORAL DAILY
Qty: 30 TABLET | Refills: 0 | Status: SHIPPED | OUTPATIENT
Start: 2024-04-17

## 2024-04-16 RX ORDER — LANOLIN ALCOHOL/MO/W.PET/CERES
100 CREAM (GRAM) TOPICAL DAILY
Qty: 30 TABLET | Refills: 0 | Status: SHIPPED | OUTPATIENT
Start: 2024-04-17

## 2024-04-16 RX ORDER — LORATADINE 10 MG/1
10 TABLET ORAL DAILY PRN
Start: 2024-04-16

## 2024-04-16 RX ORDER — LANOLIN ALCOHOL/MO/W.PET/CERES
800 CREAM (GRAM) TOPICAL
Qty: 180 TABLET | Refills: 0 | Status: SHIPPED | OUTPATIENT
Start: 2024-04-16

## 2024-04-16 RX ORDER — GABAPENTIN 300 MG/1
300 CAPSULE ORAL
Qty: 30 CAPSULE | Refills: 0 | Status: SHIPPED | OUTPATIENT
Start: 2024-04-16

## 2024-04-16 RX ADMIN — GABAPENTIN 100 MG: 100 CAPSULE ORAL at 14:06

## 2024-04-16 RX ADMIN — Medication 800 MG: at 08:14

## 2024-04-16 RX ADMIN — Medication 2000 UNITS: at 08:14

## 2024-04-16 RX ADMIN — HYDROCHLOROTHIAZIDE 12.5 MG: 12.5 TABLET ORAL at 08:14

## 2024-04-16 RX ADMIN — LORAZEPAM 0.25 MG: 0.5 TABLET ORAL at 10:13

## 2024-04-16 RX ADMIN — LISINOPRIL 40 MG: 20 TABLET ORAL at 08:14

## 2024-04-16 RX ADMIN — THIAMINE HCL TAB 100 MG 100 MG: 100 TAB at 08:14

## 2024-04-16 RX ADMIN — LORATADINE 10 MG: 10 TABLET ORAL at 08:14

## 2024-04-16 RX ADMIN — MULTIPLE VITAMINS W/ MINERALS TAB 1 TABLET: TAB ORAL at 08:14

## 2024-04-16 RX ADMIN — GABAPENTIN 100 MG: 100 CAPSULE ORAL at 08:15

## 2024-04-16 RX ADMIN — ESCITALOPRAM OXALATE 10 MG: 10 TABLET ORAL at 08:14

## 2024-04-16 RX ADMIN — PANTOPRAZOLE SODIUM 40 MG: 40 TABLET, DELAYED RELEASE ORAL at 16:48

## 2024-04-16 RX ADMIN — CHLORHEXIDINE GLUCONATE 0.12% ORAL RINSE 15 ML: 1.2 LIQUID ORAL at 08:14

## 2024-04-16 RX ADMIN — PANTOPRAZOLE SODIUM 40 MG: 40 TABLET, DELAYED RELEASE ORAL at 08:15

## 2024-04-16 RX ADMIN — AMLODIPINE BESYLATE 10 MG: 10 TABLET ORAL at 08:14

## 2024-04-16 RX ADMIN — HYDROXYZINE HYDROCHLORIDE 25 MG: 25 TABLET, FILM COATED ORAL at 08:21

## 2024-04-16 RX ADMIN — FOLIC ACID 1 MG: 1 TABLET ORAL at 08:14

## 2024-04-16 RX ADMIN — Medication 800 MG: at 16:47

## 2024-04-16 RX ADMIN — Medication 800 MG: at 14:06

## 2024-04-16 NOTE — ASSESSMENT & PLAN NOTE
HCV detected, viral load of 1,550,000.  GI consulted and following.  Follow-up with Hepatology as outpatient for treatment.  HCV Fibrosure ordered for next set of labs - obtain as outpatient if being discharged today.

## 2024-04-16 NOTE — NURSING NOTE
All medications, appointments were reviewed with the pt by this RN Pt is aware she needs a follow up CT scan before her neurosurgery appointment. All belongings are with the pt, pt is aware the nursing desk phone number is in her paperwork should she have any further questions. Pt is aware she will have home care PT/OT/RN

## 2024-04-16 NOTE — PLAN OF CARE
Problem: NEUROSENSORY - ADULT  Goal: Achieves stable or improved neurological status  Description: INTERVENTIONS  - Monitor and report changes in neurological status  - Monitor vital signs such as temperature, blood pressure, glucose, and any other labs ordered   - Initiate measures to prevent increased intracranial pressure  - Monitor for seizure activity and implement precautions if appropriate      Outcome: Adequate for Discharge  Goal: Remains free of injury related to seizures activity  Description: INTERVENTIONS  - Maintain airway, patient safety  and administer oxygen as ordered  - Monitor patient for seizure activity, document and report duration and description of seizure to physician/advanced practitioner  - If seizure occurs,  ensure patient safety during seizure  - Reorient patient post seizure  - Seizure pads on all 4 side rails  - Instruct patient/family to notify RN of any seizure activity including if an aura is experienced  - Instruct patient/family to call for assistance with activity based on nursing assessment  - Administer anti-seizure medications if ordered    Outcome: Adequate for Discharge  Goal: Achieves maximal functionality and self care  Description: INTERVENTIONS  - Monitor swallowing and airway patency with patient fatigue and changes in neurological status  - Encourage and assist patient to increase activity and self care.   - Encourage visually impaired, hearing impaired and aphasic patients to use assistive/communication devices  Outcome: Adequate for Discharge     Problem: CARDIOVASCULAR - ADULT  Goal: Maintains optimal cardiac output and hemodynamic stability  Description: INTERVENTIONS:  - Monitor I/O, vital signs and rhythm  - Monitor for S/S and trends of decreased cardiac output  - Administer and titrate ordered vasoactive medications to optimize hemodynamic stability  - Assess quality of pulses, skin color and temperature  - Assess for signs of decreased coronary artery  perfusion  - Instruct patient to report change in severity of symptoms  Outcome: Adequate for Discharge  Goal: Absence of cardiac dysrhythmias or at baseline rhythm  Description: INTERVENTIONS:  - Continuous cardiac monitoring, vital signs, obtain 12 lead EKG if ordered  - Administer antiarrhythmic and heart rate control medications as ordered  - Monitor electrolytes and administer replacement therapy as ordered  Outcome: Adequate for Discharge     Problem: RESPIRATORY - ADULT  Goal: Achieves optimal ventilation and oxygenation  Description: INTERVENTIONS:  - Assess for changes in respiratory status  - Assess for changes in mentation and behavior  - Position to facilitate oxygenation and minimize respiratory effort  - Oxygen administered by appropriate delivery if ordered  - Initiate smoking cessation education as indicated  - Encourage broncho-pulmonary hygiene including cough, deep breathe, Incentive Spirometry  - Assess the need for suctioning and aspirate as needed  - Assess and instruct to report SOB or any respiratory difficulty  - Respiratory Therapy support as indicated  Outcome: Adequate for Discharge     Problem: GASTROINTESTINAL - ADULT  Goal: Minimal or absence of nausea and/or vomiting  Description: INTERVENTIONS:  - Administer IV fluids if ordered to ensure adequate hydration  - Maintain NPO status until nausea and vomiting are resolved  - Nasogastric tube if ordered  - Administer ordered antiemetic medications as needed  - Provide nonpharmacologic comfort measures as appropriate  - Advance diet as tolerated, if ordered  - Consider nutrition services referral to assist patient with adequate nutrition and appropriate food choices  Outcome: Adequate for Discharge  Goal: Maintains or returns to baseline bowel function  Description: INTERVENTIONS:  - Assess bowel function  - Encourage oral fluids to ensure adequate hydration  - Administer IV fluids if ordered to ensure adequate hydration  - Administer  ordered medications as needed  - Encourage mobilization and activity  - Consider nutritional services referral to assist patient with adequate nutrition and appropriate food choices  Outcome: Adequate for Discharge  Goal: Maintains adequate nutritional intake  Description: INTERVENTIONS:  - Monitor percentage of each meal consumed  - Identify factors contributing to decreased intake, treat as appropriate  - Assist with meals as needed  - Monitor I&O, weight, and lab values if indicated  - Obtain nutrition services referral as needed  4/16/2024 1809 by Rebecca Llamas RN  Outcome: Adequate for Discharge  4/16/2024 0746 by Rebecca Llamas RN  Outcome: Progressing  Goal: Establish and maintain optimal ostomy function  Description: INTERVENTIONS:  - Assess bowel function  - Encourage oral fluids to ensure adequate hydration  - Administer IV fluids if ordered to ensure adequate hydration   - Administer ordered medications as needed  - Encourage mobilization and activity  - Nutrition services referral to assist patient with appropriate food choices  - Assess stoma site  - Consider wound care consult   Outcome: Adequate for Discharge  Goal: Oral mucous membranes remain intact  Description: INTERVENTIONS  - Assess oral mucosa and hygiene practices  - Implement preventative oral hygiene regimen  - Implement oral medicated treatments as ordered  - Initiate Nutrition services referral as needed  Outcome: Adequate for Discharge     Problem: GENITOURINARY - ADULT  Goal: Maintains or returns to baseline urinary function  Description: INTERVENTIONS:  - Assess urinary function  - Encourage oral fluids to ensure adequate hydration if ordered  - Administer IV fluids as ordered to ensure adequate hydration  - Administer ordered medications as needed  - Offer frequent toileting  - Follow urinary retention protocol if ordered  Outcome: Adequate for Discharge  Goal: Absence of urinary retention  Description: INTERVENTIONS:  - Assess patient’s  ability to void and empty bladder  - Monitor I/O  - Bladder scan as needed  - Discuss with physician/AP medications to alleviate retention as needed  - Discuss catheterization for long term situations as appropriate  Outcome: Adequate for Discharge     Problem: METABOLIC, FLUID AND ELECTROLYTES - ADULT  Goal: Electrolytes maintained within normal limits  Description: INTERVENTIONS:  - Monitor labs and assess patient for signs and symptoms of electrolyte imbalances  - Administer electrolyte replacement as ordered  - Monitor response to electrolyte replacements, including repeat lab results as appropriate  - Instruct patient on fluid and nutrition as appropriate  Outcome: Adequate for Discharge  Goal: Fluid balance maintained  Description: INTERVENTIONS:  - Monitor labs   - Monitor I/O and WT  - Instruct patient on fluid and nutrition as appropriate  - Assess for signs & symptoms of volume excess or deficit  Outcome: Adequate for Discharge  Goal: Glucose maintained within target range  Description: INTERVENTIONS:  - Monitor Blood Glucose as ordered  - Assess for signs and symptoms of hyperglycemia and hypoglycemia  - Administer ordered medications to maintain glucose within target range  - Assess nutritional intake and initiate nutrition service referral as needed  Outcome: Adequate for Discharge     Problem: SKIN/TISSUE INTEGRITY - ADULT  Goal: Skin Integrity remains intact(Skin Breakdown Prevention)  Description: Assess:  -Perform Werner assessment every   -Clean and moisturize skin every   -Inspect skin when repositioning, toileting, and assisting with ADLS  -Assess under medical devices such as  every   -Assess extremities for adequate circulation and sensation     Bed Management:  -Have minimal linens on bed & keep smooth, unwrinkled  -Change linens as needed when moist or perspiring  -Avoid sitting or lying in one position for more than  hours while in bed  -Keep HOB at degrees     Toileting:  -Offer bedside  commode  -Assess for incontinence every   -Use incontinent care products after each incontinent episode such as     Activity:  -Mobilize patient  times a day  -Encourage activity and walks on unit  -Encourage or provide ROM exercises   -Turn and reposition patient every  Hours  -Use appropriate equipment to lift or move patient in bed  -Instruct/ Assist with weight shifting every  when out of bed in chair  -Consider limitation of chair time  hour intervals    Skin Care:  -Avoid use of baby powder, tape, friction and shearing, hot water or constrictive clothing  -Relieve pressure over bony prominences using   -Do not massage red bony areas    Next Steps:  -Teach patient strategies to minimize risks such as    -Consider consults to  interdisciplinary teams such as   4/16/2024 1809 by Rebecca Llamas RN  Outcome: Adequate for Discharge  4/16/2024 0746 by Rebecca Llamas RN  Outcome: Progressing  Goal: Incision(s), wounds(s) or drain site(s) healing without S/S of infection  Description: INTERVENTIONS  - Assess and document dressing, incision, wound bed, drain sites and surrounding tissue  - Provide patient and family education  - Perform skin care/dressing changes every  Outcome: Adequate for Discharge     Problem: HEMATOLOGIC - ADULT  Goal: Maintains hematologic stability  Description: INTERVENTIONS  - Assess for signs and symptoms of bleeding or hemorrhage  - Monitor labs  - Administer supportive blood products/factors as ordered and appropriate  Outcome: Adequate for Discharge     Problem: MUSCULOSKELETAL - ADULT  Goal: Maintain or return mobility to safest level of function  Description: INTERVENTIONS:  - Assess patient's ability to carry out ADLs; assess patient's baseline for ADL function and identify physical deficits which impact ability to perform ADLs (bathing, care of mouth/teeth, toileting, grooming, dressing, etc.)  - Assess/evaluate cause of self-care deficits   - Assess range of motion  - Assess patient's  mobility  - Assess patient's need for assistive devices and provide as appropriate  - Encourage maximum independence but intervene and supervise when necessary  - Involve family in performance of ADLs  - Assess for home care needs following discharge   - Consider OT consult to assist with ADL evaluation and planning for discharge  - Provide patient education as appropriate  Outcome: Adequate for Discharge  Goal: Maintain proper alignment of affected body part  Description: INTERVENTIONS:  - Support, maintain and protect limb and body alignment  - Provide patient/ family with appropriate education  Outcome: Adequate for Discharge     Problem: PAIN - ADULT  Goal: Verbalizes/displays adequate comfort level or baseline comfort level  Description: Interventions:  - Encourage patient to monitor pain and request assistance  - Assess pain using appropriate pain scale  - Administer analgesics based on type and severity of pain and evaluate response  - Implement non-pharmacological measures as appropriate and evaluate response  - Consider cultural and social influences on pain and pain management  - Notify physician/advanced practitioner if interventions unsuccessful or patient reports new pain  Outcome: Adequate for Discharge

## 2024-04-16 NOTE — ASSESSMENT & PLAN NOTE
Prolonged QTc of 534 on EKG from admission to acute setting.  EKG on admission to [facility] showed QTc of 472.  Currently on Lexapro.  Avoid QTc prolonging medications as able.  Repeat EKG as needed.    Lexapro increased on 4/12.  Repeat EKG on 4/15 showed QTc of 449.

## 2024-04-16 NOTE — NURSING NOTE
Pt refused the end of OT therapy today, and refused speech therapy. Pt was anxious, ativan po was administered, call bell is in reach. Will continue to monitor.

## 2024-04-16 NOTE — PROGRESS NOTES
"   04/16/24 0830   Pain Assessment   Pain Score No Pain   Restrictions/Precautions   Precautions Bed/chair alarms;Cognitive;Fall Risk;Supervision on toilet/commode   Weight Bearing Restrictions No   ROM Restrictions No   Lifestyle   Autonomy \"I am not like everybody else. I'm not just a number\" Referring when therapist asked to complete UE assessments   Oral Hygiene   Type of Assistance Needed Independent   Comment completed in stance at sink   Oral Hygiene CARE Score 6   Shower/Bathe Self   Type of Assistance Needed Supervision   Comment pt requesting shower this session. pt able to wash 10/10 parts while seate don shower chair. pt reports she has shower chair at home. Pt req SUP for shower   Shower/Bathe Self CARE Score 4   Tub/Shower Transfer   Findings SUP for shower transfer   Upper Body Dressing   Type of Assistance Needed Independent   Comment indep bra and OH shirt   Upper Body Dressing CARE Score 6   Lower Body Dressing   Type of Assistance Needed Independent   Comment seated able to thread undergarment/pants. INDP in stance for CM   Lower Body Dressing CARE Score 6   Putting On/Taking Off Footwear   Type of Assistance Needed Independent   Comment xleg method ot don socks   Putting On/Taking Off Footwear CARE Score 6   Sit to Stand   Type of Assistance Needed Supervision;Adaptive equipment   Comment SUP with RW   Sit to Stand CARE Score 4   Bed-Chair Transfer   Type of Assistance Needed Supervision;Adaptive equipment   Comment SUP with RW   Chair/Bed-to-Chair Transfer CARE Score 4   Toilet Transfer   Type of Assistance Needed Supervision;Adaptive equipment   Comment SUP with RW   Toilet Transfer CARE Score 4   Money Management   Money Management Reports son completes   Light Housekeeping   Light Housekeeping Attempted to engage in laundry task to simulate home setup. Pt refused   Health Management   Health Management recommend assistance at DC   Cognition   Overall Cognitive Status Impaired " "  Arousal/Participation Alert;Cooperative   Attention Within functional limits   Orientation Level Oriented X4   Memory Decreased recall of precautions   Following Commands Follows one step commands without difficulty   Comments Engaged pt in MOCA 8.1 this session. Pt scoring 25/30 indicating mild cog deficis. While stating date pt stating it is the 15th. Therapist reoriented pt to the 16th and the pt stating she said that when she did state the 15th. Pt becoming upset witht herapist.   Additional Activities   Additional Activities (S)  Reassessment performed  (attempted to engage in 9HPT,  strength, box and blocks. Pt refused \"I am not like everybody else. I'm not just a number\")   Activity Tolerance   Activity Tolerance   (Limited by agitation and disengaging)   Assessment   Treatment Assessment Engaged pt in 70mins ofs killed OT services with focus on ADL retrianing and MOCA administration. Upon arrival pt pleasant and thankful for shower. Pt overall fx at SUP/Karime for ADLs/transfers. Reports RW and tray have been delivered to home. Pt agreeable to MOCA but becoming upset towards the end when therapist clarified that today was the 16th not the 15th as pt has stated. But then pt reporting she said the 16th when she did not. Pt scoring 25/30 indicating mild cog deficits. Agreeable to come down to therapy gym. Attempted to engage in laundry task as pt reports she kicks laundry basket. Attempted to simulate but pt refused. Pt reporting she won't use RW at home for luandry which therapist educ for safety maybe she can have family carry laundry or use RW, pt cont to refuse to trial. Then therapist attempted to engage in 9HPT,  strength and box and blocks assessments to assess carryover. Pt beocming upset reporting \"I have neuropathy this is not getting better. I am not just a number\" and refused. Pt refued further interventions and requesting to return to room. Unsuccessful redirecting pt and ttempting to " engage. Missed 20mins with OT. Will attempt to call son to setup FT as pt reporting he will not be attending FT sheduled for today. Cont OT POC with focus on activity tolerance, balance, UE TE, IADLs to inc fx performance, safety and dec CG burden.   Prognosis Good   Problem List Impaired balance;Decreased endurance;Decreased safety awareness   Barriers to Discharge Decreased caregiver support   Plan   Treatment/Interventions ADL retraining;Functional transfer training;Therapeutic exercise;Endurance training;Patient/family training;Bed mobility;Compensatory technique education   Progress Slow progress, multiple refusals   Discharge Recommendation   Rehab Resource Intensity Level, OT   (home support)   Equipment Recommended Shower/Tub chair with back ($)  (RW folding walker tray)   Additional Comments  pt reports she has shower chair. Reports RW and tray have been delivered to home   OT Therapy Minutes   OT Time In 0830   OT Time Out 0940   OT Total Time (minutes) 70   OT Mode of treatment - Individual (minutes) 70   OT Mode of treatment - Concurrent (minutes) 0   OT Mode of treatment - Group (minutes) 0   OT Mode of treatment - Co-treat (minutes) 0   OT Mode of Treatment - Total time(minutes) 70 minutes   OT Cumulative Minutes 880   Therapy Time missed   Time missed? Yes   Amount of time missed 20   Reason for time missed   (Therapist attempted to complete luandry task and UE assessments. Pt becoming upset and refusing to complete. Pt shut down and refusing any further interventions)     Pt completed [place] Cognitive Assessment (MOCA) version 8.1. Pt scored overall 25 / 30  indicating a mild cognitive deficit.   Visuospatial/executive: 3/5   Naming: 3/3   Memory:    (worth no points)   Attention:  6/ 6   Language: 3 / 3   Abstraction: 2 / 2   Delayed recall: 3 / 5   Orientation: 5 / 6       Total score 25/30, indicating a mild cognitive deficit.

## 2024-04-16 NOTE — PLAN OF CARE

## 2024-04-16 NOTE — ASSESSMENT & PLAN NOTE
"Worsening,  (119),  (175), and alk phos stable.   CT abd/pelvis showed liver cirrhosis with mild mesenteric edema, subtle liver surface nodularity with mild hypertrophy of the lateral L paddock lobe, and widening of the fissure for the falciform ligament.  Cholelithiasis including a 3mm gallstone within the gallbladder neck vs cystic duct.  Complaints of abdominal pain over the weekend  RUQ US 4/8: \"Cholelithiasis with borderline wall thickening. Correlate clinically to exclude early acute cholecystitis.  Slightly nodular liver contour as seen on recent CT, which may be associated with cirrhosis\"  HIDA scan on 4/10 showed EF of 0% with gallbladder dysfunction.  Likely chronic cholecystitis.  Recommending elective surgical intervention once recovered from recent cerebellar hemorrhage.  Lipitor on hold.  Lexapro decreased to 5mg daily.  Discussed Lexapro dosing with GI on 4/12 - ok to increase back to 10mg daily.  Avoid hepatotoxic agents including alcohol.  Hepatitis C antibody + on 4/5.  HCV detected, viral load of 1,550,000.      Will require GI/General Surgery follow-up as outpatient.    Discussed worsening transaminitis with GI and General Surgery on 4/15- no further changes at this time.  "

## 2024-04-16 NOTE — PROGRESS NOTES
"   04/16/24 1000   Therapy Time missed   Time missed? Yes   Amount of time missed 30   Reason for time missed   (Refused)-Attempted to see pt for skilled SLP session- pt resting in bed, stating \"not today\" and continued to shake head and hands when attempted to redirect with conversation regarding prior OT session. Pt stated 'My son was in a t-bone accident and I am not doing anything, even if it is for a quick 30 minutes.' Educated on reattempts later today which pt cont'd to state \"not today, don't even try I won't let you\". Pt will cont to benefit from skilled SLP services targeting cognitive linguistic communication skills for increased independence and decreased burden of care.     Time(s) multiple attempts made Attempted later but pt refused       "

## 2024-04-16 NOTE — PROGRESS NOTES
"[facility]  Progress Note  Name: Tanika Lira I  MRN: [id number]  Unit/Bed#: [facility] 266-01 I Date of Admission: 4/3/2024   Date of Service: 4/16/2024 I Hospital Day: 13    Assessment/Plan   Hepatitis C  Assessment & Plan  HCV detected, viral load of 1,550,000.  GI consulted and following.  Follow-up with Hepatology as outpatient for treatment.  HCV Fibrosure ordered for next set of labs - obtain as outpatient if being discharged today.    Chronic cholecystitis  Assessment & Plan  HIDA scan on 4/10 showed EF 0%, gallbladder dysfunction.  Likely representing chronic cholecystitis.  Poor surgical candidate with recent cerebellar hemorrhage.  Will likely need elective surgery as outpatient.  Declined low fat diet.  Continue current pain regimen.  Will require follow-up with GI and General Surgery as outpatient.    Simple adnexal cyst greater than 1 cm in diameter in postmenopausal patient  Assessment & Plan  Incidental finding on CT A/P: simple appearing right adnexal cyst measuring 3.7 cm.  Follow-up pelvic US in 6-12 months.  Follow-up with PCP or OB/GYN as outpatient.    Prolonged QT interval  Assessment & Plan  Prolonged QTc of 534 on EKG from admission to acute setting.  EKG on admission to [facility] showed QTc of 472.  Currently on Lexapro.  Avoid QTc prolonging medications as able.  Repeat EKG as needed.    Lexapro increased on 4/12.  Repeat EKG on 4/15 showed QTc of 449.    Transaminitis  Assessment & Plan  Worsening,  (119),  (175), and alk phos stable.   CT abd/pelvis showed liver cirrhosis with mild mesenteric edema, subtle liver surface nodularity with mild hypertrophy of the lateral L paddock lobe, and widening of the fissure for the falciform ligament.  Cholelithiasis including a 3mm gallstone within the gallbladder neck vs cystic duct.  Complaints of abdominal pain over the weekend  RUQ US 4/8: \"Cholelithiasis with borderline wall thickening. Correlate clinically to " "exclude early acute cholecystitis.  Slightly nodular liver contour as seen on recent CT, which may be associated with cirrhosis\"  HIDA scan on 4/10 showed EF of 0% with gallbladder dysfunction.  Likely chronic cholecystitis.  Recommending elective surgical intervention once recovered from recent cerebellar hemorrhage.  Lipitor on hold.  Lexapro decreased to 5mg daily.  Discussed Lexapro dosing with GI on 4/12 - ok to increase back to 10mg daily.  Avoid hepatotoxic agents including alcohol.  Hepatitis C antibody + on 4/5.  HCV detected, viral load of 1,550,000.      Will require GI/General Surgery follow-up as outpatient.    Discussed worsening transaminitis with GI and General Surgery on 4/15- no further changes at this time.    Hypomagnesemia  Assessment & Plan  Improved, Mg currently 1.9.  Continue magnesium oxide 800mg TID.  Continue to trend routine Mg.    Amphetamine abuse (HCC)  Assessment & Plan  Had been using prior to admission.  Encourage cessation.  Would benefit from HOST referral.    Insomnia  Assessment & Plan  Continue melatonin 3mg at HS.    Alcohol use  Assessment & Plan  Hx of alcohol abuse.  Continue thiamine and folic acid   Encourage alcohol cessation.    Neuropathy  Assessment & Plan  Continue gabapentin 100mg in AM, 100mg at lunch, and 300mg at HS.    ROSANA (generalized anxiety disorder)  Assessment & Plan  Previous Lexapro 10mg daily and Atarax 25mg Q8 PRN.  Supportive counseling as needed.  Consider Neuropsych consult.    Decreased Lexapro to 5mg daily on 4/8 due to transaminitis.  Discussed with GI on 4/12 - ok to increase Lexapro back to 10mg daily.    Essential hypertension  Assessment & Plan  Had not been taking medications at home.  Current regimen: amlodipine 10mg daily, lisinopril 40mg daily, and HCTZ 12.5mg daily.  Maintain normotension.  Monitor BP with routine VS.    Tobacco abuse  Assessment & Plan  Smokes less than a half a pack/day.  Encourage smoking cessation.  Declined nicotine " patch.    History of CVA (cerebrovascular accident)  Assessment & Plan  Hx of CVA in 9/2022   MRI from 9/2022 showed acute infarcts in the R frontal centrum semiovale and R posterior putamen, small subacute infarct in the L paramedian andreea, and chronic lacunar infarcts in the R corona radiata, bilateral basal ganglia, and bilateral thalami.  Had been treated with DAPT originally but has been non-compliant with medications.  Plavix on hold until appt with Neurosurgery on 4/15.  Lipitor on hold due to elevated LFTs.    * Cerebellar hemorrhage (HCC)  Assessment & Plan  Presented to acute setting with acute nausea/vomiting/dizziness.  CTH - Acute parenchymal hematoma left paramedian cerebellum measures 2.6 x 1.6 cm with mass effect on the fourth ventricle. Intraventricular extension of hemorrhage noted with blood in the fourth ventricle and left foramen of Luschka.  CTA head/neck - No left cerebellar vascular malformation to account for the patient's acute parenchymal hematoma. No cervical or intracranial large vessel occlusion, dissection or aneurysm. Mild bilateral cervical carotid bifurcation atherosclerotic disease.  Most recent CTH on 3/28 showed stability.    Maintain normotension.  Neurovascular checks Q shift.  Hold AC/AP at this time.    Primary team following.  PT/OT/ST.    CTH obtained on 4/11: New punctate foci of cortical hemorrhage vs small amount of subarachnoid hemorrhage within the posterior R sylvian fissure, decreased size of hyperdense component of the L paramedian cerebellar hemorrhage with mildly increased surrounding vasogenic edema, decreased hemorrhage within the 4th ventricle.  Repeat CTH on 4/12 was stable.    Had appt with Neurosurgery on 4/15 - follow-up in additional 2 weeks (4/29) with repeat CTH.  Hold on AC/AP.  Follow-up with Neurology as outpatient.         VTE Pharmacologic Prophylaxis:   Pharmacologic:  none - walking >300 ft  Mechanical VTE Prophylaxis in Place: Yes - sequential  compression devices.    Current Length of Stay: 13 day(s)    Current Patient Status: Inpatient Rehab     Discharge Plan: As per primary team.    Code Status: Level 1 - Full Code    Subjective:   Pt examined while pt sitting in bed in pt room.  Currently denies any headaches, lightheadedness, or dizziness.  Feels that she is ambulating well and wants to be discharged as soon as possible.  Has occasional RUQ pain right below the ribs.  Currently denies pain but is slightly tender when palpating.  Understands her risks if she were to be discharged early and understands the importance of follow-ups.  Currently has no other concerns or complaints at this time.    Objective:     Vitals:   Temp (24hrs), Av.9 °F (36.1 °C), Min:96.1 °F (35.6 °C), Max:97.5 °F (36.4 °C)    Temp:  [96.1 °F (35.6 °C)-97.5 °F (36.4 °C)] 97.2 °F (36.2 °C)  HR:  [61-73] 71  Resp:  [16-18] 18  BP: (117-134)/(70-83) 134/83  SpO2:  [94 %-98 %] 98 %  Body mass index is 21.03 kg/m².     Review of Systems   Constitutional:  Negative for appetite change, chills, fatigue and fever.   HENT:  Negative for trouble swallowing.    Eyes:  Negative for visual disturbance.   Respiratory:  Negative for cough, chest tightness, shortness of breath, wheezing and stridor.    Cardiovascular:  Negative for chest pain, palpitations and leg swelling.   Gastrointestinal:  Positive for abdominal pain (intermittent RUQ pain, slightly tender to palpation). Negative for abdominal distention, constipation, diarrhea, nausea and vomiting.        LBM 4/16   Genitourinary:  Negative for difficulty urinating.   Musculoskeletal:  Positive for gait problem. Negative for arthralgias and back pain.   Neurological:  Negative for dizziness, weakness, light-headedness, numbness and headaches.   Psychiatric/Behavioral:  Negative for dysphoric mood and sleep disturbance. The patient is not nervous/anxious.    All other systems reviewed and are negative.       Input and Output Summary  (last 24 hours):       Intake/Output Summary (Last 24 hours) at 4/16/2024 1245  Last data filed at 4/16/2024 0900  Gross per 24 hour   Intake 1220 ml   Output --   Net 1220 ml       Physical Exam:     Physical Exam  Vitals and nursing note reviewed.   Constitutional:       General: She is not in acute distress.     Appearance: Normal appearance. She is not ill-appearing.   HENT:      Head: Normocephalic and atraumatic.   Cardiovascular:      Rate and Rhythm: Normal rate and regular rhythm.      Pulses: Normal pulses.      Heart sounds: Normal heart sounds. No murmur heard.     No friction rub.   Pulmonary:      Effort: Pulmonary effort is normal. No respiratory distress.      Breath sounds: Normal breath sounds. No wheezing or rhonchi.   Abdominal:      General: Abdomen is flat. Bowel sounds are normal. There is no distension.      Palpations: Abdomen is soft. There is no mass.      Tenderness: There is abdominal tenderness in the right upper quadrant. There is no guarding or rebound.      Hernia: No hernia is present.   Musculoskeletal:      Cervical back: Normal range of motion and neck supple. No tenderness.      Right lower leg: No edema.      Left lower leg: No edema.   Skin:     General: Skin is warm and dry.      Capillary Refill: Capillary refill takes less than 2 seconds.   Neurological:      Mental Status: She is alert and oriented to person, place, and time.   Psychiatric:         Mood and Affect: Mood normal.         Behavior: Behavior normal.         Additional Data:     Labs:    Results from last 7 days   Lab Units 04/15/24  0613   WBC Thousand/uL 5.32   HEMOGLOBIN g/dL 13.6   HEMATOCRIT % 40.7   PLATELETS Thousands/uL 179   SEGS PCT % 37*   LYMPHO PCT % 45*   MONO PCT % 15*   EOS PCT % 3     Results from last 7 days   Lab Units 04/15/24  0613   SODIUM mmol/L 137   POTASSIUM mmol/L 4.2   CHLORIDE mmol/L 100   CO2 mmol/L 30   BUN mg/dL 19   CREATININE mg/dL 0.73   ANION GAP mmol/L 7   CALCIUM mg/dL 9.3    ALBUMIN g/dL 3.5   TOTAL BILIRUBIN mg/dL 0.35   ALK PHOS U/L 68   ALT U/L 246*   AST U/L 175*   GLUCOSE RANDOM mg/dL 102                       Labs reviewed    Imaging:    Imaging reviewed    Recent Cultures (last 7 days):           Last 24 Hours Medication List:   Current Facility-Administered Medications   Medication Dose Route Frequency Provider Last Rate    acetaminophen  488 mg Oral Q6H PRN Matias Jennings MD      amLODIPine  10 mg Oral Daily Matias Jennings MD      bisacodyl  10 mg Rectal Daily PRN Matias Jennings MD      calcium carbonate  500 mg Oral Daily PRN Ruth Ann Earl DO      chlorhexidine  15 mL Mouth/Throat Q12H NAOMI Matias Jennings MD      Cholecalciferol  2,000 Units Oral Daily MILAN Suresh      escitalopram  10 mg Oral Daily MILAN Suresh      folic acid  1 mg Oral Daily Matias Jennings MD      gabapentin  100 mg Oral BID Matias Jennings MD      gabapentin  300 mg Oral HS Matias Jennings MD      hydrALAZINE  10 mg Oral Q6H PRN Matias Jennings MD      hydroCHLOROthiazide  12.5 mg Oral Daily Matias Jennings MD      hydrOXYzine HCL  25 mg Oral TID PRN Matias Jennings MD      lisinopril  40 mg Oral Daily Matias Jennings MD      loratadine  10 mg Oral Daily Matias Jennings MD      LORazepam  0.25 mg Oral BID PRN Matias Jennings MD      magnesium Oxide  800 mg Oral TID With Meals MILAN Suresh      melatonin  3 mg Oral HS Matias Jennings MD      methocarbamol  500 mg Oral TID PRN Matias Jennings MD      multivitamin-minerals  1 tablet Oral Daily Matias Jennings MD      oxyCODONE  2.5 mg Oral Q4H PRN Matias Jennings MD      pantoprazole  40 mg Oral BID AC Melina Salinas PA-C      polyethylene glycol  17 g Oral Daily PRN Matias Jennings MD      thiamine  100 mg Oral Daily Matias Jennings MD          M*Modal software was used to dictate this note.  It may contain errors with dictating  incorrect words or incorrect spelling. Please contact the provider directly with any questions.

## 2024-04-16 NOTE — OCCUPATIONAL THERAPY NOTE
Therapist attempted to contact son Abner ([phone]) to discuss pts CLOF and discuss DC with SUP 2* to balance deficits to ensure safety and prevent falls. Therapist left VM to son to call back to setup virtual FT.      -Kat Byrd MS, OTR/L, CSRS

## 2024-04-16 NOTE — DISCHARGE SUMMARY
Discharge Summary - PMR   Tanika Lira 58 y.o. female MRN: [id number]  Unit/Bed#: -01 Encounter: [id number]    Admission Date: 4/3/2024     Discharge Date: 4/16/2024    Rehabilitation/Etiologic Diagnosis:   Stroke:  01.4  No paresis  L cerebellar hemorrhage      Discharge Diagnoses:      Patient Active Problem List   Diagnosis    History of CVA (cerebrovascular accident)    Tobacco abuse    Essential hypertension    ROSANA (generalized anxiety disorder)    Neuropathy    Polyneuropathy    Cerebellar hemorrhage (HCC)    Alcohol use    Hyponatremia    Insomnia    Amphetamine abuse (HCC)    Hypomagnesemia    Transaminitis    History of noncompliance with medical treatment    Marijuana use    Prolonged QT interval    Simple adnexal cyst greater than 1 cm in diameter in postmenopausal patient    Abdominal discomfort    Chronic cholecystitis    Hepatitis C    Potential for cognitive impairment       Acute Rehabilitation Center Course:     Patient participated in a comprehensive interdisciplinary inpatient rehabilitation program which included involvment of MD, therapies (PT, OT, and SLP), RN, CM/SW, dietary. She made significant functional gains and was able to be advanced to a largely supervision level of assist.  She was recommended additional time in rehab to further improve function and safety as she remains significant fall risk; however her and family wish to discharge now after reporting adequate understanding of risks.      Medical issues with comanagement from our internal medicine, neurology, neurosurgery team, gen Sx, and GI as outlined below.      Please see below for patient's hospital course and day to day management of medical needs with significant findings, complications (if applicable), treatment, and services provided in problem list format.      Decline in ADLs and mobility: Functional assessment - improving                                                    FIM  Care Score   Admit Score Recent  Score    Total assist  1-100% or 2p    Tot       Max assist 2-51-75%    Sub       Mod assist 3- 26-50%  Par Bathing     Min assist 3- 25% or < Par LBD     CG assist 4  TA      Sup/Setup 4-5 Sup To hygiene, UBD Bathing, To hygiene   Mod-I/Indep 6 MI   Dressing     Transfers   Mod-total assist  Supervision      Ambulation    Significant assist  10+ feet supervision; 150 ft CG assist      Stairs    Min assist  12 steps S assist    DC SLP FIMS - Min assist PS, memory, comp; Supervision - expression and social interaction   Dispo: Home with son and HH PT, OT, ST, MSW, RN with recommendation for 24-7 supervision      * Cerebellar hemorrhage (HCC)  Assessment & Plan  - CTH 3/26 - Acute parenchymal hematoma left paramedian cerebellum measures 2.6 x 1.6 cm with mass effect on the fourth ventricle. Intraventricular extension of hemorrhage noted with blood in the fourth ventricle and left foramen of Luschka.    Residual impairments on admission to [facility]: Incoordination, imbalance, ataxia, possible dysphagia > improving     Secondary stroke prevention  - Antithrombotic: Full AC and antiplatelet on hold - repeat CTH prior to follow-up Nsx appt on 4/30 - they may possibly clear her for antiplatelet at that time  - Neurology would like patient back on plavix when cleared by neurosurgery   - Optimal management of blood pressure - good control in [facility]  -  on importance of abstinence from smoking alcohol and amphetamine  - Follow-up with neurology and PCP after d/c   - Participated in acute comprehensive interdisciplinary inpatient rehabilitation to include intensive skilled therapies (PT, OT, ST) as outlined with oversight and management by rehabilitation physician as well as inpatient rehab level nursing, case management and weekly interdisciplinary team meetings.         She has made gains in function, balance, and coordination but continues with impairments that significantly increase risk of fall and injury some of which  "could be severe or even life-threatening  - She has been advanced to  largely supervision assist for ADLs and short distance mobility and contact guard for longer distances  - Patient's sons work during day and she could be home by herself at time which is not recommended  - Patient recommended additional rehab/functional recovery time to decrease risk of fall/injury but declines and wishes to be discharged now; pt and son, Abner, reviewed at length recommendations and dangers of leaving and they appear to have adequate understanding of risks at this time    - Recommend PCP follow-up within 1 week - CM made appt   - Recommend  PT, OT, ST, RN, MSW   - Recommend 24-7 supervision  - Recommend family help patient with iADLs, med mgmt, and transport; pt does not chronically drive and should continue not driving     Follow-up with Nsx 4/15  - Continue to hold A/P and full A/C  - Repeat CTH in 2 weeks 4/29 followed by OP follow-up appt with NSx    CTH 4/12 - stable from 4/11  - Stable punctate hyperdense focus within the posterior right sylvian fissure, cortical versus subarachnoid hemorrhage. Stable evolving hematoma within the left paramedian cerebellar hemisphere. There is no new intra or extra-axial hemorrhage.  - Chronic right centrum semiovale ovale, left corona radiata, bilateral basal ganglia, bilateral thalamic, and pontine lacunar infarcts. Chronic microangiopathic ischemic changes.     CTH 4/11  -New punctate foci of cortical hemorrhage versus small amount of subarachnoid hemorrhage within the posterior right sylvian fissure. Recommend short interval follow-up exam.  -Decreased size of hyperdense component of the left paramedian cerebellar hemorrhage with mildly increased surrounding vasogenic edema as detailed above. Decreased hemorrhage within the fourth ventricle.  4/11 neuro exam stable  - Neuro c/s - \"-New punctate foci of cortical hemorrhage versus small amount of subarachnoid hemorrhage within the " "posterior right sylvian fissure. Recommend short interval follow-up exam.    Reviewed the imaging. There is a punctate hyperdensity in one cut only 2/29, without a clear significance. Actually a comparison to CT head on 3/27 may show even the same foci in series 304, image 43/76.   Team can repeat CT head tomorrow to ensure stability, and if so, follow plan as per discharge summary on 4/3\"  - NeuroSx c/s   Given new punctate foci of cortical hemorrhage, recommend repeat CTH tomorrow to ensure stability.   Continue to hold full dose AC/AP therapy.   Pt has upcoming follow up on Monday 4/15/24 for virtual visit with neurosurgery Please contact nsx with any questions or concerns in the interim. (Per IM - patient to hold A/P until virtual decides Monday)     - CTA HN - No left cerebellar vascular malformation to account for the patient's acute parenchymal hematoma. No cervical or intracranial large vessel occlusion, dissection or aneurysm. Mild bilateral cervical carotid bifurcation atherosclerotic disease.  - CTH 3/26 - Acute parenchymal hematoma left paramedian cerebellum measures 2.6 x 1.6 cm with mass effect on the fourth ventricle. Intraventricular extension of hemorrhage noted with blood in the fourth ventricle and left foramen of Luschka.  - Follow-up CTH 3/27 -  Redemonstration of hyperdense hemorrhage in the left paramedian cerebellum, as described with mass effect and extension into the fourth ventricle and left foramen of Luschka, similar in appearance to the prior.    No hydrocephalus.  - Follow-up CTH 3/28 - No significant interval changes      History of CVA (cerebrovascular accident)  Assessment & Plan  Hx of CVA's - 2022 - MRI 9/2022 - Acute infarcts in right frontal centrum semiovale and right posterior putamen. Small subacute infarct in left paramedian andreea.  Chronic lacunar infarcts in right corona radiata, bilateral basal ganglia, and bilateral thalami.  - Was to be on DAPT and statin but was not " compliant    Secondary stroke prevention  - Antithrombotic: Full A/C and antiplatelet on hold for now (neurology would like patient on plavix when cleared by neurosx based on most recent notes) - Repeat CTH around 4/29 and f/u with OP Nsx 4/30 - they may clear her then (no need for aspirin per last neurology not)   - Statin on HOLD per IM with recent transaminitis and liver issues > resume when cleared by GI   - Optimal management of blood pressure   -  on importance of abstinence from smoking, alcohol, amphetamine  - Patient at increased risk for stroke particularly early in post-stroke period - monitor neuro exam closely with low threshold to repeat imaging  -  patient and if applicable caregiver on optimal stroke management  - Follow-up with neurology and PCP after d/c     Transaminitis  Assessment & Plan  Hep C+ > will need treatment set-up by OP GI  Chronic cholecystitis based on imaging and presentation > may need sx but Gen Sx states not candidate with recent ICH     Repeat LFTs within 1 week - obtain thru PCP   Close OP follow-up with GI, Gen Sx, and PCP   Pt/family counseled to return to ED if worsening abdominal pain, nausea, vomiting, fever, or other concerns in interim     4/15 - AST/ALT trending up further 119/175 > 175/246 (AP/TBili wnl; Amm wnl); pt afebrile; no leukocytosis   - IM followed up with with both Gen Sx and GI about worsening LFTs but they did not recommend any changes to management at this time; still not sx candidate per Gen Sx; no plan to start anti-viral in IP setting per GI     Hep C+ titer 1.5 million copies/ml 4/8   Hep testing NR   4/10 HIDA scan  - Abnormal contractile response of the gallbladder to cholecystokinin infusion, ( 0%), which in the appropriate clinical context suggest gallbladder dysfunction.  -   4/10 Dilcia, iron panel, AFP wnl other serologies per GI    AST 57>88>114>130>119>119>175   ALT 65>107>144>168>162>172>246   AP/Tbili wnl     GI follow-up  "4/11  Chronically elevated liver enzymes, appears to have plateaued, hepatocellular pattern with history of alcohol use and Hepatitis C. AM LFTs pending. Imaging suggestive of cirrhosis. Synthetic liver function appear intact. No history of variceal bleeding, ascites, hepatic encephalopathy. Liver serologies ordered to rule out hereditary, autoimmune etiologies, so far negative. HCV detected with viral load ([id number]/mL). AFP normal, no hepatoma noted on imaging. Will need outpatient follow up for HCV treatment. Avoid hepatotoxins including alcohol.  RUQ abdominal pain with nausea and cholelithiasis with possible early cholecystitis on imaging. Patient afebrile without leukocytosis. HIDA EF 0% c/w gallbladder dysfunction/chronic cholecystitis. Poor surgical candidate with recent hemorrhage, elective surgery will likely need to be delayed. Surgery following.        GI consult 4/11  Chronically elevated liver enzymes, appears to have plateaued, hepatocellular pattern with history of alcohol use and Hepatitis C. AM LFTs pending. Imaging suggestive of cirrhosis. Synthetic liver function appear intact. No history of variceal bleeding, ascites, hepatic encephalopathy. Liver serologies ordered to rule out hereditary, autoimmune etiologies, so far negative. HCV detected with viral load ([id number]/mL). AFP normal, no hepatoma noted on imaging. Will need outpatient follow up for HCV treatment. Avoid hepatotoxins including alcohol.  RUQ abdominal pain with nausea and cholelithiasis with possible early cholecystitis on imaging. Patient afebrile without leukocytosis. HIDA EF 0% c/w gallbladder dysfunction/chronic cholecystitis. Poor surgical candidate with recent hemorrhage, elective surgery will likely need to be delayed. Surgery following.      Gen Sx consult   \"Ultrasound shows cholelithiasis without cholecystitis.  Abdominal pain not related to p.o. intake which makes biliary colic less likely.  With recent cerebellar " "hemorrhage general anesthesia would be very risky.  Recommend low-fat diet  She may benefit from muscle relaxants as abdominal wall tenderness was not localized to the right upper quadrant.  Would minimize narcotics.\"       RUQ US 4/8  1. Cholelithiasis with borderline wall thickening. Correlate clinically to exclude early acute cholecystitis.  2. Slightly nodular liver contour as seen on recent CT, which may be associated with cirrhosis. Correlate clinically.      Hx of alcohol use - alcohol cessation counseling  IM consulted and co-management with their team during [facility] course  Monitor LFTs intermittently during course and after discharge  Med adjustments when indicated     CT A/P 3/26  IMPRESSION:  1. Cholelithiasis, including a 3 mm gallstone within the gallbladder neck versus cystic duct. No findings of acute cholecystitis. If there is concern for acute cholecystitis, right upper quadrant ultrasound can be obtained.  2. Findings suggesting cirrhosis with mild mesenteric edema, but no ascites. Enlarged ana hepatic and peripancreatic lymph nodes measuring up to 2 cm in short axis, indeterminate, although could be reactive, given suspicion for cirrhosis.      Chronic cholecystitis  Assessment & Plan  Per IM  \"HIDA scan on 4/10 showed EF 0%, gallbladder dysfunction.  Likely representing chronic cholecystitis.  Poor surgical candidate with recent cerebellar hemorrhage.  Will likely need elective surgery as outpatient.  Declined low fat diet.  Continue current pain regimen.  Will require follow-up with GI and General Surgery as outpatient.\"    Abdominal discomfort  Assessment & Plan  Better again today  - Chronic cholecystitis - not currently sx candidate but may be in future - Gen Sx follow-up OP  - +Hep C - OP GI follow-up and treatment     See management of transaminitis as outlined above   > AST/ALT trending up 4/15 - Gen Sx and GI spoke with IM 4/15 and no adjustments recommended with mgmt   Patient on PPI per " "GI > discussed with IM prior to d/c and qday should be adequate with OP PCP follow-up     ROSANA (generalized anxiety disorder)  Assessment & Plan  Better today, stable, no SI   Was having increased anxiety and difficulty coping  - Lexapro increased to 10mg qday recently > continue   - Repeat EKG 4/15 with improved QTc  - Hydroxyzine 25mg PRN helpful > d/c on BID PRN   - D/C on Gabapentin 300mg HS (also for hx of EtOH/amphetamine abuse)    Increased anxiety/frustration 4/11   - Provided supportive counseling   Mood Anxious at times    ROSANA with some acute anxiety/panic     Optimal sleep mgmt  Supportive counseling  Counseled on and continue to encourage deep breathing/relaxation/behavioral management techniques  OP PCP follow-up  OP Behavioral health c/s     Potential for cognitive impairment  Assessment & Plan  History of multiple ischemic strokes in past and now with recent ICH  - Did fairly well on cog screens; oriented x3 currently   - MOCA on 4/16 25 out of 30 - mild cog impairment   - MMSE 4/13 - 26 out of 30 wnl - slight impairments in orientation   - She did fairly well on CLQT+ earlier in course however   - Appears to understand risks/dangers (son reviewed risks and both appear to have adequate understanding)  - Reviewed with son need for 24-7 supervision, assistance with iADLs, med mgmt, transport and he reports adequate understanding   - Recommend  OT, ST, RN, MSW   -OP follow-up PCP  and neuro       Hepatitis C  Assessment & Plan  Per IM  \"HCV detected, viral load of 1,550,000.  GI consulted and following.  Follow-up with Hepatology as outpatient for treatment.\"       Essential hypertension  Assessment & Plan  Controlled   Current and d/c meds: Amlodipine 10mg qday, HCTZ 12.5mg qday, lisinopril 40mg qday   OP PCP         Amphetamine abuse (HCC)  Assessment & Plan  Was using several times per week   Drug cessation counseling and supportive counseling  Optimal mood/stress mgmt   Patient declined HOST " "referral for addiction/substance abuse resources       Marijuana use  Assessment & Plan  Gabapentin also for mood/sleep  Cessation counseling and supportive counseling  Optimal mood/stress mgmt   CM to assist with HOST referral if agrees      Tobacco abuse  Assessment & Plan  Nicotine patch declined by patient   Smoking cessation and supportive counseling  Optimal mood/stress mgmt       Alcohol use  Assessment & Plan  Thiamine  Gabapentin also for mood/sleep  Alcohol cessation counseling and supportive counseling  Optimal mood/stress mgmt   Patient declined HOST referral for addiction/substance abuse resources       Insomnia  Assessment & Plan  Improving   - Melatonin 3mg HS PRN   - Gabapentin 300mg HS - for anxiety, polysubstance hx as well   - Recommend appropriate sleep hygiene  - Patient counselled/will be counselled on this when appropriate   - OP PCP     Simple adnexal cyst greater than 1 cm in diameter in postmenopausal patient  Assessment & Plan  Per IM  - \"incidental finding on CT A/P   - simple appearing right adnexal cyst measuring 3.7 cm  - according to current guidelines, pt needs follow up pelvic US in 6-12 months  - follow up with OBGYN at discharge \"   - Discussed with patient/family     Prolonged QT interval  Assessment & Plan  IM consulted and with overall management at their discretion during [facility] course  Resolved on repeat EKG 4/15    History of noncompliance with medical treatment  Assessment & Plan  Was not taking meds at home appropriately and following up with OP providers needed after last CVA  - Drug/EtOH cessation counseling  - Patient and family education completed  - Patient adamant about being compliant   - OP PCP within 1 week  -  PT, OT, RN, MSW      Hypomagnesemia  Assessment & Plan  Improved but still requiring significant Mag Ox supplementation  Per IM d/c on Mag Ox 800mg TID   Repeat Mag in 1 week with follow-up with PCP  Mag 1.9 (low normal) 4/10 and 4/15   IM consulted, " monitoring, repletion, and overall management at their discretion during [facility] course      Hyponatremia  Assessment & Plan  Improved 4/9-10    Neuropathy  Assessment & Plan  Significant b/l below knees chronic   - Risk of alcoholic neuropathy  - Alcohol cessation counseling  - OP Neuro EMG/NCS follow-up  - Fall precautions, PT, OT  - On gabapentin         Follow-up providers (see above for specific issues to follow-up):  PCP  Neurology  Neurosurgery  GI  Gen Sx   Pods  Cards/Device   Ob/Gyn  Psych     - See AVS (After visit summary) with discharge instructions, discharge medications, and other details.       Imaging (See above as well):  CT head wo contrast   Final Result by Joao Mora MD (04/12 1256)      Stable punctate hyperdense focus within the posterior right sylvian fissure, cortical versus subarachnoid hemorrhage. Stable evolving hematoma within the left paramedian cerebellar hemisphere. There is no new intra or extra-axial hemorrhage.      Chronic right centrum semiovale ovale, left corona radiata, bilateral basal ganglia, bilateral thalamic, and pontine lacunar infarcts. Chronic microangiopathic ischemic changes.                     Workstation performed: [id number]         CT head wo contrast   Final Result by Xavier Pierre MD (04/11 1015)      -New punctate foci of cortical hemorrhage versus small amount of subarachnoid hemorrhage within the posterior right sylvian fissure. Recommend short interval follow-up exam.      -Decreased size of hyperdense component of the left paramedian cerebellar hemorrhage with mildly increased surrounding vasogenic edema as detailed above. Decreased hemorrhage within the fourth ventricle.      The study was marked in EPIC for immediate notification.                  Workstation performed: [id number]         NM Hepatobiliary w RX   Final Result by Luis Wu MD (04/10 4163)      1. Normal visualization of the gallbladder.      2. Abnormal contractile response of  the gallbladder to cholecystokinin infusion, ( 0%), which in the appropriate clinical context suggest gallbladder dysfunction.         Resident: ISATU ELLIOTT I, the attending radiologist, have reviewed the images and agree with the final report above.      Workstation performed: [id number]         US right upper quadrant   Final Result by Michael Brooks MD (04/08 1604)      1. Cholelithiasis with borderline wall thickening. Correlate clinically to exclude early acute cholecystitis.   2. Slightly nodular liver contour as seen on recent CT, which may be associated with cirrhosis. Correlate clinically.      Workstation performed: [id number]         CT head wo contrast    (Results Pending)       Pertinent Recent Labs (See Course above, EPIC EMR, and if needed request additional records):   Latest Reference Range & Units 04/12/24 06:22 04/15/24 06:13   Sodium 135 - 147 mmol/L  137   Potassium 3.5 - 5.3 mmol/L  4.2   Chloride 96 - 108 mmol/L  100   Carbon Dioxide 21 - 32 mmol/L  30   ANION GAP 4 - 13 mmol/L  7   BUN 5 - 25 mg/dL  19   Creatinine 0.60 - 1.30 mg/dL  0.73   GLUCOSE 65 - 140 mg/dL  102   GLUCOSE, FASTING 65 - 99 mg/dL  102 (H)   Calcium 8.4 - 10.2 mg/dL  9.3   AST 13 - 39 U/L 119 (H) 175 (H)   ALT 7 - 52 U/L 175 (H) 246 (H)   ALK PHOS 34 - 104 U/L 59 68   Total Protein 6.4 - 8.4 g/dL 7.5 7.5   Albumin 3.5 - 5.0 g/dL 3.4 (L) 3.5   Total Bilirubin 0.20 - 1.00 mg/dL 0.36 0.35   GFR, Calculated ml/min/1.73sq m  90   MAGNESIUM 1.9 - 2.7 mg/dL  1.9   Ammonia 18 - 72 umol/L  36   BILIRUBIN DIRECT 0.00 - 0.20 mg/dL 0.09    WBC 4.31 - 10.16 Thousand/uL  5.32   RBC 3.81 - 5.12 Million/uL  4.16   Hemoglobin 11.5 - 15.4 g/dL  13.6   Hematocrit 34.8 - 46.1 %  40.7   MCV 82 - 98 fL  98   MCH 26.8 - 34.3 pg  32.7   MCHC 31.4 - 37.4 g/dL  33.4   RDW 11.6 - 15.1 %  11.9   Platelet Count 149 - 390 Thousands/uL  179   (H): Data is abnormally high  (L): Data is abnormally low    Procedures Performed During [facility]  Admission: None    Discharge Physical Examination:  Temp:  [96.1 °F (35.6 °C)-97.5 °F (36.4 °C)] 97.2 °F (36.2 °C)  HR:  [61-73] 71  Resp:  [16-18] 18  BP: (117-134)/(70-83) 134/83  SpO2:  [94 %-98 %] 98 %  GEN:  Lying in bed in NAD   HEENT/NECK: Normocephalic, atraumatic, moist mucous membranes   CARDIAC: Regular rate rhythm, no murmers, no rubs, no gallops  LUNGS:  clear to auscultation, no wheezes, rales, or rhonchi  ABDOMEN: Soft, non-tender, non-distended, normal active bowel sounds  EXTREMITIES/SKIN:  no calf edema, no calf tenderness to palpation  NEURO:   MENTAL STATUS: awake, oriented to person, place, time, and situation, MENTAL STATUS:  Appropriate wakefulness and interaction , CN II-XII: Intact, Strength/MMT:  5/5 throughout, and FTN improved bilaterally   PSYCH:  Affect:  Euthymic     HPI:   58-year-old female with a past medical history of ischemic strokes in 2022, hypertension, generalized anxiety disorder, smoking tobacco, alcohol use, marijuana use, methamphetamine abuse, medical noncompliance who presented to [facility] on 3/26/2024 with dizziness, nausea, and headache after recent fall and vomiting also accompanied by some diarrhea.  Patient noted to be ataxic on exam.  CT showed acute parenchymal hematoma in the left paramedian cerebellum with mass effect on the fourth ventricle with intraventricular extension of the hemorrhage with blood in the fourth ventricle and the left foramen of Luschka as well as some chronic lacunar infarcts.  Patient transferred to [facility] for neurosurgery oversight and evaluation.  Patient placed on EVD watch but ultimately did not need this.  Patient tells me she was not compliant with her blood thinners although she did receive DDAVP for aspirin reversal.  Neurology was consulted and suspected intracerebral hemorrhage related to hypertension and medication noncompliance with recent methamphetamine use.  Follow-up repeat CT head 7/3/2027 328  were stable.  Patient required multiple blood pressure medications and has blood pressure goal of less than 140 and ideally between 120 and 140 is much as possible.  Patient is to repeat CT head on 4/11 and per neurology if improving hemorrhage can resume Plavix without need for aspirin for secondary ischemic stroke prevention.  Neurosurgery would like to see patient in follow-up at about that same time also looking for stability and assess for delayed hydrocephalus.  Patient was evaluated by skilled therapies and was found to have significant decline in ADLs and ambulation and appears appropriate for admission to [facility].     Chief complaint:  Dizziness/imbalance     Subjective: On evaluation, patient notes some dizziness with sitting up and standing.  She notes imbalance with more incoordination on the left.  She denies significant focal weakness or sensory changes.  She states she does have to be careful somehow she sometimes.  She notes some stable chronic cough without shortness of breath, chest pain, fever, chills, sweats calf pain.  She reports some chronic urinary leakage at times but without acute changes or dysuria.  She denies recent diarrhea or constipation.  She reports some acute on chronic anxiety along with her history of polysubstance abuse.  She reports sleeping poorly in the hospital.  She denies uncontrolled depression.     Review of Systems: A 10 point ROS was performed; negative except as noted above.      Condition at Discharge: stable     Discharge instructions/Information to patient and family:   See after visit summary for information provided to patient and family.      Provisions for Follow-Up Care:  See after visit summary for information related to follow-up care and any pertinent home health orders.      Disposition: Home with family     Planned Readmission:  No    Discharge Statement   Total time spent examining Tanika Lira, counseling patient and  family - son Abner on condition, medication, rehabilitation/medical plan, and coordinating care on day of discharge: at least 65 minutes.   Greater than 50% of the total time was spent examining patient, answering all questions, directly discussing plan of care and post-discharge instructions with patient (or patient and family) some of which specifically related to recent CVA, elevated liver enzyems, risks of fall/injury.  Additional time spent on coordinating care and other discharge activities.    Discharge Medications:  See after visit summary for reconciled discharge medications provided to patient and family.

## 2024-04-16 NOTE — PROGRESS NOTES
CM NOTE    Went in with physician Dr Jennings to review d/c plan including follow up appointments and services. Pt is set up with home OT/PT/RN/social work. Patient informed about PCP follow up appointment on Monday 4/22. Did discuss with patient outpatient support for substance abuse through HOST program, pt declined this service at this time. Offered patient to have resource information placed in d/c summary in case she would like to access this resource after d/c and pt declined. However she was receptive to outpatient psychiatry/psychology services. Patient will d/c home today with son.   Di Conn

## 2024-04-16 NOTE — CASE MANAGEMENT
CM NOTE:       CM made aware by team that pt/son are requesting d/c today. Scheduled PCP follow up for her Monday 4/22 at 12:45 with susan Miller placed in d/c instructions. Referral being made in aidin for home OT/PT/RN/social work. Team to follow up with pt regarding HOST program and potential for referral.       [facility]. Accepted pt for home services. Reserved in aidin and placed in d/c instructions.      [address] [place] [address]  Phone: [phone]  Fax: [phone]

## 2024-04-16 NOTE — ASSESSMENT & PLAN NOTE
Presented to acute setting with acute nausea/vomiting/dizziness.  CTH - Acute parenchymal hematoma left paramedian cerebellum measures 2.6 x 1.6 cm with mass effect on the fourth ventricle. Intraventricular extension of hemorrhage noted with blood in the fourth ventricle and left foramen of Luschka.  CTA head/neck - No left cerebellar vascular malformation to account for the patient's acute parenchymal hematoma. No cervical or intracranial large vessel occlusion, dissection or aneurysm. Mild bilateral cervical carotid bifurcation atherosclerotic disease.  Most recent CTH on 3/28 showed stability.    Maintain normotension.  Neurovascular checks Q shift.  Hold AC/AP at this time.    Primary team following.  PT/OT/ST.    CTH obtained on 4/11: New punctate foci of cortical hemorrhage vs small amount of subarachnoid hemorrhage within the posterior R sylvian fissure, decreased size of hyperdense component of the L paramedian cerebellar hemorrhage with mildly increased surrounding vasogenic edema, decreased hemorrhage within the 4th ventricle.  Repeat CTH on 4/12 was stable.    Had appt with Neurosurgery on 4/15 - follow-up in additional 2 weeks (4/29) with repeat CTH.  Hold on AC/AP.  Follow-up with Neurology as outpatient.

## 2024-04-16 NOTE — DISCHARGE INSTR - APPOINTMENTS
The following appointment has been scheduled for your PCP:     MILAN Miller MD [facility] [address] [place] [address]   PHONE: [phone]    APPT: Monday April 22, 2024. Arrive at 12:45      The following home health agency will be providing you with services upon discharge:     [facility], [address]  [place] [address]  Phone: [phone]  Fax: [phone]

## 2024-04-16 NOTE — PROGRESS NOTES
04/16/24 1230   Pain Assessment   Pain Assessment Tool 0-10   Pain Score No Pain   Restrictions/Precautions   Precautions Cognitive;Fall Risk   General   Change In Medical/Functional Status (S)  Cleared this session for IRPs with RW   Cognition   Overall Cognitive Status Impaired   Arousal/Participation Alert;Cooperative   Attention Within functional limits   Orientation Level Oriented X4   Memory Decreased recall of precautions   Following Commands Follows multistep commands with increased time or repetition   Comments per OT 25/30 on MOCA   Subjective   Subjective Pt eager to go home today.   Roll Left and Right   Type of Assistance Needed Independent   Roll Left and Right CARE Score 6   Sit to Lying   Type of Assistance Needed Independent   Sit to Lying CARE Score 6   Lying to Sitting on Side of Bed   Type of Assistance Needed Independent   Lying to Sitting on Side of Bed CARE Score 6   Sit to Stand   Type of Assistance Needed Independent   Comment grossly mod I with RW   Sit to Stand CARE Score 6   Bed-Chair Transfer   Type of Assistance Needed Independent   Comment grossly mod I with RW   Chair/Bed-to-Chair Transfer CARE Score 6   Car Transfer   Type of Assistance Needed Supervision   Physical Assistance Level No physical assistance   Comment S for simulated car transfer   Car Transfer CARE Score 4   Walk 10 Feet   Type of Assistance Needed Independent   Physical Assistance Level No physical assistance   Comment grossly mod i with RW   Walk 10 Feet CARE Score 6   Walk 50 Feet with Two Turns   Type of Assistance Needed Supervision   Physical Assistance Level No physical assistance   Comment S/CS with Rw   Walk 50 Feet with Two Turns CARE Score 4   Walk 150 Feet   Type of Assistance Needed Supervision   Physical Assistance Level No physical assistance   Comment S/CS with Rw   Walk 150 Feet CARE Score 4   Walking 10 Feet on Uneven Surfaces   Type of Assistance Needed Supervision   Physical Assistance Level No  physical assistance   Comment on indoor ramp with RW   Walking 10 Feet on Uneven Surfaces CARE Score 4   Ambulation   Primary Mode of Locomotion Prior to Admission Walk   Distance Walked (feet) 115 ft   Assist Device Roller Walker   Gait Pattern Ataxic;Inconsistant Katherine;Slow Katherine;Shuffle   Limitations Noted In Balance;Coordination;Safety;Heel Strike   Walk Assist Level Close Supervision;Supervision;Modified Independent   Findings grossly S for amb, mod I with RW despite ongoing ataxia for short distances.   Does the patient walk? 2. Yes   Wheelchair mobility   Does the patient use a wheelchair? 0. No   Curb or Single Stair   Style negotiated Curb   Type of Assistance Needed Supervision   Physical Assistance Level No physical assistance   Comment S with RW on inch curb step, improved balance and sequence carryover with subsequent trials.   1 Step (Curb) CARE Score 4   4 Steps   Type of Assistance Needed Supervision   Physical Assistance Level No physical assistance   Comment B HR reciprocally   4 Steps CARE Score 4   12 Steps   Type of Assistance Needed Supervision   Physical Assistance Level No physical assistance   Comment B HR reciprocally   12 Steps CARE Score 4   Picking Up Object   Type of Assistance Needed Independent   Physical Assistance Level No physical assistance   Comment wtih reacher and RW support. S without reacher and with RW support. Sig other got pt reacher for home use.   Picking Up Object CARE Score 6   Toilet Transfer   Type of Assistance Needed Independent   Physical Assistance Level No physical assistance   Comment grossly mod I with RW.   Toilet Transfer CARE Score 6   Therapeutic Interventions   Balance HEP review for static balance tasks: feet apart with wt shifts, feet together with wt shifts; tandem vs staggered stance and u/l stand with UE support.   Other Comments   Comments (S)  Son unable to come in for FT today due to vehicle issues. Time spent on phone with pts son and MD  "updating son on pt status and wanting to DC home today. Pt with ongoing resistance to  participate in therapy feeling she will feel better mentally at home. Discussed at length with pts son Abner, pt medical status, mobility status, medical follow-ups, and recommendations for Rw use, S at DC and home services to follow. Advised son to review AVS for medications and upcoming appointments and one needing to be scheduled. Son Abner verbalizes understanding, then utilized facetime with pts phone for son to watch pt ambulate and complete stairs.   Assessment   Treatment Assessment Overall 90 min skilled PT intervention in prep for potential and anticipated DC home later today, as post conversation with son, in agreement to accept the risk to take pt home early despite high fall risk and ongoing medical concerns/followup needed. Pt excited to go home, will have friend pick her up later today. All staff notified including CM for VNA referral. Pt ultimately cleared for IRPs with RW, profound education for pt to take her time and pace herself as she could easily loose her balance and fall given ongoing ataxia and cerebellar stroke recovery.  HEP handout reviewed and provided. Home PT recommended to follow. Pt self purchased RW and walker tray, already at home. Reports sig other Gurwinder could likely stay with her when kids are not home, Abner will be home all day tomorrow. Recommended getting a calendar to keep track of upcoming appointments and home visits. Plan for DC home later today, friend \"crystal\"to come get pt for pickup.   Plan   Progress Progressing toward goals   PT Therapy Minutes   PT Time In 1230   PT Time Out 1400   PT Total Time (minutes) 90   PT Mode of treatment - Individual (minutes) 90   PT Mode of treatment - Concurrent (minutes) 0   PT Mode of treatment - Group (minutes) 0   PT Mode of treatment - Co-treat (minutes) 0   PT Mode of Treatment - Total time(minutes) 90 minutes   PT Cumulative Minutes 745   Therapy " Time missed   Time missed? No

## 2024-04-16 NOTE — CASE MANAGEMENT
CM notified of discharge for today. HOme care referral sent to [facility] for RN/PT/OT/SW through [employer] via [employer], awaiting response. VNA unavailable. [facility] accepting patient. H&P , facesheet, After Visit Summary manually faxed to [facility] [phone]

## 2024-04-16 NOTE — PLAN OF CARE
Problem: GASTROINTESTINAL - ADULT  Goal: Maintains adequate nutritional intake  Description: INTERVENTIONS:  - Monitor percentage of each meal consumed  - Identify factors contributing to decreased intake, treat as appropriate  - Assist with meals as needed  - Monitor I&O, weight, and lab values if indicated  - Obtain nutrition services referral as needed  Outcome: Progressing     Problem: SKIN/TISSUE INTEGRITY - ADULT  Goal: Skin Integrity remains intact(Skin Breakdown Prevention)  Description: Assess:  -Perform Werner assessment every shift  -Clean and moisturize skin every   -Inspect skin when repositioning, toileting, and assisting with ADLS  -Assess under medical devices such as  every   -Assess extremities for adequate circulation and sensation     Bed Management:  -Have minimal linens on bed & keep smooth, unwrinkled  -Change linens as needed when moist or perspiring  -Avoid sitting or lying in one position for more than  hours while in bed  -Keep HOB at degrees     Toileting:  -Offer bedside commode  -Assess for incontinence every   -Use incontinent care products after each incontinent episode such as     Activity:  -Mobilize patient  times a day  -Encourage activity and walks on unit  -Encourage or provide ROM exercises   -Turn and reposition patient every  Hours  -Use appropriate equipment to lift or move patient in bed  -Instruct/ Assist with weight shifting every  when out of bed in chair  -Consider limitation of chair time  hour intervals    Skin Care:  -Avoid use of baby powder, tape, friction and shearing, hot water or constrictive clothing  -Relieve pressure over bony prominences using   -Do not massage red bony areas    Next Steps:  -Teach patient strategies to minimize risks such as    -Consider consults to  interdisciplinary teams such as   Outcome: Progressing

## 2024-04-16 NOTE — TELEPHONE ENCOUNTER
04/25/2024- 3rd ATTEMPT CALLED PT AND LEFT MESSAGE ON MACHINE CONFIRMING 04/30/2024 APT W/CTH NEEDED PRIOR. CENTRAL SCHEDULING PHONE NUMBER WAS LEFT. WAITING FOR CTH TO BE SCHEDULED. LETTER WAS SENT TO PT.    04/24/2024- 2nd ATTEMPT CALLED PT AND LEFT MESSAGE ON MACHINE CONFIRMING 04/30/2024 APT W/CTH NEEDED PRIOR. CENTRAL SCHEDULING PHONE NUMBER WAS LEFT. WAITING FOR CTH TO BE SCHEDULED.    04/22/2024- CALLED PT AND LEFT MESSAGE ON MACHINE CONFIRMING 04/30/2024 APT W/CTH NEEDED PRIOR. CENTRAL SCHEDULING PHONE NUMBER WAS LEFT. WAITING FOR CTH TO BE SCHEDULED.    **WAITING FOR CTH TO BE SCHEDULED**    04/16/2024- PT STILL IN [facility]

## 2024-04-17 ENCOUNTER — TRANSITIONAL CARE MANAGEMENT (OUTPATIENT)
Dept: FAMILY MEDICINE CLINIC | Facility: HOSPITAL | Age: 59
End: 2024-04-17

## 2024-04-17 ENCOUNTER — PATIENT OUTREACH (OUTPATIENT)
Dept: CASE MANAGEMENT | Facility: OTHER | Age: 59
End: 2024-04-17

## 2024-04-17 ENCOUNTER — TELEPHONE (OUTPATIENT)
Dept: PSYCHIATRY | Facility: CLINIC | Age: 59
End: 2024-04-17

## 2024-04-17 DIAGNOSIS — I10 ESSENTIAL HYPERTENSION: Primary | ICD-10-CM

## 2024-04-17 LAB
A1AT PHENOTYP SERPL IFE: NORMAL
A1AT SERPL-MCNC: 152 MG/DL (ref 101–187)

## 2024-04-17 NOTE — TELEPHONE ENCOUNTER
Spoke to client who states that she is ok for now and doesn't want to schedule for psychiatry services at this time.  Clt asked doctor to put in referral in case she needs it down the road.  Clt will call back if she needs to be scheduled.  
Oriented - self; Oriented - place; Oriented - time

## 2024-04-17 NOTE — CASE MANAGEMENT
4/17/24CM received [facility] with approval of additional days.  With next review date 4/24.  CM faxed notification of d/c on 4/16 with d/c summary to [facility] via [employer].  CM called [facility] to see if they received fax, was told they did not receive the fax, but had this patient in their schedule. CM faxed manually again H&P, facesheet and d/c summary to [phone].

## 2024-04-17 NOTE — PROGRESS NOTES
04/17/24 1010   Hello, [Guardian’s Name / Patient’s Name], this is [Caller Name] from [facility], and our clinical care team wanted to check on you / your child after your recent visit to the hospital. It will only take 3-5 minutes. Is this a good time?   Discharge Call Type/ Specific Diagnosis: General Call;ARC General   ARC Discharge Follow- Up   ARC Follow- Up Time Frame 5 Day follow up   Call Complete   Attempted Number of Calls 1   Discharge phone call complete? Left Message

## 2024-04-17 NOTE — PROGRESS NOTES
ADT alert received the patient discharged 4/16/24 to Home with [facility].  I have removed myself off of the care team, added the CM to the care team who will follow the patient through the episode, sent the care manager an inbasket notifying them of the HRR Referal.  Ambulatory referral placed for complex care management.

## 2024-04-18 ENCOUNTER — PATIENT OUTREACH (OUTPATIENT)
Dept: FAMILY MEDICINE CLINIC | Facility: HOSPITAL | Age: 59
End: 2024-04-18

## 2024-04-18 NOTE — SPEECH THERAPY NOTE
[facility] Speech Therapy Discharge Summary    Pt admitted to [facility] on 4/3/2024 dx L cerebellar hemorrhage. Pt completed dysphagia and cognitive assessments.    In regards to dysphagia-  pt found to be presenting with overall functional oral and pharyngeal swallow skills across baseline diet of regular solids and thin liquids, therefore, no further dysphagia therapy services are warranted at this time. Pt with hx of mild swallow deficits, reports baseline dry cough at times. Reviewed strategies- no further skilled SLP services for dysphagia at this time.     In regards to cognition- Pt completed the CLQT+ on initial evaluation with a Composite Severity Rating score of 4.0 out of 4.0, correlating to overall WNL  cognitive linguistic impairments at time of evaluation and in comparison to age matched peers ranging from 18-70 y/o. Pt scored at or above criterion cut score for 10 out of 10 tasks completed. Discussed pt's baseline function as very independent and completes all IADLs at home. Pt completed follow up sessions for IADL mangement and stroke education. Throughout stay, pt with inconsistent participation given overall mood and medical status- pt cont'd to benefit from education regarding reason for acute rehab stay to maximize her mobility and independence prior to returning home.     Pt was able to achieve min A problem solving, memory and comprehension, supervision expression.     Pt was successfully discharged home on 4/16/2024 with support of family and no further recommendations for further skilled SLP services at this time.

## 2024-04-18 NOTE — PROGRESS NOTES
Outpatient Care Management Note:    New HRR hand off received from Stacy Blevins. Patient was hospitalized from 3/26-4/3/24 with left cerebellar hemorrhage most likely in the setting of hypertensive emergency with  systolic. Patient has a history of poor medication adherence and methamphetamine use. She was discharged to [facility] and admitted from 4/3-4/16/24. Per epic notes, patient made improvements and is now a supervision level of assist, but remains a fall risk. She was recommended for continued rehab due to significant risk, but family declined. She is recommended to have 24/7 supervision. She is to follow up with her PCP (4/22); podiatry; neurosurgery (4/30) with CT 2-3 days prior; neurology (5/15); cardiology; GI; General Surgery (4/19), OB/GYN; and  psychiatry (declined per 4/17 notes).     Voice mail message left for Tanika, with my contact information, introducing myself and requesting a call back.

## 2024-04-19 ENCOUNTER — CONSULT (OUTPATIENT)
Dept: SURGERY | Facility: HOSPITAL | Age: 59
End: 2024-04-19
Payer: COMMERCIAL

## 2024-04-19 ENCOUNTER — TELEPHONE (OUTPATIENT)
Dept: GASTROENTEROLOGY | Facility: CLINIC | Age: 59
End: 2024-04-19

## 2024-04-19 VITALS
SYSTOLIC BLOOD PRESSURE: 134 MMHG | HEART RATE: 105 BPM | HEIGHT: 66 IN | DIASTOLIC BLOOD PRESSURE: 83 MMHG | WEIGHT: 132.8 LBS | BODY MASS INDEX: 21.34 KG/M2

## 2024-04-19 DIAGNOSIS — Z86.73 RECENT CEREBROVASCULAR ACCIDENT (CVA): Primary | ICD-10-CM

## 2024-04-19 DIAGNOSIS — R74.01 TRANSAMINITIS: ICD-10-CM

## 2024-04-19 DIAGNOSIS — K81.1 CHRONIC CHOLECYSTITIS: ICD-10-CM

## 2024-04-19 PROCEDURE — 99243 OFF/OP CNSLTJ NEW/EST LOW 30: CPT | Performed by: SURGERY

## 2024-04-19 NOTE — PROGRESS NOTES
04/19/24 1314   Hello, [Guardian’s Name / Patient’s Name], this is [Caller Name] from [facility], and our clinical care team wanted to check on you / your child after your recent visit to the hospital. It will only take 3-5 minutes. Is this a good time?   Discharge Call Type/ Specific Diagnosis: General Call;ARC General   ARC Discharge Follow- Up   ARC Follow- Up Time Frame 5 Day follow up   Call Complete   Attempted Number of Calls 2   Discharge phone call complete? Left Message

## 2024-04-19 NOTE — TELEPHONE ENCOUNTER
Pt called to say she would be a few mins late to her apt with general surgery. Transferred pt to general surgery.

## 2024-04-22 ENCOUNTER — PATIENT OUTREACH (OUTPATIENT)
Dept: FAMILY MEDICINE CLINIC | Facility: HOSPITAL | Age: 59
End: 2024-04-22

## 2024-04-22 ENCOUNTER — TELEPHONE (OUTPATIENT)
Dept: NEUROLOGY | Facility: CLINIC | Age: 59
End: 2024-04-22

## 2024-04-22 NOTE — TELEPHONE ENCOUNTER
Post CVA Discharge Follow Up  Hospitalization: 3/26/24-4/3/24, 4/3/24-4/16/24 [facility]    Called patient to obtain an update. Patient did not answer. Left a voice message requesting for a call. Provided the office's phone number.

## 2024-04-22 NOTE — LETTER
Date: 04/22/24    Dear Tanika Lira,   My name is Raquel Slater. I am a registered nurse care manager working with   [facility] [address]   [address] [place] [id number].   I have not been able to reach you and would like to set a time that I can talk with you over the phone.  My work is to help patients that have complex medical conditions get the care they need. This includes patients who may have been in the hospital or emergency room.     Please call me with any questions you may have. I look forward to speaking with you.  Sincerely,  Raquel Mckoyneelam  [phone]  Outpatient Care Manager  Copy:  (primary care physician name and address)

## 2024-04-22 NOTE — PHYSICAL THERAPY NOTE
[facility] PT DC SUMMARY    Pt 58 yr old female to [facility] on 3/26/24 with c/o dizziness, nausea, HA, Diarrhea. CT revealed acute parenchymal hematoma, L paramedian cerebellar mass effect, with chronic lacunar infarcts. Transferred to [facility] for neurosurgical eval and folowup. PT with PMH: previous CVA 2022, HTN, Anxiety disorder, neuropathy, dec med compliance, +Tobacco/ETOH/Meth. At baseline pt fully I with out AD, lives with 2 sons both work full time (varying hours) in mobile home [address], worked PT cleaning.     Pt made decent progress towards goals however ongoing ataxia and dec balance persist. RW use encouraged. Pt was eager to DC home ASAP as she felt being here was affecting her mental health.     FT with son Abner completed via [employer] to observe pt mobility. Time spent with MD and son reviewing via phone pts fucntional and medical status and extensive medical f/u pt will require. Son in agreement to accept the risk to take pt home early despite high fall risk and ongoing medical concerns/followup needed.   Profound education provided to pt to take her time and pace herself as she could easily loose her balance and fall given ongoing ataxia and cerebellar stroke recovery. HEP handout reviewed and provided. Home PT recommended to follow.     Pt self purchased RW and walker tray, already at home. Reports sig other Gurwinder could likely stay with her when kids are not home, Abner will be home all day tomorrow. Recommended getting a calendar to keep track of upcoming appointments and home visits.     Goals met, DC home.

## 2024-04-22 NOTE — PROGRESS NOTES
Outpatient Care Management Note:    Voice mail message left for Tanika, with my contact information, introducing myself and requesting a call back. (2nd attempt)    Unable to reach letter sent via my chart.

## 2024-04-23 NOTE — OCCUPATIONAL THERAPY NOTE
OT DISCHARGE SUMMARY    Pt made good progress during stay on the [facility] following admission for cerebellar hemorrhage.  Pt presented upon IE with generalized weakness, decreased endurance, activity tolerance, and functional mobility. On evaluation, pt required modA to complete all ADLs and functional transfers. Pt was discharged to home with son support. Pt currently functioning at Karime level for ADL, SUP bathing, CGA meal prep, SUP med mngmnt, Karime level for transfers with RW. The following DME was recommended shower chair, RW. Family training did not occur with OT, but recommendations were provided by PT during FT 2* to pts unexpected DC. OT recommended pt continue to receive OP services with focus on UE FMC/NMR, standing balance, safety awareness.Completed UE assessments, however, pt refused to complete reassessments towards DC.     STRENGTH 4/8/24  Right: 50#, 40#, 40# avg 43#  Left: 40#, 37#, 40# avg 39#     Box and Block Test 4/8/24  The Box and Block Test (BBT) is a measure of manual dexterity that requires repeatedly moving 1-inch blocks from one side of a box to another in 60 seconds. It is commonly used in the stroke population to determine manual dexterity. It measures unilateral function, not bilateral function.     Box & block administered with the following results: R= 40 blocks, L= 26 blocks (average for age R= 74 blocks, L= 73 blocks) demonstrating impairments B/L, L<R. Pt is right hand dominant.       -Kat Byrd MS, OTR/L, CSRS

## 2024-04-26 NOTE — TELEPHONE ENCOUNTER
Post CVA Discharge Follow Up  Hospitalization: 3/26/24-4/3/24,4/3/24-4/16/24 [facility]    Called patient with no answer to obtain an update. Left a voice message requesting for a call. Provided the office's phone number.      3rd attempt: Mailed unable to reach letter.

## 2024-04-29 ENCOUNTER — TELEPHONE (OUTPATIENT)
Dept: NEUROSURGERY | Facility: CLINIC | Age: 59
End: 2024-04-29

## 2024-04-29 NOTE — TELEPHONE ENCOUNTER
Called pt's primary number ([phone]) in regards to 4/30/24 appt w/ [facility], for which we need a CT scan prior to the appt (none scheduled). Pt answered and hung up. Sent message on [employer] detailing that we're cancelling and need a CT scan prior to pt's next appt w/ us.

## 2024-05-08 ENCOUNTER — PATIENT OUTREACH (OUTPATIENT)
Dept: FAMILY MEDICINE CLINIC | Facility: HOSPITAL | Age: 59
End: 2024-05-08

## 2024-05-08 NOTE — PROGRESS NOTES
Outpatient Care Management Note:    No response to telephone calls or unable to reach letter sent to patient.  Patient closed to care management services, but CM will remain available if they call back.  Re-consult as needed.

## 2024-05-10 NOTE — PROGRESS NOTES
Assessment/Plan:   Tanika Lira is a 58 y.o.female who is here for Consult (CONSULT- CHRONIC CHOLECYSTITIS //PT is here for a consult for chronic cholecystitis, she was hospitalized on 04/03/24 and was just released on 04/16/24. PT is not currently having any pain or discomfort. Bowel movements have been ok, she had an episode of diarrhea but she believes it was caused by her food. Her eating/drinking habits have been good.)  .    Plan:  Biliary dyskinesia/Recent CVA - currently patient has no abdominal symptoms, no fatty food intolerance, I discussed that despite her HIDA showing 0% EF I would not recommend surgical intervention as she currently has no biliary symptoms and given her recent CVA she is also a poor surgical candidate. I discussed what biliary symptoms are and should she experience them she should return for re-evaluation    Preoperative Clearance: None    HPI:  Tanika Lira is a 58 y.o.female who was referred for evaluation of Consult (CONSULT- CHRONIC CHOLECYSTITIS //PT is here for a consult for chronic cholecystitis, she was hospitalized on 04/03/24 and was just released on 04/16/24. PT is not currently having any pain or discomfort. Bowel movements have been ok, she had an episode of diarrhea but she believes it was caused by her food. Her eating/drinking habits have been good.)  .    Currently No abd sxs.     ROS:  General ROS: negative  negative for - chills, fatigue, fever or night sweats, weight loss  Respiratory ROS: no cough, shortness of breath, or wheezing  Cardiovascular ROS: no chest pain or dyspnea on exertion  Genito-Urinary ROS: no dysuria, trouble voiding, or hematuria  Musculoskeletal ROS: negative for - gait disturbance, joint pain or muscle pain  Neurological ROS: no TIA or stroke symptoms  No abd sxs    [unfilled]  Patient has no known allergies.    Current Outpatient Medications:     amLODIPine (NORVASC) 10 mg tablet, Take 1 tablet (10 mg total) by mouth daily, Disp: 30  tablet, Rfl: 0    Blood Pressure Monitoring (Blood Pressure Cuff) MISC, Use daily, Disp: 1 each, Rfl: 0    cholecalciferol 1,000 units tablet, Take 2 tablets (2,000 Units total) by mouth daily, Disp: 60 tablet, Rfl: 0    escitalopram (LEXAPRO) 10 mg tablet, Take 1 tablet (10 mg total) by mouth daily, Disp: 30 tablet, Rfl: 0    folic acid (FOLVITE) 1 mg tablet, Take 1 tablet (1 mg total) by mouth daily, Disp: 30 tablet, Rfl: 0    gabapentin (NEURONTIN) 300 mg capsule, Take 1 capsule (300 mg total) by mouth daily at bedtime, Disp: 30 capsule, Rfl: 0    hydroCHLOROthiazide 12.5 mg tablet, Take 1 tablet (12.5 mg total) by mouth daily, Disp: 30 tablet, Rfl: 0    hydrOXYzine HCL (ATARAX) 25 mg tablet, Take 1 tablet (25 mg total) by mouth 2 (two) times a day as needed for anxiety, Disp: 30 tablet, Rfl: 0    lisinopril (ZESTRIL) 40 mg tablet, Take 1 tablet (40 mg total) by mouth daily, Disp: 30 tablet, Rfl: 0    loratadine (CLARITIN) 10 mg tablet, Take 1 tablet (10 mg total) by mouth daily as needed for allergies Over the counter, Disp: , Rfl:     magnesium Oxide (MAG-OX) 400 mg TABS, Take 2 tablets (800 mg total) by mouth 3 (three) times a day with meals, Disp: 180 tablet, Rfl: 0    melatonin 3 mg, Take 1 tablet (3 mg total) by mouth daily at bedtime as needed (Insomnia) Over the counter, Disp: , Rfl:     pantoprazole (PROTONIX) 40 mg tablet, Take 1 tablet (40 mg total) by mouth 2 (two) times a day before meals, Disp: 30 tablet, Rfl: 0    thiamine 100 MG tablet, Take 1 tablet (100 mg total) by mouth daily Over the counter, Disp: 30 tablet, Rfl: 0  Past Medical History:   Diagnosis Date    CVA (cerebral vascular accident) (HCC)     Diverticulitis     Diverticulitis of colon     Diverticulosis     Hypertension     Stroke (HCC)      Past Surgical History:   Procedure Laterality Date    CARDIAC ELECTROPHYSIOLOGY PROCEDURE N/A 2/8/2023    Procedure: Cardiac loop recorder implant;  Surgeon: Duke Abraham MD;  Location:   CARDIAC CATH LAB;  Service: Cardiology    COLON SIGMOID RESECTION       Family History   Problem Relation Age of Onset    Coronary artery disease Mother     Heart disease Mother     Diabetes Father     No Known Problems Sister     Breast cancer Maternal Grandmother         age unknown    No Known Problems Maternal Grandfather     No Known Problems Paternal Grandmother     No Known Problems Paternal Grandfather     No Known Problems Paternal Aunt     No Known Problems Paternal Aunt     No Known Problems Maternal Aunt       reports that she has been smoking cigarettes. She has never used smokeless tobacco. She reports current alcohol use. She reports current drug use. Drug: Marijuana.    Labs:   Lab Results   Component Value Date    WBC 5.32 04/15/2024    WBC 6.70 04/10/2024    WBC 7.00 04/08/2024    HGB 13.6 04/15/2024    HGB 13.0 04/10/2024    HGB 13.1 04/08/2024     04/15/2024     04/10/2024     04/08/2024     Lab Results   Component Value Date     (H) 04/15/2024     (H) 04/12/2024     (H) 04/10/2024     (H) 04/15/2024     (H) 04/12/2024     (H) 04/10/2024     This SmartLink has not been configured with any valid records.       PHYSICAL EXAM  General Appearance:    Alert, cooperative, no distress,    Head:    Normocephalic without obvious abnormality   Eyes:    PERRL, conjunctiva/corneas clear, EOM's intact        Neck:   Supple, no adenopathy, no JVD   Back:     Symmetric, no spinal or CVA tenderness   Lungs:     Clear to auscultation bilaterally, no wheezing or rhonchi   Heart:    Regular rate and rhythm, S1 and S2 normal, no murmur   Abdomen:     Soft +BS ND NT   Extremities:   Extremities normal. No clubbing, cyanosis or edema   Psych:   Normal Affect, AOx3.    Neurologic:  Skin:   CNII-XII intact. Strength symmetric, speech intact    Warm, dry, intact, no visible rashes or lesions         Physical Exam              Some portions of this record may  "have been generated with voice recognition software. There may be translation, syntax,  or grammatical errors. Occasional wrong word or \"sound-a-like\" substitutions may have occurred due to the inherent limitations of the voice recognition software. Read the chart carefully and recognize, using context, where substitutions may have occurred. If you have any questions, please contact the dictating provider for clarification or correction, as needed. This encounter has been coded by a non-certified coder.   "

## 2024-05-13 ENCOUNTER — TELEPHONE (OUTPATIENT)
Dept: NEUROLOGY | Facility: CLINIC | Age: 59
End: 2024-05-13

## 2024-05-13 NOTE — TELEPHONE ENCOUNTER
Neurovascular Discharge Follow Up  Hospitalization: 3/26/24-4/3/24    Called patient. Since discharge, she denies experiencing any new or worsening stroke-like symptoms. Patient reports she is doing okay.     She is ambulating independently as well as preforming her own ADLs. Patient manages her own medications, appointments, and affairs.    Reviewed appointments - patient missed her PCP appointment on 4/22/24. Patient will call the PCP to schedule an appointment and she reports how she needs to obtain BP medication refills.     She is aware of the need for repeat CT head (ordered by neurosurgery). Provided central scheduling's phone number. She will call to schedule. Advised patient how this RN will send a message to neurosurgery for them to reach out to her to help arrange a HFU appointment. Reminded patient of her neurology HFU on 5/115/24. Provided appointment details.     As for risk factors, patient is not monitoring her BP at home, she relies on checking her BP at doctor office visits. Again, expressed the importance of calling the PCP office to arrange an appointment. At the general surgery office appointment on 4/19/24, her BP was 134/83 per chart review.     All of her questions were addressed. At the conclusion of the conversation, patient denies having any further questions or concerns.

## 2024-05-14 ENCOUNTER — TELEPHONE (OUTPATIENT)
Dept: NEUROSURGERY | Facility: CLINIC | Age: 59
End: 2024-05-14

## 2024-05-14 NOTE — TELEPHONE ENCOUNTER
Left voicemail at pt's primary number ([phone]) to address pt's request to schedule a follow-up appointment with neurosurgery (per neurology nurse Gabriel note). Provided call back number to office ([phone]).

## 2024-05-15 DIAGNOSIS — I10 ESSENTIAL HYPERTENSION: ICD-10-CM

## 2024-05-16 ENCOUNTER — REMOTE DEVICE CLINIC VISIT (OUTPATIENT)
Dept: CARDIOLOGY CLINIC | Facility: CLINIC | Age: 59
End: 2024-05-16
Payer: COMMERCIAL

## 2024-05-16 DIAGNOSIS — Z95.818 PRESENCE OF OTHER CARDIAC IMPLANTS AND GRAFTS: Primary | ICD-10-CM

## 2024-05-16 PROCEDURE — 93298 REM INTERROG DEV EVAL SCRMS: CPT | Performed by: STUDENT IN AN ORGANIZED HEALTH CARE EDUCATION/TRAINING PROGRAM

## 2024-05-16 RX ORDER — HYDROCHLOROTHIAZIDE 12.5 MG/1
12.5 TABLET ORAL DAILY
Qty: 30 TABLET | Refills: 0 | Status: SHIPPED | OUTPATIENT
Start: 2024-05-16

## 2024-05-16 RX ORDER — LISINOPRIL 40 MG/1
40 TABLET ORAL DAILY
Qty: 30 TABLET | Refills: 0 | Status: SHIPPED | OUTPATIENT
Start: 2024-05-16

## 2024-05-16 RX ORDER — AMLODIPINE BESYLATE 10 MG/1
10 TABLET ORAL DAILY
Qty: 30 TABLET | Refills: 0 | Status: SHIPPED | OUTPATIENT
Start: 2024-05-16

## 2024-05-16 NOTE — TELEPHONE ENCOUNTER
She is very overdue for OV w/PCP Tanika W and was a no show for TCM recently - please call to schedule follow up w/Tanika to continue further med refills. Will issue 1 month supply in the meantime

## 2024-05-16 NOTE — PROGRESS NOTES
"MDT LNQ22/ ACTIVE SYSTEM IS MRI CONDITIONAL   CARELINK TRANSMISSION: LOOP RECORDER. PRESENTING RHYTHM NSR @ 84 BPM. BATTERY STATUS \"OK.\" NO PATIENT OR DEVICE ACTIVATED EPISODES. NORMAL DEVICE FUNCTION. DL   "

## 2024-07-15 ENCOUNTER — TELEPHONE (OUTPATIENT)
Dept: FAMILY MEDICINE CLINIC | Facility: HOSPITAL | Age: 59
End: 2024-07-15

## 2024-08-22 DIAGNOSIS — I10 ESSENTIAL HYPERTENSION: ICD-10-CM

## 2024-08-22 RX ORDER — AMLODIPINE BESYLATE 10 MG/1
10 TABLET ORAL DAILY
Qty: 30 TABLET | Refills: 0 | Status: SHIPPED | OUTPATIENT
Start: 2024-08-22

## 2024-08-22 RX ORDER — LISINOPRIL 40 MG/1
40 TABLET ORAL DAILY
Qty: 30 TABLET | Refills: 0 | Status: SHIPPED | OUTPATIENT
Start: 2024-08-22

## 2024-08-22 RX ORDER — HYDROCHLOROTHIAZIDE 12.5 MG/1
12.5 TABLET ORAL DAILY
Qty: 30 TABLET | Refills: 0 | Status: SHIPPED | OUTPATIENT
Start: 2024-08-22

## 2024-08-22 NOTE — TELEPHONE ENCOUNTER
Reason for call:   [x] Refill   [] Prior Auth  [x] Other: Patient said she is going to call to make an appointment since she wan't able to be seen the other day but she has no pills left.     Office:   [x] PCP/Provider - [facility]   [] Specialty/Provider -     amLODIPine (NORVASC) 10 mg tablet   10 mg, Daily   90    hydroCHLOROthiazide 12.5 mg tablet   12.5 mg, Daily   90    lisinopril (ZESTRIL) 40 mg tablet   40 mg, Daily   90    Pharmacy: [id number]    Does the patient have enough for 3 days?   [] Yes   [x] No - Send as HP to POD

## 2024-08-22 NOTE — TELEPHONE ENCOUNTER
30 day refill only. Pt came 17 minutes late to her appointment and refused to reschedule. No further refills until she is seen I the office. Please reschedule.

## 2024-08-23 NOTE — TELEPHONE ENCOUNTER
Patient called requesting refill for amLODIPine (NORVASC) 10 mg tablet, hydroCHLOROthiazide 12.5 mg tablet and lisinopril (ZESTRIL) 40 mg tablet  . Patient made aware medication was refilled on 8/22/2024 to [facility]. Patient instructed to contact the pharmacy to obtain refills of medication. Patient verbalized understanding.

## 2024-08-28 ENCOUNTER — REMOTE DEVICE CLINIC VISIT (OUTPATIENT)
Dept: CARDIOLOGY CLINIC | Facility: CLINIC | Age: 59
End: 2024-08-28
Payer: COMMERCIAL

## 2024-08-28 DIAGNOSIS — I63.9 CRYPTOGENIC STROKE (HCC): Primary | ICD-10-CM

## 2024-08-28 PROCEDURE — 93298 REM INTERROG DEV EVAL SCRMS: CPT | Performed by: INTERNAL MEDICINE

## 2024-08-28 NOTE — PROGRESS NOTES
"MDT LNQ22/ ACTIVE SYSTEM IS MRI CONDITIONAL   CARELINK TRANSMISSION: LOOP RECORDER. PRESENTING RHYTHM NSR @ 86 BPM. BATTERY STATUS \"OK.\" NO PATIENT OR DEVICE ACTIVATED EPISODES. NORMAL DEVICE FUNCTION. DL   "

## 2024-09-10 ENCOUNTER — OFFICE VISIT (OUTPATIENT)
Dept: FAMILY MEDICINE CLINIC | Facility: HOSPITAL | Age: 59
End: 2024-09-10
Payer: COMMERCIAL

## 2024-09-10 VITALS
DIASTOLIC BLOOD PRESSURE: 86 MMHG | TEMPERATURE: 97.7 F | BODY MASS INDEX: 23.3 KG/M2 | SYSTOLIC BLOOD PRESSURE: 154 MMHG | HEART RATE: 94 BPM | OXYGEN SATURATION: 96 % | HEIGHT: 66 IN | WEIGHT: 145 LBS

## 2024-09-10 DIAGNOSIS — Z12.31 ENCOUNTER FOR SCREENING MAMMOGRAM FOR BREAST CANCER: ICD-10-CM

## 2024-09-10 DIAGNOSIS — I10 ESSENTIAL HYPERTENSION: Primary | ICD-10-CM

## 2024-09-10 DIAGNOSIS — F41.1 GAD (GENERALIZED ANXIETY DISORDER): ICD-10-CM

## 2024-09-10 DIAGNOSIS — I61.4 CEREBELLAR HEMORRHAGE (HCC): ICD-10-CM

## 2024-09-10 PROCEDURE — 99214 OFFICE O/P EST MOD 30 MIN: CPT | Performed by: NURSE PRACTITIONER

## 2024-09-10 RX ORDER — HYDROCHLOROTHIAZIDE 25 MG/1
25 TABLET ORAL DAILY
Qty: 30 TABLET | Refills: 1 | Status: SHIPPED | OUTPATIENT
Start: 2024-09-10

## 2024-09-10 RX ORDER — ESCITALOPRAM OXALATE 10 MG/1
10 TABLET ORAL DAILY
Qty: 90 TABLET | Refills: 1 | Status: SHIPPED | OUTPATIENT
Start: 2024-09-10

## 2024-09-10 RX ORDER — AMLODIPINE BESYLATE 10 MG/1
10 TABLET ORAL DAILY
Qty: 90 TABLET | Refills: 1 | Status: SHIPPED | OUTPATIENT
Start: 2024-09-10

## 2024-09-10 RX ORDER — LISINOPRIL 40 MG/1
40 TABLET ORAL DAILY
Qty: 90 TABLET | Refills: 1 | Status: SHIPPED | OUTPATIENT
Start: 2024-09-10

## 2024-09-10 NOTE — ASSESSMENT & PLAN NOTE
BP is not controlled.   Increase HCTZ to 25 mg. Continue on lisinopril and amlodipine.   F/U in 4 weeks for BP check.     Orders:    CBC and differential; Future    Comprehensive metabolic panel; Future    Lipid panel; Future    hydroCHLOROthiazide 25 mg tablet; Take 1 tablet (25 mg total) by mouth daily    amLODIPine (NORVASC) 10 mg tablet; Take 1 tablet (10 mg total) by mouth daily    lisinopril (ZESTRIL) 40 mg tablet; Take 1 tablet (40 mg total) by mouth daily    CBC and differential    Comprehensive metabolic panel    Lipid panel

## 2024-09-10 NOTE — ASSESSMENT & PLAN NOTE
Referral for PT and speech as pt has not completed any outpt therapies.   Referred back to neurology and neurosurgery as she has not had any f/u.     Orders:    Ambulatory Referral to Physical Therapy; Future    Ambulatory Referral to Speech Therapy; Future    Ambulatory Referral to Neurology; Future    Ambulatory Referral to Neurosurgery; Future

## 2024-09-10 NOTE — PROGRESS NOTES
Ambulatory Visit  Name: Tanika Lira      : 1965      MRN: [id number]  Encounter Provider: MILAN Miller  Encounter Date: 9/10/2024   Encounter department: [facility] [address]     Assessment & Plan  Essential hypertension  BP is not controlled.   Increase HCTZ to 25 mg. Continue on lisinopril and amlodipine.   F/U in 4 weeks for BP check.     Orders:    CBC and differential; Future    Comprehensive metabolic panel; Future    Lipid panel; Future    hydroCHLOROthiazide 25 mg tablet; Take 1 tablet (25 mg total) by mouth daily    amLODIPine (NORVASC) 10 mg tablet; Take 1 tablet (10 mg total) by mouth daily    lisinopril (ZESTRIL) 40 mg tablet; Take 1 tablet (40 mg total) by mouth daily    CBC and differential    Comprehensive metabolic panel    Lipid panel    Cerebellar hemorrhage (HCC)  Referral for PT and speech as pt has not completed any outpt therapies.   Referred back to neurology and neurosurgery as she has not had any f/u.     Orders:    Ambulatory Referral to Physical Therapy; Future    Ambulatory Referral to Speech Therapy; Future    Ambulatory Referral to Neurology; Future    Ambulatory Referral to Neurosurgery; Future    ROSANA (generalized anxiety disorder)  Anxiety is pretty well controlled. Having depressive symptoms. She is agreeable to going back on lexapro.   F/U in 4 weeks.   Orders:    escitalopram (LEXAPRO) 10 mg tablet; Take 1 tablet (10 mg total) by mouth daily    Encounter for screening mammogram for breast cancer    Orders:    Mammo screening bilateral w 3d and cad; Future         History of Present Illness     Cerebellar hemorrhage in April. Went to acute rehab. Did nothing after that. No further PT, OT, speech. She did not f/u with neurology or neurosurgery. She does have a loop recorder which is scheduled to come out in November. This is scheduled.  She has issues with Balance, slurring. Using walker.   Mood is fine. Feels she is depressed. On lexparo  previously and felt it worked for her. Would be willing to go back on. Anxiety is ok. Was very angry for a awhile but better.   Drinks 1 Twisted tea/day.   Occasional use of marijuana. Denies other drug use.   She does not check BP at home. She reports she is taking 3 BP meds but not sure what they are.       Review of Systems   Eyes:  Negative for visual disturbance.   Respiratory:  Negative for shortness of breath.    Cardiovascular:  Positive for leg swelling. Negative for chest pain and palpitations.   Musculoskeletal:  Positive for gait problem.   Neurological:  Positive for speech difficulty. Negative for dizziness, weakness, light-headedness and headaches.   Psychiatric/Behavioral:  Positive for dysphoric mood. The patient is not nervous/anxious.      Past Medical History:   Diagnosis Date    CVA (cerebral vascular accident) (HCC)     Diverticulitis     Diverticulitis of colon     Diverticulosis     Hypertension     Stroke (HCC)      Past Surgical History:   Procedure Laterality Date    CARDIAC ELECTROPHYSIOLOGY PROCEDURE N/A 2/8/2023    Procedure: Cardiac loop recorder implant;  Surgeon: Duke Abraham MD;  Location: [facility];  Service: Cardiology    COLON SIGMOID RESECTION       Family History   Problem Relation Age of Onset    Coronary artery disease Mother     Heart disease Mother     Diabetes Father     No Known Problems Sister     Breast cancer Maternal Grandmother         age unknown    No Known Problems Maternal Grandfather     No Known Problems Paternal Grandmother     No Known Problems Paternal Grandfather     No Known Problems Paternal Aunt     No Known Problems Paternal Aunt     No Known Problems Maternal Aunt      Social History     Tobacco Use    Smoking status: Light Smoker     Types: Cigarettes    Smokeless tobacco: Never    Tobacco comments:     smokes 4 cigarettes daily   Vaping Use    Vaping status: Never Used   Substance and Sexual Activity    Alcohol use: Yes     Comment:  "\"twisted tea daily\"\"    Drug use: Yes     Types: Marijuana     Comment: medical card    Sexual activity: Not on file     Current Outpatient Medications on File Prior to Visit   Medication Sig    [DISCONTINUED] amLODIPine (NORVASC) 10 mg tablet Take 1 tablet (10 mg total) by mouth daily    [DISCONTINUED] lisinopril (ZESTRIL) 40 mg tablet Take 1 tablet (40 mg total) by mouth daily    hydrOXYzine HCL (ATARAX) 25 mg tablet Take 1 tablet (25 mg total) by mouth 2 (two) times a day as needed for anxiety (Patient not taking: Reported on 9/10/2024)    pantoprazole (PROTONIX) 40 mg tablet Take 1 tablet (40 mg total) by mouth 2 (two) times a day before meals (Patient not taking: Reported on 9/10/2024)    [DISCONTINUED] Blood Pressure Monitoring (Blood Pressure Cuff) MISC Use daily    [DISCONTINUED] cholecalciferol 1,000 units tablet Take 2 tablets (2,000 Units total) by mouth daily (Patient not taking: Reported on 9/10/2024)    [DISCONTINUED] escitalopram (LEXAPRO) 10 mg tablet Take 1 tablet (10 mg total) by mouth daily (Patient not taking: Reported on 9/10/2024)    [DISCONTINUED] folic acid (FOLVITE) 1 mg tablet Take 1 tablet (1 mg total) by mouth daily (Patient not taking: Reported on 9/10/2024)    [DISCONTINUED] gabapentin (NEURONTIN) 300 mg capsule Take 1 capsule (300 mg total) by mouth daily at bedtime (Patient not taking: Reported on 9/10/2024)    [DISCONTINUED] hydroCHLOROthiazide 12.5 mg tablet Take 1 tablet (12.5 mg total) by mouth daily    [DISCONTINUED] loratadine (CLARITIN) 10 mg tablet Take 1 tablet (10 mg total) by mouth daily as needed for allergies Over the counter (Patient not taking: Reported on 9/10/2024)    [DISCONTINUED] magnesium Oxide (MAG-OX) 400 mg TABS Take 2 tablets (800 mg total) by mouth 3 (three) times a day with meals (Patient not taking: Reported on 9/10/2024)    [DISCONTINUED] melatonin 3 mg Take 1 tablet (3 mg total) by mouth daily at bedtime as needed (Insomnia) Over the counter (Patient not " "taking: Reported on 9/10/2024)    [DISCONTINUED] thiamine 100 MG tablet Take 1 tablet (100 mg total) by mouth daily Over the counter (Patient not taking: Reported on 9/10/2024)     No Known Allergies  Immunization History   Administered Date(s) Administered    INFLUENZA 12/09/2010, 10/16/2012, 09/22/2015, 11/17/2016, 02/01/2018, 10/18/2022    Influenza, recombinant, quadrivalent,injectable, preservative free 10/18/2022    Tdap 10/10/2017     Objective     /86 (BP Location: Left arm, Patient Position: Sitting, Cuff Size: Standard)   Pulse 94   Temp 97.7 °F (36.5 °C) (Tympanic)   Ht 5' 6\" (1.676 m)   Wt 65.8 kg (145 lb)   SpO2 96%   BMI 23.40 kg/m²     Physical Exam  Vitals reviewed.   Constitutional:       Appearance: Normal appearance.   Cardiovascular:      Rate and Rhythm: Normal rate and regular rhythm.      Heart sounds: Normal heart sounds. No murmur heard.  Pulmonary:      Effort: Pulmonary effort is normal.      Breath sounds: Normal breath sounds.   Skin:     General: Skin is warm and dry.   Neurological:      Mental Status: She is alert and oriented to person, place, and time.   Psychiatric:         Mood and Affect: Mood normal.         Behavior: Behavior normal.         Thought Content: Thought content normal.         Judgment: Judgment normal.       Administrative Statements   I have spent a total time of 20 minutes in caring for this patient on the day of the visit/encounter including Instructions for management, Importance of tx compliance, Risk factor reductions, Documenting in the medical record, Reviewing / ordering tests, medicine, procedures  , and Obtaining or reviewing history  .  "

## 2024-09-10 NOTE — ASSESSMENT & PLAN NOTE
Anxiety is pretty well controlled. Having depressive symptoms. She is agreeable to going back on lexapro.   F/U in 4 weeks.   Orders:    escitalopram (LEXAPRO) 10 mg tablet; Take 1 tablet (10 mg total) by mouth daily

## 2024-10-31 ENCOUNTER — TELEPHONE (OUTPATIENT)
Age: 59
End: 2024-10-31

## 2024-10-31 NOTE — TELEPHONE ENCOUNTER
Pt requested to have her labs faxed to [facility] in [place],  faxed to  [phone].    viktoria

## 2024-11-02 LAB
ALBUMIN SERPL-MCNC: 3.9 G/DL (ref 3.8–4.9)
ALP SERPL-CCNC: 120 IU/L (ref 44–121)
ALT SERPL-CCNC: 157 IU/L (ref 0–32)
AST SERPL-CCNC: 157 IU/L (ref 0–40)
BASOPHILS # BLD AUTO: 0 X10E3/UL (ref 0–0.2)
BASOPHILS NFR BLD AUTO: 0 %
BILIRUB SERPL-MCNC: 0.6 MG/DL (ref 0–1.2)
BUN SERPL-MCNC: 18 MG/DL (ref 6–24)
BUN/CREAT SERPL: 20 (ref 9–23)
CALCIUM SERPL-MCNC: 9.4 MG/DL (ref 8.7–10.2)
CHLORIDE SERPL-SCNC: 102 MMOL/L (ref 96–106)
CHOLEST SERPL-MCNC: 153 MG/DL (ref 100–199)
CHOLEST/HDLC SERPL: 2.9 RATIO (ref 0–4.4)
CO2 SERPL-SCNC: 26 MMOL/L (ref 20–29)
CREAT SERPL-MCNC: 0.89 MG/DL (ref 0.57–1)
EGFR: 75 ML/MIN/1.73
EOSINOPHIL # BLD AUTO: 0.1 X10E3/UL (ref 0–0.4)
EOSINOPHIL NFR BLD AUTO: 2 %
ERYTHROCYTE [DISTWIDTH] IN BLOOD BY AUTOMATED COUNT: 11.5 % (ref 11.7–15.4)
GLOBULIN SER-MCNC: 4.1 G/DL (ref 1.5–4.5)
GLUCOSE SERPL-MCNC: 113 MG/DL (ref 70–99)
HCT VFR BLD AUTO: 42.8 % (ref 34–46.6)
HDLC SERPL-MCNC: 52 MG/DL
HGB BLD-MCNC: 14.4 G/DL (ref 11.1–15.9)
IMM GRANULOCYTES # BLD: 0 X10E3/UL (ref 0–0.1)
IMM GRANULOCYTES NFR BLD: 0 %
LDLC SERPL CALC-MCNC: 88 MG/DL (ref 0–99)
LYMPHOCYTES # BLD AUTO: 3 X10E3/UL (ref 0.7–3.1)
LYMPHOCYTES NFR BLD AUTO: 42 %
MCH RBC QN AUTO: 32.7 PG (ref 26.6–33)
MCHC RBC AUTO-ENTMCNC: 33.6 G/DL (ref 31.5–35.7)
MCV RBC AUTO: 97 FL (ref 79–97)
MONOCYTES # BLD AUTO: 1.1 X10E3/UL (ref 0.1–0.9)
MONOCYTES NFR BLD AUTO: 15 %
NEUTROPHILS # BLD AUTO: 3 X10E3/UL (ref 1.4–7)
NEUTROPHILS NFR BLD AUTO: 41 %
PLATELET # BLD AUTO: 163 X10E3/UL (ref 150–450)
POTASSIUM SERPL-SCNC: 4.5 MMOL/L (ref 3.5–5.2)
PROT SERPL-MCNC: 8 G/DL (ref 6–8.5)
RBC # BLD AUTO: 4.4 X10E6/UL (ref 3.77–5.28)
SL AMB VLDL CHOLESTEROL CALC: 13 MG/DL (ref 5–40)
SODIUM SERPL-SCNC: 140 MMOL/L (ref 134–144)
TRIGL SERPL-MCNC: 66 MG/DL (ref 0–149)
WBC # BLD AUTO: 7.3 X10E3/UL (ref 3.4–10.8)

## 2024-11-04 ENCOUNTER — TELEPHONE (OUTPATIENT)
Dept: FAMILY MEDICINE CLINIC | Facility: HOSPITAL | Age: 59
End: 2024-11-04

## 2024-11-04 NOTE — TELEPHONE ENCOUNTER
----- Message from MILAN Miller sent at 11/4/2024  7:29 AM EST -----  Pt is overdue for her 4 week BP check. Please call and get this scheduled and we will discuss blood work then.

## 2024-11-05 NOTE — TELEPHONE ENCOUNTER
LEFT DETAILED MESSAGE THAT PATIENT NEEDS TO CALL BACK AND MAKE AN APPT FOR A BLOOD PRESSURE CHECK TO GO OVER BLOOD WORK AS WELL

## 2024-11-12 ENCOUNTER — REMOTE DEVICE CLINIC VISIT (OUTPATIENT)
Dept: CARDIOLOGY CLINIC | Facility: CLINIC | Age: 59
End: 2024-11-12
Payer: COMMERCIAL

## 2024-11-12 DIAGNOSIS — I63.9 CRYPTOGENIC STROKE (HCC): Primary | ICD-10-CM

## 2024-11-12 PROCEDURE — 93298 REM INTERROG DEV EVAL SCRMS: CPT | Performed by: INTERNAL MEDICINE

## 2024-11-12 NOTE — PROGRESS NOTES
"MDT LNQ22/ ACTIVE SYSTEM IS MRI CONDITIONAL  CARELINK TRANSMISSION: LOOP RECORDER. PRESENTING RHYTHM ST @ 100 BPM. BATTERY STATUS \"OK.\" NO PATIENT OR DEVICE ACTIVATED EPISODES. NORMAL DEVICE FUNCTION. DL                 "

## 2024-11-19 ENCOUNTER — TELEPHONE (OUTPATIENT)
Dept: FAMILY MEDICINE CLINIC | Facility: HOSPITAL | Age: 59
End: 2024-11-19

## 2024-11-19 NOTE — TELEPHONE ENCOUNTER
----- Message from Marisol GLEASON sent at 11/14/2024 12:05 PM EST -----  LM FOR CB  ----- Message -----  From: MILAN Miller  Sent: 11/13/2024   9:12 AM EST  To: [facility] [address] Clerical    Please schedule BP check. She was supposed to f/u in 4 weeks and has not.

## 2024-11-27 ENCOUNTER — OFFICE VISIT (OUTPATIENT)
Dept: FAMILY MEDICINE CLINIC | Facility: HOSPITAL | Age: 59
End: 2024-11-27
Payer: COMMERCIAL

## 2024-11-27 VITALS
WEIGHT: 146.2 LBS | BODY MASS INDEX: 23.5 KG/M2 | OXYGEN SATURATION: 96 % | HEART RATE: 80 BPM | SYSTOLIC BLOOD PRESSURE: 152 MMHG | HEIGHT: 66 IN | TEMPERATURE: 97.5 F | DIASTOLIC BLOOD PRESSURE: 90 MMHG

## 2024-11-27 DIAGNOSIS — K30 INDIGESTION: ICD-10-CM

## 2024-11-27 DIAGNOSIS — N95.8 SIMPLE ADNEXAL CYST GREATER THAN 1 CM IN DIAMETER IN POSTMENOPAUSAL PATIENT: ICD-10-CM

## 2024-11-27 DIAGNOSIS — B18.2 CHRONIC HEPATITIS C WITHOUT HEPATIC COMA (HCC): ICD-10-CM

## 2024-11-27 DIAGNOSIS — I10 ESSENTIAL HYPERTENSION: Primary | ICD-10-CM

## 2024-11-27 DIAGNOSIS — N94.89 SIMPLE ADNEXAL CYST GREATER THAN 1 CM IN DIAMETER IN POSTMENOPAUSAL PATIENT: ICD-10-CM

## 2024-11-27 DIAGNOSIS — R10.9 ABDOMINAL DISCOMFORT: ICD-10-CM

## 2024-11-27 DIAGNOSIS — M79.89 LEG SWELLING: ICD-10-CM

## 2024-11-27 DIAGNOSIS — F41.1 GAD (GENERALIZED ANXIETY DISORDER): ICD-10-CM

## 2024-11-27 PROBLEM — N83.201 CYST OF RIGHT OVARY: Status: ACTIVE | Noted: 2024-11-27

## 2024-11-27 PROCEDURE — 99214 OFFICE O/P EST MOD 30 MIN: CPT | Performed by: NURSE PRACTITIONER

## 2024-11-27 RX ORDER — LISINOPRIL 40 MG/1
40 TABLET ORAL DAILY
Qty: 90 TABLET | Refills: 1 | Status: SHIPPED | OUTPATIENT
Start: 2024-11-27

## 2024-11-27 RX ORDER — PANTOPRAZOLE SODIUM 40 MG/1
40 TABLET, DELAYED RELEASE ORAL
Qty: 30 TABLET | Refills: 0 | Status: SHIPPED | OUTPATIENT
Start: 2024-11-27

## 2024-11-27 RX ORDER — ESCITALOPRAM OXALATE 10 MG/1
10 TABLET ORAL DAILY
Qty: 90 TABLET | Refills: 1 | Status: SHIPPED | OUTPATIENT
Start: 2024-11-27

## 2024-11-27 RX ORDER — AMLODIPINE BESYLATE 10 MG/1
10 TABLET ORAL DAILY
Qty: 90 TABLET | Refills: 1 | Status: SHIPPED | OUTPATIENT
Start: 2024-11-27

## 2024-11-27 RX ORDER — HYDROCHLOROTHIAZIDE 25 MG/1
25 TABLET ORAL DAILY
Qty: 30 TABLET | Refills: 1 | Status: SHIPPED | OUTPATIENT
Start: 2024-11-27

## 2024-11-27 NOTE — PROGRESS NOTES
Name: Tanika Lira      : 1965      MRN: [id number]  Encounter Provider: MILAN Miller  Encounter Date: 2024   Encounter department: [facility] C[facility] [address]   :  Assessment & Plan  Essential hypertension  BP is elevated although she is not taking any of her medications because she ran out.   Discussed that she should always call the office to get refills and not stop her medication.   She will resume previous doses and I will have her f/u in 2 weeks for BP check.   Orders:    amLODIPine (NORVASC) 10 mg tablet; Take 1 tablet (10 mg total) by mouth daily    hydroCHLOROthiazide 25 mg tablet; Take 1 tablet (25 mg total) by mouth daily    lisinopril (ZESTRIL) 40 mg tablet; Take 1 tablet (40 mg total) by mouth daily    Chronic hepatitis C without hepatic coma (HCC)  Never treated although she has been referred to GI.   Will place referral again.   Orders:    Ambulatory Referral to Gastroenterology; Future    ROSANA (generalized anxiety disorder)  Resume lexapro at previous dose.   Orders:    escitalopram (LEXAPRO) 10 mg tablet; Take 1 tablet (10 mg total) by mouth daily    Leg swelling  May be from not taking HCTZ vs amlodipine.   Will have her resume HCTZ.   F/U in 2 weeks to reassess.        Simple adnexal cyst greater than 1 cm in diameter in postmenopausal patient  Incidental finsing on CT from march.   US was recommended in 6 months.   This was ordered.   Orders:    US pelvis complete w transvaginal; Future    Abdominal discomfort    Orders:    pantoprazole (PROTONIX) 40 mg tablet; Take 1 tablet (40 mg total) by mouth 2 (two) times a day before meals    Indigestion    Orders:    pantoprazole (PROTONIX) 40 mg tablet; Take 1 tablet (40 mg total) by mouth 2 (two) times a day before meals           History of Present Illness     She has not taken any of her meds for about 1 week. Ran out and reports there was no refill. She figured she could discuss at this appointment. Having  "issues with leg swelling L > R. Has neuropathy.   Has hep C and never treated. Never scheduled with GI. Liver enzymes are elevated but improved. Drinks 1 twisted tea/week.       Review of Systems   Constitutional:  Negative for fatigue and unexpected weight change.   Eyes:  Negative for visual disturbance.   Respiratory:  Negative for shortness of breath.    Cardiovascular:  Positive for leg swelling. Negative for chest pain and palpitations.   Neurological:  Negative for dizziness, syncope, light-headedness and headaches.   Psychiatric/Behavioral:  The patient is nervous/anxious.           Objective   /90 (Patient Position: Sitting, Cuff Size: Standard)   Pulse 80   Temp 97.5 °F (36.4 °C) (Tympanic)   Ht 5' 6\" (1.676 m)   Wt 66.3 kg (146 lb 3.2 oz)   SpO2 96%   BMI 23.60 kg/m²      Physical Exam  Vitals reviewed.   Constitutional:       Appearance: Normal appearance. She is well-developed.   Cardiovascular:      Rate and Rhythm: Normal rate and regular rhythm.      Heart sounds: Normal heart sounds. No murmur heard.  Pulmonary:      Effort: Pulmonary effort is normal.      Breath sounds: Normal breath sounds.   Musculoskeletal:      Right lower le+ Edema present.      Left lower le+ Pitting Edema present.   Skin:     General: Skin is warm and dry.   Neurological:      Mental Status: She is alert and oriented to person, place, and time.   Psychiatric:         Mood and Affect: Mood normal.         Behavior: Behavior normal.         Thought Content: Thought content normal.         Judgment: Judgment normal.         "

## 2024-11-27 NOTE — ASSESSMENT & PLAN NOTE
Orders:    pantoprazole (PROTONIX) 40 mg tablet; Take 1 tablet (40 mg total) by mouth 2 (two) times a day before meals

## 2024-11-27 NOTE — ASSESSMENT & PLAN NOTE
Resume lexapro at previous dose.   Orders:    escitalopram (LEXAPRO) 10 mg tablet; Take 1 tablet (10 mg total) by mouth daily

## 2024-11-27 NOTE — ASSESSMENT & PLAN NOTE
BP is elevated although she is not taking any of her medications because she ran out.   Discussed that she should always call the office to get refills and not stop her medication.   She will resume previous doses and I will have her f/u in 2 weeks for BP check.   Orders:    amLODIPine (NORVASC) 10 mg tablet; Take 1 tablet (10 mg total) by mouth daily    hydroCHLOROthiazide 25 mg tablet; Take 1 tablet (25 mg total) by mouth daily    lisinopril (ZESTRIL) 40 mg tablet; Take 1 tablet (40 mg total) by mouth daily

## 2024-11-27 NOTE — ASSESSMENT & PLAN NOTE
Incidental finsing on CT from march.   US was recommended in 6 months.   This was ordered.   Orders:    US pelvis complete w transvaginal; Future

## 2024-11-27 NOTE — ASSESSMENT & PLAN NOTE
Never treated although she has been referred to GI.   Will place referral again.   Orders:    Ambulatory Referral to Gastroenterology; Future

## 2024-12-17 ENCOUNTER — OFFICE VISIT (OUTPATIENT)
Dept: FAMILY MEDICINE CLINIC | Facility: HOSPITAL | Age: 59
End: 2024-12-17
Payer: COMMERCIAL

## 2024-12-17 VITALS
DIASTOLIC BLOOD PRESSURE: 84 MMHG | BODY MASS INDEX: 23.05 KG/M2 | TEMPERATURE: 97.7 F | WEIGHT: 143.4 LBS | SYSTOLIC BLOOD PRESSURE: 136 MMHG | HEIGHT: 66 IN | HEART RATE: 90 BPM | OXYGEN SATURATION: 97 %

## 2024-12-17 DIAGNOSIS — I10 ESSENTIAL HYPERTENSION: Primary | ICD-10-CM

## 2024-12-17 DIAGNOSIS — Z12.31 ENCOUNTER FOR SCREENING MAMMOGRAM FOR BREAST CANCER: ICD-10-CM

## 2024-12-17 PROCEDURE — 99213 OFFICE O/P EST LOW 20 MIN: CPT | Performed by: NURSE PRACTITIONER

## 2024-12-17 NOTE — ASSESSMENT & PLAN NOTE
BP is better now that she is back on antihypertensive regimen.   Continue with this.   Plan to f/u in 6 months.

## 2024-12-17 NOTE — PROGRESS NOTES
"Name: Tanika Lira      : 1965      MRN: [id number]  Encounter Provider: MILAN Miller  Encounter Date: 2024   Encounter department: [facility] [address]   :  Assessment & Plan  Essential hypertension  BP is better now that she is back on antihypertensive regimen.   Continue with this.   Plan to f/u in 6 months.        Encounter for screening mammogram for breast cancer    Orders:    Mammo screening bilateral w 3d and cad; Future           History of Present Illness     Has restarted her medications again. Feeling good.       Review of Systems   Constitutional:  Negative for fatigue and unexpected weight change.   Eyes:  Negative for visual disturbance.   Respiratory:  Negative for shortness of breath.    Cardiovascular:  Negative for chest pain, palpitations and leg swelling.   Neurological:  Negative for dizziness, syncope, light-headedness and headaches.       Objective   /84 (Patient Position: Sitting, Cuff Size: Standard)   Pulse 90   Temp 97.7 °F (36.5 °C) (Tympanic)   Ht 5' 6\" (1.676 m)   Wt 65 kg (143 lb 6.4 oz)   SpO2 97%   BMI 23.15 kg/m²      Physical Exam  Vitals reviewed.   Constitutional:       Appearance: Normal appearance. She is well-developed.   Cardiovascular:      Rate and Rhythm: Normal rate and regular rhythm.      Heart sounds: Normal heart sounds. No murmur heard.  Pulmonary:      Effort: Pulmonary effort is normal.      Breath sounds: Normal breath sounds.   Skin:     General: Skin is warm and dry.   Neurological:      Mental Status: She is alert and oriented to person, place, and time.   Psychiatric:         Mood and Affect: Mood normal.         Behavior: Behavior normal.         Thought Content: Thought content normal.         Judgment: Judgment normal.         "

## 2025-01-28 DIAGNOSIS — R10.9 ABDOMINAL DISCOMFORT: ICD-10-CM

## 2025-01-28 DIAGNOSIS — K30 INDIGESTION: ICD-10-CM

## 2025-01-29 RX ORDER — PANTOPRAZOLE SODIUM 40 MG/1
40 TABLET, DELAYED RELEASE ORAL
Qty: 60 TABLET | Refills: 1 | Status: SHIPPED | OUTPATIENT
Start: 2025-01-29

## 2025-04-13 DIAGNOSIS — I10 ESSENTIAL HYPERTENSION: ICD-10-CM

## 2025-04-14 RX ORDER — HYDROCHLOROTHIAZIDE 25 MG/1
25 TABLET ORAL DAILY
Qty: 30 TABLET | Refills: 5 | Status: SHIPPED | OUTPATIENT
Start: 2025-04-14

## 2025-06-17 ENCOUNTER — TELEPHONE (OUTPATIENT)
Dept: FAMILY MEDICINE CLINIC | Facility: HOSPITAL | Age: 60
End: 2025-06-17

## 2025-08-05 ENCOUNTER — REMOTE DEVICE CLINIC VISIT (OUTPATIENT)
Dept: CARDIOLOGY CLINIC | Facility: CLINIC | Age: 60
End: 2025-08-05

## 2025-08-05 DIAGNOSIS — I63.9 CRYPTOGENIC STROKE (HCC): Primary | ICD-10-CM

## 2025-08-05 PROCEDURE — 93298 REM INTERROG DEV EVAL SCRMS: CPT | Performed by: INTERNAL MEDICINE
